# Patient Record
Sex: FEMALE | Race: BLACK OR AFRICAN AMERICAN | NOT HISPANIC OR LATINO | Employment: OTHER | URBAN - METROPOLITAN AREA
[De-identification: names, ages, dates, MRNs, and addresses within clinical notes are randomized per-mention and may not be internally consistent; named-entity substitution may affect disease eponyms.]

---

## 2017-01-20 ENCOUNTER — HISTORICAL (OUTPATIENT)
Dept: ADMINISTRATIVE | Facility: HOSPITAL | Age: 72
End: 2017-01-20

## 2017-03-17 ENCOUNTER — HISTORICAL (OUTPATIENT)
Dept: ADMINISTRATIVE | Facility: HOSPITAL | Age: 72
End: 2017-03-17

## 2017-04-20 ENCOUNTER — HISTORICAL (OUTPATIENT)
Dept: ADMINISTRATIVE | Facility: HOSPITAL | Age: 72
End: 2017-04-20

## 2017-06-05 ENCOUNTER — HISTORICAL (OUTPATIENT)
Dept: INFUSION THERAPY | Facility: HOSPITAL | Age: 72
End: 2017-06-05

## 2017-06-12 ENCOUNTER — HISTORICAL (OUTPATIENT)
Dept: INFUSION THERAPY | Facility: HOSPITAL | Age: 72
End: 2017-06-12

## 2017-06-19 ENCOUNTER — HISTORICAL (OUTPATIENT)
Dept: INFUSION THERAPY | Facility: HOSPITAL | Age: 72
End: 2017-06-19

## 2017-06-21 ENCOUNTER — HISTORICAL (OUTPATIENT)
Dept: ADMINISTRATIVE | Facility: HOSPITAL | Age: 72
End: 2017-06-21

## 2017-06-21 LAB
ABS NEUT (OLG): 5.37 X10(3)/MCL (ref 2.1–9.2)
ALBUMIN SERPL-MCNC: 2.9 GM/DL (ref 3.4–5)
ALBUMIN/GLOB SERPL: 1 RATIO (ref 1–2)
ALP SERPL-CCNC: 74 UNIT/L (ref 20–120)
ALT SERPL-CCNC: 17 UNIT/L
AST SERPL-CCNC: 20 UNIT/L
BASOPHILS # BLD AUTO: 0.04 X10(3)/MCL
BASOPHILS NFR BLD AUTO: 0 % (ref 0–1)
BILIRUB SERPL-MCNC: 0.4 MG/DL
BILIRUBIN DIRECT+TOT PNL SERPL-MCNC: <0.1 MG/DL
BILIRUBIN DIRECT+TOT PNL SERPL-MCNC: >0.3 MG/DL
BUN SERPL-MCNC: 32 MG/DL (ref 7–25)
CALCIUM SERPL-MCNC: 9 MG/DL (ref 8.4–10.3)
CHLORIDE SERPL-SCNC: 105 MMOL/L (ref 96–110)
CHOLEST SERPL-MCNC: 168 MG/DL
CHOLEST/HDLC SERPL: 3.6 {RATIO} (ref 0–4.4)
CO2 SERPL-SCNC: 30 MMOL/L (ref 24–32)
CREAT SERPL-MCNC: 3.47 MG/DL (ref 0.7–1.1)
EOSINOPHIL # BLD AUTO: 0.09 10*3/UL
EOSINOPHIL NFR BLD AUTO: 1 % (ref 0–5)
ERYTHROCYTE [DISTWIDTH] IN BLOOD BY AUTOMATED COUNT: 15.1 % (ref 11.5–14.5)
EST. AVERAGE GLUCOSE BLD GHB EST-MCNC: 183 MG/DL
GLOBULIN SER-MCNC: 4.3 GM/ML (ref 2.3–3.5)
GLUCOSE SERPL-MCNC: 158 MG/DL (ref 65–99)
HBA1C MFR BLD: 8 % (ref 4.7–5.6)
HCT VFR BLD AUTO: 31.9 % (ref 35–46)
HDLC SERPL-MCNC: 47 MG/DL
HGB BLD-MCNC: 9.7 GM/DL (ref 12–16)
IMM GRANULOCYTES # BLD AUTO: 0.02 10*3/UL
IMM GRANULOCYTES NFR BLD AUTO: 0 %
LDLC SERPL CALC-MCNC: 100 MG/DL (ref 0–130)
LYMPHOCYTES # BLD AUTO: 1.8 X10(3)/MCL
LYMPHOCYTES NFR BLD AUTO: 23 % (ref 15–40)
MCH RBC QN AUTO: 26.1 PG (ref 26–34)
MCHC RBC AUTO-ENTMCNC: 30.4 GM/DL (ref 31–37)
MCV RBC AUTO: 86 FL (ref 80–100)
MONOCYTES # BLD AUTO: 0.44 X10(3)/MCL
MONOCYTES NFR BLD AUTO: 6 % (ref 4–12)
NEUTROPHILS # BLD AUTO: 5.37 X10(3)/MCL
NEUTROPHILS NFR BLD AUTO: 69 X10(3)/MCL
PLATELET # BLD AUTO: 232 X10(3)/MCL (ref 130–400)
PMV BLD AUTO: 10.8 FL (ref 7.4–10.4)
POTASSIUM SERPL-SCNC: 3.7 MMOL/L (ref 3.6–5.2)
PROT SERPL-MCNC: 7.2 GM/DL (ref 6–8)
RBC # BLD AUTO: 3.71 X10(6)/MCL (ref 4–5.2)
SODIUM SERPL-SCNC: 142 MMOL/L (ref 135–146)
TRIGL SERPL-MCNC: 103 MG/DL
TSH SERPL-ACNC: 0.85 MIU/L (ref 0.5–5)
VLDLC SERPL CALC-MCNC: 21 MG/DL
WBC # SPEC AUTO: 7.8 X10(3)/MCL (ref 4.5–11)

## 2017-06-26 ENCOUNTER — HISTORICAL (OUTPATIENT)
Dept: INFUSION THERAPY | Facility: HOSPITAL | Age: 72
End: 2017-06-26

## 2017-07-28 ENCOUNTER — HISTORICAL (OUTPATIENT)
Dept: ADMINISTRATIVE | Facility: HOSPITAL | Age: 72
End: 2017-07-28

## 2017-07-28 LAB
ABS NEUT (OLG): 4.44 X10(3)/MCL (ref 2.1–9.2)
ALBUMIN SERPL-MCNC: 2.5 GM/DL (ref 3.4–5)
ALBUMIN/GLOB SERPL: 1 RATIO (ref 1–2)
ALP SERPL-CCNC: 92 UNIT/L (ref 45–117)
ALT SERPL-CCNC: 19 UNIT/L (ref 12–78)
APPEARANCE, UA: ABNORMAL
AST SERPL-CCNC: 15 UNIT/L (ref 15–37)
BACTERIA #/AREA URNS AUTO: ABNORMAL /[HPF]
BASOPHILS # BLD AUTO: 0.03 X10(3)/MCL
BASOPHILS NFR BLD AUTO: 0 % (ref 0–1)
BILIRUB SERPL-MCNC: 0.2 MG/DL (ref 0.2–1)
BILIRUB UR QL STRIP: 1
BILIRUBIN DIRECT+TOT PNL SERPL-MCNC: <0.1 MG/DL
BILIRUBIN DIRECT+TOT PNL SERPL-MCNC: >0.1 MG/DL
BUN SERPL-MCNC: 45 MG/DL (ref 7–18)
CALCIUM SERPL-MCNC: 8.5 MG/DL (ref 8.5–10.1)
CHLORIDE SERPL-SCNC: 105 MMOL/L (ref 98–107)
CO2 SERPL-SCNC: 27 MMOL/L (ref 21–32)
COLOR UR: YELLOW
CREAT SERPL-MCNC: 4.6 MG/DL (ref 0.6–1.3)
CREAT UR-MCNC: 293 MG/DL
DEPRECATED CALCIDIOL+CALCIFEROL SERPL-MC: 23.31 NG/ML (ref 30–80)
EOSINOPHIL # BLD AUTO: 0.09 10*3/UL
EOSINOPHIL NFR BLD AUTO: 1 % (ref 0–5)
ERYTHROCYTE [DISTWIDTH] IN BLOOD BY AUTOMATED COUNT: 15.3 % (ref 11.5–14.5)
FERRITIN SERPL-MCNC: 455.3 NG/ML (ref 8–388)
GLOBULIN SER-MCNC: 4.5 GM/ML (ref 2.3–3.5)
GLUCOSE (UA): 50 MG/DL
GLUCOSE SERPL-MCNC: 311 MG/DL (ref 74–106)
HCT VFR BLD AUTO: 31.6 % (ref 35–46)
HGB BLD-MCNC: 9.9 GM/DL (ref 12–16)
HGB UR QL STRIP: ABNORMAL /MCL
HYALINE CASTS #/AREA URNS LPF: 0 /[LPF]
IMM GRANULOCYTES # BLD AUTO: 0.01 10*3/UL
IMM GRANULOCYTES NFR BLD AUTO: 0 %
IRON SATN MFR SERPL: 20.9 % (ref 15–50)
IRON SERPL-MCNC: 45 MCG/DL (ref 50–170)
KETONES UR QL STRIP: ABNORMAL MG/DL
LEUKOCYTE ESTERASE UR QL STRIP: ABNORMAL /MCL
LYMPHOCYTES # BLD AUTO: 2 X10(3)/MCL
LYMPHOCYTES NFR BLD AUTO: 28 % (ref 15–40)
MAGNESIUM SERPL-MCNC: 2.3 MG/DL (ref 1.8–2.4)
MCH RBC QN AUTO: 26.6 PG (ref 26–34)
MCHC RBC AUTO-ENTMCNC: 31.3 GM/DL (ref 31–37)
MCV RBC AUTO: 84.9 FL (ref 80–100)
MONOCYTES # BLD AUTO: 0.51 X10(3)/MCL
MONOCYTES NFR BLD AUTO: 7 % (ref 4–12)
NEUTROPHILS # BLD AUTO: 4.44 X10(3)/MCL
NEUTROPHILS NFR BLD AUTO: 63 X10(3)/MCL
NITRITE UR QL STRIP: NEGATIVE
PH UR STRIP: 5 [PH] (ref 4.5–8)
PHOSPHATE SERPL-MCNC: 3.1 MG/DL (ref 2.5–4.9)
PLATELET # BLD AUTO: 198 X10(3)/MCL (ref 130–400)
PMV BLD AUTO: 11.1 FL (ref 7.4–10.4)
POTASSIUM SERPL-SCNC: 3.6 MMOL/L (ref 3.5–5.1)
PROT SERPL-MCNC: 7 GM/DL (ref 6.4–8.2)
PROT UR QL STRIP: 500
PROT UR STRIP-MCNC: 897.1 MG/DL
PROT/CREAT UR-RTO: 3061.8 MG/GM
PTH-INTACT SERPL-MCNC: 103.4 PG/ML (ref 13.8–85)
RBC # BLD AUTO: 3.72 X10(6)/MCL (ref 4–5.2)
RBC #/AREA URNS AUTO: ABNORMAL /[HPF]
SODIUM SERPL-SCNC: 141 MMOL/L (ref 136–145)
SP GR UR STRIP: 1.02 (ref 1–1.03)
SQUAMOUS #/AREA URNS LPF: ABNORMAL /[LPF]
TIBC SERPL-MCNC: 215 MCG/DL (ref 250–450)
TRANSFERRIN SERPL-MCNC: 184 MG/DL (ref 200–360)
UROBILINOGEN UR STRIP-ACNC: ABNORMAL MG/DL
WBC # SPEC AUTO: 7.1 X10(3)/MCL (ref 4.5–11)
WBC #/AREA URNS AUTO: ABNORMAL /HPF

## 2017-09-26 ENCOUNTER — HISTORICAL (OUTPATIENT)
Dept: ADMINISTRATIVE | Facility: HOSPITAL | Age: 72
End: 2017-09-26

## 2017-09-26 LAB
ABS NEUT (OLG): 4.39 X10(3)/MCL (ref 2.1–9.2)
ALBUMIN SERPL-MCNC: 2.9 GM/DL (ref 3.4–5)
ALBUMIN/GLOB SERPL: 1 RATIO (ref 1–2)
ALP SERPL-CCNC: 105 UNIT/L (ref 45–117)
ALT SERPL-CCNC: 19 UNIT/L (ref 12–78)
APPEARANCE, UA: ABNORMAL
AST SERPL-CCNC: 17 UNIT/L (ref 15–37)
BACTERIA #/AREA URNS AUTO: ABNORMAL /[HPF]
BASOPHILS # BLD AUTO: 0.03 X10(3)/MCL
BASOPHILS NFR BLD AUTO: 0 % (ref 0–1)
BILIRUB SERPL-MCNC: 0.3 MG/DL (ref 0.2–1)
BILIRUB UR QL STRIP: 0.5 MG/DL
BILIRUBIN DIRECT+TOT PNL SERPL-MCNC: <0.1 MG/DL
BILIRUBIN DIRECT+TOT PNL SERPL-MCNC: >0.2 MG/DL
BUN SERPL-MCNC: 41 MG/DL (ref 7–18)
CALCIUM SERPL-MCNC: 9.2 MG/DL (ref 8.5–10.1)
CHLORIDE SERPL-SCNC: 108 MMOL/L (ref 98–107)
CO2 SERPL-SCNC: 32 MMOL/L (ref 21–32)
COLOR UR: YELLOW
CREAT SERPL-MCNC: 4.5 MG/DL (ref 0.6–1.3)
CREAT UR-MCNC: 375 MG/DL
DEPRECATED CALCIDIOL+CALCIFEROL SERPL-MC: 39.51 NG/ML (ref 30–80)
EOSINOPHIL # BLD AUTO: 0.12 X10(3)/MCL
EOSINOPHIL NFR BLD AUTO: 2 % (ref 0–5)
ERYTHROCYTE [DISTWIDTH] IN BLOOD BY AUTOMATED COUNT: 15.4 % (ref 11.5–14.5)
FERRITIN SERPL-MCNC: 405.1 NG/ML (ref 8–388)
GLOBULIN SER-MCNC: 4.6 GM/ML (ref 2.3–3.5)
GLUCOSE (UA): NORMAL
GLUCOSE SERPL-MCNC: 110 MG/DL (ref 74–106)
HCT VFR BLD AUTO: 32.7 % (ref 35–46)
HGB BLD-MCNC: 10.4 GM/DL (ref 12–16)
HGB UR QL STRIP: 0.06 MG/DL
HYALINE CASTS #/AREA URNS LPF: ABNORMAL /[LPF]
IMM GRANULOCYTES # BLD AUTO: 0.02 10*3/UL
IMM GRANULOCYTES NFR BLD AUTO: 0 %
IRON SATN MFR SERPL: 21.7 % (ref 15–50)
IRON SERPL-MCNC: 52 MCG/DL (ref 50–170)
KETONES UR QL STRIP: ABNORMAL
LEUKOCYTE ESTERASE UR QL STRIP: 75 LEU/UL
LYMPHOCYTES # BLD AUTO: 2 X10(3)/MCL
LYMPHOCYTES NFR BLD AUTO: 29 % (ref 15–40)
MAGNESIUM SERPL-MCNC: 2.4 MG/DL (ref 1.8–2.4)
MCH RBC QN AUTO: 27.8 PG (ref 26–34)
MCHC RBC AUTO-ENTMCNC: 31.8 GM/DL (ref 31–37)
MCV RBC AUTO: 87.4 FL (ref 80–100)
MONOCYTES # BLD AUTO: 0.44 X10(3)/MCL
MONOCYTES NFR BLD AUTO: 6 % (ref 4–12)
NEUTROPHILS # BLD AUTO: 4.39 X10(3)/MCL
NEUTROPHILS NFR BLD AUTO: 63 X10(3)/MCL
NITRITE UR QL STRIP: NEGATIVE
PH UR STRIP: 6 [PH] (ref 4.5–8)
PHOSPHATE SERPL-MCNC: 3.7 MG/DL (ref 2.5–4.9)
PLATELET # BLD AUTO: 230 X10(3)/MCL (ref 130–400)
PMV BLD AUTO: 10.9 FL (ref 7.4–10.4)
POTASSIUM SERPL-SCNC: 4.5 MMOL/L (ref 3.5–5.1)
PROT SERPL-MCNC: 7.5 GM/DL (ref 6.4–8.2)
PROT UR QL STRIP: >600 MG/DL
PROT UR STRIP-MCNC: 1032.6 MG/DL
PROT/CREAT UR-RTO: 2753.6 MG/GM
PTH-INTACT SERPL-MCNC: 147.9 PG/ML (ref 13.8–85)
RBC # BLD AUTO: 3.74 X10(6)/MCL (ref 4–5.2)
RBC #/AREA URNS AUTO: ABNORMAL /[HPF]
SODIUM SERPL-SCNC: 142 MMOL/L (ref 136–145)
SP GR UR STRIP: 1.02 (ref 1–1.03)
SQUAMOUS #/AREA URNS LPF: ABNORMAL /[LPF]
TIBC SERPL-MCNC: 240 MCG/DL (ref 250–450)
TRANSFERRIN SERPL-MCNC: 208 MG/DL (ref 200–360)
UROBILINOGEN UR STRIP-ACNC: 2 MG/DL
WBC # SPEC AUTO: 7 X10(3)/MCL (ref 4.5–11)
WBC #/AREA URNS AUTO: ABNORMAL /HPF

## 2017-10-12 ENCOUNTER — HISTORICAL (OUTPATIENT)
Dept: ADMINISTRATIVE | Facility: HOSPITAL | Age: 72
End: 2017-10-12

## 2017-10-12 LAB
ABS NEUT (OLG): 5.1 X10(3)/MCL (ref 2.1–9.2)
ALBUMIN SERPL-MCNC: 2.9 GM/DL (ref 3.4–5)
ALBUMIN/GLOB SERPL: 1 RATIO (ref 1–2)
ALP SERPL-CCNC: 97 UNIT/L (ref 45–117)
ALT SERPL-CCNC: 18 UNIT/L (ref 12–78)
AST SERPL-CCNC: 16 UNIT/L (ref 15–37)
BASOPHILS # BLD AUTO: 0.04 X10(3)/MCL
BASOPHILS NFR BLD AUTO: 0 % (ref 0–1)
BILIRUB SERPL-MCNC: 0.3 MG/DL (ref 0.2–1)
BILIRUBIN DIRECT+TOT PNL SERPL-MCNC: 0.1 MG/DL
BILIRUBIN DIRECT+TOT PNL SERPL-MCNC: 0.2 MG/DL
BUN SERPL-MCNC: 45 MG/DL (ref 7–18)
CALCIUM SERPL-MCNC: 8.8 MG/DL (ref 8.5–10.1)
CHLORIDE SERPL-SCNC: 107 MMOL/L (ref 98–107)
CO2 SERPL-SCNC: 32 MMOL/L (ref 21–32)
CREAT SERPL-MCNC: 4.5 MG/DL (ref 0.6–1.3)
EOSINOPHIL # BLD AUTO: 0.16 X10(3)/MCL
EOSINOPHIL NFR BLD AUTO: 2 % (ref 0–5)
ERYTHROCYTE [DISTWIDTH] IN BLOOD BY AUTOMATED COUNT: 14.7 % (ref 11.5–14.5)
GLOBULIN SER-MCNC: 4.7 GM/ML (ref 2.3–3.5)
GLUCOSE SERPL-MCNC: 94 MG/DL (ref 74–106)
HCT VFR BLD AUTO: 31.2 % (ref 35–46)
HGB BLD-MCNC: 9.9 GM/DL (ref 12–16)
IMM GRANULOCYTES # BLD AUTO: 0.03 10*3/UL
IMM GRANULOCYTES NFR BLD AUTO: 0 %
LYMPHOCYTES # BLD AUTO: 2.23 X10(3)/MCL
LYMPHOCYTES NFR BLD AUTO: 27 % (ref 15–40)
MAGNESIUM SERPL-MCNC: 2.3 MG/DL (ref 1.8–2.4)
MCH RBC QN AUTO: 28 PG (ref 26–34)
MCHC RBC AUTO-ENTMCNC: 31.7 GM/DL (ref 31–37)
MCV RBC AUTO: 88.1 FL (ref 80–100)
MONOCYTES # BLD AUTO: 0.57 X10(3)/MCL
MONOCYTES NFR BLD AUTO: 7 % (ref 4–12)
NEUTROPHILS # BLD AUTO: 5.1 X10(3)/MCL
NEUTROPHILS NFR BLD AUTO: 63 X10(3)/MCL
PHOSPHATE SERPL-MCNC: 4.4 MG/DL (ref 2.5–4.9)
PLATELET # BLD AUTO: 201 X10(3)/MCL (ref 130–400)
PMV BLD AUTO: 10.5 FL (ref 7.4–10.4)
POTASSIUM SERPL-SCNC: 4.1 MMOL/L (ref 3.5–5.1)
PROT SERPL-MCNC: 7.6 GM/DL (ref 6.4–8.2)
PTH-INTACT SERPL-MCNC: 151.4 PG/ML (ref 13.8–85)
RBC # BLD AUTO: 3.54 X10(6)/MCL (ref 4–5.2)
SODIUM SERPL-SCNC: 143 MMOL/L (ref 136–145)
WBC # SPEC AUTO: 8.1 X10(3)/MCL (ref 4.5–11)

## 2017-10-19 ENCOUNTER — HISTORICAL (OUTPATIENT)
Dept: INTERNAL MEDICINE | Facility: CLINIC | Age: 72
End: 2017-10-19

## 2017-11-07 ENCOUNTER — HISTORICAL (OUTPATIENT)
Dept: INTERNAL MEDICINE | Facility: CLINIC | Age: 72
End: 2017-11-07

## 2017-11-07 LAB
ABS NEUT (OLG): 5.2 X10(3)/MCL (ref 2.1–9.2)
ALBUMIN SERPL-MCNC: 3 GM/DL (ref 3.4–5)
ALBUMIN/GLOB SERPL: 1 RATIO (ref 1–2)
ALP SERPL-CCNC: 108 UNIT/L (ref 45–117)
ALT SERPL-CCNC: 22 UNIT/L (ref 12–78)
AST SERPL-CCNC: 21 UNIT/L (ref 15–37)
BASOPHILS # BLD AUTO: 0.03 X10(3)/MCL
BASOPHILS NFR BLD AUTO: 0 % (ref 0–1)
BILIRUB SERPL-MCNC: 0.3 MG/DL (ref 0.2–1)
BILIRUBIN DIRECT+TOT PNL SERPL-MCNC: 0.1 MG/DL
BILIRUBIN DIRECT+TOT PNL SERPL-MCNC: 0.2 MG/DL
BUN SERPL-MCNC: 45 MG/DL (ref 7–18)
CALCIUM SERPL-MCNC: 9.1 MG/DL (ref 8.5–10.1)
CHLORIDE SERPL-SCNC: 107 MMOL/L (ref 98–107)
CHOLEST SERPL-MCNC: 156 MG/DL
CHOLEST/HDLC SERPL: 3.1 {RATIO} (ref 0–4.4)
CO2 SERPL-SCNC: 29 MMOL/L (ref 21–32)
CREAT SERPL-MCNC: 3.7 MG/DL (ref 0.6–1.3)
EOSINOPHIL # BLD AUTO: 0.11 X10(3)/MCL
EOSINOPHIL NFR BLD AUTO: 1 % (ref 0–5)
ERYTHROCYTE [DISTWIDTH] IN BLOOD BY AUTOMATED COUNT: 14.5 % (ref 11.5–14.5)
EST. AVERAGE GLUCOSE BLD GHB EST-MCNC: 189 MG/DL
GLOBULIN SER-MCNC: 4.3 GM/ML (ref 2.3–3.5)
GLUCOSE SERPL-MCNC: 112 MG/DL (ref 74–106)
HBA1C MFR BLD: 8.2 % (ref 4.2–6.3)
HCT VFR BLD AUTO: 26.2 % (ref 35–46)
HDLC SERPL-MCNC: 50 MG/DL
HGB BLD-MCNC: 8.1 GM/DL (ref 12–16)
IMM GRANULOCYTES # BLD AUTO: 0.02 10*3/UL
IMM GRANULOCYTES NFR BLD AUTO: 0 %
LDLC SERPL CALC-MCNC: 87 MG/DL (ref 0–130)
LYMPHOCYTES # BLD AUTO: 2.03 X10(3)/MCL
LYMPHOCYTES NFR BLD AUTO: 25 % (ref 15–40)
MCH RBC QN AUTO: 27.7 PG (ref 26–34)
MCHC RBC AUTO-ENTMCNC: 30.9 GM/DL (ref 31–37)
MCV RBC AUTO: 89.7 FL (ref 80–100)
MONOCYTES # BLD AUTO: 0.74 X10(3)/MCL
MONOCYTES NFR BLD AUTO: 9 % (ref 4–12)
NEUTROPHILS # BLD AUTO: 5.2 X10(3)/MCL
NEUTROPHILS NFR BLD AUTO: 64 X10(3)/MCL
PLATELET # BLD AUTO: 209 X10(3)/MCL (ref 130–400)
PMV BLD AUTO: 10.2 FL (ref 7.4–10.4)
POTASSIUM SERPL-SCNC: 4.2 MMOL/L (ref 3.5–5.1)
PROT SERPL-MCNC: 7.3 GM/DL (ref 6.4–8.2)
RBC # BLD AUTO: 2.92 X10(6)/MCL (ref 4–5.2)
SODIUM SERPL-SCNC: 144 MMOL/L (ref 136–145)
TRIGL SERPL-MCNC: 95 MG/DL
TSH SERPL-ACNC: 3.18 MIU/L (ref 0.36–3.74)
VLDLC SERPL CALC-MCNC: 19 MG/DL
WBC # SPEC AUTO: 8.1 X10(3)/MCL (ref 4.5–11)

## 2017-11-29 ENCOUNTER — HISTORICAL (OUTPATIENT)
Dept: RADIOLOGY | Facility: HOSPITAL | Age: 72
End: 2017-11-29

## 2017-12-05 ENCOUNTER — HISTORICAL (OUTPATIENT)
Dept: INTERNAL MEDICINE | Facility: CLINIC | Age: 72
End: 2017-12-05

## 2017-12-05 LAB
ABS NEUT (OLG): 4.69 X10(3)/MCL (ref 2.1–9.2)
ALBUMIN SERPL-MCNC: 2.9 GM/DL (ref 3.4–5)
ALBUMIN/GLOB SERPL: 1 RATIO (ref 1–2)
ALP SERPL-CCNC: 106 UNIT/L (ref 45–117)
ALT SERPL-CCNC: 21 UNIT/L (ref 12–78)
AST SERPL-CCNC: 19 UNIT/L (ref 15–37)
BASOPHILS # BLD AUTO: 0.05 X10(3)/MCL
BASOPHILS NFR BLD AUTO: 1 % (ref 0–1)
BILIRUB SERPL-MCNC: 0.2 MG/DL (ref 0.2–1)
BILIRUBIN DIRECT+TOT PNL SERPL-MCNC: 0.1 MG/DL
BILIRUBIN DIRECT+TOT PNL SERPL-MCNC: 0.1 MG/DL
BUN SERPL-MCNC: 49 MG/DL (ref 7–18)
CALCIUM SERPL-MCNC: 9 MG/DL (ref 8.5–10.1)
CHLORIDE SERPL-SCNC: 104 MMOL/L (ref 98–107)
CHOLEST SERPL-MCNC: 152 MG/DL
CHOLEST/HDLC SERPL: 2.3 {RATIO} (ref 0–4.4)
CO2 SERPL-SCNC: 33 MMOL/L (ref 21–32)
CREAT SERPL-MCNC: 5.3 MG/DL (ref 0.6–1.3)
EOSINOPHIL # BLD AUTO: 0.15 10*3/UL
EOSINOPHIL NFR BLD AUTO: 2 % (ref 0–5)
ERYTHROCYTE [DISTWIDTH] IN BLOOD BY AUTOMATED COUNT: 13.9 % (ref 11.5–14.5)
EST. AVERAGE GLUCOSE BLD GHB EST-MCNC: 174 MG/DL
GLOBULIN SER-MCNC: 4.4 GM/ML (ref 2.3–3.5)
GLUCOSE SERPL-MCNC: 53 MG/DL (ref 74–106)
HBA1C MFR BLD: 7.7 % (ref 4.2–6.3)
HCT VFR BLD AUTO: 29.2 % (ref 35–46)
HDLC SERPL-MCNC: 65 MG/DL
HGB BLD-MCNC: 9 GM/DL (ref 12–16)
IMM GRANULOCYTES # BLD AUTO: 0.03 10*3/UL
IMM GRANULOCYTES NFR BLD AUTO: 0 %
LDLC SERPL CALC-MCNC: 72 MG/DL (ref 0–130)
LYMPHOCYTES # BLD AUTO: 2.34 X10(3)/MCL
LYMPHOCYTES NFR BLD AUTO: 30 % (ref 15–40)
MCH RBC QN AUTO: 28.1 PG (ref 26–34)
MCHC RBC AUTO-ENTMCNC: 30.8 GM/DL (ref 31–37)
MCV RBC AUTO: 91.3 FL (ref 80–100)
MONOCYTES # BLD AUTO: 0.61 X10(3)/MCL
MONOCYTES NFR BLD AUTO: 8 % (ref 4–12)
NEUTROPHILS # BLD AUTO: 4.69 X10(3)/MCL
NEUTROPHILS NFR BLD AUTO: 60 X10(3)/MCL
PLATELET # BLD AUTO: 236 X10(3)/MCL (ref 130–400)
PMV BLD AUTO: 10.8 FL (ref 7.4–10.4)
POTASSIUM SERPL-SCNC: 4.4 MMOL/L (ref 3.5–5.1)
PROT SERPL-MCNC: 7.3 GM/DL (ref 6.4–8.2)
PTH-INTACT SERPL-MCNC: 131.8 PG/ML (ref 13.8–85)
RBC # BLD AUTO: 3.2 X10(6)/MCL (ref 4–5.2)
SODIUM SERPL-SCNC: 143 MMOL/L (ref 136–145)
TRIGL SERPL-MCNC: 76 MG/DL
TSH SERPL-ACNC: 0.82 MIU/L (ref 0.36–3.74)
VLDLC SERPL CALC-MCNC: 15 MG/DL
WBC # SPEC AUTO: 7.9 X10(3)/MCL (ref 4.5–11)

## 2018-01-08 ENCOUNTER — HISTORICAL (OUTPATIENT)
Dept: CARDIOLOGY | Facility: CLINIC | Age: 73
End: 2018-01-08

## 2018-01-10 ENCOUNTER — HISTORICAL (OUTPATIENT)
Dept: RADIOLOGY | Facility: HOSPITAL | Age: 73
End: 2018-01-10

## 2018-02-14 ENCOUNTER — HISTORICAL (OUTPATIENT)
Dept: ADMINISTRATIVE | Facility: HOSPITAL | Age: 73
End: 2018-02-14

## 2018-02-14 LAB
BILIRUB SERPL-MCNC: NEGATIVE MG/DL
BLOOD URINE, POC: NORMAL
CLARITY, POC UA: CLEAR
COLOR, POC UA: COLORLESS
GLUCOSE UR QL STRIP: NEGATIVE
KETONES UR QL STRIP: NEGATIVE
LEUKOCYTE EST, POC UA: NORMAL
NITRITE, POC UA: NEGATIVE
PH, POC UA: 6
PROTEIN, POC: NORMAL
SPECIFIC GRAVITY, POC UA: 1.02
UROBILINOGEN, POC UA: NORMAL

## 2018-02-16 LAB — FINAL CULTURE: NORMAL

## 2018-02-28 ENCOUNTER — HISTORICAL (OUTPATIENT)
Dept: RADIOLOGY | Facility: HOSPITAL | Age: 73
End: 2018-02-28

## 2018-03-14 ENCOUNTER — HISTORICAL (OUTPATIENT)
Dept: ADMINISTRATIVE | Facility: HOSPITAL | Age: 73
End: 2018-03-14

## 2018-03-21 ENCOUNTER — HISTORICAL (OUTPATIENT)
Dept: SURGERY | Facility: HOSPITAL | Age: 73
End: 2018-03-21

## 2018-03-21 LAB
ABS NEUT (OLG): 6.22 X10(3)/MCL (ref 2.1–9.2)
ALBUMIN SERPL-MCNC: 3.2 GM/DL (ref 3.4–5)
ALBUMIN/GLOB SERPL: 1 RATIO (ref 1–2)
ALP SERPL-CCNC: 119 UNIT/L (ref 45–117)
ALT SERPL-CCNC: 18 UNIT/L (ref 12–78)
APTT PPP: 37.4 SECOND(S) (ref 23.3–37)
AST SERPL-CCNC: 21 UNIT/L (ref 15–37)
BASOPHILS # BLD AUTO: 0.03 X10(3)/MCL
BASOPHILS NFR BLD AUTO: 0 %
BILIRUB SERPL-MCNC: 0.2 MG/DL (ref 0.2–1)
BILIRUBIN DIRECT+TOT PNL SERPL-MCNC: <0.1 MG/DL
BILIRUBIN DIRECT+TOT PNL SERPL-MCNC: ABNORMAL MG/DL
BUN SERPL-MCNC: 29 MG/DL (ref 7–18)
CALCIUM SERPL-MCNC: 8.9 MG/DL (ref 8.5–10.1)
CHLORIDE SERPL-SCNC: 100 MMOL/L (ref 98–107)
CO2 SERPL-SCNC: 36 MMOL/L (ref 21–32)
CREAT SERPL-MCNC: 3.6 MG/DL (ref 0.6–1.3)
EOSINOPHIL # BLD AUTO: 0.13 10*3/UL
EOSINOPHIL NFR BLD AUTO: 1 %
ERYTHROCYTE [DISTWIDTH] IN BLOOD BY AUTOMATED COUNT: 15.3 % (ref 11.5–14.5)
EST. AVERAGE GLUCOSE BLD GHB EST-MCNC: 194 MG/DL
GLOBULIN SER-MCNC: 5.4 GM/ML (ref 2.3–3.5)
GLUCOSE SERPL-MCNC: 44 MG/DL (ref 74–106)
HBA1C MFR BLD: 8.4 % (ref 4.2–6.3)
HCT VFR BLD AUTO: 37.9 % (ref 35–46)
HGB BLD-MCNC: 11.8 GM/DL (ref 12–16)
IMM GRANULOCYTES # BLD AUTO: 0.04 10*3/UL
IMM GRANULOCYTES NFR BLD AUTO: 0 %
INR PPP: 1.04 (ref 0.9–1.2)
LYMPHOCYTES # BLD AUTO: 2.11 X10(3)/MCL
LYMPHOCYTES NFR BLD AUTO: 23 % (ref 13–40)
MCH RBC QN AUTO: 29.1 PG (ref 26–34)
MCHC RBC AUTO-ENTMCNC: 31.1 GM/DL (ref 31–37)
MCV RBC AUTO: 93.6 FL (ref 80–100)
MONOCYTES # BLD AUTO: 0.7 X10(3)/MCL
MONOCYTES NFR BLD AUTO: 8 % (ref 4–12)
NEUTROPHILS # BLD AUTO: 6.22 X10(3)/MCL
NEUTROPHILS NFR BLD AUTO: 67 X10(3)/MCL
PLATELET # BLD AUTO: 248 X10(3)/MCL (ref 130–400)
PMV BLD AUTO: 10.2 FL (ref 7.4–10.4)
POTASSIUM SERPL-SCNC: 3.5 MMOL/L (ref 3.5–5.1)
PROT SERPL-MCNC: 8.6 GM/DL (ref 6.4–8.2)
PROTHROMBIN TIME: 12.9 SECOND(S) (ref 11.9–14.4)
RBC # BLD AUTO: 4.05 X10(6)/MCL (ref 4–5.2)
SODIUM SERPL-SCNC: 141 MMOL/L (ref 136–145)
T4 FREE SERPL-MCNC: 1.31 NG/DL (ref 0.76–1.46)
TSH SERPL-ACNC: 1.21 MIU/L (ref 0.36–3.74)
WBC # SPEC AUTO: 9.2 X10(3)/MCL (ref 4.5–11)

## 2018-04-21 ENCOUNTER — HOSPITAL ENCOUNTER (OUTPATIENT)
Dept: MEDSURG UNIT | Facility: HOSPITAL | Age: 73
End: 2018-04-23
Attending: INTERNAL MEDICINE | Admitting: INTERNAL MEDICINE

## 2018-04-21 LAB
ABS NEUT (OLG): 3.88 X10(3)/MCL (ref 2.1–9.2)
ABS NEUT (OLG): 3.97 X10(3)/MCL (ref 2.1–9.2)
ALBUMIN SERPL-MCNC: 2.6 GM/DL (ref 3.4–5)
ALBUMIN SERPL-MCNC: 3 GM/DL (ref 3.4–5)
ALBUMIN/GLOB SERPL: 1 RATIO (ref 1–2)
ALBUMIN/GLOB SERPL: 1 RATIO (ref 1–2)
ALP SERPL-CCNC: 106 UNIT/L (ref 45–117)
ALP SERPL-CCNC: 123 UNIT/L (ref 45–117)
ALT SERPL-CCNC: 13 UNIT/L (ref 12–78)
ALT SERPL-CCNC: 20 UNIT/L (ref 12–78)
AMPHET UR QL SCN: NEGATIVE
APPEARANCE, UA: CLEAR
AST SERPL-CCNC: 15 UNIT/L (ref 15–37)
AST SERPL-CCNC: 17 UNIT/L (ref 15–37)
BACTERIA #/AREA URNS AUTO: ABNORMAL /[HPF]
BARBITURATE SCN PRESENT UR: NEGATIVE
BASOPHILS # BLD AUTO: 0.04 X10(3)/MCL
BASOPHILS # BLD AUTO: 0.04 X10(3)/MCL
BASOPHILS NFR BLD AUTO: 1 %
BASOPHILS NFR BLD AUTO: 1 %
BENZODIAZ UR QL SCN: NEGATIVE
BILIRUB SERPL-MCNC: 0.2 MG/DL (ref 0.2–1)
BILIRUB SERPL-MCNC: 0.3 MG/DL (ref 0.2–1)
BILIRUB UR QL STRIP: NEGATIVE
BILIRUBIN DIRECT+TOT PNL SERPL-MCNC: 0.1 MG/DL
BILIRUBIN DIRECT+TOT PNL SERPL-MCNC: 0.2 MG/DL
BUN SERPL-MCNC: 16 MG/DL (ref 7–18)
BUN SERPL-MCNC: 23 MG/DL (ref 7–18)
CALCIUM SERPL-MCNC: 8.2 MG/DL (ref 8.5–10.1)
CALCIUM SERPL-MCNC: 8.4 MG/DL (ref 8.5–10.1)
CANNABINOIDS UR QL SCN: NEGATIVE
CHLORIDE SERPL-SCNC: 102 MMOL/L (ref 98–107)
CHLORIDE SERPL-SCNC: 98 MMOL/L (ref 98–107)
CK MB SERPL-MCNC: 2 NG/ML (ref 1–3.6)
CK SERPL-CCNC: 146 UNIT/L (ref 26–192)
CO2 SERPL-SCNC: 29 MMOL/L (ref 21–32)
CO2 SERPL-SCNC: 34 MMOL/L (ref 21–32)
COCAINE UR QL SCN: NEGATIVE
COLOR UR: ABNORMAL
CREAT SERPL-MCNC: 2.3 MG/DL (ref 0.6–1.3)
CREAT SERPL-MCNC: 3.1 MG/DL (ref 0.6–1.3)
EOSINOPHIL # BLD AUTO: 0.13 10*3/UL
EOSINOPHIL # BLD AUTO: 0.16 10*3/UL
EOSINOPHIL NFR BLD AUTO: 2 %
EOSINOPHIL NFR BLD AUTO: 2 %
ERYTHROCYTE [DISTWIDTH] IN BLOOD BY AUTOMATED COUNT: 15.9 % (ref 11.5–14.5)
ERYTHROCYTE [DISTWIDTH] IN BLOOD BY AUTOMATED COUNT: 15.9 % (ref 11.5–14.5)
GLOBULIN SER-MCNC: 4.4 GM/ML (ref 2.3–3.5)
GLOBULIN SER-MCNC: 4.9 GM/ML (ref 2.3–3.5)
GLUCOSE (UA): 30 MG/DL
GLUCOSE SERPL-MCNC: 117 MG/DL (ref 74–106)
GLUCOSE SERPL-MCNC: 201 MG/DL (ref 74–106)
HCT VFR BLD AUTO: 35.4 % (ref 35–46)
HCT VFR BLD AUTO: 35.5 % (ref 35–46)
HGB BLD-MCNC: 10.8 GM/DL (ref 12–16)
HGB BLD-MCNC: 11.2 GM/DL (ref 12–16)
HGB UR QL STRIP: NEGATIVE
HYALINE CASTS #/AREA URNS LPF: ABNORMAL /[LPF]
IMM GRANULOCYTES # BLD AUTO: 0.02 10*3/UL
IMM GRANULOCYTES # BLD AUTO: 0.02 10*3/UL
IMM GRANULOCYTES NFR BLD AUTO: 0 %
IMM GRANULOCYTES NFR BLD AUTO: 0 %
KETONES UR QL STRIP: NEGATIVE
LEUKOCYTE ESTERASE UR QL STRIP: NEGATIVE
LYMPHOCYTES # BLD AUTO: 1.88 X10(3)/MCL
LYMPHOCYTES # BLD AUTO: 1.97 X10(3)/MCL
LYMPHOCYTES NFR BLD AUTO: 28 % (ref 13–40)
LYMPHOCYTES NFR BLD AUTO: 29 % (ref 13–40)
MAGNESIUM SERPL-MCNC: 2 MG/DL (ref 1.8–2.4)
MCH RBC QN AUTO: 29.3 PG (ref 26–34)
MCH RBC QN AUTO: 29.9 PG (ref 26–34)
MCHC RBC AUTO-ENTMCNC: 30.5 GM/DL (ref 31–37)
MCHC RBC AUTO-ENTMCNC: 31.5 GM/DL (ref 31–37)
MCV RBC AUTO: 94.9 FL (ref 80–100)
MCV RBC AUTO: 96.2 FL (ref 80–100)
MONOCYTES # BLD AUTO: 0.53 X10(3)/MCL
MONOCYTES # BLD AUTO: 0.64 X10(3)/MCL
MONOCYTES NFR BLD AUTO: 10 % (ref 4–12)
MONOCYTES NFR BLD AUTO: 8 % (ref 4–12)
NEUTROPHILS # BLD AUTO: 3.88 X10(3)/MCL
NEUTROPHILS # BLD AUTO: 3.97 X10(3)/MCL
NEUTROPHILS NFR BLD AUTO: 59 X10(3)/MCL
NEUTROPHILS NFR BLD AUTO: 59 X10(3)/MCL
NITRITE UR QL STRIP: NEGATIVE
OPIATES UR QL SCN: NEGATIVE
PCP UR QL: NEGATIVE
PH UR STRIP.AUTO: 7.5 [PH] (ref 5–8)
PH UR STRIP: 7.5 [PH] (ref 4.5–8)
PLATELET # BLD AUTO: 187 X10(3)/MCL (ref 130–400)
PLATELET # BLD AUTO: 200 X10(3)/MCL (ref 130–400)
PMV BLD AUTO: 10.5 FL (ref 7.4–10.4)
PMV BLD AUTO: 11.7 FL (ref 7.4–10.4)
POC TROPONIN: 0.06 NG/ML (ref 0–0.08)
POTASSIUM SERPL-SCNC: 2.9 MMOL/L (ref 3.5–5.1)
POTASSIUM SERPL-SCNC: 3.7 MMOL/L (ref 3.5–5.1)
PROT SERPL-MCNC: 7 GM/DL (ref 6.4–8.2)
PROT SERPL-MCNC: 7.9 GM/DL (ref 6.4–8.2)
PROT UR QL STRIP: 200 MG/DL
RBC # BLD AUTO: 3.68 X10(6)/MCL (ref 4–5.2)
RBC # BLD AUTO: 3.74 X10(6)/MCL (ref 4–5.2)
RBC #/AREA URNS AUTO: ABNORMAL /[HPF]
SODIUM SERPL-SCNC: 139 MMOL/L (ref 136–145)
SODIUM SERPL-SCNC: 141 MMOL/L (ref 136–145)
SP GR UR STRIP: ABNORMAL (ref 1–1.03)
SQUAMOUS #/AREA URNS LPF: ABNORMAL /[LPF]
TEMPERATURE, URINE (OHS): 23 DEGC (ref 20–25)
TROPONIN I SERPL-MCNC: 0.06 NG/ML (ref 0–0.05)
UROBILINOGEN UR STRIP-ACNC: NORMAL
WBC # SPEC AUTO: 6.6 X10(3)/MCL (ref 4.5–11)
WBC # SPEC AUTO: 6.7 X10(3)/MCL (ref 4.5–11)
WBC #/AREA URNS AUTO: ABNORMAL /HPF

## 2018-04-22 LAB
TROPONIN I SERPL-MCNC: 0.05 NG/ML (ref 0–0.05)
TROPONIN I SERPL-MCNC: 0.06 NG/ML (ref 0–0.05)

## 2018-04-23 LAB
ABS NEUT (OLG): 2.47 X10(3)/MCL (ref 2.1–9.2)
ALBUMIN SERPL-MCNC: 2.8 GM/DL (ref 3.4–5)
ALBUMIN/GLOB SERPL: 1 RATIO (ref 1–2)
ALP SERPL-CCNC: 106 UNIT/L (ref 45–117)
ALT SERPL-CCNC: 11 UNIT/L (ref 12–78)
AST SERPL-CCNC: 14 UNIT/L (ref 15–37)
BASOPHILS # BLD AUTO: 0.03 X10(3)/MCL
BASOPHILS NFR BLD AUTO: 1 %
BILIRUB SERPL-MCNC: 0.3 MG/DL (ref 0.2–1)
BILIRUBIN DIRECT+TOT PNL SERPL-MCNC: 0.1 MG/DL
BILIRUBIN DIRECT+TOT PNL SERPL-MCNC: 0.2 MG/DL
BUN SERPL-MCNC: 34 MG/DL (ref 7–18)
CALCIUM SERPL-MCNC: 8.7 MG/DL (ref 8.5–10.1)
CHLORIDE SERPL-SCNC: 102 MMOL/L (ref 98–107)
CO2 SERPL-SCNC: 28 MMOL/L (ref 21–32)
CREAT SERPL-MCNC: 3.8 MG/DL (ref 0.6–1.3)
EOSINOPHIL # BLD AUTO: 0.11 X10(3)/MCL
EOSINOPHIL NFR BLD AUTO: 2 %
ERYTHROCYTE [DISTWIDTH] IN BLOOD BY AUTOMATED COUNT: 15.8 % (ref 11.5–14.5)
GLOBULIN SER-MCNC: 4.5 GM/ML (ref 2.3–3.5)
GLUCOSE SERPL-MCNC: 158 MG/DL (ref 74–106)
HCT VFR BLD AUTO: 35.2 % (ref 35–46)
HGB BLD-MCNC: 10.9 GM/DL (ref 12–16)
IMM GRANULOCYTES # BLD AUTO: 0.01 10*3/UL
IMM GRANULOCYTES NFR BLD AUTO: 0 %
LYMPHOCYTES # BLD AUTO: 1.45 X10(3)/MCL
LYMPHOCYTES NFR BLD AUTO: 32 % (ref 13–40)
MCH RBC QN AUTO: 29.3 PG (ref 26–34)
MCHC RBC AUTO-ENTMCNC: 31 GM/DL (ref 31–37)
MCV RBC AUTO: 94.6 FL (ref 80–100)
MONOCYTES # BLD AUTO: 0.48 X10(3)/MCL
MONOCYTES NFR BLD AUTO: 10 % (ref 4–12)
NEUTROPHILS # BLD AUTO: 2.47 X10(3)/MCL
NEUTROPHILS NFR BLD AUTO: 54 X10(3)/MCL
PLATELET # BLD AUTO: 169 X10(3)/MCL (ref 130–400)
PMV BLD AUTO: 10.8 FL (ref 7.4–10.4)
POTASSIUM SERPL-SCNC: 4.7 MMOL/L (ref 3.5–5.1)
PROT SERPL-MCNC: 7.3 GM/DL (ref 6.4–8.2)
RBC # BLD AUTO: 3.72 X10(6)/MCL (ref 4–5.2)
SODIUM SERPL-SCNC: 139 MMOL/L (ref 136–145)
TROPONIN I SERPL-MCNC: 0.04 NG/ML (ref 0–0.05)
WBC # SPEC AUTO: 4.6 X10(3)/MCL (ref 4.5–11)

## 2018-04-28 LAB — FINAL CULTURE: NORMAL

## 2018-06-07 ENCOUNTER — HOSPITAL ENCOUNTER (OUTPATIENT)
Dept: MEDSURG UNIT | Facility: HOSPITAL | Age: 73
End: 2018-06-08
Attending: INTERNAL MEDICINE | Admitting: INTERNAL MEDICINE

## 2018-06-07 LAB
ABS NEUT (OLG): 4.83 X10(3)/MCL (ref 2.1–9.2)
ALBUMIN SERPL-MCNC: 3.2 GM/DL (ref 3.4–5)
ALBUMIN/GLOB SERPL: 1 RATIO (ref 1–2)
ALP SERPL-CCNC: 165 UNIT/L (ref 45–117)
ALT SERPL-CCNC: 21 UNIT/L (ref 12–78)
APTT PPP: 35.8 SECOND(S) (ref 23.3–37)
AST SERPL-CCNC: 18 UNIT/L (ref 15–37)
BASOPHILS # BLD AUTO: 0.04 X10(3)/MCL
BASOPHILS NFR BLD AUTO: 1 %
BILIRUB SERPL-MCNC: 0.3 MG/DL (ref 0.2–1)
BILIRUBIN DIRECT+TOT PNL SERPL-MCNC: 0.1 MG/DL
BILIRUBIN DIRECT+TOT PNL SERPL-MCNC: 0.2 MG/DL
BUN SERPL-MCNC: 38 MG/DL (ref 7–18)
CALCIUM SERPL-MCNC: 8 MG/DL (ref 8.5–10.1)
CHLORIDE SERPL-SCNC: 101 MMOL/L (ref 98–107)
CO2 SERPL-SCNC: 28 MMOL/L (ref 21–32)
CREAT SERPL-MCNC: 4.7 MG/DL (ref 0.6–1.3)
EOSINOPHIL # BLD AUTO: 0.09 X10(3)/MCL
EOSINOPHIL NFR BLD AUTO: 1 %
ERYTHROCYTE [DISTWIDTH] IN BLOOD BY AUTOMATED COUNT: 14.4 % (ref 11.5–14.5)
FLUAV AG NPH QL IA: NEGATIVE
FLUBV AG NPH QL IA: NEGATIVE
GLOBULIN SER-MCNC: 4.1 GM/ML (ref 2.3–3.5)
GLUCOSE SERPL-MCNC: 203 MG/DL (ref 74–106)
HCT VFR BLD AUTO: 35.2 % (ref 35–46)
HGB BLD-MCNC: 11.3 GM/DL (ref 12–16)
IMM GRANULOCYTES # BLD AUTO: 0.02 10*3/UL
IMM GRANULOCYTES NFR BLD AUTO: 0 %
INR PPP: 1.05 (ref 0.9–1.2)
LACTATE SERPL-SCNC: 1.4 MMOL/L (ref 0.4–2)
LYMPHOCYTES # BLD AUTO: 0.81 X10(3)/MCL
LYMPHOCYTES NFR BLD AUTO: 13 % (ref 13–40)
MAGNESIUM SERPL-MCNC: 1.7 MG/DL (ref 1.8–2.4)
MCH RBC QN AUTO: 30.2 PG (ref 26–34)
MCHC RBC AUTO-ENTMCNC: 32.1 GM/DL (ref 31–37)
MCV RBC AUTO: 94.1 FL (ref 80–100)
MONOCYTES # BLD AUTO: 0.5 X10(3)/MCL
MONOCYTES NFR BLD AUTO: 8 % (ref 4–12)
NEUTROPHILS # BLD AUTO: 4.83 X10(3)/MCL
NEUTROPHILS NFR BLD AUTO: 77 X10(3)/MCL
PHOSPHATE SERPL-MCNC: 2.7 MG/DL (ref 2.5–4.9)
PLATELET # BLD AUTO: 152 X10(3)/MCL (ref 130–400)
PMV BLD AUTO: 10.4 FL (ref 7.4–10.4)
POTASSIUM SERPL-SCNC: 4.1 MMOL/L (ref 3.5–5.1)
PROT SERPL-MCNC: 7.3 GM/DL (ref 6.4–8.2)
PROTHROMBIN TIME: 13 SECOND(S) (ref 11.9–14.4)
RBC # BLD AUTO: 3.74 X10(6)/MCL (ref 4–5.2)
SODIUM SERPL-SCNC: 139 MMOL/L (ref 136–145)
WBC # SPEC AUTO: 6.3 X10(3)/MCL (ref 4.5–11)

## 2018-06-08 LAB
ABS NEUT (OLG): 3.4 X10(3)/MCL (ref 2.1–9.2)
APPEARANCE, UA: CLEAR
BACTERIA #/AREA URNS AUTO: ABNORMAL /[HPF]
BASOPHILS # BLD AUTO: 0.03 X10(3)/MCL
BASOPHILS NFR BLD AUTO: 0 %
BILIRUB UR QL STRIP: NEGATIVE
BUN SERPL-MCNC: 38 MG/DL (ref 7–18)
CALCIUM SERPL-MCNC: 8.3 MG/DL (ref 8.5–10.1)
CHLORIDE SERPL-SCNC: 101 MMOL/L (ref 98–107)
CO2 SERPL-SCNC: 29 MMOL/L (ref 21–32)
COLOR UR: ABNORMAL
CREAT SERPL-MCNC: 4.7 MG/DL (ref 0.6–1.3)
CREAT/UREA NIT SERPL: 8
EOSINOPHIL # BLD AUTO: 0.11 X10(3)/MCL
EOSINOPHIL NFR BLD AUTO: 2 %
ERYTHROCYTE [DISTWIDTH] IN BLOOD BY AUTOMATED COUNT: 14.6 % (ref 11.5–14.5)
GLUCOSE (UA): 50 MG/DL
GLUCOSE SERPL-MCNC: 152 MG/DL (ref 74–106)
HCT VFR BLD AUTO: 35.5 % (ref 35–46)
HGB BLD-MCNC: 11.2 GM/DL (ref 12–16)
HGB UR QL STRIP: 0.03 MG/DL
HYALINE CASTS #/AREA URNS LPF: ABNORMAL /[LPF]
IMM GRANULOCYTES # BLD AUTO: 0.02 10*3/UL
IMM GRANULOCYTES NFR BLD AUTO: 0 %
KETONES UR QL STRIP: NEGATIVE
LEUKOCYTE ESTERASE UR QL STRIP: NEGATIVE
LYMPHOCYTES # BLD AUTO: 1.45 X10(3)/MCL
LYMPHOCYTES NFR BLD AUTO: 26 % (ref 13–40)
MCH RBC QN AUTO: 29.6 PG (ref 26–34)
MCHC RBC AUTO-ENTMCNC: 31.5 GM/DL (ref 31–37)
MCV RBC AUTO: 93.7 FL (ref 80–100)
MONOCYTES # BLD AUTO: 0.53 X10(3)/MCL
MONOCYTES NFR BLD AUTO: 10 % (ref 4–12)
NEUTROPHILS # BLD AUTO: 3.4 X10(3)/MCL
NEUTROPHILS NFR BLD AUTO: 61 X10(3)/MCL
NITRITE UR QL STRIP: NEGATIVE
PH UR STRIP: 8 [PH] (ref 4.5–8)
PLATELET # BLD AUTO: 140 X10(3)/MCL (ref 130–400)
PMV BLD AUTO: 10.5 FL (ref 7.4–10.4)
POTASSIUM SERPL-SCNC: 3.7 MMOL/L (ref 3.5–5.1)
PROT UR QL STRIP: 200 MG/DL
RBC # BLD AUTO: 3.79 X10(6)/MCL (ref 4–5.2)
RBC #/AREA URNS AUTO: ABNORMAL /[HPF]
SODIUM SERPL-SCNC: 139 MMOL/L (ref 136–145)
SP GR UR STRIP: 1.01 (ref 1–1.03)
SQUAMOUS #/AREA URNS LPF: ABNORMAL /[LPF]
UROBILINOGEN UR STRIP-ACNC: NORMAL
WBC # SPEC AUTO: 5.5 X10(3)/MCL (ref 4.5–11)
WBC #/AREA URNS AUTO: ABNORMAL /HPF

## 2018-06-10 LAB — FINAL CULTURE: NORMAL

## 2018-06-13 LAB — FINAL CULTURE: NORMAL

## 2018-08-10 ENCOUNTER — HISTORICAL (OUTPATIENT)
Dept: WOUND CARE | Facility: HOSPITAL | Age: 73
End: 2018-08-10

## 2018-08-27 ENCOUNTER — HISTORICAL (OUTPATIENT)
Dept: RADIOLOGY | Facility: HOSPITAL | Age: 73
End: 2018-08-27

## 2018-10-24 ENCOUNTER — HISTORICAL (OUTPATIENT)
Dept: RADIOLOGY | Facility: HOSPITAL | Age: 73
End: 2018-10-24

## 2018-10-31 ENCOUNTER — HISTORICAL (OUTPATIENT)
Dept: RADIOLOGY | Facility: HOSPITAL | Age: 73
End: 2018-10-31

## 2018-11-26 ENCOUNTER — HISTORICAL (OUTPATIENT)
Dept: SURGERY | Facility: HOSPITAL | Age: 73
End: 2018-11-26

## 2018-11-26 LAB
ABS NEUT (OLG): 6.02 X10(3)/MCL (ref 2.1–9.2)
ALBUMIN SERPL-MCNC: 2.9 GM/DL (ref 3.4–5)
ALBUMIN/GLOB SERPL: 1 RATIO (ref 1–2)
ALP SERPL-CCNC: 114 UNIT/L (ref 45–117)
ALT SERPL-CCNC: 19 UNIT/L (ref 12–78)
APTT PPP: 37.1 SECOND(S) (ref 23.3–37)
AST SERPL-CCNC: 20 UNIT/L (ref 15–37)
BASOPHILS # BLD AUTO: 0.04 X10(3)/MCL
BASOPHILS NFR BLD AUTO: 0 %
BILIRUB SERPL-MCNC: 0.3 MG/DL (ref 0.2–1)
BILIRUBIN DIRECT+TOT PNL SERPL-MCNC: 0.1 MG/DL
BILIRUBIN DIRECT+TOT PNL SERPL-MCNC: 0.2 MG/DL
BUN SERPL-MCNC: 47 MG/DL (ref 7–18)
CALCIUM SERPL-MCNC: 9 MG/DL (ref 8.5–10.1)
CHLORIDE SERPL-SCNC: 101 MMOL/L (ref 98–107)
CO2 SERPL-SCNC: 33 MMOL/L (ref 21–32)
CREAT SERPL-MCNC: 4 MG/DL (ref 0.6–1.3)
EOSINOPHIL # BLD AUTO: 0.06 X10(3)/MCL
EOSINOPHIL NFR BLD AUTO: 1 %
ERYTHROCYTE [DISTWIDTH] IN BLOOD BY AUTOMATED COUNT: 14.9 % (ref 11.5–14.5)
GLOBULIN SER-MCNC: 4.7 GM/ML (ref 2.3–3.5)
GLUCOSE SERPL-MCNC: 176 MG/DL (ref 74–106)
HCT VFR BLD AUTO: 34 % (ref 35–46)
HGB BLD-MCNC: 10.4 GM/DL (ref 12–16)
IMM GRANULOCYTES # BLD AUTO: 0.02 10*3/UL
IMM GRANULOCYTES NFR BLD AUTO: 0 %
INR PPP: 1.02 (ref 0.9–1.2)
LYMPHOCYTES # BLD AUTO: 1.73 X10(3)/MCL
LYMPHOCYTES NFR BLD AUTO: 20 % (ref 13–40)
MCH RBC QN AUTO: 29.2 PG (ref 26–34)
MCHC RBC AUTO-ENTMCNC: 30.6 GM/DL (ref 31–37)
MCV RBC AUTO: 95.5 FL (ref 80–100)
MONOCYTES # BLD AUTO: 0.58 X10(3)/MCL
MONOCYTES NFR BLD AUTO: 7 % (ref 4–12)
NEUTROPHILS # BLD AUTO: 6.02 X10(3)/MCL
NEUTROPHILS NFR BLD AUTO: 71 X10(3)/MCL
PLATELET # BLD AUTO: 302 X10(3)/MCL (ref 130–400)
PMV BLD AUTO: 9.5 FL (ref 7.4–10.4)
POTASSIUM SERPL-SCNC: 4.4 MMOL/L (ref 3.5–5.1)
PROT SERPL-MCNC: 7.6 GM/DL (ref 6.4–8.2)
PROTHROMBIN TIME: 12.7 SECOND(S) (ref 11.9–14.4)
RBC # BLD AUTO: 3.56 X10(6)/MCL (ref 4–5.2)
SODIUM SERPL-SCNC: 138 MMOL/L (ref 136–145)
WBC # SPEC AUTO: 8.4 X10(3)/MCL (ref 4.5–11)

## 2019-01-25 ENCOUNTER — HISTORICAL (OUTPATIENT)
Dept: ADMINISTRATIVE | Facility: HOSPITAL | Age: 74
End: 2019-01-25

## 2019-01-25 LAB
ABS NEUT (OLG): 3.79 X10(3)/MCL (ref 2.1–9.2)
ALBUMIN SERPL-MCNC: 3.2 GM/DL (ref 3.4–5)
ALBUMIN/GLOB SERPL: 0.8 RATIO (ref 1.1–2)
ALP SERPL-CCNC: 199 UNIT/L (ref 45–117)
ALT SERPL-CCNC: 30 UNIT/L (ref 12–78)
AST SERPL-CCNC: 19 UNIT/L (ref 15–37)
BASOPHILS # BLD AUTO: 0.03 X10(3)/MCL
BASOPHILS NFR BLD AUTO: 0 %
BILIRUB SERPL-MCNC: 0.3 MG/DL (ref 0.2–1)
BILIRUBIN DIRECT+TOT PNL SERPL-MCNC: 0.1 MG/DL
BILIRUBIN DIRECT+TOT PNL SERPL-MCNC: 0.2 MG/DL
BUN SERPL-MCNC: 26 MG/DL (ref 7–18)
CALCIUM SERPL-MCNC: 8.5 MG/DL (ref 8.5–10.1)
CHLORIDE SERPL-SCNC: 99 MMOL/L (ref 98–107)
CHOLEST SERPL-MCNC: 154 MG/DL
CHOLEST/HDLC SERPL: 2.1 {RATIO} (ref 0–4.4)
CO2 SERPL-SCNC: 34 MMOL/L (ref 21–32)
CREAT SERPL-MCNC: 3.1 MG/DL (ref 0.6–1.3)
EOSINOPHIL # BLD AUTO: 0.07 X10(3)/MCL
EOSINOPHIL NFR BLD AUTO: 1 %
ERYTHROCYTE [DISTWIDTH] IN BLOOD BY AUTOMATED COUNT: 17.7 % (ref 11.5–14.5)
EST. AVERAGE GLUCOSE BLD GHB EST-MCNC: 169 MG/DL
GLOBULIN SER-MCNC: 4 GM/ML (ref 2.3–3.5)
GLUCOSE SERPL-MCNC: 272 MG/DL (ref 74–106)
HBA1C MFR BLD: 7.5 % (ref 4.2–6.3)
HCT VFR BLD AUTO: 39.9 % (ref 35–46)
HDLC SERPL-MCNC: 74 MG/DL
HGB BLD-MCNC: 12.4 GM/DL (ref 12–16)
IMM GRANULOCYTES # BLD AUTO: 0.03 10*3/UL
IMM GRANULOCYTES NFR BLD AUTO: 0 %
LDLC SERPL CALC-MCNC: 61 MG/DL (ref 0–130)
LYMPHOCYTES # BLD AUTO: 1.49 X10(3)/MCL
LYMPHOCYTES NFR BLD AUTO: 25 % (ref 13–40)
MCH RBC QN AUTO: 29.5 PG (ref 26–34)
MCHC RBC AUTO-ENTMCNC: 31.1 GM/DL (ref 31–37)
MCV RBC AUTO: 94.8 FL (ref 80–100)
MONOCYTES # BLD AUTO: 0.5 X10(3)/MCL
MONOCYTES NFR BLD AUTO: 8 % (ref 4–12)
NEUTROPHILS # BLD AUTO: 3.79 X10(3)/MCL
NEUTROPHILS NFR BLD AUTO: 64 X10(3)/MCL
PLATELET # BLD AUTO: 186 X10(3)/MCL (ref 130–400)
PMV BLD AUTO: 10.6 FL (ref 7.4–10.4)
POTASSIUM SERPL-SCNC: 4.3 MMOL/L (ref 3.5–5.1)
PROT SERPL-MCNC: 7.2 GM/DL (ref 6.4–8.2)
RBC # BLD AUTO: 4.21 X10(6)/MCL (ref 4–5.2)
SODIUM SERPL-SCNC: 137 MMOL/L (ref 136–145)
T4 FREE SERPL-MCNC: 1.19 NG/DL (ref 0.76–1.46)
TRIGL SERPL-MCNC: 96 MG/DL
TSH SERPL-ACNC: 3.89 MIU/L (ref 0.36–3.74)
VLDLC SERPL CALC-MCNC: 19 MG/DL
WBC # SPEC AUTO: 5.9 X10(3)/MCL (ref 4.5–11)

## 2019-07-26 ENCOUNTER — HISTORICAL (OUTPATIENT)
Dept: RADIOLOGY | Facility: HOSPITAL | Age: 74
End: 2019-07-26

## 2019-08-15 ENCOUNTER — HISTORICAL (OUTPATIENT)
Dept: RADIOLOGY | Facility: HOSPITAL | Age: 74
End: 2019-08-15

## 2019-08-19 ENCOUNTER — HISTORICAL (OUTPATIENT)
Dept: ADMINISTRATIVE | Facility: HOSPITAL | Age: 74
End: 2019-08-19

## 2019-08-19 LAB
ABS NEUT (OLG): 3.79 X10(3)/MCL (ref 2.1–9.2)
ALBUMIN SERPL-MCNC: 3.1 GM/DL (ref 3.4–5)
ALBUMIN/GLOB SERPL: 0.7 RATIO (ref 1.1–2)
ALP SERPL-CCNC: 145 UNIT/L (ref 45–117)
ALT SERPL-CCNC: 22 UNIT/L (ref 12–78)
AST SERPL-CCNC: 25 UNIT/L (ref 15–37)
BASOPHILS # BLD AUTO: 0.03 X10(3)/MCL
BASOPHILS NFR BLD AUTO: 0 %
BILIRUB SERPL-MCNC: 0.4 MG/DL (ref 0.2–1)
BILIRUBIN DIRECT+TOT PNL SERPL-MCNC: 0.1 MG/DL
BILIRUBIN DIRECT+TOT PNL SERPL-MCNC: 0.3 MG/DL
BUN SERPL-MCNC: 42 MG/DL (ref 7–18)
CALCIUM SERPL-MCNC: 8.8 MG/DL (ref 8.5–10.1)
CHLORIDE SERPL-SCNC: 99 MMOL/L (ref 98–107)
CHOLEST SERPL-MCNC: 124 MG/DL
CHOLEST/HDLC SERPL: 2 {RATIO} (ref 0–4.4)
CO2 SERPL-SCNC: 32 MMOL/L (ref 21–32)
CREAT SERPL-MCNC: 5 MG/DL (ref 0.6–1.3)
EOSINOPHIL # BLD AUTO: 0.13 10*3/UL
EOSINOPHIL NFR BLD AUTO: 2 %
ERYTHROCYTE [DISTWIDTH] IN BLOOD BY AUTOMATED COUNT: 14.4 % (ref 11.5–14.5)
EST. AVERAGE GLUCOSE BLD GHB EST-MCNC: 154 MG/DL
GLOBULIN SER-MCNC: 4.2 GM/ML (ref 2.3–3.5)
GLUCOSE SERPL-MCNC: 108 MG/DL (ref 74–106)
HBA1C MFR BLD: 7 % (ref 4.2–6.3)
HCT VFR BLD AUTO: 34 % (ref 35–46)
HDLC SERPL-MCNC: 63 MG/DL
HGB BLD-MCNC: 10.4 GM/DL (ref 12–16)
IMM GRANULOCYTES # BLD AUTO: 0.02 10*3/UL
IMM GRANULOCYTES NFR BLD AUTO: 0 %
LDLC SERPL CALC-MCNC: 49 MG/DL (ref 0–130)
LYMPHOCYTES # BLD AUTO: 1.47 X10(3)/MCL
LYMPHOCYTES NFR BLD AUTO: 25 % (ref 13–40)
MCH RBC QN AUTO: 29 PG (ref 26–34)
MCHC RBC AUTO-ENTMCNC: 30.6 GM/DL (ref 31–37)
MCV RBC AUTO: 94.7 FL (ref 80–100)
MONOCYTES # BLD AUTO: 0.44 X10(3)/MCL
MONOCYTES NFR BLD AUTO: 8 % (ref 0–24)
NEUTROPHILS # BLD AUTO: 3.79 X10(3)/MCL
NEUTROPHILS NFR BLD AUTO: 64 X10(3)/MCL
PLATELET # BLD AUTO: 216 X10(3)/MCL (ref 130–400)
PMV BLD AUTO: 10.2 FL (ref 7.4–10.4)
POTASSIUM SERPL-SCNC: 3.8 MMOL/L (ref 3.5–5.1)
PROT SERPL-MCNC: 7.3 GM/DL (ref 6.4–8.2)
RBC # BLD AUTO: 3.59 X10(6)/MCL (ref 4–5.2)
SODIUM SERPL-SCNC: 138 MMOL/L (ref 136–145)
TRIGL SERPL-MCNC: 62 MG/DL
TSH SERPL-ACNC: 0.66 MIU/L (ref 0.36–3.74)
VLDLC SERPL CALC-MCNC: 12 MG/DL
WBC # SPEC AUTO: 5.9 X10(3)/MCL (ref 4.5–11)

## 2019-08-28 ENCOUNTER — HISTORICAL (OUTPATIENT)
Dept: ADMINISTRATIVE | Facility: HOSPITAL | Age: 74
End: 2019-08-28

## 2019-10-02 ENCOUNTER — HISTORICAL (OUTPATIENT)
Dept: RADIOLOGY | Facility: HOSPITAL | Age: 74
End: 2019-10-02

## 2019-10-17 ENCOUNTER — HISTORICAL (OUTPATIENT)
Dept: RADIOLOGY | Facility: HOSPITAL | Age: 74
End: 2019-10-17

## 2020-01-07 ENCOUNTER — HISTORICAL (OUTPATIENT)
Dept: INTERNAL MEDICINE | Facility: CLINIC | Age: 75
End: 2020-01-07

## 2020-01-07 LAB
ABS NEUT (OLG): 3.64 X10(3)/MCL (ref 2.1–9.2)
ALBUMIN SERPL-MCNC: 3.5 GM/DL (ref 3.4–5)
ALBUMIN/GLOB SERPL: 0.7 RATIO (ref 1.1–2)
ALP SERPL-CCNC: 125 UNIT/L (ref 45–117)
ALT SERPL-CCNC: 25 UNIT/L (ref 12–78)
AST SERPL-CCNC: 23 UNIT/L (ref 15–37)
BASOPHILS # BLD AUTO: 0 X10(3)/MCL (ref 0–0.2)
BASOPHILS NFR BLD AUTO: 0 %
BILIRUB SERPL-MCNC: 0.3 MG/DL (ref 0.2–1)
BILIRUBIN DIRECT+TOT PNL SERPL-MCNC: <0.1 MG/DL (ref 0–0.2)
BILIRUBIN DIRECT+TOT PNL SERPL-MCNC: >0.2 MG/DL
BUN SERPL-MCNC: 17 MG/DL (ref 7–18)
CALCIUM SERPL-MCNC: 9.2 MG/DL (ref 8.5–10.1)
CHLORIDE SERPL-SCNC: 97 MMOL/L (ref 98–107)
CHOLEST SERPL-MCNC: 145 MG/DL
CHOLEST/HDLC SERPL: 2.7 {RATIO} (ref 0–4.4)
CO2 SERPL-SCNC: 35 MMOL/L (ref 21–32)
CREAT SERPL-MCNC: 2.9 MG/DL (ref 0.6–1.3)
EOSINOPHIL # BLD AUTO: 0.1 X10(3)/MCL (ref 0–0.9)
EOSINOPHIL NFR BLD AUTO: 2 %
ERYTHROCYTE [DISTWIDTH] IN BLOOD BY AUTOMATED COUNT: 14.7 % (ref 11.5–14.5)
EST. AVERAGE GLUCOSE BLD GHB EST-MCNC: 183 MG/DL
GLOBULIN SER-MCNC: 4.9 GM/ML (ref 2.3–3.5)
GLUCOSE SERPL-MCNC: 124 MG/DL (ref 74–106)
HBA1C MFR BLD: 8 % (ref 4.2–6.3)
HCT VFR BLD AUTO: 40.8 % (ref 35–46)
HDLC SERPL-MCNC: 53 MG/DL (ref 40–59)
HGB BLD-MCNC: 12.6 GM/DL (ref 12–16)
IMM GRANULOCYTES # BLD AUTO: 0.01 10*3/UL
IMM GRANULOCYTES NFR BLD AUTO: 0 %
LDLC SERPL CALC-MCNC: 65 MG/DL
LYMPHOCYTES # BLD AUTO: 1.4 X10(3)/MCL (ref 0.6–4.6)
LYMPHOCYTES NFR BLD AUTO: 25 %
MCH RBC QN AUTO: 28.4 PG (ref 26–34)
MCHC RBC AUTO-ENTMCNC: 30.9 GM/DL (ref 31–37)
MCV RBC AUTO: 91.9 FL (ref 80–100)
MONOCYTES # BLD AUTO: 0.4 X10(3)/MCL (ref 0.1–1.3)
MONOCYTES NFR BLD AUTO: 7 %
NEUTROPHILS # BLD AUTO: 3.64 X10(3)/MCL (ref 2.1–9.2)
NEUTROPHILS NFR BLD AUTO: 66 %
PLATELET # BLD AUTO: 160 X10(3)/MCL (ref 130–400)
PMV BLD AUTO: 10.1 FL (ref 7.4–10.4)
POTASSIUM SERPL-SCNC: 3.6 MMOL/L (ref 3.5–5.1)
PROT SERPL-MCNC: 8.4 GM/DL (ref 6.4–8.2)
RBC # BLD AUTO: 4.44 X10(6)/MCL (ref 4–5.2)
SODIUM SERPL-SCNC: 137 MMOL/L (ref 136–145)
T3FREE SERPL-MCNC: 2.04 PG/ML (ref 2.18–3.98)
T4 FREE SERPL-MCNC: 1.28 NG/DL (ref 0.76–1.46)
TRIGL SERPL-MCNC: 136 MG/DL
TSH SERPL-ACNC: 0.52 MIU/L (ref 0.36–3.74)
VLDLC SERPL CALC-MCNC: 27 MG/DL
WBC # SPEC AUTO: 5.5 X10(3)/MCL (ref 4.5–11)

## 2020-01-17 ENCOUNTER — HISTORICAL (OUTPATIENT)
Dept: RADIATION THERAPY | Facility: HOSPITAL | Age: 75
End: 2020-01-17

## 2020-01-29 ENCOUNTER — HISTORICAL (OUTPATIENT)
Dept: RADIOLOGY | Facility: HOSPITAL | Age: 75
End: 2020-01-29

## 2020-03-11 ENCOUNTER — HISTORICAL (OUTPATIENT)
Dept: RADIOLOGY | Facility: HOSPITAL | Age: 75
End: 2020-03-11

## 2020-05-13 ENCOUNTER — HISTORICAL (OUTPATIENT)
Dept: RADIOLOGY | Facility: HOSPITAL | Age: 75
End: 2020-05-13

## 2020-05-20 ENCOUNTER — HISTORICAL (OUTPATIENT)
Dept: RADIOLOGY | Facility: HOSPITAL | Age: 75
End: 2020-05-20

## 2020-06-29 ENCOUNTER — HISTORICAL (OUTPATIENT)
Dept: INTERNAL MEDICINE | Facility: CLINIC | Age: 75
End: 2020-06-29

## 2020-06-29 LAB
ABS NEUT (OLG): 3.33 X10(3)/MCL (ref 2.1–9.2)
ALBUMIN SERPL-MCNC: 2.4 GM/DL (ref 3.4–5)
ALBUMIN/GLOB SERPL: 0.5 RATIO (ref 1.1–2)
ALP SERPL-CCNC: 76 UNIT/L (ref 45–117)
ALT SERPL-CCNC: 27 UNIT/L (ref 12–78)
AST SERPL-CCNC: 38 UNIT/L (ref 15–37)
BASOPHILS # BLD AUTO: 0 X10(3)/MCL (ref 0–0.2)
BASOPHILS NFR BLD AUTO: 0 %
BILIRUB SERPL-MCNC: 0.5 MG/DL (ref 0.2–1)
BILIRUBIN DIRECT+TOT PNL SERPL-MCNC: 0.2 MG/DL (ref 0–0.2)
BILIRUBIN DIRECT+TOT PNL SERPL-MCNC: 0.3 MG/DL
BUN SERPL-MCNC: 29 MG/DL (ref 7–18)
CALCIUM SERPL-MCNC: 8.1 MG/DL (ref 8.5–10.1)
CHLORIDE SERPL-SCNC: 98 MMOL/L (ref 98–107)
CHOLEST SERPL-MCNC: 113 MG/DL
CHOLEST/HDLC SERPL: 2.4 {RATIO} (ref 0–4.4)
CO2 SERPL-SCNC: 30 MMOL/L (ref 21–32)
CREAT SERPL-MCNC: 5 MG/DL (ref 0.6–1.3)
EOSINOPHIL # BLD AUTO: 0 X10(3)/MCL (ref 0–0.9)
EOSINOPHIL NFR BLD AUTO: 0 %
ERYTHROCYTE [DISTWIDTH] IN BLOOD BY AUTOMATED COUNT: 15.1 % (ref 11.5–14.5)
EST. AVERAGE GLUCOSE BLD GHB EST-MCNC: 160 MG/DL
GLOBULIN SER-MCNC: 4.7 GM/ML (ref 2.3–3.5)
GLUCOSE SERPL-MCNC: 140 MG/DL (ref 74–106)
HBA1C MFR BLD: 7.2 % (ref 4.2–6.3)
HCT VFR BLD AUTO: 34.7 % (ref 35–46)
HDLC SERPL-MCNC: 48 MG/DL (ref 40–59)
HGB BLD-MCNC: 10.5 GM/DL (ref 12–16)
IMM GRANULOCYTES # BLD AUTO: 0.04 10*3/UL
IMM GRANULOCYTES NFR BLD AUTO: 1 %
LDLC SERPL CALC-MCNC: 39 MG/DL
LYMPHOCYTES # BLD AUTO: 1.2 X10(3)/MCL (ref 0.6–4.6)
LYMPHOCYTES NFR BLD AUTO: 23 %
MCH RBC QN AUTO: 27.5 PG (ref 26–34)
MCHC RBC AUTO-ENTMCNC: 30.3 GM/DL (ref 31–37)
MCV RBC AUTO: 90.8 FL (ref 80–100)
MONOCYTES # BLD AUTO: 0.4 X10(3)/MCL (ref 0.1–1.3)
MONOCYTES NFR BLD AUTO: 8 %
NEUTROPHILS # BLD AUTO: 3.33 X10(3)/MCL (ref 2.1–9.2)
NEUTROPHILS NFR BLD AUTO: 68 %
PLATELET # BLD AUTO: 174 X10(3)/MCL (ref 130–400)
PMV BLD AUTO: 10.8 FL (ref 7.4–10.4)
POTASSIUM SERPL-SCNC: 3.1 MMOL/L (ref 3.5–5.1)
PROT SERPL-MCNC: 7.1 GM/DL (ref 6.4–8.2)
RBC # BLD AUTO: 3.82 X10(6)/MCL (ref 4–5.2)
SODIUM SERPL-SCNC: 134 MMOL/L (ref 136–145)
TRIGL SERPL-MCNC: 128 MG/DL
TSH SERPL-ACNC: 1.57 MIU/L (ref 0.36–3.74)
VLDLC SERPL CALC-MCNC: 26 MG/DL
WBC # SPEC AUTO: 4.9 X10(3)/MCL (ref 4.5–11)

## 2020-07-10 ENCOUNTER — HISTORICAL (OUTPATIENT)
Dept: ADMINISTRATIVE | Facility: HOSPITAL | Age: 75
End: 2020-07-10

## 2021-01-18 ENCOUNTER — HISTORICAL (OUTPATIENT)
Dept: ADMINISTRATIVE | Facility: HOSPITAL | Age: 76
End: 2021-01-18

## 2021-01-18 LAB
ABS NEUT (OLG): 3.92 X10(3)/MCL (ref 2.1–9.2)
ALBUMIN SERPL-MCNC: 3.6 GM/DL (ref 3.4–4.8)
ALBUMIN/GLOB SERPL: 0.9 RATIO (ref 1.1–2)
ALP SERPL-CCNC: 109 UNIT/L (ref 40–150)
ALT SERPL-CCNC: 17 UNIT/L (ref 0–55)
APPEARANCE, UA: CLEAR
AST SERPL-CCNC: 15 UNIT/L (ref 5–34)
BACTERIA #/AREA URNS AUTO: ABNORMAL /HPF
BASOPHILS # BLD AUTO: 0 X10(3)/MCL (ref 0–0.2)
BASOPHILS NFR BLD AUTO: 1 %
BILIRUB SERPL-MCNC: 0.3 MG/DL
BILIRUB UR QL STRIP: NEGATIVE
BILIRUBIN DIRECT+TOT PNL SERPL-MCNC: 0.1 MG/DL (ref 0–0.5)
BILIRUBIN DIRECT+TOT PNL SERPL-MCNC: 0.2 MG/DL (ref 0–0.8)
BUN SERPL-MCNC: 43 MG/DL (ref 9.8–20.1)
CALCIUM SERPL-MCNC: 9.3 MG/DL (ref 8.4–10.2)
CHLORIDE SERPL-SCNC: 102 MMOL/L (ref 98–107)
CHOLEST SERPL-MCNC: 127 MG/DL
CHOLEST/HDLC SERPL: 3 {RATIO} (ref 0–5)
CO2 SERPL-SCNC: 31 MMOL/L (ref 23–31)
COLOR UR: YELLOW
CREAT SERPL-MCNC: 5.08 MG/DL (ref 0.55–1.02)
EOSINOPHIL # BLD AUTO: 0.1 X10(3)/MCL (ref 0–0.9)
EOSINOPHIL NFR BLD AUTO: 2 %
ERYTHROCYTE [DISTWIDTH] IN BLOOD BY AUTOMATED COUNT: 16.4 % (ref 11.5–14.5)
EST. AVERAGE GLUCOSE BLD GHB EST-MCNC: 162.8 MG/DL
GLOBULIN SER-MCNC: 3.9 GM/DL (ref 2.4–3.5)
GLUCOSE (UA): NEGATIVE
GLUCOSE SERPL-MCNC: 102 MG/DL (ref 82–115)
HBA1C MFR BLD: 7.3 %
HCT VFR BLD AUTO: 35.8 % (ref 35–46)
HDLC SERPL-MCNC: 50 MG/DL (ref 35–60)
HGB BLD-MCNC: 10.7 GM/DL (ref 12–16)
HGB UR QL STRIP: NEGATIVE
HYALINE CASTS #/AREA URNS LPF: ABNORMAL /LPF
IMM GRANULOCYTES # BLD AUTO: 0.02 10*3/UL
IMM GRANULOCYTES NFR BLD AUTO: 0 %
KETONES UR QL STRIP: NEGATIVE
LDLC SERPL CALC-MCNC: 63 MG/DL (ref 50–140)
LEUKOCYTE ESTERASE UR QL STRIP: NEGATIVE
LYMPHOCYTES # BLD AUTO: 1.6 X10(3)/MCL (ref 0.6–4.6)
LYMPHOCYTES NFR BLD AUTO: 25 %
MCH RBC QN AUTO: 30.1 PG (ref 26–34)
MCHC RBC AUTO-ENTMCNC: 29.9 GM/DL (ref 31–37)
MCV RBC AUTO: 100.6 FL (ref 80–100)
MONOCYTES # BLD AUTO: 0.5 X10(3)/MCL (ref 0.1–1.3)
MONOCYTES NFR BLD AUTO: 9 %
NEUTROPHILS # BLD AUTO: 3.92 X10(3)/MCL (ref 2.1–9.2)
NEUTROPHILS NFR BLD AUTO: 64 %
NITRITE UR QL STRIP: NEGATIVE
PH UR STRIP: 6 [PH] (ref 4.5–8)
PLATELET # BLD AUTO: 164 X10(3)/MCL (ref 130–400)
PMV BLD AUTO: 10.7 FL (ref 7.4–10.4)
POTASSIUM SERPL-SCNC: 4.9 MMOL/L (ref 3.5–5.1)
PROT SERPL-MCNC: 7.5 GM/DL (ref 5.8–7.6)
PROT UR QL STRIP: 30 MG/DL
RBC # BLD AUTO: 3.56 X10(6)/MCL (ref 4–5.2)
RBC #/AREA URNS AUTO: ABNORMAL /HPF
SODIUM SERPL-SCNC: 141 MMOL/L (ref 136–145)
SP GR UR STRIP: 1.01 (ref 1–1.03)
SQUAMOUS #/AREA URNS LPF: ABNORMAL /LPF
T4 FREE SERPL-MCNC: 1.01 NG/DL (ref 0.7–1.48)
TRIGL SERPL-MCNC: 71 MG/DL (ref 37–140)
TSH SERPL-ACNC: 1.16 UIU/ML (ref 0.35–4.94)
UROBILINOGEN UR STRIP-ACNC: NORMAL
VLDLC SERPL CALC-MCNC: 14 MG/DL
WBC # SPEC AUTO: 6.2 X10(3)/MCL (ref 4.5–11)
WBC #/AREA URNS AUTO: ABNORMAL /HPF

## 2021-06-09 ENCOUNTER — HISTORICAL (OUTPATIENT)
Dept: ADMINISTRATIVE | Facility: HOSPITAL | Age: 76
End: 2021-06-09

## 2021-07-07 ENCOUNTER — HISTORICAL (OUTPATIENT)
Dept: RADIOLOGY | Facility: HOSPITAL | Age: 76
End: 2021-07-07

## 2021-07-07 LAB — BCS RECOMMENDATION EXT: NORMAL

## 2021-07-21 ENCOUNTER — HISTORICAL (OUTPATIENT)
Dept: ENDOSCOPY | Facility: HOSPITAL | Age: 76
End: 2021-07-21

## 2021-07-21 LAB
CRC RECOMMENDATION EXT: NORMAL
POTASSIUM SERPL-SCNC: 3.6 MMOL/L (ref 3.5–5.1)

## 2021-12-15 ENCOUNTER — HISTORICAL (OUTPATIENT)
Dept: ADMINISTRATIVE | Facility: HOSPITAL | Age: 76
End: 2021-12-15

## 2021-12-15 LAB
ABS NEUT (OLG): 4.89 X10(3)/MCL (ref 2.1–9.2)
ALBUMIN SERPL-MCNC: 3.1 GM/DL (ref 3.4–4.8)
ALBUMIN/GLOB SERPL: 1 RATIO (ref 1.1–2)
ALP SERPL-CCNC: 98 UNIT/L (ref 40–150)
ALT SERPL-CCNC: 17 UNIT/L (ref 0–55)
AST SERPL-CCNC: 17 UNIT/L (ref 5–34)
BASOPHILS # BLD AUTO: 0 X10(3)/MCL (ref 0–0.2)
BASOPHILS NFR BLD AUTO: 1 %
BILIRUB SERPL-MCNC: 0.2 MG/DL
BILIRUBIN DIRECT+TOT PNL SERPL-MCNC: 0.1 MG/DL (ref 0–0.5)
BILIRUBIN DIRECT+TOT PNL SERPL-MCNC: 0.1 MG/DL (ref 0–0.8)
BUN SERPL-MCNC: 30.1 MG/DL (ref 9.8–20.1)
CALCIUM SERPL-MCNC: 9 MG/DL (ref 8.7–10.5)
CHLORIDE SERPL-SCNC: 100 MMOL/L (ref 98–107)
CHOLEST SERPL-MCNC: 117 MG/DL
CHOLEST/HDLC SERPL: 2 {RATIO} (ref 0–5)
CO2 SERPL-SCNC: 36 MMOL/L (ref 23–31)
CREAT SERPL-MCNC: 3.89 MG/DL (ref 0.55–1.02)
EOSINOPHIL # BLD AUTO: 0.1 X10(3)/MCL (ref 0–0.9)
EOSINOPHIL NFR BLD AUTO: 1 %
ERYTHROCYTE [DISTWIDTH] IN BLOOD BY AUTOMATED COUNT: 16.5 % (ref 11.5–17)
EST. AVERAGE GLUCOSE BLD GHB EST-MCNC: 177.2 MG/DL
GLOBULIN SER-MCNC: 3.1 GM/DL (ref 2.4–3.5)
GLUCOSE SERPL-MCNC: 156 MG/DL (ref 82–115)
HBA1C MFR BLD: 7.8 %
HCT VFR BLD AUTO: 31.6 % (ref 37–47)
HDLC SERPL-MCNC: 47 MG/DL (ref 35–60)
HGB BLD-MCNC: 9.2 GM/DL (ref 12–16)
LDLC SERPL CALC-MCNC: 57 MG/DL (ref 50–140)
LYMPHOCYTES # BLD AUTO: 1.5 X10(3)/MCL (ref 0.6–4.6)
LYMPHOCYTES NFR BLD AUTO: 21 %
MCH RBC QN AUTO: 28.8 PG (ref 27–31)
MCHC RBC AUTO-ENTMCNC: 29.1 GM/DL (ref 33–36)
MCV RBC AUTO: 98.8 FL (ref 80–94)
MONOCYTES # BLD AUTO: 0.5 X10(3)/MCL (ref 0.1–1.3)
MONOCYTES NFR BLD AUTO: 7 %
NEUTROPHILS # BLD AUTO: 4.89 X10(3)/MCL (ref 2.1–9.2)
NEUTROPHILS NFR BLD AUTO: 70 %
PLATELET # BLD AUTO: 164 X10(3)/MCL (ref 130–400)
PMV BLD AUTO: 11.5 FL (ref 9.4–12.4)
POTASSIUM SERPL-SCNC: 3.9 MMOL/L (ref 3.5–5.1)
PROT SERPL-MCNC: 6.2 GM/DL (ref 5.8–7.6)
RBC # BLD AUTO: 3.2 X10(6)/MCL (ref 4.2–5.4)
SODIUM SERPL-SCNC: 141 MMOL/L (ref 136–145)
T4 FREE SERPL-MCNC: 1.13 NG/DL (ref 0.7–1.48)
TRIGL SERPL-MCNC: 64 MG/DL (ref 37–140)
TSH SERPL-ACNC: 1.55 UIU/ML (ref 0.35–4.94)
VLDLC SERPL CALC-MCNC: 13 MG/DL
WBC # SPEC AUTO: 7 X10(3)/MCL (ref 4.5–11.5)

## 2022-03-19 ENCOUNTER — HISTORICAL (OUTPATIENT)
Dept: ADMINISTRATIVE | Facility: HOSPITAL | Age: 77
End: 2022-03-19

## 2022-04-10 ENCOUNTER — HISTORICAL (OUTPATIENT)
Dept: ADMINISTRATIVE | Facility: HOSPITAL | Age: 77
End: 2022-04-10
Payer: COMMERCIAL

## 2022-04-11 ENCOUNTER — HISTORICAL (OUTPATIENT)
Dept: ADMINISTRATIVE | Facility: HOSPITAL | Age: 77
End: 2022-04-11

## 2022-04-11 ENCOUNTER — HISTORICAL (OUTPATIENT)
Dept: RADIOLOGY | Facility: HOSPITAL | Age: 77
End: 2022-04-11

## 2022-04-11 ENCOUNTER — HISTORICAL (OUTPATIENT)
Dept: CARDIOLOGY | Facility: HOSPITAL | Age: 77
End: 2022-04-11

## 2022-04-18 ENCOUNTER — HISTORICAL (OUTPATIENT)
Dept: CARDIOLOGY | Facility: HOSPITAL | Age: 77
End: 2022-04-18
Payer: COMMERCIAL

## 2022-04-25 VITALS
HEIGHT: 65 IN | DIASTOLIC BLOOD PRESSURE: 82 MMHG | BODY MASS INDEX: 38.57 KG/M2 | OXYGEN SATURATION: 91 % | SYSTOLIC BLOOD PRESSURE: 162 MMHG | WEIGHT: 231.5 LBS

## 2022-05-03 NOTE — HISTORICAL [facility]ERNER
This is a historical note converted from [employer]. Formatting and pictures may have been removed.  Please reference [employer] for original formatting and attached multimedia. Chief Complaint  annual  History of Present Illness  73 yo postmenopausal female presenting for complaints of vaginal spotting x3 months. Denies prior episodes of postmenopausal bleeding. Age of menopause: early 50s. Endometrial cancer risk factors including HTN, DM II. Currently on ASA. Describes bleeding episode as a few specs on my pad approximately 3 months. No clots or persistent occurrences. Denies hx abnormal pap smears.  Review of Systems  negative; see HPI  Physical Exam  Vitals & Measurements  T:?36.8? ?C ?(Oral)? HR:?50?(Peripheral)? RR:?18? BP:?136/69?  HT:?165?cm? HT:?165?cm? WT:?96?kg? WT:?96?kg? BMI:?35.26?  General Appearance: Alert, cooperative, no distress  Neck: no thyromegaly, no LAD  Lungs: Clear to auscultation bilaterally, respirations unlabored  Heart: Regular rate and rhythm, S1 and S2 normal, no murmur, rub or gallop  Abdomen: Soft, non-tender, bowel sounds active all four quadrants, no masses, no organomegaly  Genitalia:  - Breast Exam: Symmetric breasts, without tenderness, masses, or nipple deformities.?-  ?Rectal: adequate sphincter tone; no masses or lesions; no gross blood on sterile glove; minimal stool  - External genitalia: Normal without lesions  - Urethral meatus: Normal  - Bladder: No suprapubic tenderness  - Vaginal/pelvic support: Normal, moist vaginal mucosa without lesions. thin physiologic vaginal discharge;?No blood  - Cervix: No CMT, no lesions; multiparous  - Uterus: ~8 week sized;mobile, non-nodular; firm in consistency; non-tender  - Adnexa/parametria: No fullness or masses  - Anus/perineum: Intact without lesions or hemorrhoids  Extremities: Extremities normal, atraumatic, no cyanosis or edema; evidence of left well healed lower extremity incision  Skin: Skin turgor normal, no rashes or lesions.?  ?    Endometrial Biopsy:  ?  Informed consent was obtained. Consent forms were signed and witnessed. ?  The patient was placed in dorsal lithotomy position. ?A speculum was placed in the vagina and good visualization of the cervix was achieved.  The cervix was swabbed with betadine x 3.??  The uterus sounded to 8 cm. ?The endometrial pipelle was advanced to the fundus without difficulty. ?Two passes were performed and adequate tissue was noted.?  All instruments were removed from the vagina. ?The patient tolerated the procedure well. ?  Endometrial biopsy was labeled and sent to pathology. ?Dr. Oneil was present for the entire procedure.  ?  ?  Assessment/Plan  Postmenopause bleeding  ? Discussed with patient need for endometrial tissue sampling?as result?of?endometrial risk?factors including HTN, DM II, age. ?Patient amenable and EMB performed today without difficulty. Complete pelvic ultrasound also ordered. Will follow up EMB results in 1-2 weeks and contact patient to determine need for closer follow up. UA/UCx with sensitives pending. FITT testing ordered to rule out additional etiologies for PMB. Rectal exam wnl today.  Ordered:  Occult Blood, Fecal, Immunoassay Lab [facility] [id number], Routine collect, 02/14/18 10:34:00 CST, Stool, Order for future visit, Stop date 02/14/18 10:34:00 CST, Nurse collect, Postmenopause bleeding  Colon cancer screening, 02/14/18 10:34:00 CST  Pathology Tissue [facility], 02/14/18 10:36:00 CST, AP Specimen, postmenopausal bleeding, postmenopausal bleeding, Endometrial biopsy, Routine, Collected, Hold Until Collected, RT - Routine, Postmenopause bleeding, 02/14/18 10:36:00 CST  Urinalysis Dip POC, 02/14/18 10:34:00 CST, [facility] C Central C  Urine Culture [id number], Routine collect, 02/14/18 10:34:00 CST, Order for future visit, Urine, Clean Catch, Nurse collect, Stop date 02/14/18 10:34:00 CST, Postmenopause bleeding  US Pelvic Non-Obstetrics, Routine, *Est. 03/14/18 3:00:00 CDT, Other (please specify),  None, Wheelchair, Patient Has IV?, Patient on Oxygen?, Rad Type, Order for future visit, Postmenopause bleeding, Schedule this test, [facility], 1, month(s), In Approxim...  US Transvaginal Non-OB, Routine, *Est. 03/14/18 3:00:00 CDT, Other (please specify), None, Wheelchair, Patient Has IV?, Patient on Oxygen?, Rad Type, Order for future visit, Postmenopause bleeding, Schedule this test, [facility], 1, month(s), In Approxim...  ?   Problem List/Past Medical History  Ongoing  CAD (coronary artery disease) of artery bypass graft  CHF, chronic  CKD (chronic kidney disease), stage IV  COPD  Depression  Diabetes  DM (diabetes mellitus)  HTN (hypertension)  Hyperlipemia  Historical  CABG (Coronary artery bypass grafting) planned  DM (diabetes mellitus), type 2  High cholesterol  HTN (hypertension)  Hypercholesterolemia  Hypertension  Hypothyroidism  Hypothyroidism  Renal insufficiency  Tobacco user  Procedure/Surgical History  Performance of Urinary Filtration, Intermittent, Less than 6 Hours Per Day (01/16/2018)  Fluoroscopy of Right Heart using Low Osmolar Contrast (11/13/2017)  Insertion of Infusion Device into Right Atrium, Percutaneous Approach (11/13/2017)  Insertion of Tunneled Vascular Access Device into Chest Subcutaneous Tissue and Fascia, Percutaneous Approach (11/13/2017)  Performance of Urinary Filtration, Intermittent, Less than 6 Hours Per Day (11/13/2017)  heart (11/01/2012)  CABG x 4 vessels (2012)  Thyroid  Thyroidectomy  Tubal ligation  Medications  aspirin 81 mg oral tablet, CHEWABLE, 81 mg= 1 tab(s), Oral, Daily  Breo Ellipta 100 mcg-25 mcg/inh inhalation powder, 1 puff(s), INH, Daily  calcitriol 0.5 mcg oral capsule, 0.5 mcg= 1 cap(s), Oral, Daily, 3 refills  cloniDINE 0.1 mg oral tablet, 0.1 mg= 1 tab(s), Oral, BID, 3 refills  Coreg 6.25 mg oral tablet, 6.25 mg= 1 tab(s), Oral, BID, 3 refills  DULoxetine 30 mg oral delayed release capsule, 30 mg= 1 cap(s),  Oral, Daily, 3 refills  ferrous gluconate 324 mg (38 mg elemental iron) oral tablet, See Instructions, 3 refills  glipiZIDE 10 mg oral tablet, See Instructions, 3 refills  Insulin Syringes 50 unit 1/2, See Instructions, 3 refills  Lasix 40 mg oral tablet, 40 mg= 1 tab(s), Oral, BID, 3 refills  levothyroxine 125 mcg (0.125 mg) oral tablet, See Instructions, 3 refills  NovoLIN 70/30 subcutaneous suspension, 45 units, Subcutaneous, BIDAC, 11 refills  ProAir HFA 90 mcg/inh inhalation aerosol with adapter, 1 puff(s), INH, q6hr, PRN, 2 refills  RISPERDAL CONSTA 50 MG SYR  rosuvastatin 10 mg oral tablet, 40 mg= 4 tab(s), Oral, Once a day (at bedtime)  ROSUVASTATIN CALCIUM 40 MG TAB  Spiriva 18 mcg inhalation capsule, 18 mcg= 1 cap(s), INH, Daily, 2 refills  Allergies  No Known Medication Allergies  Social History  Alcohol - Denies Alcohol Use, 02/09/2018  Current, 1-2 times per month  Employment/School - 02/09/2018  DISABLE, Highest education level: None. Operates hazardous equipment: No. 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  Exercise - 02/09/2018  Exercise frequency: 1-2 times/week. Self assessment: Fair condition. Exercise type: Walking.  Self assessment: Fair condition.  Exercise frequency: 1-2 times/week. Exercise type: Walking.  Home/Environment - 02/09/2018  Lives with Children. Living situation: Home/Independent. Home equipment: Glucose monitoring, Walker/Cane. Alcohol abuse in household: No. Substance abuse in household: No. Smoker in household: No. Injuries/Abuse/Neglect in household: No. Feels unsafe at home: No. Major illness in household: No. Financial concerns: No. TV/Computer concerns: No.  Nutrition/Health - 02/09/2018  Caffeine intake amount: drinks decaffeinated coffee 3 times a week.  Caffeine intake amount: 3 cups coffee per day.  Diabetic, Caffeine intake amount: 1 CUP OF COFFEE EVERY OTHER DAY. Wants to lose weight: Yes. Sleeping concerns: No. Feels highly stressed: No.  Sexual - No Sexual  Activity, 02/09/2018  Sexually active: No.  Substance Abuse - Denies Substance Abuse, 02/09/2018  Never  Tobacco - High Risk, 02/09/2018  Former smoker  Former smoker  Current every day smoker, Cigarettes, 3 per day.  Former smoker  Stopped age 69 Years.  Family History  Diabetes: Sister and Brother.  Hypertension.: Mother and Sister.  Primary malignant neoplasm of colon: Father.      I saw and evaluated the patient. Discussed with resident and agree with resident?s findings and plan as documented in the resident?s note  Reviewed the patients medical history, residents findings on physical exam, and the diagnosis with treatment plan. I saw and examined the patient. Care provided was reasonable and necessary.  I was present throughout the procedure.

## 2022-05-03 NOTE — HISTORICAL [facility] CERN[facility]
This is a historical note converted from [employer]. Formatting and pictures may have been removed.  Please reference [employer] for original formatting and attached multimedia. Chief Complaint  Renal follow up  History of Present Illness  This is a very pleasant 71-year-old -American lady with past medical history of hypertension, diabetes, chronic kidney disease stage IV, CHF, and hyperlipidemia. Previous kidney ultrasound in April 2015 revealed no evidence of obstructive uropathy, asymmetrical kidney size with left kidney smaller than the right. SRAVAN, ANCA, SPEP (-).  Patient denies fever, chills, weight loss, night sweats, visual/hearing changes, epistaxis, chest pain, palpitations,?syncope,?nausea, vomiting, abdominal pain, blood per rectum, constipation, dysuria, hematuria, kidney stones, joint pains, seizures, memory loss, paresthesia or tremors.??Reports feeling better after receiving ferrlecit. C/o chronic cough. ?  Review of Systems  All Systems: Negative except as noted in the HPI.  Physical Exam  Vitals & Measurements  T:?36.9? ?C ?(Oral)? HR:?57?(Peripheral)? BP:?135/74? HT:?166?cm? HT:?166?cm? WT:?101?kg? WT:?101?kg? BMI:?36.65?  General appearance: Appears in no acute distress.  Skin: No rashes, capillary refill <3 sec. Good turgor.  Lymph Nodes: No LAD.  HEENT: NC/AT. PERRLA, EOMI, no scleral icterus. Mucous membranes moist and pink, no oral lesions. No thyromegaly, JVD, neck supple.  ?Chest: Equal expansion, rhonchi cleared with cough, respirations unlabored.  ?Heart: S1-S2, regular rate and rhythm, no murmurs, pulses palpable throughout.  ?Abdomen: Benign. Obese  ?Genitourinary: Deferred  ?Extremities:?BLE edema left greater than right, no cyanosis or clubbing, pulses equal and palpable throughout.  Neurological: CN II-XII grossly intact.  Assessment/Plan  1.?CKD (chronic kidney disease), stage IV  ?Likely diabetic/hypertensive nephropathy. No recent labs, will obtain routine studies today and follow  up by phone with results.  ?  Encouraged to adhere to following instructions in order to slow the progression of renal disease:  ?   -Comply with renal diet and low sodium diet (2 grams a day)  -Control diabetes (goal A1C <7.0%)  -Control high blood pressure (?goal BP < 130/80, please record BP at home every day and bring log to next office visit)  -Exercise at least 30 minutes a day, 5 days a week.  -Maintain healthy weight.  -Stay well hydrated  -Receive Pneumovax, Flu, and HBV vaccines if indicated.  -Do not take NSAIDs (Ibuprofen, Naproxen, Aleve, Advil, Toradol, Mobic), may take only Tylenol as needed for pain/headaches.  -Take cholesterol-lowering medications as prescribed (LDL goal <100)  -NO SMOKING INDOORS (including all household members)  ?   Handout on treatment options for kidney disease provided.  F/U with PCP as scheduled  RTC to [facility] with routine labs in 2 months (CBC, CMP, Mg, Phos, iPTH, UA with micro, UP/CR, vitamin D level)  2.?HTN (hypertension)  ?Controlled. Continue current regimen.  3.?Anemia  ?Received 4 doses of IV Ferrlecit in June. Will recheck CBC and Fe studies today.  4.?Metabolic bone disease  Will check vitamin D, CMP and iPTH today.   Problem List/Past Medical History  CAD (coronary artery disease) of artery bypass graft  CHF, chronic  COPD  Depression  DM (diabetes mellitus)  HTN (hypertension)  Hyperlipemia  Historical  CABG (Coronary artery bypass grafting) planned  Diabetes  DM (diabetes mellitus), type 2  High cholesterol  HTN (hypertension)  Hypercholesterolemia  Hypertension  Hypothyroidism  Hypothyroidism  Renal insufficiency  Procedure/Surgical History  heart (11/01/2012), CABG x 4 vessels (2012), Thyroid, Thyroidectomy, Tubal ligation.  Medications  amLODIPine 5 mg oral tablet, 5 mg, 1 tab(s), Oral, Daily, 3 refills  aspirin 325 mg oral tablet, 325 mg, 1 tab(s), Oral, Daily, 2 refills  calcitriol 0.5 mcg oral capsule, 0.5 mcg, 1 cap(s), Oral, Daily, 1  refills  Coreg 6.25 mg oral tablet, 6.25 mg, 1 tab(s), Oral, BID, 1 refills  Crestor 40 mg oral tablet, See Instructions, 3 refills  DULoxetine 30 mg oral delayed release capsule, 30 mg, 1 cap(s), Oral, Daily, 3 refills  ferrous gluconate 324 mg (38 mg elemental iron) oral tablet, 1 tab(s), Oral, TID, 2 refills  glipiZIDE 10 mg oral tablet, See Instructions, 3 refills  Lasix 40 mg oral tablet, 40 mg, 1 tab(s), Oral, BID, 3 refills  levothyroxine 125 mcg (0.125 mg) oral tablet, See Instructions, 3 refills  lisinopril 10 mg oral tablet, 10 mg, 1 tab(s), Oral, Daily, 1 refills  NovoLIN 70/30 subcutaneous suspension, 45 units, Subcutaneous, BIDAC, 11 refills  omeprazole 20 mg oral DR capsule, 20 mg, 1 cap(s), Oral, Daily, 11 refills  ProAir HFA 90 mcg/inh inhalation aerosol with adapter, 1 puff(s), INH, q6hr, PRN, 2 refills  RISPERDAL CONSTA 50 MG SYR  Allergies  No Known Medication Allergies  Social History  Alcohol - Denies Alcohol Use  Current, 1-2 times per month  Employment/School  DISABLE, Highest education level: None. Operates hazardous equipment: No. 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  Exercise  Self assessment: Fair condition.  Exercise frequency: 1-2 times/week. Exercise type: Walking.  Home/Environment  Lives with Children. Living situation: Home/Independent. Home equipment: Glucose monitoring, Walker/Cane. Alcohol abuse in household: No. Substance abuse in household: No. Smoker in household: No. Injuries/Abuse/Neglect in household: No. Feels unsafe at home: No. Major illness in household: No. Financial concerns: No. TV/Computer concerns: No.  Nutrition/Health  Caffeine intake amount: 3 cups coffee per day.  Diabetic, Caffeine intake amount: 1 CUP OF COFFEE EVERY OTHER DAY. Wants to lose weight: Yes. Sleeping concerns: No. Feels highly stressed: No.  Sexual - No Sexual Activity  Substance Abuse - Denies Substance Abuse  Never  Tobacco - High Risk  Former smoker  Stopped age 69 Years.  Former  smoker  Family History  Diabetes: Sister and Brother.  Hypertension.: Mother and Sister.  Primary malignant neoplasm of colon: Father.  Immunizations  UTD  Lab Results  Sodium Lvl 142 mmol/L 06/21/2017 11:05 CDT  Potassium Lvl 3.7 mmol/L 06/21/2017 11:05 CDT  Chloride 105 mmol/L 06/21/2017 11:05 CDT  CO2 30 mmol/L 06/21/2017 11:05 CDT  Calcium Lvl 9.0 mg/dL 06/21/2017 11:05 CDT  Glucose Lvl 158 mg/dL 06/21/2017 11:05 CDT (High)   mg/dL 06/21/2017 11:05 CDT (High)  BUN 32 mg/dL 06/21/2017 11:05 CDT (High)  Creatinine 3.47 mg/dL 06/21/2017 11:05 CDT (High)  eGFR-AA 17 mL/min 06/21/2017 11:05 CDT (Low)  eGFR-KRISS 14 mL/min 06/21/2017 11:05 CDT (Low)  Bili Total 0.4 mg/dL 06/21/2017 11:05 CDT  Bili Direct <0.1 mg/dL 06/21/2017 11:05 CDT  Bili Indirect >0.3 mg/dL 06/21/2017 11:05 CDT  AST 20 unit/L 06/21/2017 11:05 CDT  ALT 17 unit/L 06/21/2017 11:05 CDT  Alk Phos 74 unit/L 06/21/2017 11:05 CDT  Total Protein 7.2 gm/dL 06/21/2017 11:05 CDT  Albumin Lvl 2.9 gm/dL 06/21/2017 11:05 CDT (Low)  Globulin 4.30 gm/mL 06/21/2017 11:05 CDT (High)  A/G Ratio 1 ratio 06/21/2017 11:05 CDT  Hgb A1c 8.0 % 06/21/2017 11:05 CDT (High)   mg/dL 06/21/2017 11:05 CDT  WBC 7.8 x10(3)/mcL 06/21/2017 11:05 CDT  RBC 3.71 x10(6)/mcL 06/21/2017 11:05 CDT (Low)  Hgb 9.7 gm/dL 06/21/2017 11:05 CDT (Low)  Hct 31.9 % 06/21/2017 11:05 CDT (Low)  Platelet 232 x10(3)/mcL 06/21/2017 11:05 CDT  Diagnostic Results  Reviewed

## 2022-05-03 NOTE — HISTORICAL [facility]ERN[facility]
This is a historical note converted from [employer]. Formatting and pictures may have been removed.  Please reference [employer] for original formatting and attached multimedia. DATE OF CONSULTATION: 6/8/2018  ?  CONSULT REQUESTED BY:? Dr Palacio  ?  REASON FOR CONSULTATION: End-stage renal disease  ?  HPI: This is a 72-year-old -American female with past medical history of end-stage renal disease on hemodialysis by way of right IJ tunneled catheter (Tuesdays, Thursdays, Saturdays), diabetes, hypertension, COPD, depression.? Patient presented to the hospital on 6/7/2018 with complaints of fever, chills, and cough for 1 day.? Patient reports that symptoms began after she received an injection of invega at her psychiatrists office.? Patient was last dialyzed on 6/5/2018, was unable to make scheduled appointment yesterday.? She was admitted to medicine team, further evaluation revealed no leukocytosis, chest pain without acute cardiopulmonary process, blood cultures have been collected, no growth to date.? We have been consulted for nephrology care.  ?  ALLERGIES: No known allergies  ?  REVIEW OF SYSTEMS: 12 point review systems conducted, negative except as stated in history of present illness  ?  PAST MEDICAL HISTORY: End-stage renal disease on hemodialysis, diabetes, hypertension, coronary artery disease, COPD, depression, hyperlipidemia.  ?  PROCEDURE HISTORY: AV fistula creation 3/21/2018, tunneled catheter insertion, CABG ?4 in 2012, thyroidectomy, tubal ligation.  ?  FAMILY HISTORY: Positive for diabetes, hypertension, colon cancer.  ?  SOCIAL HISTORY: Former smoker, denies alcohol/illicit drug use.  ?  MEDICATIONS:?  duloxetine HCL 30mg Cap ?30 mg 1 cap(s), Oral, Daily  furosemide 10mg/ml Inj--2ml ?60 mg 6 mL, IV Push, Once  heparin 5000 units/ml Inj-1ml ?5,000 units 1 mL, Subcutaneous, q12hr  heparin 5000 units/ml Inj-1ml ?5,000 units 1 mL, Subcutaneous, q12hr  levofloxacin 500 mg Tab UD ?500 mg 1 tab(s), Oral,  q48hr  levothyroxine 0.125 mg Tab UD ?125 mcg 1 tab(s), Oral, Daily  rosuvastatin 10 mg Tab UD ?40 mg 4 tab(s), Oral, Daily  Continuous: (0)  PRN: (13)  acetaminophen 500 mg Tab ?1,000 mg 2 tab(s), Oral, q6hr  albuterol-ipratropium 3mg-0.5 mg/3 mL Sol ?3 mL, NEB, As Directed  cloniDINE 0.1 mg Tab UD ?0.2 mg 2 tab(s), Oral, q8hr  dextrose 50% abboj ?12.5 gm 25 mL, IV Push, As Directed  dextrose 50% abboj ?12.5 gm 25 mL, IV Push, Once  dextrose 50% abboj ?12.5 gm 25 mL, IV Push, As Directed  dextrose 50% abboj ?25 gm 50 mL, IV Push, As Directed  glucagon recombinant 1 mg Inj ?1 mg 1 EA, IM, q10min  glucagon recombinant 1 mg Inj ?1 mg 1 EA, IM, q10min  glucose 40% oral gel ?15 gm 0.5 tube(s), Oral, As Directed  insulin lispro (clinic) 100 units/mL Soln ?2-14 units, Subcutaneous, As Directed  ondansetron 2 mg/mL inj - 2mL ?4 mg 2 mL, IV Push, q4hr  promethazine 25 mg/mL Inj ?12.5 mg 0.5 mL, IV Push, q4hr  ?  LABORATORY:?  BUN 38, creatinine 4.7, sodium 139, potassium 3.7, chloride 101, CO2 29, calcium 8.3, hemoglobin 11.2, hematocrit 35.5, platelet count 140, WBC 5.5.  ?  PHYSICAL EXAMINATION:  37.2, 57, 164/76, 18, 97%  General appearance: Appears in no acute distress.  Skin: No rashes, capillary refill <3 sec. Good turgor.? Right chest wall tunneled catheter in place,?site clean, dry,?no erythema/fluctuance/drainage.  Lymph Nodes: No LAD.  HEENT: NC/AT. PERRLA, EOMI, no scleral icterus. Mucous membranes moist and pink, no oral lesions. ?Neck is supple, no JVD.?  Chest: Equal expansion, rhonchi?to auscultation bilaterally, respirations unlabored.  Heart: S1-S2, regular.  Abdomen: Benign.??Obese.  Genitourinary: Deferred  Extremities: Trace bilateral lower extremity?edema, no cyanosis or clubbing, pulses equal and palpable throughout.? Left?wrist?AV fistula with audible bruit and palpable thrill  Neurological: CN II-XII grossly intact.  ?  ?  IMPRESSION:  End-stage renal disease, on  hemodialysis  Hypertension  Diabetes  Anemia of chronic disease  Coronary artery disease  COPD  Upper respiratory infection  History of depression heart failure with preserved ejection fraction  ?  RECOMMENDATIONS:  ?  No acute indications for hemodialysis today. ?Continue antibiotic therapy per primary team.? Recommend 60 mg of furosemide now, continue hemodialysis per Tuesday, Thursday, Saturday schedule.  ?  ?  Thank you very much for your consultation.   Seen and examined.Agree with NP assessment and plan as above.  ?

## 2022-05-03 NOTE — HISTORICAL [facility] CERNER
This is a historical note converted from Belinda. Formatting and pictures may have been removed.  Please reference Belinda for original formatting and attached multimedia. Chief Complaint  FEVER AND CHILLS. THINK I AM COMING DOWN WITH A FEVER. ON DIALYSIS. DIALYZE TU-THUR-SAT. DID NOT GO TODAY WILL GO ON TOMORROW BECAUSE I HAD DOCTORS APPOITMENT  History of Present Illness  This a 72-year-old -American female with past medical history CAD status post CABG, CHF with echo 1/18 EF 66%, ESRD on HD Tuesday, Thursday, Saturday last done Tuesday 6/5, hypertension, COPD,?IDDM,?and depression?who presents to [facility] ED with complaints of fever and chills that began?earlier today approximately 2pm. ?Patient states T-max recorded at home 100.4.??Patient also complains of?productive cough for 2 weeks.?Patient?normally undergoes dialysis Tuesday Thursday Saturday?and skip dialysis to today. ?Emergency department blood cultures drawn, labs indicated BUN/creatinine 37/4.7, WBC 6.3 and urinalysis pending. With chest x-ray no acute cardiopulmonary processes, No consolidations identified with right IJ tunneled central venous cath in place with postsurgical sternotomy wires,?as per my read. ?Official read pending. Patient received Tylenol as well as dose of Zosyn in the ER with blood cultures pending. ?Patient denies?chest pain, shortness of breath, dyspnea on exertion, nausea, vomiting, abdominal pain, headache, dizziness?or syncope.?Internal medicine consulted for fever without source and need for HD.  ?  Review of Systems  Constitutional:?+?fever, +chills.  ENMT: No hearing loss, sneezing, congestion, runny nose or sore throat.  Respiratory: No shortness of breath. No Dyspnea on exertion. + cough  Cardiovascular: no chest pain or palpations  Gastrointestinal: no anorexia, nausea, vomiting or diarrhea. No abdominal pain or blood.  Musculoskeletal: No muscle, back pain, joint pain or stiffness.  Neurologic: No headache, dizziness,  syncope, paralysis, ataxia, numbness or tingling in the extremities. No change in bowel or bladder control.  All Other ROS_  ?  Physical Exam  Vitals & Measurements  T:?37.2? ?C (Oral)? TMIN:?37.2? ?C (Oral)? TMAX:?38? ?C (Oral)? HR:?61(Peripheral)? HR:?61(Monitored)? RR:?20? BP:?154/68? SpO2:?96%? WT:?98.7?kg? WT:?98.7?kg?  General: AAOx3, NAD, alert and cooperative  HEENT: PERRLA, EOMI, normal conjunctiva  Neck: no LAD, no JVD, supple  CVS: S1/S2 nml, bradycardia, regular rhythm, no murmurs, tunneled cath to R upper?chest without warmth,?drainage, or erythema  Resp: slight decreased breath sounds throughout all lung fields,?with wheezes  GI: Not distended, not tender, BS+, no guarding  : No CVA tenderness  Skin: not jaundiced, warm  Musculoskeletal: normal ROM, no joint tenderness, normal muscular development  Extremities: trace peripheral edema, peripheral pulses intact, AV graft with palpable and audible?thrill?left forearm  Neuro: CN II-XII grossly intact, strength and sensation symmetric and intact throughout, no focal neurological deficits  ?  Assessment/Plan  1.? URI  2. ?ESRD?on dialysis  3.? Uncontrolled hypertension  4. ?COPD  5.? CAD status post CABG  6.? IDDM  7. depression  8. HFpEF 66% 1/2018  ?  ?  ?   Patient with 1 day history of fever T-max?100.4?with reported?chills??1 day.? Blood cultures pending, urinalysis pending.? No leukocytosis and lactic acid 1.4.? Patient has remained hemodynamically stable?afebrile with Tylenol.? Admitted to telemetry?with monitoring.??Patient did?complain of a?2 week productive cough?with clear sputum production likely URI.??Will?start patient on Levaquin given hx of?COPD with PRN duonebs. ?His maintain O2 sats 96 and above on room air.??Chest x-ray without any obvious sites of consolidation however official read is pending.??Patient with ESRD on dialysis following?Tuesday Thursday Saturday patient states that she skipped dialysis?on Thursday. ?Nephrology  consulted?for dialysis inpatient.? Patient hypertensive systolic 170s on admission. ?As needed clonidine.? BUN and creatinine?37/4.7. ?Upon examination of catheter?it is without?erythema, drainage, tenderness to palpation?or warmth.  ?  ?  ?  DVT ppx: Heparin 5000 units twice daily  Consults: nephrology  ?   Problem List/Past Medical History  Ongoing  CAD (coronary artery disease) of artery bypass graft  CHF, chronic  CKD (chronic kidney disease), stage IV  COPD  Depression  Diabetes  DM (diabetes mellitus)  HTN (hypertension)  Hyperlipemia  Procedure/Surgical History  Arteriovenous Fistula (Left) (03/21/2018), Bypass Left Radial Artery to Lower Arm Vein, Open Approach (03/21/2018), Performance of Urinary Filtration, Intermittent, Less than 6 Hours Per Day (01/16/2018), Insertion of Infusion Device into Right Atrium, Percutaneous Approach (11/13/2017), Insertion of Tunneled Vascular Access Device into Chest Subcutaneous Tissue and Fascia, Percutaneous Approach (11/13/2017), heart (11/01/2012), CABG x 4 vessels (2012), Thyroid, Thyroidectomy, Tubal ligation.  Medications  Inpatient  acetaminophen, 1000 mg= 2 tab(s), Oral, q6hr, PRN  cloNIDine, 0.2 mg= 1 tab(s), Oral, q8hr, PRN  heparin, 5000 units= 1 mL, Subcutaneous, q12hr  heparin, 5000 units= 1 mL, Subcutaneous, q12hr  Phenergan, 12.5 mg= 0.5 mL, IV Push, q4hr, PRN  Zofran, 4 mg= 2 mL, IV Push, q4hr, PRN  Home  aspirin 81 mg oral tablet, CHEWABLE, 81 mg= 1 tab(s), Chewed, Daily, 11 refills  Breo Ellipta 100 mcg-25 mcg/inh inhalation powder, 1 puff(s), INH, Daily, 3 refills  calcitriol 0.25 mcg oral capsule, 0.25 mcg= 1 cap(s), Oral, Daily, 3 refills  carvedilol 6.25 mg oral tablet, 6.25 mg= 1 tab(s), Oral, BID, 6 refills  CLONIDINE HCL 0.1 MG TABLET  DULOXETINE HCL DR 30 MG CAP  GLIPIZIDE 10 MG TABLET  hydrALAZINE 100 mg oral tablet, 100 mg= 1 tab(s), Oral, TID, 6 refills  HYDROCODONE-ACETAMIN 5-325 MG,? ?Not taking  Lantus Solostar Pen 100 units/mL subcutaneous  solution, 45 units, Subcutaneous, Once a day (at bedtime), 3 refills  levothyroxine 125 mcg (0.125 mg) oral tablet, 125 mcg= 1 tab(s), Oral, Daily, 3 refills  NovoLOG 100 units/mL injectable solution, 5 units, Subcutaneous, TIDAC, 3 refills,? ?Not Taking per Prescriber  RELION NOVOLIN 70-30 VIAL  RISPERDAL CONSTA 50 MG SYR  rosuvastatin 40 mg oral tablet, 40 mg= 1 tab(s), Oral, Daily, 6 refills  Allergies  No Known Medication Allergies  Social History  Alcohol - Denies Alcohol Use, 06/27/2014  Current, Wine, 1-2 times per month, 05/29/2018  Past, 04/21/2018  Employment/School  DISABLE, Highest education level: None. Operates hazardous equipment: No. 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 Self assessment: Fair condition. Exercise type: Walking., 02/09/2018  Self assessment: Fair condition., 03/11/2015  Exercise frequency: 1-2 times/week. Exercise type: Walking., 02/28/2015  Home/Environment  Lives with Children. Living situation: Home/Independent. Home equipment: Glucose monitoring, Walker/Cane. Alcohol abuse in household: No. Substance abuse in household: No. Smoker in household: No. Injuries/Abuse/Neglect in household: No. Feels unsafe at home: No. Major illness in household: No. Financial concerns: No. TV/Computer concerns: No., 02/28/2015  Nutrition/Health  Caffeine intake amount: drinks decaffeinated coffee 3 times a week., 02/09/2018  Caffeine intake amount: 3 cups coffee per day., 03/11/2015  Diabetic, Caffeine intake amount: 1 CUP OF COFFEE EVERY OTHER DAY. Wants to lose weight: Yes. Sleeping concerns: No. Feels highly stressed: No., 02/28/2015  Sexual - No Sexual Activity, 02/28/2015  Sexually active: No., 09/29/2017  Substance Abuse - Denies Substance Abuse, 06/27/2014  Never, 01/14/2017  Tobacco - High Risk, 06/28/2014  Former smoker, 02/09/2018  Former smoker, 11/21/2017  Former smoker, 09/16/2015  Stopped age 69 Years.,  02/28/2015  Family History  Diabetes: Sister and Brother.  Hypertension.: Mother and Sister.  Primary malignant neoplasm of colon: Father.  Immunizations  Vaccine Date Status Comments   influenza virus vaccine, inactivated 11/15/2017 Given    influenza virus vaccine, inactivated 10/31/2016 Given pt tolerated well.   pneumococcal 23-polyvalent vaccine 10/31/2016 Given pt tolerated well.   Lab Results  General Lab  BUN:?38 mg/dL?High (06/07/18 23:10:00 CDT)  Creatinine:?4.7 mg/dL?High (06/07/18 23:10:00 CDT)  Glucose Lvl:?203 mg/dL?High (06/07/18 23:10:00 CDT)  Hct: 35.2 % (06/07/18 23:10:00 CDT)  Hgb:?11.3 gm/dL?Low (06/07/18 23:10:00 CDT)  INR: 1.05 (06/07/18 23:10:00 CDT)  WBC: 6.3 x10(3)/mcL (06/07/18 23:10:00 CDT)  Platelet: 152 x10(3)/mcL (06/07/18 23:10:00 CDT)  Potassium Lvl: 4.1 mmol/L (06/07/18 23:10:00 CDT)  Sodium Lvl: 139 mmol/L (06/07/18 23:10:00 CDT)  Diagnostic Results  Checks x-ray official read pending      Agree with above, pt with multiple comorbidities w/ symptoms of febrile URI at risk for further decompensation admitted for observation. Pt received Zosyn in ED, proceed with Levaquin renally dosed. Chronicity of cough and lack of need for supplemental O2 not suggestive of COPD.   I have seen the patient with the residents, re assessed the historical, physical, laboratory, and radiologic findings.  I have discussed the case with the residents and made recommendations.

## 2022-05-03 NOTE — HISTORICAL [facility] CERNER
This is a historical note converted from Cerner. Formatting and pictures may have been removed.  Please reference Cerner for original formatting and attached multimedia. Chief Complaint  dialysis today; started having chest heaviness half way through procedure. Denies chest pain or heaviness currently. Denies shortness of breath, N or V.  History of Present Illness  Is a 72-year-old -American female with past medical history CAD status post CABG, CHF with echo 1/18 EF 66%, ESRD on ultrafiltration Tuesday, Thursday, Saturday last done today, hypertension, COPD,?diabetes,?continue and hyperlipidemia presents to [facility] ED with complaints of chest pressure which began during dialysis today.? Patient states that she had no other complaints such as shortness of breath, palpitations, orthopnea, PND, peripheral edema, syncope, nausea, vomiting, abdominal pain, or diaphoresis. Patient last saw cardiology February 2018 with echocardiogram with EF 66% and stress echo negative for significant ischemia at which time she received surgical clearance to receive forearm graft for future dialysis.? Patient currently has central line in which has been examined today clean dry intact without signs of infection. Blood pressure upon presentation to the emergency department 197/71.? POC troponin 0.06 and will continue to be trended every 6 hours ?3 along with EKGs.? Initial EKG with no new ischemic changes. Chest x-ray with no acute cardiopulmonary disease. ?Patient started on 0.2 clonidine tablet 3 times daily with first dose given then, heparin 5000 units twice daily.? If blood pressure continues to be elevated consider upgrading patient to ICU and starting Cardizem drip. ?Internal medicine consulted for ACS rule out.  Review of Systems  Constitutional: no weightloss,?fever, chills, weakness or fatigue  ENMT: No hearing loss, sneezing, congestion, runny nose or sore throat.  Respiratory: No shortness of breath, cough or sputum. No  Dyspnea on exertion.  Cardiovascular:+chest pressure. No palpitations or edema.  Gastrointestinal: no anorexia, nausea, vomiting or diarrhea. No abdominal pain or blood.  Musculoskeletal: No muscle, back pain, joint pain or stiffness.  Neurologic: No headache, dizziness, syncope, paralysis, ataxia, numbness or tingling in the extremities. No change in bowel or bladder control.  All Other ROS_  Physical Exam  Vitals & Measurements  T:?36.6? ?C (Oral)? TMIN:?36.6? ?C (Oral)? TMAX:?37? ?C (Oral)? HR:?56(Apical)? RR:?18? BP:?201/81? SpO2:?94%? WT:?97.4?kg?  General: AAOx3, NAD, alert and cooperative  HEENT: PERRLA, EOMI, normal conjunctiva  Neck: no LAD, no JVD, supple  CVS: S1/S2 nml, bradycardia, regular rhythm, no murmurs  Resp: slight decreased breath sounds throughout all lung fields,?with wheezes, no crackles  GI: Not distended, not tender, BS+, no guarding  : No CVA tenderness  Skin: not jaundiced, warm  Musculoskeletal: normal ROM, no joint tenderness, normal muscular development  Extremities: trace peripheral edema, peripheral pulses intact  Neuro: CN II-XII grossly intact, strength and sensation symmetric and intact throughout, no focal neurological deficits  ?  Assessment/Plan  1. ?Chest pain, ACS rule out  2.? ESRD on ultrafiltration Tuesday, Thursday, Saturday  3.? CAD status post CABG  4.??IDDM  5.? HFpEF?last EF 1/18 66%  6. ?Hypertension  7.? Hyperlipidemia  8.? Hypothyroid?status post thyroidectomy  9. COPD  ?  ?  72-year-old -American female with past medical history of?CAD status post CABG,?insulin-dependent diabetes, heart failure preserved ejection fraction 66?ESRD on ultrafiltration%,,?hypertension, hyperlipidemia, hypothyroid status post thyroidectomy, COPD who presents to emergency emergency department for chest discomfort that began during?filtration process today. ?Initial POC troponin 0.06,?EKG?with no new signs of ischemia,?and patient without?signs of fluid overload or?accompanying  symptoms like shortness of breath, nausea, vomiting, numbness, tingling,?abdominal pain,?syncope, or diaphoresis. ?Patient admitted to telemetry with monitoring. ?Placed on sliding scale insulin and?with continuation of?clonidine 0.2?TID with first dose given?now?to decreased blood pressure, and single?dose of labetalol 20mg?IV. ?Patients systolic blood pressure still remains high in 200s if she continues may need to be upgraded to ICU and placed on Cardizem drip.? Levothyroxine,?aspirin,?high intensity statin, Spiriva,?Breo continued.? Follow troponins every 6 hours ?3 with EKGs.  ?  ?  ?  ?  ?  ?  ?  ?  DVT prophylaxis: Heparin 5000 units twice daily  ?  ?  ?  ?  ?   Problem List/Past Medical History  Ongoing  CAD (coronary artery disease) of artery bypass graft  CHF, chronic  CKD (chronic kidney disease), stage IV  COPD  Depression  Diabetes  DM (diabetes mellitus)  HTN (hypertension)  Hyperlipemia  Procedure/Surgical History  Bypass Left Radial Artery to Lower Arm Vein, Open Approach (03/21/2018), Performance of Urinary Filtration, Intermittent, Less than 6 Hours Per Day (01/16/2018), Insertion of Infusion Device into Right Atrium, Percutaneous Approach (11/13/2017), CABG x 4 vessels (2012), Thyroid, Thyroidectomy, Tubal ligation.  Medications  Inpatient  acetaminophen, 650 mg= 2 tab(s), Oral, q6hr, PRN  acetaminophen, 1000 mg= 2 tab(s), Oral, q6hr, PRN  aspirin 81 mg oral tablet, CHEWABLE, 81 mg= 1 tab(s), Oral, Daily  Breo Ellipta 100 mcg-25 mcg/inh inhalation powder, 1 puff, INH, Daily  cloNIDine, 0.2 mg= 2 tab(s), Oral, q8hr, PRN  cloNIDine, 0.2 mg= 2 tab(s), Oral, TID  cloniDINE 0.1 mg oral tablet, 0.2 mg= 2 tab(s), Oral, Once-NOW  Dextrose 50% and Water (50 mL vial/syringe), 25 gm= 50 mL, IV Push, As Directed  Dextrose 50% and Water (50 mL vial/syringe), 12.5 gm= 25 mL, IV Push, Once, PRN  Dextrose 50% and Water (50 mL vial/syringe), 12.5 gm= 25 mL, IV Push, As Directed, PRN  Dextrose 50% in Water  intravenous solution, 12.5 gm= 25 mL, IV Push, As Directed, PRN  DULoxetine 30 mg oral delayed release capsule, 30 mg= 1 cap(s), Oral, Daily  glucagon recombinant 1 mg injection, 1 mg= 1 EA, IM, q10min, PRN  glucagon recombinant 1 mg injection, 1 mg= 1 EA, IM, q10min, PRN  glucose 40% oral gel, 15 gm= 0.5 tube(s), Oral, As Directed, PRN  heparin, 5000 units= 1 mL, Subcutaneous, q12hr  insulin lispro 100 units/mL subcutaneous injection, 3-21 units, Subcutaneous, As Directed, PRN  Lantus 100 U/mL (per pen), 30 units= 0.3 mL, Subcutaneous, At Bedtime  Lasix, 40 mg= 2 tab(s), Oral, BID  levothyroxine 125 mcg (0.125 mg) oral tablet, 125 mcg= 1 tab(s), Oral, Daily  rosuvastatin 40 mg oral tablet, 40 mg= 4 tab(s), Oral, Daily  Spiriva 18 mcg inhalation capsule, 18 mcg= 1 cap(s), INH, Daily  Zofran, 4 mg= 2 mL, IV Push, q4hr, PRN  Home  aspirin 81 mg oral tablet, CHEWABLE, 81 mg= 1 tab(s), Oral, Daily, 3 refills  benzonatate 100 mg oral capsule, See Instructions, 1 refills,? ?Not taking: Last Dose Date/Time Unknown  Breo Ellipta 100 mcg-25 mcg/inh inhalation powder, 1 puff(s), INH, Daily  calcitriol 0.5 mcg oral capsule, 0.5 mcg= 1 cap(s), Oral, Daily, 3 refills,? ?Not taking: pt. stated Last Dose Date/Time Unknown  cloniDINE 0.1 mg oral tablet, 0.1 mg= 1 tab(s), Oral, BID, 3 refills  Coreg 12.5 mg oral tablet, 12.5 mg= 1 tab(s), Oral, BID, 6 refills  DOXYCYCLINE HYCLATE 100 MG CAP, 100 mg= 1 cap(s), Oral, BID,? ?Not Taking, Completed Rx  DULoxetine 30 mg oral delayed release capsule, 30 mg= 1 cap(s), Oral, Daily, 3 refills  ferrous gluconate 324 mg (38 mg elemental iron) oral tablet, See Instructions, 3 refills  glipiZIDE 10 mg oral tablet, See Instructions, 3 refills  HYDROCODONE-ACETAMIN 5-325 MG,? ?Not taking  Insulin Syringes 50 unit 1/2, See Instructions, 3 refills  Lasix 40 mg oral tablet, 40 mg= 1 tab(s), Oral, BID, 3 refills  levothyroxine 125 mcg (0.125 mg) oral tablet, See Instructions, 3 refills  NovoLIN 70/30  subcutaneous suspension, 45 units, Subcutaneous, BIDAC, 11 refills  ProAir HFA 90 mcg/inh inhalation aerosol with adapter, 1 puff(s), INH, q6hr, PRN, 2 refills  RISPERDAL CONSTA 50 MG SYR  rosuvastatin 40 mg oral tablet, 40 mg= 1 tab(s), Oral, Daily, 3 refills  Spiriva 18 mcg inhalation capsule, 18 mcg= 1 cap(s), INH, Daily, 2 refills  Allergies  No Known Medication Allergies  Social History  Alcohol - Denies Alcohol Use, 06/27/2014  Past, 04/21/2018  Current, 1-2 times per month, 01/14/2017  Home/Environment  Lives with Children. Living situation: Home/Independent. Home equipment: Glucose monitoring, Walker/Cane. Alcohol abuse in household: No. Substance abuse in household: No. Smoker in household: No. Injuries/Abuse/Neglect in household: No. Feels unsafe at home: No. Major illness in household: No. Financial concerns: No. TV/Computer concerns: No., 02/28/2015  Substance Abuse - Denies Substance Abuse, 06/27/2014  Never, 01/14/2017  Tobacco - High Risk, 06/28/2014  Former smoker, 09/16/2015  Stopped age 69 Years., 02/28/2015  Family History  Diabetes: Sister and Brother.  Hypertension.: Mother and Sister.  Primary malignant neoplasm of colon: Father.  Immunizations  Vaccine Date Status Comments   influenza virus vaccine, inactivated 11/15/2017 Given    influenza virus vaccine, inactivated 10/31/2016 Given pt tolerated well.   pneumococcal 23-polyvalent vaccine 10/31/2016 Given pt tolerated well.   Lab Results  General Lab  BUN: 16 mg/dL (04/21/18 15:05:00)  Creatinine:?2.3 mg/dL?High (04/21/18 15:05:00)  Glucose Lvl:?117 mg/dL?High (04/21/18 15:05:00)  Hct: 35.5 % (04/21/18 15:05:00)  Hgb:?11.2 gm/dL?Low (04/21/18 15:05:00)  WBC: 6.7 x10(3)/mcL (04/21/18 15:05:00)  Platelet: 200 x10(3)/mcL (04/21/18 15:05:00)  Potassium Lvl:?2.9 mmol/L?Low (04/21/18 15:05:00)  Sodium Lvl: 139 mmol/L (04/21/18 15:05:00)  ?  Diagnostic Results  Accession:?[id number]  Order:?XR Chest 2 Views  Report Dt/Tm:?04/21/2018  15:39  Report:?  XR Chest 2 Views  ?  REASON FOR STUDY: Chest Pain  ?  TECHNIQUE: Frontal and lateral radiographs of the chest  ?  COMPARISON: Chest from March 14, 2018  ?  FINDINGS: Cardiac silhouette and mediastinal contours are within  normal limits. The lungs are well-inflated. No consolidation  identified. There is no pleural effusion or discernible pneumothorax.  Postsurgical changes from CABG. Right IJ tunneled central venous  catheter is unchanged in position with the distal tip at the atrial  caval junction.  ?  IMPRESSION:?  ?  No acute cardiopulmonary process. Stable chest. ? ?  ?  ?  ?      VASILIY score 4.   Labetalol not given considering patient bradycardiac in 50s. Norvasc and enalapril used instead  ?   I have performed a history and physical examination of the patient and discussed the management with the resident.  ???  [x ] I reviewed the residents note and agree with the documented findings and plan of care.  [ ] I reviewed the residents note and agree with the documented findings and plan of care except:  ?   See addendum on progress note from todays date.  ?   MD Shira  ID Staff

## 2022-05-03 NOTE — HISTORICAL [facility] CERNER
This is a historical note converted from Belinda. Formatting and pictures may have been removed.  Please reference Belinda for original formatting and attached multimedia. Interval H&P Update  I have reviewed the H&P and examined the pateint. Patient desires to proceed to surgery today  ?   Plan:  To OR for left AV fistula creation  ?   Ellie Puckett MD  [facility] Surgery PGY1  ?   Chief Complaint  needs av fistula  History of Present Illness  71 yo R hand dominant female with a PMHx of ESRD on TRS HD, HTN, HLD, CABG (2005), 20 PPD smoking hx (quit 10 yrs ago)and DMII who presents to surgery clinic for evaluation for AV fistula creation. Pt has had temporary access (R IJ)?since November for a GFR<15. Fistula creation was delayed until now because pt was hospitalized for PNA in January. She has been evaluated as a high risk surgical candidate by cardiology. Venous mapping shows distal cephalic vein <2 cm.  Review of Systems  ROS negative except as stated above.  Physical Exam  ??Vitals & Measurements  ??T:?36.6? ?C (Oral)? HR:?50(Peripheral)? RR:?24? BP:?154/90?  ??HT:?165?cm? HT:?165?cm? WT:?96.1?kg?  Gen: pt alert, well appearing, speaking in full sentences, A&Ox3  CV: RRR, 2+radial pulses  Chest: symmetric rise, CABG scar; R IJ cath dressing clean and dry  Resp: inspiratory wheezes bilaterally, chest rise symmetric, normal effort  Abd: +BS, soft, nondistended  Ext: warm, dry ,well perfused  Skin: skin color changes noted on dorsal aspect of left foot  Assessment/Plan  ESRD on dialysis  ??? To OR on March 21

## 2022-05-03 NOTE — HISTORICAL [facility] CERNER
This is a historical note converted from [employer]. Formatting and pictures may have been removed.  Please reference [employer] for original formatting and attached multimedia. Patient seen and examined this morning. Complains of worsening cervical disc disease, but otherwise no changes to below documented H&P.  ?   Josué Gallegos MD  PGY-1, [facility]  __________________________________________________________________________  ?  Chief Complaint  ultrasound results/Left AVF  History of Present Illness  Ms. Cuenca is a 72 yo F w PMHx of ESRD on HD, T2DM, L radiocephalic AV fistula (3/18)?w/ 50% stenosis.  She presents today for removal of IJ catheter. Currently, she states that both her fistula and IJ catheter are used for access.  States that sometimes fistula is not used because it clogs during HD and tunneled catheter is used. States that they are not able to get 2 needles into the fistula.  denies f/c, cp/sob, claudication, n/v/d  Review of Systems  otherwise negative, as stated above.  Physical Exam  ??Vitals & Measurements  ??T:?36.7? ?C (Oral)? HR:?71(Peripheral)? RR:?18? BP:?158/81? SpO2:?96%?  ??HT:?165.10?cm? HT:?165.10?cm? WT:?94.4?kg? WT:?94.4?kg? BMI:?34.63?  General:?well-developed well-nourished in no acute distress  Eye: PERRLA, EOMI, clear conjunctiva, eyelids normal  Respiratory:?clear to auscultation bilaterally  Cardiovascular:?systolic murmur III/VI, regular rate.  Gastrointestinal:?soft, non-tender, non-distended with normal bowel sounds, without masses to palpation  Genitourinary: no CVA tenderness to palpation  Musculoskeletal:?L arm AV radiocephalic fistula has palpable thrill. mild pulsatility. full range of motion of all extremities/spine without limitation or discomfort.  Integumentary: no rashes or skin lesions present  Neurologic: cranial nerves intact, no signs of peripheral neurological deficit, motor/sensory function intact  ?  Assessment/Plan  ?   72yo F w/ PMHx of ESRD on HD,  T2DM, L radiocephalic AV fistula (3/18) with poor flow during dialysis  -scheduled for fistulogram to assess for clot  -continue HD with IJ and fistula as allowed.  ?   ADDENDUM  Agree with above.? Patient seen and examined with medical student.? Plan for fistulagram 11/26/18.?  ?   Gerard Burroughs MD  [employer] Surgery, PGY-3 [1]  [1]?Office Visit Note; Behzad Bacon 11/20/2018 12:02 CST

## 2022-05-10 ENCOUNTER — TELEPHONE (OUTPATIENT)
Dept: INTERNAL MEDICINE | Facility: CLINIC | Age: 77
End: 2022-05-10

## 2022-05-10 NOTE — TELEPHONE ENCOUNTER
Pt called for a refill on her pen needles but I do not see it under her medication. She is also asking if it can be sent to [facility] pharmacy on [address]

## 2022-05-19 RX ORDER — PEN NEEDLE, DIABETIC 30 GX3/16"
2 NEEDLE, DISPOSABLE MISCELLANEOUS 2 TIMES DAILY
Qty: 100 EACH | Refills: 2 | Status: SHIPPED | OUTPATIENT
Start: 2022-05-19 | End: 2023-03-01 | Stop reason: SDUPTHER

## 2022-06-14 ENCOUNTER — TELEPHONE (OUTPATIENT)
Dept: INTERNAL MEDICINE | Facility: CLINIC | Age: 77
End: 2022-06-14

## 2022-06-14 NOTE — TELEPHONE ENCOUNTER
Pt, son stated that the pt needs a referral in order to ride the transportation bus to her appts, the companies name is [employer].

## 2022-06-21 NOTE — TELEPHONE ENCOUNTER
I spoke with patient's daughter and she notified me that a PA is needed because patient's insurance only covers 12 rides throughout the year and she needs long-term transportation since dialysis takes up most of the rides, she has none left for the year. She is also requesting diabetic shoes, a Saturday clinic appt has been scheduled for patient.

## 2022-07-06 ENCOUNTER — TELEPHONE (OUTPATIENT)
Dept: NEPHROLOGY | Facility: CLINIC | Age: 77
End: 2022-07-06
Payer: COMMERCIAL

## 2022-07-06 NOTE — TELEPHONE ENCOUNTER
Spoke with Charge nurseMacarena:   out of office; but aware of the situation.   Marlen, daughter of patient, advised to speak with  at [facility] clinic to collaborate with nephrologist in order to do prior authorization of increased transportation.

## 2022-07-06 NOTE — TELEPHONE ENCOUNTER
unable to leave message; voicemail fullLeft message, will call again  ----- Message from Roberta Bejarano sent at 7/5/2022  2:32 PM CDT -----  Regarding: call back  Pt's daughter Dalila called and stated that she would like a call back in regards to her mother. Contact info is [phone]          Thank You  Latisha MCKENZIE

## 2022-08-08 ENCOUNTER — OFFICE VISIT (OUTPATIENT)
Dept: CARDIOLOGY | Facility: CLINIC | Age: 77
End: 2022-08-08
Payer: COMMERCIAL

## 2022-08-08 VITALS
BODY MASS INDEX: 35.55 KG/M2 | DIASTOLIC BLOOD PRESSURE: 80 MMHG | HEART RATE: 56 BPM | WEIGHT: 213.38 LBS | SYSTOLIC BLOOD PRESSURE: 158 MMHG | RESPIRATION RATE: 18 BRPM | HEIGHT: 65 IN | OXYGEN SATURATION: 96 %

## 2022-08-08 DIAGNOSIS — N18.6 ESRD ON HEMODIALYSIS: ICD-10-CM

## 2022-08-08 DIAGNOSIS — E78.2 MIXED HYPERLIPIDEMIA: ICD-10-CM

## 2022-08-08 DIAGNOSIS — I25.700 CORONARY ARTERY DISEASE INVOLVING CORONARY BYPASS GRAFT OF NATIVE HEART WITH UNSTABLE ANGINA PECTORIS: ICD-10-CM

## 2022-08-08 DIAGNOSIS — E11.9 DIABETES MELLITUS WITHOUT COMPLICATION: ICD-10-CM

## 2022-08-08 DIAGNOSIS — Z99.2 ESRD ON HEMODIALYSIS: ICD-10-CM

## 2022-08-08 DIAGNOSIS — J44.9 CHRONIC OBSTRUCTIVE PULMONARY DISEASE, UNSPECIFIED COPD TYPE: ICD-10-CM

## 2022-08-08 DIAGNOSIS — I50.32 CHRONIC HEART FAILURE WITH PRESERVED EJECTION FRACTION: Primary | ICD-10-CM

## 2022-08-08 DIAGNOSIS — Z72.0 TOBACCO USE: ICD-10-CM

## 2022-08-08 DIAGNOSIS — I10 HTN (HYPERTENSION), BENIGN: ICD-10-CM

## 2022-08-08 PROCEDURE — 99214 PR OFFICE/OUTPT VISIT, EST, LEVL IV, 30-39 MIN: ICD-10-PCS | Mod: S$PBB,,, | Performed by: NURSE PRACTITIONER

## 2022-08-08 PROCEDURE — 1101F PR PT FALLS ASSESS DOC 0-1 FALLS W/OUT INJ PAST YR: ICD-10-PCS | Mod: CPTII,,, | Performed by: NURSE PRACTITIONER

## 2022-08-08 PROCEDURE — 1160F PR REVIEW ALL MEDS BY PRESCRIBER/CLIN PHARMACIST DOCUMENTED: ICD-10-PCS | Mod: CPTII,,, | Performed by: NURSE PRACTITIONER

## 2022-08-08 PROCEDURE — 1101F PT FALLS ASSESS-DOCD LE1/YR: CPT | Mod: CPTII,,, | Performed by: NURSE PRACTITIONER

## 2022-08-08 PROCEDURE — 3077F SYST BP >= 140 MM HG: CPT | Mod: CPTII,,, | Performed by: NURSE PRACTITIONER

## 2022-08-08 PROCEDURE — 1159F MED LIST DOCD IN RCRD: CPT | Mod: CPTII,,, | Performed by: NURSE PRACTITIONER

## 2022-08-08 PROCEDURE — 3077F PR MOST RECENT SYSTOLIC BLOOD PRESSURE >= 140 MM HG: ICD-10-PCS | Mod: CPTII,,, | Performed by: NURSE PRACTITIONER

## 2022-08-08 PROCEDURE — 3288F PR FALLS RISK ASSESSMENT DOCUMENTED: ICD-10-PCS | Mod: CPTII,,, | Performed by: NURSE PRACTITIONER

## 2022-08-08 PROCEDURE — 1159F PR MEDICATION LIST DOCUMENTED IN MEDICAL RECORD: ICD-10-PCS | Mod: CPTII,,, | Performed by: NURSE PRACTITIONER

## 2022-08-08 PROCEDURE — 3079F DIAST BP 80-89 MM HG: CPT | Mod: CPTII,,, | Performed by: NURSE PRACTITIONER

## 2022-08-08 PROCEDURE — 3288F FALL RISK ASSESSMENT DOCD: CPT | Mod: CPTII,,, | Performed by: NURSE PRACTITIONER

## 2022-08-08 PROCEDURE — 99214 OFFICE O/P EST MOD 30 MIN: CPT | Mod: S$PBB,,, | Performed by: NURSE PRACTITIONER

## 2022-08-08 PROCEDURE — 99215 OFFICE O/P EST HI 40 MIN: CPT | Mod: PBBFAC | Performed by: NURSE PRACTITIONER

## 2022-08-08 PROCEDURE — 1160F RVW MEDS BY RX/DR IN RCRD: CPT | Mod: CPTII,,, | Performed by: NURSE PRACTITIONER

## 2022-08-08 PROCEDURE — 3079F PR MOST RECENT DIASTOLIC BLOOD PRESSURE 80-89 MM HG: ICD-10-PCS | Mod: CPTII,,, | Performed by: NURSE PRACTITIONER

## 2022-08-08 RX ORDER — LEVOTHYROXINE SODIUM 125 UG/1
TABLET ORAL
COMMUNITY
Start: 2022-01-25 | End: 2022-09-30 | Stop reason: SDUPTHER

## 2022-08-08 RX ORDER — GLIPIZIDE 10 MG/1
10 TABLET ORAL
COMMUNITY
Start: 2022-01-19 | End: 2022-09-30 | Stop reason: SDUPTHER

## 2022-08-08 RX ORDER — AMLODIPINE BESYLATE 5 MG/1
5 TABLET ORAL DAILY
COMMUNITY
Start: 2022-05-21 | End: 2022-12-05 | Stop reason: SDUPTHER

## 2022-08-08 RX ORDER — DIPHENHYDRAMINE HCL 2 %
50 CREAM (GRAM) TOPICAL NIGHTLY
COMMUNITY
Start: 2022-02-23

## 2022-08-08 RX ORDER — FERROUS GLUCONATE 324(38)MG
1 TABLET ORAL 3 TIMES DAILY
COMMUNITY
Start: 2022-07-07 | End: 2022-09-30 | Stop reason: SDUPTHER

## 2022-08-08 RX ORDER — ASPIRIN 81 MG/1
81 TABLET ORAL DAILY
COMMUNITY
Start: 2022-05-21 | End: 2022-11-23 | Stop reason: SDUPTHER

## 2022-08-08 RX ORDER — BISACODYL 5 MG
5 TABLET, DELAYED RELEASE (ENTERIC COATED) ORAL DAILY PRN
Status: ON HOLD | COMMUNITY
End: 2023-03-25 | Stop reason: HOSPADM

## 2022-08-08 RX ORDER — FUROSEMIDE 40 MG/1
40 TABLET ORAL
COMMUNITY
Start: 2021-06-23 | End: 2022-12-05 | Stop reason: SDUPTHER

## 2022-08-08 RX ORDER — PALIPERIDONE PALMITATE 156 MG/ML
INJECTION INTRAMUSCULAR
COMMUNITY
Start: 2022-08-02

## 2022-08-08 RX ORDER — INSULIN GLARGINE 100 [IU]/ML
30 INJECTION, SOLUTION SUBCUTANEOUS DAILY
COMMUNITY
Start: 2022-05-30 | End: 2022-09-30 | Stop reason: SDUPTHER

## 2022-08-08 RX ORDER — ROSUVASTATIN CALCIUM 40 MG/1
40 TABLET, COATED ORAL DAILY
COMMUNITY
Start: 2022-05-21 | End: 2022-12-05 | Stop reason: SDUPTHER

## 2022-08-08 RX ORDER — TIZANIDINE 4 MG/1
4 TABLET ORAL EVERY 8 HOURS PRN
COMMUNITY
Start: 2022-05-07 | End: 2023-03-01

## 2022-08-08 RX ORDER — DULOXETIN HYDROCHLORIDE 30 MG/1
30 CAPSULE, DELAYED RELEASE ORAL DAILY
COMMUNITY
Start: 2022-07-27

## 2022-08-08 RX ORDER — FLUTICASONE FUROATE AND VILANTEROL 100; 25 UG/1; UG/1
1 POWDER RESPIRATORY (INHALATION) DAILY
COMMUNITY
Start: 2022-01-11 | End: 2022-09-30 | Stop reason: SDUPTHER

## 2022-08-08 RX ORDER — TIOTROPIUM BROMIDE 18 UG/1
18 CAPSULE ORAL; RESPIRATORY (INHALATION)
COMMUNITY
Start: 2022-02-07 | End: 2022-09-30 | Stop reason: SDUPTHER

## 2022-08-08 RX ORDER — CARVEDILOL 6.25 MG/1
6.25 TABLET ORAL 2 TIMES DAILY
COMMUNITY
Start: 2022-02-12 | End: 2022-12-05 | Stop reason: SDUPTHER

## 2022-08-08 RX ORDER — CLONAZEPAM 0.5 MG/1
0.5 TABLET ORAL NIGHTLY
COMMUNITY
Start: 2022-07-13

## 2022-08-08 NOTE — PATIENT INSTRUCTIONS
Take medications before every cardiology visit    Bring your medications with you to your appointment    Monitor Blood pressure, report high readings to nephrology

## 2022-08-08 NOTE — PROGRESS NOTES
CHIEF COMPLAINT:   Chief Complaint   Patient presents with    Here today for follow up     C/O low BP at dialysis                    Review of patient's allergies indicates:   Allergen Reactions    Lisinopril Swelling    Azithromycin      Other reaction(s): tongue/facial swelling    Baclofen      Other reaction(s): tongue/facial swelling    Doxycycline      Other reaction(s): Angioedema                                          HPI:  Rosette Madrid 77 y.o. AAF with a past medical history of CAD s/p CABG prior to 2005, ESRD on HD (TTHSat), HTN, DM, HFpEF, and HLD who presents for follow up. The patient was last seen in cardiology clinic in January 2022 after a hospitalization in December 2021 in which she was noted to have dark tarry stools. Patient underwent EGD procedure on December 31, 2021 which revealed a single small no bleeding angioectasia in the gastric antrum status post successful hemostasis with/APC, normal esophagus and duodenum, and small hiatal hernia. Cardiology was consulted due to elevated troponin levels. They stated no inpatient ischemic work-up was needed as it was NSTEMI Type II Demand Ischemia. Patient repeated an echocardiogram on 12.30.21 which revealed an ejection fraction of approximately 50 to 55%. See report below. She also had a hospital admission in March 2022 with COPD exacerbation and was treated withantibiotics, steroids and supplemental oxygen.     Today the patients endorses improved shortness of breath with exertion since last seen. She states she is able to perform her normal ADLs without chest pain or significant shortness of breath but does require baseline assistance.  She also endorses that she is able to do minor housework like washing dishes but is generally sedentary.  She utilizes a cane and a Rollator at home as an assistive devices.  She denies any orthopnea, chest pain, PND, lightheadedness, dizziness or syncope.  She does continue to endorse intermittent  bilateral lower extremity edema but states that she follows Dr. Singh for venous insufficiency.  She reports compliance with her current medications and with hemodialysis.  Of note, the patient does endorse low blood pressures with hemodialysis.    Echocardiogram December 30, 2021:  Left ventricular ejection fraction is measured at approximately 50 to 55%.  Grade 2 diastolic dysfunction.  Aortic valve is tricuspid.  Aortic valve trileaflet are mildly thickened.  Mitral annular calcification is present.  Mild mitral regurgitation.  There is mild tricuspid regurgitation with RVSP estimated at 56 mmHg.                                                                                                                                                                                                                                                                                                                                                                                                                                                                                              Patient Active Problem List   Diagnosis    Chronic heart failure with preserved ejection fraction    Tobacco use    Coronary artery disease involving coronary bypass graft of native heart with unstable angina pectoris    Chronic obstructive pulmonary disease    HTN (hypertension), benign    Mixed hyperlipidemia    Diabetes mellitus without complication    ESRD on hemodialysis     Past Surgical History:   Procedure Laterality Date    AV FISTULA PLACEMENT Left     CARDIAC CATHETERIZATION      COLONOSCOPY      CORONARY ARTERY BYPASS GRAFT      ESOPHAGOGASTRODUODENOSCOPY      POLYPECTOMY      THYROIDECTOMY      TUBAL LIGATION       Social History     Socioeconomic History    Marital status: Unknown   Tobacco Use    Smoking status: Current Every Day Smoker     Packs/day: 0.25    Smokeless tobacco: Never Used   Substance and Sexual Activity     Alcohol use: Yes    Drug use: Not Currently        History reviewed. No pertinent family history.      Current Outpatient Medications:     amLODIPine (NORVASC) 5 MG tablet, Take 5 mg by mouth once daily., Disp: , Rfl:     aspirin (ECOTRIN) 81 MG EC tablet, Take 81 mg by mouth once daily., Disp: , Rfl:     BASAGLAR KWIKPEN U-100 INSULIN glargine 100 units/mL SubQ pen, SMARTSI Unit(s) SUB-Q Every Night, Disp: , Rfl:     BENADRYL 25 mg capsule, Take 50 mg by mouth nightly., Disp: , Rfl:     bisacodyL (DULCOLAX) 5 mg EC tablet, Take 5 mg by mouth daily as needed for Constipation., Disp: , Rfl:     carvediloL (COREG) 6.25 MG tablet, Take 6.25 mg by mouth 2 (two) times daily., Disp: , Rfl:     cetirizine 10 mg Cap, Take 10 mg by mouth., Disp: , Rfl:     clonazePAM (KLONOPIN) 0.5 MG tablet, Take 0.5 mg by mouth daily as needed., Disp: , Rfl:     DULoxetine (CYMBALTA) 30 MG capsule, Take 30 mg by mouth once daily., Disp: , Rfl:     ferrous gluconate (FERGON) 324 MG tablet, Take 1 tablet by mouth 3 (three) times daily., Disp: , Rfl:     fluticasone furoate-vilanteroL (BREO) 100-25 mcg/dose diskus inhaler,  See Instructions, INHALE ONE PUFF ONCE A DAY, # 180 EA, 1 Refill(s), Pharmacy: [facility]/pharmacy #[id number], 165.1, cm, Height/Length Dosing, 22 8:32:00 CST, 105, kg, Weight Dosing, 22 8:32:00 CST, Disp: , Rfl:     furosemide (LASIX) 40 MG tablet, Take 40 mg by mouth., Disp: , Rfl:     glipiZIDE (GLUCOTROL) 10 MG tablet, Take 10 mg by mouth., Disp: , Rfl:     INVEGA SUSTENNA 156 mg/mL Syrg injection, Inject into the muscle., Disp: , Rfl:     levothyroxine (SYNTHROID) 125 MCG tablet,  See Instructions, TAKE ONE TABLET BY MOUTH DAILY, # 90 tab(s), 1 Refill(s), Pharmacy: [facility]/pharmacy #[id number], 165.1, cm, Height/Length Dosing, 22 8:32:00 CST, 105, kg, Weight Dosing, 22 8:32:00 CST, Disp: , Rfl:     rosuvastatin (CRESTOR) 40 MG Tab, Take 40 mg by mouth once daily., Disp: , Rfl:      "tiotropium (SPIRIVA WITH HANDIHALER) 18 mcg inhalation capsule, Inhale 18 mcg into the lungs., Disp: , Rfl:     tiZANidine (ZANAFLEX) 4 MG tablet, Take 4 mg by mouth every 8 (eight) hours., Disp: , Rfl:     ONETOUCH DELICA PLUS LANCET 30 gauge Misc, 1 lancet by Other route once daily., Disp: 100 each, Rfl: 2    ONETOUCH ULTRA TEST Strp, 1 strip by Other route once daily., Disp: 100 strip, Rfl: 2    pen needle, diabetic 31 gauge x 5/16" Ndle, 2 Units by Misc.(Non-Drug; Combo Route) route 2 (two) times a day. Patient to check blood sugars twice daily (morning and night), Disp: 100 each, Rfl: 2     ROS:                                                                                                                                                                             Review of Systems   Constitutional: Negative.    HENT: Negative.    Eyes: Negative.    Respiratory: Positive for shortness of breath.    Cardiovascular: Positive for leg swelling.   Gastrointestinal: Negative.    Genitourinary: Negative.    Musculoskeletal: Negative.    Skin: Negative.    Neurological: Negative.    Endo/Heme/Allergies: Negative.    Psychiatric/Behavioral: Negative.         Blood pressure (!) 158/80, pulse (!) 56, resp. rate 18, height 5' 5.35" (1.66 m), weight 96.8 kg (213 lb 6.5 oz), SpO2 96 %.   PE:  Physical Exam  HENT:      Head: Normocephalic.      Nose: Nose normal.   Eyes:      Pupils: Pupils are equal, round, and reactive to light.   Cardiovascular:      Rate and Rhythm: Normal rate and regular rhythm.   Pulmonary:      Effort: Pulmonary effort is normal.   Abdominal:      General: Abdomen is flat. Bowel sounds are normal.      Palpations: Abdomen is soft.   Musculoskeletal:         General: Normal range of motion.      Cervical back: Normal range of motion.   Skin:     General: Skin is warm.   Neurological:      General: No focal deficit present.      Mental Status: She is alert.   Psychiatric:         Mood and Affect: " Mood normal.                ASSESSMENT/PLAN:  HFpEF - EF 50-55% per Echo Dec. 2021 - NYHA Class III  Echo Jan. 2018 showed diastolic dysfunction, EF of 66%, E/A flow reversal, aortic regurg, and moderately thickened leaflets without aortic stenosis  Reports improved SOB with exertion. ADLs unchanged  Continue Lasix and hemodialysis as directed  Counseled on low salt diet    CAD s/p CABG prior to 2005  NSTEMI Type II (Supply/Demand) in the setting of Severe Anemia due to GI Bleed Dec. 2021 - recent hospital admission  EF 50% per Echo Dec. 2021  Stress Echo 2018 showed no significant ischemia but a moderately large fixed anterior defect of moderate intensity  Denies CP. Reports improved SOB with exertion  Continue ASA, Coreg, and Crestor  Counseled on heart healthy diet       COPD   Continue management per PCP       HTN  BP not at goal - 158/80- did not take medications today  Reports low BP with HD, however hypertensive today. Will not decrease medications today due to hypertension  Instructed patient to monitor/log BP andotify nephrologist of hypotension during HD for medication adjustment on those days  Continuing current therapy - will defer BP management to Nephrology  Counseled on low salt diet    HLD  LDL at goal - 57  Continue Crestor 40mg daily  Counseled on low cholesterol diet    Diabetes  Continue management per PCP    Tobacco use  Counseled on the importance of smoking cessation   She smokes 3 cigarettes per day - not ready to quit at this time. Declined smoking cessation program referral     ESRD on HD T/TH/Sat  Continue nephrology management    Venous Insufficiency   s/p percutaneous endovenous Microfoam ablation with Varithena of the left GSV with Dr. Singh on May 17, 2021  Recommend compression stockings  Instructed on low salt diet  Follow up with Dr. Singh as directed        Follow up in general cardiology clinic in 3 months with FLP or sooner if needed   Follow up with PCP and Nephrology as  directed

## 2022-09-30 ENCOUNTER — OFFICE VISIT (OUTPATIENT)
Dept: INTERNAL MEDICINE | Facility: CLINIC | Age: 77
End: 2022-09-30
Payer: COMMERCIAL

## 2022-09-30 ENCOUNTER — HOSPITAL ENCOUNTER (OUTPATIENT)
Dept: RADIOLOGY | Facility: HOSPITAL | Age: 77
Discharge: HOME | End: 2022-09-30
Attending: NURSE PRACTITIONER
Payer: COMMERCIAL

## 2022-09-30 VITALS
WEIGHT: 207.63 LBS | RESPIRATION RATE: 18 BRPM | SYSTOLIC BLOOD PRESSURE: 146 MMHG | TEMPERATURE: 98 F | DIASTOLIC BLOOD PRESSURE: 80 MMHG | HEART RATE: 51 BPM | HEIGHT: 65 IN | BODY MASS INDEX: 34.59 KG/M2

## 2022-09-30 DIAGNOSIS — Z79.4 TYPE 2 DIABETES MELLITUS WITH CHRONIC KIDNEY DISEASE ON CHRONIC DIALYSIS, WITH LONG-TERM CURRENT USE OF INSULIN: Chronic | ICD-10-CM

## 2022-09-30 DIAGNOSIS — E66.09 CLASS 1 OBESITY DUE TO EXCESS CALORIES WITH SERIOUS COMORBIDITY AND BODY MASS INDEX (BMI) OF 34.0 TO 34.9 IN ADULT: Chronic | ICD-10-CM

## 2022-09-30 DIAGNOSIS — E78.2 MIXED HYPERLIPIDEMIA: Chronic | ICD-10-CM

## 2022-09-30 DIAGNOSIS — E11.22 TYPE 2 DIABETES MELLITUS WITH CHRONIC KIDNEY DISEASE ON CHRONIC DIALYSIS, WITH LONG-TERM CURRENT USE OF INSULIN: Chronic | ICD-10-CM

## 2022-09-30 DIAGNOSIS — Z99.2 TYPE 2 DIABETES MELLITUS WITH CHRONIC KIDNEY DISEASE ON CHRONIC DIALYSIS, WITH LONG-TERM CURRENT USE OF INSULIN: Chronic | ICD-10-CM

## 2022-09-30 DIAGNOSIS — Z12.31 ENCOUNTER FOR SCREENING MAMMOGRAM FOR MALIGNANT NEOPLASM OF BREAST: ICD-10-CM

## 2022-09-30 DIAGNOSIS — J44.9 CHRONIC OBSTRUCTIVE PULMONARY DISEASE, UNSPECIFIED COPD TYPE: Primary | ICD-10-CM

## 2022-09-30 DIAGNOSIS — I50.32 CHRONIC HEART FAILURE WITH PRESERVED EJECTION FRACTION: Chronic | ICD-10-CM

## 2022-09-30 DIAGNOSIS — N18.6 ESRD ON HEMODIALYSIS: Chronic | ICD-10-CM

## 2022-09-30 DIAGNOSIS — N18.6 TYPE 2 DIABETES MELLITUS WITH CHRONIC KIDNEY DISEASE ON CHRONIC DIALYSIS, WITH LONG-TERM CURRENT USE OF INSULIN: Chronic | ICD-10-CM

## 2022-09-30 DIAGNOSIS — F32.A DEPRESSIVE DISORDER: Chronic | ICD-10-CM

## 2022-09-30 DIAGNOSIS — Z99.2 ESRD ON HEMODIALYSIS: Chronic | ICD-10-CM

## 2022-09-30 DIAGNOSIS — E55.9 VITAMIN D DEFICIENCY: Chronic | ICD-10-CM

## 2022-09-30 DIAGNOSIS — Z72.0 TOBACCO USE: Chronic | ICD-10-CM

## 2022-09-30 DIAGNOSIS — J44.9 CHRONIC OBSTRUCTIVE PULMONARY DISEASE, UNSPECIFIED COPD TYPE: ICD-10-CM

## 2022-09-30 DIAGNOSIS — I10 HTN (HYPERTENSION), BENIGN: Chronic | ICD-10-CM

## 2022-09-30 PROCEDURE — 1101F PR PT FALLS ASSESS DOC 0-1 FALLS W/OUT INJ PAST YR: ICD-10-PCS | Mod: CPTII,,, | Performed by: NURSE PRACTITIONER

## 2022-09-30 PROCEDURE — 1159F MED LIST DOCD IN RCRD: CPT | Mod: CPTII,,, | Performed by: NURSE PRACTITIONER

## 2022-09-30 PROCEDURE — 3077F PR MOST RECENT SYSTOLIC BLOOD PRESSURE >= 140 MM HG: ICD-10-PCS | Mod: CPTII,,, | Performed by: NURSE PRACTITIONER

## 2022-09-30 PROCEDURE — 3288F FALL RISK ASSESSMENT DOCD: CPT | Mod: CPTII,,, | Performed by: NURSE PRACTITIONER

## 2022-09-30 PROCEDURE — 1101F PT FALLS ASSESS-DOCD LE1/YR: CPT | Mod: CPTII,,, | Performed by: NURSE PRACTITIONER

## 2022-09-30 PROCEDURE — 3079F DIAST BP 80-89 MM HG: CPT | Mod: CPTII,,, | Performed by: NURSE PRACTITIONER

## 2022-09-30 PROCEDURE — 99215 OFFICE O/P EST HI 40 MIN: CPT | Mod: PBBFAC,25 | Performed by: NURSE PRACTITIONER

## 2022-09-30 PROCEDURE — 3079F PR MOST RECENT DIASTOLIC BLOOD PRESSURE 80-89 MM HG: ICD-10-PCS | Mod: CPTII,,, | Performed by: NURSE PRACTITIONER

## 2022-09-30 PROCEDURE — 1126F PR PAIN SEVERITY QUANTIFIED, NO PAIN PRESENT: ICD-10-PCS | Mod: CPTII,,, | Performed by: NURSE PRACTITIONER

## 2022-09-30 PROCEDURE — 99214 OFFICE O/P EST MOD 30 MIN: CPT | Mod: S$PBB,,, | Performed by: NURSE PRACTITIONER

## 2022-09-30 PROCEDURE — 3288F PR FALLS RISK ASSESSMENT DOCUMENTED: ICD-10-PCS | Mod: CPTII,,, | Performed by: NURSE PRACTITIONER

## 2022-09-30 PROCEDURE — 1160F PR REVIEW ALL MEDS BY PRESCRIBER/CLIN PHARMACIST DOCUMENTED: ICD-10-PCS | Mod: CPTII,,, | Performed by: NURSE PRACTITIONER

## 2022-09-30 PROCEDURE — 1159F PR MEDICATION LIST DOCUMENTED IN MEDICAL RECORD: ICD-10-PCS | Mod: CPTII,,, | Performed by: NURSE PRACTITIONER

## 2022-09-30 PROCEDURE — 99214 PR OFFICE/OUTPT VISIT, EST, LEVL IV, 30-39 MIN: ICD-10-PCS | Mod: S$PBB,,, | Performed by: NURSE PRACTITIONER

## 2022-09-30 PROCEDURE — 71046 X-RAY EXAM CHEST 2 VIEWS: CPT | Mod: TC

## 2022-09-30 PROCEDURE — 1126F AMNT PAIN NOTED NONE PRSNT: CPT | Mod: CPTII,,, | Performed by: NURSE PRACTITIONER

## 2022-09-30 PROCEDURE — 1160F RVW MEDS BY RX/DR IN RCRD: CPT | Mod: CPTII,,, | Performed by: NURSE PRACTITIONER

## 2022-09-30 PROCEDURE — 3077F SYST BP >= 140 MM HG: CPT | Mod: CPTII,,, | Performed by: NURSE PRACTITIONER

## 2022-09-30 RX ORDER — LEVOTHYROXINE SODIUM 125 UG/1
125 TABLET ORAL
Qty: 90 TABLET | Refills: 2 | Status: SHIPPED | OUTPATIENT
Start: 2022-09-30 | End: 2022-12-06 | Stop reason: SDUPTHER

## 2022-09-30 RX ORDER — LEVOMEFOLATE CALCIUM 15 MG
1 TABLET ORAL DAILY
Status: ON HOLD | COMMUNITY
Start: 2022-08-31 | End: 2023-03-25 | Stop reason: HOSPADM

## 2022-09-30 RX ORDER — CEFDINIR 300 MG/1
300 CAPSULE ORAL 2 TIMES DAILY
Qty: 20 CAPSULE | Refills: 0 | Status: SHIPPED | OUTPATIENT
Start: 2022-09-30 | End: 2022-10-10

## 2022-09-30 RX ORDER — FLUTICASONE FUROATE AND VILANTEROL 100; 25 UG/1; UG/1
1 POWDER RESPIRATORY (INHALATION) DAILY
Qty: 90 EACH | Refills: 2 | Status: SHIPPED | OUTPATIENT
Start: 2022-09-30 | End: 2023-03-01 | Stop reason: SDUPTHER

## 2022-09-30 RX ORDER — ASCORBIC ACID, THIAMINE, RIBOFLAVIN, NIACINAMIDE, PYRIDOXINE, FOLIC ACID, COBALAMIN, BIOTIN, PANTOTHENIC ACID 100; 1.5; 1.7; 20; 10; 1; 6; 300; 1 MG/1; MG/1; MG/1; MG/1; MG/1; MG/1; UG/1; UG/1; MG/1
1 TABLET, COATED ORAL DAILY
COMMUNITY
Start: 2022-08-13

## 2022-09-30 RX ORDER — TIOTROPIUM BROMIDE 18 UG/1
18 CAPSULE ORAL; RESPIRATORY (INHALATION) DAILY
Qty: 90 CAPSULE | Refills: 2 | Status: SHIPPED | OUTPATIENT
Start: 2022-09-30 | End: 2023-03-01 | Stop reason: SDUPTHER

## 2022-09-30 RX ORDER — PEN NEEDLE, DIABETIC 31 GX5/16"
NEEDLE, DISPOSABLE MISCELLANEOUS
COMMUNITY
Start: 2022-09-09 | End: 2023-02-16

## 2022-09-30 RX ORDER — ERGOCALCIFEROL 1.25 MG/1
50000 CAPSULE ORAL
Qty: 12 CAPSULE | Refills: 1 | Status: SHIPPED | OUTPATIENT
Start: 2022-09-30 | End: 2022-12-29

## 2022-09-30 RX ORDER — IPRATROPIUM BROMIDE AND ALBUTEROL SULFATE 2.5; .5 MG/3ML; MG/3ML
3 SOLUTION RESPIRATORY (INHALATION) EVERY 6 HOURS PRN
COMMUNITY
End: 2022-12-14 | Stop reason: SDUPTHER

## 2022-09-30 RX ORDER — METHYLPREDNISOLONE 4 MG/1
TABLET ORAL
Qty: 21 EACH | Refills: 0 | Status: SHIPPED | OUTPATIENT
Start: 2022-09-30 | End: 2022-10-21

## 2022-09-30 RX ORDER — ALBUTEROL SULFATE 90 UG/1
2 AEROSOL, METERED RESPIRATORY (INHALATION) EVERY 6 HOURS PRN
Qty: 18 G | Refills: 1 | Status: SHIPPED | OUTPATIENT
Start: 2022-09-30 | End: 2022-11-23 | Stop reason: SDUPTHER

## 2022-09-30 RX ORDER — FERROUS GLUCONATE 324(38)MG
1 TABLET ORAL 3 TIMES DAILY
Qty: 270 TABLET | Refills: 2 | Status: SHIPPED | OUTPATIENT
Start: 2022-09-30 | End: 2022-12-29

## 2022-09-30 RX ORDER — INSULIN GLARGINE 100 [IU]/ML
30 INJECTION, SOLUTION SUBCUTANEOUS DAILY
Qty: 27 ML | Refills: 2 | Status: SHIPPED | OUTPATIENT
Start: 2022-09-30 | End: 2023-03-01 | Stop reason: SDUPTHER

## 2022-09-30 RX ORDER — SEVELAMER CARBONATE 800 MG/1
1600 TABLET, FILM COATED ORAL 3 TIMES DAILY
Status: ON HOLD | COMMUNITY
Start: 2022-09-27 | End: 2023-03-23 | Stop reason: CLARIF

## 2022-09-30 RX ORDER — GLIPIZIDE 10 MG/1
10 TABLET ORAL
Qty: 90 TABLET | Refills: 2 | Status: SHIPPED | OUTPATIENT
Start: 2022-09-30 | End: 2022-12-06 | Stop reason: SDUPTHER

## 2022-09-30 NOTE — ASSESSMENT & PLAN NOTE
PFTs: none noted  Continue Albuterol prn, DuoNeb prn, Breo 1 puff daily, Spiriva 1 puff daily.   Start Cefdinir 300 mg bid x10 days, Medrol Dose pack take as directed

## 2022-09-30 NOTE — ASSESSMENT & PLAN NOTE
Continue Rosuvastatin  Weight loss encouraged  Low fat/high fiber diet  Increase physical activity  Tobacco cessation encouraged  Chol: 117  HDL: 42  Tri  LDL: 62

## 2022-09-30 NOTE — PROGRESS NOTES
Subjective:       Patient ID: Rosette Madrid is a 77 y.o. female.    Chief Complaint: Follow-up (C/O chest cold)    Patient has diagnosis of HTN, HLD, Diastolic Dysfunction, Class III HFpEF 55%, Hx of CABG x4, DM2, ESRD with HD, COPD, Hypothyroidism S/T Thyroidectomy. Patient seen in clinic today for cough and congestion. Patient states started about 3 weeks ago. States goes to Dialysis on Tues, Thurs, Sat. Patient states she went to dialysis yesterday and has appointment for tomorrow. Patient denies skipping any dialysis appointments. Patient does have diagnosis of COPD. Patient denies SOB, chest pain, fever. Patient denies any other acute complaints. Patient last seen in clinic on 03/30/2022.    Patient followed by Nephrology, Dr. Harris. Last appointment on 09/10/2022.     Patient followed by Cardiology. Last appointment on 008/08/2022. Rosette Madrid 77 y.o. AAF with a past medical history of CAD s/p CABG prior to 2005, ESRD on HD (TTHSat), HTN, DM, HFpEF, and HLD who presents for follow up. The patient was last seen in cardiology clinic in January 2022 after a hospitalization in December 2021 in which she was noted to have dark tarry stools. Patient underwent EGD procedure on December 31, 2021 which revealed a single small no bleeding angioectasia in the gastric antrum status post successful hemostasis with/APC, normal esophagus and duodenum, and small hiatal hernia. Cardiology was consulted due to elevated troponin levels. They stated no inpatient ischemic work-up was needed as it was NSTEMI Type II Demand Ischemia. Patient repeated an echocardiogram on 12.30.21 which revealed an ejection fraction of approximately 50 to 55%. See report below. She also had a hospital admission in March 2022 with COPD exacerbation and was treated withantibiotics, steroids and supplemental oxygen. Today the patients endorses improved shortness of breath with exertion since last seen. She states she is able to perform her normal  ADLs without chest pain or significant shortness of breath but does require baseline assistance.  She also endorses that she is able to do minor housework like washing dishes but is generally sedentary.  She utilizes a cane and a Rollator at home as an assistive devices.  She denies any orthopnea, chest pain, PND, lightheadedness, dizziness or syncope.  She does continue to endorse intermittent bilateral lower extremity edema but states that she follows Dr. Singh for venous insufficiency.  She reports compliance with her current medications and with hemodialysis.  Of note, the patient does endorse low blood pressures with hemodialysis. HFpEF - EF 50-55% per Echo Dec. 2021 - NYHA Class III: Echo Jan. 2018 showed diastolic dysfunction, EF of 66%, E/A flow reversal, aortic regurg, and moderately thickened leaflets without aortic stenosis; Reports improved SOB with exertion; ADLs unchanged; Continue Lasix and hemodialysis as directed; Counseled on low salt diet. CAD s/p CABG prior to 2005: NSTEMI Type II (Supply/Demand) in the setting of Severe Anemia due to GI Bleed Dec. 2021 - recent hospital admission; EF 50% per Echo Dec. 2021; Stress Echo 2018 showed no significant ischemia but a moderately large fixed anterior defect of moderate intensity; Denies CP. Reports improved SOB with exertion; Continue ASA, Coreg, and Crestor; Counseled on heart healthy diet. HTN: BP not at goal - 158/80- did not take medications today; Reports low BP with HD, however hypertensive today; Will not decrease medications today due to hypertension; Instructed patient to monitor/log BP andotify nephrologist of hypotension during HD for medication adjustment on those days; Continuing current therapy - will defer BP management to Nephrology. Counseled on low salt diet. HLD: LDL at goal - 57; Continue Crestor 40mg daily; Counseled on low cholesterol diet. Venous Insufficiency: s/p percutaneous endovenous Microfoam ablation with Varithena of the  left GSV with Dr. Singh on May 17, 2021; Recommend compression stockings; Instructed on low salt diet; Follow up with Dr. Singh as directed. Patient has follow up appointment scheduled on 11/08/2022.     Patient followed by [facility].      Mammogram: 07/07/2021, negative  PAP: deferred due to age  FIT: 08/10/2018, positive  Colonoscopy: 07/21/2021, repeat in 3 years  Diabetic Eye Exam: 06/15/2020, patient followed by Dr. Casey in [place]  Diabetic Foot Exam: 06/09/2021  Low Dose CT Scan Lung: refuses at this time  Bone Density: 04/11/2022, normal  Medicare Wellness: ordered    Review of Systems   Constitutional: Negative.    HENT: Negative.     Eyes: Negative.    Respiratory:  Positive for cough.    Cardiovascular: Negative.    Gastrointestinal: Negative.    Endocrine: Negative.    Genitourinary: Negative.    Musculoskeletal: Negative.    Integumentary:  Negative.   Allergic/Immunologic: Negative.    Neurological: Negative.    Hematological: Negative.    Psychiatric/Behavioral: Negative.         Objective:      Physical Exam  Vitals reviewed.   Constitutional:       Appearance: Normal appearance.   HENT:      Head: Normocephalic and atraumatic.      Mouth/Throat:      Mouth: Mucous membranes are moist.      Pharynx: Oropharynx is clear.   Eyes:      Extraocular Movements: Extraocular movements intact.      Conjunctiva/sclera: Conjunctivae normal.      Pupils: Pupils are equal, round, and reactive to light.   Cardiovascular:      Rate and Rhythm: Normal rate and regular rhythm.      Heart sounds: Normal heart sounds.   Pulmonary:      Effort: Pulmonary effort is normal.      Breath sounds: Examination of the left-upper field reveals rales. Examination of the left-middle field reveals rales. Rales present.   Abdominal:      General: Bowel sounds are normal.   Musculoskeletal:         General: Normal range of motion.      Cervical back: Normal range of motion.   Skin:     General: Skin is warm and dry.    Neurological:      Mental Status: She is alert and oriented to person, place, and time.   Psychiatric:         Mood and Affect: Mood normal.         Behavior: Behavior normal.       Assessment:       Problem List Items Addressed This Visit          Psychiatric    Depressive disorder (Chronic)     Patient followed by Mental Health Provider            Pulmonary    Chronic obstructive pulmonary disease - Primary (Chronic)     PFTs: none noted  Continue Albuterol prn, DuoNeb prn, Breo 1 puff daily, Spiriva 1 puff daily.   Start Cefdinir 300 mg bid x10 days, Medrol Dose pack take as directed         Relevant Orders    X-Ray Chest PA And Lateral (Completed)       Cardiac/Vascular    Chronic heart failure with preserved ejection fraction (Chronic)     Followed by Cardiology         HTN (hypertension), benign (Chronic)     B/P: 146/80  Patient did not take B/P medication this morning  Continue Amlodipine 5 mg daily, Lasix 40 mg daily  [id number]  DASH diet  Encouraged home blood pressure monitoring         Relevant Orders    CBC Auto Differential    Comprehensive Metabolic Panel    TSH    Mixed hyperlipidemia (Chronic)     Continue Rosuvastatin  Weight loss encouraged  Low fat/high fiber diet  Increase physical activity  Tobacco cessation encouraged  Chol: 117  HDL: 42  Tri  LDL: 62            Renal/    ESRD on hemodialysis (Chronic)     Patient followed by Nephrology  Dialysis on , Sat.            Endocrine    Type 2 diabetes mellitus with chronic kidney disease on chronic dialysis, with long-term current use of insulin (Chronic)     Continue Insulin Glargine 30 units daily, Glipizide 10 mg daily  Continue home CBG monitoring.  Hypoglycemic episodes: denies  BMI: 34.17  HgbA1c: 6.0  On Dialysis  On Rosuvastatin  Weight Loss Encouraged  ADA Diet         Relevant Medications    glipiZIDE (GLUCOTROL) 10 MG tablet    BASAGLAR KWIKPEN U-100 INSULIN glargine 100 units/mL SubQ pen    Class 1 obesity due  to excess calories with serious comorbidity and body mass index (BMI) of 34.0 to 34.9 in adult (Chronic)     BMI: 34.17  TSH: 1.55  Vitamin D level: 20.0  HgbA1c: 6.0  Weight Loss Encouraged  Increase Physical Activity         Vitamin D deficiency (Chronic)     Vitamin D level: 20  Start Vitamin D 50,000 units weekly         Relevant Orders    Vitamin D       Other    Tobacco use (Chronic)     Smoking cessation education provided, encouraged. Patient refused smoking cessation at this time.           Other Visit Diagnoses       Encounter for screening mammogram for malignant neoplasm of breast        Relevant Orders    Mammo Digital Screening Bilat w/ Josse            Plan:    Patient to follow up in 2 weeks to follow up on COPD.

## 2022-09-30 NOTE — ASSESSMENT & PLAN NOTE
BMI: 34.17  TSH: 1.55  Vitamin D level: 20.0  HgbA1c: 6.0  Weight Loss Encouraged  Increase Physical Activity

## 2022-09-30 NOTE — ASSESSMENT & PLAN NOTE
B/P: 146/80  Patient did not take B/P medication this morning  Continue Amlodipine 5 mg daily, Lasix 40 mg daily  [id number]  DASH diet  Encouraged home blood pressure monitoring

## 2022-09-30 NOTE — ASSESSMENT & PLAN NOTE
Continue Insulin Glargine 30 units daily, Glipizide 10 mg daily  Continue home CBG monitoring.  Hypoglycemic episodes: denies  BMI: 34.17  HgbA1c: 6.0  On Dialysis  On Rosuvastatin  Weight Loss Encouraged  ADA Diet

## 2022-10-13 ENCOUNTER — TELEPHONE (OUTPATIENT)
Dept: INTERNAL MEDICINE | Facility: CLINIC | Age: 77
End: 2022-10-13
Payer: COMMERCIAL

## 2022-10-28 ENCOUNTER — HOSPITAL ENCOUNTER (OUTPATIENT)
Dept: RADIOLOGY | Facility: HOSPITAL | Age: 77
Discharge: HOME | End: 2022-10-28
Attending: NURSE PRACTITIONER
Payer: COMMERCIAL

## 2022-10-28 DIAGNOSIS — Z12.31 ENCOUNTER FOR SCREENING MAMMOGRAM FOR MALIGNANT NEOPLASM OF BREAST: ICD-10-CM

## 2022-10-28 PROCEDURE — 77067 SCR MAMMO BI INCL CAD: CPT | Mod: TC

## 2022-10-28 PROCEDURE — 77063 MAMMO DIGITAL SCREENING BILAT WITH TOMO: ICD-10-PCS | Mod: 26,,, | Performed by: RADIOLOGY

## 2022-10-28 PROCEDURE — 77067 MAMMO DIGITAL SCREENING BILAT WITH TOMO: ICD-10-PCS | Mod: 26,,, | Performed by: RADIOLOGY

## 2022-10-28 PROCEDURE — 77067 SCR MAMMO BI INCL CAD: CPT | Mod: 26,,, | Performed by: RADIOLOGY

## 2022-10-28 PROCEDURE — 77063 BREAST TOMOSYNTHESIS BI: CPT | Mod: 26,,, | Performed by: RADIOLOGY

## 2022-11-23 ENCOUNTER — OFFICE VISIT (OUTPATIENT)
Dept: INTERNAL MEDICINE | Facility: CLINIC | Age: 77
End: 2022-11-23
Payer: COMMERCIAL

## 2022-11-23 DIAGNOSIS — Z79.4 TYPE 2 DIABETES MELLITUS WITH CHRONIC KIDNEY DISEASE ON CHRONIC DIALYSIS, WITH LONG-TERM CURRENT USE OF INSULIN: Chronic | ICD-10-CM

## 2022-11-23 DIAGNOSIS — F17.210 CIGARETTE NICOTINE DEPENDENCE WITHOUT COMPLICATION: Chronic | ICD-10-CM

## 2022-11-23 DIAGNOSIS — E55.9 VITAMIN D DEFICIENCY: Chronic | ICD-10-CM

## 2022-11-23 DIAGNOSIS — N18.6 ESRD ON HEMODIALYSIS: Chronic | ICD-10-CM

## 2022-11-23 DIAGNOSIS — J44.9 CHRONIC OBSTRUCTIVE PULMONARY DISEASE, UNSPECIFIED COPD TYPE: Primary | Chronic | ICD-10-CM

## 2022-11-23 DIAGNOSIS — F32.A DEPRESSIVE DISORDER: Chronic | ICD-10-CM

## 2022-11-23 DIAGNOSIS — E78.2 MIXED HYPERLIPIDEMIA: Chronic | ICD-10-CM

## 2022-11-23 DIAGNOSIS — E11.9 DIABETES MELLITUS WITHOUT COMPLICATION: ICD-10-CM

## 2022-11-23 DIAGNOSIS — I10 HTN (HYPERTENSION), BENIGN: Chronic | ICD-10-CM

## 2022-11-23 DIAGNOSIS — N18.6 TYPE 2 DIABETES MELLITUS WITH CHRONIC KIDNEY DISEASE ON CHRONIC DIALYSIS, WITH LONG-TERM CURRENT USE OF INSULIN: Chronic | ICD-10-CM

## 2022-11-23 DIAGNOSIS — Z99.2 TYPE 2 DIABETES MELLITUS WITH CHRONIC KIDNEY DISEASE ON CHRONIC DIALYSIS, WITH LONG-TERM CURRENT USE OF INSULIN: Chronic | ICD-10-CM

## 2022-11-23 DIAGNOSIS — E11.22 TYPE 2 DIABETES MELLITUS WITH CHRONIC KIDNEY DISEASE ON CHRONIC DIALYSIS, WITH LONG-TERM CURRENT USE OF INSULIN: Chronic | ICD-10-CM

## 2022-11-23 DIAGNOSIS — E66.09 CLASS 1 OBESITY DUE TO EXCESS CALORIES WITH SERIOUS COMORBIDITY AND BODY MASS INDEX (BMI) OF 34.0 TO 34.9 IN ADULT: Chronic | ICD-10-CM

## 2022-11-23 DIAGNOSIS — Z99.2 ESRD ON HEMODIALYSIS: Chronic | ICD-10-CM

## 2022-11-23 DIAGNOSIS — I50.32 CHRONIC HEART FAILURE WITH PRESERVED EJECTION FRACTION: Chronic | ICD-10-CM

## 2022-11-23 PROCEDURE — 99406 BEHAV CHNG SMOKING 3-10 MIN: CPT | Mod: 95,,, | Performed by: NURSE PRACTITIONER

## 2022-11-23 PROCEDURE — 99214 PR OFFICE/OUTPT VISIT, EST, LEVL IV, 30-39 MIN: ICD-10-PCS | Mod: 25,FQ,95, | Performed by: NURSE PRACTITIONER

## 2022-11-23 PROCEDURE — 3288F PR FALLS RISK ASSESSMENT DOCUMENTED: ICD-10-PCS | Mod: CPTII,95,, | Performed by: NURSE PRACTITIONER

## 2022-11-23 PROCEDURE — 1160F RVW MEDS BY RX/DR IN RCRD: CPT | Mod: CPTII,95,, | Performed by: NURSE PRACTITIONER

## 2022-11-23 PROCEDURE — 99214 OFFICE O/P EST MOD 30 MIN: CPT | Mod: 25,FQ,95, | Performed by: NURSE PRACTITIONER

## 2022-11-23 PROCEDURE — 3288F FALL RISK ASSESSMENT DOCD: CPT | Mod: CPTII,95,, | Performed by: NURSE PRACTITIONER

## 2022-11-23 PROCEDURE — 1160F PR REVIEW ALL MEDS BY PRESCRIBER/CLIN PHARMACIST DOCUMENTED: ICD-10-PCS | Mod: CPTII,95,, | Performed by: NURSE PRACTITIONER

## 2022-11-23 PROCEDURE — 1101F PT FALLS ASSESS-DOCD LE1/YR: CPT | Mod: CPTII,95,, | Performed by: NURSE PRACTITIONER

## 2022-11-23 PROCEDURE — 99406 PR TOBACCO USE CESSATION INTERMEDIATE 3-10 MINUTES: ICD-10-PCS | Mod: 95,,, | Performed by: NURSE PRACTITIONER

## 2022-11-23 PROCEDURE — 1101F PR PT FALLS ASSESS DOC 0-1 FALLS W/OUT INJ PAST YR: ICD-10-PCS | Mod: CPTII,95,, | Performed by: NURSE PRACTITIONER

## 2022-11-23 PROCEDURE — 1159F PR MEDICATION LIST DOCUMENTED IN MEDICAL RECORD: ICD-10-PCS | Mod: CPTII,95,, | Performed by: NURSE PRACTITIONER

## 2022-11-23 PROCEDURE — 1159F MED LIST DOCD IN RCRD: CPT | Mod: CPTII,95,, | Performed by: NURSE PRACTITIONER

## 2022-11-23 RX ORDER — ASPIRIN 81 MG/1
81 TABLET ORAL DAILY
Qty: 90 TABLET | Refills: 1 | Status: ON HOLD | OUTPATIENT
Start: 2022-11-23 | End: 2023-06-21 | Stop reason: HOSPADM

## 2022-11-23 RX ORDER — ALBUTEROL SULFATE 90 UG/1
2 AEROSOL, METERED RESPIRATORY (INHALATION) EVERY 6 HOURS PRN
Qty: 18 G | Refills: 1 | Status: SHIPPED | OUTPATIENT
Start: 2022-11-23 | End: 2023-03-01 | Stop reason: SDUPTHER

## 2022-11-23 RX ORDER — BLOOD SUGAR DIAGNOSTIC
1 STRIP MISCELLANEOUS DAILY
Qty: 200 STRIP | Refills: 2 | Status: SHIPPED | OUTPATIENT
Start: 2022-11-23 | End: 2024-01-29 | Stop reason: SDUPTHER

## 2022-11-23 NOTE — ASSESSMENT & PLAN NOTE
Continue Insulin Glargine 30 units daily, Glipizide 10 mg daily  Continue home CBG monitoring.  Hypoglycemic episodes: denies  BMI: 34.17  HgbA1c: 6.0  On Dialysis  On Rosuvastatin  Weight Loss Encouraged  ADA Diet   RRR, lungs CTA B/L

## 2022-11-23 NOTE — PROGRESS NOTES
Subjective:       Patient ID: Rosette Madrid is a 77 y.o. female.    Chief Complaint: Follow-up (Need refill on albuterol inhaler and aspirin.), Medication Refill, and Results    Established Patient - Audio Only Telehealth Visit     The patient location is: home  The chief complaint leading to consultation is: follow up on COPD  Visit type: Virtual visit with audio only (telephone)  Total time spent with patient: 12 minutes    The reason for the audio only service rather than synchronous audio and video virtual visit was related to technical difficulties or patient preference/necessity.    Each patient to whom I provide medical services by telemedicine is:  (1) informed of the relationship between the physician and patient and the respective role of any other health care provider with respect to management of the patient; and (2) notified that they may decline to receive medical services by telemedicine and may withdraw from such care at any time. Patient verbally consented to receive this service via voice-only telephone call.    This service was not originating from a related E/M service provided within the previous 7 days nor will  to an E/M service or procedure within the next 24 hours or my soonest available appointment.  Prevailing standard of care was able to be met in this audio-only visit.          Patient has diagnosis of HTN, HLD, Diastolic Dysfunction, Class III HFpEF 55%, Hx of CABG x4, DM2, ESRD with HD on T/Th/Sa, COPD, Hypothyroidism S/T Thyroidectomy. Patient seen per audio visit today for follow up on COPD. Patient last seen in clinic on 09/30/2022. Wheezing noted at that appointment. CXR negative. Patient states she is doing better. States some wheezing at times but uses her Albuterol Inhaler and it improved. Patient currently on Breo Ellipta and and Spiriva for COPD. Patient states she has cut back on her cigarettes to where she smokes a couple cigarettes a day, less than 1/2 a pack.  Patient refuses smoking cessation program. Patient denies any acute complaints.      Patient followed by Nephrology, Dr. Harris.      Patient followed by Cardiology. Last appointment on 08/08/2022. Rosette Madrid 77 y.o. AAF with a past medical history of CAD s/p CABG prior to 2005, ESRD on HD (TTHSat), HTN, DM, HFpEF, and HLD who presents for follow up. The patient was last seen in cardiology clinic in January 2022 after a hospitalization in December 2021 in which she was noted to have dark tarry stools. Patient underwent EGD procedure on December 31, 2021 which revealed a single small no bleeding angioectasia in the gastric antrum status post successful hemostasis with/APC, normal esophagus and duodenum, and small hiatal hernia. Cardiology was consulted due to elevated troponin levels. They stated no inpatient ischemic work-up was needed as it was NSTEMI Type II Demand Ischemia. Patient repeated an echocardiogram on 12.30.21 which revealed an ejection fraction of approximately 50 to 55%. See report below. She also had a hospital admission in March 2022 with COPD exacerbation and was treated withantibiotics, steroids and supplemental oxygen. Today the patients endorses improved shortness of breath with exertion since last seen. She states she is able to perform her normal ADLs without chest pain or significant shortness of breath but does require baseline assistance.  She also endorses that she is able to do minor housework like washing dishes but is generally sedentary.  She utilizes a cane and a Rollator at home as an assistive devices.  She denies any orthopnea, chest pain, PND, lightheadedness, dizziness or syncope.  She does continue to endorse intermittent bilateral lower extremity edema but states that she follows Dr. Singh for venous insufficiency.  She reports compliance with her current medications and with hemodialysis.  Of note, the patient does endorse low blood pressures with hemodialysis. HFpEF - EF  50-55% per Echo Dec. 2021 - NYHA Class III: Echo Jan. 2018 showed diastolic dysfunction, EF of 66%, E/A flow reversal, aortic regurg, and moderately thickened leaflets without aortic stenosis; Reports improved SOB with exertion; ADLs unchanged; Continue Lasix and hemodialysis as directed; Counseled on low salt diet. CAD s/p CABG prior to 2005: NSTEMI Type II (Supply/Demand) in the setting of Severe Anemia due to GI Bleed Dec. 2021 - recent hospital admission; EF 50% per Echo Dec. 2021; Stress Echo 2018 showed no significant ischemia but a moderately large fixed anterior defect of moderate intensity; Denies CP. Reports improved SOB with exertion; Continue ASA, Coreg, and Crestor; Counseled on heart healthy diet. HTN: BP not at goal - 158/80- did not take medications today; Reports low BP with HD, however hypertensive today; Will not decrease medications today due to hypertension; Instructed patient to monitor/log BP andotify nephrologist of hypotension during HD for medication adjustment on those days; Continuing current therapy - will defer BP management to Nephrology. Counseled on low salt diet. HLD: LDL at goal - 57; Continue Crestor 40mg daily; Counseled on low cholesterol diet. Venous Insufficiency: s/p percutaneous endovenous Microfoam ablation with Varithena of the left GSV with Dr. Singh on May 17, 2021; Recommend compression stockings; Instructed on low salt diet; Follow up with Dr. Singh as directed. Patient has follow up appointment scheduled on 12/05/2022.     Patient followed by [facility], Dr. Marshal Espinal.        Mammogram: 10/28/2022, negative  PAP: deferred due to age  FIT: 08/10/2018, positive  Colonoscopy: 07/21/2021, repeat in 3 years  Diabetic Eye Exam: 06/15/2020, patient followed by Dr. Casey in [place]  Diabetic Foot Exam: 06/09/2021  Low Dose CT Scan Lung: refuses at this time  Bone Density: 04/11/2022, normal  Medicare Wellness: ordered      Review of Systems   Constitutional:  Negative.    HENT: Negative.     Eyes: Negative.    Respiratory: Negative.     Cardiovascular: Negative.    Gastrointestinal: Negative.    Endocrine: Negative.    Genitourinary: Negative.    Musculoskeletal: Negative.    Integumentary:  Negative.   Allergic/Immunologic: Negative.    Neurological: Negative.    Hematological: Negative.    Psychiatric/Behavioral: Negative.         Objective:      Physical Exam  Neurological:      Mental Status: She is alert and oriented to person, place, and time.   Psychiatric:         Mood and Affect: Mood normal.       Assessment:       Problem List Items Addressed This Visit          Psychiatric    Depressive disorder (Chronic)     Followed by Mental Health Provider            Pulmonary    Chronic obstructive pulmonary disease - Primary (Chronic)     PFTs: none noted  Continue Albuterol prn, Breo 1 puff daily, Spiriva 1 puff daily            Cardiac/Vascular    Chronic heart failure with preserved ejection fraction (Chronic)     Patient followed by Cardiology Clinic         HTN (hypertension), benign (Chronic)     B/P: deferred due to audio visit  Continue Amlodipine 5 mg daily,Corvedilol 6.25 mg bid, Lasix 40 mg daily  EK2022  DASH diet  Encouraged home blood pressure monitoring         Mixed hyperlipidemia (Chronic)     Continue Rosuvastatin  Weight loss encouraged  Low fat/high fiber diet  Increase physical activity  Tobacco cessation encouraged   Latest Reference Range & Units 22 09:18   Cholesterol <=200 mg/dL 117   HDL 35 - 60 mg/dL 42   LDL Cholesterol External 50.00 - 140.00 mg/dL 62.00   Total Cholesterol/HDL Ratio 0 - 5  3   Triglycerides 37 - 140 mg/dL 66   Very Low Density Lipoprotein  13            Relevant Orders    Lipid Panel       Renal/    ESRD on hemodialysis (Chronic)     Followed by Nephrology  Dialysis on ,Sat            Endocrine    Class 1 obesity due to excess calories with serious comorbidity and body mass index (BMI) of 34.0 to 34.9  in adult (Chronic)     BMI: 34.17  TSH: 1.55  Vitamin D level: 20.0  HgbA1c: 6.0  Weight Loss Encouraged  Increase Physical Activity         Vitamin D deficiency (Chronic)     Vitamin D level: 20  Start Vitamin D 50,000 units weekly         Relevant Orders    Vitamin D    Type 2 diabetes mellitus with chronic kidney disease on chronic dialysis, with long-term current use of insulin     Continue Insulin Glargine 30 units daily, Glipizide 10 mg daily  Continue home CBG monitoring.  Hypoglycemic episodes: denies  BMI: 34.17  HgbA1c: 6.0  On Dialysis  On Rosuvastatin  Weight Loss Encouraged  ADA Diet         Relevant Medications    ONETOUCH ULTRA TEST Strp    Other Relevant Orders    CBC Auto Differential    Comprehensive Metabolic Panel    Hemoglobin A1C       Other    Cigarette nicotine dependence without complication (Chronic)     Smoking cessation education provided, encouraged. Offered smoking cessation program. Patient refused smoking cessation at this time. I spent approx 5 minutes discussing smoking cessation with patient.           Other Visit Diagnoses       Diabetes mellitus without complication        Relevant Medications    ONETOUCH ULTRA TEST Strp            Plan:    Patient to follow up in 3 months with labs to be done prior to appointment to reassess DM2.

## 2022-11-23 NOTE — ASSESSMENT & PLAN NOTE
B/P: deferred due to audio visit  Continue Amlodipine 5 mg daily,Corvedilol 6.25 mg bid, Lasix 40 mg daily  EK2022  DASH diet  Encouraged home blood pressure monitoring

## 2022-11-23 NOTE — ASSESSMENT & PLAN NOTE
Smoking cessation education provided, encouraged. Offered smoking cessation program. Patient refused smoking cessation at this time. I spent approx 5 minutes discussing smoking cessation with patient.

## 2022-11-23 NOTE — ASSESSMENT & PLAN NOTE
Continue Rosuvastatin  Weight loss encouraged  Low fat/high fiber diet  Increase physical activity  Tobacco cessation encouraged   Latest Reference Range & Units 09/30/22 09:18   Cholesterol <=200 mg/dL 117   HDL 35 - 60 mg/dL 42   LDL Cholesterol External 50.00 - 140.00 mg/dL 62.00   Total Cholesterol/HDL Ratio 0 - 5  3   Triglycerides 37 - 140 mg/dL 66   Very Low Density Lipoprotein  13

## 2022-12-05 ENCOUNTER — OFFICE VISIT (OUTPATIENT)
Dept: CARDIOLOGY | Facility: CLINIC | Age: 77
End: 2022-12-05
Payer: COMMERCIAL

## 2022-12-05 VITALS
WEIGHT: 206.81 LBS | HEART RATE: 60 BPM | HEIGHT: 63 IN | TEMPERATURE: 98 F | DIASTOLIC BLOOD PRESSURE: 80 MMHG | SYSTOLIC BLOOD PRESSURE: 170 MMHG | BODY MASS INDEX: 36.64 KG/M2 | RESPIRATION RATE: 18 BRPM

## 2022-12-05 DIAGNOSIS — Z72.0 TOBACCO USE: ICD-10-CM

## 2022-12-05 DIAGNOSIS — J44.9 CHRONIC OBSTRUCTIVE PULMONARY DISEASE, UNSPECIFIED COPD TYPE: Chronic | ICD-10-CM

## 2022-12-05 DIAGNOSIS — N18.6 ESRD ON HEMODIALYSIS: Chronic | ICD-10-CM

## 2022-12-05 DIAGNOSIS — I25.10 CORONARY ARTERY DISEASE INVOLVING NATIVE CORONARY ARTERY OF NATIVE HEART WITHOUT ANGINA PECTORIS: ICD-10-CM

## 2022-12-05 DIAGNOSIS — Z99.2 ESRD ON HEMODIALYSIS: Chronic | ICD-10-CM

## 2022-12-05 DIAGNOSIS — E78.2 MIXED HYPERLIPIDEMIA: Chronic | ICD-10-CM

## 2022-12-05 DIAGNOSIS — I10 HTN (HYPERTENSION), BENIGN: Chronic | ICD-10-CM

## 2022-12-05 DIAGNOSIS — I50.32 CHRONIC HEART FAILURE WITH PRESERVED EJECTION FRACTION: Primary | Chronic | ICD-10-CM

## 2022-12-05 PROCEDURE — 1126F AMNT PAIN NOTED NONE PRSNT: CPT | Mod: CPTII,,, | Performed by: NURSE PRACTITIONER

## 2022-12-05 PROCEDURE — 99213 PR OFFICE/OUTPT VISIT, EST, LEVL III, 20-29 MIN: ICD-10-PCS | Mod: S$PBB,,, | Performed by: NURSE PRACTITIONER

## 2022-12-05 PROCEDURE — 99215 OFFICE O/P EST HI 40 MIN: CPT | Mod: PBBFAC | Performed by: NURSE PRACTITIONER

## 2022-12-05 PROCEDURE — 3077F SYST BP >= 140 MM HG: CPT | Mod: CPTII,,, | Performed by: NURSE PRACTITIONER

## 2022-12-05 PROCEDURE — 1159F PR MEDICATION LIST DOCUMENTED IN MEDICAL RECORD: ICD-10-PCS | Mod: CPTII,,, | Performed by: NURSE PRACTITIONER

## 2022-12-05 PROCEDURE — 1160F PR REVIEW ALL MEDS BY PRESCRIBER/CLIN PHARMACIST DOCUMENTED: ICD-10-PCS | Mod: CPTII,,, | Performed by: NURSE PRACTITIONER

## 2022-12-05 PROCEDURE — 3288F FALL RISK ASSESSMENT DOCD: CPT | Mod: CPTII,,, | Performed by: NURSE PRACTITIONER

## 2022-12-05 PROCEDURE — 1159F MED LIST DOCD IN RCRD: CPT | Mod: CPTII,,, | Performed by: NURSE PRACTITIONER

## 2022-12-05 PROCEDURE — 1160F RVW MEDS BY RX/DR IN RCRD: CPT | Mod: CPTII,,, | Performed by: NURSE PRACTITIONER

## 2022-12-05 PROCEDURE — 3079F DIAST BP 80-89 MM HG: CPT | Mod: CPTII,,, | Performed by: NURSE PRACTITIONER

## 2022-12-05 PROCEDURE — 1126F PR PAIN SEVERITY QUANTIFIED, NO PAIN PRESENT: ICD-10-PCS | Mod: CPTII,,, | Performed by: NURSE PRACTITIONER

## 2022-12-05 PROCEDURE — 3079F PR MOST RECENT DIASTOLIC BLOOD PRESSURE 80-89 MM HG: ICD-10-PCS | Mod: CPTII,,, | Performed by: NURSE PRACTITIONER

## 2022-12-05 PROCEDURE — 3288F PR FALLS RISK ASSESSMENT DOCUMENTED: ICD-10-PCS | Mod: CPTII,,, | Performed by: NURSE PRACTITIONER

## 2022-12-05 PROCEDURE — 3077F PR MOST RECENT SYSTOLIC BLOOD PRESSURE >= 140 MM HG: ICD-10-PCS | Mod: CPTII,,, | Performed by: NURSE PRACTITIONER

## 2022-12-05 PROCEDURE — 1101F PT FALLS ASSESS-DOCD LE1/YR: CPT | Mod: CPTII,,, | Performed by: NURSE PRACTITIONER

## 2022-12-05 PROCEDURE — 99213 OFFICE O/P EST LOW 20 MIN: CPT | Mod: S$PBB,,, | Performed by: NURSE PRACTITIONER

## 2022-12-05 PROCEDURE — 1101F PR PT FALLS ASSESS DOC 0-1 FALLS W/OUT INJ PAST YR: ICD-10-PCS | Mod: CPTII,,, | Performed by: NURSE PRACTITIONER

## 2022-12-05 RX ORDER — ROSUVASTATIN CALCIUM 40 MG/1
40 TABLET, COATED ORAL DAILY
Qty: 90 TABLET | Refills: 3 | Status: SHIPPED | OUTPATIENT
Start: 2022-12-05 | End: 2023-11-29 | Stop reason: SDUPTHER

## 2022-12-05 RX ORDER — CARVEDILOL 6.25 MG/1
6.25 TABLET ORAL 2 TIMES DAILY
Qty: 180 TABLET | Refills: 3 | Status: SHIPPED | OUTPATIENT
Start: 2022-12-05 | End: 2023-11-29 | Stop reason: SDUPTHER

## 2022-12-05 RX ORDER — FUROSEMIDE 40 MG/1
40 TABLET ORAL DAILY
Qty: 90 TABLET | Refills: 3 | Status: SHIPPED | OUTPATIENT
Start: 2022-12-05 | End: 2023-11-29 | Stop reason: SDUPTHER

## 2022-12-05 RX ORDER — AMLODIPINE BESYLATE 5 MG/1
5 TABLET ORAL DAILY
Qty: 90 TABLET | Refills: 3 | Status: SHIPPED | OUTPATIENT
Start: 2022-12-05 | End: 2023-02-06

## 2022-12-05 NOTE — PROGRESS NOTES
CHIEF COMPLAINT:   Chief Complaint   Patient presents with    Follow-up     Last ekg 3/22/22 denies cardiac targets                   Review of patient's allergies indicates:   Allergen Reactions    Lisinopril Swelling    Azithromycin      Other reaction(s): tongue/facial swelling    Baclofen      Other reaction(s): tongue/facial swelling    Doxycycline      Other reaction(s): Angioedema                                          HPI:  Rosette Madrid 77 y.o. with a past medical history of CAD s/p CABG prior to 2005, ESRD on HD (TTHSat), HTN, DM, HFpEF, and HLD presents for follow up and ongoing care.  Patient completed an Echocardiogram on 12.30.21 which revealed an ejection fraction of approximately 50 to 55%. See report below.     Patient denies chest pain, shortness of breath, palpitations, orthopnea, or syncope.  Patient does endorse bilateral lower extremity edema, but continues to follow up with Dr. Singh for the venous insufficiency.  She reports compliance with her medications.  She also reports compliance with her hemodialysis.  Of note, patient continues to state that she does have episodes of hypotension during her dialysis sessions.    Echocardiogram December 30, 2021:  Left ventricular ejection fraction is measured at approximately 50 to 55%.  Grade 2 diastolic dysfunction.  Aortic valve is tricuspid.  Aortic valve trileaflet are mildly thickened.  Mitral annular calcification is present.  Mild mitral regurgitation.  There is mild tricuspid regurgitation with RVSP estimated at 56 mmHg.                                                                                                                                                                                                                                                                                                                                                                                                                                                                                               Patient Active Problem List   Diagnosis    Chronic heart failure with preserved ejection fraction    Cigarette nicotine dependence without complication    Coronary artery disease involving coronary bypass graft of native heart with unstable angina pectoris    Chronic obstructive pulmonary disease    HTN (hypertension), benign    Mixed hyperlipidemia    Type 2 diabetes mellitus with chronic kidney disease on chronic dialysis, with long-term current use of insulin    ESRD on hemodialysis    Depressive disorder    Class 1 obesity due to excess calories with serious comorbidity and body mass index (BMI) of 34.0 to 34.9 in adult    Vitamin D deficiency    Coronary artery disease involving native coronary artery of native heart without angina pectoris    Tobacco use     Past Surgical History:   Procedure Laterality Date    AV FISTULA PLACEMENT Left     CARDIAC CATHETERIZATION      COLONOSCOPY      CORONARY ARTERY BYPASS GRAFT      ESOPHAGOGASTRODUODENOSCOPY      POLYPECTOMY      THYROIDECTOMY      TUBAL LIGATION       Social History     Socioeconomic History    Marital status:     Number of children: 6   Tobacco Use    Smoking status: Every Day     Packs/day: 0.25     Types: Cigarettes    Smokeless tobacco: Never    Tobacco comments:     Smokes 2 cigarettes a day   Substance and Sexual Activity    Alcohol use: Yes     Comment: 1 glass every 2-3 month    Drug use: Not Currently    Sexual activity: Not Currently        Family History   Problem Relation Age of Onset    Hypertension Mother     Hypertension Father     Hypertension Sister     Hypertension Sister          Current Outpatient Medications:     albuterol (VENTOLIN HFA) 90 mcg/actuation inhaler, Inhale 2 puffs into the lungs every 6 (six) hours as needed for Wheezing. Rescue, Disp: 18 g, Rfl: 1    albuterol-ipratropium (DUO-NEB) 2.5 mg-0.5 mg/3 mL nebulizer solution, Take 3 mLs by nebulization every 6 (six)  "hours as needed. Rescue, Disp: , Rfl:     aspirin (ECOTRIN) 81 MG EC tablet, Take 1 tablet (81 mg total) by mouth once daily., Disp: 90 tablet, Rfl: 1    BASAGLAR KWIKPEN U-100 INSULIN glargine 100 units/mL SubQ pen, Inject 30 Units into the skin Daily., Disp: 27 mL, Rfl: 2    BD ULTRA-FINE MINI PEN NEEDLE 31 gauge x 3/16" Ndle, USE AS DIRECTED TWICE A DAY, Disp: , Rfl:     BENADRYL 25 mg capsule, Take 50 mg by mouth nightly., Disp: , Rfl:     bisacodyL (DULCOLAX) 5 mg EC tablet, Take 5 mg by mouth daily as needed for Constipation., Disp: , Rfl:     clonazePAM (KLONOPIN) 0.5 MG tablet, Take 0.5 mg by mouth daily as needed., Disp: , Rfl:     DIALYVITE 100-1 mg Tab, Take 1 tablet by mouth once daily., Disp: , Rfl:     DULoxetine (CYMBALTA) 30 MG capsule, Take 30 mg by mouth once daily., Disp: , Rfl:     ferrous gluconate (FERGON) 324 MG tablet, Take 1 tablet (324 mg total) by mouth 3 (three) times daily., Disp: 270 tablet, Rfl: 2    glipiZIDE (GLUCOTROL) 10 MG tablet, Take 1 tablet (10 mg total) by mouth daily with breakfast., Disp: 90 tablet, Rfl: 2    INVEGA SUSTENNA 156 mg/mL Syrg injection, Inject into the muscle., Disp: , Rfl:     L-METHYLFOLATE 15 mg Tab, Take 1 tablet by mouth once daily., Disp: , Rfl:     levothyroxine (SYNTHROID) 125 MCG tablet, Take 1 tablet (125 mcg total) by mouth before breakfast., Disp: 90 tablet, Rfl: 2    ONETOUCH DELICA PLUS LANCET 30 gauge Misc, 1 lancet by Other route once daily., Disp: 100 each, Rfl: 2    ONETOUCH ULTRA TEST Strp, 1 strip by Other route once daily., Disp: 200 strip, Rfl: 2    pen needle, diabetic 31 gauge x 5/16" Ndle, 2 Units by Misc.(Non-Drug; Combo Route) route 2 (two) times a day. Patient to check blood sugars twice daily (morning and night), Disp: 100 each, Rfl: 2    sevelamer carbonate (RENVELA) 800 mg Tab, Take 1,600 mg by mouth 3 (three) times daily., Disp: , Rfl:     tiotropium (SPIRIVA WITH HANDIHALER) 18 mcg inhalation capsule, Inhale 1 capsule (18 mcg " "total) into the lungs Daily., Disp: 90 capsule, Rfl: 2    tiZANidine (ZANAFLEX) 4 MG tablet, Take 4 mg by mouth every 8 (eight) hours as needed., Disp: , Rfl:     amLODIPine (NORVASC) 5 MG tablet, Take 1 tablet (5 mg total) by mouth once daily., Disp: 90 tablet, Rfl: 3    carvediloL (COREG) 6.25 MG tablet, Take 1 tablet (6.25 mg total) by mouth 2 (two) times daily., Disp: 180 tablet, Rfl: 3    ergocalciferol (ERGOCALCIFEROL) 50,000 unit Cap, Take 1 capsule (50,000 Units total) by mouth every 7 days., Disp: 12 capsule, Rfl: 1    fluticasone furoate-vilanteroL (BREO) 100-25 mcg/dose diskus inhaler, Inhale 1 puff into the lungs once daily., Disp: 90 each, Rfl: 2    furosemide (LASIX) 40 MG tablet, Take 1 tablet (40 mg total) by mouth once daily., Disp: 90 tablet, Rfl: 3    rosuvastatin (CRESTOR) 40 MG Tab, Take 1 tablet (40 mg total) by mouth once daily., Disp: 90 tablet, Rfl: 3     ROS:                                                                                                                                                                             Review of Systems   Constitutional: Negative.    HENT: Negative.     Eyes: Negative.    Respiratory: Negative.     Cardiovascular:  Positive for leg swelling.   Gastrointestinal: Negative.    Genitourinary: Negative.    Musculoskeletal: Negative.    Skin: Negative.    Neurological: Negative.    Endo/Heme/Allergies: Negative.    Psychiatric/Behavioral: Negative.        Blood pressure (!) 170/80, pulse 60, temperature 97.5 °F (36.4 °C), resp. rate 18, height 5' 3" (1.6 m), weight 93.8 kg (206 lb 12.7 oz).   PE:  Physical Exam  HENT:      Head: Normocephalic.      Nose: Nose normal.   Eyes:      Pupils: Pupils are equal, round, and reactive to light.   Cardiovascular:      Rate and Rhythm: Normal rate and regular rhythm.   Pulmonary:      Effort: Pulmonary effort is normal.   Abdominal:      General: Abdomen is flat. Bowel sounds are normal.      Palpations: Abdomen is " soft.   Musculoskeletal:         General: Normal range of motion.      Cervical back: Normal range of motion.      Right lower leg: Edema present.      Left lower leg: Edema present.   Skin:     General: Skin is warm.   Neurological:      General: No focal deficit present.      Mental Status: She is alert.   Psychiatric:         Mood and Affect: Mood normal.        ASSESSMENT/PLAN:  HFpEF - EF 50-55% per Echo Dec. 2021 - NYHA Class II  Echo Jan. 2018 showed diastolic dysfunction, EF of 66%, E/A flow reversal, aortic regurg, and moderately thickened leaflets without aortic stenosis  Patient denies SOB  She is euvolemic, warm, and dry on exam today  Continue Lasix and hemodialysis as directed  Counseled on low salt diet    CAD s/p CABG prior to 2005  NSTEMI Type II (Supply/Demand) in the setting of Severe Anemia due to GI Bleed Dec. 2021 - recent hospital admission  EF 50% per Echo Dec. 2021  Stress Echo 2018 showed no significant ischemia but a moderately large fixed anterior defect of moderate intensity  Denies chest pain  Continue Aspirin, Coreg, and Crestor  Counseled on heart healthy diet     COPD   Management per PCP     HTN  BP not at goal -  170/80 - did not take medications today  Reports low BP with HD, however hypertensive today  Continuing current therapy   Will defer BP management to Nephrology  Counseled on low salt diet    HLD  LDL at goal - 62  Continue Crestor 40mg daily  Counseled on low cholesterol diet    Diabetes  A1C - 6.0   Continue management per PCP    Tobacco use  Counseled on the importance of smoking cessation   She smokes 2 cigarettes per day - states she is trying to quit on her own      ESRD on HD T/TH/Sat  Continue nephrology management    Venous Insufficiency   s/p percutaneous endovenous Microfoam ablation with Varithena of the left GSV with Dr. Singh on May 17, 2021  Recommend compression stockings  Instructed on low salt diet  Follow up with Dr. Singh as directed    Follow up in  Cardiology Clinic in 4 months  Follow-up with PCP, Nephrology, and Dr. Singh as directed

## 2022-12-05 NOTE — PATIENT INSTRUCTIONS
Follow up in Cardiology Clinic in 4 months  Follow-up with PCP, Nephrology, and Dr. Singh as directed

## 2022-12-17 ENCOUNTER — DOCUMENTATION ONLY (OUTPATIENT)
Dept: INTERNAL MEDICINE | Facility: CLINIC | Age: 77
End: 2022-12-17
Payer: COMMERCIAL

## 2022-12-18 ENCOUNTER — PATIENT OUTREACH (OUTPATIENT)
Dept: ADMINISTRATIVE | Facility: HOSPITAL | Age: 77
End: 2022-12-18
Payer: COMMERCIAL

## 2023-01-19 ENCOUNTER — HOSPITAL ENCOUNTER (INPATIENT)
Facility: HOSPITAL | Age: 78
LOS: 3 days | Discharge: HOME | DRG: 377 | End: 2023-01-22
Attending: FAMILY MEDICINE | Admitting: STUDENT IN AN ORGANIZED HEALTH CARE EDUCATION/TRAINING PROGRAM
Payer: MEDICARE

## 2023-01-19 DIAGNOSIS — Z99.2 ESRD (END STAGE RENAL DISEASE) ON DIALYSIS: ICD-10-CM

## 2023-01-19 DIAGNOSIS — J44.9 CHRONIC OBSTRUCTIVE PULMONARY DISEASE, UNSPECIFIED COPD TYPE: Chronic | ICD-10-CM

## 2023-01-19 DIAGNOSIS — D64.9 ACUTE ANEMIA: Primary | ICD-10-CM

## 2023-01-19 DIAGNOSIS — I48.19 PERSISTENT ATRIAL FIBRILLATION: ICD-10-CM

## 2023-01-19 DIAGNOSIS — I48.91 NEW ONSET A-FIB: ICD-10-CM

## 2023-01-19 DIAGNOSIS — N18.6 ESRD (END STAGE RENAL DISEASE) ON DIALYSIS: ICD-10-CM

## 2023-01-19 DIAGNOSIS — Q27.30 AVM (ARTERIOVENOUS MALFORMATION): ICD-10-CM

## 2023-01-19 DIAGNOSIS — D64.9 ANEMIA, UNSPECIFIED TYPE: ICD-10-CM

## 2023-01-19 DIAGNOSIS — R19.5 OCCULT BLOOD IN STOOLS: ICD-10-CM

## 2023-01-19 PROBLEM — D53.9 MACROCYTIC ANEMIA: Status: ACTIVE | Noted: 2023-01-19

## 2023-01-19 PROBLEM — D64.89 OTHER SPECIFIED ANEMIAS: Status: ACTIVE | Noted: 2023-01-19

## 2023-01-19 LAB
ABORH RETYPE: NORMAL
ALBUMIN SERPL-MCNC: 3.2 G/DL (ref 3.4–4.8)
ALBUMIN/GLOB SERPL: 0.9 RATIO (ref 1.1–2)
ALP SERPL-CCNC: 80 UNIT/L (ref 40–150)
ALT SERPL-CCNC: 24 UNIT/L (ref 0–55)
APTT PPP: 38.4 SECONDS
AST SERPL-CCNC: 21 UNIT/L (ref 5–34)
BASOPHILS # BLD AUTO: 0.02 X10(3)/MCL (ref 0–0.2)
BASOPHILS NFR BLD AUTO: 0.2 %
BILIRUBIN DIRECT+TOT PNL SERPL-MCNC: 0.3 MG/DL
BUN SERPL-MCNC: 34.9 MG/DL (ref 9.8–20.1)
CALCIUM SERPL-MCNC: 9.7 MG/DL (ref 8.4–10.2)
CHLORIDE SERPL-SCNC: 102 MMOL/L (ref 98–107)
CO2 SERPL-SCNC: 29 MMOL/L (ref 23–31)
CREAT SERPL-MCNC: 5.34 MG/DL (ref 0.55–1.02)
EOSINOPHIL # BLD AUTO: 0.09 X10(3)/MCL (ref 0–0.9)
EOSINOPHIL NFR BLD AUTO: 1.1 %
ERYTHROCYTE [DISTWIDTH] IN BLOOD BY AUTOMATED COUNT: 17 % (ref 11.5–17)
EST. AVERAGE GLUCOSE BLD GHB EST-MCNC: 119.8 MG/DL
FERRITIN SERPL-MCNC: 1461.24 NG/ML (ref 4.63–204)
FLUAV AG UPPER RESP QL IA.RAPID: NOT DETECTED
FLUBV AG UPPER RESP QL IA.RAPID: NOT DETECTED
FOLATE SERPL-MCNC: 15 NG/ML (ref 7–31.4)
GFR SERPLBLD CREATININE-BSD FMLA CKD-EPI: 8 MLS/MIN/1.73/M2
GLOBULIN SER-MCNC: 3.4 GM/DL (ref 2.4–3.5)
GLUCOSE SERPL-MCNC: 180 MG/DL (ref 82–115)
GROUP & RH: NORMAL
HBA1C MFR BLD: 5.8 %
HCT VFR BLD AUTO: 22.3 % (ref 37–47)
HGB BLD-MCNC: 6.6 GM/DL (ref 12–16)
IMM GRANULOCYTES # BLD AUTO: 0.04 X10(3)/MCL (ref 0–0.04)
IMM GRANULOCYTES NFR BLD AUTO: 0.5 %
INDIRECT COOMBS GEL: NORMAL
IRON SATN MFR SERPL: 18 % (ref 20–50)
IRON SERPL-MCNC: 44 UG/DL (ref 50–170)
LYMPHOCYTES # BLD AUTO: 1.26 X10(3)/MCL (ref 0.6–4.6)
LYMPHOCYTES NFR BLD AUTO: 15.7 %
MAGNESIUM SERPL-MCNC: 2.3 MG/DL (ref 1.6–2.6)
MCH RBC QN AUTO: 30.3 PG
MCHC RBC AUTO-ENTMCNC: 29.6 MG/DL (ref 33–36)
MCV RBC AUTO: 102.3 FL (ref 80–94)
MONOCYTES # BLD AUTO: 0.48 X10(3)/MCL (ref 0.1–1.3)
MONOCYTES NFR BLD AUTO: 6 %
NEUTROPHILS # BLD AUTO: 6.15 X10(3)/MCL (ref 2.1–9.2)
NEUTROPHILS NFR BLD AUTO: 76.5 %
NRBC BLD AUTO-RTO: 0 %
PHOSPHATE SERPL-MCNC: 2.6 MG/DL (ref 2.3–4.7)
PLATELET # BLD AUTO: 188 X10(3)/MCL (ref 130–400)
PMV BLD AUTO: 10.6 FL (ref 7.4–10.4)
POCT GLUCOSE: 156 MG/DL (ref 70–110)
POTASSIUM SERPL-SCNC: 4.2 MMOL/L (ref 3.5–5.1)
PROT SERPL-MCNC: 6.6 GM/DL (ref 5.8–7.6)
RBC # BLD AUTO: 2.18 X10(6)/MCL (ref 4.2–5.4)
SARS-COV-2 RNA RESP QL NAA+PROBE: NOT DETECTED
SODIUM SERPL-SCNC: 139 MMOL/L (ref 136–145)
TIBC SERPL-MCNC: 197 UG/DL (ref 70–310)
TIBC SERPL-MCNC: 241 UG/DL (ref 250–450)
TRANSFERRIN SERPL-MCNC: 202 MG/DL (ref 173–360)
TRANSFERRIN SERPL-MCNC: 202 MG/DL (ref 173–360)
TROPONIN I SERPL-MCNC: 0.07 NG/ML (ref 0–0.04)
TROPONIN I SERPL-MCNC: 0.08 NG/ML (ref 0–0.04)
TSH SERPL-ACNC: 1.6 UIU/ML (ref 0.35–4.94)
VIT B12 SERPL-MCNC: 817 PG/ML (ref 213–816)
WBC # SPEC AUTO: 8 X10(3)/MCL (ref 4.5–11.5)

## 2023-01-19 PROCEDURE — 82607 VITAMIN B-12: CPT

## 2023-01-19 PROCEDURE — 0240U COVID/FLU A&B PCR: CPT | Performed by: PHYSICIAN ASSISTANT

## 2023-01-19 PROCEDURE — 84466 ASSAY OF TRANSFERRIN: CPT | Performed by: STUDENT IN AN ORGANIZED HEALTH CARE EDUCATION/TRAINING PROGRAM

## 2023-01-19 PROCEDURE — 63600175 PHARM REV CODE 636 W HCPCS

## 2023-01-19 PROCEDURE — 85025 COMPLETE CBC W/AUTO DIFF WBC: CPT | Performed by: PHYSICIAN ASSISTANT

## 2023-01-19 PROCEDURE — 25000003 PHARM REV CODE 250

## 2023-01-19 PROCEDURE — 85730 THROMBOPLASTIN TIME PARTIAL: CPT

## 2023-01-19 PROCEDURE — 83735 ASSAY OF MAGNESIUM: CPT | Performed by: PHYSICIAN ASSISTANT

## 2023-01-19 PROCEDURE — 11000001 HC ACUTE MED/SURG PRIVATE ROOM

## 2023-01-19 PROCEDURE — 85610 PROTHROMBIN TIME: CPT

## 2023-01-19 PROCEDURE — 83036 HEMOGLOBIN GLYCOSYLATED A1C: CPT

## 2023-01-19 PROCEDURE — 84443 ASSAY THYROID STIM HORMONE: CPT

## 2023-01-19 PROCEDURE — 93005 ELECTROCARDIOGRAM TRACING: CPT

## 2023-01-19 PROCEDURE — 84100 ASSAY OF PHOSPHORUS: CPT | Performed by: STUDENT IN AN ORGANIZED HEALTH CARE EDUCATION/TRAINING PROGRAM

## 2023-01-19 PROCEDURE — 36415 COLL VENOUS BLD VENIPUNCTURE: CPT

## 2023-01-19 PROCEDURE — 86920 COMPATIBILITY TEST SPIN: CPT

## 2023-01-19 PROCEDURE — 36430 TRANSFUSION BLD/BLD COMPNT: CPT

## 2023-01-19 PROCEDURE — 80053 COMPREHEN METABOLIC PANEL: CPT | Performed by: PHYSICIAN ASSISTANT

## 2023-01-19 PROCEDURE — 86920 COMPATIBILITY TEST SPIN: CPT | Performed by: STUDENT IN AN ORGANIZED HEALTH CARE EDUCATION/TRAINING PROGRAM

## 2023-01-19 PROCEDURE — 99285 EMERGENCY DEPT VISIT HI MDM: CPT | Mod: 25

## 2023-01-19 PROCEDURE — 82728 ASSAY OF FERRITIN: CPT

## 2023-01-19 PROCEDURE — 82746 ASSAY OF FOLIC ACID SERUM: CPT

## 2023-01-19 PROCEDURE — 86900 BLOOD TYPING SEROLOGIC ABO: CPT

## 2023-01-19 PROCEDURE — 83550 IRON BINDING TEST: CPT

## 2023-01-19 PROCEDURE — 84484 ASSAY OF TROPONIN QUANT: CPT | Performed by: PHYSICIAN ASSISTANT

## 2023-01-19 PROCEDURE — 86920 COMPATIBILITY TEST SPIN: CPT | Performed by: PHYSICIAN ASSISTANT

## 2023-01-19 PROCEDURE — P9016 RBC LEUKOCYTES REDUCED: HCPCS

## 2023-01-19 RX ORDER — HYDROCODONE BITARTRATE AND ACETAMINOPHEN 500; 5 MG/1; MG/1
TABLET ORAL
Status: DISCONTINUED | OUTPATIENT
Start: 2023-01-19 | End: 2023-01-19

## 2023-01-19 RX ORDER — IPRATROPIUM BROMIDE AND ALBUTEROL SULFATE 2.5; .5 MG/3ML; MG/3ML
3 SOLUTION RESPIRATORY (INHALATION) EVERY 6 HOURS PRN
Status: DISCONTINUED | OUTPATIENT
Start: 2023-01-19 | End: 2023-01-20

## 2023-01-19 RX ORDER — GLUCAGON 1 MG
1 KIT INJECTION
Status: DISCONTINUED | OUTPATIENT
Start: 2023-01-19 | End: 2023-01-22 | Stop reason: HOSPADM

## 2023-01-19 RX ORDER — AMLODIPINE BESYLATE 5 MG/1
5 TABLET ORAL DAILY
Status: DISCONTINUED | OUTPATIENT
Start: 2023-01-20 | End: 2023-01-22 | Stop reason: HOSPADM

## 2023-01-19 RX ORDER — INSULIN ASPART 100 [IU]/ML
0-5 INJECTION, SOLUTION INTRAVENOUS; SUBCUTANEOUS
Status: DISCONTINUED | OUTPATIENT
Start: 2023-01-19 | End: 2023-01-22 | Stop reason: HOSPADM

## 2023-01-19 RX ORDER — IBUPROFEN 200 MG
24 TABLET ORAL
Status: DISCONTINUED | OUTPATIENT
Start: 2023-01-19 | End: 2023-01-22 | Stop reason: HOSPADM

## 2023-01-19 RX ORDER — HYDROCODONE BITARTRATE AND ACETAMINOPHEN 500; 5 MG/1; MG/1
TABLET ORAL
Status: DISCONTINUED | OUTPATIENT
Start: 2023-01-19 | End: 2023-01-22 | Stop reason: HOSPADM

## 2023-01-19 RX ORDER — IBUPROFEN 200 MG
16 TABLET ORAL
Status: DISCONTINUED | OUTPATIENT
Start: 2023-01-19 | End: 2023-01-22 | Stop reason: HOSPADM

## 2023-01-19 RX ORDER — FLUTICASONE FUROATE AND VILANTEROL 100; 25 UG/1; UG/1
1 POWDER RESPIRATORY (INHALATION) DAILY
Status: DISCONTINUED | OUTPATIENT
Start: 2023-01-20 | End: 2023-01-22 | Stop reason: HOSPADM

## 2023-01-19 RX ORDER — ATORVASTATIN CALCIUM 40 MG/1
40 TABLET, FILM COATED ORAL DAILY
Status: DISCONTINUED | OUTPATIENT
Start: 2023-01-20 | End: 2023-01-22 | Stop reason: HOSPADM

## 2023-01-19 RX ORDER — CARVEDILOL 3.12 MG/1
6.25 TABLET ORAL 2 TIMES DAILY
Status: DISCONTINUED | OUTPATIENT
Start: 2023-01-19 | End: 2023-01-22 | Stop reason: HOSPADM

## 2023-01-19 RX ORDER — PANTOPRAZOLE SODIUM 40 MG/1
40 TABLET, DELAYED RELEASE ORAL DAILY
Status: DISCONTINUED | OUTPATIENT
Start: 2023-01-20 | End: 2023-01-22 | Stop reason: HOSPADM

## 2023-01-19 RX ORDER — LANOLIN ALCOHOL/MO/W.PET/CERES
1 CREAM (GRAM) TOPICAL EVERY OTHER DAY
Status: DISCONTINUED | OUTPATIENT
Start: 2023-01-20 | End: 2023-01-21

## 2023-01-19 RX ORDER — SEVELAMER CARBONATE 800 MG/1
1600 TABLET, FILM COATED ORAL 3 TIMES DAILY
Status: DISCONTINUED | OUTPATIENT
Start: 2023-01-19 | End: 2023-01-21

## 2023-01-19 RX ORDER — SODIUM CHLORIDE 0.9 % (FLUSH) 0.9 %
10 SYRINGE (ML) INJECTION
Status: DISCONTINUED | OUTPATIENT
Start: 2023-01-19 | End: 2023-01-22 | Stop reason: HOSPADM

## 2023-01-19 RX ORDER — TALC
6 POWDER (GRAM) TOPICAL NIGHTLY PRN
Status: DISCONTINUED | OUTPATIENT
Start: 2023-01-19 | End: 2023-01-22 | Stop reason: HOSPADM

## 2023-01-19 RX ADMIN — Medication 6 MG: at 09:01

## 2023-01-19 RX ADMIN — INSULIN DETEMIR 30 UNITS: 100 INJECTION, SOLUTION SUBCUTANEOUS at 09:01

## 2023-01-19 RX ADMIN — SEVELAMER CARBONATE 1600 MG: 800 TABLET, FILM COATED ORAL at 09:01

## 2023-01-19 RX ADMIN — CARVEDILOL 6.25 MG: 3.12 TABLET, FILM COATED ORAL at 08:01

## 2023-01-19 NOTE — ED PROVIDER NOTES
Encounter Date: 1/19/2023       History     Chief Complaint   Patient presents with    Hypotension     Low BP during dialysis, no weakness at this time.       76 yo F w/ PMHx significant for ESRD on dialysis, DM, CHF, HTN, HLD & COPD presents to ED for critical H&H. Patient originally reported that she was here for low BP. On T/TH/SA dialysis schedule, but reports they called her this AM & told her to come to ED rather than going for dialysis appointment. Mild hypotension on arrival to ED w/ BP of 98/60. I called dialysis clinic & they informed me that the patient was actually told to come to ED for H&H of 6.4 & 11. Patient does endorse hx of dark stool, but states she believes this is because of the iron supplement she takes. Denies abdominal pain, hematochezia, unexplained weight loss, night sweats, appetite change, F/C, lightheadedness, syncope, generalized weakness, fatigue, CP, SOB, palpitations, vaginal bleeding, hx of easy bruising/bleeding. VSS on arrival w/ NAD.    Review of patient's allergies indicates:   Allergen Reactions    Lisinopril Swelling    Azithromycin      Other reaction(s): tongue/facial swelling    Baclofen      Other reaction(s): tongue/facial swelling    Doxycycline      Other reaction(s): Angioedema     Past Medical History:   Diagnosis Date    CKD (chronic kidney disease) requiring chronic dialysis     COPD (chronic obstructive pulmonary disease)     Diabetes mellitus     Hyperlipidemia     Hypertension     Renal insufficiency     Thyroid disease      Past Surgical History:   Procedure Laterality Date    AV FISTULA PLACEMENT Left     CARDIAC CATHETERIZATION      COLONOSCOPY      CORONARY ARTERY BYPASS GRAFT      ESOPHAGOGASTRODUODENOSCOPY      POLYPECTOMY      THYROIDECTOMY      TUBAL LIGATION       Family History   Problem Relation Age of Onset    Hypertension Mother     Hypertension Father     Hypertension Sister     Hypertension Sister      Social History     Tobacco Use    Smoking  status: Every Day     Packs/day: 0.25     Types: Cigarettes    Smokeless tobacco: Never    Tobacco comments:     Smokes 2 cigarettes a day   Substance Use Topics    Alcohol use: Yes     Comment: 1 glass every 2-3 month    Drug use: Not Currently     Review of Systems   HENT:  Negative for congestion, rhinorrhea and sore throat.    Eyes:  Negative for visual disturbance.   Gastrointestinal:  Negative for constipation, diarrhea, nausea and vomiting.   Musculoskeletal:  Negative for arthralgias, joint swelling and myalgias.   Skin:  Negative for color change, pallor and rash.   All other systems reviewed and are negative.    Physical Exam     Initial Vitals [01/19/23 1140]   BP Pulse Resp Temp SpO2   98/60 62 18 97 °F (36.1 °C) 99 %      MAP       --         Physical Exam    Nursing note and vitals reviewed.  Constitutional: She appears well-developed and well-nourished. She is not diaphoretic. No distress.   HENT:   Head: Normocephalic and atraumatic.   Mouth/Throat: Oropharynx is clear and moist. No oropharyngeal exudate.   Eyes: Conjunctivae and EOM are normal. Pupils are equal, round, and reactive to light.   Neck: Neck supple.   Normal range of motion.  Cardiovascular:  Normal rate, regular rhythm, normal heart sounds and intact distal pulses.     Exam reveals no gallop and no friction rub.       No murmur heard.  Pulmonary/Chest: No accessory muscle usage. No tachypnea. No respiratory distress. She has wheezes. She has rhonchi. She has no rales.   Abdominal: Abdomen is soft. Bowel sounds are normal. She exhibits no distension and no mass. no abdominal tenderness There is no rebound and no guarding.   Genitourinary: Rectum:      Guaiac result positive.      External hemorrhoid present.      No rectal mass, tenderness or abnormal anal tone.   Guaiac positive stool. : Acceptable.   Genitourinary Comments: RICKY chaperoned by Ofelia Linares RN     Musculoskeletal:         General: No tenderness. Normal  range of motion.      Cervical back: Normal range of motion and neck supple.      Right lower le+ Pitting Edema present.      Left lower le+ Pitting Edema present.     Neurological: She is alert and oriented to person, place, and time. She has normal strength. No sensory deficit.   Skin: Skin is warm and dry. Capillary refill takes less than 2 seconds. No rash noted. No erythema. No pallor.   Psychiatric: She has a normal mood and affect. Thought content normal.       ED Course   Procedures  Labs Reviewed   COMPREHENSIVE METABOLIC PANEL - Abnormal; Notable for the following components:       Result Value    Glucose Level 180 (*)     Blood Urea Nitrogen 34.9 (*)     Creatinine 5.34 (*)     Albumin Level 3.2 (*)     Albumin/Globulin Ratio 0.9 (*)     All other components within normal limits   TROPONIN I - Abnormal; Notable for the following components:    Troponin-I 0.076 (*)     All other components within normal limits   CBC WITH DIFFERENTIAL - Abnormal; Notable for the following components:    RBC 2.18 (*)     Hgb 6.6 (*)     Hct 22.3 (*)     .3 (*)     MCHC 29.6 (*)     MPV 10.6 (*)     All other components within normal limits   TROPONIN I - Abnormal; Notable for the following components:    Troponin-I 0.071 (*)     All other components within normal limits   MAGNESIUM - Normal   CBC W/ AUTO DIFFERENTIAL    Narrative:     The following orders were created for panel order CBC Auto Differential.  Procedure                               Abnormality         Status                     ---------                               -----------         ------                     CBC with Differential[[id number]]        Abnormal            Final result                 Please view results for these tests on the individual orders.   EXTRA TUBES    Narrative:     The following orders were created for panel order EXTRA TUBES.  Procedure                               Abnormality         Status                      ---------                               -----------         ------                     Red Top Hold[[id number]]                                     In process                   Please view results for these tests on the individual orders.   RED TOP HOLD   COVID/FLU A&B PCR   PHOSPHORUS   IRON AND TIBC   FERRITIN   PROTIME-INR   APTT   HEMOGLOBIN A1C   TSH   TYPE & SCREEN   POCT GLUCOSE MONITORING CONTINUOUS   PREPARE RBC SOFT     EKG Readings: (Independently Interpreted)   Initial Reading: No STEMI. Rhythm: Atrial Fibrillation. Heart Rate: 68. Conduction: LBBB. T Waves Flipped: I, AVL, V5 and V6. Axis: Left Axis Deviation. Clinical Impression: Atrial Fibrillation with LBBB   ECG Results              EKG 12-lead (In process)  Result time 01/19/23 16:43:47      In process by Interface, Lab In [facility] (01/19/23 16:43:47)                   Narrative:    Test Reason :     Vent. Rate : 068 BPM     Atrial Rate : 065 BPM     P-R Int : 000 ms          QRS Dur : 132 ms      QT Int : 452 ms       P-R-T Axes : 000 020 148 degrees     QTc Int : 480 ms    Undetermined rhythm  Nonspecific intraventricular block  Cannot rule out Anteroseptal infarct ,age undetermined  T wave abnormality, consider lateral ischemia  Abnormal ECG  When compared with ECG of 19-JAN-2023 14:04,  Significant changes have occurred    Referred By: [email or web address]   SELF           Confirmed By:                                      EKG 12-lead (In process)  Result time 01/19/23 14:11:33      In process by Interface, Lab In [facility] (01/19/23 14:11:33)                   Narrative:    Test Reason : N18.6,Z99.2,    Vent. Rate : 064 BPM     Atrial Rate : 066 BPM     P-R Int : 000 ms          QRS Dur : 128 ms      QT Int : 592 ms       P-R-T Axes : 000 -62 259 degrees     QTc Int : 610 ms    Atrial fibrillation  Left axis deviation  Left bundle branch block  Abnormal ECG  When compared with ECG of 19-JAN-2023 14:03,  Previous ECG has undetermined rhythm, needs  review  ST no longer depressed in Inferior leads  QT has lengthened    Referred By: [email or web address]   SELF           Confirmed By:                                   Imaging Results              X-Ray Chest 1 View (Final result)  Result time 01/19/23 17:47:25      Final result by Reinier Owen MD (01/19/23 17:47:25)                   Impression:      No acute pulmonary process identified.      Electronically signed by: Reinier Owen  Date:    01/19/2023  Time:    17:47               Narrative:    EXAMINATION:  XR CHEST 1 VIEW    CLINICAL HISTORY:  coarse breath sounds;    TECHNIQUE:  Frontal view(s) of the chest.    COMPARISON:  Radiography 09/30/2022    FINDINGS:  Prior sternotomy.  Stable cardiac silhouette with similar tortuosity of the thoracic aorta.  The lungs are well-inflated.  Sites of mild scarring in the lower left lung similar since September.  No acute consolidation.  No significant pleural effusion or discernible pneumothorax.                                       Medications   amLODIPine tablet 5 mg (has no administration in time range)   carvediloL tablet 6.25 mg (has no administration in time range)   fluticasone furoate-vilanteroL 100-25 mcg/dose diskus inhaler 1 puff (has no administration in time range)   levothyroxine tablet 125 mcg (has no administration in time range)   atorvastatin tablet 40 mg (has no administration in time range)   sevelamer carbonate tablet 1,600 mg (has no administration in time range)   tiotropium bromide 2.5 mcg/actuation inhaler 2 puff (has no administration in time range)   sodium chloride 0.9% flush 10 mL (has no administration in time range)   melatonin tablet 6 mg (has no administration in time range)   pantoprazole EC tablet 40 mg (has no administration in time range)   0.9%  NaCl infusion (for blood administration) (has no administration in time range)   insulin detemir U-100 injection 30 Units (has no administration in time range)   insulin aspart U-100 injection  0-5 Units (has no administration in time range)   glucose chewable tablet 16 g (has no administration in time range)   glucose chewable tablet 24 g (has no administration in time range)   glucagon (human recombinant) injection 1 mg (has no administration in time range)   dextrose 10% bolus 125 mL 125 mL (has no administration in time range)   dextrose 10% bolus 250 mL 250 mL (has no administration in time range)   albuterol-ipratropium 2.5 mg-0.5 mg/3 mL nebulizer solution 3 mL (has no administration in time range)     Medical Decision Making:   Clinical Tests:   Lab Tests: Ordered and Reviewed  Radiological Study: Ordered and Reviewed  Medical Tests: Ordered and Reviewed  Other:   I have discussed this case with another health care provider.       <> Summary of the Discussion: Case discussed w/ Dr. Oneal & she reviewed all diagnostic information & performed face-to-face evaluation at bedside. All of her recommendations followed.           ED Course as of 01/19/23 1823   Thu Jan 19, 2023   0142 I went to speak w/ patient regarding need for blood transfusion based off of H&H. She informed me that she is a [employer] & cannot receive blood transfusions. I spoke w/ patient's son on the phone earlier & he told me he & his sister have power of  & to not let patient refuse medical care. Patient's son reported he would be to ED in next 30 mins to 1 hour when I spoke to him. Transfusion orders cancelled for now & will wait for patient's son to arrive to determine further plan of care. [NB]      ED Course User Index  [NB] HUANG Wallis                 Clinical Impression:   Final diagnoses:  [N18.6, Z99.2] ESRD (end stage renal disease) on dialysis  [D64.9] Acute anemia (Primary)  [R19.5] Occult blood in stools  [I48.91] New onset a-fib        ED Disposition Condition    Admit Stable                HUANG Wallis  01/19/23 1823

## 2023-01-19 NOTE — CARE UPDATE
"[facility] INTERNAL MEDICINE  ADMISSION HISTORY AND PHYSICAL  RESIDENT'S ATTESTATION    Resident Team: [facility] Medicine List 2  Attending Physician: Travon Nichole DO    Date of Admit: 1/19/2023    SUBJECTIVE:     Note made in conjunction with Dr. Shravan Gee, agree with assessment and plan as indicated in the history and physical. Of note, Rosette Madrid is a 77 y.o. female with PMH of ESRD with HD on T/Th/Sa, COPD, HTN, HLD, HFpEF 55% (December 2021), Hx of CABG x4, DMII, Hypothyroidism S/T Thyroidectomy, who was advised by her dialysis RN to present to the ED d/t low H/H. She is asymptomatic at this time and has no acute complaints. She states having intermittent dark stools for the past few months which she attributed to taking ferrous gluconate. Of note, her last colonoscopy on 7/21/2021 revealed multiple polyps in the transverse, descending, and sigmoid colon. EGD 12/31/2021 revealed a single small no bleeding angioectasia in the gastric antrum status post successful hemostasis with/APC, normal esophagus and duodenum, and small hiatal hernia.     In the ED: Initial BP was 98/60 but increased to 144/78 at the time of encounter. Labs are as followed: H/H 6.6/22.3, K+ 4.2, Mag 2.3, Cr 5.34, BUN 34.9, phos level pending. CXR revealed no acute cardiopulmonary abnormalities.    ROS:   (+) Occasional dark stools  (-) Chest pain, SOB, palpitations, fever, night sweat, chills, N/V, diarrhea, constipation, dizziness, hematuria       OBJECTIVE:     Vital signs:   /66 (BP Location: Right arm, Patient Position: Sitting)   Pulse 67   Temp 97 °F (36.1 °C) (Tympanic)   Resp 18   Ht 5' 3" (1.6 m)   Wt 95.1 kg (209 lb 12.3 oz)   SpO2 99%   BMI 37.16 kg/m²      Physical Examination:  General: Obese w/o distress  HEENT: NC/AT; PERRL; nasal and oral mucosa moist and clear  Neck: Full ROM; no lymphadenopathy  Pulm: Wheezing in upper lobes bilaterally, normal work of breathing on room air  CV: Irregular w/o murmurs or gallops; " +1 edema in RLE, +2 edema in LLE; (-) JVD; left AV fistula with thrill and bruit  GI: Soft with normal bowel sounds in all quadrants, no masses on palpation; RICKY revealed no active bleeding or masses; external hemorrhoid observed  MSK: Full ROM of all extremities and spine w/o limitation or discomfort  Derm: No rashes, abnormal bruising, or skin lesions  Neuro: AAOx4; motor/sensory function intact  Psych: Cooperative; appropriate mood and affect     ASSESSMENT & PLAN:     Anemia  ESRD with HD on T/Th/Sa   COPD   HTN  HLD   HFpEF 55% (December 2021)  Hx of CABG x4  DMII  Hypothyroidism S/T Thyroidectomy    -Admitted to inpatient unit for ongoing monitoring and medical therapy   -Colonoscopy 7/21/2021 revealed multiple polyps in the transverse, descending, and sigmoid colon. EGD 12/31/2021 revealed a single small no bleeding angioectasia in the gastric antrum status post successful hemostasis with/APC, normal esophagus and duodenum, and small hiatal hernia   -Electrolytes WNL, no acidosis, and does not appear fluid overloaded on admission; urgent dialysis is not needed at this time  -Patient is Je[employer] witness but agreed to receive blood transfusion; type and screen now and will give 2 units PRBCs; baseline H/H around 8/27; last blood transfusion was 1/1/2022  -Started low SSI and continue home dose levelmir 30 units QHS  -Continue home breathing treatments; started incentive spirometry  -Continue home meds: amlodipine, atorvastatin, carvedilol  -Will repeat EKG on 1/20/2023 morning to assess for presence of A-fib  -NPO after MN, will plan to consult GI services on 1/20/2023    DVT PPx: SCD and NADINE hose  GI PPx: Protonix   Diet: NPO after midnight  ABX: None  Fluids: None  Pain PRNs: None  O2: Room air  PCP: JOSEFINA Lord    Disposition (01/19/2023): Admitted inpatient for ongoing monitoring and medical therapy. Discharge plan: Will be discharged home when medically stable.     Margarito Pace, DO   [employer] [facility]  [facility]icine, PGY-II

## 2023-01-20 ENCOUNTER — ANESTHESIA (OUTPATIENT)
Dept: ENDOSCOPY | Facility: HOSPITAL | Age: 78
DRG: 377 | End: 2023-01-20
Payer: MEDICARE

## 2023-01-20 ENCOUNTER — ANESTHESIA EVENT (OUTPATIENT)
Dept: ENDOSCOPY | Facility: HOSPITAL | Age: 78
DRG: 377 | End: 2023-01-20
Payer: MEDICARE

## 2023-01-20 LAB
ABO + RH BLD: NORMAL
ABO + RH BLD: NORMAL
ALBUMIN SERPL-MCNC: 3 G/DL (ref 3.4–4.8)
ALBUMIN/GLOB SERPL: 0.9 RATIO (ref 1.1–2)
ALP SERPL-CCNC: 89 UNIT/L (ref 40–150)
ALT SERPL-CCNC: 25 UNIT/L (ref 0–55)
AST SERPL-CCNC: 23 UNIT/L (ref 5–34)
BASOPHILS # BLD AUTO: 0.02 X10(3)/MCL (ref 0–0.2)
BASOPHILS NFR BLD AUTO: 0.3 %
BILIRUBIN DIRECT+TOT PNL SERPL-MCNC: 0.3 MG/DL
BLD PROD TYP BPU: NORMAL
BLD PROD TYP BPU: NORMAL
BLOOD UNIT EXPIRATION DATE: NORMAL
BLOOD UNIT EXPIRATION DATE: NORMAL
BLOOD UNIT TYPE CODE: 5100
BLOOD UNIT TYPE CODE: 5100
BUN SERPL-MCNC: 42 MG/DL (ref 9.8–20.1)
CALCIUM SERPL-MCNC: 9.4 MG/DL (ref 8.4–10.2)
CHLORIDE SERPL-SCNC: 105 MMOL/L (ref 98–107)
CO2 SERPL-SCNC: 24 MMOL/L (ref 23–31)
CREAT SERPL-MCNC: 5.46 MG/DL (ref 0.55–1.02)
CROSSMATCH INTERPRETATION: NORMAL
CROSSMATCH INTERPRETATION: NORMAL
DISPENSE STATUS: NORMAL
DISPENSE STATUS: NORMAL
EOSINOPHIL # BLD AUTO: 0.14 X10(3)/MCL (ref 0–0.9)
EOSINOPHIL NFR BLD AUTO: 2 %
ERYTHROCYTE [DISTWIDTH] IN BLOOD BY AUTOMATED COUNT: 18.2 % (ref 11.5–17)
GFR SERPLBLD CREATININE-BSD FMLA CKD-EPI: 8 MLS/MIN/1.73/M2
GLOBULIN SER-MCNC: 3.2 GM/DL (ref 2.4–3.5)
GLUCOSE SERPL-MCNC: 161 MG/DL (ref 70–110)
GLUCOSE SERPL-MCNC: 64 MG/DL (ref 70–110)
GLUCOSE SERPL-MCNC: 91 MG/DL (ref 82–115)
HBV SURFACE AB SER-ACNC: 206.67 MIU/ML
HBV SURFACE AB SERPL IA-ACNC: REACTIVE M[IU]/ML
HBV SURFACE AG SERPL QL IA: NONREACTIVE
HCT VFR BLD AUTO: 28.6 % (ref 37–47)
HGB BLD-MCNC: 8.7 GM/DL (ref 12–16)
IMM GRANULOCYTES # BLD AUTO: 0.02 X10(3)/MCL (ref 0–0.04)
IMM GRANULOCYTES NFR BLD AUTO: 0.3 %
LYMPHOCYTES # BLD AUTO: 1.26 X10(3)/MCL (ref 0.6–4.6)
LYMPHOCYTES NFR BLD AUTO: 17.7 %
MAGNESIUM SERPL-MCNC: 2.4 MG/DL (ref 1.6–2.6)
MCH RBC QN AUTO: 29.5 PG
MCHC RBC AUTO-ENTMCNC: 30.4 MG/DL (ref 33–36)
MCV RBC AUTO: 96.9 FL (ref 80–94)
MONOCYTES # BLD AUTO: 0.6 X10(3)/MCL (ref 0.1–1.3)
MONOCYTES NFR BLD AUTO: 8.4 %
NEUTROPHILS # BLD AUTO: 5.09 X10(3)/MCL (ref 2.1–9.2)
NEUTROPHILS NFR BLD AUTO: 71.3 %
NRBC BLD AUTO-RTO: 0 %
PHOSPHATE SERPL-MCNC: 3.3 MG/DL (ref 2.3–4.7)
PLATELET # BLD AUTO: 173 X10(3)/MCL (ref 130–400)
PMV BLD AUTO: 10.7 FL (ref 7.4–10.4)
POCT GLUCOSE: 161 MG/DL (ref 70–110)
POCT GLUCOSE: 219 MG/DL (ref 70–110)
POCT GLUCOSE: 64 MG/DL (ref 70–110)
POCT GLUCOSE: 68 MG/DL (ref 70–110)
POCT GLUCOSE: 84 MG/DL (ref 70–110)
POCT GLUCOSE: 87 MG/DL (ref 70–110)
POTASSIUM SERPL-SCNC: 4.3 MMOL/L (ref 3.5–5.1)
PROT SERPL-MCNC: 6.2 GM/DL (ref 5.8–7.6)
RBC # BLD AUTO: 2.95 X10(6)/MCL (ref 4.2–5.4)
SODIUM SERPL-SCNC: 139 MMOL/L (ref 136–145)
UNIT NUMBER: NORMAL
UNIT NUMBER: NORMAL
WBC # SPEC AUTO: 7.1 X10(3)/MCL (ref 4.5–11.5)

## 2023-01-20 PROCEDURE — 37000009 HC ANESTHESIA EA ADD 15 MINS: Performed by: INTERNAL MEDICINE

## 2023-01-20 PROCEDURE — 37000008 HC ANESTHESIA 1ST 15 MINUTES: Performed by: INTERNAL MEDICINE

## 2023-01-20 PROCEDURE — 87340 HEPATITIS B SURFACE AG IA: CPT | Performed by: NURSE PRACTITIONER

## 2023-01-20 PROCEDURE — 83735 ASSAY OF MAGNESIUM: CPT

## 2023-01-20 PROCEDURE — 94640 AIRWAY INHALATION TREATMENT: CPT

## 2023-01-20 PROCEDURE — 85730 THROMBOPLASTIN TIME PARTIAL: CPT | Performed by: STUDENT IN AN ORGANIZED HEALTH CARE EDUCATION/TRAINING PROGRAM

## 2023-01-20 PROCEDURE — 25000003 PHARM REV CODE 250: Performed by: SPECIALIST

## 2023-01-20 PROCEDURE — 93005 ELECTROCARDIOGRAM TRACING: CPT

## 2023-01-20 PROCEDURE — 25000242 PHARM REV CODE 250 ALT 637 W/ HCPCS

## 2023-01-20 PROCEDURE — 86706 HEP B SURFACE ANTIBODY: CPT | Performed by: STUDENT IN AN ORGANIZED HEALTH CARE EDUCATION/TRAINING PROGRAM

## 2023-01-20 PROCEDURE — 63600175 PHARM REV CODE 636 W HCPCS: Performed by: NURSE ANESTHETIST, CERTIFIED REGISTERED

## 2023-01-20 PROCEDURE — 11000001 HC ACUTE MED/SURG PRIVATE ROOM

## 2023-01-20 PROCEDURE — 84100 ASSAY OF PHOSPHORUS: CPT

## 2023-01-20 PROCEDURE — 36415 COLL VENOUS BLD VENIPUNCTURE: CPT

## 2023-01-20 PROCEDURE — 25000003 PHARM REV CODE 250

## 2023-01-20 PROCEDURE — 94761 N-INVAS EAR/PLS OXIMETRY MLT: CPT

## 2023-01-20 PROCEDURE — 80100014 HC HEMODIALYSIS 1:1

## 2023-01-20 PROCEDURE — 63600175 PHARM REV CODE 636 W HCPCS

## 2023-01-20 PROCEDURE — 63600175 PHARM REV CODE 636 W HCPCS: Performed by: NURSE PRACTITIONER

## 2023-01-20 PROCEDURE — 43235 EGD DIAGNOSTIC BRUSH WASH: CPT | Performed by: INTERNAL MEDICINE

## 2023-01-20 PROCEDURE — 80053 COMPREHEN METABOLIC PANEL: CPT

## 2023-01-20 PROCEDURE — 25000003 PHARM REV CODE 250: Performed by: STUDENT IN AN ORGANIZED HEALTH CARE EDUCATION/TRAINING PROGRAM

## 2023-01-20 PROCEDURE — 85610 PROTHROMBIN TIME: CPT | Performed by: STUDENT IN AN ORGANIZED HEALTH CARE EDUCATION/TRAINING PROGRAM

## 2023-01-20 PROCEDURE — 85025 COMPLETE CBC W/AUTO DIFF WBC: CPT

## 2023-01-20 PROCEDURE — P9016 RBC LEUKOCYTES REDUCED: HCPCS

## 2023-01-20 RX ORDER — PROPOFOL 10 MG/ML
INJECTION, EMULSION INTRAVENOUS
Status: DISCONTINUED | OUTPATIENT
Start: 2023-01-20 | End: 2023-01-20

## 2023-01-20 RX ORDER — LIDOCAINE HCL/PF 100 MG/5ML
SYRINGE (ML) INTRAVENOUS
Status: DISCONTINUED | OUTPATIENT
Start: 2023-01-20 | End: 2023-01-20

## 2023-01-20 RX ORDER — SODIUM CHLORIDE 9 MG/ML
INJECTION, SOLUTION INTRAVENOUS CONTINUOUS
Status: CANCELLED | OUTPATIENT
Start: 2023-01-20

## 2023-01-20 RX ORDER — IPRATROPIUM BROMIDE AND ALBUTEROL SULFATE 2.5; .5 MG/3ML; MG/3ML
3 SOLUTION RESPIRATORY (INHALATION) EVERY 6 HOURS
Status: DISCONTINUED | OUTPATIENT
Start: 2023-01-20 | End: 2023-01-22 | Stop reason: HOSPADM

## 2023-01-20 RX ORDER — HEPARIN SODIUM,PORCINE/D5W 25000/250
0-40 INTRAVENOUS SOLUTION INTRAVENOUS CONTINUOUS
Status: DISCONTINUED | OUTPATIENT
Start: 2023-01-21 | End: 2023-01-21

## 2023-01-20 RX ORDER — LIDOCAINE HYDROCHLORIDE 10 MG/ML
1 INJECTION, SOLUTION EPIDURAL; INFILTRATION; INTRACAUDAL; PERINEURAL ONCE
Status: CANCELLED | OUTPATIENT
Start: 2023-01-20 | End: 2023-01-20

## 2023-01-20 RX ORDER — MUPIROCIN 20 MG/G
OINTMENT TOPICAL 2 TIMES DAILY
Status: DISCONTINUED | OUTPATIENT
Start: 2023-01-20 | End: 2023-01-22 | Stop reason: HOSPADM

## 2023-01-20 RX ORDER — HEPARIN SODIUM,PORCINE/D5W 25000/250
0-40 INTRAVENOUS SOLUTION INTRAVENOUS CONTINUOUS
Status: DISCONTINUED | OUTPATIENT
Start: 2023-01-20 | End: 2023-01-20

## 2023-01-20 RX ADMIN — IPRATROPIUM BROMIDE AND ALBUTEROL SULFATE 3 ML: 2.5; .5 SOLUTION RESPIRATORY (INHALATION) at 01:01

## 2023-01-20 RX ADMIN — FLUTICASONE FUROATE AND VILANTEROL TRIFENATATE 1 PUFF: 100; 25 POWDER RESPIRATORY (INHALATION) at 08:01

## 2023-01-20 RX ADMIN — INSULIN ASPART 1 UNITS: 100 INJECTION, SOLUTION INTRAVENOUS; SUBCUTANEOUS at 09:01

## 2023-01-20 RX ADMIN — FERROUS SULFATE TAB 325 MG (65 MG ELEMENTAL FE) 1 EACH: 325 (65 FE) TAB at 09:01

## 2023-01-20 RX ADMIN — ATORVASTATIN CALCIUM 40 MG: 40 TABLET, FILM COATED ORAL at 09:01

## 2023-01-20 RX ADMIN — IPRATROPIUM BROMIDE AND ALBUTEROL SULFATE 3 ML: 2.5; .5 SOLUTION RESPIRATORY (INHALATION) at 09:01

## 2023-01-20 RX ADMIN — DEXTROSE MONOHYDRATE 200 ML: 100 INJECTION, SOLUTION INTRAVENOUS at 05:01

## 2023-01-20 RX ADMIN — MUPIROCIN: 20 OINTMENT TOPICAL at 08:01

## 2023-01-20 RX ADMIN — CARVEDILOL 6.25 MG: 3.12 TABLET, FILM COATED ORAL at 08:01

## 2023-01-20 RX ADMIN — PANTOPRAZOLE SODIUM 40 MG: 40 TABLET, DELAYED RELEASE ORAL at 09:01

## 2023-01-20 RX ADMIN — PROPOFOL 50 MG: 10 INJECTION, EMULSION INTRAVENOUS at 05:01

## 2023-01-20 RX ADMIN — Medication 50 MG: at 05:01

## 2023-01-20 RX ADMIN — INSULIN DETEMIR 30 UNITS: 100 INJECTION, SOLUTION SUBCUTANEOUS at 09:01

## 2023-01-20 RX ADMIN — LEVOTHYROXINE SODIUM 125 MCG: 0.1 TABLET ORAL at 05:01

## 2023-01-20 RX ADMIN — AMLODIPINE BESYLATE 5 MG: 5 TABLET ORAL at 09:01

## 2023-01-20 RX ADMIN — IPRATROPIUM BROMIDE AND ALBUTEROL SULFATE 3 ML: 2.5; .5 SOLUTION RESPIRATORY (INHALATION) at 08:01

## 2023-01-20 RX ADMIN — Medication 6 MG: at 08:01

## 2023-01-20 RX ADMIN — SEVELAMER CARBONATE 1600 MG: 800 TABLET, FILM COATED ORAL at 08:01

## 2023-01-20 RX ADMIN — ERYTHROPOIETIN 10000 UNITS: 10000 INJECTION, SOLUTION INTRAVENOUS; SUBCUTANEOUS at 01:01

## 2023-01-20 RX ADMIN — CARVEDILOL 6.25 MG: 3.12 TABLET, FILM COATED ORAL at 09:01

## 2023-01-20 NOTE — MEDICAL/APP STUDENT
History and Physical     Date of Admit: 1/19/2023  Current Hospital Day: 1     Subjective:      Brief HPI:  Rosette Madrid is a 77 y.o. female who has PMH of ESRD on dialysis (per T/TH/S schedule with last session on Tuesday), DM, HFpEF (EF 50-55% Dec 2021), CAD (s/p CABG prior to 2005), HTN, HLD, COPD (Gold stage II, PFTs in 2015), and hypothyroid (s/p thyroidectomy). She presented to [facility] ED on 1/19/2023 after receiving a call from dialysis center ([facility]) this am advising her to report to ED for critical H/H. Pt's son states the dialysis center gets monthly labs, and they have seen steady decline in H/H. Pt denies weakness, fatigue or syncope. However, she does report dark stools for the past few months, which she thinks is caused by her iron supplement. Her last colonoscopy was on 7/21/2021, which revealed multiple polyps in the transverse, descending, and sigmoid colon. She also had EGD 12/31/2021 revealed a single small no bleeding angioectasia in the gastric antrum status post successful hemostasis with/APC, normal esophagus and duodenum, and small hiatal hernia.     Pt's VSS in ED. Labs significant for H/H of 6.6/22.3, K+ 4.2, Mag 2.3, BUN 34.9 and Cr 5.34. Phosphate pending. RICKY performed and guaiac stool positive. CXR revealed no acute cardiopulmonary abnormalities. EKG demonstrated a-fib with LBBB. Pt denies chest pain or palpitations.    Past Medical History:   Diagnosis Date    CKD (chronic kidney disease) requiring chronic dialysis     COPD (chronic obstructive pulmonary disease)     Diabetes mellitus     Hyperlipidemia     Hypertension     Renal insufficiency     Thyroid disease        Past Surgical History:   Procedure Laterality Date    AV FISTULA PLACEMENT Left     CARDIAC CATHETERIZATION      COLONOSCOPY      CORONARY ARTERY BYPASS GRAFT      ESOPHAGOGASTRODUODENOSCOPY      POLYPECTOMY      THYROIDECTOMY      TUBAL LIGATION         Review of patient's allergies indicates:   Allergen Reactions  "   Lisinopril Swelling    Azithromycin      Other reaction(s): tongue/facial swelling    Baclofen      Other reaction(s): tongue/facial swelling    Doxycycline      Other reaction(s): Angioedema       Current Outpatient Medications   Medication Instructions    albuterol (VENTOLIN HFA) 90 mcg/actuation inhaler 2 puffs, Inhalation, Every 6 hours PRN, Rescue    albuterol-ipratropium (DUO-NEB) 2.5 mg-0.5 mg/3 mL nebulizer solution 3 mLs, Nebulization, Every 6 hours PRN, Rescue    amLODIPine (NORVASC) 5 mg, Oral, Daily    aspirin (ECOTRIN) 81 mg, Oral, Daily    BASAGLAR KWIKPEN U-100 INSULIN 30 Units, Subcutaneous, Daily    BD ULTRA-FINE MINI PEN NEEDLE 31 gauge x 3/16" Ndle USE AS DIRECTED TWICE A DAY    BenadryL 50 mg, Oral, Nightly    bisacodyL (DULCOLAX) 5 mg, Oral, Daily PRN    carvediloL (COREG) 6.25 mg, Oral, 2 times daily    clonazePAM (KLONOPIN) 0.5 mg, Oral, Daily PRN    DIALYVITE 100-1 mg Tab 1 tablet, Oral, Daily    DULoxetine (CYMBALTA) 30 mg, Oral, Daily    fluticasone furoate-vilanteroL (BREO) 100-25 mcg/dose diskus inhaler 1 puff, Inhalation, Daily    furosemide (LASIX) 40 mg, Oral, Daily    glipiZIDE (GLUCOTROL) 10 mg, Oral, With breakfast    INVEGA SUSTENNA 156 mg/mL Syrg injection Intramuscular    L-METHYLFOLATE 15 mg Tab 1 tablet, Oral, Daily    levothyroxine (SYNTHROID) 125 mcg, Oral, Before breakfast    ONETOUCH DELICA PLUS LANCET 30 gauge Misc 1 lancet, Other, Daily    ONETOUCH ULTRA TEST Strp 1 strip, Other, Daily    pen needle, diabetic 2 Units, Misc.(Non-Drug; Combo Route), 2 times daily, Patient to check blood sugars twice daily (morning and night)    rosuvastatin (CRESTOR) 40 mg, Oral, Daily    sevelamer carbonate (RENVELA) 1,600 mg, Oral, 3 times daily    SPIRIVA WITH HANDIHALER 18 mcg, Inhalation, Daily    tiZANidine (ZANAFLEX) 4 mg, Oral, Every 8 hours PRN        Family History       Problem Relation (Age of Onset)    Hypertension Mother, Father, Sister, Sister            Tobacco Use    " Smoking status: Every Day     Packs/day: 0.25     Types: Cigarettes    Smokeless tobacco: Never    Tobacco comments:     Smokes 2 cigarettes a day   Substance and Sexual Activity    Alcohol use: Yes     Comment: 1 glass every 2-3 month    Drug use: Not Currently    Sexual activity: Not Currently        Review of Systems:  Review of Systems   Constitutional:  Negative for chills, fever and malaise/fatigue.   HENT:  Negative for congestion and sore throat.    Respiratory:  Positive for cough (cough after smoking) and wheezing. Negative for shortness of breath.    Cardiovascular:  Positive for leg swelling. Negative for chest pain, palpitations and orthopnea.   Gastrointestinal:  Positive for melena. Negative for abdominal pain, nausea and vomiting.   Genitourinary:  Negative for dysuria.   Musculoskeletal:  Negative for myalgias.   Neurological:  Negative for seizures, weakness and headaches.        Objective:     Vital Signs:  Vital Signs (Most Recent):  Temp: 97 °F (36.1 °C) (01/19/23 1140)  Pulse: 67 (01/19/23 1806)  Resp: 18 (01/19/23 1459)  BP: 124/66 (01/19/23 1459)  SpO2: 99 % (01/19/23 1309) Vital Signs (24h Range):  Temp:  [97 °F (36.1 °C)] 97 °F (36.1 °C)  Pulse:  [61-67] 67  Resp:  [18] 18  SpO2:  [99 %] 99 %  BP: ()/(53-66) 124/66   Body mass index is 37.16 kg/m².   No intake or output data in the 24 hours ending 01/19/23 1829    Physical Examination:  General:  Well developed, well nourished, no acute distress  Head: Normocephalic, atraumatic  ENT: PERRL, MMM.  Neck: supple, normal ROM  CVS:  regular rate, irregular rhythm. S1 and S2 normal. No murmurs, rubs, or gallops. Regular peripheral pulses. +1 edema to BLE  Resp:Normal respiratory effort.Wheezing noted throughout lung fields. No crackles.  GI:  Abdomen soft, non-tender, non-distended, normoactive bowel sounds  MSK:  Full range of motion, no obvious deformities. Fistula to left forearm with palpable thrill  Skin:  No rashes, ulcers,  erythema  Neuro:  Alert and oriented x3, No focal neuro deficits, CNII-XII grossly intact  Psych:  Appropriate mood and affect     Laboratory:    Recent Labs   Lab 01/19/23  1426   WBC 8.0   HGB 6.6*   HCT 22.3*      .3*   RDW 17.0     Recent Labs   Lab 01/19/23  1633   TROPONINI 0.071*     Recent Labs   Lab 01/19/23  1633   TROPONINI 0.071*     No results for input(s): CHOL, HDL, LDLCALC, TRIG, CHOLHDL in the last 168 hours. Recent Labs   Lab 01/19/23  1426      K 4.2   CO2 29   BUN 34.9*   CREATININE 5.34*   CALCIUM 9.7   MG 2.30     Recent Labs   Lab 01/19/23  1426   ALBUMIN 3.2*   BILITOT 0.3   AST 21   ALKPHOS 80   ALT 24     No results for input(s): IRON, TIBC, FERRITIN, SATURATEDIRO, [id number], FOLATE in the last 168 hours.  No results for input(s): TSH, S5TGVXW, HGBA1C, INR, PROTIME, PTT in the last 168 hours.       Microbiology Data:  Microbiology Results (last 7 days)       ** No results found for the last 168 hours. **             Other Results:    Radiology:  Imaging Results              X-Ray Chest 1 View (Final result)  Result time 01/19/23 17:47:25      Final result by Reinier Owen MD (01/19/23 17:47:25)                   Impression:      No acute pulmonary process identified.      Electronically signed by: Reinier Owen  Date:    01/19/2023  Time:    17:47               Narrative:    EXAMINATION:  XR CHEST 1 VIEW    CLINICAL HISTORY:  coarse breath sounds;    TECHNIQUE:  Frontal view(s) of the chest.    COMPARISON:  Radiography 09/30/2022    FINDINGS:  Prior sternotomy.  Stable cardiac silhouette with similar tortuosity of the thoracic aorta.  The lungs are well-inflated.  Sites of mild scarring in the lower left lung similar since September.  No acute consolidation.  No significant pleural effusion or discernible pneumothorax.                                    X-Ray Chest 1 View    Result Date: 1/19/2023  No acute pulmonary process identified. Electronically signed  by: Reinier Owen Date:    01/19/2023 Time:    17:47       Current Medications:     Infusions:        Scheduled:   [START ON 1/20/2023] amLODIPine  5 mg Oral Daily    [START ON 1/20/2023] atorvastatin  40 mg Oral Daily    carvediloL  6.25 mg Oral BID    [START ON 1/20/2023] fluticasone furoate-vilanteroL  1 puff Inhalation Daily    insulin detemir U-100  30 Units Subcutaneous QHS    [START ON 1/20/2023] levothyroxine  125 mcg Oral Before breakfast    [START ON 1/20/2023] pantoprazole  40 mg Oral Daily    sevelamer carbonate  1,600 mg Oral TID    [START ON 1/20/2023] tiotropium bromide  2 puff Inhalation Daily         PRN:   sodium chloride    albuterol-ipratropium    dextrose 10%    dextrose 10%    glucagon (human recombinant)    glucose    glucose    insulin aspart U-100    melatonin    sodium chloride 0.9%        Antibiotics and Day Number of Therapy:  Antibiotics (From admission, onward)      None                 Assessment & Plan:     Macrocytic anemia  - H/H of 6.6/22.3  - ordered type and screen  - will transfuse 2U pRBCs  - hold anticoagulation and antiplatelet  - Colonoscopy 7/21/2021 revealed multiple polyps in the transverse, descending, and sigmoid colon. EGD 12/31/2021 revealed a single small no bleeding angioectasia in the gastric antrum status post successful hemostasis with/APC, normal esophagus and duodenum, and small hiatal hernia   - NPO after midnight, plan to consult GI services tomorrow     ESRD on HD (T/TH/S schedule)  - electrolytes within normal limits  - missed today's dialysis session  - no indication for urgent dialysis tonight  - plan to consult nephrology tomorrow for HD    HTN  CAD (s/p CABG x4)  HFpEF (EF of 50-55% Dec 2021)  New onset a-fib  - [id number] - 7  - HAS-BLED - 4   - continue home amlodipine 5mg, coreg 6.25 BID, and atorvastatin 40mg    Hypothyroidism (s/p thyroidectomy)  - continue levothyroxine 125mcg    COPD (Gold stage II with last)  - continue home breathing  treatments  - duonebs q6h prn  - encourage incentive spirometry    DM  - last A1C 6.0  - continue home basal insulin 30u  - low dose SSI      CODE STATUS: full code  Access: peripheral, left forearm AV fistula  Antibiotics: none  Diet: Renal on dialysis + cardiac; NPO at midnight in anticipation of GI intervention  DVT Prophylaxis: SCD and NADINE hose  GI Prophylaxis: Protonix  Fluids: none      Disposition: Pt admitted to inpatient for anemia less than 7, ESRD (on HD), new onset a-fib. Pending transfusion of 2u pRBCs and HD tomorrow. Plan to consult GI tomorrow for intervention.      Trupti España, MS4

## 2023-01-20 NOTE — PROGRESS NOTES
"Inpatient Nutrition Evaluation    Admit Date: 2023   Total duration of encounter: 1 day    Nutrition Recommendation/Prescription     Diabetic, Renal diet  Monitor Weights with HD  Continue Fe supplementation    Nutrition Assessment     Chart Review    Reason Seen: continuous nutrition monitoring    Malnutrition Screening Tool Results   Have you recently lost weight without trying?: No  Have you been eating poorly because of a decreased appetite?: No   MST Score: 0     Diagnosis:  Macrocytic Anemia, Melena, ESRD on HD, New onset Afib, HF, COPD, HTN, DM    Relevant Medical History: ESRD on HD, DM, HF, CAD, HTN, COPD, Hypothyroidism, Anemia, Dyslipidemia    Nutrition-Related Medications: Amlodipine, Fe Sulfate, Insulin, Protonix, Sevelamer    Nutrition-Related Labs:  23 -- H/H 8.7/28.6 L, Glu 91, K 4.3, BUN 42 H, Cr 5.4 H, Hgb A1C 5.8    Diet Order: Diet NPO  Oral Supplement Order: none  Appetite/Oral Intake: good/NPO  Factors Affecting Nutritional Intake: none identified  Food/Temple/Cultural Preferences: none reported  Food Allergies: none reported       Wound(s):   skin intact    Comments    23 -- Pt reports good appetite; no n/v; LBM ; H/H (L) - improved s/p PRBC, on FE supplementation; wts stable; BUN/Cr (H) - ESRD on HD    Anthropometrics    Height: 5' 3" (160 cm) Height Method: Stated  Last Weight: 95.1 kg (209 lb 12.3 oz) (23 1140) Weight Method: Standard Scale  BMI (Calculated): 37.2  BMI Classification: obese grade II (BMI 35-39.9)     Ideal Body Weight (IBW), Female: 115 lb     % Ideal Body Weight, Female (lb): 182.41 %                    Usual Body Weight (UBW), k.5 kg  % Usual Body Weight: 100.9     Usual Weight Provided By: patient    Wt Readings from Last 3 Encounters:   23 1140 95.1 kg (209 lb 12.3 oz)   22 1153 93.8 kg (206 lb 12.7 oz)   22 0949 94.2 kg (207 lb 9.6 oz)      Weight Change(s) Since Admission:  Admit Weight: 95.1 kg (209 lb 12.3 oz) " (01/19/23 7276)  stable    Patient Education    Not applicable.    Monitoring & Evaluation     Dietitian will monitor food and beverage intake, weight change, electrolyte/renal panel, glucose/endocrine profile, and gastrointestinal profile.  Nutrition Risk/Follow-Up: low (follow-up in 5-7 days)  Patients assigned 'low nutrition risk' status do not qualify for a full nutritional assessment but will be monitored and re-evaluated in a 5-7 day time period. Please consult if re-evaluation needed sooner.

## 2023-01-20 NOTE — PLAN OF CARE
Problem: Adult Inpatient Plan of Care  Goal: Plan of Care Review  1/20/2023 0340 by Nedra Llaa RN  Outcome: Ongoing, Progressing  1/20/2023 0340 by Nedra Lala RN  Outcome: Ongoing, Progressing  Goal: Patient-Specific Goal (Individualized)  1/20/2023 0340 by Nedra Lala RN  Outcome: Ongoing, Progressing  1/20/2023 0340 by Nedra Lala RN  Outcome: Ongoing, Progressing  Goal: Absence of Hospital-Acquired Illness or Injury  1/20/2023 0340 by Nedra Lala RN  Outcome: Ongoing, Progressing  1/20/2023 0340 by Nedra Lala RN  Outcome: Ongoing, Progressing  Goal: Optimal Comfort and Wellbeing  1/20/2023 0340 by Nedra Lala RN  Outcome: Ongoing, Progressing  1/20/2023 0340 by Nedra Lala RN  Outcome: Ongoing, Progressing  Goal: Readiness for Transition of Care  1/20/2023 0340 by Nedra Lala RN  Outcome: Ongoing, Progressing  1/20/2023 0340 by Nedra Lala RN  Outcome: Ongoing, Progressing     Problem: Diabetes Comorbidity  Goal: Blood Glucose Level Within Targeted Range  1/20/2023 0340 by Nedra Lala RN  Outcome: Ongoing, Progressing  1/20/2023 0340 by Nedra Lala RN  Outcome: Ongoing, Progressing     Problem: Device-Related Complication Risk (Hemodialysis)  Goal: Safe, Effective Therapy Delivery  1/20/2023 0340 by Nedra Lala RN  Outcome: Ongoing, Progressing  1/20/2023 0340 by Nedra Lala RN  Outcome: Ongoing, Progressing     Problem: Hemodynamic Instability (Hemodialysis)  Goal: Effective Tissue Perfusion  1/20/2023 0340 by Nedra Lala RN  Outcome: Ongoing, Progressing  1/20/2023 0340 by Nedra Lala RN  Outcome: Ongoing, Progressing     Problem: Infection (Hemodialysis)  Goal: Absence of Infection Signs and Symptoms  1/20/2023 0340 by Nedra Lala RN  Outcome: Ongoing, Progressing  1/20/2023 0340 by Nedra Lala RN  Outcome: Ongoing, Progressing     Problem: Anemia  Goal: Anemia Symptom Improvement  1/20/2023 0340 by Nedra Lala RN  Outcome: Ongoing, Progressing  1/20/2023 0340 by  Nedra Lala RN  Outcome: Ongoing, Progressing

## 2023-01-20 NOTE — H&P
[facility] Medicine Wards   History & Physical Note     Resident Team: [facility] Medicine List 2  Attending Physician: Trvaon Nichole MD  Resident: Margarito Pace DO  Intern: Matias Gee MD     Date of Admit: 1/19/2023    Chief Complaint:     Anemia of less than 7 noted on labs by Christophe, was sent to the ED prior to dialysis being done    Subjective:      History of Present Illness:  Rosette Madrid is a 77 y.o. female who with a history of CAD status post CABG X 4 prior to 2005, ESRD on HD (T-TH-SAT), hypertension, diabetes mellitus, heart failure with a preserved ejection fraction (50-55% 12/2021), COPD (last PFTs in 2015 with gold stage II), hypothyroidism status post thyroidectomy, history of melena status post EGD 12/31/2021 who presented to [facility] ED on 1/19/2023  with complaint of asymptomatic anemia, melena.    Patient has been receiving dialysis Tuesday, Thursday, Saturday as scheduled.  Patient does not complain of any issues at this time, however however prior to today's hemodialysis session, her son was called and informed that her hemoglobin was low which was noted to be a significant drop from previous hemoglobin of 11.4.  Per the son, dialysis center apparently gets labs once a month and has noted a drop each time, and informed that patient needs to go to the ED.    At the ED patient was noted to be normotensive, and not in distress.  H&H was 6.6/22.3, BUN and creatinine was 34.9/5.34 however rest of CMP was unremarkable for any sort of electrolyte abnormalities.  Guaiac stool positive after RICKY done.  Chest x-ray was unremarkable on read, may have some slight increase in vasculature and possible cephalization but no pleural effusions.  EKG also was noted to show in atrial fibrillation, with left bundle branch block.  No palpitations, or shortness of breath endorsed.  Medicine was consulted for admission for hemodialysis, transfusion, new onset atrial fibrillation and workup of possible GI bleed.     Patient  "states that during dialysis day she is much more swollen throughout however resolves after dialysis session.    Past Medical History:   has a past medical history of CKD (chronic kidney disease) requiring chronic dialysis, COPD (chronic obstructive pulmonary disease), Diabetes mellitus, Hyperlipidemia, Hypertension, Renal insufficiency, and Thyroid disease.     Past Surgical History:   has a past surgical history that includes Coronary artery bypass graft; Cardiac catheterization; AV fistula placement (Left); Esophagogastroduodenoscopy; Colonoscopy; Thyroidectomy; Polypectomy; and Tubal ligation.     Family History:  family history includes Hypertension in her father, mother, sister, and sister.     Social History:   reports that she has been smoking cigarettes. She has been smoking an average of .25 packs per day. She has never used smokeless tobacco. She reports current alcohol use. She reports that she does not currently use drugs.     Allergies:  is allergic to lisinopril, azithromycin, baclofen, and doxycycline.     Home Medications:  Prior to Admission medications    Medication Sig Start Date End Date Taking? Authorizing Provider   albuterol (VENTOLIN HFA) 90 mcg/actuation inhaler Inhale 2 puffs into the lungs every 6 (six) hours as needed for Wheezing. Rescue 11/23/22   JOSEFINA Lord   albuterol-ipratropium (DUO-NEB) 2.5 mg-0.5 mg/3 mL nebulizer solution Take 3 mLs by nebulization every 6 (six) hours as needed for Wheezing or Shortness of Breath. Rescue 12/19/22   JOSEFINA Lord   amLODIPine (NORVASC) 5 MG tablet Take 1 tablet (5 mg total) by mouth once daily. 12/5/22 12/5/23  YUE Balderas   aspirin (ECOTRIN) 81 MG EC tablet Take 1 tablet (81 mg total) by mouth once daily. 11/23/22   JOSEFINA Lord   BASAGLAR KWIKPEN U-100 INSULIN glargine 100 units/mL SubQ pen Inject 30 Units into the skin Daily. 9/30/22 12/29/22  JOSEFINA Lord   BD ULTRA-FINE MINI PEN NEEDLE 31 gauge x 3/16" " "Ndle USE AS DIRECTED TWICE A DAY 9/9/22   Historical Provider   BENADRYL 25 mg capsule Take 50 mg by mouth nightly. 2/23/22   Historical Provider   bisacodyL (DULCOLAX) 5 mg EC tablet Take 5 mg by mouth daily as needed for Constipation.    Historical Provider   carvediloL (COREG) 6.25 MG tablet Take 1 tablet (6.25 mg total) by mouth 2 (two) times daily. 12/5/22 12/5/23  YUE Balderas   clonazePAM (KLONOPIN) 0.5 MG tablet Take 0.5 mg by mouth daily as needed. 7/13/22   Historical Provider   DIALYVITE 100-1 mg Tab Take 1 tablet by mouth once daily. 8/13/22   Historical Provider   DULoxetine (CYMBALTA) 30 MG capsule Take 30 mg by mouth once daily. 7/27/22   Historical Provider   fluticasone furoate-vilanteroL (BREO) 100-25 mcg/dose diskus inhaler Inhale 1 puff into the lungs once daily. 9/30/22 12/29/22  JOSEFINA Lord   furosemide (LASIX) 40 MG tablet Take 1 tablet (40 mg total) by mouth once daily. 12/5/22 12/5/23  YUE Balderas   glipiZIDE (GLUCOTROL) 10 MG tablet Take 1 tablet (10 mg total) by mouth daily with breakfast. 12/12/22   JOSEFINA Lord   INVEGA SUSTENNA 156 mg/mL Syrg injection Inject into the muscle. 8/2/22   Historical Provider   L-METHYLFOLATE 15 mg Tab Take 1 tablet by mouth once daily. 8/31/22   Historical Provider   levothyroxine (SYNTHROID) 125 MCG tablet Take 1 tablet (125 mcg total) by mouth before breakfast. 12/12/22   JOSEFINA Lord   ONETOUCH DELICA PLUS LANCET 30 gauge Misc 1 lancet by Other route once daily. 5/9/22   JOSEFINA LordUCH ULTRA TEST Strp 1 strip by Other route once daily. 11/23/22   JOSEFINA Lord   pen needle, diabetic 31 gauge x 5/16" Ndle 2 Units by Misc.(Non-Drug; Combo Route) route 2 (two) times a day. Patient to check blood sugars twice daily (morning and night) 5/19/22   JOSEFINA Lord   rosuvastatin (CRESTOR) 40 MG Tab Take 1 tablet (40 mg total) by mouth once daily. 12/5/22 12/5/23  YUE Balderas "   sevelamer carbonate (RENVELA) 800 mg Tab Take 1,600 mg by mouth 3 (three) times daily. 9/27/22   Historical Provider   tiotropium (SPIRIVA WITH HANDIHALER) 18 mcg inhalation capsule Inhale 1 capsule (18 mcg total) into the lungs Daily. 9/30/22 12/29/22  JOSEFINA Lord   tiZANidine (ZANAFLEX) 4 MG tablet Take 4 mg by mouth every 8 (eight) hours as needed. 5/7/22   Historical Provider         Review of Systems:  Review of Systems   Constitutional:  Negative for chills, fever, malaise/fatigue and weight loss.   HENT:  Negative for nosebleeds.    Respiratory:  Positive for wheezing. Negative for cough and shortness of breath.    Cardiovascular:  Negative for chest pain and orthopnea.   Gastrointestinal:  Positive for melena. Negative for abdominal pain, blood in stool and vomiting.   Genitourinary:  Negative for flank pain and hematuria.   Skin:  Negative for rash.   Neurological:  Negative for dizziness.          Objective:     Vital Signs (Most Recent):  Temp: 97 °F (36.1 °C) (01/19/23 1140)  Pulse: 67 (01/19/23 1806)  Resp: 18 (01/19/23 1459)  BP: 124/66 (01/19/23 1459)  SpO2: 99 % (01/19/23 1309) Vital Signs (24h Range):  Temp:  [97 °F (36.1 °C)] 97 °F (36.1 °C)  Pulse:  [61-67] 67  Resp:  [18] 18  SpO2:  [99 %] 99 %  BP: ()/(53-66) 124/66       Physical Examination:  General: Well nourished w/o distress  HEENT: NC/AT; PERRL; nasal and oral mucosa moist and clear; no sinus tenderness  Neck: Full ROM; no lymphadenopathy  Pulm:  Wheezing noted throughout lung fields, more prominent bibasilarly, no crackles appreciated, breath sounds not diminished  CV: S1, S2 w/o murmurs or gallops; bilateral +2 edema on lower extremities  GI: Soft with normal bowel sounds in all quadrants, no masses on palpation  MSK: Full ROM of all extremities and spine w/o limitation or discomfort, left forearm AV fistula noted with palpable thrill  Derm: No rashes, abnormal bruising, or skin lesions  Neuro: AAOx4; motor/sensory  function intact  Psych: Cooperative; appropriate mood and affect    Laboratory:  Most Recent Data:  CBC:   Lab Results   Component Value Date    WBC 8.0 01/19/2023    HGB 6.6 (L) 01/19/2023    HCT 22.3 (L) 01/19/2023     01/19/2023    .3 (H) 01/19/2023    RDW 17.0 01/19/2023     BMP:   Lab Results   Component Value Date     01/19/2023    K 4.2 01/19/2023    CHLORIDE 102 01/19/2023    CO2 29 01/19/2023    BUN 34.9 (H) 01/19/2023    CREATININE 5.34 (H) 01/19/2023    GLUCOSE 180 (H) 01/19/2023    CALCIUM 9.7 01/19/2023     LFTs:   Lab Results   Component Value Date    ALBUMIN 3.2 (L) 01/19/2023    BILITOT 0.3 01/19/2023    BILIDIR <0.1 03/22/2022    IBILI >0.10 03/22/2022    AST 21 01/19/2023    ALKPHOS 80 01/19/2023    ALT 24 01/19/2023     Coags:   Lab Results   Component Value Date    INR 1.2 12/30/2021    PROTIME 14.6 (H) 12/30/2021    PTT >200.0 (AA) 12/30/2021     FLP:   Lab Results   Component Value Date    CHOL 117 09/30/2022    HDL 42 09/30/2022    LDL 62.00 09/30/2022    TRIG 66 09/30/2022     DM:   Lab Results   Component Value Date    HGBA1C 6.0 09/30/2022    HGBA1C 7.8 (H) 12/15/2021    HGBA1C 7.3 (H) 01/18/2021    CREATININE 5.34 (H) 01/19/2023     Thyroid:   Lab Results   Component Value Date    TSH 1.5540 12/15/2021    [id number] 1.13 12/15/2021     Anemia:   Lab Results   Component Value Date    IRON 44 (L) 01/19/2023    FERRITIN 1,446.58 03/21/2022    TRANS 202 01/19/2023    TIBC 241 (L) 01/19/2023    THBCHOEA83 728 12/30/2021    FOLATE 17.9 12/30/2021     Cardiac:   Lab Results   Component Value Date    TROPONINI 0.071 (H) 01/19/2023     (H) 01/12/2019    CPKMB 3.1 01/12/2019    .4 03/19/2022     Urinalysis:   Lab Results   Component Value Date    COLORU YELLOW 09/30/2021    PHUA 7.0 09/30/2021    CLARITYU Clear 02/14/2018    SPECGRAV 1.020 02/14/2018    NITRITE Negative 09/30/2021    KETONESU Negative 09/30/2021    UROBILINOGEN Normal 09/30/2021    WBCUA 0-2  09/30/2021       Trended Cardiac Data:  Recent Labs   Lab 01/19/23  1426 01/19/23  1633   TROPONINI 0.076* 0.071*          Other Results:  EKG (my interpretation):  Atrial fibrillation in RVR, with left bundle branch block    Radiology:  Imaging Results              X-Ray Chest 1 View (Final result)  Result time 01/19/23 17:47:25      Final result by Reinier Owen MD (01/19/23 17:47:25)                   Impression:      No acute pulmonary process identified.      Electronically signed by: Reinier Owen  Date:    01/19/2023  Time:    17:47               Narrative:    EXAMINATION:  XR CHEST 1 VIEW    CLINICAL HISTORY:  coarse breath sounds;    TECHNIQUE:  Frontal view(s) of the chest.    COMPARISON:  Radiography 09/30/2022    FINDINGS:  Prior sternotomy.  Stable cardiac silhouette with similar tortuosity of the thoracic aorta.  The lungs are well-inflated.  Sites of mild scarring in the lower left lung similar since September.  No acute consolidation.  No significant pleural effusion or discernible pneumothorax.                                         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, on hemodialysis (Tuesday, Thursday, Saturday)  - currently no indications for urgent dialysis  - did not get dialysis today, we will consult Nephrology tomorrow morning   - continue home phosphate binder    New onset atrial fibrillation  History of CAD status post CABG x4  Heart  failure with preserved ejection fraction (50-55% on last echo)  - [id number] - 7  - HAS-BLED - 4   -continue statin, we will hold aspirin in the setting possible bleed  -we will resume amlodipine, Coreg home doses  - LDL at goal 62 mg/dL  - she has both a high risk for bleeding as well as for stroke, we will hold anticoagulation for now though we will plan to start eventually    COPD, PFTs last done 2015 gold stage  - p.r.n. duo nebs Q 6 hours  - resume home inhalers    Hypertension  -we will resume home antihypertensives  - currently controlled    Diabetes mellitus  - A1c currently at 6.0  - resuming home basal insulin at 30 units  - low-dose sliding scale started        CODE STATUS: Full Code  Access: Peripheral, left forearm AV fistula  Diet: Renal, NPO at midnight for possible GI intervention tomorrow  DVT Prophylaxis: Teds/SCDs  GI Prophylaxis: proton pump inhibitor per orders  Fluids: none     Disposition: day 0 of admission for anemia less than 7, ESRD, new onset atrial fibrillation, pending transfusion, and hemodialysis tomorrow, consult GI for possible intervention    Matias Gee MD  [facility] PGY-1

## 2023-01-20 NOTE — PROGRESS NOTES
[facility] Medicine Wards   Progress Note     Resident Team: [facility] Medicine List 2  Attending Physician: Travon Nichole MD  Resident: Margarito Pace DO   Intern: Matias Gee MD   Hospital Length of Stay: 1 days    Subjective:      Brief HPI:  Rosette Madrid is a 77 y.o. female who with a history of CAD status post CABG X 4 prior to 2005, ESRD on HD (T-TH-SAT), hypertension, diabetes mellitus, heart failure with a preserved ejection fraction (50-55% 12/2021), COPD (last PFTs in 2015 with gold stage II), hypothyroidism status post thyroidectomy, history of melena status post EGD 12/31/2021 who presented to [facility] ED on 1/19/2023  with complaint of asymptomatic anemia, melena.     Patient has been receiving dialysis Tuesday, Thursday, Saturday as scheduled.  Patient does not complain of any issues at this time, however however prior to today's hemodialysis session, her son was called and informed that her hemoglobin was low which was noted to be a significant drop from previous hemoglobin of 11.4.  Per the son, dialysis center apparently gets labs once a month and has noted a drop each time, and informed that patient needs to go to the ED.     At the ED patient was noted to be normotensive, and not in distress.  H&H was 6.6/22.3, BUN and creatinine was 34.9/5.34 however rest of CMP was unremarkable for any sort of electrolyte abnormalities.  Guaiac stool positive after RICKY done.  Chest x-ray was unremarkable on read, may have some slight increase in vasculature and possible cephalization but no pleural effusions.  EKG also was noted to show in atrial fibrillation, with left bundle branch block.  No palpitations, or shortness of breath endorsed.  Medicine was consulted for admission for hemodialysis, transfusion, new onset atrial fibrillation and workup of possible GI bleed.      Patient states that during dialysis day she is much more swollen throughout however resolves after dialysis session.       Interval History:   Patient seen in a.m. doing well.  She has no complaints.  Currently NPO.  H&H responded well to transfusion from 6.6-8.7.  Nephrology was consulted plan for dialysis today.  GI was also consulted regarding melena, plan to do push enteroscopy today.  Cardiology also consulted in regards to decision-making for new onset atrial fibrillation.      Review of Systems:  Review of Systems   Constitutional:  Negative for chills, fever and weight loss.   HENT:  Negative for hearing loss.    Respiratory:  Negative for cough and shortness of breath.    Cardiovascular:  Positive for leg swelling. Negative for chest pain.   Gastrointestinal:  Positive for melena.   Neurological:  Negative for dizziness.        Objective:     Vital Signs (Most Recent):  Temp: 98 °F (36.7 °C) (01/20/23 1136)  Pulse: (!) 56 (01/20/23 1136)  Resp: 18 (01/20/23 0810)  BP: 124/69 (01/20/23 1136)  SpO2: 99 % (01/20/23 1136)   Vital Signs (24h Range):  Temp:  [97.3 °F (36.3 °C)-98.3 °F (36.8 °C)] 98 °F (36.7 °C)  Pulse:  [55-72] 56  Resp:  [16-24] 18  SpO2:  [95 %-100 %] 99 %  BP: (102-145)/(48-75) 124/69       Physical Examination:  Physical Exam  Constitutional:       General: She is not in acute distress.     Appearance: Normal appearance. She is obese.   HENT:      Head: Normocephalic.   Eyes:      Pupils: Pupils are equal, round, and reactive to light.   Cardiovascular:      Rate and Rhythm: Normal rate. Rhythm irregular.      Pulses: Normal pulses.   Pulmonary:      Effort: Pulmonary effort is normal.      Breath sounds: Normal breath sounds.   Abdominal:      General: Abdomen is flat.      Palpations: Abdomen is soft.      Tenderness: There is no abdominal tenderness.   Musculoskeletal:         General: Swelling present. Normal range of motion.   Skin:     General: Skin is warm.      Capillary Refill: Capillary refill takes less than 2 seconds.   Neurological:      General: No focal deficit present.      Mental Status: She is alert and oriented to  person, place, and time.   Psychiatric:         Mood and Affect: Mood normal.         Behavior: Behavior normal.         Thought Content: Thought content normal.         Laboratory:  Most Recent Data:  CBC:   Lab Results   Component Value Date    WBC 7.1 01/20/2023    HGB 8.7 (L) 01/20/2023    HCT 28.6 (L) 01/20/2023     01/20/2023    MCV 96.9 (H) 01/20/2023    RDW 18.2 (H) 01/20/2023     BMP:   Lab Results   Component Value Date     01/20/2023    K 4.3 01/20/2023    CHLORIDE 105 01/20/2023    CO2 24 01/20/2023    BUN 42.0 (H) 01/20/2023    CREATININE 5.46 (H) 01/20/2023    GLUCOSE 91 01/20/2023    CALCIUM 9.4 01/20/2023     LFTs:   Lab Results   Component Value Date    ALBUMIN 3.0 (L) 01/20/2023    BILITOT 0.3 01/20/2023    BILIDIR <0.1 03/22/2022    IBILI >0.10 03/22/2022    AST 23 01/20/2023    ALKPHOS 89 01/20/2023    ALT 25 01/20/2023     Coags:   Lab Results   Component Value Date    INR 1.03 01/19/2023    PROTIME 13.3 01/19/2023    PTT 38.4 01/19/2023     FLP:   Lab Results   Component Value Date    CHOL 117 09/30/2022    HDL 42 09/30/2022    LDL 62.00 09/30/2022    TRIG 66 09/30/2022     DM:   Lab Results   Component Value Date    HGBA1C 5.8 01/19/2023    HGBA1C 6.0 09/30/2022    HGBA1C 7.8 (H) 12/15/2021    CREATININE 5.46 (H) 01/20/2023     Thyroid:   Lab Results   Component Value Date    TSH 1.602 01/19/2023    [id number] 1.13 12/15/2021     Anemia:   Lab Results   Component Value Date    IRON 44 (L) 01/19/2023    FERRITIN 1,461.24 (H) 01/19/2023    TRANS 202 01/19/2023    TRANS 202 01/19/2023    TIBC 241 (L) 01/19/2023    [id number] 817 (H) 01/19/2023    FOLATE 15.0 01/19/2023     Cardiac:   Lab Results   Component Value Date    TROPONINI 0.071 (H) 01/19/2023     (H) 01/12/2019    CPKMB 3.1 01/12/2019    .4 03/19/2022     Urinalysis:   Lab Results   Component Value Date    COLORU YELLOW 09/30/2021    PHUA 7.0 09/30/2021    CLARITYU Clear 02/14/2018    SPECGRAV 1.020 02/14/2018     NITRITE Negative 09/30/2021    KETONESU Negative 09/30/2021    UROBILINOGEN Normal 09/30/2021    WBCUA 0-2 09/30/2021       Trended Cardiac Data:  Recent Labs   Lab 01/19/23  1426 01/19/23  1633   TROPONINI 0.076* 0.071*       Radiology:  Imaging Results              X-Ray Chest 1 View (Final result)  Result time 01/19/23 17:47:25      Final result by Reinier Owen MD (01/19/23 17:47:25)                   Impression:      No acute pulmonary process identified.      Electronically signed by: Reinier Owen  Date:    01/19/2023  Time:    17:47               Narrative:    EXAMINATION:  XR CHEST 1 VIEW    CLINICAL HISTORY:  coarse breath sounds;    TECHNIQUE:  Frontal view(s) of the chest.    COMPARISON:  Radiography 09/30/2022    FINDINGS:  Prior sternotomy.  Stable cardiac silhouette with similar tortuosity of the thoracic aorta.  The lungs are well-inflated.  Sites of mild scarring in the lower left lung similar since September.  No acute consolidation.  No significant pleural effusion or discernible pneumothorax.                                      Current Medications:     Infusions:       Scheduled:   albuterol-ipratropium  3 mL Nebulization Q6H    amLODIPine  5 mg Oral Daily    atorvastatin  40 mg Oral Daily    carvediloL  6.25 mg Oral BID    ferrous sulfate  1 tablet Oral Every other day    fluticasone furoate-vilanteroL  1 puff Inhalation Daily    insulin detemir U-100  30 Units Subcutaneous QHS    levothyroxine  125 mcg Oral Before breakfast    pantoprazole  40 mg Oral Daily    sevelamer carbonate  1,600 mg Oral TID        PRN:  sodium chloride, dextrose 10%, dextrose 10%, glucagon (human recombinant), glucose, glucose, insulin aspart U-100, melatonin, sodium chloride 0.9%      Intake/Output Summary (Last 24 hours) at 1/20/2023 1306  Last data filed at 1/20/2023 0438  Gross per 24 hour   Intake 874.17 ml   Output 0 ml   Net 874.17 ml       Lines/Drains/Airways       Drain  Duration                   Hemodialysis AV Fistula Left forearm -- days              Peripheral Intravenous Line  Duration                  Peripheral IV - Single Lumen 01/19/23 1915 20 G Right Antecubital <1 day                      Assessment & Plan:   Macrocytic anemia  Melena - upper GI bleed versus iron supplementation discoloration versus possible malignancy  - holding all anticoagulation, antiplatelet for possible bleed  - noted EGD last done 12/2021 showing a single small nonbleeding angio ectasia in the gastric antrum which was successfully controlled with hemostasis/APC  - colonoscopy done 07/21/2021 noted to have numerous polyps removed and to be redone in 3 years  - patient was transfused 2 units last night and H&H matilda 6.6-8.7  - GI consulted, plan is for push enteroscopy today, appreciate assistance  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, on hemodialysis (Tuesday, Thursday, Saturday)  - currently no indications for urgent dialysis  - nephrology consulted, plan is for dialysis today, appreciate assistance  - continue home phosphate binder     New onset atrial fibrillation  History of CAD status post CABG x4  Heart failure with preserved ejection fraction (50-55% on last echo)  - [id number] - 7  - HAS-BLED - 4   -continue statin, we will hold aspirin in the setting possible bleed  -we will resume amlodipine, Coreg home doses  - LDL at goal 62 mg/dL  - she has both a high risk for bleeding as well as for stroke, we will hold anticoagulation for now though we will plan to start eventually  - cardiology was consulted and decision-making for anticoagulation, appreciate recommendations     COPD, PFTs last done 2015 gold stage  -DuoNebs q.6 H  - resume home inhalers     Hypertension  -we will resume home antihypertensives  -currently controlled     Diabetes mellitus  - A1c this admission is 5.8  - resuming home basal insulin at 30 units  - low-dose sliding scale started           CODE STATUS: Full  Code  Access: Peripheral, left forearm AV fistula  Diet:  NPO at this time, can resume renal diet on dialysis if no problems status post enteroscopy  DVT Prophylaxis: Teds/SCDs  GI Prophylaxis: proton pump inhibitor per orders  Fluids: none     Disposition: day 1 of admission for  melena workup, hemodialysis, atrial fibrillation decision-making.  Continuing workup, anticipate discharge tomorrow if with no significant findings    Matias Gee MD  [employer] [facility] PGY-1

## 2023-01-20 NOTE — PROGRESS NOTES
01/20/23 1609        Hemodialysis AV Fistula Left forearm   No placement date or time found.   Present Prior to Hospital Arrival?: Yes  Location: Left forearm   Site Assessment Clean;Dry;Intact;No redness;No swelling   Patency Present;Thrill;Bruit   Status Deaccessed   Dressing Status Clean;Dry;Intact   Site Condition No complications   Dressing Gauze   Post-Hemodialysis Assessment   Blood Volume Processed (Liters) 58.9 L   Dialyzer Clearance Lightly streaked   Duration of Treatment 180 minutes   Additional Fluid Intake (mL) 500 mL   Total UF (mL) 1500 mL   Net Fluid Removal 1000   Patient Response to Treatment Pt tolerated well   Post-Hemodialysis Comments Tx complete, pt reinfused, avf deaccessed per p&p, hemostasis acheived, gauze and tape applied

## 2023-01-20 NOTE — CONSULTS
[facility] Consult Note     Cardiology  Provider: Bing Bender MD    Reason for Consult:      New onset atrial fibrillation with GI bleed    Subjective:     History of Present Illness:  Rosette Madrid is a 77 y.o. female who  has a past medical history of CKD (chronic kidney disease) requiring chronic dialysis, COPD (chronic obstructive pulmonary disease), Diabetes mellitus, Hyperlipidemia, Hypertension, Renal insufficiency, and Thyroid disease.. Cardiology is being consulted for question about anticoagulation in the setting of GI bleed. Patient recently presented to the hospital after receiving dialysis when she was found to have a low hemoglobin. Dialysis center at the time had noted that the patient's hemoglobin had been slowly dropping each time. In the ED, patient was normotensive and in no acute distress. She did report having dark stools recently but had also said she had been on ferrous sulfate. H/H at the time was 6.2/22.3. Guaiac stool test came back positive after RICKY. During this time, patient's EKG showed new onset atrial fibrillation. She denies any palpitations, SOB, dizziness or syncope. Cardiology was consulted on the timing of anticoagulation in the setting of suspected GI bleed.      Review of Systems:  Review of Systems   Constitutional:  Negative for chills and fever.   HENT:  Negative for congestion, sinus pain and sore throat.    Eyes:  Negative for blurred vision and double vision.   Respiratory:  Negative for cough, sputum production, shortness of breath and wheezing.    Cardiovascular:  Positive for leg swelling. Negative for chest pain and palpitations.   Gastrointestinal:  Positive for melena. Negative for abdominal pain, nausea and vomiting.   Genitourinary:  Negative for dysuria.   Musculoskeletal:  Negative for myalgias.   Neurological:  Negative for dizziness, focal weakness, seizures and weakness.       Past Medical History:   Patient Active Problem List   Diagnosis    Chronic  heart failure with preserved ejection fraction    Cigarette nicotine dependence without complication    Coronary artery disease involving coronary bypass graft of native heart with unstable angina pectoris    Chronic obstructive pulmonary disease    HTN (hypertension), benign    Mixed hyperlipidemia    Type 2 diabetes mellitus with chronic kidney disease on chronic dialysis, with long-term current use of insulin    ESRD (end stage renal disease) on dialysis    Depressive disorder    Class 1 obesity due to excess calories with serious comorbidity and body mass index (BMI) of 34.0 to 34.9 in adult    Vitamin D deficiency    Coronary artery disease involving native coronary artery of native heart without angina pectoris    Tobacco use    Acute anemia        Past Surgical History:  Past Surgical History:   Procedure Laterality Date    AV FISTULA PLACEMENT Left     CARDIAC CATHETERIZATION      COLONOSCOPY      CORONARY ARTERY BYPASS GRAFT      ESOPHAGOGASTRODUODENOSCOPY      POLYPECTOMY      THYROIDECTOMY      TUBAL LIGATION         Family History:  Family History   Problem Relation Age of Onset    Hypertension Mother     Hypertension Father     Hypertension Sister     Hypertension Sister        Social History:  Social History     Tobacco Use    Smoking status: Every Day     Packs/day: 0.25     Types: Cigarettes    Smokeless tobacco: Never    Tobacco comments:     Smokes 2 cigarettes a day   Substance Use Topics    Alcohol use: Yes     Comment: 1 glass every 2-3 month    Drug use: Not Currently       Allergies:  Review of patient's allergies indicates:   Allergen Reactions    Lisinopril Swelling    Azithromycin      Other reaction(s): tongue/facial swelling    Baclofen      Other reaction(s): tongue/facial swelling    Doxycycline      Other reaction(s): Angioedema       Home Medications:  Prior to Admission medications    Medication Sig Start Date End Date Taking? Authorizing Provider   albuterol (VENTOLIN HFA) 90  "mcg/actuation inhaler Inhale 2 puffs into the lungs every 6 (six) hours as needed for Wheezing. Rescue 11/23/22   JOSEFINA Lord   albuterol-ipratropium (DUO-NEB) 2.5 mg-0.5 mg/3 mL nebulizer solution Take 3 mLs by nebulization every 6 (six) hours as needed for Wheezing or Shortness of Breath. Rescue 12/19/22   JOSEFINA Lord   amLODIPine (NORVASC) 5 MG tablet Take 1 tablet (5 mg total) by mouth once daily. 12/5/22 12/5/23  YUE Balderas   aspirin (ECOTRIN) 81 MG EC tablet Take 1 tablet (81 mg total) by mouth once daily. 11/23/22   JOSEFINA Lord   BASAGLAR KWIKPEN U-100 INSULIN glargine 100 units/mL SubQ pen Inject 30 Units into the skin Daily. 9/30/22 12/29/22  JOSEFINA Lord   BD ULTRA-FINE MINI PEN NEEDLE 31 gauge x 3/16" Ndle USE AS DIRECTED TWICE A DAY 9/9/22   Historical Provider   BENADRYL 25 mg capsule Take 50 mg by mouth nightly. 2/23/22   Historical Provider   bisacodyL (DULCOLAX) 5 mg EC tablet Take 5 mg by mouth daily as needed for Constipation.    Historical Provider   carvediloL (COREG) 6.25 MG tablet Take 1 tablet (6.25 mg total) by mouth 2 (two) times daily. 12/5/22 12/5/23  YUE Balderas   clonazePAM (KLONOPIN) 0.5 MG tablet Take 0.5 mg by mouth daily as needed. 7/13/22   Historical Provider   DIALYVITE 100-1 mg Tab Take 1 tablet by mouth once daily. 8/13/22   Historical Provider   DULoxetine (CYMBALTA) 30 MG capsule Take 30 mg by mouth once daily. 7/27/22   Historical Provider   fluticasone furoate-vilanteroL (BREO) 100-25 mcg/dose diskus inhaler Inhale 1 puff into the lungs once daily. 9/30/22 12/29/22  JOSEFINA Lord   furosemide (LASIX) 40 MG tablet Take 1 tablet (40 mg total) by mouth once daily. 12/5/22 12/5/23  Elizabeth Mustafa, ANP   glipiZIDE (GLUCOTROL) 10 MG tablet Take 1 tablet (10 mg total) by mouth daily with breakfast. 12/12/22   Margarita Dior, JAIMEP   INVEGA SUSTENNA 156 mg/mL Syrg injection Inject into the muscle. 8/2/22   Historical " "Provider   L-METHYLFOLATE 15 mg Tab Take 1 tablet by mouth once daily. 8/31/22   Historical Provider   levothyroxine (SYNTHROID) 125 MCG tablet Take 1 tablet (125 mcg total) by mouth before breakfast. 12/12/22   JOSEFINA Lord   ONETOUCH DELICA PLUS LANCET 30 gauge Misc 1 lancet by Other route once daily. 5/9/22   JOSEFINA Lord   ONETOUCH ULTRA TEST Strp 1 strip by Other route once daily. 11/23/22   JOSEFINA Lord   pen needle, diabetic 31 gauge x 5/16" Ndle 2 Units by Misc.(Non-Drug; Combo Route) route 2 (two) times a day. Patient to check blood sugars twice daily (morning and night) 5/19/22   JOSEFINA Lord   rosuvastatin (CRESTOR) 40 MG Tab Take 1 tablet (40 mg total) by mouth once daily. 12/5/22 12/5/23  Elizabeth Mustafa, YUE   sevelamer carbonate (RENVELA) 800 mg Tab Take 1,600 mg by mouth 3 (three) times daily. 9/27/22   Historical Provider   tiotropium (SPIRIVA WITH HANDIHALER) 18 mcg inhalation capsule Inhale 1 capsule (18 mcg total) into the lungs Daily. 9/30/22 12/29/22  JOSEFINA Lord   tiZANidine (ZANAFLEX) 4 MG tablet Take 4 mg by mouth every 8 (eight) hours as needed. 5/7/22   Historical Provider          Objective:   Vitals  Vitals:    01/19/23 2145 01/19/23 2211 01/19/23 2225 01/19/23 2255   BP: 131/68 116/65 (!) 110/48 (!) 102/54   Pulse: 64 67 64 66   Resp:  18 16 18   Temp: 98 °F (36.7 °C) 98.3 °F (36.8 °C) 97.8 °F (36.6 °C) 97.3 °F (36.3 °C)   TempSrc: Oral Oral Oral Oral   SpO2: 99% 97% 97% 99%   Weight:       Height:        01/20/23 0035 01/20/23 0055 01/20/23 0110 01/20/23 0210   BP: 128/60 121/65 134/69 127/70   Pulse: 61 60 (!) 59 (!) 59   Resp: 18 18 18 18   Temp: 97.8 °F (36.6 °C) 98 °F (36.7 °C) 97.9 °F (36.6 °C) 97.6 °F (36.4 °C)   TempSrc: Oral Oral Oral Oral   SpO2: 99% 98% 97% 96%   Weight:       Height:        01/20/23 0328 01/20/23 0330 01/20/23 0520 01/20/23 0739   BP: 129/70 (!) 129/57 (!) 145/73 131/75   Pulse: 61 (!) 57 (!) 57 (!) 55   Resp: 18 18   "   Temp: 97.5 °F (36.4 °C) 97.5 °F (36.4 °C) 97.9 °F (36.6 °C) 98.2 °F (36.8 °C)   TempSrc: Oral Oral Oral Oral   SpO2: 96% 95% 98% 99%   Weight:       Height:        01/20/23 0804 01/20/23 0810 01/20/23 1136   BP:   124/69   Pulse: (!) 56 (!) 57 (!) 56   Resp: 18 18    Temp:   98 °F (36.7 °C)   TempSrc:   Oral   SpO2: 99% 100% 99%   Weight:      Height:           Physical Examination:  Physical Exam  Constitutional:       Appearance: Normal appearance.   HENT:      Head: Normocephalic and atraumatic.      Mouth/Throat:      Mouth: Mucous membranes are moist.      Pharynx: Oropharynx is clear.   Eyes:      Conjunctiva/sclera: Conjunctivae normal.      Pupils: Pupils are equal, round, and reactive to light.   Cardiovascular:      Rate and Rhythm: Normal rate. Rhythm irregular.      Pulses: Normal pulses.      Heart sounds: No murmur heard.  Pulmonary:      Effort: Pulmonary effort is normal. No respiratory distress.      Breath sounds: No wheezing.   Chest:      Chest wall: No tenderness.   Abdominal:      General: Abdomen is flat. Bowel sounds are normal. There is no distension.      Palpations: Abdomen is soft.      Tenderness: There is no abdominal tenderness.   Musculoskeletal:         General: Swelling present. Normal range of motion.      Cervical back: Normal range of motion.      Left lower leg: Edema present.   Skin:     General: Skin is warm.   Neurological:      General: No focal deficit present.      Mental Status: She is alert and oriented to person, place, and time.         Laboratory:  Most Recent Data:  CBC:   Lab Results   Component Value Date    WBC 7.1 01/20/2023    HGB 8.7 (L) 01/20/2023    HCT 28.6 (L) 01/20/2023     01/20/2023    MCV 96.9 (H) 01/20/2023    RDW 18.2 (H) 01/20/2023     WBC Differential:   Recent Labs   Lab 01/19/23  1426 01/20/23  0438   WBC 8.0 7.1   HGB 6.6* 8.7*   HCT 22.3* 28.6*    173   .3* 96.9*     BMP:   Lab Results   Component Value Date      01/20/2023    K 4.3 01/20/2023    CO2 24 01/20/2023    BUN 42.0 (H) 01/20/2023    CREATININE 5.46 (H) 01/20/2023    CALCIUM 9.4 01/20/2023    MG 2.40 01/20/2023    PHOS 3.3 01/20/2023     LFTs:   Lab Results   Component Value Date    ALBUMIN 3.0 (L) 01/20/2023    BILITOT 0.3 01/20/2023    AST 23 01/20/2023    ALKPHOS 89 01/20/2023    ALT 25 01/20/2023     Coags:   Lab Results   Component Value Date    INR 1.03 01/19/2023    PROTIME 13.3 01/19/2023    PTT 38.4 01/19/2023     FLP:   Lab Results   Component Value Date    CHOL 117 09/30/2022    HDL 42 09/30/2022    TRIG 66 09/30/2022     DM:   Lab Results   Component Value Date    HGBA1C 5.8 01/19/2023    HGBA1C 6.0 09/30/2022    HGBA1C 7.8 (H) 12/15/2021    CREATININE 5.46 (H) 01/20/2023     Thyroid:   Lab Results   Component Value Date    TSH 1.602 01/19/2023      Anemia:   Lab Results   Component Value Date    IRON 44 (L) 01/19/2023    TIBC 241 (L) 01/19/2023    FERRITIN 1,461.24 (H) 01/19/2023       Lab Results   Component Value Date    PZHQDOAU39 817 (H) 01/19/2023       Lab Results   Component Value Date    FOLATE 15.0 01/19/2023        Cardiac:   Lab Results   Component Value Date    TROPONINI 0.071 (H) 01/19/2023    .4 03/19/2022     Urinalysis:   Lab Results   Component Value Date    COLORU YELLOW 09/30/2021    PHUA 7.0 09/30/2021    CLARITYU Clear 02/14/2018    SPECGRAV 1.020 02/14/2018    NITRITE Negative 09/30/2021    KETONESU Negative 09/30/2021    UROBILINOGEN Normal 09/30/2021    WBCUA 0-2 09/30/2021       Trended Lab Data:  Recent Labs   Lab 01/19/23  1426 01/20/23  0438 01/20/23  0451   WBC 8.0 7.1  --    HGB 6.6* 8.7*  --    HCT 22.3* 28.6*  --     173  --    .3* 96.9*  --    RDW 17.0 18.2*  --      --  139   K 4.2  --  4.3   CO2 29  --  24   BUN 34.9*  --  42.0*   CREATININE 5.34*  --  5.46*   ALBUMIN 3.2*  --  3.0*   BILITOT 0.3  --  0.3   AST 21  --  23   ALKPHOS 80  --  89   ALT 24  --  25       Trended Cardiac  Data:  Recent Labs   Lab 01/19/23  1426 01/19/23  1633   TROPONINI 0.076* 0.071*       Microbiology Data:  Microbiology Results (last 7 days)       ** No results found for the last 168 hours. **               Radiology:  X-Ray Chest 1 View    Result Date: 1/19/2023  No acute pulmonary process identified. Electronically signed by: Reinier Owen Date:    01/19/2023 Time:    17:47       Assessment & Plan:   76 yo female with PMH of CAD status post CABG X 4 prior to 2005, ESRD on HD (T-TH-SAT), hypertension, diabetes mellitus, heart failure with a preserved ejection fraction (50-55% 12/2021), history of melena status post EGD 12/31/2021. She presented after being told she had a low hemoglobin while receiving her scheduled dialysis. Found to have hemoglobin 6.6/22.3 in the ED. During this time, patient also found to be in atrial fibrillation. She is currently rate controlled. Patient denies any chest pain, SOB, BLAS, or syncope. Cardiology was consulted for the timing of anticoagulation in the setting of new onset atrial fibrillation with suspected GI bleed.    Atrial fibrillation  Suspected GI bleed    -Presents to [facility] due to low hemoglobin; found to be 6.2 in the ED  -Currently in atrial fibrillation though is rate controlled  -Transfused 2 units pRBC yesterday with appropriate response hemoglobin 8.7  -GI has been consulted for possible scope  -[id number] score 7  -HAS-BLED score 4  -Awaiting results of GI scope to determine timing of anticoagulation  -Even in setting of history of GI bleed, would continue to recommend anticoagulation once source of bleeding is controlled  -Recommend starting patient on Heparin after GI scope if source of bleeding is controlled   -Patient can be swapped to Eliquis 5 mg BID on discharge      Juan Winters MD  [facility]

## 2023-01-20 NOTE — PLAN OF CARE
01/20/23 1421   Discharge Assessment   Assessment Type Discharge Planning Assessment   Confirmed/corrected address, phone number and insurance Yes   Confirmed Demographics Correct on Facesheet   Source of Information patient   When was your last doctors appointment?   (PCP: Margarita Dior)   Does patient/caregiver understand observation status   (Inpatient)   Communicated YUN with patient/caregiver Date not available/Unable to determine   Reason For Admission ESRD, occult blood in stools   People in Home child(shawn), adult   Facility Arrived From: House   Do you expect to return to your current living situation? Yes   Do you have help at home or someone to help you manage your care at home? Yes   Who are your caregiver(s) and their phone number(s)? Marlen Bishop, daughter, P: [phone]; Jah Le, son, P: [phone]   Prior to hospitilization cognitive status: Alert/Oriented   Current cognitive status: Alert/Oriented;No Deficits   Walking or Climbing Stairs ambulation difficulty, requires equipment   Mobility Management Rollator, cane   Dressing/Bathing bathing difficulty, assistance 1 person   Dressing/Bathing Management Family   Home Accessibility wheelchair accessible   Home Layout Able to live on 1st floor   Equipment Currently Used at Home rollator;cane, straight;shower chair;glucometer   Readmission within 30 days? No   Patient currently being followed by outpatient case management? No   Do you currently have service(s) that help you manage your care at home? No   Do you take prescription medications? Yes  ([facility] on [address])   Do you have prescription coverage? Yes   Coverage [employer]   Do you have any problems affording any of your prescribed medications? No   Is the patient taking medications as prescribed? yes   Who is going to help you get home at discharge? Family   How do you get to doctors appointments? family or friend will provide   Are you on dialysis? Yes   Dialysis Name and Scheduled  days [facility]: TTS @ 11:40   Discharge Plan A Home with family   DME Needed Upon Discharge  none   Discharge Plan discussed with: Patient   Discharge Barriers Identified None   Physical Activity   On average, how many days per week do you engage in moderate to strenuous exercise (like a brisk walk)? 0 days   On average, how many minutes do you engage in exercise at this level? 0 min   Financial Resource Strain   How hard is it for you to pay for the very basics like food, housing, medical care, and heating? Not hard   Housing Stability   In the last 12 months, was there a time when you were not able to pay the mortgage or rent on time? N   In the last 12 months, how many places have you lived? 1   In the last 12 months, was there a time when you did not have a steady place to sleep or slept in a shelter (including now)? N   Transportation Needs   In the past 12 months, has lack of transportation kept you from medical appointments or from getting medications? no   In the past 12 months, has lack of transportation kept you from meetings, work, or from getting things needed for daily living? No   Food Insecurity   Within the past 12 months, you worried that your food would run out before you got the money to buy more. Never true   Within the past 12 months, the food you bought just didn't last and you didn't have money to get more. Never true   Stress   Do you feel stress - tense, restless, nervous, or anxious, or unable to sleep at night because your mind is troubled all the time - these days? Not at all   Social Connections   In a typical week, how many times do you talk on the phone with family, friends, or neighbors? More than 3   How often do you get together with friends or relatives? More than 3   How often do you attend Episcopal or Sabianist services? More than 4   Do you belong to any clubs or organizations such as Episcopal groups, unions, fraternal or athletic groups, or school groups? No   How often do you  attend meetings of the clubs or organizations you belong to? Never   Are you , , , , never , or living with a partner?    Alcohol Use   Q1: How often do you have a drink containing alcohol? Monthly or l   Q2: How many drinks containing alcohol do you have on a typical day when you are drinking? 1 or 2   Q3: How often do you have six or more drinks on one occasion? Never   OTHER   Name(s) of People in Home Marlen Bishop, daughter, P: [phone]

## 2023-01-20 NOTE — PROVATION PATIENT INSTRUCTIONS
Discharge Summary/Instructions after an Endoscopic Procedure  Patient Name: Rosette Madrid  Patient MRN: [id number]  Patient YOB: 1945 Friday, January 20, 2023  Lexi Scales MD  Dear patient,  As a result of recent federal legislation (The Federal Cures Act), you may   receive lab or pathology results from your procedure in your [facility]   account before your physician is able to contact you. Your physician or   their representative will relay the results to you with their   recommendations at their soonest availability.  Thank you,  RESTRICTIONS:  During your procedure today, you received medications for sedation.  These   medications may affect your judgment, balance and coordination.  Therefore,   for 24 hours, you have the following restrictions:   - DO NOT drive a car, operate machinery, make legal/financial decisions,   sign important papers or drink alcohol.    ACTIVITY:  Today: no heavy lifting, straining or running due to procedural   sedation/anesthesia.  The following day: return to full activity including work.  DIET:  Eat and drink normally unless instructed otherwise.     TREATMENT FOR COMMON SIDE EFFECTS:  - Mild abdominal pain, nausea, belching, bloating or excessive gas:  rest,   eat lightly and use a heating pad.  - Sore Throat: treat with throat lozenges and/or gargle with warm salt   water.  - Because air was used during the procedure, expelling large amounts of air   from your rectum or belching is normal.  - If a bowel prep was taken, you may not have a bowel movement for 1-3 days.    This is normal.  SYMPTOMS TO WATCH FOR AND REPORT TO YOUR PHYSICIAN:  1. Abdominal pain or bloating, other than gas cramps.  2. Chest pain.  3. Back pain.  4. Signs of infection such as: chills or fever occurring within 24 hours   after the procedure.  5. Rectal bleeding, which would show as bright red, maroon, or black stools.   (A tablespoon of blood from the rectum is not serious,  especially if   hemorrhoids are present.)  6. Vomiting.  7. Weakness or dizziness.  GO DIRECTLY TO THE NEAREST EMERGENCY ROOM IF YOU HAVE ANY OF THE FOLLOWING:      Difficulty breathing              Chills and/or fever over 101 F   Persistent vomiting and/or vomiting blood   Severe abdominal pain   Severe chest pain   Black, tarry stools   Bleeding- more than one tablespoon   Any other symptom or condition that you feel may need urgent attention  Your doctor recommends these additional instructions:  If any biopsies were taken, your doctors clinic will contact you in 1 to 2   weeks with any results.  - Return patient to [facility] for [facility] care.   - Advance diet as tolerated today.   - Continue present medications.   - To visualize the small bowel, perform video capsule endoscopy as   outpatient.   - Recommend an iron supplement.  For questions, problems or results please call your physician - Lexi Scales MD at Work:  [phone], Work:  [phone].  [facility] , EMERGENCY ROOM PHONE NUMBER: [phone]  IF A COMPLICATION OR EMERGENCY SITUATION ARISES AND YOU ARE UNABLE TO REACH   YOUR PHYSICIAN - GO DIRECTLY TO THE EMERGENCY ROOM.  MD Lexi Arellano MD  1/20/2023 5:37:07 PM  This report has been verified and signed electronically.  Dear patient,  As a result of recent federal legislation (The Federal Cures Act), you may   receive lab or pathology results from your procedure in your [facility]   account before your physician is able to contact you. Your physician or   their representative will relay the results to you with their   recommendations at their soonest availability.  Thank you,  PROVATION

## 2023-01-20 NOTE — CONSULTS
[facility]  Nephrology Consultation    Patient Name: Rosette Madrid  Age: 77 y.o.  : 1945  MRN: [id number]  Admission Date: 2023    Date of Consultation: 2023    Consultation Requested By: Dr Gee    Reason for Consultation: ESRD    Chief Complaint: Hypotension (Low BP during dialysis, no weakness at this time.  )    History of Present Illness:  Ms Rosette Madrid is a 77 y.o. Black or  female with past medical history of end-stage renal disease, (on hemodialysis by way of left forearm AV fistula, dialyzes per Tuesday,  [place] [facility] [place] [facility]), diabetes mellitus type 2, heart failure with preserved ejection fraction, coronary artery disease hypertension, dyslipidemia, COPD, hypothyroidism, and anemia.  Patient was referred to the emergency department by in her dialysis clinic for low hemoglobin.  Patient denied any complaints, denied signs of overt bleeding.  Upon presentation to the emergency department, laboratory results were remarkable for hemoglobin of 6.6.  Patient was admitted to medicine service, received 2 units packed red cells overnight.  Nephrology was consulted for assistance with management of ESRD.    Patient is allergic to lisinopril, azithromycin, baclofen, and doxycycline.     Review of Systems   Constitutional: Negative.    HENT: Negative.     Eyes: Negative.    Respiratory: Negative.     Cardiovascular: Negative.    Gastrointestinal: Negative.    Endocrine: Negative.    Genitourinary: Negative.    Musculoskeletal: Negative.    Skin: Negative.    Allergic/Immunologic: Negative.    Neurological: Negative.    Psychiatric/Behavioral: Negative.       Past Medical History:  has a past medical history of CKD (chronic kidney disease) requiring chronic dialysis, COPD (chronic obstructive pulmonary disease), Diabetes mellitus, Hyperlipidemia, Hypertension, Renal insufficiency, and Thyroid disease.    Procedure  "History:  has a past surgical history that includes Coronary artery bypass graft; Cardiac catheterization; AV fistula placement (Left); Esophagogastroduodenoscopy; Colonoscopy; Thyroidectomy; Polypectomy; and Tubal ligation.    Family History: family history includes Hypertension in her father, mother, sister, and sister.    Social History:  reports that she has been smoking cigarettes. She has been smoking an average of .25 packs per day. She has never used smokeless tobacco. She reports current alcohol use. She reports that she does not currently use drugs.    Physical Exam:   /75   Pulse (!) 55   Temp 98.2 °F (36.8 °C) (Oral)   Resp 18   Ht 5' 3" (1.6 m)   Wt 95.1 kg (209 lb 12.3 oz)   SpO2 99%   BMI 37.16 kg/m²  Body mass index is 37.16 kg/m².  General appearance: Patient is in no acute distress.  HEENT: PERRLA, EOMI, no scleral icterus, no JVD. Neck is supple.  Chest: Respirations are unlabored. Lungs sounds are diminished to auscultation.   Heart: S1, S2.   Abdomen: Benign. Obese  : Deferred.  Extremities:  Trace bilateral lower extremity pitting edema, peripheral pulses are palpable.  Left forearm AV fistula with audible bruit and palpable thrill  Neuro: No focal deficits.     Inpatient Medications:     Current Facility-Administered Medications:     0.9%  NaCl infusion (for blood administration), , Intravenous, Q24H PRN, Matias Gee MD    albuterol-ipratropium 2.5 mg-0.5 mg/3 mL nebulizer solution 3 mL, 3 mL, Nebulization, Q6H, Matias Gee MD    amLODIPine tablet 5 mg, 5 mg, Oral, Daily, Matias Gee MD    atorvastatin tablet 40 mg, 40 mg, Oral, Daily, Matias Gee MD    carvediloL tablet 6.25 mg, 6.25 mg, Oral, BID, Matias Gee MD, 6.25 mg at 01/19/23 2042    dextrose 10% bolus 125 mL 125 mL, 12.5 g, Intravenous, PRN, Matias Gee MD    dextrose 10% bolus 250 mL 250 mL, 25 g, Intravenous, PRN, Matias Gee MD    epoetin alexis injection 10,000 " Units, 10,000 Units, Subcutaneous, Once, Isabelle Salazar, JOSEFINA    ferrous sulfate tablet 1 each, 1 tablet, Oral, Every other day, Margarito Pace,     fluticasone furoate-vilanteroL 100-25 mcg/dose diskus inhaler 1 puff, 1 puff, Inhalation, Daily, Matias Gee MD    glucagon (human recombinant) injection 1 mg, 1 mg, Intramuscular, PRN, Matias Gee MD    glucose chewable tablet 16 g, 16 g, Oral, PRN, Matias Gee MD    glucose chewable tablet 24 g, 24 g, Oral, PRN, Matias Gee MD    insulin aspart U-100 injection 0-5 Units, 0-5 Units, Subcutaneous, QID (AC + HS) PRN, Matias Gee MD    insulin detemir U-100 injection 30 Units, 30 Units, Subcutaneous, QHS, Matias Gee MD, 30 Units at 01/19/23 2131    levothyroxine tablet 125 mcg, 125 mcg, Oral, Before breakfast, Matias Gee MD, 125 mcg at 01/20/23 0544    melatonin tablet 6 mg, 6 mg, Oral, Nightly PRN, Matias Gee MD, 6 mg at 01/19/23 2130    pantoprazole EC tablet 40 mg, 40 mg, Oral, Daily, Matias Gee MD    sevelamer carbonate tablet 1,600 mg, 1,600 mg, Oral, TID, Matias Gee MD, 1,600 mg at 01/19/23 2130    sodium chloride 0.9% flush 10 mL, 10 mL, Intravenous, PRN, Matias Gee MD     Imaging:  X-Ray Chest 1 View   Final Result      No acute pulmonary process identified.         Electronically signed by: Reinier Owen   Date:    01/19/2023   Time:    17:47          Laboratory Data:   Hematology  Lab Results   Component Value Date    WBC 7.1 01/20/2023    HGB 8.7 (L) 01/20/2023    HCT 28.6 (L) 01/20/2023     01/20/2023     Chemistry  Lab Results   Component Value Date     01/20/2023    K 4.3 01/20/2023    CHLORIDE 105 01/20/2023    CO2 24 01/20/2023    BUN 42.0 (H) 01/20/2023    CREATININE 5.46 (H) 01/20/2023    EGFRNORACEVR 8 01/20/2023    GLUCOSE 91 01/20/2023    CALCIUM 9.4 01/20/2023    ALKPHOS 89 01/20/2023    LABPROT 6.2 01/20/2023    ALBUMIN 3.0 (L) 01/20/2023     BILIDIR <0.1 03/22/2022    IBILI >0.10 03/22/2022    AST 23 01/20/2023    ALT 25 01/20/2023    MG 2.40 01/20/2023    PHOS 3.3 01/20/2023      Urine:  Lab Results   Component Value Date    APPEARANCEUA Hazy 09/30/2021    PHUA 7.0 09/30/2021    PROTEINUA 30 (A) 09/30/2021    LEUKOCYTESUR Negative 09/30/2021    RBCUA 0-2 09/30/2021    WBCUA 0-2 09/30/2021    BACTERIA Moderate (A) 09/30/2021    SQEPUA >100 (A) 09/30/2021    HYALINECASTS 0-2 (A) 09/30/2021    CREATRANDUR 375.0 09/26/2017    PROTEINURINE 1,032.6 09/26/2017         Lab Results   Component Value Date    HGBA1C 5.8 01/19/2023    GLUCOSE 91 01/20/2023     Lab Results   Component Value Date    .8 (H) 12/05/2017    CTOGMYOF63NO 20.0 (L) 09/30/2022       Lab Results   Component Value Date    IRON 44 (L) 01/19/2023    TIBC 241 (L) 01/19/2023    TRANS 202 01/19/2023    TRANS 202 01/19/2023    LABIRON 18 (L) 01/19/2023    FERRITIN 1,461.24 (H) 01/19/2023    FOLATE 15.0 01/19/2023    YOYWJTHF40 817 (H) 01/19/2023       Lab Results   Component Value Date    HEPCAB Nonreactive 11/13/2017    HEPBSURFAG Nonreactive 12/30/2021    HEPBSAB Reactive (A) 12/30/2021       Impression:   End-stage renal disease  Anemia   Hypertension   Diabetes mellitus type 2   Heart failure with preserved ejection fraction   Coronary artery disease   COPD   Hypothyroidism  Atrial fibrillation    Plan:   Will plan to dialyze patient today for uremic toxin clearance and ultrafiltration.  Will give 38384 units of Retacrit to stimulate erythropoiesis.  Continue antihypertensive regimen and phosphorus binders with meals.    Thank you very much for your consultation.     Isabelle Salazar NP  [facility]  1/20/2023 7:52 AM

## 2023-01-20 NOTE — ANESTHESIA POSTPROCEDURE EVALUATION
Anesthesia Post Evaluation    Patient: Rosette Madrid    Procedure(s) Performed: Procedure(s) (LRB):  ENTEROSCOPY (Left)    Final Anesthesia Type: general      Patient location during evaluation: GI PACU  Patient participation: Yes- Able to Participate  Level of consciousness: awake and alert  Post-procedure vital signs: reviewed and stable  Pain management: adequate    PONV status at discharge: No PONV  Anesthetic complications: no      Cardiovascular status: hemodynamically stable  Respiratory status: unassisted and spontaneous ventilation  Follow-up not needed.          Vitals Value Taken Time   /65 01/20/23 1500   Temp 36.7 °C (98.1 °F) 01/20/23 1500   Pulse 58 01/20/23 1500   Resp 18 01/20/23 1500   SpO2 99 % 01/20/23 1500         No case tracking events are documented in the log.      Pain/Marco Score: No data recorded

## 2023-01-20 NOTE — TRANSFER OF CARE
Anesthesia Transfer of Care Note    Patient: Rosette Madrid    Procedure(s) Performed: Procedure(s) (LRB):  ENTEROSCOPY (Left)    Patient location: GI    Anesthesia Type: general    Post pain: adequate analgesia    Post assessment: no apparent anesthetic complications    Post vital signs: stable    Level of consciousness: awake    Nausea/Vomiting: no nausea/vomiting    Complications: none    Transfer of care protocol was followedComments: Report to Luis orellana      Last vitals:   125/63 p66 r21 Sat 95 ra t36

## 2023-01-20 NOTE — CONSULTS
Gastroenterology/Hepatology Initial Consult Note    Patient Name: Rosette Madrid  Age: 77 y.o.  : 1945  MRN: [id number]  Admission Date: 2023    Reason for Consult:      Medical Management  Chief Complaint   Patient presents with    Hypotension     Low BP during dialysis, no weakness at this time.           Self, Aaareferral    HPI:     Rosette Madrid is a 77 y.o. female with history of ESRD (on hemodialysis on Tuesday/Thursday/Saturday schedule), type 2 diabetes, heart failure with preserved ejection fraction of 55% (2021), coronary artery disease, history of CABG x4, hypertension, dyslipidemia, COPD, hypothyroidism, anemia, AVM and colon polyps who presented to [facility] ED on 2023 for critical H&H after being advised by the dialysis RN to report to the ED for evaluation.  She initially reported to ED that she was there for low blood pressure.  While in the ED she did endorse having intermittent dark stools for the past few months which she attributed to taking ferrous gluconate.    In the ED: Initial BP was 98/60 but increased to 144/78 at the time of encounter. Labs are as followed: H/H 6.6/22.3, K+ 4.2, Mag 2.3, Cr 5.34, BUN 34.9, creatinine 5.34. CXR revealed no acute cardiopulmonary abnormalities.    GI service consulted for melena with history of angioectasia of gastric antrum.    Patient reports that she has had dark green stools for the past few months since starting iron supplement.  She endorses some constipation stating that it is sometimes hard for her to have a bowel movement and she has to strain a little. She typically has a bowel movement twice weekly.  She also takes a stool softener which she finds helpful.  Her last bowel movement was this morning which she describes as dark green and soft.  The nursing staff also endorses dark green stools.  She denies having black stools.  She denies abdominal pain, nausea, vomiting, hematemesis, reflux symptoms, difficulty swallowing,  diarrhea, hematochezia, decreased appetite, weight loss, dysuria and hematuria.      She reports having a colonoscopy in July of 2021 and an EGD in December of 2021. Denies NSAID use.  She does take aspirin 81 mg daily.  Admits to smoking 4 cigarettes per day for the past 2 years but previously smoked 1/2 ppd since she was 18 years old.  Admits to occasional alcohol use during the holidays but denies alcohol use during the most recent holidays.  Denies illicit drug use. Denies a family history of IBD, colon polyps or colon cancer.    Prior endoscopies:     Capsule endoscopy for anemia and history of gastric arteriovenous malformation  on 1/2/2022:  Technically incomplete study.  Visibility and stomach good until patient ingested food at 8:00 a.m..  Capsule did not leave the stomach.  Probable AVM ablation site identified within the stomach.  No other signs of bleeding were seen throughout the stomach.  Recommendations: Incomplete study due to delayed gastric emptying.  Consider repeating study with a capsule placed endoscopically if warranted.    EGD on 12/31/2021 for melena per Dr. Manolo Winter:  The esophagus was normal.  A small hiatal hernia was present.  A single small nonbleeding angioectasia was found in the gastric antrum.  Coagulation for hemostasis using argon beam was successful.  The examined duodenum was normal.  Recommendations:  We will plan on capsule endoscopy tomorrow.  Consider Sandostatin to help prevent any other possible AVM bleeding/oozing.  Overall, test situation with her not wanting to receive any blood.    Colonoscopy on 07/21/2021 for positive fecal immunochemical test per Dr. Trung Bourgeois:  three 5 to 9 mm semi-sessile polyps were found in the transverse colon and were removed with a cold snare. Resection and retrieval were complete.  Seven 6-9 mm semi-sessile polyps were found in the descending colon and were removed with a cold snare. Resection and retrieval were  complete.  A 12 mm semi-sessile polyp was found in the descending colon was removed with a hot snare. Resection and retrieval were complete.  Two 3 to 5 mm semi-sessile polyps were found in the sigmoid colon and were removed with a cold snare. Resection and retrieval were complete.  Non-bleeding internal hemorrhoids were found during retroflexion.  The hemorrhoids were medium-sized and grade II  (internal hemorrhoids that prolapse but reduced spontaneously).  Recommendations await pathology results.  Repeat colonoscopy in 3 years for surveillance.  Pathology:  Transverse colon, polyp x3, biopsy:  Tubular adenoma, multiple fragments.  Descending colon, polyp x8, biopsy:  Tubular adenoma, multiple fragments.  Sigmoid colon, polyp x2, biopsy:  Tubular adenoma, multiple fragments.      JOSEFINA Lord    Past Medical History:   Diagnosis Date    CKD (chronic kidney disease) requiring chronic dialysis     COPD (chronic obstructive pulmonary disease)     Diabetes mellitus     Hyperlipidemia     Hypertension     Renal insufficiency     Thyroid disease         Past Surgical History:   Procedure Laterality Date    AV FISTULA PLACEMENT Left     CARDIAC CATHETERIZATION      COLONOSCOPY      CORONARY ARTERY BYPASS GRAFT      ESOPHAGOGASTRODUODENOSCOPY      POLYPECTOMY      THYROIDECTOMY      TUBAL LIGATION          Family History   Problem Relation Age of Onset    Hypertension Mother     Hypertension Father     Hypertension Sister     Hypertension Sister        Social History     Tobacco Use    Smoking status: Every Day     Packs/day: 0.25     Types: Cigarettes    Smokeless tobacco: Never    Tobacco comments:     Smokes 2 cigarettes a day   Substance Use Topics    Alcohol use: Yes     Comment: 1 glass every 2-3 month         Review of patient's allergies indicates:   Allergen Reactions    Lisinopril Swelling    Azithromycin      Other reaction(s): tongue/facial swelling    Baclofen      Other reaction(s): tongue/facial  "swelling    Doxycycline      Other reaction(s): Angioedema        Medications Prior to Admission   Medication Sig Dispense Refill Last Dose    albuterol (VENTOLIN HFA) 90 mcg/actuation inhaler Inhale 2 puffs into the lungs every 6 (six) hours as needed for Wheezing. Rescue 18 g 1 Unknown    albuterol-ipratropium (DUO-NEB) 2.5 mg-0.5 mg/3 mL nebulizer solution Take 3 mLs by nebulization every 6 (six) hours as needed for Wheezing or Shortness of Breath. Rescue 75 mL 0 Unknown    amLODIPine (NORVASC) 5 MG tablet Take 1 tablet (5 mg total) by mouth once daily. 90 tablet 3 Unknown    aspirin (ECOTRIN) 81 MG EC tablet Take 1 tablet (81 mg total) by mouth once daily. 90 tablet 1 Unknown    BASAGLAR KWIKPEN U-100 INSULIN glargine 100 units/mL SubQ pen Inject 30 Units into the skin Daily. 27 mL 2     BD ULTRA-FINE MINI PEN NEEDLE 31 gauge x 3/16" Ndle USE AS DIRECTED TWICE A DAY   Unknown    BENADRYL 25 mg capsule Take 50 mg by mouth nightly.   Unknown    bisacodyL (DULCOLAX) 5 mg EC tablet Take 5 mg by mouth daily as needed for Constipation.   Unknown    carvediloL (COREG) 6.25 MG tablet Take 1 tablet (6.25 mg total) by mouth 2 (two) times daily. 180 tablet 3 Unknown    clonazePAM (KLONOPIN) 0.5 MG tablet Take 0.5 mg by mouth daily as needed.   Unknown    DIALYVITE 100-1 mg Tab Take 1 tablet by mouth once daily.   Unknown    DULoxetine (CYMBALTA) 30 MG capsule Take 30 mg by mouth once daily.   Unknown    fluticasone furoate-vilanteroL (BREO) 100-25 mcg/dose diskus inhaler Inhale 1 puff into the lungs once daily. 90 each 2     furosemide (LASIX) 40 MG tablet Take 1 tablet (40 mg total) by mouth once daily. 90 tablet 3 Unknown    glipiZIDE (GLUCOTROL) 10 MG tablet Take 1 tablet (10 mg total) by mouth daily with breakfast. 90 tablet 1 Unknown    INVEGA SUSTENNA 156 mg/mL Syrg injection Inject into the muscle.   Unknown    L-METHYLFOLATE 15 mg Tab Take 1 tablet by mouth once daily.   Unknown    levothyroxine (SYNTHROID) 125 MCG " "tablet Take 1 tablet (125 mcg total) by mouth before breakfast. 90 tablet 1 Unknown    ONETOUCH DELICA PLUS LANCET 30 gauge Misc 1 lancet by Other route once daily. 100 each 2 Unknown    ONETOUCH ULTRA TEST Strp 1 strip by Other route once daily. 200 strip 2 Unknown    pen needle, diabetic 31 gauge x 5/16" Ndle 2 Units by Misc.(Non-Drug; Combo Route) route 2 (two) times a day. Patient to check blood sugars twice daily (morning and night) 100 each 2 Unknown    rosuvastatin (CRESTOR) 40 MG Tab Take 1 tablet (40 mg total) by mouth once daily. 90 tablet 3 Unknown    sevelamer carbonate (RENVELA) 800 mg Tab Take 1,600 mg by mouth 3 (three) times daily.   Unknown    tiotropium (SPIRIVA WITH HANDIHALER) 18 mcg inhalation capsule Inhale 1 capsule (18 mcg total) into the lungs Daily. 90 capsule 2     tiZANidine (ZANAFLEX) 4 MG tablet Take 4 mg by mouth every 8 (eight) hours as needed.   Unknown         INPATIENT MEDICATIONS    Scheduled Meds:   albuterol-ipratropium  3 mL Nebulization Q6H    amLODIPine  5 mg Oral Daily    atorvastatin  40 mg Oral Daily    carvediloL  6.25 mg Oral BID    epoetin alexis (PROCRIT) injection  10,000 Units Subcutaneous Once    ferrous sulfate  1 tablet Oral Every other day    fluticasone furoate-vilanteroL  1 puff Inhalation Daily    insulin detemir U-100  30 Units Subcutaneous QHS    levothyroxine  125 mcg Oral Before breakfast    pantoprazole  40 mg Oral Daily    sevelamer carbonate  1,600 mg Oral TID     Continuous Infusions:  PRN Meds:  sodium chloride, dextrose 10%, dextrose 10%, glucagon (human recombinant), glucose, glucose, insulin aspart U-100, melatonin, sodium chloride 0.9%          Review of Systems:       Review of Systems   Constitutional:  Negative for appetite change, chills, fatigue, fever and unexpected weight change.   HENT:  Negative for trouble swallowing.    Respiratory:  Positive for wheezing. Negative for cough.    Cardiovascular:  Positive for leg swelling. "   Gastrointestinal:  Positive for constipation. Negative for abdominal distention, abdominal pain, anal bleeding, blood in stool, change in bowel habit, diarrhea, nausea, rectal pain, vomiting, reflux and change in bowel habit.   Genitourinary:  Positive for decreased urine volume (hx of CKD). Negative for dysuria and hematuria.   Integumentary:  Negative for color change and pallor.   Neurological:  Negative for dizziness and weakness.        Objective:       VITAL SIGNS: 24 HR MIN & MAX LAST    Temp  Min: 97 °F (36.1 °C)  Max: 98.3 °F (36.8 °C)  98.2 °F (36.8 °C)        BP  Min: 98/60  Max: 145/73  131/75     Pulse  Min: 55  Max: 72  (!) 57     Resp  Min: 16  Max: 24  18    SpO2  Min: 95 %  Max: 100 %  100 %        Physical Exam  Constitutional:       General: She is awake. She is not in acute distress.  Eyes:      Conjunctiva/sclera: Conjunctivae normal.   Cardiovascular:      Rate and Rhythm: Normal rate. Rhythm irregular.      Pulses: Normal pulses.   Pulmonary:      Effort: Pulmonary effort is normal. No respiratory distress.      Breath sounds: Wheezing (expiratory, bilaterally) present.   Abdominal:      General: Bowel sounds are normal. There is no distension.      Palpations: Abdomen is soft.      Tenderness: There is no abdominal tenderness.   Musculoskeletal:      Right lower leg: Edema (trace) present.      Left lower leg: Edema (trace) present.      Comments: Left forearm AV fistula with audible bruit and palpable thrill   Skin:     Coloration: Skin is not jaundiced or pale.   Neurological:      General: No focal deficit present.      Mental Status: She is alert and oriented to person, place, and time.   Psychiatric:         Behavior: Behavior is cooperative.            LABS:      Recent Labs   Lab 01/19/23  1426 01/20/23  0438   WBC 8.0 7.1   HGB 6.6* 8.7*   HCT 22.3* 28.6*    173   .3* 96.9*       Recent Labs   Lab 01/19/23  1426 01/20/23  0438   HGB 6.6* 8.7*   HCT 22.3* 28.6*         Recent Labs   Lab 01/19/23  1426 01/20/23  0451    139   K 4.2 4.3   CO2 29 24   BUN 34.9* 42.0*   CREATININE 5.34* 5.46*   BILITOT 0.3 0.3   ALKPHOS 80 89   AST 21 23   ALT 24 25   LABPROT 6.6 6.2   ALBUMIN 3.2* 3.0*        Recent Labs   Lab 01/19/23  1830   INR 1.03   PROTIME 13.3   PTT 38.4        Recent Labs   Lab 01/19/23  1830   IRON 44*   FERRITIN 1,461.24*          IMAGING:   X-Ray Chest 1 View    Result Date: 1/19/2023  EXAMINATION: XR CHEST 1 VIEW CLINICAL HISTORY: coarse breath sounds; TECHNIQUE: Frontal view(s) of the chest. COMPARISON: Radiography 09/30/2022 FINDINGS: Prior sternotomy.  Stable cardiac silhouette with similar tortuosity of the thoracic aorta.  The lungs are well-inflated.  Sites of mild scarring in the lower left lung similar since September.  No acute consolidation.  No significant pleural effusion or discernible pneumothorax.     No acute pulmonary process identified. Electronically signed by: Reinier Owen Date:    01/19/2023 Time:    17:47        Assessment / Plan:     Anemia, hx of AVM and colon polyps  -Hgb on admit 6.6--> 2 units PRBCs-->8.7.  -Responded appropriately to blood transfusions. Baseline appears to be a hgb of 8.   -Continue to monitor hgb. Transfuse for Hgb <7.   -Plan for push enteroscopy today.   -NPO for procedure.

## 2023-01-20 NOTE — MEDICAL/APP STUDENT
[facility] Medicine Progress Note      Subjective:   Brief HPI:  Rosette Madrid is a 77 y.o. female who has PMH of ESRD on dialysis (per T/TH/S schedule with last session on Tuesday), DM, HFpEF (EF 50-55% Dec 2021), CAD (s/p CABG prior to 2005), HTN, HLD, COPD (Gold stage II, PFTs in 2015), and hypothyroid (s/p thyroidectomy). She presented to [facility] ED on 1/19/2023 after receiving a call from dialysis center ([facility]) this am advising her to report to ED for critical H/H. Pt's son states the dialysis center gets monthly labs, and they have seen steady decline in H/H. Pt denies weakness, fatigue or syncope. However, she does report dark stools for the past few months, which she thinks is caused by her iron supplement. Her last colonoscopy was on 7/21/2021, which revealed multiple polyps in the transverse, descending, and sigmoid colon. She also had EGD 12/31/2021 revealed a single small no bleeding angioectasia in the gastric antrum status post successful hemostasis with/APC, normal esophagus and duodenum, and small hiatal hernia.      Pt's VSS in ED. Labs significant for H/H of 6.6/22.3, K+ 4.2, Mag 2.3, BUN 34.9 and Cr 5.34. Phosphate pending. RICKY performed and guaiac stool positive. CXR revealed no acute cardiopulmonary abnormalities. EKG demonstrated a-fib with LBBB. Pt denies chest pain or palpitations. IM consulted for admission for anemia less than 7 requiring transfusion, ESRD requiring HD, new onset atrial fibrillation.    Interval History: Pt hemodynamically stable this AM without acute events overnight. She was transfused 2u pRBCs last night and tolerated well. Reports increased wheezing but denies SOB, chest pain, or palpitations. She states she has had minimal urine output since admission and has not had a BM. Pt NPO since midnight in anticipation of possible GI intervention.      ROS:  Review of Systems   Constitutional:  Negative for chills, fever and malaise/fatigue.   HENT:  Negative for  congestion and sore throat.    Respiratory:  Positive for wheezing. Negative for cough, sputum production and shortness of breath.    Cardiovascular:  Positive for leg swelling. Negative for chest pain, palpitations and orthopnea.   Gastrointestinal:  Positive for melena. Negative for abdominal pain, nausea and vomiting.   Genitourinary:  Negative for dysuria.   Musculoskeletal:  Negative for myalgias.   Neurological:  Negative for seizures, weakness and headaches.       Objective:   Vitals:  Temp: 97.9 °F (36.6 °C) (01/20 0520)  Pulse: 57 (01/20 0520)  Resp: 18 (01/20 0330)  BP: 145/73 (01/20 0520)  SpO2: 98 % (01/20 0520)    Physical Examination:  General:  Well developed, well nourished, no acute distress, lying in bed  Head: Normocephalic, atraumatic  ENT: PERRL, MMM.  Neck: supple, normal ROM  CVS:  regular rate, irregular rhythm. S1 and S2 normal. No murmurs, rubs, or gallops. +1 edema to BLE similar to yesterday's exam  Resp:Normal respiratory effort. Wheezing noted throughout lung fields. No crackles.  GI:  Abdomen soft, non-tender, non-distended, normoactive bowel sounds  MSK:  Full range of motion, no obvious deformities. AV fistula to left forearm with palpable thrill  Skin:  No rashes, ulcers, erythema  Neuro:  Alert and oriented x3, No focal neuro deficits, CNII-XII grossly intact  Psych:  Appropriate mood and affect     Laboratory:  CMP:  Recent Labs   Lab 01/19/23  1426 01/20/23  0451    139   K 4.2 4.3   CHLORIDE 102 105   CO2 29 24   BUN 34.9* 42.0*   CREATININE 5.34* 5.46*   GLUCOSE 180* 91   CALCIUM 9.7 9.4   ALBUMIN 3.2* 3.0*   BILITOT 0.3 0.3   AST 21 23   ALT 24 25   ALKPHOS 80 89     CBC:  Recent Labs   Lab 01/19/23  1426 01/20/23  0438   WBC 8.0 7.1   ABSNEUTRO 6.15 5.09   RBC 2.18* 2.95*   HGB 6.6* 8.7*   HCT 22.3* 28.6*    173   .3* 96.9*   RDW 17.0 18.2*       Electrolytes:  Recent Labs   Lab 01/19/23  1426 01/19/23  1830 01/20/23  0451     --  139   K 4.2  --   4.3   CHLORIDE 102  --  105   CALCIUM 9.7  --  9.4   MG 2.30  --  2.40   PHOS  --  2.6 3.3       24hr CBG:  Recent Labs   Lab 01/19/23 2057   POCTGLUCOSE 156*       Radiology:  X-Ray Chest 1 View    Result Date: 1/19/2023  No acute pulmonary process identified. Electronically signed by: Reinier Owen Date:    01/19/2023 Time:    17:47       Assessment & Plan:     Macrocytic anemia  - H/H of 6.6/22.3 on admission  - transfused 2U pRBCs  - this AM, H/H 8.7/28.6  - hold anticoagulation and antiplatelet  - Colonoscopy 7/21/2021 revealed multiple polyps in the transverse, descending, and sigmoid colon. EGD 12/31/2021 revealed a single small no bleeding angioectasia in the gastric antrum status post successful hemostasis with/APC, normal esophagus and duodenum, and small hiatal hernia   - NPO since midnight, plan to consult GI services today for potential intervention      ESRD on HD (T/TH/S schedule)  - electrolytes within normal limits on admission; this AM electrolytes similar to yesterday  - BUN 42, Cr 5.46  - missed yesterday's dialysis session  - no indication for urgent dialysis   - plan to consult nephrology today for HD     HTN  CAD (s/p CABG x4)  HFpEF (EF of 50-55% Dec 2021)  New onset a-fib  - [id number] - 7  - HAS-BLED - 4   - continue home amlodipine 5mg, coreg 6.25 BID, and atorvastatin 40mg     Hypothyroidism (s/p thyroidectomy)  - continue levothyroxine 125mcg     COPD (Gold stage II with last PFTs 2015)  - pt wheezing on exam this AM without SOB  - change duonebs q6h prn to scheduled   - continue home breathing treatments  - encourage incentive spirometry     DM  - A1C 5.8  - continue home basal insulin 30u  - low dose SSI        CODE STATUS: full code  Access: peripheral, left forearm AV fistula  Antibiotics: none  Diet: Renal on dialysis + cardiac; NPO at midnight in anticipation of GI intervention  DVT Prophylaxis: SCD and NADINE hose  GI Prophylaxis: Protonix  Fluids: none      Disposition: Day 1 of  admission for anemia less than 7 requiring transfusion, ESRD requiring HD, new onset a-fib. Plan to consult nephrology and dialyze today. Plan to consult GI today for possible intervention.        Trupti España, MS4

## 2023-01-20 NOTE — ANESTHESIA PREPROCEDURE EVALUATION
01/20/2023  Rosette Madrid is a 77 y.o., female for EGD -- history of symptomatic anemia admitted 1.19.22    History of HTN, Hypothyroidism, ESRD Last HD PM 1.20.22, (HD @ Left forearm AV fistula, T, R, F) , DM2, CHF, CAD, COPD, Anemia, 70 pack year smoker      Last CBG noted 84 @ 1500    S/p 2 U PRBC with current H/H 8.7/28.6    Vitals:    01/20/23 0804 01/20/23 0810 01/20/23 1136 01/20/23 1341   BP:   124/69    Pulse: (!) 56 (!) 57 (!) 56 61   Resp: 18 18 19   Temp:   36.7 °C (98 °F)    TempSrc:   Oral    SpO2: 99% 100% 99% 99%   Weight:       Height:           Lab Results   Component Value Date    WBC 7.1 01/20/2023    HGB 8.7 (L) 01/20/2023    HCT 28.6 (L) 01/20/2023     01/20/2023    CHOL 117 09/30/2022    TRIG 66 09/30/2022    HDL 42 09/30/2022    ALT 25 01/20/2023    AST 23 01/20/2023     01/20/2023    K 4.3 01/20/2023    CREATININE 5.46 (H) 01/20/2023    BUN 42.0 (H) 01/20/2023    CO2 24 01/20/2023    TSH 1.602 01/19/2023    INR 1.03 01/19/2023    HGBA1C 5.8 01/19/2023     Pre-op Assessment    I have reviewed the Patient Summary Reports.     I have reviewed the Nursing Notes. I have reviewed the NPO Status.   I have reviewed the Medications.     Review of Systems  Anesthesia Hx:  No problems with previous Anesthesia  History of prior surgery of interest to airway management or planning: Denies Family Hx of Anesthesia complications.   Denies Personal Hx of Anesthesia complications.   Hematology/Oncology:  Hematology Normal   Oncology Normal     EENT/Dental:EENT/Dental Normal   Cardiovascular:  Cardiovascular Normal     Pulmonary:  Pulmonary Normal    Renal/:  Renal/ Normal     Hepatic/GI:  Hepatic/GI Normal    Musculoskeletal:  Musculoskeletal Normal    Neurological:  Neurology Normal    Endocrine:  Endocrine Normal    Dermatological:  Skin Normal    Psych:  Psychiatric Normal            Physical Exam  General: Well nourished, Cooperative, Alert and Oriented    Airway:  Mallampati: I / I  Mouth Opening: Normal  TM Distance: Normal  Tongue: Normal  Neck ROM: Normal ROM    Dental:  Intact        Anesthesia Plan  Type of Anesthesia, risks & benefits discussed:    Anesthesia Type: Gen Natural Airway  Intra-op Monitoring Plan: Standard ASA Monitors  Post Op Pain Control Plan: IV/PO Opioids PRN  (medical reason for not using multimodal pain management)  Induction:  IV  Informed Consent: Informed consent signed with the Patient and all parties understand the risks and agree with anesthesia plan.  All questions answered. Patient consented to blood products? No  ASA Score: 4  Day of Surgery Review of History & Physical: H&P Update referred to the surgeon/provider.    Ready For Surgery From Anesthesia Perspective.     .

## 2023-01-21 LAB
ALBUMIN SERPL-MCNC: 2.7 G/DL (ref 3.4–4.8)
ALBUMIN/GLOB SERPL: 0.9 RATIO (ref 1.1–2)
ALP SERPL-CCNC: 63 UNIT/L (ref 40–150)
ALT SERPL-CCNC: 18 UNIT/L (ref 0–55)
APTT PPP: 198.8 SECONDS
APTT PPP: 41.4 SECONDS
APTT PPP: 69.2 SECONDS
APTT PPP: 98.7 SECONDS
AST SERPL-CCNC: 16 UNIT/L (ref 5–34)
BASOPHILS # BLD AUTO: 0.03 X10(3)/MCL (ref 0–0.2)
BASOPHILS NFR BLD AUTO: 0.4 %
BILIRUBIN DIRECT+TOT PNL SERPL-MCNC: 0.4 MG/DL
BUN SERPL-MCNC: 25.3 MG/DL (ref 9.8–20.1)
CALCIUM SERPL-MCNC: 9.1 MG/DL (ref 8.4–10.2)
CHLORIDE SERPL-SCNC: 98 MMOL/L (ref 98–107)
CO2 SERPL-SCNC: 28 MMOL/L (ref 23–31)
CREAT SERPL-MCNC: 3.59 MG/DL (ref 0.55–1.02)
EOSINOPHIL # BLD AUTO: 0.1 X10(3)/MCL (ref 0–0.9)
EOSINOPHIL NFR BLD AUTO: 1.2 %
ERYTHROCYTE [DISTWIDTH] IN BLOOD BY AUTOMATED COUNT: 17.2 % (ref 11.5–17)
GFR SERPLBLD CREATININE-BSD FMLA CKD-EPI: 13 MLS/MIN/1.73/M2
GLOBULIN SER-MCNC: 3.1 GM/DL (ref 2.4–3.5)
GLUCOSE SERPL-MCNC: 75 MG/DL (ref 82–115)
HCT VFR BLD AUTO: 26.2 % (ref 37–47)
HGB BLD-MCNC: 8.1 GM/DL (ref 12–16)
IMM GRANULOCYTES # BLD AUTO: 0.03 X10(3)/MCL (ref 0–0.04)
IMM GRANULOCYTES NFR BLD AUTO: 0.4 %
LYMPHOCYTES # BLD AUTO: 0.92 X10(3)/MCL (ref 0.6–4.6)
LYMPHOCYTES NFR BLD AUTO: 11.3 %
MAGNESIUM SERPL-MCNC: 2.1 MG/DL (ref 1.6–2.6)
MCH RBC QN AUTO: 29.2 PG
MCHC RBC AUTO-ENTMCNC: 30.9 MG/DL (ref 33–36)
MCV RBC AUTO: 94.6 FL (ref 80–94)
MONOCYTES # BLD AUTO: 0.59 X10(3)/MCL (ref 0.1–1.3)
MONOCYTES NFR BLD AUTO: 7.2 %
NEUTROPHILS # BLD AUTO: 6.5 X10(3)/MCL (ref 2.1–9.2)
NEUTROPHILS NFR BLD AUTO: 79.5 %
NRBC BLD AUTO-RTO: 0 %
PHOSPHATE SERPL-MCNC: 2.7 MG/DL (ref 2.3–4.7)
PLATELET # BLD AUTO: 170 X10(3)/MCL (ref 130–400)
PMV BLD AUTO: 10.6 FL (ref 7.4–10.4)
POCT GLUCOSE: 141 MG/DL (ref 70–110)
POCT GLUCOSE: 155 MG/DL (ref 70–110)
POCT GLUCOSE: 276 MG/DL (ref 70–110)
POCT GLUCOSE: 69 MG/DL (ref 70–110)
POTASSIUM SERPL-SCNC: 4.1 MMOL/L (ref 3.5–5.1)
PROT SERPL-MCNC: 5.8 GM/DL (ref 5.8–7.6)
RBC # BLD AUTO: 2.77 X10(6)/MCL (ref 4.2–5.4)
SODIUM SERPL-SCNC: 131 MMOL/L (ref 136–145)
WBC # SPEC AUTO: 8.2 X10(3)/MCL (ref 4.5–11.5)

## 2023-01-21 PROCEDURE — 94640 AIRWAY INHALATION TREATMENT: CPT

## 2023-01-21 PROCEDURE — 80053 COMPREHEN METABOLIC PANEL: CPT

## 2023-01-21 PROCEDURE — 83735 ASSAY OF MAGNESIUM: CPT

## 2023-01-21 PROCEDURE — 63600175 PHARM REV CODE 636 W HCPCS: Mod: JG | Performed by: INTERNAL MEDICINE

## 2023-01-21 PROCEDURE — 84100 ASSAY OF PHOSPHORUS: CPT

## 2023-01-21 PROCEDURE — 85730 THROMBOPLASTIN TIME PARTIAL: CPT | Performed by: STUDENT IN AN ORGANIZED HEALTH CARE EDUCATION/TRAINING PROGRAM

## 2023-01-21 PROCEDURE — 63600175 PHARM REV CODE 636 W HCPCS

## 2023-01-21 PROCEDURE — 25000003 PHARM REV CODE 250

## 2023-01-21 PROCEDURE — 25000242 PHARM REV CODE 250 ALT 637 W/ HCPCS

## 2023-01-21 PROCEDURE — 85025 COMPLETE CBC W/AUTO DIFF WBC: CPT

## 2023-01-21 PROCEDURE — 36415 COLL VENOUS BLD VENIPUNCTURE: CPT | Performed by: STUDENT IN AN ORGANIZED HEALTH CARE EDUCATION/TRAINING PROGRAM

## 2023-01-21 PROCEDURE — 25000003 PHARM REV CODE 250: Performed by: INTERNAL MEDICINE

## 2023-01-21 PROCEDURE — 25000003 PHARM REV CODE 250: Performed by: STUDENT IN AN ORGANIZED HEALTH CARE EDUCATION/TRAINING PROGRAM

## 2023-01-21 PROCEDURE — 11000001 HC ACUTE MED/SURG PRIVATE ROOM

## 2023-01-21 PROCEDURE — 94761 N-INVAS EAR/PLS OXIMETRY MLT: CPT

## 2023-01-21 RX ORDER — CLONAZEPAM 0.5 MG/1
0.5 TABLET ORAL NIGHTLY
Status: DISCONTINUED | OUTPATIENT
Start: 2023-01-21 | End: 2023-01-22 | Stop reason: HOSPADM

## 2023-01-21 RX ORDER — BENZONATATE 100 MG/1
100 CAPSULE ORAL 3 TIMES DAILY PRN
Status: DISCONTINUED | OUTPATIENT
Start: 2023-01-21 | End: 2023-01-22 | Stop reason: HOSPADM

## 2023-01-21 RX ORDER — GUAIFENESIN 600 MG/1
600 TABLET, EXTENDED RELEASE ORAL 2 TIMES DAILY
Status: DISCONTINUED | OUTPATIENT
Start: 2023-01-21 | End: 2023-01-22 | Stop reason: HOSPADM

## 2023-01-21 RX ORDER — SEVELAMER CARBONATE 800 MG/1
800 TABLET, FILM COATED ORAL 3 TIMES DAILY
Status: DISCONTINUED | OUTPATIENT
Start: 2023-01-21 | End: 2023-01-22 | Stop reason: HOSPADM

## 2023-01-21 RX ORDER — ACETAMINOPHEN 325 MG/1
650 TABLET ORAL EVERY 4 HOURS PRN
Status: DISCONTINUED | OUTPATIENT
Start: 2023-01-21 | End: 2023-01-22 | Stop reason: HOSPADM

## 2023-01-21 RX ADMIN — IPRATROPIUM BROMIDE AND ALBUTEROL SULFATE 3 ML: 2.5; .5 SOLUTION RESPIRATORY (INHALATION) at 07:01

## 2023-01-21 RX ADMIN — GUAIFENESIN 600 MG: 600 TABLET ORAL at 12:01

## 2023-01-21 RX ADMIN — GUAIFENESIN 600 MG: 600 TABLET ORAL at 09:01

## 2023-01-21 RX ADMIN — Medication 6 MG: at 09:01

## 2023-01-21 RX ADMIN — GUAIFENESIN 600 MG: 600 TABLET ORAL at 08:01

## 2023-01-21 RX ADMIN — CARVEDILOL 6.25 MG: 3.12 TABLET, FILM COATED ORAL at 09:01

## 2023-01-21 RX ADMIN — MUPIROCIN: 20 OINTMENT TOPICAL at 08:01

## 2023-01-21 RX ADMIN — BENZONATATE 100 MG: 100 CAPSULE ORAL at 03:01

## 2023-01-21 RX ADMIN — BENZONATATE 100 MG: 100 CAPSULE ORAL at 12:01

## 2023-01-21 RX ADMIN — AMLODIPINE BESYLATE 5 MG: 5 TABLET ORAL at 08:01

## 2023-01-21 RX ADMIN — MUPIROCIN: 20 OINTMENT TOPICAL at 09:01

## 2023-01-21 RX ADMIN — PANTOPRAZOLE SODIUM 40 MG: 40 TABLET, DELAYED RELEASE ORAL at 08:01

## 2023-01-21 RX ADMIN — LEVOTHYROXINE SODIUM 125 MCG: 0.1 TABLET ORAL at 05:01

## 2023-01-21 RX ADMIN — CARVEDILOL 6.25 MG: 3.12 TABLET, FILM COATED ORAL at 08:01

## 2023-01-21 RX ADMIN — SEVELAMER CARBONATE 800 MG: 800 TABLET, FILM COATED ORAL at 09:01

## 2023-01-21 RX ADMIN — ATORVASTATIN CALCIUM 40 MG: 40 TABLET, FILM COATED ORAL at 08:01

## 2023-01-21 RX ADMIN — HEPARIN SODIUM 12 UNITS/KG/HR: 10000 INJECTION, SOLUTION INTRAVENOUS at 12:01

## 2023-01-21 RX ADMIN — INSULIN ASPART 3 UNITS: 100 INJECTION, SOLUTION INTRAVENOUS; SUBCUTANEOUS at 05:01

## 2023-01-21 RX ADMIN — SEVELAMER CARBONATE 800 MG: 800 TABLET, FILM COATED ORAL at 03:01

## 2023-01-21 RX ADMIN — APIXABAN 5 MG: 2.5 TABLET, FILM COATED ORAL at 09:01

## 2023-01-21 RX ADMIN — SEVELAMER CARBONATE 1600 MG: 800 TABLET, FILM COATED ORAL at 08:01

## 2023-01-21 RX ADMIN — INSULIN DETEMIR 30 UNITS: 100 INJECTION, SOLUTION SUBCUTANEOUS at 09:01

## 2023-01-21 RX ADMIN — FERUMOXYTOL 510 MG: 510 INJECTION INTRAVENOUS at 11:01

## 2023-01-21 RX ADMIN — CLONAZEPAM 0.5 MG: 0.5 TABLET ORAL at 03:01

## 2023-01-21 RX ADMIN — CLONAZEPAM 0.5 MG: 0.5 TABLET ORAL at 09:01

## 2023-01-21 RX ADMIN — IPRATROPIUM BROMIDE AND ALBUTEROL SULFATE 3 ML: 2.5; .5 SOLUTION RESPIRATORY (INHALATION) at 01:01

## 2023-01-21 RX ADMIN — FLUTICASONE FUROATE AND VILANTEROL TRIFENATATE 1 PUFF: 100; 25 POWDER RESPIRATORY (INHALATION) at 07:01

## 2023-01-21 RX ADMIN — ACETAMINOPHEN 650 MG: 325 TABLET, FILM COATED ORAL at 12:01

## 2023-01-21 NOTE — PLAN OF CARE
Problem: Adult Inpatient Plan of Care  Goal: Plan of Care Review  Outcome: Ongoing, Progressing  Goal: Patient-Specific Goal (Individualized)  Outcome: Ongoing, Progressing  Goal: Absence of Hospital-Acquired Illness or Injury  Outcome: Ongoing, Progressing  Goal: Optimal Comfort and Wellbeing  Outcome: Ongoing, Progressing  Goal: Readiness for Transition of Care  Outcome: Ongoing, Progressing     Problem: Diabetes Comorbidity  Goal: Blood Glucose Level Within Targeted Range  Outcome: Ongoing, Progressing     Problem: Device-Related Complication Risk (Hemodialysis)  Goal: Safe, Effective Therapy Delivery  Outcome: Ongoing, Progressing     Problem: Hemodynamic Instability (Hemodialysis)  Goal: Effective Tissue Perfusion  Outcome: Ongoing, Progressing     Problem: Infection (Hemodialysis)  Goal: Absence of Infection Signs and Symptoms  Outcome: Ongoing, Progressing     Problem: Anemia  Goal: Anemia Symptom Improvement  Outcome: Ongoing, Progressing

## 2023-01-21 NOTE — PROGRESS NOTES
"Nephrology Progress Note    Hospital course:  77-year-old female with end-stage renal disease dialyzes Tuesday Thursday Saturday at [facility] location missed dialysis and felt weak.  Patient did receive blood transfusion on Thursday night and was dialyzed yesterday without incident.    Subjective:   Patient relates that she feels better this morning.    Twelve point review of systems: Positive for some weakness but otherwise negative.    Objective:   /79   Pulse (!) 57   Temp 98.6 °F (37 °C) (Oral)   Resp 19   Ht 5' 3" (1.6 m)   Wt 95.1 kg (209 lb 12.3 oz)   SpO2 99%   BMI 37.16 kg/m²     Intake/Output Summary (Last 24 hours) at 1/21/2023 0925  Last data filed at 1/21/2023 0437  Gross per 24 hour   Intake 1502.36 ml   Output 1500 ml   Net 2.36 ml        Physical exam:   General:  Patient is alert and oriented person place time no acute distress  HEENT normocephalic atraumatic pupils equal round reactive to light and accommodation, pale conjunctiva bilaterally  Neck:  No JVD bruit thyromegaly adenopathy appreciated  Lungs: Clear to auscultation bilaterally all fields  Cardiovascular:  Regular rate and rhythm no murmur rub gallop appreciated  Abdomen: Positive bowel sounds all 4 quadrants soft nontender nondistended  Extremities:  No clubbing cyanosis or edema noted  Neurologic:  No clonus asterixis appreciated g 2 through 12 grossly intact    Laboratory data:   Recent Results (from the past 24 hour(s))   POCT glucose    Collection Time: 01/20/23 11:38 AM   Result Value Ref Range    POCT Glucose 68 (L) 70 - 110 mg/dL   POCT glucose    Collection Time: 01/20/23  3:00 PM   Result Value Ref Range    POCT Glucose 84 70 - 110 mg/dL   POCT glucose    Collection Time: 01/20/23  4:59 PM   Result Value Ref Range    POCT Glucose 64 (L) 70 - 110 mg/dL   POCT glucose    Collection Time: 01/20/23  5:01 PM   Result Value Ref Range    POC Glucose 64 (A) 70 - 110 MG/DL   POCT glucose    Collection Time: 01/20/23  " 5:37 PM   Result Value Ref Range    POCT Glucose 161 (H) 70 - 110 mg/dL   POCT glucose    Collection Time: 01/20/23  5:38 PM   Result Value Ref Range    POC Glucose 161 (A) 70 - 110 MG/DL   POCT glucose    Collection Time: 01/20/23  7:54 PM   Result Value Ref Range    POCT Glucose 219 (H) 70 - 110 mg/dL   APTT    Collection Time: 01/20/23 10:50 PM   Result Value Ref Range    PTT 41.4 <150.0 seconds   Protime-INR    Collection Time: 01/20/23 10:50 PM   Result Value Ref Range    PT 13.4 seconds    INR 1.04 0.00 - 1.30   Comprehensive metabolic panel    Collection Time: 01/21/23  6:07 AM   Result Value Ref Range    Sodium Level 131 (L) 136 - 145 mmol/L    Potassium Level 4.1 3.5 - 5.1 mmol/L    Chloride 98 98 - 107 mmol/L    Carbon Dioxide 28 23 - 31 mmol/L    Glucose Level 75 (L) 82 - 115 mg/dL    Blood Urea Nitrogen 25.3 (H) 9.8 - 20.1 mg/dL    Creatinine 3.59 (H) 0.55 - 1.02 mg/dL    Calcium Level Total 9.1 8.4 - 10.2 mg/dL    Protein Total 5.8 5.8 - 7.6 gm/dL    Albumin Level 2.7 (L) 3.4 - 4.8 g/dL    Globulin 3.1 2.4 - 3.5 gm/dL    Albumin/Globulin Ratio 0.9 (L) 1.1 - 2.0 ratio    Bilirubin Total 0.4 <=1.5 mg/dL    Alkaline Phosphatase 63 40 - 150 unit/L    Alanine Aminotransferase 18 0 - 55 unit/L    Aspartate Aminotransferase 16 5 - 34 unit/L    eGFR 13 mls/min/1.73/m2   Phosphorus    Collection Time: 01/21/23  6:07 AM   Result Value Ref Range    Phosphorus Level 2.7 2.3 - 4.7 mg/dL   Magnesium    Collection Time: 01/21/23  6:07 AM   Result Value Ref Range    Magnesium Level 2.10 1.60 - 2.60 mg/dL   APTT    Collection Time: 01/21/23  6:07 AM   Result Value Ref Range    PTT 98.7 <150.0 seconds   CBC with Differential    Collection Time: 01/21/23  6:07 AM   Result Value Ref Range    WBC 8.2 4.5 - 11.5 x10(3)/mcL    RBC 2.77 (L) 4.20 - 5.40 x10(6)/mcL    Hgb 8.1 (L) 12.0 - 16.0 gm/dL    Hct 26.2 (L) 37.0 - 47.0 %    MCV 94.6 (H) 80.0 - 94.0 fL    MCH 29.2 pg    MCHC 30.9 (L) 33.0 - 36.0 mg/dL    RDW 17.2 (H) 11.5 -  17.0 %    Platelet 170 130 - 400 x10(3)/mcL    MPV 10.6 (H) 7.4 - 10.4 fL    Neut % 79.5 %    Lymph % 11.3 %    Mono % 7.2 %    Eos % 1.2 %    Basophil % 0.4 %    Lymph # 0.92 0.6 - 4.6 x10(3)/mcL    Neut # 6.50 2.1 - 9.2 x10(3)/mcL    Mono # 0.59 0.1 - 1.3 x10(3)/mcL    Eos # 0.10 0 - 0.9 x10(3)/mcL    Baso # 0.03 0 - 0.2 x10(3)/mcL    IG# 0.03 0 - 0.04 x10(3)/mcL    IG% 0.4 %    NRBC% 0.0 %   POCT glucose    Collection Time: 01/21/23  7:53 AM   Result Value Ref Range    POCT Glucose 69 (L) 70 - 110 mg/dL       Imaging:   Imaging Results              X-Ray Chest 1 View (Final result)  Result time 01/19/23 17:47:25      Final result by Reinier Owen MD (01/19/23 17:47:25)                   Impression:      No acute pulmonary process identified.      Electronically signed by: Reinier Owen  Date:    01/19/2023  Time:    17:47               Narrative:    EXAMINATION:  XR CHEST 1 VIEW    CLINICAL HISTORY:  coarse breath sounds;    TECHNIQUE:  Frontal view(s) of the chest.    COMPARISON:  Radiography 09/30/2022    FINDINGS:  Prior sternotomy.  Stable cardiac silhouette with similar tortuosity of the thoracic aorta.  The lungs are well-inflated.  Sites of mild scarring in the lower left lung similar since September.  No acute consolidation.  No significant pleural effusion or discernible pneumothorax.                                       Medications:     Current Facility-Administered Medications:     0.9%  NaCl infusion (for blood administration), , Intravenous, Q24H PRN, Matias Gee MD    acetaminophen tablet 650 mg, 650 mg, Oral, Q4H PRN, Renaldo Acosta MD, 650 mg at 01/21/23 0054    albuterol-ipratropium 2.5 mg-0.5 mg/3 mL nebulizer solution 3 mL, 3 mL, Nebulization, Q6H, Matias Gee MD, 3 mL at 01/21/23 0755    amLODIPine tablet 5 mg, 5 mg, Oral, Daily, Matias Gee MD, 5 mg at 01/21/23 0855    atorvastatin tablet 40 mg, 40 mg, Oral, Daily, Matias Gee MD, 40 mg at 01/21/23 0835     benzonatate capsule 100 mg, 100 mg, Oral, TID PRN, Renaldo cAosta MD, 100 mg at 01/21/23 0053    carvediloL tablet 6.25 mg, 6.25 mg, Oral, BID, Matias Gee MD, 6.25 mg at 01/21/23 0855    clonazePAM tablet 0.5 mg, 0.5 mg, Oral, QHS, Hubert Aleman MD, 0.5 mg at 01/21/23 0304    dextrose 10% bolus 125 mL 125 mL, 12.5 g, Intravenous, PRN, Matias Gee MD    dextrose 10% bolus 200 mL 200 mL, 200 mL, Intravenous, bolus from bag, Yessica Hodge MD, Stopped at 01/20/23 1734    dextrose 10% bolus 250 mL 250 mL, 25 g, Intravenous, PRN, Matias Gee MD    ferrous sulfate tablet 1 each, 1 tablet, Oral, Every other day, Margarito Pace, DO, 1 each at 01/20/23 0916    fluticasone furoate-vilanteroL 100-25 mcg/dose diskus inhaler 1 puff, 1 puff, Inhalation, Daily, Matias Gee MD, 1 puff at 01/21/23 0756    glucagon (human recombinant) injection 1 mg, 1 mg, Intramuscular, PRN, Matias Gee MD    glucose chewable tablet 16 g, 16 g, Oral, PRN, Matias Gee MD    glucose chewable tablet 24 g, 24 g, Oral, PRN, Matias Gee MD    guaiFENesin 12 hr tablet 600 mg, 600 mg, Oral, BID, Renaldo Acosta MD, 600 mg at 01/21/23 0855    heparin 25,000 units in dextrose 5% (100 units/ml) IV bolus from bag - ADDITIONAL PRN BOLUS - 30 units/kg, 30 Units/kg (Adjusted), Intravenous, PRN, Zui Keat Ng, DO    heparin 25,000 units in dextrose 5% (100 units/ml) IV bolus from bag - ADDITIONAL PRN BOLUS - 60 units/kg, 60 Units/kg (Adjusted), Intravenous, PRN, Zui Keat Ng, DO    heparin 25,000 units in dextrose 5% 250 mL (100 units/mL) infusion LOW INTENSITY nomogram - LAF, 0-40 Units/kg/hr (Adjusted), Intravenous, Continuous, Renaldo Acosta MD, Last Rate: 8.3 mL/hr at 01/21/23 0740, 12 Units/kg/hr at 01/21/23 0740    insulin aspart U-100 injection 0-5 Units, 0-5 Units, Subcutaneous, QID (AC + HS) PRN, Matias Gee MD, 1 Units at 01/20/23 2111    insulin detemir U-100 injection 30 Units, 30 Units,  Subcutaneous, QHS, Matias Gee MD, 30 Units at 01/20/23 2110    levothyroxine tablet 125 mcg, 125 mcg, Oral, Before breakfast, Matias Gee MD, 125 mcg at 01/21/23 0538    melatonin tablet 6 mg, 6 mg, Oral, Nightly PRN, Matias Gee MD, 6 mg at 01/20/23 2054    mupirocin 2 % ointment, , Nasal, BID, Travon Nichole MD, Given at 01/21/23 0857    pantoprazole EC tablet 40 mg, 40 mg, Oral, Daily, Matias Gee MD, 40 mg at 01/21/23 0856    sevelamer carbonate tablet 1,600 mg, 1,600 mg, Oral, TID, Matias Gee MD, 1,600 mg at 01/21/23 0855    sodium chloride 0.9% flush 10 mL, 10 mL, Intravenous, PRN, Matias Gee MD     Impression/Plan:  1. End-stage renal disease:  Continue hemodialysis Monday Wednesday Friday while hospitalized labs reviewed stable at this time  2. Hyperphosphatemia:  Phosphorus is now corrected to 2.7 would decrease sevelamer from 1600 t.i.d. to 800 t.i.d. with meals and will hold sevelamer patient is not eating.  3. Acute anemia: Responded to blood transfusion.  Patient is iron deficient recommend 1 dose of Feraheme 510 mg IV x1 ferritin is elevated but is likely acute phase reactant.  Status post endoscopy yesterday.  Will monitor CBC and transfuse if needed  Yaz Cheng M.D.  Nephrology

## 2023-01-21 NOTE — PROGRESS NOTES
[facility] Medicine Wards   Progress Note     Resident Team: [facility] Medicine List 2  Attending Physician: Travon Nichole MD  Resident: Margarito Pace DO   Intern: Matias Gee MD   Hospital Length of Stay: 2 days    Subjective:      Brief HPI:  Rosette Madrid is a 77 y.o. female who with a history of CAD status post CABG X 4 prior to 2005, ESRD on HD (T-TH-SAT), hypertension, diabetes mellitus, heart failure with a preserved ejection fraction (50-55% 12/2021), COPD (last PFTs in 2015 with gold stage II), hypothyroidism status post thyroidectomy, history of melena status post EGD 12/31/2021 who presented to [facility] ED on 1/19/2023  with complaint of asymptomatic anemia, melena.     Patient has been receiving dialysis Tuesday, Thursday, Saturday as scheduled.  Patient does not complain of any issues at this time, however however prior to today's hemodialysis session, her son was called and informed that her hemoglobin was low which was noted to be a significant drop from previous hemoglobin of 11.4.  Per the son, dialysis center apparently gets labs once a month and has noted a drop each time, and informed that patient needs to go to the ED.     At the ED patient was noted to be normotensive, and not in distress.  H&H was 6.6/22.3, BUN and creatinine was 34.9/5.34 however rest of CMP was unremarkable for any sort of electrolyte abnormalities.  Guaiac stool positive after RICKY done.  Chest x-ray was unremarkable on read, may have some slight increase in vasculature and possible cephalization but no pleural effusions.  EKG also was noted to show in atrial fibrillation, with left bundle branch block.  No palpitations, or shortness of breath endorsed.  Medicine was consulted for admission for hemodialysis, transfusion, new onset atrial fibrillation and workup of possible GI bleed.      Patient states that during dialysis day she is much more swollen throughout however resolves after dialysis session.       Interval History:   Patient doing well this morning.  She complains of a little bit of irritation in her throat.  Patient had push enteroscopy done yesterday with no upper GI bleeding source identified.  GI recommends outpatient capsule study, repeat.  Patient also had dialysis yesterday, states swelling is improved.  We will follow through with Cardiology plan to place patient on heparin and swap to Eliquis 5 mg on discharge.    Review of Systems:  Review of Systems   Constitutional:  Negative for chills, fever and weight loss.   HENT:  Negative for hearing loss.    Respiratory:  Negative for cough and shortness of breath.    Cardiovascular:  Negative for chest pain and leg swelling.   Gastrointestinal:  Positive for melena.   Neurological:  Negative for dizziness.        Objective:     Vital Signs (Most Recent):  Temp: 98.6 °F (37 °C) (01/21/23 0735)  Pulse: (!) 57 (01/21/23 0756)  Resp: 19 (01/21/23 0756)  BP: 138/79 (01/21/23 0855)  SpO2: 99 % (01/21/23 0756)   Vital Signs (24h Range):  Temp:  [97.6 °F (36.4 °C)-99.2 °F (37.3 °C)] 98.6 °F (37 °C)  Pulse:  [55-71] 57  Resp:  [18-25] 19  SpO2:  [94 %-99 %] 99 %  BP: (124-159)/(60-79) 138/79       Physical Examination:  Physical Exam  Constitutional:       General: She is not in acute distress.     Appearance: Normal appearance. She is obese.   HENT:      Head: Normocephalic.   Eyes:      Pupils: Pupils are equal, round, and reactive to light.   Cardiovascular:      Rate and Rhythm: Normal rate. Rhythm irregular.      Pulses: Normal pulses.   Pulmonary:      Effort: Pulmonary effort is normal.      Breath sounds: Normal breath sounds.   Abdominal:      General: Abdomen is flat.      Palpations: Abdomen is soft.      Tenderness: There is no abdominal tenderness.   Musculoskeletal:         General: Swelling present. Normal range of motion.      Comments: Left AV fistula with palpable thrill   Skin:     General: Skin is warm.      Capillary Refill: Capillary refill takes less than 2  seconds.   Neurological:      General: No focal deficit present.      Mental Status: She is alert and oriented to person, place, and time.   Psychiatric:         Mood and Affect: Mood normal.         Behavior: Behavior normal.         Thought Content: Thought content normal.         Laboratory:  Most Recent Data:  CBC:   Lab Results   Component Value Date    WBC 8.2 01/21/2023    HGB 8.1 (L) 01/21/2023    HCT 26.2 (L) 01/21/2023     01/21/2023    MCV 94.6 (H) 01/21/2023    RDW 17.2 (H) 01/21/2023     BMP:   Lab Results   Component Value Date     (L) 01/21/2023    K 4.1 01/21/2023    CHLORIDE 98 01/21/2023    CO2 28 01/21/2023    BUN 25.3 (H) 01/21/2023    CREATININE 3.59 (H) 01/21/2023    GLUCOSE 75 (L) 01/21/2023    CALCIUM 9.1 01/21/2023     LFTs:   Lab Results   Component Value Date    ALBUMIN 2.7 (L) 01/21/2023    BILITOT 0.4 01/21/2023    BILIDIR <0.1 03/22/2022    IBILI >0.10 03/22/2022    AST 16 01/21/2023    ALKPHOS 63 01/21/2023    ALT 18 01/21/2023     Coags:   Lab Results   Component Value Date    INR 1.04 01/20/2023    PROTIME 13.4 01/20/2023    PTT 98.7 01/21/2023     FLP:   Lab Results   Component Value Date    CHOL 117 09/30/2022    HDL 42 09/30/2022    LDL 62.00 09/30/2022    TRIG 66 09/30/2022     DM:   Lab Results   Component Value Date    HGBA1C 5.8 01/19/2023    HGBA1C 6.0 09/30/2022    HGBA1C 7.8 (H) 12/15/2021    CREATININE 3.59 (H) 01/21/2023     Thyroid:   Lab Results   Component Value Date    TSH 1.602 01/19/2023    [id number] 1.13 12/15/2021     Anemia:   Lab Results   Component Value Date    IRON 44 (L) 01/19/2023    FERRITIN 1,461.24 (H) 01/19/2023    TRANS 202 01/19/2023    TRANS 202 01/19/2023    TIBC 241 (L) 01/19/2023    NXGMSDNX03 817 (H) 01/19/2023    FOLATE 15.0 01/19/2023     Cardiac:   Lab Results   Component Value Date    TROPONINI 0.071 (H) 01/19/2023     (H) 01/12/2019    CPKMB 3.1 01/12/2019    .4 03/19/2022     Urinalysis:   Lab Results    Component Value Date    COLORU YELLOW 09/30/2021    PHUA 7.0 09/30/2021    CLARITYU Clear 02/14/2018    SPECGRAV 1.020 02/14/2018    NITRITE Negative 09/30/2021    KETONESU Negative 09/30/2021    UROBILINOGEN Normal 09/30/2021    WBCUA 0-2 09/30/2021       Trended Cardiac Data:  Recent Labs   Lab 01/19/23  1426 01/19/23  1633   TROPONINI 0.076* 0.071*         Radiology:  Imaging Results              X-Ray Chest 1 View (Final result)  Result time 01/19/23 17:47:25      Final result by Reinier Owen MD (01/19/23 17:47:25)                   Impression:      No acute pulmonary process identified.      Electronically signed by: Reinier Owen  Date:    01/19/2023  Time:    17:47               Narrative:    EXAMINATION:  XR CHEST 1 VIEW    CLINICAL HISTORY:  coarse breath sounds;    TECHNIQUE:  Frontal view(s) of the chest.    COMPARISON:  Radiography 09/30/2022    FINDINGS:  Prior sternotomy.  Stable cardiac silhouette with similar tortuosity of the thoracic aorta.  The lungs are well-inflated.  Sites of mild scarring in the lower left lung similar since September.  No acute consolidation.  No significant pleural effusion or discernible pneumothorax.                                      Current Medications:     Infusions:   heparin (porcine) in D5W 12 Units/kg/hr (01/21/23 0740)        Scheduled:   albuterol-ipratropium  3 mL Nebulization Q6H    amLODIPine  5 mg Oral Daily    apixaban  5 mg Oral BID    atorvastatin  40 mg Oral Daily    carvediloL  6.25 mg Oral BID    clonazePAM  0.5 mg Oral QHS    ferrous sulfate  1 tablet Oral Every other day    fluticasone furoate-vilanteroL  1 puff Inhalation Daily    guaiFENesin  600 mg Oral BID    insulin detemir U-100  30 Units Subcutaneous QHS    levothyroxine  125 mcg Oral Before breakfast    mupirocin   Nasal BID    pantoprazole  40 mg Oral Daily    sevelamer carbonate  1,600 mg Oral TID        PRN:  sodium chloride, acetaminophen, benzonatate, dextrose 10%, dextrose 10%,  dextrose 10%, glucagon (human recombinant), glucose, glucose, heparin (PORCINE), heparin (PORCINE), insulin aspart U-100, melatonin, sodium chloride 0.9%      Intake/Output Summary (Last 24 hours) at 1/21/2023 0930  Last data filed at 1/21/2023 0437  Gross per 24 hour   Intake 1502.36 ml   Output 1500 ml   Net 2.36 ml         Lines/Drains/Airways       Drain  Duration                  Hemodialysis AV Fistula Left forearm -- days              Peripheral Intravenous Line  Duration                  Peripheral IV - Single Lumen 01/20/23 2250 20 G Posterior;Right Hand <1 day                      Assessment & Plan:   Macrocytic anemia  Melena - upper GI bleed versus iron supplementation discoloration versus possible malignancy  - holding all anticoagulation, antiplatelet for possible bleed  - noted EGD last done 12/2021 showing a single small nonbleeding angio ectasia in the gastric antrum which was successfully controlled with hemostasis/APC  - colonoscopy done 07/21/2021 noted to have numerous polyps removed and to be redone in 3 years  - patient was transfused 2 units last night and H&H matilda 6.6-8.7  - GI consulted, push enteroscopy done with no source of bleed identified, recommend capsule study repeat as outpatient  - patient has a history of AVM upon further chart review, capsule endoscopy performed 01/02/2022 was technically incomplete.  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, on hemodialysis (Tuesday, Thursday, Saturday)  - currently no indications for urgent dialysis  - nephrology consulted, dialysis performed yesterday patient looks improved, 1 L of fluid removed that  - continue home phosphate binder     New onset atrial fibrillation  History of CAD status post CABG x4  Heart failure with preserved ejection fraction (50-55% on last echo)  - [id number] - 7  - HAS-BLED - 4   -continue statin, we will hold aspirin in the setting possible bleed  -we will resume amlodipine,  Coreg home doses  - LDL at goal 62 mg/dL  - she has both a high risk for bleeding as well as for stroke, we will hold anticoagulation for now though we will plan to start eventually  - patient currently on a heparin drip post enteroscopy per Cardiology recommendation, appreciate recommendations  - we will plan to transition to Eliquis tonight at 9:00 a.m., 5 mg b.i.d. for AFib     COPD, PFTs last done 2015 gold stage  -DuoNebs q.6 H  - resume home inhalers     Hypertension  -we will resume home antihypertensives  -currently controlled     Diabetes mellitus  - A1c this admission is 5.8  - resuming home basal insulin at 30 units  - low-dose sliding scale continued           CODE STATUS: Full Code  Access: Peripheral, left forearm AV fistula  Diet:  NPO at this time, can resume renal diet on dialysis if no problems status post enteroscopy  DVT Prophylaxis: Teds/SCDs  GI Prophylaxis: proton pump inhibitor per orders  Fluids: none     Disposition: day 2 of admission for suspected upper GI bleed, enteroscopy done without bleeding, dialysis done yesterday, plan to continue heparin until 6:00 p.m. today and then transition Eliquis b.i.d. at 9:00 pm, anticipate discharge tomorrow    Matias Gee MD  [facility] PGY-1

## 2023-01-22 VITALS
RESPIRATION RATE: 18 BRPM | OXYGEN SATURATION: 100 % | WEIGHT: 209.75 LBS | TEMPERATURE: 99 F | DIASTOLIC BLOOD PRESSURE: 72 MMHG | HEART RATE: 58 BPM | HEIGHT: 63 IN | SYSTOLIC BLOOD PRESSURE: 145 MMHG | BODY MASS INDEX: 37.16 KG/M2

## 2023-01-22 PROBLEM — I48.91 NEW ONSET A-FIB: Status: ACTIVE | Noted: 2023-01-22

## 2023-01-22 LAB
ABO + RH BLD: NORMAL
ABO + RH BLD: NORMAL
ALBUMIN SERPL-MCNC: 2.8 G/DL (ref 3.4–4.8)
ALBUMIN/GLOB SERPL: 0.9 RATIO (ref 1.1–2)
ALP SERPL-CCNC: 78 UNIT/L (ref 40–150)
ALT SERPL-CCNC: 16 UNIT/L (ref 0–55)
AST SERPL-CCNC: 16 UNIT/L (ref 5–34)
BASOPHILS # BLD AUTO: 0.02 X10(3)/MCL (ref 0–0.2)
BASOPHILS NFR BLD AUTO: 0.2 %
BILIRUBIN DIRECT+TOT PNL SERPL-MCNC: 0.3 MG/DL
BLD PROD TYP BPU: NORMAL
BLD PROD TYP BPU: NORMAL
BLOOD UNIT EXPIRATION DATE: NORMAL
BLOOD UNIT EXPIRATION DATE: NORMAL
BLOOD UNIT TYPE CODE: 5100
BLOOD UNIT TYPE CODE: 5100
BUN SERPL-MCNC: 36.7 MG/DL (ref 9.8–20.1)
CALCIUM SERPL-MCNC: 9.4 MG/DL (ref 8.4–10.2)
CHLORIDE SERPL-SCNC: 97 MMOL/L (ref 98–107)
CO2 SERPL-SCNC: 26 MMOL/L (ref 23–31)
CREAT SERPL-MCNC: 4.03 MG/DL (ref 0.55–1.02)
CROSSMATCH INTERPRETATION: NORMAL
CROSSMATCH INTERPRETATION: NORMAL
DISPENSE STATUS: NORMAL
DISPENSE STATUS: NORMAL
EOSINOPHIL # BLD AUTO: 0.08 X10(3)/MCL (ref 0–0.9)
EOSINOPHIL NFR BLD AUTO: 1 %
ERYTHROCYTE [DISTWIDTH] IN BLOOD BY AUTOMATED COUNT: 16.3 % (ref 11.5–17)
GFR SERPLBLD CREATININE-BSD FMLA CKD-EPI: 11 MLS/MIN/1.73/M2
GLOBULIN SER-MCNC: 3.2 GM/DL (ref 2.4–3.5)
GLUCOSE SERPL-MCNC: 74 MG/DL (ref 82–115)
HCT VFR BLD AUTO: 26 % (ref 37–47)
HGB BLD-MCNC: 7.9 GM/DL (ref 12–16)
IMM GRANULOCYTES # BLD AUTO: 0.03 X10(3)/MCL (ref 0–0.04)
IMM GRANULOCYTES NFR BLD AUTO: 0.4 %
LYMPHOCYTES # BLD AUTO: 0.81 X10(3)/MCL (ref 0.6–4.6)
LYMPHOCYTES NFR BLD AUTO: 9.8 %
MAGNESIUM SERPL-MCNC: 2.1 MG/DL (ref 1.6–2.6)
MCH RBC QN AUTO: 28.9 PG
MCHC RBC AUTO-ENTMCNC: 30.4 MG/DL (ref 33–36)
MCV RBC AUTO: 95.2 FL (ref 80–94)
MONOCYTES # BLD AUTO: 0.66 X10(3)/MCL (ref 0.1–1.3)
MONOCYTES NFR BLD AUTO: 8 %
NEUTROPHILS # BLD AUTO: 6.7 X10(3)/MCL (ref 2.1–9.2)
NEUTROPHILS NFR BLD AUTO: 80.6 %
NRBC BLD AUTO-RTO: 0 %
PHOSPHATE SERPL-MCNC: 2.6 MG/DL (ref 2.3–4.7)
PLATELET # BLD AUTO: 170 X10(3)/MCL (ref 130–400)
PMV BLD AUTO: 10.6 FL (ref 7.4–10.4)
POCT GLUCOSE: 155 MG/DL (ref 70–110)
POCT GLUCOSE: 171 MG/DL (ref 70–110)
POCT GLUCOSE: 57 MG/DL (ref 70–110)
POTASSIUM SERPL-SCNC: 4.5 MMOL/L (ref 3.5–5.1)
PROT SERPL-MCNC: 6 GM/DL (ref 5.8–7.6)
RBC # BLD AUTO: 2.73 X10(6)/MCL (ref 4.2–5.4)
SODIUM SERPL-SCNC: 130 MMOL/L (ref 136–145)
UNIT NUMBER: NORMAL
UNIT NUMBER: NORMAL
WBC # SPEC AUTO: 8.3 X10(3)/MCL (ref 4.5–11.5)

## 2023-01-22 PROCEDURE — 94640 AIRWAY INHALATION TREATMENT: CPT

## 2023-01-22 PROCEDURE — 99900035 HC TECH TIME PER 15 MIN (STAT)

## 2023-01-22 PROCEDURE — 25000003 PHARM REV CODE 250

## 2023-01-22 PROCEDURE — 25000242 PHARM REV CODE 250 ALT 637 W/ HCPCS

## 2023-01-22 PROCEDURE — 80100014 HC HEMODIALYSIS 1:1

## 2023-01-22 PROCEDURE — 83735 ASSAY OF MAGNESIUM: CPT

## 2023-01-22 PROCEDURE — 36430 TRANSFUSION BLD/BLD COMPNT: CPT

## 2023-01-22 PROCEDURE — 84100 ASSAY OF PHOSPHORUS: CPT

## 2023-01-22 PROCEDURE — 80053 COMPREHEN METABOLIC PANEL: CPT

## 2023-01-22 PROCEDURE — 85025 COMPLETE CBC W/AUTO DIFF WBC: CPT

## 2023-01-22 PROCEDURE — P9016 RBC LEUKOCYTES REDUCED: HCPCS | Performed by: STUDENT IN AN ORGANIZED HEALTH CARE EDUCATION/TRAINING PROGRAM

## 2023-01-22 PROCEDURE — 94761 N-INVAS EAR/PLS OXIMETRY MLT: CPT

## 2023-01-22 PROCEDURE — P9016 RBC LEUKOCYTES REDUCED: HCPCS | Performed by: PHYSICIAN ASSISTANT

## 2023-01-22 PROCEDURE — 36415 COLL VENOUS BLD VENIPUNCTURE: CPT

## 2023-01-22 PROCEDURE — 25000003 PHARM REV CODE 250: Performed by: INTERNAL MEDICINE

## 2023-01-22 RX ORDER — FERROUS SULFATE 325(65) MG
325 TABLET, DELAYED RELEASE (ENTERIC COATED) ORAL DAILY
Qty: 120 TABLET | Refills: 0 | Status: SHIPPED | OUTPATIENT
Start: 2023-01-22 | End: 2023-03-01

## 2023-01-22 RX ORDER — HYDROCODONE BITARTRATE AND ACETAMINOPHEN 500; 5 MG/1; MG/1
TABLET ORAL
Status: DISCONTINUED | OUTPATIENT
Start: 2023-01-22 | End: 2023-01-22 | Stop reason: HOSPADM

## 2023-01-22 RX ADMIN — IPRATROPIUM BROMIDE AND ALBUTEROL SULFATE 3 ML: 2.5; .5 SOLUTION RESPIRATORY (INHALATION) at 07:01

## 2023-01-22 RX ADMIN — APIXABAN 2.5 MG: 2.5 TABLET, FILM COATED ORAL at 08:01

## 2023-01-22 RX ADMIN — SEVELAMER CARBONATE 800 MG: 800 TABLET, FILM COATED ORAL at 08:01

## 2023-01-22 RX ADMIN — CARVEDILOL 6.25 MG: 3.12 TABLET, FILM COATED ORAL at 08:01

## 2023-01-22 RX ADMIN — MUPIROCIN: 20 OINTMENT TOPICAL at 08:01

## 2023-01-22 RX ADMIN — PANTOPRAZOLE SODIUM 40 MG: 40 TABLET, DELAYED RELEASE ORAL at 08:01

## 2023-01-22 RX ADMIN — IPRATROPIUM BROMIDE AND ALBUTEROL SULFATE 3 ML: 2.5; .5 SOLUTION RESPIRATORY (INHALATION) at 12:01

## 2023-01-22 RX ADMIN — SEVELAMER CARBONATE 800 MG: 800 TABLET, FILM COATED ORAL at 03:01

## 2023-01-22 RX ADMIN — GUAIFENESIN 600 MG: 600 TABLET ORAL at 08:01

## 2023-01-22 RX ADMIN — ATORVASTATIN CALCIUM 40 MG: 40 TABLET, FILM COATED ORAL at 08:01

## 2023-01-22 RX ADMIN — FLUTICASONE FUROATE AND VILANTEROL TRIFENATATE 1 PUFF: 100; 25 POWDER RESPIRATORY (INHALATION) at 07:01

## 2023-01-22 RX ADMIN — BENZONATATE 100 MG: 100 CAPSULE ORAL at 08:01

## 2023-01-22 RX ADMIN — AMLODIPINE BESYLATE 5 MG: 5 TABLET ORAL at 08:01

## 2023-01-22 NOTE — NURSING
01/22/23 1331        Hemodialysis AV Fistula Left forearm   No placement date or time found.   Present Prior to Hospital Arrival?: Yes  Location: Left forearm   Needle Size 15ga   Site Assessment No redness;Intact;Dry;Clean;No swelling   Patency Present;Bruit;Thrill   Status Deaccessed   Flows Good   Dressing Intervention First dressing   Dressing Status Clean;Dry;Intact   Site Condition No complications   Dressing Gauze   Post-Hemodialysis Assessment   Blood Volume Processed (Liters) 71.8 L   Dialyzer Clearance Lightly streaked   Duration of Treatment 180 minutes   Total UF (mL) 3100 mL   Net Fluid Removal 2000   Patient Response to Treatment Dialyzied x 3 hours.  Net fluid removal of 2 liters.  Tolerated well.  Vitals stable.  2 units prbc given on hd and volume removed.   Post-Hemodialysis Comments Deaccessed per p and p.  Tolerated well.  NO bleeding

## 2023-01-22 NOTE — PROGRESS NOTES
"Nephrology Progress Note    Hospital course:   77 year old female with ESRD and symptomatic anemia. Patient status post GI evaluation. No source identified initially. Today hgb 7.9 and patient feel weak. Will dialyze and patient will receive 2 units packed RBC.    Subjective:   Feels "weak" today.    12 point ROS:  Describes dark stools overnight, some weakness. Otherwise, negative    Objective:   BP (!) 166/74   Pulse 67   Temp 98.2 °F (36.8 °C) (Oral)   Resp 20   Ht 5' 3" (1.6 m)   Wt 95.1 kg (209 lb 12.3 oz)   SpO2 100%   BMI 37.16 kg/m²     Intake/Output Summary (Last 24 hours) at 1/22/2023 0912  Last data filed at 1/21/2023 1800  Gross per 24 hour   Intake 433.85 ml   Output --   Net 433.85 ml        Physical exam:   General: alert and oriented x 3  HEENT: Normocephalic atraumatic, pale conjunctiva bilaterally  Neck: no JVD bruits  Lungs: CTA bilaterally all fields  CVS: RRR, no m/r/g  Abdomen: positve bowel sounds all 4 quadrants   Extremites: no clubbing, cyanosis or edema  Neurologic: CN 2-12 intact    Laboratory data:   Recent Results (from the past 24 hour(s))   POCT glucose    Collection Time: 01/21/23 11:37 AM   Result Value Ref Range    POCT Glucose 155 (H) 70 - 110 mg/dL   APTT    Collection Time: 01/21/23  1:37 PM   Result Value Ref Range    .8 (HH) <150.0 seconds   POCT glucose    Collection Time: 01/21/23  3:06 PM   Result Value Ref Range    POCT Glucose 276 (H) 70 - 110 mg/dL   APTT    Collection Time: 01/21/23  3:28 PM   Result Value Ref Range    PTT 69.2 <150.0 seconds   POCT glucose    Collection Time: 01/21/23  7:30 PM   Result Value Ref Range    POCT Glucose 141 (H) 70 - 110 mg/dL   Comprehensive metabolic panel    Collection Time: 01/22/23  5:16 AM   Result Value Ref Range    Sodium Level 130 (L) 136 - 145 mmol/L    Potassium Level 4.5 3.5 - 5.1 mmol/L    Chloride 97 (L) 98 - 107 mmol/L    Carbon Dioxide 26 23 - 31 mmol/L    Glucose Level 74 (L) 82 - 115 mg/dL    Blood Urea " Nitrogen 36.7 (H) 9.8 - 20.1 mg/dL    Creatinine 4.03 (H) 0.55 - 1.02 mg/dL    Calcium Level Total 9.4 8.4 - 10.2 mg/dL    Protein Total 6.0 5.8 - 7.6 gm/dL    Albumin Level 2.8 (L) 3.4 - 4.8 g/dL    Globulin 3.2 2.4 - 3.5 gm/dL    Albumin/Globulin Ratio 0.9 (L) 1.1 - 2.0 ratio    Bilirubin Total 0.3 <=1.5 mg/dL    Alkaline Phosphatase 78 40 - 150 unit/L    Alanine Aminotransferase 16 0 - 55 unit/L    Aspartate Aminotransferase 16 5 - 34 unit/L    eGFR 11 mls/min/1.73/m2   Phosphorus    Collection Time: 01/22/23  5:16 AM   Result Value Ref Range    Phosphorus Level 2.6 2.3 - 4.7 mg/dL   Magnesium    Collection Time: 01/22/23  5:16 AM   Result Value Ref Range    Magnesium Level 2.10 1.60 - 2.60 mg/dL   CBC with Differential    Collection Time: 01/22/23  5:16 AM   Result Value Ref Range    WBC 8.3 4.5 - 11.5 x10(3)/mcL    RBC 2.73 (L) 4.20 - 5.40 x10(6)/mcL    Hgb 7.9 (L) 12.0 - 16.0 gm/dL    Hct 26.0 (L) 37.0 - 47.0 %    MCV 95.2 (H) 80.0 - 94.0 fL    MCH 28.9 pg    MCHC 30.4 (L) 33.0 - 36.0 mg/dL    RDW 16.3 11.5 - 17.0 %    Platelet 170 130 - 400 x10(3)/mcL    MPV 10.6 (H) 7.4 - 10.4 fL    Neut % 80.6 %    Lymph % 9.8 %    Mono % 8.0 %    Eos % 1.0 %    Basophil % 0.2 %    Lymph # 0.81 0.6 - 4.6 x10(3)/mcL    Neut # 6.70 2.1 - 9.2 x10(3)/mcL    Mono # 0.66 0.1 - 1.3 x10(3)/mcL    Eos # 0.08 0 - 0.9 x10(3)/mcL    Baso # 0.02 0 - 0.2 x10(3)/mcL    IG# 0.03 0 - 0.04 x10(3)/mcL    IG% 0.4 %    NRBC% 0.0 %   POCT glucose    Collection Time: 01/22/23  8:19 AM   Result Value Ref Range    POCT Glucose 57 (L) 70 - 110 mg/dL       Imaging:   Imaging Results              X-Ray Chest 1 View (Final result)  Result time 01/19/23 17:47:25      Final result by Reinier Owen MD (01/19/23 17:47:25)                   Impression:      No acute pulmonary process identified.      Electronically signed by: Reinier Owen  Date:    01/19/2023  Time:    17:47               Narrative:    EXAMINATION:  XR CHEST 1 VIEW    CLINICAL  HISTORY:  coarse breath sounds;    TECHNIQUE:  Frontal view(s) of the chest.    COMPARISON:  Radiography 09/30/2022    FINDINGS:  Prior sternotomy.  Stable cardiac silhouette with similar tortuosity of the thoracic aorta.  The lungs are well-inflated.  Sites of mild scarring in the lower left lung similar since September.  No acute consolidation.  No significant pleural effusion or discernible pneumothorax.                                       Medications:     Current Facility-Administered Medications:     0.9%  NaCl infusion (for blood administration), , Intravenous, Q24H PRN, Matias Gee MD    0.9%  NaCl infusion (for blood administration), , Intravenous, Q24H PRN, Margarito Pace DO    acetaminophen tablet 650 mg, 650 mg, Oral, Q4H PRN, Renaldo Acosta MD, 650 mg at 01/21/23 0054    albuterol-ipratropium 2.5 mg-0.5 mg/3 mL nebulizer solution 3 mL, 3 mL, Nebulization, Q6H, Matias Gee MD, 3 mL at 01/22/23 0733    amLODIPine tablet 5 mg, 5 mg, Oral, Daily, Matias Gee MD, 5 mg at 01/22/23 0849    apixaban tablet 2.5 mg, 2.5 mg, Oral, BID, Yaz Cheng MD, 2.5 mg at 01/22/23 0850    atorvastatin tablet 40 mg, 40 mg, Oral, Daily, Matias Gee MD, 40 mg at 01/22/23 0850    benzonatate capsule 100 mg, 100 mg, Oral, TID PRN, Renaldo Acosta MD, 100 mg at 01/22/23 0849    carvediloL tablet 6.25 mg, 6.25 mg, Oral, BID, Matias Gee MD, 6.25 mg at 01/22/23 0849    clonazePAM tablet 0.5 mg, 0.5 mg, Oral, QHS, Hubert Aleman MD, 0.5 mg at 01/21/23 2103    dextrose 10% bolus 125 mL 125 mL, 12.5 g, Intravenous, PRN, Matias Gee MD    dextrose 10% bolus 200 mL 200 mL, 200 mL, Intravenous, bolus from bag, Yessica Hodge MD, Stopped at 01/20/23 1734    dextrose 10% bolus 250 mL 250 mL, 25 g, Intravenous, PRN, Matias Gee MD    fluticasone furoate-vilanteroL 100-25 mcg/dose diskus inhaler 1 puff, 1 puff, Inhalation, Daily, Matias Gee MD, 1 puff at 01/22/23  0742    glucagon (human recombinant) injection 1 mg, 1 mg, Intramuscular, PRN, Matias Gee MD    glucose chewable tablet 16 g, 16 g, Oral, PRN, Matias Gee MD    glucose chewable tablet 24 g, 24 g, Oral, PRN, Matias Gee MD    guaiFENesin 12 hr tablet 600 mg, 600 mg, Oral, BID, Renaldo Acosta MD, 600 mg at 01/22/23 0849    insulin aspart U-100 injection 0-5 Units, 0-5 Units, Subcutaneous, QID (AC + HS) PRN, Matias Gee MD, 3 Units at 01/21/23 1706    insulin detemir U-100 injection 30 Units, 30 Units, Subcutaneous, QHS, Matias Gee MD, 30 Units at 01/21/23 2106    levothyroxine tablet 125 mcg, 125 mcg, Oral, Before breakfast, Matias Gee MD, 125 mcg at 01/21/23 0538    melatonin tablet 6 mg, 6 mg, Oral, Nightly PRN, Matias Gee MD, 6 mg at 01/21/23 2103    mupirocin 2 % ointment, , Nasal, BID, Travon Nichole MD, Given at 01/22/23 0850    pantoprazole EC tablet 40 mg, 40 mg, Oral, Daily, Matias Gee MD, 40 mg at 01/22/23 0850    sevelamer carbonate tablet 800 mg, 800 mg, Oral, TID, Yaz Cheng MD, 800 mg at 01/22/23 0850    sodium chloride 0.9% flush 10 mL, 10 mL, Intravenous, PRN, Matias Gee MD     Impression/Plan:  1.ESRD: HD today for 3 hours, remove 1-2 liters as tolerated and transfuse 2 units PRBC.  2. Symptomatic anemia: transfuse 2 units PRBC with HD.  3. Hyperphoshatemia: decreased Renvela to 800 tid form 1600 since phos 2.6. Monitor if phos further decreases may hold renvela.  Yaz Cheng M.D.  Nephrology

## 2023-01-22 NOTE — PLAN OF CARE
Problem: Adult Inpatient Plan of Care  Goal: Plan of Care Review  Outcome: Ongoing, Progressing  Goal: Patient-Specific Goal (Individualized)  Outcome: Ongoing, Progressing  Goal: Absence of Hospital-Acquired Illness or Injury  Outcome: Ongoing, Progressing  Goal: Optimal Comfort and Wellbeing  Outcome: Ongoing, Progressing  Goal: Readiness for Transition of Care  Outcome: Ongoing, Progressing     Problem: Diabetes Comorbidity  Goal: Blood Glucose Level Within Targeted Range  Outcome: Ongoing, Progressing     Problem: Device-Related Complication Risk (Hemodialysis)  Goal: Safe, Effective Therapy Delivery  Outcome: Ongoing, Progressing     Problem: Hemodynamic Instability (Hemodialysis)  Goal: Effective Tissue Perfusion  Outcome: Ongoing, Progressing     Problem: Infection (Hemodialysis)  Goal: Absence of Infection Signs and Symptoms  Outcome: Ongoing, Progressing     Problem: Anemia  Goal: Anemia Symptom Improvement  Outcome: Ongoing, Progressing     Problem: Skin Injury Risk Increased  Goal: Skin Health and Integrity  Outcome: Ongoing, Progressing

## 2023-01-22 NOTE — PROGRESS NOTES
[facility] Medicine Wards   Progress Note     Resident Team: [facility] Medicine List 2  Attending Physician: Travon Nichole MD  Resident: Margarito Pace DO   Intern: Matias Gee MD   Hospital Length of Stay: 3 days    Subjective:      Brief HPI:  Rosette Madrid is a 77 y.o. female who with a history of CAD status post CABG X 4 prior to 2005, ESRD on HD (T-TH-SAT), hypertension, diabetes mellitus, heart failure with a preserved ejection fraction (50-55% 12/2021), COPD (last PFTs in 2015 with gold stage II), hypothyroidism status post thyroidectomy, history of melena status post EGD 12/31/2021 who presented to [facility] ED on 1/19/2023  with complaint of asymptomatic anemia, melena.     Patient has been receiving dialysis Tuesday, Thursday, Saturday as scheduled.  Patient does not complain of any issues at this time, however however prior to today's hemodialysis session, her son was called and informed that her hemoglobin was low which was noted to be a significant drop from previous hemoglobin of 11.4.  Per the son, dialysis center apparently gets labs once a month and has noted a drop each time, and informed that patient needs to go to the ED.     At the ED patient was noted to be normotensive, and not in distress.  H&H was 6.6/22.3, BUN and creatinine was 34.9/5.34 however rest of CMP was unremarkable for any sort of electrolyte abnormalities.  Guaiac stool positive after RICKY done.  Chest x-ray was unremarkable on read, may have some slight increase in vasculature and possible cephalization but no pleural effusions.  EKG also was noted to show in atrial fibrillation, with left bundle branch block.  No palpitations, or shortness of breath endorsed.  Medicine was consulted for admission for hemodialysis, transfusion, new onset atrial fibrillation and workup of possible GI bleed.      Patient states that during dialysis day she is much more swollen throughout however resolves after dialysis session.       Interval History:   Patient seen in in chair, sitting up.  She states she is doing well.  No acute events overnight.  Hemoglobin this morning was 7.9, holding stable from previous values.  Patient states that her bowel movements are brown, nonbloody.  She was transitioned to Eliquis 5 mg b.i.d. patient states she is ready to go home.    Review of Systems:  Review of Systems   Constitutional:  Negative for chills, fever and weight loss.   HENT:  Negative for hearing loss.    Respiratory:  Negative for cough and shortness of breath.    Cardiovascular:  Negative for chest pain and leg swelling.   Gastrointestinal:  Positive for melena.   Neurological:  Negative for dizziness.        Objective:     Vital Signs (Most Recent):  Temp: 98.2 °F (36.8 °C) (01/22/23 0021)  Pulse: 63 (01/22/23 0033)  Resp: 20 (01/22/23 0033)  BP: (!) 158/75 (01/22/23 0021)  SpO2: 97 % (01/22/23 0033)   Vital Signs (24h Range):  Temp:  [97.9 °F (36.6 °C)-98.8 °F (37.1 °C)] 98.2 °F (36.8 °C)  Pulse:  [55-67] 63  Resp:  [19-20] 20  SpO2:  [95 %-100 %] 97 %  BP: (138-158)/(67-79) 158/75       Physical Examination:  Physical Exam  Constitutional:       General: She is not in acute distress.     Appearance: Normal appearance. She is obese.   HENT:      Head: Normocephalic.   Eyes:      Pupils: Pupils are equal, round, and reactive to light.   Cardiovascular:      Rate and Rhythm: Normal rate. Rhythm irregular.      Pulses: Normal pulses.   Pulmonary:      Effort: Pulmonary effort is normal.      Breath sounds: Normal breath sounds.   Abdominal:      General: Abdomen is flat.      Palpations: Abdomen is soft.      Tenderness: There is no abdominal tenderness.   Musculoskeletal:         General: Swelling present. Normal range of motion.      Comments: Left AV fistula with palpable thrill   Skin:     General: Skin is warm.      Capillary Refill: Capillary refill takes less than 2 seconds.   Neurological:      General: No focal deficit present.      Mental Status: She is  alert and oriented to person, place, and time.   Psychiatric:         Mood and Affect: Mood normal.         Behavior: Behavior normal.         Thought Content: Thought content normal.         Laboratory:  Most Recent Data:  CBC:   Lab Results   Component Value Date    WBC 8.3 01/22/2023    HGB 7.9 (L) 01/22/2023    HCT 26.0 (L) 01/22/2023     01/22/2023    MCV 95.2 (H) 01/22/2023    RDW 16.3 01/22/2023     BMP:   Lab Results   Component Value Date     (L) 01/22/2023    K 4.5 01/22/2023    CHLORIDE 97 (L) 01/22/2023    CO2 26 01/22/2023    BUN 36.7 (H) 01/22/2023    CREATININE 4.03 (H) 01/22/2023    GLUCOSE 74 (L) 01/22/2023    CALCIUM 9.4 01/22/2023     LFTs:   Lab Results   Component Value Date    ALBUMIN 2.8 (L) 01/22/2023    BILITOT 0.3 01/22/2023    BILIDIR <0.1 03/22/2022    IBILI >0.10 03/22/2022    AST 16 01/22/2023    ALKPHOS 78 01/22/2023    ALT 16 01/22/2023     Coags:   Lab Results   Component Value Date    INR 1.04 01/20/2023    PROTIME 13.4 01/20/2023    PTT 69.2 01/21/2023     FLP:   Lab Results   Component Value Date    CHOL 117 09/30/2022    HDL 42 09/30/2022    LDL 62.00 09/30/2022    TRIG 66 09/30/2022     DM:   Lab Results   Component Value Date    HGBA1C 5.8 01/19/2023    HGBA1C 6.0 09/30/2022    HGBA1C 7.8 (H) 12/15/2021    CREATININE 4.03 (H) 01/22/2023     Thyroid:   Lab Results   Component Value Date    TSH 1.602 01/19/2023    YBCPHQ5DLMC 1.13 12/15/2021     Anemia:   Lab Results   Component Value Date    IRON 44 (L) 01/19/2023    FERRITIN 1,461.24 (H) 01/19/2023    TRANS 202 01/19/2023    TRANS 202 01/19/2023    TIBC 241 (L) 01/19/2023    GTTHHFPC69 817 (H) 01/19/2023    FOLATE 15.0 01/19/2023     Cardiac:   Lab Results   Component Value Date    TROPONINI 0.071 (H) 01/19/2023     (H) 01/12/2019    CPKMB 3.1 01/12/2019    .4 03/19/2022     Urinalysis:   Lab Results   Component Value Date    COLORU YELLOW 09/30/2021    PHUA 7.0 09/30/2021    CLARITYU Clear 02/14/2018     SPECGRAV 1.020 02/14/2018    NITRITE Negative 09/30/2021    KETONESU Negative 09/30/2021    UROBILINOGEN Normal 09/30/2021    WBCUA 0-2 09/30/2021       Trended Cardiac Data:  Recent Labs   Lab 01/19/23  1426 01/19/23  1633   TROPONINI 0.076* 0.071*         Radiology:  Imaging Results              X-Ray Chest 1 View (Final result)  Result time 01/19/23 17:47:25      Final result by Reinier Owen MD (01/19/23 17:47:25)                   Impression:      No acute pulmonary process identified.      Electronically signed by: Reinier Owen  Date:    01/19/2023  Time:    17:47               Narrative:    EXAMINATION:  XR CHEST 1 VIEW    CLINICAL HISTORY:  coarse breath sounds;    TECHNIQUE:  Frontal view(s) of the chest.    COMPARISON:  Radiography 09/30/2022    FINDINGS:  Prior sternotomy.  Stable cardiac silhouette with similar tortuosity of the thoracic aorta.  The lungs are well-inflated.  Sites of mild scarring in the lower left lung similar since September.  No acute consolidation.  No significant pleural effusion or discernible pneumothorax.                                      Current Medications:     Infusions:         Scheduled:   albuterol-ipratropium  3 mL Nebulization Q6H    amLODIPine  5 mg Oral Daily    apixaban  5 mg Oral BID    atorvastatin  40 mg Oral Daily    carvediloL  6.25 mg Oral BID    clonazePAM  0.5 mg Oral QHS    fluticasone furoate-vilanteroL  1 puff Inhalation Daily    guaiFENesin  600 mg Oral BID    insulin detemir U-100  30 Units Subcutaneous QHS    levothyroxine  125 mcg Oral Before breakfast    mupirocin   Nasal BID    pantoprazole  40 mg Oral Daily    sevelamer carbonate  800 mg Oral TID        PRN:  sodium chloride, acetaminophen, benzonatate, dextrose 10%, dextrose 10%, dextrose 10%, glucagon (human recombinant), glucose, glucose, insulin aspart U-100, melatonin, sodium chloride 0.9%      Intake/Output Summary (Last 24 hours) at 1/22/2023 0645  Last data filed at 1/21/2023  1800  Gross per 24 hour   Intake 433.85 ml   Output --   Net 433.85 ml         Lines/Drains/Airways       Drain  Duration                  Hemodialysis AV Fistula Left forearm -- days              Peripheral Intravenous Line  Duration                  Peripheral IV - Single Lumen 01/20/23 2250 20 G Posterior;Right Hand 1 day         Peripheral IV - Single Lumen 01/21/23 1100 22 G Distal;Right;Anterior Forearm <1 day                      Assessment & Plan:   Macrocytic anemia  Melena - upper GI bleed versus iron supplementation discoloration versus possible malignancy  - stable this a.m. after heparin transitioned to Eliquis  - noted EGD last done 12/2021 showing a single small nonbleeding angio ectasia in the gastric antrum which was successfully controlled with hemostasis/APC  - colonoscopy done 07/21/2021 noted to have numerous polyps removed and to be redone in 3 years  - patient was transfused 2 units last night and H&H matilda 6.6-8.7  - GI consulted, push enteroscopy done with no source of bleed identified, recommend capsule study repeat as outpatient  - patient has a history of AVM upon further chart review, capsule endoscopy performed 01/02/2022 was technically incomplete.  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, on hemodialysis (Tuesday, Thursday, Saturday)  - currently no indications for urgent dialysis  - nephrology consulted, dialysis performed yesterday patient looks improved, 1 L of fluid removed net  - to continue Monday Wednesday Friday schedule while inpatient  -plan to perform dialysis today, and we will transfuse 1 unit PRBC  - continue home phosphate binder     New onset atrial fibrillation  History of CAD status post CABG x4  Heart failure with preserved ejection fraction (50-55% on last echo)  - [id number] - 7  - HAS-BLED - 4   -continue statin, we will hold aspirin in the setting possible bleed  -we will resume amlodipine, Coreg home doses  - LDL at goal 62  mg/dL  - she has both a high risk for bleeding as well as for stroke, we will hold anticoagulation for now though we will plan to start eventually  - patient currently on a heparin drip post enteroscopy per Cardiology recommendation, appreciate recommendations  - on Eliquis 5 mg b.i.d. currently (H&H stable)     COPD, PFTs last done 2015 gold stage  -DuoNebs q.6 H  - resume home inhalers     Hypertension  -we will resume home antihypertensives  -currently controlled     Diabetes mellitus  - A1c this admission is 5.8  - resuming home basal insulin at 30 units  - low-dose sliding scale continued           CODE STATUS: Full Code  Access: Peripheral, left forearm AV fistula  Diet:  NPO at this time, can resume renal diet on dialysis if no problems status post enteroscopy  DVT Prophylaxis: Teds/SCDs  GI Prophylaxis: proton pump inhibitor per orders  Fluids: none     Disposition: day 3 of H&H stable this morning, Eliquis on board, no further evidence of GI bleed, plan is for discharge today after dialysis and 1 unit PRBC transfusion.  Patient to follow up with PCP, regular hemodialysis schedule, and follow up with GI for capsule study.    Matias Gee MD  [facility] PGY-1

## 2023-01-22 NOTE — DISCHARGE SUMMARY
[facility] Discharge Summary    Admitting Physician: Travon Nichole MD  Attending Physician: Travon Nichole MD  Date of Admit: 1/19/2023  Date of Discharge: 1/22/2023    Condition: Good  Outcome: Patient tolerated treatment/procedure well without complication and is now ready for discharge.  DISPOSITION: Home or Self Care        Discharge Diagnoses:     Patient Active Problem List   Diagnosis    Chronic heart failure with preserved ejection fraction    Cigarette nicotine dependence without complication    Coronary artery disease involving coronary bypass graft of native heart with unstable angina pectoris    Chronic obstructive pulmonary disease    HTN (hypertension), benign    Mixed hyperlipidemia    Type 2 diabetes mellitus with chronic kidney disease on chronic dialysis, with long-term current use of insulin    ESRD (end stage renal disease) on dialysis    Depressive disorder    Class 1 obesity due to excess calories with serious comorbidity and body mass index (BMI) of 34.0 to 34.9 in adult    Vitamin D deficiency    Coronary artery disease involving native coronary artery of native heart without angina pectoris    Tobacco use    Acute anemia    New onset a-fib       Principal Problem:  Acute anemia    Consultants and Procedures:     Consultants:  IP CONSULT TO INTERNAL MEDICINE  IP CONSULT TO NEPHROLOGY  IP CONSULT TO GI  IP CONSULT TO CARDIOLOGY    Procedures:   Procedure(s) (LRB):  ENTEROSCOPY (Left)      Brief Admission History:      Rosette Madrid is a 77 y.o. female who with a history of CAD status post CABG X 4 prior to 2005, ESRD on HD (T-TH-SAT), hypertension, diabetes mellitus, heart failure with a preserved ejection fraction (50-55% 12/2021), COPD (last PFTs in 2015 with gold stage II), hypothyroidism status post thyroidectomy, history of melena status post EGD 12/31/2021 who presented to [facility] ED on 1/19/2023  with complaint of asymptomatic anemia, melena.     Patient has been receiving  "dialysis Tuesday, Thursday, Saturday as scheduled.  Patient does not complain of any issues at this time, however however prior to today's hemodialysis session, her son was called and informed that her hemoglobin was low which was noted to be a significant drop from previous hemoglobin of 11.4.  Per the son, dialysis center apparently gets labs once a month and has noted a drop each time, and informed that patient needs to go to the ED.     At the ED patient was noted to be normotensive, and not in distress.  H&H was 6.6/22.3, BUN and creatinine was 34.9/5.34 however rest of CMP was unremarkable for any sort of electrolyte abnormalities.  Guaiac stool positive after RICKY done.  Chest x-ray was unremarkable on read, may have some slight increase in vasculature and possible cephalization but no pleural effusions.  EKG also was noted to show in atrial fibrillation, with left bundle branch block.  No palpitations, or shortness of breath endorsed.  Medicine was consulted for admission for hemodialysis, transfusion, new onset atrial fibrillation and workup of possible GI bleed.     Hospital Course with Pertinent Findings:      Patient was admitted and transfused with 2 units of packed RBC.  Nephrology was consulted and hemodialysis was initiated.  GI was consulted as well as Cardiology.  Push enteroscopy was done which did not identify any source of bleeding.  After the procedure heparin drip was initiated for 12 hours, and then patient was transitioned to Eliquis.  Patient tolerated all procedures well.    Discharge physical exam:  Vitals  BP: (!) 145/72  Temp: 98.9 °F (37.2 °C)  Temp src: Oral  Pulse: (!) 58  Resp: 18  SpO2: 100 %  Height: 5' 3" (160 cm)  Weight: 95.1 kg (209 lb 12.3 oz)    Constitutional:       General: She is not in acute distress.     Appearance: Normal appearance. She is obese.   HENT:      Head: Normocephalic.   Eyes:      Pupils: Pupils are equal, round, and reactive to light.   Cardiovascular:     "  Rate and Rhythm: Normal rate. Rhythm irregular.      Pulses: Normal pulses.   Pulmonary:      Effort: Pulmonary effort is normal.      Breath sounds: Normal breath sounds.   Abdominal:      General: Abdomen is flat.      Palpations: Abdomen is soft.      Tenderness: There is no abdominal tenderness.   Musculoskeletal:         General: Swelling present. Normal range of motion.      Comments: Left AV fistula with palpable thrill   Skin:     General: Skin is warm.      Capillary Refill: Capillary refill takes less than 2 seconds.   Neurological:      General: No focal deficit present.      Mental Status: She is alert and oriented to person, place, and time.   Psychiatric:         Mood and Affect: Mood normal.         Behavior: Behavior normal.         Thought Content: Thought content normal.        TIME SPENT ON DISCHARGE: 35 minutes    Discharge Medications:         Medication List        START taking these medications      apixaban 5 mg Tab  Commonly known as: ELIQUIS  Take 1 tablet (5 mg total) by mouth 2 (two) times daily.     ferrous sulfate 325 (65 FE) MG EC tablet  Take 1 tablet (325 mg total) by mouth once daily.            CONTINUE taking these medications      albuterol 90 mcg/actuation inhaler  Commonly known as: VENTOLIN HFA  Inhale 2 puffs into the lungs every 6 (six) hours as needed for Wheezing. Rescue     albuterol-ipratropium 2.5 mg-0.5 mg/3 mL nebulizer solution  Commonly known as: DUO-NEB  Take 3 mLs by nebulization every 6 (six) hours as needed for Wheezing or Shortness of Breath. Rescue     amLODIPine 5 MG tablet  Commonly known as: NORVASC  Take 1 tablet (5 mg total) by mouth once daily.     aspirin 81 MG EC tablet  Commonly known as: ECOTRIN  Take 1 tablet (81 mg total) by mouth once daily.     BASAGLAR KWIKPEN U-100 INSULIN glargine 100 units/mL SubQ pen  Generic drug: insulin  Inject 30 Units into the skin Daily.     BenadryL 25 mg capsule  Generic drug: diphenhydrAMINE     bisacodyL 5 mg EC  "tablet  Commonly known as: DULCOLAX     carvediloL 6.25 MG tablet  Commonly known as: COREG  Take 1 tablet (6.25 mg total) by mouth 2 (two) times daily.     clonazePAM 0.5 MG tablet  Commonly known as: KlonoPIN     DIALYVITE 100-1 mg Tab  Generic drug: B complex-vitamin C-folic acid     DULoxetine 30 MG capsule  Commonly known as: CYMBALTA     fluticasone furoate-vilanteroL 100-25 mcg/dose diskus inhaler  Commonly known as: BREO  Inhale 1 puff into the lungs once daily.     furosemide 40 MG tablet  Commonly known as: LASIX  Take 1 tablet (40 mg total) by mouth once daily.     glipiZIDE 10 MG tablet  Commonly known as: GLUCOTROL  Take 1 tablet (10 mg total) by mouth daily with breakfast.     INVEGA SUSTENNA 156 mg/mL Syrg injection  Generic drug: paliperidone palmitate     L-METHYLFOLATE 15 mg Tab  Generic drug: levomefolate calcium     levothyroxine 125 MCG tablet  Commonly known as: SYNTHROID  Take 1 tablet (125 mcg total) by mouth before breakfast.     ONETOUCH DELICA PLUS LANCET 30 gauge Misc  Generic drug: lancets  1 lancet by Other route once daily.     ONETOUCH ULTRA TEST Strp  Generic drug: blood sugar diagnostic  1 strip by Other route once daily.     * pen needle, diabetic 31 gauge x 5/16" Ndle  2 Units by Misc.(Non-Drug; Combo Route) route 2 (two) times a day. Patient to check blood sugars twice daily (morning and night)     * BD ULTRA-FINE MINI PEN NEEDLE 31 gauge x 3/16" Ndle  Generic drug: pen needle, diabetic     rosuvastatin 40 MG Tab  Commonly known as: CRESTOR  Take 1 tablet (40 mg total) by mouth once daily.     sevelamer carbonate 800 mg Tab  Commonly known as: RENVELA     SPIRIVA WITH HANDIHALER 18 mcg inhalation capsule  Generic drug: tiotropium  Inhale 1 capsule (18 mcg total) into the lungs Daily.     tiZANidine 4 MG tablet  Commonly known as: ZANAFLEX           * This list has 2 medication(s) that are the same as other medications prescribed for you. Read the directions carefully, and ask " your doctor or other care provider to review them with you.                   Where to Get Your Medications        These medications were sent to [id number] - [place] - [address]  [address], [place] [address]      Phone: [phone]   apixaban 5 mg Tab  ferrous sulfate 325 (65 FE) MG EC tablet         Discharge Instructions:         Rosette Madrid is being discharged Home or Self Care.    Discharge Procedure Orders   Ambulatory referral/consult to Internal Medicine   Standing Status: Future   Referral Priority: Routine Referral Type: Consultation   Referral Reason: Specialty Services Required   Requested Specialty: Internal Medicine   Number of Visits Requested: 1     Ambulatory referral/consult to Cardiology   Standing Status: Future   Referral Priority: Routine Referral Type: Consultation   Referral Reason: Specialty Services Required   Requested Specialty: Cardiology   Number of Visits Requested: 1        Follow-Up Appointments:   Follow-up Information       [facility] - Internal Medicine Follow up.    Specialty: Internal Medicine  Why: Nurse to call and make appointment  Contact information:  [address] [place] [id number] [phone]             [facility] - Gastroenterology. Schedule an appointment as soon as possible for a visit.    Specialty: Gastroenterology  Contact information:  [address] [place] [id number] [phone]  Additional information:  Entrance 1             [facility] - Cardiology. Schedule an appointment as soon as possible for a visit.    Specialty: Cardiology  Why: Call to schedule.  Contact information:  [address] [place] [phone] [phone]                         Post Wards follow up - patient should be assessed for Eliquis compliance, anemia workup    GI follow up to be scheduled for capsule study    Cardiology follow up for new onset  AFib    At this time, Rosette Madrid is determined to have maximized benefits of IP hospitalization. she is discharged in stable condition with OP f/u recommendations and instructions. All questions answered, and patient verbalized agreement with the POC. They were given return precautions prior to d/c including symptoms that should prompt return to ED or to call PCP. Total time spent of DC of 35 minutes.           Matias Gee MD  [facility] PGY-1

## 2023-01-23 ENCOUNTER — PATIENT OUTREACH (OUTPATIENT)
Dept: ADMINISTRATIVE | Facility: CLINIC | Age: 78
End: 2023-01-23
Payer: MEDICARE

## 2023-01-23 NOTE — PROGRESS NOTES
C3 nurse spoke with Rosette Madrid  for a TCC post hospital discharge follow up call. The patient has a scheduled HOSFU appointment with JOSEFINA Lord  on 02/23/2023 @ 1230 pm. The patient does not have a scheduled HOSFU appointment with JOSEFINA Lord  within 5-7 days post hospital discharge date 01/22/2023.    Message sent to PCP staff requesting they contact patient and schedule follow up appointment.     Appointment [facility] 04/05/2023 @ 1015 am.

## 2023-02-06 ENCOUNTER — OFFICE VISIT (OUTPATIENT)
Dept: INTERNAL MEDICINE | Facility: CLINIC | Age: 78
End: 2023-02-06
Payer: MEDICARE

## 2023-02-06 VITALS
HEIGHT: 63 IN | HEART RATE: 62 BPM | RESPIRATION RATE: 18 BRPM | OXYGEN SATURATION: 99 % | BODY MASS INDEX: 37.24 KG/M2 | SYSTOLIC BLOOD PRESSURE: 114 MMHG | WEIGHT: 210.19 LBS | TEMPERATURE: 98 F | DIASTOLIC BLOOD PRESSURE: 53 MMHG

## 2023-02-06 DIAGNOSIS — D64.9 ANEMIA, UNSPECIFIED TYPE: ICD-10-CM

## 2023-02-06 PROCEDURE — 99215 OFFICE O/P EST HI 40 MIN: CPT | Mod: PBBFAC

## 2023-02-06 NOTE — PROGRESS NOTES
[facility]    Office Visit Note      CC: Follow-Up    HPI:    77 y.o. female who with a history of CAD status post CABG X 4 prior to 2005, ESRD on HD (T-TH-SAT), hypertension, diabetes mellitus, HFpEF (50-55% 12/2021), COPD II (, hypothyroidism status post thyroidectomy, history of melena status post EGD  & colonoscopy 12/31/2021 w/ gastric AVMs and adenomatous polyps.    Admitted to [facility] 01/19 -01/22 for asymptomatic anemia workup from dialysis clinic. Received 2pRBC. GI consulted for push enteroscopy as stool was guaiac +. Cardiology consulted for New-onset atrial fibrillation w/ controlled HR, recommended Initiation of Eliquis 5mg BID and if she compliant a possible Watchman Procedure if subsequently admitted. Nephrology consulted for HD.      Push enteroscopy 1/20/23: no proximal small bowel bleeding, AVMs seen.  Repeat capsule study reasonable to pursue as outpatient.    TODAY: Reports no signs of bleeding or fatigue, compliant with dialysis scheduled. Reports increasing leg swelling despite taking lasix 40mg tabs. Does endorse increase in urine when she takes the lasix.     Denies chest pain, palpitations, SOB, fever, night sweats, chills, diarrhea, constipation.    ROS:  10 point review of systems assessed and are negative except as stated above    OBJECTIVE:    Vitals:   There were no vitals taken for this visit.     Physical Exam:  General: Well nourished w/o distress  HEENT: NC/AT; PERRLA; nasal and oral mucosa moist and clear; no sinus tenderness; no thyromegaly  Neck: Full ROM; no lymphadenopathy  Pulm: CTA bilaterally, normal work of breathing  CV: S1, S2 w/o murmurs or gallops; 2-3+ BLE pitting edema  GI: Soft with normal bowel sounds in all quadrants, no masses on palpation  MSK: Full ROM of all extremities and spine w/o limitation or discomfort  Neuro: AAOx4; CN II-XII intact; motor/sensory function intact  Psych: Cooperative; appropriate mood and affect        ASSESSMENT &  PLAN:    Asymptomatic anemia in the setting of CKD vs small bowel bleed  Continue iron supplementation,  OTC stool softener as needed for constipation  Continue HD per T/Th/S schedule  Recommended video capsule endoscopy, defer to GI  Atrial fibrillation- with controlled  Keep cardiology appt 04/5/2023  Continue Eliquis 5mg BID  Continue coreg 6.25mg BID, recommend discontinuing amlodipine at this time and uptitration of Coreg as tolerated, defer to PCP and cardiology  BLE edema  Advised to increase lasix to 40mg BID      F/u with PCP Alexx Dior, JAIMEP 02/23/2023      Sourav Rodgers MD  [facility], PGY-3

## 2023-02-06 NOTE — PROGRESS NOTES
I have reviewed and concur with the resident's history, physical, assessment, and plan.  I have discussed with him all issues related to the diagnosis, workup and treatment plan. Care provided as reasonable and necessary.stble a fib on eliquis.No Gi source of bleeding    Florentin Keith MD  [facility]

## 2023-02-09 ENCOUNTER — HOSPITAL ENCOUNTER (OUTPATIENT)
Facility: HOSPITAL | Age: 78
Discharge: HOME | End: 2023-02-10
Attending: FAMILY MEDICINE | Admitting: INTERNAL MEDICINE
Payer: MEDICARE

## 2023-02-09 DIAGNOSIS — Z99.2 ANEMIA DUE TO CHRONIC KIDNEY DISEASE, ON CHRONIC DIALYSIS: ICD-10-CM

## 2023-02-09 DIAGNOSIS — N18.6 ESRD (END STAGE RENAL DISEASE) ON DIALYSIS: ICD-10-CM

## 2023-02-09 DIAGNOSIS — I95.9 HYPOTENSION: ICD-10-CM

## 2023-02-09 DIAGNOSIS — D63.1 ANEMIA DUE TO CHRONIC KIDNEY DISEASE, ON CHRONIC DIALYSIS: ICD-10-CM

## 2023-02-09 DIAGNOSIS — D64.9 ANEMIA, UNSPECIFIED TYPE: Primary | ICD-10-CM

## 2023-02-09 DIAGNOSIS — R07.9 CHEST PAIN: ICD-10-CM

## 2023-02-09 DIAGNOSIS — N18.6 ANEMIA DUE TO CHRONIC KIDNEY DISEASE, ON CHRONIC DIALYSIS: ICD-10-CM

## 2023-02-09 DIAGNOSIS — Z99.2 ESRD (END STAGE RENAL DISEASE) ON DIALYSIS: ICD-10-CM

## 2023-02-09 LAB
ALBUMIN SERPL-MCNC: 3 G/DL (ref 3.4–4.8)
ALBUMIN/GLOB SERPL: 1 RATIO (ref 1.1–2)
ALP SERPL-CCNC: 86 UNIT/L (ref 40–150)
ALT SERPL-CCNC: 19 UNIT/L (ref 0–55)
AST SERPL-CCNC: 18 UNIT/L (ref 5–34)
BASOPHILS # BLD AUTO: 0.02 X10(3)/MCL (ref 0–0.2)
BASOPHILS NFR BLD AUTO: 0.3 %
BILIRUBIN DIRECT+TOT PNL SERPL-MCNC: 0.3 MG/DL
BNP BLD-MCNC: 518.9 PG/ML
BUN SERPL-MCNC: 21 MG/DL (ref 9.8–20.1)
CALCIUM SERPL-MCNC: 9 MG/DL (ref 8.4–10.2)
CHLORIDE SERPL-SCNC: 102 MMOL/L (ref 98–107)
CO2 SERPL-SCNC: 26 MMOL/L (ref 23–31)
CREAT SERPL-MCNC: 2.37 MG/DL (ref 0.55–1.02)
EOSINOPHIL # BLD AUTO: 0.08 X10(3)/MCL (ref 0–0.9)
EOSINOPHIL NFR BLD AUTO: 1.1 %
ERYTHROCYTE [DISTWIDTH] IN BLOOD BY AUTOMATED COUNT: 19.9 % (ref 11.5–17)
GFR SERPLBLD CREATININE-BSD FMLA CKD-EPI: 21 MLS/MIN/1.73/M2
GLOBULIN SER-MCNC: 2.9 GM/DL (ref 2.4–3.5)
GLUCOSE SERPL-MCNC: 182 MG/DL (ref 82–115)
GROUP & RH: NORMAL
HCT VFR BLD AUTO: 17.6 % (ref 37–47)
HEMOCCULT SP1 STL QL: POSITIVE
HGB BLD-MCNC: 5.2 GM/DL (ref 12–16)
IMM GRANULOCYTES # BLD AUTO: 0.04 X10(3)/MCL (ref 0–0.04)
IMM GRANULOCYTES NFR BLD AUTO: 0.5 %
INDIRECT COOMBS GEL: NORMAL
LYMPHOCYTES # BLD AUTO: 1.01 X10(3)/MCL (ref 0.6–4.6)
LYMPHOCYTES NFR BLD AUTO: 13.5 %
MAGNESIUM SERPL-MCNC: 1.9 MG/DL (ref 1.6–2.6)
MCH RBC QN AUTO: 29.7 PG
MCHC RBC AUTO-ENTMCNC: 29.5 MG/DL (ref 33–36)
MCV RBC AUTO: 100.6 FL (ref 80–94)
MONOCYTES # BLD AUTO: 0.45 X10(3)/MCL (ref 0.1–1.3)
MONOCYTES NFR BLD AUTO: 6 %
NEUTROPHILS # BLD AUTO: 5.89 X10(3)/MCL (ref 2.1–9.2)
NEUTROPHILS NFR BLD AUTO: 78.6 %
NRBC BLD AUTO-RTO: 0 %
PLATELET # BLD AUTO: 165 X10(3)/MCL (ref 130–400)
PMV BLD AUTO: 10.4 FL (ref 7.4–10.4)
POTASSIUM SERPL-SCNC: 3.6 MMOL/L (ref 3.5–5.1)
PROT SERPL-MCNC: 5.9 GM/DL (ref 5.8–7.6)
RBC # BLD AUTO: 1.75 X10(6)/MCL (ref 4.2–5.4)
SODIUM SERPL-SCNC: 138 MMOL/L (ref 136–145)
TROPONIN I SERPL-MCNC: 0.05 NG/ML (ref 0–0.04)
WBC # SPEC AUTO: 7.5 X10(3)/MCL (ref 4.5–11.5)

## 2023-02-09 PROCEDURE — 82270 OCCULT BLOOD FECES: CPT | Performed by: STUDENT IN AN ORGANIZED HEALTH CARE EDUCATION/TRAINING PROGRAM

## 2023-02-09 PROCEDURE — 25000003 PHARM REV CODE 250: Performed by: FAMILY MEDICINE

## 2023-02-09 PROCEDURE — 80053 COMPREHEN METABOLIC PANEL: CPT | Performed by: FAMILY MEDICINE

## 2023-02-09 PROCEDURE — 86920 COMPATIBILITY TEST SPIN: CPT | Performed by: FAMILY MEDICINE

## 2023-02-09 PROCEDURE — G0378 HOSPITAL OBSERVATION PER HR: HCPCS

## 2023-02-09 PROCEDURE — 83735 ASSAY OF MAGNESIUM: CPT | Performed by: FAMILY MEDICINE

## 2023-02-09 PROCEDURE — 86920 COMPATIBILITY TEST SPIN: CPT | Performed by: NURSE PRACTITIONER

## 2023-02-09 PROCEDURE — 83880 ASSAY OF NATRIURETIC PEPTIDE: CPT | Performed by: FAMILY MEDICINE

## 2023-02-09 PROCEDURE — 84484 ASSAY OF TROPONIN QUANT: CPT | Performed by: FAMILY MEDICINE

## 2023-02-09 PROCEDURE — 99291 CRITICAL CARE FIRST HOUR: CPT | Mod: 25

## 2023-02-09 PROCEDURE — 86900 BLOOD TYPING SEROLOGIC ABO: CPT | Performed by: FAMILY MEDICINE

## 2023-02-09 PROCEDURE — 93005 ELECTROCARDIOGRAM TRACING: CPT

## 2023-02-09 PROCEDURE — 85025 COMPLETE CBC W/AUTO DIFF WBC: CPT | Performed by: FAMILY MEDICINE

## 2023-02-09 PROCEDURE — 96360 HYDRATION IV INFUSION INIT: CPT

## 2023-02-09 RX ORDER — INSULIN ASPART 100 [IU]/ML
1-10 INJECTION, SOLUTION INTRAVENOUS; SUBCUTANEOUS
Status: DISCONTINUED | OUTPATIENT
Start: 2023-02-09 | End: 2023-02-10 | Stop reason: HOSPADM

## 2023-02-09 RX ORDER — IPRATROPIUM BROMIDE AND ALBUTEROL SULFATE 2.5; .5 MG/3ML; MG/3ML
3 SOLUTION RESPIRATORY (INHALATION) EVERY 6 HOURS PRN
Status: DISCONTINUED | OUTPATIENT
Start: 2023-02-09 | End: 2023-02-10 | Stop reason: HOSPADM

## 2023-02-09 RX ORDER — CLONAZEPAM 0.5 MG/1
0.5 TABLET ORAL NIGHTLY
Status: DISCONTINUED | OUTPATIENT
Start: 2023-02-09 | End: 2023-02-10 | Stop reason: HOSPADM

## 2023-02-09 RX ORDER — CARVEDILOL 3.12 MG/1
6.25 TABLET ORAL 2 TIMES DAILY
Status: DISCONTINUED | OUTPATIENT
Start: 2023-02-09 | End: 2023-02-10

## 2023-02-09 RX ORDER — SEVELAMER CARBONATE 800 MG/1
1600 TABLET, FILM COATED ORAL 3 TIMES DAILY
Status: DISCONTINUED | OUTPATIENT
Start: 2023-02-10 | End: 2023-02-10 | Stop reason: HOSPADM

## 2023-02-09 RX ORDER — FUROSEMIDE 20 MG/1
40 TABLET ORAL DAILY
Status: DISCONTINUED | OUTPATIENT
Start: 2023-02-10 | End: 2023-02-10 | Stop reason: HOSPADM

## 2023-02-09 RX ORDER — NALOXONE HCL 0.4 MG/ML
0.02 VIAL (ML) INJECTION
Status: DISCONTINUED | OUTPATIENT
Start: 2023-02-09 | End: 2023-02-10 | Stop reason: HOSPADM

## 2023-02-09 RX ORDER — SODIUM CHLORIDE 0.9 % (FLUSH) 0.9 %
10 SYRINGE (ML) INJECTION EVERY 12 HOURS PRN
Status: DISCONTINUED | OUTPATIENT
Start: 2023-02-09 | End: 2023-02-10 | Stop reason: HOSPADM

## 2023-02-09 RX ORDER — LEVOTHYROXINE SODIUM 125 UG/1
125 TABLET ORAL
Status: DISCONTINUED | OUTPATIENT
Start: 2023-02-10 | End: 2023-02-10 | Stop reason: HOSPADM

## 2023-02-09 RX ORDER — LANOLIN ALCOHOL/MO/W.PET/CERES
1 CREAM (GRAM) TOPICAL DAILY
Status: DISCONTINUED | OUTPATIENT
Start: 2023-02-10 | End: 2023-02-10 | Stop reason: HOSPADM

## 2023-02-09 RX ORDER — IBUPROFEN 200 MG
16 TABLET ORAL
Status: DISCONTINUED | OUTPATIENT
Start: 2023-02-09 | End: 2023-02-10 | Stop reason: HOSPADM

## 2023-02-09 RX ORDER — HYDROCODONE BITARTRATE AND ACETAMINOPHEN 500; 5 MG/1; MG/1
TABLET ORAL
Status: DISCONTINUED | OUTPATIENT
Start: 2023-02-09 | End: 2023-02-10 | Stop reason: HOSPADM

## 2023-02-09 RX ORDER — IBUPROFEN 200 MG
24 TABLET ORAL
Status: DISCONTINUED | OUTPATIENT
Start: 2023-02-09 | End: 2023-02-10 | Stop reason: HOSPADM

## 2023-02-09 RX ORDER — ATORVASTATIN CALCIUM 40 MG/1
80 TABLET, FILM COATED ORAL DAILY
Status: DISCONTINUED | OUTPATIENT
Start: 2023-02-10 | End: 2023-02-10 | Stop reason: HOSPADM

## 2023-02-09 RX ORDER — ASPIRIN 81 MG/1
81 TABLET ORAL DAILY
Status: DISCONTINUED | OUTPATIENT
Start: 2023-02-10 | End: 2023-02-10 | Stop reason: HOSPADM

## 2023-02-09 RX ORDER — GLUCAGON 1 MG
1 KIT INJECTION
Status: DISCONTINUED | OUTPATIENT
Start: 2023-02-09 | End: 2023-02-10 | Stop reason: HOSPADM

## 2023-02-09 RX ORDER — DIPHENHYDRAMINE HCL 25 MG
50 CAPSULE ORAL NIGHTLY
Status: DISCONTINUED | OUTPATIENT
Start: 2023-02-09 | End: 2023-02-10 | Stop reason: HOSPADM

## 2023-02-09 RX ADMIN — SODIUM CHLORIDE 1000 ML: 9 INJECTION, SOLUTION INTRAVENOUS at 07:02

## 2023-02-10 VITALS
HEART RATE: 65 BPM | BODY MASS INDEX: 38.67 KG/M2 | TEMPERATURE: 98 F | WEIGHT: 218.25 LBS | DIASTOLIC BLOOD PRESSURE: 56 MMHG | OXYGEN SATURATION: 98 % | RESPIRATION RATE: 18 BRPM | HEIGHT: 63 IN | SYSTOLIC BLOOD PRESSURE: 125 MMHG

## 2023-02-10 LAB
ABO + RH BLD: NORMAL
ALBUMIN SERPL-MCNC: 2.8 G/DL (ref 3.4–4.8)
ALBUMIN/GLOB SERPL: 1.2 RATIO (ref 1.1–2)
ALP SERPL-CCNC: 102 UNIT/L (ref 40–150)
ALT SERPL-CCNC: 16 UNIT/L (ref 0–55)
AST SERPL-CCNC: 20 UNIT/L (ref 5–34)
BASOPHILS # BLD AUTO: 0.03 X10(3)/MCL (ref 0–0.2)
BASOPHILS NFR BLD AUTO: 0.4 %
BILIRUBIN DIRECT+TOT PNL SERPL-MCNC: 0.4 MG/DL
BLD PROD TYP BPU: NORMAL
BLOOD UNIT EXPIRATION DATE: NORMAL
BLOOD UNIT TYPE CODE: 5100
BUN SERPL-MCNC: 38 MG/DL (ref 9.8–20.1)
CALCIUM SERPL-MCNC: 8.8 MG/DL (ref 8.4–10.2)
CHLORIDE SERPL-SCNC: 105 MMOL/L (ref 98–107)
CO2 SERPL-SCNC: 25 MMOL/L (ref 23–31)
CREAT SERPL-MCNC: 2.96 MG/DL (ref 0.55–1.02)
CROSSMATCH INTERPRETATION: NORMAL
DISPENSE STATUS: NORMAL
EOSINOPHIL # BLD AUTO: 0.08 X10(3)/MCL (ref 0–0.9)
EOSINOPHIL NFR BLD AUTO: 1.1 %
ERYTHROCYTE [DISTWIDTH] IN BLOOD BY AUTOMATED COUNT: 19.9 % (ref 11.5–17)
GFR SERPLBLD CREATININE-BSD FMLA CKD-EPI: 16 MLS/MIN/1.73/M2
GLOBULIN SER-MCNC: 2.4 GM/DL (ref 2.4–3.5)
GLUCOSE SERPL-MCNC: 149 MG/DL (ref 82–115)
HCT VFR BLD AUTO: 21.3 % (ref 37–47)
HGB BLD-MCNC: 6.6 GM/DL (ref 12–16)
IMM GRANULOCYTES # BLD AUTO: 0.04 X10(3)/MCL (ref 0–0.04)
IMM GRANULOCYTES NFR BLD AUTO: 0.6 %
LYMPHOCYTES # BLD AUTO: 1.19 X10(3)/MCL (ref 0.6–4.6)
LYMPHOCYTES NFR BLD AUTO: 16.6 %
MAGNESIUM SERPL-MCNC: 2.1 MG/DL (ref 1.6–2.6)
MCH RBC QN AUTO: 28.9 PG
MCHC RBC AUTO-ENTMCNC: 31 MG/DL (ref 33–36)
MCV RBC AUTO: 93.4 FL (ref 80–94)
MONOCYTES # BLD AUTO: 0.6 X10(3)/MCL (ref 0.1–1.3)
MONOCYTES NFR BLD AUTO: 8.4 %
NEUTROPHILS # BLD AUTO: 5.22 X10(3)/MCL (ref 2.1–9.2)
NEUTROPHILS NFR BLD AUTO: 72.9 %
NRBC BLD AUTO-RTO: 0.4 %
PHOSPHATE SERPL-MCNC: 3.1 MG/DL (ref 2.3–4.7)
PLATELET # BLD AUTO: 157 X10(3)/MCL (ref 130–400)
PMV BLD AUTO: 10.6 FL (ref 7.4–10.4)
POCT GLUCOSE: 122 MG/DL (ref 70–110)
POCT GLUCOSE: 293 MG/DL (ref 70–110)
POTASSIUM SERPL-SCNC: 4.1 MMOL/L (ref 3.5–5.1)
PROT SERPL-MCNC: 5.2 GM/DL (ref 5.8–7.6)
RBC # BLD AUTO: 2.28 X10(6)/MCL (ref 4.2–5.4)
RET# (OHS): 0.2 (ref 0.02–0.08)
RETICULOCYTE COUNT AUTOMATED (OLG): 8.7 % (ref 1.1–2.1)
SODIUM SERPL-SCNC: 137 MMOL/L (ref 136–145)
T4 FREE SERPL-MCNC: 0.92 NG/DL (ref 0.7–1.48)
TSH SERPL-ACNC: 7.24 UIU/ML (ref 0.35–4.94)
UNIT NUMBER: NORMAL
WBC # SPEC AUTO: 7.2 X10(3)/MCL (ref 4.5–11.5)

## 2023-02-10 PROCEDURE — 94640 AIRWAY INHALATION TREATMENT: CPT | Mod: XB

## 2023-02-10 PROCEDURE — P9016 RBC LEUKOCYTES REDUCED: HCPCS | Performed by: NURSE PRACTITIONER

## 2023-02-10 PROCEDURE — 82962 GLUCOSE BLOOD TEST: CPT | Mod: 91

## 2023-02-10 PROCEDURE — 25000242 PHARM REV CODE 250 ALT 637 W/ HCPCS: Performed by: STUDENT IN AN ORGANIZED HEALTH CARE EDUCATION/TRAINING PROGRAM

## 2023-02-10 PROCEDURE — 84443 ASSAY THYROID STIM HORMONE: CPT

## 2023-02-10 PROCEDURE — 85045 AUTOMATED RETICULOCYTE COUNT: CPT

## 2023-02-10 PROCEDURE — 84439 ASSAY OF FREE THYROXINE: CPT

## 2023-02-10 PROCEDURE — 94640 AIRWAY INHALATION TREATMENT: CPT

## 2023-02-10 PROCEDURE — 80100014 HC HEMODIALYSIS 1:1

## 2023-02-10 PROCEDURE — 96372 THER/PROPH/DIAG INJ SC/IM: CPT | Mod: 59 | Performed by: STUDENT IN AN ORGANIZED HEALTH CARE EDUCATION/TRAINING PROGRAM

## 2023-02-10 PROCEDURE — 84100 ASSAY OF PHOSPHORUS: CPT | Performed by: STUDENT IN AN ORGANIZED HEALTH CARE EDUCATION/TRAINING PROGRAM

## 2023-02-10 PROCEDURE — 25000003 PHARM REV CODE 250: Performed by: STUDENT IN AN ORGANIZED HEALTH CARE EDUCATION/TRAINING PROGRAM

## 2023-02-10 PROCEDURE — 80053 COMPREHEN METABOLIC PANEL: CPT | Performed by: STUDENT IN AN ORGANIZED HEALTH CARE EDUCATION/TRAINING PROGRAM

## 2023-02-10 PROCEDURE — P9016 RBC LEUKOCYTES REDUCED: HCPCS | Performed by: FAMILY MEDICINE

## 2023-02-10 PROCEDURE — 25000003 PHARM REV CODE 250

## 2023-02-10 PROCEDURE — 83735 ASSAY OF MAGNESIUM: CPT | Performed by: STUDENT IN AN ORGANIZED HEALTH CARE EDUCATION/TRAINING PROGRAM

## 2023-02-10 PROCEDURE — 85025 COMPLETE CBC W/AUTO DIFF WBC: CPT | Performed by: STUDENT IN AN ORGANIZED HEALTH CARE EDUCATION/TRAINING PROGRAM

## 2023-02-10 PROCEDURE — G0378 HOSPITAL OBSERVATION PER HR: HCPCS

## 2023-02-10 PROCEDURE — 97162 PT EVAL MOD COMPLEX 30 MIN: CPT

## 2023-02-10 PROCEDURE — 36430 TRANSFUSION BLD/BLD COMPNT: CPT

## 2023-02-10 PROCEDURE — G0257 UNSCHED DIALYSIS ESRD PT HOS: HCPCS

## 2023-02-10 PROCEDURE — 63600175 PHARM REV CODE 636 W HCPCS: Performed by: STUDENT IN AN ORGANIZED HEALTH CARE EDUCATION/TRAINING PROGRAM

## 2023-02-10 RX ORDER — FLUTICASONE FUROATE AND VILANTEROL 200; 25 UG/1; UG/1
1 POWDER RESPIRATORY (INHALATION) DAILY
Status: DISCONTINUED | OUTPATIENT
Start: 2023-02-10 | End: 2023-02-10 | Stop reason: HOSPADM

## 2023-02-10 RX ORDER — PANTOPRAZOLE SODIUM 40 MG/1
40 TABLET, DELAYED RELEASE ORAL DAILY
Status: DISCONTINUED | OUTPATIENT
Start: 2023-02-10 | End: 2023-02-10 | Stop reason: HOSPADM

## 2023-02-10 RX ADMIN — SEVELAMER CARBONATE 1600 MG: 800 TABLET, FILM COATED ORAL at 03:02

## 2023-02-10 RX ADMIN — DIPHENHYDRAMINE HYDROCHLORIDE 50 MG: 25 CAPSULE ORAL at 12:02

## 2023-02-10 RX ADMIN — FLUTICASONE FUROATE AND VILANTEROL TRIFENATATE 1 PUFF: 200; 25 POWDER RESPIRATORY (INHALATION) at 09:02

## 2023-02-10 RX ADMIN — ATORVASTATIN CALCIUM 80 MG: 40 TABLET, FILM COATED ORAL at 08:02

## 2023-02-10 RX ADMIN — APIXABAN 5 MG: 2.5 TABLET, FILM COATED ORAL at 12:02

## 2023-02-10 RX ADMIN — SEVELAMER CARBONATE 1600 MG: 800 TABLET, FILM COATED ORAL at 09:02

## 2023-02-10 RX ADMIN — APIXABAN 5 MG: 2.5 TABLET, FILM COATED ORAL at 08:02

## 2023-02-10 RX ADMIN — CARVEDILOL 6.25 MG: 3.12 TABLET, FILM COATED ORAL at 12:02

## 2023-02-10 RX ADMIN — CLONAZEPAM 0.5 MG: 0.5 TABLET ORAL at 03:02

## 2023-02-10 RX ADMIN — INSULIN ASPART 6 UNITS: 100 INJECTION, SOLUTION INTRAVENOUS; SUBCUTANEOUS at 09:02

## 2023-02-10 RX ADMIN — PANTOPRAZOLE SODIUM 40 MG: 40 TABLET, DELAYED RELEASE ORAL at 09:02

## 2023-02-10 RX ADMIN — TIOTROPIUM BROMIDE INHALATION SPRAY 2 PUFF: 3.12 SPRAY, METERED RESPIRATORY (INHALATION) at 09:02

## 2023-02-10 RX ADMIN — FUROSEMIDE 40 MG: 20 TABLET ORAL at 08:02

## 2023-02-10 RX ADMIN — LEVOTHYROXINE SODIUM 125 MCG: 125 TABLET ORAL at 06:02

## 2023-02-10 RX ADMIN — ASPIRIN 81 MG: 81 TABLET, COATED ORAL at 08:02

## 2023-02-10 RX ADMIN — FERROUS SULFATE TAB 325 MG (65 MG ELEMENTAL FE) 1 EACH: 325 (65 FE) TAB at 08:02

## 2023-02-10 RX ADMIN — IPRATROPIUM BROMIDE AND ALBUTEROL SULFATE 3 ML: .5; 3 SOLUTION RESPIRATORY (INHALATION) at 09:02

## 2023-02-10 NOTE — DISCHARGE SUMMARY
[facility] Discharge Summary    Admitting Physician: Joleen Cesar MD  Attending Physician: Joleen Cesar MD  Date of Admit: 2/9/2023  Date of Discharge: 2/10/2023    Condition: Good  Outcome: Patient tolerated treatment/procedure well without complication and is now ready for discharge.  DISPOSITION: Home or Self Care        Discharge Diagnoses:     Patient Active Problem List   Diagnosis    Chronic heart failure with preserved ejection fraction    Cigarette nicotine dependence without complication    Coronary artery disease involving coronary bypass graft of native heart with unstable angina pectoris    Chronic obstructive pulmonary disease    HTN (hypertension), benign    Mixed hyperlipidemia    Type 2 diabetes mellitus with chronic kidney disease on chronic dialysis, with long-term current use of insulin    ESRD (end stage renal disease) on dialysis    Depressive disorder    Class 1 obesity due to excess calories with serious comorbidity and body mass index (BMI) of 34.0 to 34.9 in adult    Vitamin D deficiency    Coronary artery disease involving native coronary artery of native heart without angina pectoris    Tobacco use    Acute anemia    New onset a-fib    Anemia due to chronic kidney disease, on chronic dialysis    Hypotension       Principal Problem:  Anemia due to chronic kidney disease, on chronic dialysis    Consultants and Procedures:     Consultants:  IP CONSULT TO NEPHROLOGY  IP CONSULT TO GI  IP CONSULT TO CARDIOLOGY    Procedures:   * No surgery found *      Brief Admission History:      Rosette Madrid is a 77 y.o. female with Hx of CAD s/p CABG x4, ESRD on HD (Tu, Th, Sa), HFpEF (LVEF 50-55%), COPD Gold Stage II (PFTs 2015), HTN, T2DM, and Hypothyroidism who presented to [facility] ED on 2/9/2023 from dialysis 2/2 anemia. Patient reports waking up this morning feeling a little weak and mildly SOB, she went to her dialysis session and reported feeling much better following dialysis,  "however she was advised that her Hgb was low and that she needed to come to the hospital for a transfusion. At this time patient denies any SOB, chest pain, palpitations, dizziness, or lightheadedness.     Of note, patient was recently admitted to [facility] (01/19/2023 - 01/22/2023) for a similar problem. Patient had presented at that time with a Hgb of 6.6 and have + guaiac stool on exam. Patient was transfused 2u PRBCs and was evaluated by GI with a push enteroscopy that did not show any source of bleeding at the time. Patient was also found to have new onset Atrial fibrillation during that admission wand was initiated on Eliquis.     In the ED patient was initially hypotensive on arrival, BP 87/48, all other vitals WNL. Labs were significant for H/H 5.2/17.6, BUN/Cr 21.0/2.37, .9, Troponin 0.052. CXR with no acute cardiopulmonary process. Patient was typed and screened and 2u PRBCs ordered from transfusion. Internal medicine consulted for admission secondary to symptomatic anemia.     Hospital Course with Pertinent Findings:      Patient seen in the ED, asymptomatic.  She received 2 units of packed RBC overnight, and received additional 2 units while undergoing dialysis today.  Vital signs were stable throughout admission. Consults with Gastroenterology and Cardiology done regarding recurrent bleeding and optimizing anticoagulation options in the setting of Afib.  Reticulocyte count showed 8.7%.  Plan for gastroenterology will be to pursue video capsule endoscopy on an outpatient basis.  From a cardiologic standpoint patient will be referred to [facility] for evaluation for Watchman.  Patient will need to tolerate anticoagulation before and after his procedure at that time.  Patient will follow-up with postvoid clinic next week for evaluation of anemia symptoms.    Discharge physical exam:  Vitals  BP: 135/76  Temp: 97.7 °F (36.5 °C)  Temp src: Oral  Pulse: 76  Resp: 15  SpO2: (!) 92 %  Height: 5' 3" (160 cm)  Weight: " 99 kg (218 lb 4.1 oz)    General: well-developed well-nourished in no acute distress  Eye: PERRLA, EOMI, clear conjunctiva, eyelids normal  HENT: oropharynx and nasal mucosal surfaces moist  Neck: full range of motion, no lymphadenopathy  Respiratory: clear to auscultation bilaterally, non labored respirations  Cardiovascular: regular rate and rhythm,2+ peripheral pulses  Gastrointestinal: soft, non-tender, non-distended with normal bowel sounds  Musculoskeletal: Strength 5/5 in bilateral LE/UE, moves all extremities spontaneously, LLE 1+ pitting edema to mid calf, RLE trace to 1+ pitting edema to mid calf  Neurologic: no signs of peripheral neurological deficit, motor/sensory function intact    TIME SPENT ON DISCHARGE: 35 minutes    Discharge Medications:         Medication List        CONTINUE taking these medications      albuterol 90 mcg/actuation inhaler  Commonly known as: VENTOLIN HFA  Inhale 2 puffs into the lungs every 6 (six) hours as needed for Wheezing. Rescue     albuterol-ipratropium 2.5 mg-0.5 mg/3 mL nebulizer solution  Commonly known as: DUO-NEB  Take 3 mLs by nebulization every 6 (six) hours as needed for Wheezing or Shortness of Breath. Rescue     apixaban 5 mg Tab  Commonly known as: ELIQUIS  Take 1 tablet (5 mg total) by mouth 2 (two) times daily.     aspirin 81 MG EC tablet  Commonly known as: ECOTRIN  Take 1 tablet (81 mg total) by mouth once daily.     BASAGLAR KWIKPEN U-100 INSULIN glargine 100 units/mL SubQ pen  Generic drug: insulin  Inject 30 Units into the skin Daily.     BenadryL 25 mg capsule  Generic drug: diphenhydrAMINE     bisacodyL 5 mg EC tablet  Commonly known as: DULCOLAX     carvediloL 6.25 MG tablet  Commonly known as: COREG  Take 1 tablet (6.25 mg total) by mouth 2 (two) times daily.     clonazePAM 0.5 MG tablet  Commonly known as: KlonoPIN     DIALYVITE 100-1 mg Tab  Generic drug: B complex-vitamin C-folic acid     DULoxetine 30 MG capsule  Commonly known as: CYMBALTA    "  ferrous sulfate 325 (65 FE) MG EC tablet  Take 1 tablet (325 mg total) by mouth once daily.     fluticasone furoate-vilanteroL 100-25 mcg/dose diskus inhaler  Commonly known as: BREO  Inhale 1 puff into the lungs once daily.     furosemide 40 MG tablet  Commonly known as: LASIX  Take 1 tablet (40 mg total) by mouth once daily.     glipiZIDE 10 MG tablet  Commonly known as: GLUCOTROL  Take 1 tablet (10 mg total) by mouth daily with breakfast.     INVEGA SUSTENNA 156 mg/mL Syrg injection  Generic drug: paliperidone palmitate     L-METHYLFOLATE 15 mg Tab  Generic drug: levomefolate calcium     levothyroxine 125 MCG tablet  Commonly known as: SYNTHROID  Take 1 tablet (125 mcg total) by mouth before breakfast.     ONETOUCH DELICA PLUS LANCET 30 gauge Misc  Generic drug: lancets  1 lancet by Other route once daily.     ONETOUCH ULTRA TEST Strp  Generic drug: blood sugar diagnostic  1 strip by Other route once daily.     * pen needle, diabetic 31 gauge x 5/16" Ndle  2 Units by Misc.(Non-Drug; Combo Route) route 2 (two) times a day. Patient to check blood sugars twice daily (morning and night)     * BD ULTRA-FINE MINI PEN NEEDLE 31 gauge x 3/16" Ndle  Generic drug: pen needle, diabetic     rosuvastatin 40 MG Tab  Commonly known as: CRESTOR  Take 1 tablet (40 mg total) by mouth once daily.     sevelamer carbonate 800 mg Tab  Commonly known as: RENVELA     SPIRIVA WITH HANDIHALER 18 mcg inhalation capsule  Generic drug: tiotropium  Inhale 1 capsule (18 mcg total) into the lungs Daily.     tiZANidine 4 MG tablet  Commonly known as: ZANAFLEX           * This list has 2 medication(s) that are the same as other medications prescribed for you. Read the directions carefully, and ask your doctor or other care provider to review them with you.                  Discharge Instructions:         Rosette Madrid is being discharged .        Follow-Up Appointments:   Follow-up Information       [facility]IN, [facility] INTERNAL MEDICINE Follow " up.    Why: Post-koehler F/U in 1 week.                             To address at follow-up:  - Symptoms of Anemia, Schedule for Video Capsule Endoscopy with GI  - For follow up with Cardiology outpatient to discuss options for anticoagulation for Eliquis (watchman evaluation)    At this time, Rosette Madrid is determined to have maximized benefits of IP hospitalization. she is discharged in stable condition with OP f/u recommendations and instructions. All questions answered, and family verbalized agreement with the POC. They were given return precautions prior to d/c including symptoms that should prompt return to ED or to call PCP. Total time spent of DC of 35 minutes.           Matias Gee MD  [facility] PGY-1

## 2023-02-10 NOTE — PROGRESS NOTES
02/10/23 1230   Vital Signs   Pulse 65   Resp 14   SpO2 100 %   BP (!) 145/68   MAP (mmHg) 93   Post-Hemodialysis Assessment   Blood Volume Processed (Liters) 53.4 L   Dialyzer Clearance Lightly streaked   Duration of Treatment 120 minutes   Additional Fluid Intake (mL) 500 mL   Total UF (mL) 3500 mL   Net Fluid Removal 3000   Patient Response to Treatment Tx completed, tolerated well, lines flushed and then needles pulled and pressure held till hemostasis achieved, no bleeding or hematoma noted   Post-Hemodialysis Comments no distress noted

## 2023-02-10 NOTE — ED PROVIDER NOTES
Name: Rosette Madrid   Age: 77 y.o.  Sex: female    Chief complaint:   Chief Complaint   Patient presents with    Hypotension     Sent from dialysis because she needs a blood transfusion.  Weak and hypotensive in triage      Patient arrived with: Private  History obtained from: Patient    Subjective:   77-year-old female with a past medical history of ESRD (hemodialysis on Tuesday, Thursday, Saturday), hypertension, hyperlipidemia, diabetes, COPD that presents to the emergency department for anemia.  Patient had dialysis today with a efraín her labs said that she was anemic with a hemoglobin 6.0 which is why they sent her into the emergency department.  Patient said she feels at baseline.  Denies feeling lightheaded or dizzy, no chest pain, no shortness of breath.  Denies fever, chills, sweats, cough, sore throat, runny nose, abdominal pain, nausea, vomiting, diarrhea.  Denies any bloody or black stools.    Patient had similar symptoms recently and was just recently discharged from the hospital on 01/22.  During that visit she was transfused 2 units of PRBCs.  GI did a enteroscopy and there is no obvious signs of bleeding.  Patient is currently on Eliquis.    Past Medical History:   Diagnosis Date    CKD (chronic kidney disease) requiring chronic dialysis     COPD (chronic obstructive pulmonary disease)     Diabetes mellitus     Hyperlipidemia     Hypertension     Renal insufficiency     Thyroid disease      Past Surgical History:   Procedure Laterality Date    AV FISTULA PLACEMENT Left     CARDIAC CATHETERIZATION      COLONOSCOPY      CORONARY ARTERY BYPASS GRAFT      ESOPHAGOGASTRODUODENOSCOPY      POLYPECTOMY      SMALL BOWEL ENTEROSCOPY Left 1/20/2023    Procedure: ENTEROSCOPY;  Surgeon: Lexi Scales MD;  Location: [facility] ENDOSCOPY;  Service: Gastroenterology;  Laterality: Left;    THYROIDECTOMY      TUBAL LIGATION       Social History     Socioeconomic History    Marital status:     Number of  children: 6   Tobacco Use    Smoking status: Every Day     Packs/day: 0.25     Types: Cigarettes    Smokeless tobacco: Never    Tobacco comments:     Smokes 4 cigarettes a day   Substance and Sexual Activity    Alcohol use: Yes     Comment: 1 glass every 2-3 month    Drug use: Not Currently    Sexual activity: Not Currently     Social Determinants of Health     Financial Resource Strain: Low Risk     Difficulty of Paying Living Expenses: Not hard at all   Food Insecurity: No Food Insecurity    Worried About Running Out of Food in the Last Year: Never true    Ran Out of Food in the Last Year: Never true   Transportation Needs: No Transportation Needs    Lack of Transportation (Medical): No    Lack of Transportation (Non-Medical): No   Physical Activity: Inactive    Days of Exercise per Week: 0 days    Minutes of Exercise per Session: 0 min   Stress: No Stress Concern Present    Feeling of Stress : Not at all   Social Connections: Moderately Isolated    Frequency of Communication with Friends and Family: More than three times a week    Frequency of Social Gatherings with Friends and Family: More than three times a week    Attends Tenri[employer] Services: More than 4 times per year    Active Member of Clubs or Organizations: No    Attends Club or Organization Meetings: Never    Marital Status:    Housing Stability: Low Risk     Unable to Pay for Housing in the Last Year: No    Number of Places Lived in the Last Year: 1    Unstable Housing in the Last Year: No     Review of patient's allergies indicates:   Allergen Reactions    Lisinopril Swelling    Azithromycin      Other reaction(s): tongue/facial swelling    Baclofen      Other reaction(s): tongue/facial swelling    Doxycycline      Other reaction(s): Angioedema        Review of Systems   Constitutional:  Negative for diaphoresis and fever.   HENT:  Negative for congestion and sore throat.    Eyes:  Negative for pain and discharge.   Respiratory:  Negative for  cough and shortness of breath.    Cardiovascular:  Negative for chest pain and palpitations.   Gastrointestinal:  Negative for diarrhea and vomiting.   Genitourinary:  Negative for dysuria and hematuria.   Musculoskeletal:  Negative for back pain and myalgias.   Skin:  Negative for itching and rash.   Neurological:  Negative for weakness and headaches.        Objective:     Vitals:    02/09/23 1856   BP: 111/67   Pulse: 82   Resp: 20   Temp:         Physical Exam  Constitutional:       Appearance: She is not toxic-appearing.   HENT:      Head: Normocephalic and atraumatic.   Cardiovascular:      Rate and Rhythm: Regular rhythm.      Pulses: Normal pulses.   Pulmonary:      Effort: Pulmonary effort is normal.      Breath sounds: Normal breath sounds.   Abdominal:      General: Abdomen is flat. Bowel sounds are normal.      Palpations: Abdomen is soft.   Musculoskeletal:         General: No deformity. Normal range of motion.      Cervical back: Normal range of motion and neck supple.   Skin:     General: Skin is warm and dry.   Neurological:      General: No focal deficit present.      Mental Status: She is alert and oriented to person, place, and time.   Psychiatric:         Mood and Affect: Mood normal.         Behavior: Behavior normal.        Records:  Nursing records and triage records reviewed  Prior records reviewed    Medical decision making:   Presents to the emergency department with a hemoglobin 6.0, patient is otherwise asymptomatic.  Patient is nontoxic appearing.  Afebrile, non tachycardic, originally had a blood pressure 87/48, improved to 1 11/67 with 1 L IVF, saturating well on room air.  Physical exam within normal limits.  Will get labs for further evaluation and type and screen.  Patient will be started on 1 L IV fluid.    ED Course as of 02/09/23 [address] [place] [address] Feb 09, 2023 1919 Hemoglobin 5.2, will give 2 units PRBC.  No leukocytosis or anemia. [RK]   1919 Chest x-ray within normal limits. [RK]    1932 No severe electrolyte abnormality.  ESRD with a creatinine 2.37.  No hyperglycemia.  Liver enzymes are within normal limits.  BNP elevated proximally 500.  Patient has no signs of fluid overload, will not diurese at this time secondary to hypotension. [RK]   2000 EKG is AFib with a prolonged QTC of 499 [RK]   2010 Troponin 0.052, similar to previous troponin and secondary to ESRD.      Patient will be admitted to the hospital for further management.  Discussed with internal medicine team. [RK]      ED Course User Index  [RK] Babatunde Meade MD          EKG:  ECG Results              EKG 12-lead (Preliminary result)  Result time 02/09/23 20:15:28      ED Interpretation by Babatunde Meade MD (02/09/23 20:15:28, [facility], Emergency Medicine)    AFib at a rate of 76, no signs of ST elevation or depression, left axis deviation, left bundle-branch block, prolonged QTC of 499                                     Critical Care    Date/Time: 2/9/2023 8:11 PM  Performed by: Babatunde Meade MD  Authorized by: Babatunde Meade MD   Total critical care time (exclusive of procedural time) : 0 minutes  Comments: Upon my evaluation, this patient had a high probability of imminent or life-threatening deterioration due to anemia with Hgb 5.2, which required my direct attention, intervention, and personal management.    I have personally provided 35 minutes of critical care time exclusive of time spent on separately billed procedures. Time includes review of chart, history and physical exam of the patient, review of laboratory data, review of radiology results, discussion with consultants, and monitoring for potential decompensation. Interventions were performed as documented above.              Diagnosis:  Final diagnoses:  [I95.9] Hypotension  [D64.9] Anemia, unspecified type (Primary)          Babatunde Meade M.D.  Emergency Medicine Physician     (Please note that this chart was  completed via voice to text dictation. There may be typographical errors or substitutions that are unintentional, or uncorrected. Every attempt was made to proofread the chart prior to completion. If there are any questions, please contact the physician for final clarification).         Babatunde Meade MD  02/09/23 2012       Babatunde Meade MD  02/09/23 2015

## 2023-02-10 NOTE — PLAN OF CARE
02/10/23 1242   Discharge Assessment   Assessment Type Discharge Planning Assessment   Confirmed/corrected address, phone number and insurance Yes   Confirmed Demographics Correct on Facesheet   Source of Information patient   When was your last doctors appointment?   ([facility])   Reason For Admission Anemia   People in Home child(shawn), adult;grandchild(shawn)   Facility Arrived From: Home   Do you expect to return to your current living situation? Yes   Do you have help at home or someone to help you manage your care at home? Yes   Who are your caregiver(s) and their phone number(s)? Marlen Masters (Daughter)   [phone]   Prior to hospitilization cognitive status: Alert/Oriented   Current cognitive status: Alert/Oriented   Walking or Climbing Stairs ambulation difficulty, requires equipment   Mobility Management Rollator, Cane   Dressing/Bathing bathing difficulty, requires equipment   Dressing/Bathing Management Shower chair   Home Accessibility wheelchair accessible   Equipment Currently Used at Home shower chair;rollator;cane, straight;grab bar   Patient currently being followed by outpatient case management? No   Do you currently have service(s) that help you manage your care at home? No   Do you take prescription medications? Yes  ([facility])   Do you have prescription coverage? Yes   Coverage [employer]   Do you have any problems affording any of your prescribed medications? No   Is the patient taking medications as prescribed? yes   Who is going to help you get home at discharge? Dghtr   How do you get to doctors appointments? health plan transportation   Are you on dialysis? Yes   Dialysis Name and Scheduled days [facility] 11:00   Discharge Plan A Home with family   DME Needed Upon Discharge  none   Discharge Plan discussed with: Patient   Discharge Barriers Identified None   OTHER   Name(s) of People in Home Marlen masters (Daughter)   [phone] & 37 yr old  Dante     Pt resides with [facility]; Utilizes [employer] service to get to appointments. Pt stated she plans to request order from physician for new diabetic shoes & stockings which she purchases at [employer]. Will follow for d/c planning needs.

## 2023-02-10 NOTE — PT/OT/SLP EVAL
Physical Therapy Evaluation    Patient Name:  Rosette Madrid   MRN:  [id number]    Recommendations:     Discharge Recommendations: home health PT   Discharge Equipment Recommendations: none   Barriers to discharge: None    Assessment:     Rosette Madrid is a 77 y.o. female admitted with a medical diagnosis of   1. Anemia, unspecified type    2. Hypotension    3. Chest pain    Per MD note  Pt presented to [facility] ED on 2/9/2023 from dialysis 2/2 anemia. Patient reports waking up this morning feeling a little weak and mildly SOB, she went to her dialysis session and reported feeling much better following dialysis, however she was advised that her Hgb was low and that she needed to come to the hospital for a transfusion    She presents with the following impairments/functional limitations: weakness, impaired endurance, impaired self care skills, impaired functional mobility, edema, impaired balance, gait instability .    Rehab Prognosis: Good; patient would benefit from acute skilled PT services to address these deficits and reach maximum level of function.    Recent Surgery: * No surgery found *      Plan:     During this hospitalization, patient to be seen BID (daily / BID 3-5 x week) to address the identified rehab impairments via gait training, therapeutic activities, therapeutic exercises and progress toward the following goals:    Plan of Care Expires:  03/12/23    Subjective     Chief Complaint: swelling  Patient/Family Comments/goals: to return home  Pain/Comfort:  Pain Rating 1: 0/10    Patients cultural, spiritual, Confucianist conflicts given the current situation: no    Living Environment:  Pt lives with daughter and granddaughter who both work from home. Her daughter is her PCA. Pt lives in a  house with 1 NAZ and a tub shower combo. She uses rollator or SC for ambulation. She requires assist for dressing and bathing as needed. She does dishes and laundry at home as she is able. Her family is able to assist.  She reported no falls    Equipment used at home: rollator, cane, straight, shower chair, other (see comments) (grab bars in shower).  DME owned (not currently used): none.  Upon discharge, patient will have assistance from family.    Objective:     Communicated with nurse maciej prior to session.  Patient found HOB elevated with peripheral IV, telemetry, pulse ox (continuous), blood pressure cuff  upon PT entry to room.    General Precautions: Standard, fall  Orthopedic Precautions:N/A   Braces: N/A  Respiratory Status: Room air   Pre session vitals Post session vitals   BP mmHG 124/65 111/58   HR bpm 67 67   Ox sats % 99 100       Exams:  Cognitive Exam:  Patient is oriented to Person, Place, Time, and Situation  Fine Motor Coordination:    -       Intact  Left hand thumb/finger opposition skills and Right hand thumb/finger opposition skills  Sensation:    -       Intact  light/touch BUE / LE  RUE ROM: WFL  RUE Strength: WFL  LUE ROM: WFL  LUE Strength: WFL  RLE ROM: WFL  RLE Strength: WFL  LLE ROM: WFL  LLE Strength: WFL    Functional Mobility:  Bed Mobility:     Supine to Sit: minimum assistance  Sit to Supine: moderate assistance  Transfers:     Sit to Stand:  contact guard assistance with no AD  Gait: pt side steps towards HOB with CGA. RT present for treatment        Patient left HOB elevated with all lines intact and call button in reach.    GOALS:   Multidisciplinary Problems       Physical Therapy Goals          Problem: Physical Therapy    Goal Priority Disciplines Outcome Goal Variances Interventions   Physical Therapy Goal     PT, PT/OT      Description: Goals to be met by: discharge     Patient will increase functional independence with mobility by performin. Supine to sit with Stand-by Assistance  2. Sit to supine with Set-up Arpin  3. Sit to stand transfer with Stand-by Assistance  4. Gait  x 130 feet with Stand-by Assistance using Rolling Walker.                          History:      Past Medical History:   Diagnosis Date    CKD (chronic kidney disease) requiring chronic dialysis     COPD (chronic obstructive pulmonary disease)     Diabetes mellitus     Hyperlipidemia     Hypertension     Renal insufficiency     Thyroid disease        Past Surgical History:   Procedure Laterality Date    AV FISTULA PLACEMENT Left     CARDIAC CATHETERIZATION      COLONOSCOPY      CORONARY ARTERY BYPASS GRAFT      ESOPHAGOGASTRODUODENOSCOPY      POLYPECTOMY      SMALL BOWEL ENTEROSCOPY Left 1/20/2023    Procedure: ENTEROSCOPY;  Surgeon: Lexi Scales MD;  Location: [facility] ENDOSCOPY;  Service: Gastroenterology;  Laterality: Left;    THYROIDECTOMY      TUBAL LIGATION         Time Tracking:     PT Received On: 02/10/23  PT Start Time: 0929     PT Stop Time: 0940  PT Total Time (min): 11 min     Billable Minutes: Evaluation 11      02/10/2023

## 2023-02-10 NOTE — CONSULTS
[facility] Cardiology Consult Note        Attending Physician: Dr. Bender  Fellow: Dr. Azeb Singh    Date of Admit: 2/9/2023    Reason for Consult     OAC recommendations    Subjective:      History of Present Illness:  77F with PMHx of CAD s/p CABG, HTN, HLD, ESRD on HD, DM2, COPD, thyroid disease, and newly diagnosed AF who presents on 2/9/23 for anemia. Pt states that she was instructed to come to ED after she was discovered to be severely anemic at her dialysis visit. She denies sxs of CP, SOB, lightheadedness, dizziness, palpitations, or syncope. However,she does report fatigue. She recently started eliquis a few weeks ago after new diagnosis of AF. Since then, she denies any obvious bleeding, bloody stools, or melena. She has no complaints at this time.    Past Medical History:  Past Medical History:   Diagnosis Date    CKD (chronic kidney disease) requiring chronic dialysis     COPD (chronic obstructive pulmonary disease)     Diabetes mellitus     Hyperlipidemia     Hypertension     Renal insufficiency     Thyroid disease        Past Surgical History:  Past Surgical History:   Procedure Laterality Date    AV FISTULA PLACEMENT Left     CARDIAC CATHETERIZATION      COLONOSCOPY      CORONARY ARTERY BYPASS GRAFT      ESOPHAGOGASTRODUODENOSCOPY      POLYPECTOMY      SMALL BOWEL ENTEROSCOPY Left 1/20/2023    Procedure: ENTEROSCOPY;  Surgeon: Lexi Scales MD;  Location: [facility] ENDOSCOPY;  Service: Gastroenterology;  Laterality: Left;    THYROIDECTOMY      TUBAL LIGATION         Allergies:  Review of patient's allergies indicates:   Allergen Reactions    Lisinopril Swelling    Azithromycin      Other reaction(s): tongue/facial swelling    Baclofen      Other reaction(s): tongue/facial swelling    Doxycycline      Other reaction(s): Angioedema       Home Medications:  Prior to Admission medications    Medication Sig Start Date End Date Taking? Authorizing Provider   albuterol (VENTOLIN HFA) 90 mcg/actuation  "inhaler Inhale 2 puffs into the lungs every 6 (six) hours as needed for Wheezing. Rescue 11/23/22   JOSEFINA Lord   albuterol-ipratropium (DUO-NEB) 2.5 mg-0.5 mg/3 mL nebulizer solution Take 3 mLs by nebulization every 6 (six) hours as needed for Wheezing or Shortness of Breath. Rescue 12/19/22   JOSEFINA Lord   apixaban (ELIQUIS) 5 mg Tab Take 1 tablet (5 mg total) by mouth 2 (two) times daily. 1/22/23 1/22/24  Matias Gee MD   aspirin (ECOTRIN) 81 MG EC tablet Take 1 tablet (81 mg total) by mouth once daily. 11/23/22   JOSEFINA Lord   BASAGLAR KWIKPEN U-100 INSULIN glargine 100 units/mL SubQ pen Inject 30 Units into the skin Daily. 9/30/22 2/6/23  JOSEFINA Lord   BD ULTRA-FINE MINI PEN NEEDLE 31 gauge x 3/16" Ndle USE AS DIRECTED TWICE A DAY 9/9/22   Historical Provider   BENADRYL 25 mg capsule Take 50 mg by mouth nightly. 2/23/22   Historical Provider   bisacodyL (DULCOLAX) 5 mg EC tablet Take 5 mg by mouth daily as needed for Constipation.    Historical Provider   carvediloL (COREG) 6.25 MG tablet Take 1 tablet (6.25 mg total) by mouth 2 (two) times daily. 12/5/22 12/5/23  YUE Balderas   clonazePAM (KLONOPIN) 0.5 MG tablet Take 0.5 mg by mouth daily as needed. 7/13/22   Historical Provider   DIALYVITE 100-1 mg Tab Take 1 tablet by mouth once daily. 8/13/22   Historical Provider   DULoxetine (CYMBALTA) 30 MG capsule Take 30 mg by mouth once daily. 7/27/22   Historical Provider   ferrous sulfate 325 (65 FE) MG EC tablet Take 1 tablet (325 mg total) by mouth once daily. 1/22/23 5/22/23  Matias Gee MD   fluticasone furoate-vilanteroL (BREO) 100-25 mcg/dose diskus inhaler Inhale 1 puff into the lungs once daily. 9/30/22 2/6/23  Margarita Dior, JAIMEP   furosemide (LASIX) 40 MG tablet Take 1 tablet (40 mg total) by mouth once daily. 12/5/22 12/5/23  Elizabeth Mustafa, ANP   glipiZIDE (GLUCOTROL) 10 MG tablet Take 1 tablet (10 mg total) by mouth daily with breakfast. " "22   JOSEFINA Lord   INVEGA SUSTENNA 156 mg/mL Syrg injection Inject into the muscle. 22   Historical Provider   L-METHYLFOLATE 15 mg Tab Take 1 tablet by mouth once daily. 22   Historical Provider   levothyroxine (SYNTHROID) 125 MCG tablet Take 1 tablet (125 mcg total) by mouth before breakfast. 22   JOSEFINA Lord   ONETOUCH DELICA PLUS LANCET 30 gauge Misc 1 lancet by Other route once daily. 22   JOSEFINA Lord   ONETOUCH ULTRA TEST Strp 1 strip by Other route once daily. 22   JOSEFINA Lord   pen needle, diabetic 31 gauge x 5/16" Ndle 2 Units by Misc.(Non-Drug; Combo Route) route 2 (two) times a day. Patient to check blood sugars twice daily (morning and night) 22   JOSEFINA Lord   rosuvastatin (CRESTOR) 40 MG Tab Take 1 tablet (40 mg total) by mouth once daily. 22  Elizabeth Mustafa, YUE   sevelamer carbonate (RENVELA) 800 mg Tab Take 1,600 mg by mouth 3 (three) times daily. 22   Historical Provider   tiotropium (SPIRIVA WITH HANDIHALER) 18 mcg inhalation capsule Inhale 1 capsule (18 mcg total) into the lungs Daily. 22  JOSEFINA Lord   tiZANidine (ZANAFLEX) 4 MG tablet Take 4 mg by mouth every 8 (eight) hours as needed. 22   Historical Provider       Family History:  Family History   Problem Relation Age of Onset    Hypertension Mother     Hypertension Father     Hypertension Sister     Hypertension Sister        Social History:  Social History     Tobacco Use    Smoking status: Every Day     Packs/day: 0.25     Types: Cigarettes    Smokeless tobacco: Never    Tobacco comments:     Smokes 4 cigarettes a day   Substance Use Topics    Alcohol use: Yes     Comment: 1 glass every 2-3 month    Drug use: Not Currently       Review of Systems:  All other systems are reviewed and are negative.       Objective:   Last 24 Hour Vital Signs:  BP  Min: 87/48  Max: 145/63  Temp  Av.1 °F (36.7 °C)  Min: 97.9 °F (36.6 °C)  " "Max: 98.6 °F (37 °C)  Pulse  Av.2  Min: 65  Max: 82  Resp  Av.7  Min: 15  Max: 23  SpO2  Av.4 %  Min: 96 %  Max: 100 %  Height  Av' 3" (160 cm)  Min: 5' 3" (160 cm)  Max: 5' 3" (160 cm)  Weight  Av.3 kg (214 lb 8.1 oz)  Min: 95.6 kg (210 lb 12.2 oz)  Max: 99 kg (218 lb 4.1 oz)  Body mass index is 38.66 kg/m².  I/O last 3 completed shifts:  In: 1299 [Blood:300; IV Piggyback:999]  Out: -     Physical Examination:  Gen: AxOx4, NAD  HEENT: PERRLA, EOMI, MMM  CV: RRR, no S3 or S4, no murmurs  Lungs: CTA bilaterally; no wheezes or rhonchi  Abd: soft, nt/nd, normoactive BS  Ext: no peripheral edema, no rashes/lesions  Neuro: no focal deficits, moving all extremities spontaneously, 5/5 strength of UE and LE bilaterally      Laboratory:  Most Recent Data:  CBC:   Lab Results   Component Value Date    WBC 7.2 02/10/2023    HGB 6.6 (L) 02/10/2023    HCT 21.3 (L) 02/10/2023     02/10/2023    MCV 93.4 02/10/2023    RDW 19.9 (H) 02/10/2023     BMP:   Lab Results   Component Value Date     02/10/2023    K 4.1 02/10/2023    CO2 25 02/10/2023    BUN 38.0 (H) 02/10/2023    CREATININE 2.96 (H) 02/10/2023    CALCIUM 8.8 02/10/2023    MG 2.10 02/10/2023    PHOS 3.1 02/10/2023     LFTs:   Lab Results   Component Value Date    ALBUMIN 2.8 (L) 02/10/2023    BILITOT 0.4 02/10/2023    AST 20 02/10/2023    ALKPHOS 102 02/10/2023    ALT 16 02/10/2023     Coags:   Lab Results   Component Value Date    INR 1.04 2023    PROTIME 13.4 2023    PTT 69.2 2023     FLP:   Lab Results   Component Value Date    CHOL 117 2022    HDL 42 2022    TRIG 66 2022     DM:   Lab Results   Component Value Date    HGBA1C 5.8 2023    HGBA1C 6.0 2022    HGBA1C 7.8 (H) 12/15/2021    CREATININE 2.96 (H) 02/10/2023     Thyroid:   Lab Results   Component Value Date    TSH 7.238 (H) 02/10/2023     Anemia:   Lab Results   Component Value Date    IRON 44 (L) 2023    TIBC 241 (L) " 01/19/2023    FERRITIN 1,461.24 (H) 01/19/2023    UXQDVXRL93 817 (H) 01/19/2023    FOLATE 15.0 01/19/2023     Cardiac:   Lab Results   Component Value Date    TROPONINI 0.052 (H) 02/09/2023    .9 (H) 02/09/2023     Urinalysis:   Lab Results   Component Value Date    COLORU YELLOW 09/30/2021    PHUA 7.0 09/30/2021    CLARITYU Clear 02/14/2018    SPECGRAV 1.020 02/14/2018    NITRITE Negative 09/30/2021    KETONESU Negative 09/30/2021    UROBILINOGEN Normal 09/30/2021    WBCUA 0-2 09/30/2021       Trended Lab Data:  Recent Labs   Lab 02/09/23  1900 02/10/23  0759 02/10/23  0800   WBC 7.5 7.2  --    HGB 5.2* 6.6*  --    HCT 17.6* 21.3*  --     157  --    .6* 93.4  --    RDW 19.9* 19.9*  --      --  137   K 3.6  --  4.1   CO2 26  --  25   BUN 21.0*  --  38.0*   CREATININE 2.37*  --  2.96*   ALBUMIN 3.0*  --  2.8*   BILITOT 0.3  --  0.4   AST 18  --  20   ALKPHOS 86  --  102   ALT 19  --  16       Trended Cardiac Data:  Recent Labs   Lab 02/09/23 1900   TROPONINI 0.052*   .9*           Radiology:  Imaging Results              X-Ray Chest 1 View (Final result)  Result time 02/09/23 19:13:01   Procedure changed from X-Ray Chest PA And Lateral     Final result by Niles Stevenson MD (02/09/23 19:13:01)                   Impression:      NO ACUTE CARDIOPULMONARY PROCESS IDENTIFIED.      Electronically signed by: Niles Stevenson  Date:    02/09/2023  Time:    19:13               Narrative:    EXAMINATION:  XR CHEST 1 VIEW    CLINICAL HISTORY:  Chest pain;    TECHNIQUE:  One view    COMPARISON:  January 19, 2023.    FINDINGS:  Cardiopericardial silhouette enlarged appearance is similar.  Sternotomy changes.  No acute dense focal or segmental consolidation, congestive process, pleural effusions or pneumothorax.                                       Assessment:   77F with PMHx of CAD s/p CABG, HTN, HLD, ESRD on HD, DM2, COPD, thyroid disease, and newly diagnosed AF who presents on 2/9/23 for anemia.  Cardiology consulted for recs regarding OAC.   Plan:     -H/H 5.2/17.6 on admission down from 8.7 three weeks earlier recently underwent push enteroscopy which was largely unremarkable underlying AOCD but occult stool positive today GI consulted hx of AVMs per chart review  -hold OAC until completion of GI workup to find source of bleeding  -CHADSVASC2 score of 7 HAS-BLED score of 5  -given need for transfusion and recent GI bleed, pt should be considered for watchman outpatient (explained that she will have to tolerate OAC periprocedurally) recommend referral to structural cardiology at [facility] in [place]       Azeb Singh MD

## 2023-02-10 NOTE — CONSULTS
Gastroenterology/Hepatology Initial Consult Note    Patient Name: Rosette Madrid  Age: 77 y.o.  : 1945  MRN: [id number]  Admission Date: 2023    Reason for Consult:      Medical Management  Chief Complaint   Patient presents with    Hypotension     Sent from dialysis because she needs a blood transfusion.  Weak and hypotensive in triage         Self, Aaareferral    HPI:     Rosette Madrid is a 77 y.o. female  with history of ESRD (on hemodialysis on Tuesday/Thursday/Saturday schedule), type 2 diabetes, heart failure with preserved ejection fraction of 55% (2021), coronary artery disease, history of CABG x4, hypertension, AF (on Eliquis), dyslipidemia, COPD, hypothyroidism, anemia, AVM and colon polyps who was sent from dialysis to [facility] ED on 2023 due to anemia. Patient reports waking yesterday morning feeling a little weak and mildly short of breath. She went to her dialysis treatment as scheduled and was advised that her Hgb was low (6.0) and that she needed to come to the hospital for a transfusion. She was admitted for symptomatic anemia.      ED course:VS:  BP 87/48, P 74, R 18, T 98.6 degrees Fahrenheit, SpO2 99%. Labs:  WBC 7.5, H and H 5.2/17.6, BUN 21.0, creatinine 2.37, glucose 182, albumin 3.0, LFTs WNL.  .9, troponin 0.052.  FOBT positive. CXR with no acute cardiopulmonary process.     GI service consulted for Hgb <5, rule out GI bleed.      Patient reports that she typically has a bowel movement every 1-2 days. She does take a laxative twice weekly in order to keep her bowel movements regular. Otherwise, she will have constipation. Her last bowel movement was 2 days ago which she describes as dark brown and soft. She denies having black or bloody stools.  She denies abdominal pain, nausea, vomiting, hematemesis, reflux symptoms, difficulty swallowing, diarrhea, hematochezia, decreased appetite, weight loss, dysuria and hematuria.     Denies NSAID use.  She does take  aspirin 81 mg daily and Eliquis daily- last dose this AM. Admits to smoking 4 cigarettes per day for the past 2 years but previously smoked 1/2 ppd since she was 18 years old.  Admits to occasional alcohol use during the holidays but denies alcohol use during the most recent holidays.  Denies illicit drug use. Denies a family history of IBD, colon polyps or colon cancer.       Prior endoscopies:     EGD/push enteroscopy on 1/20/2023 for unexplained iron deficiency anemia per Dr. Lexi Scales:  Normal esophagus.  Normal stomach.  Normal examined duodenum. No proximal small bowel bleeding or AVMs seen. No specimens collected. Recommendations:  To visualize the small bowel, perform video capsule endoscopy as outpatient.  Recommend an iron supplement.     Capsule endoscopy for anemia and history of gastric arteriovenous malformation  on 1/2/2022:  Technically incomplete study.  Visibility and stomach good until patient ingested food at 8:00 a.m..  Capsule did not leave the stomach.  Probable AVM ablation site identified within the stomach.  No other signs of bleeding were seen throughout the stomach.  Recommendations: Incomplete study due to delayed gastric emptying.  Consider repeating study with a capsule placed endoscopically if warranted.     EGD on 12/31/2021 for melena per Dr. Manolo Winter:  The esophagus was normal.  A small hiatal hernia was present.  A single small nonbleeding angioectasia was found in the gastric antrum.  Coagulation for hemostasis using argon beam was successful.  The examined duodenum was normal.  Recommendations:  We will plan on capsule endoscopy tomorrow.  Consider Sandostatin to help prevent any other possible AVM bleeding/oozing.  Overall, test situation with her not wanting to receive any blood.     Colonoscopy on 07/21/2021 for positive fecal immunochemical test per Dr. Trung Bourgeois:  three 5 to 9 mm semi-sessile polyps were found in the transverse colon and were  removed with a cold snare. Resection and retrieval were complete.  Seven 6-9 mm semi-sessile polyps were found in the descending colon and were removed with a cold snare. Resection and retrieval were complete.  A 12 mm semi-sessile polyp was found in the descending colon was removed with a hot snare. Resection and retrieval were complete.  Two 3 to 5 mm semi-sessile polyps were found in the sigmoid colon and were removed with a cold snare. Resection and retrieval were complete.  Non-bleeding internal hemorrhoids were found during retroflexion.  The hemorrhoids were medium-sized and grade II  (internal hemorrhoids that prolapse but reduced spontaneously).  Recommendations await pathology results. Repeat colonoscopy in 3 years for surveillance.  Pathology: Transverse colon, polyp x3, biopsy:  Tubular adenoma, multiple fragments. Descending colon, polyp x8, biopsy:  Tubular adenoma, multiple fragments.  Sigmoid colon, polyp x2, biopsy:  Tubular adenoma, multiple fragments.      Margarita Dior, JOSEFINA    Past Medical History:   Diagnosis Date    CKD (chronic kidney disease) requiring chronic dialysis     COPD (chronic obstructive pulmonary disease)     Diabetes mellitus     Hyperlipidemia     Hypertension     Renal insufficiency     Thyroid disease         Past Surgical History:   Procedure Laterality Date    AV FISTULA PLACEMENT Left     CARDIAC CATHETERIZATION      COLONOSCOPY      CORONARY ARTERY BYPASS GRAFT      ESOPHAGOGASTRODUODENOSCOPY      POLYPECTOMY      SMALL BOWEL ENTEROSCOPY Left 1/20/2023    Procedure: ENTEROSCOPY;  Surgeon: Lexi Scales MD;  Location: [facility] ENDOSCOPY;  Service: Gastroenterology;  Laterality: Left;    THYROIDECTOMY      TUBAL LIGATION          Family History   Problem Relation Age of Onset    Hypertension Mother     Hypertension Father     Hypertension Sister     Hypertension Sister        Social History     Tobacco Use    Smoking status: Every Day     Packs/day: 0.25     Types:  Cigarettes    Smokeless tobacco: Never    Tobacco comments:     Smokes 4 cigarettes a day   Substance Use Topics    Alcohol use: Yes     Comment: 1 glass every 2-3 month         Review of patient's allergies indicates:   Allergen Reactions    Lisinopril Swelling    Azithromycin      Other reaction(s): tongue/facial swelling    Baclofen      Other reaction(s): tongue/facial swelling    Doxycycline      Other reaction(s): Angioedema        (Not in a hospital admission)        INPATIENT MEDICATIONS    Scheduled Meds:   aspirin  81 mg Oral Daily    atorvastatin  80 mg Oral Daily    clonazePAM  0.5 mg Oral QHS    diphenhydrAMINE  50 mg Oral Nightly    ferrous sulfate  1 tablet Oral Daily    fluticasone furoate-vilanteroL  1 puff Inhalation Daily    furosemide  40 mg Oral Daily    levothyroxine  125 mcg Oral Before breakfast    pantoprazole  40 mg Oral Daily    sevelamer carbonate  1,600 mg Oral TID    tiotropium bromide  2 puff Inhalation Daily     Continuous Infusions:  PRN Meds:  sodium chloride, albuterol-ipratropium, dextrose 10%, dextrose 10%, glucagon (human recombinant), glucose, glucose, insulin aspart U-100, naloxone, sodium chloride 0.9%          Review of Systems:       Review of Systems   Constitutional:  Positive for fatigue. Negative for appetite change, chills, fever and unexpected weight change.   HENT:  Negative for trouble swallowing.    Respiratory:  Positive for shortness of breath.    Cardiovascular: Negative.    Gastrointestinal:  Positive for constipation (occasional). Negative for abdominal distention, abdominal pain, anal bleeding, blood in stool, change in bowel habit, diarrhea, nausea, vomiting, reflux and change in bowel habit.   Genitourinary:  Positive for decreased urine volume (hx of ESRD on dialysis). Negative for dysuria and hematuria.   Integumentary:  Negative for color change and mole/lesion.   Neurological:  Positive for weakness. Negative for dizziness and light-headedness.         Objective:       VITAL SIGNS: 24 HR MIN & MAX LAST    Temp  Min: 97.7 °F (36.5 °C)  Max: 98.6 °F (37 °C)  97.7 °F (36.5 °C)        BP  Min: 87/48  Max: 145/63  133/67     Pulse  Min: 59  Max: 82  70     Resp  Min: 14  Max: 23  15    SpO2  Min: 95 %  Max: 100 %  98 %        Physical Exam  Constitutional:       General: She is awake. She is not in acute distress.  Eyes:      Conjunctiva/sclera: Conjunctivae normal.   Cardiovascular:      Rate and Rhythm: Normal rate and regular rhythm.      Pulses: Normal pulses.   Pulmonary:      Effort: Pulmonary effort is normal. No respiratory distress.      Breath sounds: No wheezing.   Abdominal:      General: Bowel sounds are normal. There is no distension.      Palpations: Abdomen is soft.      Tenderness: There is no abdominal tenderness.   Musculoskeletal:      Right lower leg: Edema (1+ pitting) present.      Left lower leg: Edema (1+ pitting) present.      Comments: Dialysis treatment in progress using left forearm AV fistula.    Skin:     Coloration: Skin is not jaundiced or pale.   Neurological:      General: No focal deficit present.      Mental Status: She is alert and oriented to person, place, and time.   Psychiatric:         Behavior: Behavior is cooperative.            LABS:      Recent Labs   Lab 02/09/23  1900 02/10/23  0759   WBC 7.5 7.2   HGB 5.2* 6.6*   HCT 17.6* 21.3*    157   .6* 93.4       Recent Labs   Lab 02/09/23  1900 02/10/23  0759   HGB 5.2* 6.6*   HCT 17.6* 21.3*        Recent Labs   Lab 02/09/23  1900 02/10/23  0800    137   K 3.6 4.1   CO2 26 25   BUN 21.0* 38.0*   CREATININE 2.37* 2.96*   BILITOT 0.3 0.4   ALKPHOS 86 102   AST 18 20   ALT 19 16   LABPROT 5.9 5.2*   ALBUMIN 3.0* 2.8*        No results for input(s): INR, PROTIME, PTT in the last 168 hours.     No results for input(s): IRON, FERRITIN in the last 168 hours.       IMAGING:   X-Ray Chest 1 View    Result Date: 2/9/2023  EXAMINATION: XR CHEST 1 VIEW CLINICAL  HISTORY: Chest pain; TECHNIQUE: One view COMPARISON: January 19, 2023. FINDINGS: Cardiopericardial silhouette enlarged appearance is similar.  Sternotomy changes.  No acute dense focal or segmental consolidation, congestive process, pleural effusions or pneumothorax.     NO ACUTE CARDIOPULMONARY PROCESS IDENTIFIED. Electronically signed by: Nilse Stevenson Date:    02/09/2023 Time:    19:13    X-Ray Chest 1 View    Result Date: 1/19/2023  EXAMINATION: XR CHEST 1 VIEW CLINICAL HISTORY: coarse breath sounds; TECHNIQUE: Frontal view(s) of the chest. COMPARISON: Radiography 09/30/2022 FINDINGS: Prior sternotomy.  Stable cardiac silhouette with similar tortuosity of the thoracic aorta.  The lungs are well-inflated.  Sites of mild scarring in the lower left lung similar since September.  No acute consolidation.  No significant pleural effusion or discernible pneumothorax.     No acute pulmonary process identified. Electronically signed by: Reinier Owen Date:    01/19/2023 Time:    17:47        Assessment / Plan:     Anemia, hx of AVM and colon polyps  -Hgb on admit 5.2--> 2 units PRBCs-->6.6-->1 unit PRBC in progress.   -Monitor Hgb posttransfusion. Transfuse for Hgb <7.   -Continue iron replacement.   -Had unremarkable push enteroscopy on 1/20/2023.   -Last colonoscopy June 2021 with recommendations to repeat in 3 years for surveillance.  -Will plan for inpatient capsule endoscopy on Monday if patient is still hospitalized. Will need to be on a clear liquid diet Sunday, NPO after MN on Sunday and have Golytely 2,000 ml bowel prep Sunday at 1800. If discharged over the weekend, will plan to pursue outpatient.

## 2023-02-10 NOTE — PROGRESS NOTES
"Inpatient Nutrition Evaluation    Admit Date: 2023   Total duration of encounter: 1 day    Nutrition Recommendation/Prescription     When appropriate--resume diabetic /renal diet  Pt education on renal/diabetic diet complete  MVI/fe  Biweekly wt  Will monitor nutrition status     Nutrition Assessment     Chart Review    Reason Seen: continuous nutrition monitoring    Malnutrition Screening Tool Results                Diagnosis:  Anemia, ESRD/HD, CAD, CHF, HTN, Afib, DM, hypothyroid, COPD     Relevant Medical History: ESRD/HD, DM, CHF, CAD, HTN, dyslipidemia, COPD, hypothyroid, anemia     Nutrition-Related Medications: ASA, atorvastatin, FeSO4, Furosemide levothyroxine, pantoprazole, sevelamer carbonate    Nutrition-Related Labs:  (2-10) H/H 6.6/21.3(L) Gluc 149 Bun 38(H) Cr 2.9(H) GFR 16(L) K 4.1     Diet Order: Diet NPO Except for: Medication  Oral Supplement Order: none  Appetite/Oral Intake: NPO/NPO  Factors Affecting Nutritional Intake: NPO  Food/Zoroastrianism/Cultural Preferences: none reported  Food Allergies: none reported       Wound(s):   none reported     Comments    (2/10) Pt currently NPO; reported usually with good appetite; no N/V; UBW approx 210#--wt slightly elevated/ fluid; on diuretic. Pt tries to follow diabetic/ renal diet; provided pt with copy of diet. Abnormal labs noted: reflective DM/ESRD. Pt is obese--BMI 38.6.     Anthropometrics    Height: 5' 3" (160 cm)    Last Weight: 99 kg (218 lb 4.1 oz) (02/10/23 0649) Weight Method: Bed Scale  BMI (Calculated): 38.7  BMI Classification: obese grade II (BMI 35-39.9)     Ideal Body Weight (IBW), Female: 115 lb     % Ideal Body Weight, Female (lb): 183.27 %                    Usual Body Weight (UBW), k.4 kg  % Usual Body Weight: 103.99     Usual Weight Provided By: EMR weight history    Wt Readings from Last 3 Encounters:   02/10/23 0649 99 kg (218 lb 4.1 oz)   23 1836 95.6 kg (210 lb 12.2 oz)   23 1336 95.3 kg (210 lb 3.2 oz) "   01/19/23 1140 95.1 kg (209 lb 12.3 oz)      Weight Change(s) Since Admission:  Admit Weight: 95.6 kg (210 lb 12.2 oz) (02/09/23 1836)  Wt elevated/ obese/fluid     Patient Education    Education Provided: diabetic diet and renal diet  Teaching Method: explanation and printed materials  Comprehension: verbalizes understanding  Barriers to Learning: none evident  Expected Compliance: good  Comments: All questions were answered and dietitian's contact information was provided.     Monitoring & Evaluation     Dietitian will monitor food and beverage intake, weight, and electrolyte/renal panel.  Nutrition Risk/Follow-Up: low (follow-up in 5-7 days)  Patients assigned 'low nutrition risk' status do not qualify for a full nutritional assessment but will be monitored and re-evaluated in a 5-7 day time period. Please consult if re-evaluation needed sooner.

## 2023-02-10 NOTE — H&P
[facility] Internal Medicine History and Physical     Resident Team: [facility] Internal Medicine List 2  Attending Physician: Dr. Joleen Cesar MD  Resident: Nicky Winters MD     Date of Admit: 2/9/2023    Chief Complaint:      Chief Complaint   Patient presents with    Hypotension     Sent from dialysis because she needs a blood transfusion.  Weak and hypotensive in triage       Subjective:     History of Present Illness:  Rosette Madrid is a 77 y.o. female with Hx of CAD s/p CABG x4, ESRD on HD (Tu, Th, Sa), HFpEF (LVEF 50-55%), COPD Gold Stage II (PFTs 2015), HTN, T2DM, and Hypothyroidism who presented to [facility] ED on 2/9/2023 from dialysis 2/2 anemia. Patient reports waking up this morning feeling a little weak and mildly SOB, she went to her dialysis session and reported feeling much better following dialysis, however she was advised that her Hgb was low and that she needed to come to the hospital for a transfusion. At this time patient denies any SOB, chest pain, palpitations, dizziness, or lightheadedness.    Of note, patient was recently admitted to [facility] (01/19/2023 - 01/22/2023) for a similar problem. Patient had presented at that time with a Hgb of 6.6 and have + guaiac stool on exam. Patient was transfused 2u PRBCs and was evaluated by GI with a push enteroscopy that did not show any source of bleeding at the time. Patient was also found to have new onset Atrial fibrillation during that admission wand was initiated on Eliquis.    In the ED patient was initially hypotensive on arrival, BP 87/48, all other vitals WNL. Labs were significant for H/H 5.2/17.6, BUN/Cr 21.0/2.37, .9, Troponin 0.052. CXR with no acute cardiopulmonary process. Patient was typed and screened and 2u PRBCs ordered from transfusion. Internal medicine consulted for admission secondary to symptomatic anemia.       Review of Systems:  Constitutional: Denies fever or unintentional weight change   Eye: Denies vision changes   ENMT: Denies sinus  congestion or nasal drainage   Respiratory: Denies dyspnea, cough, or wheezing   Cardiovascular: Denies chest pain, palpitations, or syncope   Gastrointestinal: Denies nausea, vomiting, abdominal pain, constipation, or diarrhea   Genitourinary: Denies dysuria or hematuria   Heme/Lymph: Denies abnormal bruising or bleeding, or lymph node swelling   Musculoskeletal: Denies trauma, joint pain or swelling, or myalgias   Integumentary: Denies rash or lesion   Neurologic: Denies confusion, memory loss, numbness, tingling, or unilateral weakness   Endocrine: Denies polyuria, polydipsia, heat or cold intolerance   Psychiatric: Denies depression, anhedonia, or anxiety      Past Medical History:   ----------------------------  CKD (chronic kidney disease) requiring chronic dialysis  COPD (chronic obstructive pulmonary disease)  Diabetes mellitus  Hyperlipidemia  Hypertension  Renal insufficiency  Thyroid disease    Past Surgical History:  Past Surgical History:   Procedure Laterality Date    AV FISTULA PLACEMENT Left     CARDIAC CATHETERIZATION      COLONOSCOPY      CORONARY ARTERY BYPASS GRAFT      ESOPHAGOGASTRODUODENOSCOPY      POLYPECTOMY      SMALL BOWEL ENTEROSCOPY Left 1/20/2023    Procedure: ENTEROSCOPY;  Surgeon: Lexi Scales MD;  Location: [facility] ENDOSCOPY;  Service: Gastroenterology;  Laterality: Left;    THYROIDECTOMY      TUBAL LIGATION         Allergies:  Review of patient's allergies indicates:   Allergen Reactions    Lisinopril Swelling    Azithromycin      Other reaction(s): tongue/facial swelling    Baclofen      Other reaction(s): tongue/facial swelling    Doxycycline      Other reaction(s): Angioedema       Home Medications:  Prior to Admission medications    Medication Sig Start Date End Date Taking? Authorizing Provider   albuterol (VENTOLIN HFA) 90 mcg/actuation inhaler Inhale 2 puffs into the lungs every 6 (six) hours as needed for Wheezing. Rescue 11/23/22   JOSEFINA Lord  "  albuterol-ipratropium (DUO-NEB) 2.5 mg-0.5 mg/3 mL nebulizer solution Take 3 mLs by nebulization every 6 (six) hours as needed for Wheezing or Shortness of Breath. Rescue 12/19/22   JOSEFINA Lord   apixaban (ELIQUIS) 5 mg Tab Take 1 tablet (5 mg total) by mouth 2 (two) times daily. 1/22/23 1/22/24  Matias Gee MD   aspirin (ECOTRIN) 81 MG EC tablet Take 1 tablet (81 mg total) by mouth once daily. 11/23/22   JOSEFINA Lord   BASAGLAR KWIKPEN U-100 INSULIN glargine 100 units/mL SubQ pen Inject 30 Units into the skin Daily. 9/30/22 2/6/23  JOSEFINA Lord   BD ULTRA-FINE MINI PEN NEEDLE 31 gauge x 3/16" Ndle USE AS DIRECTED TWICE A DAY 9/9/22   Historical Provider   BENADRYL 25 mg capsule Take 50 mg by mouth nightly. 2/23/22   Historical Provider   bisacodyL (DULCOLAX) 5 mg EC tablet Take 5 mg by mouth daily as needed for Constipation.    Historical Provider   carvediloL (COREG) 6.25 MG tablet Take 1 tablet (6.25 mg total) by mouth 2 (two) times daily. 12/5/22 12/5/23  YUE Balderas   clonazePAM (KLONOPIN) 0.5 MG tablet Take 0.5 mg by mouth daily as needed. 7/13/22   Historical Provider   DIALYVITE 100-1 mg Tab Take 1 tablet by mouth once daily. 8/13/22   Historical Provider   DULoxetine (CYMBALTA) 30 MG capsule Take 30 mg by mouth once daily. 7/27/22   Historical Provider   ferrous sulfate 325 (65 FE) MG EC tablet Take 1 tablet (325 mg total) by mouth once daily. 1/22/23 5/22/23  Matias Gee MD   fluticasone furoate-vilanteroL (BREO) 100-25 mcg/dose diskus inhaler Inhale 1 puff into the lungs once daily. 9/30/22 2/6/23  JOSEFINA Lord   furosemide (LASIX) 40 MG tablet Take 1 tablet (40 mg total) by mouth once daily. 12/5/22 12/5/23  Elizabeth Mustafa, ANP   glipiZIDE (GLUCOTROL) 10 MG tablet Take 1 tablet (10 mg total) by mouth daily with breakfast. 12/12/22   JOSEFINA Lord SUSTENNA 156 mg/mL Syrg injection Inject into the muscle. 8/2/22   Historical " "Provider   L-METHYLFOLATE 15 mg Tab Take 1 tablet by mouth once daily. 8/31/22   Historical Provider   levothyroxine (SYNTHROID) 125 MCG tablet Take 1 tablet (125 mcg total) by mouth before breakfast. 12/12/22   JOSEFINA Lord   ONETOUCH DELICA PLUS LANCET 30 gauge Misc 1 lancet by Other route once daily. 5/9/22   JOSEFINA Lord   ONETOUCH ULTRA TEST Strp 1 strip by Other route once daily. 11/23/22   JOSEFINA Lord   pen needle, diabetic 31 gauge x 5/16" Ndle 2 Units by Misc.(Non-Drug; Combo Route) route 2 (two) times a day. Patient to check blood sugars twice daily (morning and night) 5/19/22   JOSEFINA Lord   rosuvastatin (CRESTOR) 40 MG Tab Take 1 tablet (40 mg total) by mouth once daily. 12/5/22 12/5/23  Elizabeth Mustafa, YUE   sevelamer carbonate (RENVELA) 800 mg Tab Take 1,600 mg by mouth 3 (three) times daily. 9/27/22   Historical Provider   tiotropium (SPIRIVA WITH HANDIHALER) 18 mcg inhalation capsule Inhale 1 capsule (18 mcg total) into the lungs Daily. 9/30/22 2/6/23  JOSEFINA Lord   tiZANidine (ZANAFLEX) 4 MG tablet Take 4 mg by mouth every 8 (eight) hours as needed. 5/7/22   Historical Provider       Family History:  Family History   Problem Relation Age of Onset    Hypertension Mother     Hypertension Father     Hypertension Sister     Hypertension Sister        Social History:  Social History     Tobacco Use    Smoking status: Every Day     Packs/day: 0.25     Types: Cigarettes    Smokeless tobacco: Never    Tobacco comments:     Smokes 4 cigarettes a day   Substance Use Topics    Alcohol use: Yes     Comment: 1 glass every 2-3 month    Drug use: Not Currently          Objective:   Vitals  Vitals  BP: 111/67  Temp: 98.6 °F (37 °C)  Pulse: 82  Resp: 20  SpO2: 100 %  Height: 5' 3" (160 cm)  Weight: 95.6 kg (210 lb 12.2 oz)    Physical Examination:  General: well-developed well-nourished in no acute distress  Eye: PERRLA, EOMI, clear conjunctiva, eyelids normal  HENT: oropharynx " and nasal mucosal surfaces moist  Neck: full range of motion, no lymphadenopathy  Respiratory: clear to auscultation bilaterally, non labored respirations  Cardiovascular: regular rate and rhythm,2+ peripheral pulses  Gastrointestinal: soft, non-tender, non-distended with normal bowel sounds  Musculoskeletal: Strength 5/5 in bilateral LE/UE, moves all extremities spontaneously, LLE 1+ pitting edema to mid calf, RLE trace to 1+ pitting edema to mid calf  Neurologic: no signs of peripheral neurological deficit, motor/sensory function intact    Laboratory:  CMP:  Recent Labs   Lab 02/09/23  1900      K 3.6   CHLORIDE 102   CO2 26   BUN 21.0*   CREATININE 2.37*   GLUCOSE 182*   CALCIUM 9.0   ALBUMIN 3.0*   BILITOT 0.3   AST 18   ALT 19   ALKPHOS 86     CBC:  Recent Labs   Lab 02/09/23  1900   WBC 7.5   ABSNEUTRO 5.89   RBC 1.75*   HGB 5.2*   HCT 17.6*      .6*   RDW 19.9*       Electrolytes:  Recent Labs   Lab 02/09/23  1900      K 3.6   CHLORIDE 102   CALCIUM 9.0   MG 1.90       24hr CBG:No results for input(s): POCTGLUCOSE in the last 168 hours.      Radiology:  X-Ray Chest 1 View    Result Date: 2/9/2023  NO ACUTE CARDIOPULMONARY PROCESS IDENTIFIED. Electronically signed by: Niles Stevenson Date:    02/09/2023 Time:    19:13       Assessment & Plan:     Symptomatic Anemia  Iron Deficiency Anemia  - Hgb 5.2 on admission  - Last iron panel with decreased iron (01/2023)  - Type and Screened, Transfuse 2u PRBCs, Post transfusion CBC pending  - FOBT ordered to r/o GI bleed    ESRD on HD (Tu,Th, Sa)  - Completed full dialysis session prior to admission  - Continue Sevelamer, will hold Dialyvite, not on formulary  - Nephrology consulted for HD    CAD s/p CABGx4  HFpEF (LVEF 50-55%)  HTN  - Patient hypotensive on admission, will hold BP medications for now; will resume if BP begins to elevate  - Continue Aspirin 81mg, Atorvastatin 80mg (takes Crestor 40mg at home), Coreg 6.25mg BID, and Lasix 40mg  qD  - Troponin mildly elevated on admission, however decreasing since last admission (01/19/2023), will not trend troponin at this time as patient is not actively bleeding and has chronically elevated troponin levels    Atrial Fibrillation  - New diagnosis in 01/2023  - Continue with Eliquis 5mg BID    T2DM  - Serum glucose 182 on admission  - Will hold home glipizide  - Moderate ISS    Hypothyroidism  - Continue synthroid 125mcg qD    COPD   - DuoNeb q6hr PRN for wheezing  - Continue daily Spiriva and Breo  - Supplemental Oxygen as needed for SOB to keep SpO2 >88%      CODE STATUS: Full Code  Access: PIV x1 RUE; AV Graft L Forearm  Antimicrobials: None  Diet: Renal/Diabetic  DVT Prophylaxis: Eliquis  GI Prophylaxis: None  Fluids: None    Disposition: Patient admitted for symptomatic anemia. Will transfuse 2u PRBCs and reassess patient's symptoms, will monitor H/H.      A total of 65 mins were spent on this encounter with this patient, which include interviewing patient and/or family, chart reviewing, coordinating care, examining patient, and documenting.    Nicky Winters MD  [employer] [facility]  PGY-3

## 2023-02-10 NOTE — CONSULTS
[facility]  Nephrology Consultation  Patient Name: Rosette Madrid  Age: 77 y.o.  : 1945  MRN: [id number]  Admission Date: 2023    Date of Consultation: 02/10/2023    Consultation Requested By: Dr. Winters    Reason for Consultation: ESRD    Chief Complaint: Hypotension (Sent from dialysis because she needs a blood transfusion.  Weak and hypotensive in triage)      History of Present Illness:  Ms Rosette Madrid is a 77 y.o. Black or  female with past medical history of end-stage renal disease, (on hemodialysis by way of left forearm AV fistula, dialyzes per Tuesday,  schedule at [facility]), diabetes mellitus type 2, heart failure with preserved ejection fraction, coronary artery disease hypertension, dyslipidemia, COPD, hypothyroidism, and anemia.    Patient presented to the emergency department on 2022 for evaluation of weakness and mild shortness of breath.  She denied fever, chills, nausea, vomiting, or overt bleeding.  She reported that symptoms began the morning of 2023, does not recall any precipitating or alleviating factors.  She was last dialyzed on 2023.  Laboratory results in the emergency department were remarkable for hemoglobin level of 5.2, patient was mildly hypotensive.  She was admitted to medicine service for further evaluation and treatment, received 2 units of packed red blood cells overnight. Nephrology was consulted for assistance with management of ESRD.    Patient is allergic to lisinopril, azithromycin, baclofen, and doxycycline.     Review of Systems   Constitutional:  Positive for fatigue.   HENT: Negative.     Eyes: Negative.    Respiratory:  Positive for shortness of breath.    Cardiovascular: Negative.    Gastrointestinal: Negative.    Endocrine: Negative.    Genitourinary: Negative.    Musculoskeletal: Negative.    Skin: Negative.    Allergic/Immunologic: Negative.    Neurological:  "Negative.    Hematological: Negative.    Psychiatric/Behavioral: Negative.       Past Medical History:  has a past medical history of CKD (chronic kidney disease) requiring chronic dialysis, COPD (chronic obstructive pulmonary disease), Diabetes mellitus, Hyperlipidemia, Hypertension, Renal insufficiency, and Thyroid disease.    Procedure History:  has a past surgical history that includes Coronary artery bypass graft; Cardiac catheterization; AV fistula placement (Left); Esophagogastroduodenoscopy; Colonoscopy; Thyroidectomy; Polypectomy; Tubal ligation; and Small bowel enteroscopy (Left, 1/20/2023).    Family History: family history includes Hypertension in her father, mother, sister, and sister.    Social History:  reports that she has been smoking cigarettes. She has been smoking an average of .25 packs per day. She has never used smokeless tobacco. She reports current alcohol use. She reports that she does not currently use drugs.    Physical Exam:   /61   Pulse 65   Temp 98.3 °F (36.8 °C)   Resp 18   Ht 5' 3" (1.6 m)   Wt 99 kg (218 lb 4.1 oz)   SpO2 99%   BMI 38.66 kg/m²  Body mass index is 38.66 kg/m².  General appearance: Patient is in no acute distress.  HEENT: PERRLA, EOMI, no scleral icterus, no JVD. Neck is supple.  Chest: Respirations are unlabored. Lungs sounds are diminished to auscultation.   Heart: S1, S2.   Abdomen: Benign. Obese  : Deferred.  Extremities:  2+ bilateral lower extremity pitting edema, peripheral pulses are palpable.  Left forearm AV fistula with audible bruit and palpable thrill  Neuro: No focal deficits.     Inpatient Medications:     Current Facility-Administered Medications:     0.9%  NaCl infusion (for blood administration), , Intravenous, Q24H PRN, Nicky Winters MD    albuterol-ipratropium 2.5 mg-0.5 mg/3 mL nebulizer solution 3 mL, 3 mL, Nebulization, Q6H PRN, Nicky Winters MD    apixaban tablet 5 mg, 5 mg, Oral, BID, Nicky Winters MD, 5 mg at 02/10/23 " 0815    aspirin EC tablet 81 mg, 81 mg, Oral, Daily, Nicky Winters MD, 81 mg at 02/10/23 0815    atorvastatin tablet 80 mg, 80 mg, Oral, Daily, Nicky Winters MD, 80 mg at 02/10/23 0815    clonazePAM tablet 0.5 mg, 0.5 mg, Oral, QHS, Nicky Winters MD, 0.5 mg at 02/10/23 0352    dextrose 10% bolus 125 mL 125 mL, 12.5 g, Intravenous, PRN, Nicky Winters MD    dextrose 10% bolus 250 mL 250 mL, 25 g, Intravenous, PRN, Nicky Winters MD    diphenhydrAMINE capsule 50 mg, 50 mg, Oral, Nightly, Nicky Winters MD, 50 mg at 02/10/23 0047    ferrous sulfate tablet 1 each, 1 tablet, Oral, Daily, Nicky Winters MD, 1 each at 02/10/23 0815    fluticasone furoate-vilanteroL 200-25 mcg/dose diskus inhaler 1 puff, 1 puff, Inhalation, Daily **AND** MDI Daily, , , Daily, Nicky Winters MD    furosemide tablet 40 mg, 40 mg, Oral, Daily, Nicky Winters MD, 40 mg at 02/10/23 0815    glucagon (human recombinant) injection 1 mg, 1 mg, Intramuscular, PRN, Nicky Winters MD    glucose chewable tablet 16 g, 16 g, Oral, PRN, Nicky Winters MD    glucose chewable tablet 24 g, 24 g, Oral, PRN, Nicky Winters MD    insulin aspart U-100 injection 1-10 Units, 1-10 Units, Subcutaneous, QID (AC + HS) PRN, Nicky Winters MD    levothyroxine tablet 125 mcg, 125 mcg, Oral, Before breakfast, Nicky Winters MD, 125 mcg at 02/10/23 0633    naloxone 0.4 mg/mL injection 0.02 mg, 0.02 mg, Intravenous, PRN, Nicky Winters MD    pantoprazole EC tablet 40 mg, 40 mg, Oral, Daily, Matias Gee MD, 40 mg at 02/10/23 0915    sevelamer carbonate tablet 1,600 mg, 1,600 mg, Oral, TID, Nicky Winters MD, 1,600 mg at 02/10/23 0911    sodium chloride 0.9% flush 10 mL, 10 mL, Intravenous, Q12H PRN, Nicky Winters MD    tiotropium bromide 2.5 mcg/actuation inhaler 2 puff, 2 puff, Inhalation, Daily, Nicky Winters MD    Current Outpatient Medications:     albuterol (VENTOLIN HFA) 90 mcg/actuation inhaler, Inhale 2 puffs into the lungs every 6 (six) hours as needed  "for Wheezing. Rescue, Disp: 18 g, Rfl: 1    albuterol-ipratropium (DUO-NEB) 2.5 mg-0.5 mg/3 mL nebulizer solution, Take 3 mLs by nebulization every 6 (six) hours as needed for Wheezing or Shortness of Breath. Rescue, Disp: 75 mL, Rfl: 0    apixaban (ELIQUIS) 5 mg Tab, Take 1 tablet (5 mg total) by mouth 2 (two) times daily., Disp: 60 tablet, Rfl: 11    aspirin (ECOTRIN) 81 MG EC tablet, Take 1 tablet (81 mg total) by mouth once daily., Disp: 90 tablet, Rfl: 1    BASAGLAR KWIKPEN U-100 INSULIN glargine 100 units/mL SubQ pen, Inject 30 Units into the skin Daily., Disp: 27 mL, Rfl: 2    BD ULTRA-FINE MINI PEN NEEDLE 31 gauge x 3/16" Ndle, USE AS DIRECTED TWICE A DAY, Disp: , Rfl:     BENADRYL 25 mg capsule, Take 50 mg by mouth nightly., Disp: , Rfl:     bisacodyL (DULCOLAX) 5 mg EC tablet, Take 5 mg by mouth daily as needed for Constipation., Disp: , Rfl:     carvediloL (COREG) 6.25 MG tablet, Take 1 tablet (6.25 mg total) by mouth 2 (two) times daily., Disp: 180 tablet, Rfl: 3    clonazePAM (KLONOPIN) 0.5 MG tablet, Take 0.5 mg by mouth daily as needed., Disp: , Rfl:     DIALYVITE 100-1 mg Tab, Take 1 tablet by mouth once daily., Disp: , Rfl:     DULoxetine (CYMBALTA) 30 MG capsule, Take 30 mg by mouth once daily., Disp: , Rfl:     ferrous sulfate 325 (65 FE) MG EC tablet, Take 1 tablet (325 mg total) by mouth once daily., Disp: 120 tablet, Rfl: 0    fluticasone furoate-vilanteroL (BREO) 100-25 mcg/dose diskus inhaler, Inhale 1 puff into the lungs once daily., Disp: 90 each, Rfl: 2    furosemide (LASIX) 40 MG tablet, Take 1 tablet (40 mg total) by mouth once daily., Disp: 90 tablet, Rfl: 3    glipiZIDE (GLUCOTROL) 10 MG tablet, Take 1 tablet (10 mg total) by mouth daily with breakfast., Disp: 90 tablet, Rfl: 1    INVEGA SUSTENNA 156 mg/mL Syrg injection, Inject into the muscle., Disp: , Rfl:     L-METHYLFOLATE 15 mg Tab, Take 1 tablet by mouth once daily., Disp: , Rfl:     levothyroxine (SYNTHROID) 125 MCG tablet, " "Take 1 tablet (125 mcg total) by mouth before breakfast., Disp: 90 tablet, Rfl: 1    ONETOUCH DELICA PLUS LANCET 30 gauge Misc, 1 lancet by Other route once daily., Disp: 100 each, Rfl: 2    ONETOUCH ULTRA TEST Strp, 1 strip by Other route once daily., Disp: 200 strip, Rfl: 2    pen needle, diabetic 31 gauge x 5/16" Ndle, 2 Units by Misc.(Non-Drug; Combo Route) route 2 (two) times a day. Patient to check blood sugars twice daily (morning and night), Disp: 100 each, Rfl: 2    rosuvastatin (CRESTOR) 40 MG Tab, Take 1 tablet (40 mg total) by mouth once daily., Disp: 90 tablet, Rfl: 3    sevelamer carbonate (RENVELA) 800 mg Tab, Take 1,600 mg by mouth 3 (three) times daily., Disp: , Rfl:     tiotropium (SPIRIVA WITH HANDIHALER) 18 mcg inhalation capsule, Inhale 1 capsule (18 mcg total) into the lungs Daily., Disp: 90 capsule, Rfl: 2    tiZANidine (ZANAFLEX) 4 MG tablet, Take 4 mg by mouth every 8 (eight) hours as needed., Disp: , Rfl:      Imaging:  X-Ray Chest 1 View   Final Result      NO ACUTE CARDIOPULMONARY PROCESS IDENTIFIED.         Electronically signed by: Niles Stevenson   Date:    02/09/2023   Time:    19:13        Laboratory Data:   Hematology  Lab Results   Component Value Date    WBC 7.2 02/10/2023    HGB 6.6 (L) 02/10/2023    HCT 21.3 (L) 02/10/2023     02/10/2023     Chemistry  Lab Results   Component Value Date     02/10/2023    K 4.1 02/10/2023    CHLORIDE 105 02/10/2023    CO2 25 02/10/2023    BUN 38.0 (H) 02/10/2023    CREATININE 2.96 (H) 02/10/2023    EGFRNORACEVR 16 02/10/2023    GLUCOSE 149 (H) 02/10/2023    CALCIUM 8.8 02/10/2023    ALKPHOS 102 02/10/2023    LABPROT 5.2 (L) 02/10/2023    ALBUMIN 2.8 (L) 02/10/2023    BILIDIR <0.1 03/22/2022    IBILI >0.10 03/22/2022    AST 20 02/10/2023    ALT 16 02/10/2023    MG 2.10 02/10/2023    PHOS 3.1 02/10/2023      Urine:  Lab Results   Component Value Date    APPEARANCEUA Hazy 09/30/2021    PHUA 7.0 09/30/2021    PROTEINUA 30 (A) 09/30/2021    " LEUKOCYTESUR Negative 09/30/2021    RBCUA 0-2 09/30/2021    WBCUA 0-2 09/30/2021    BACTERIA Moderate (A) 09/30/2021    SQEPUA >100 (A) 09/30/2021    HYALINECASTS 0-2 (A) 09/30/2021    CREATRANDUR 375.0 09/26/2017    PROTEINURINE 1,032.6 09/26/2017         Lab Results   Component Value Date    HGBA1C 5.8 01/19/2023    GLUCOSE 149 (H) 02/10/2023     Lab Results   Component Value Date    .8 (H) 12/05/2017    OAXSAICZ06SU 20.0 (L) 09/30/2022       Lab Results   Component Value Date    IRON 44 (L) 01/19/2023    TIBC 241 (L) 01/19/2023    TRANS 202 01/19/2023    TRANS 202 01/19/2023    LABIRON 18 (L) 01/19/2023    FERRITIN 1,461.24 (H) 01/19/2023    FOLATE 15.0 01/19/2023    MMADAQWA64 817 (H) 01/19/2023       Lab Results   Component Value Date    HEPCAB Nonreactive 11/13/2017    HEPBSURFAG Nonreactive 01/20/2023    HEPBSAB Reactive (A) 01/20/2023       Impression:   End-stage renal disease  Anemia   Diabetes mellitus type 2   Heart failure with preserved ejection fraction   Coronary artery disease (status post CABG x4)  Heart failure with preserved ejection fraction  COPD   Hypothyroidism  Atrial fibrillation    Plan:   Will plan to dialyze patient today, mostly for ultrafiltration purpose.  Repeat hemoglobin level after transfusion is still 6.6, will plan to give additional 2 units of packed red blood cells with hemodialysis treatment.  Patient is normotensive, will hold antihypertensives for now.  Will defer evaluation of etiology of anemia to medicine service.    Thank you very much for your consultation.     Isabelle Salazar NP  [facility] Nephrology  2/10/2023 7:28 AM

## 2023-02-13 NOTE — PT/OT/SLP DISCHARGE
Physical Therapy Discharge Summary    Name: Rosette Madrid  MRN: [id number]   Principal Problem: Anemia due to chronic kidney disease, on chronic dialysis   Post discharge documentation  Patient Discharged from acute Physical Therapy on 2023.  Please refer to prior PT noted date on 2/10/23 for functional status.     Assessment:     Patient was discharged unexpectedly.  Refer to therapy initial evaluation and last progress note for initial and most recent functional status and goal achievement.  Recommendations made may be found in medical record.    Objective:     GOALS:   Multidisciplinary Problems       Physical Therapy Goals          Problem: Physical Therapy    Goal Priority Disciplines Outcome Goal Variances Interventions   Physical Therapy Goal     PT, PT/OT Unable to Meet, eval only     Description: Goals to be met by: discharge     Patient will increase functional independence with mobility by performin. Supine to sit with Stand-by Assistance  2. Sit to supine with Set-up Bandy  3. Sit to stand transfer with Stand-by Assistance  4. Gait  x 130 feet with Stand-by Assistance using Rolling Walker.                          Reasons for Discontinuation of Therapy Services  Transfer to alternate level of care.      Plan:     Patient Discharged to: Home with Home Health Service.      2023

## 2023-02-16 ENCOUNTER — HOSPITAL ENCOUNTER (INPATIENT)
Facility: HOSPITAL | Age: 78
LOS: 4 days | Discharge: HOME | DRG: 377 | End: 2023-02-20
Attending: STUDENT IN AN ORGANIZED HEALTH CARE EDUCATION/TRAINING PROGRAM | Admitting: INTERNAL MEDICINE
Payer: MEDICARE

## 2023-02-16 DIAGNOSIS — N18.6 ESRD (END STAGE RENAL DISEASE): ICD-10-CM

## 2023-02-16 DIAGNOSIS — K92.1 MELENA: ICD-10-CM

## 2023-02-16 DIAGNOSIS — K92.2 GASTROINTESTINAL HEMORRHAGE, UNSPECIFIED GASTROINTESTINAL HEMORRHAGE TYPE: ICD-10-CM

## 2023-02-16 DIAGNOSIS — R07.9 CHEST PAIN: ICD-10-CM

## 2023-02-16 DIAGNOSIS — D64.9 ANEMIA, UNSPECIFIED TYPE: Primary | ICD-10-CM

## 2023-02-16 LAB
ABO + RH BLD: NORMAL
ALBUMIN SERPL-MCNC: 2.8 G/DL (ref 3.4–4.8)
ALBUMIN/GLOB SERPL: 1 RATIO (ref 1.1–2)
ALP SERPL-CCNC: 75 UNIT/L (ref 40–150)
ALT SERPL-CCNC: 32 UNIT/L (ref 0–55)
APTT PPP: 22.5 SECONDS (ref 23.2–33.7)
AST SERPL-CCNC: 24 UNIT/L (ref 5–34)
BASOPHILS # BLD AUTO: 0.04 X10(3)/MCL (ref 0–0.2)
BASOPHILS NFR BLD AUTO: 0.6 %
BILIRUBIN DIRECT+TOT PNL SERPL-MCNC: 0.3 MG/DL
BLD PROD TYP BPU: NORMAL
BLOOD UNIT EXPIRATION DATE: NORMAL
BLOOD UNIT TYPE CODE: 5100
BUN SERPL-MCNC: 55.7 MG/DL (ref 9.8–20.1)
CALCIUM SERPL-MCNC: 9 MG/DL (ref 8.4–10.2)
CHLORIDE SERPL-SCNC: 101 MMOL/L (ref 98–107)
CO2 SERPL-SCNC: 26 MMOL/L (ref 23–31)
CREAT SERPL-MCNC: 4.66 MG/DL (ref 0.55–1.02)
CROSSMATCH INTERPRETATION: NORMAL
DISPENSE STATUS: NORMAL
EOSINOPHIL # BLD AUTO: 0.06 X10(3)/MCL (ref 0–0.9)
EOSINOPHIL NFR BLD AUTO: 0.9 %
ERYTHROCYTE [DISTWIDTH] IN BLOOD BY AUTOMATED COUNT: 17.3 % (ref 11.5–17)
GFR SERPLBLD CREATININE-BSD FMLA CKD-EPI: 9 MLS/MIN/1.73/M2
GLOBULIN SER-MCNC: 2.7 GM/DL (ref 2.4–3.5)
GLUCOSE SERPL-MCNC: 163 MG/DL (ref 82–115)
GROUP & RH: NORMAL
HCT VFR BLD AUTO: 21.2 % (ref 37–47)
HGB BLD-MCNC: 6.6 GM/DL (ref 12–16)
IMM GRANULOCYTES # BLD AUTO: 0.03 X10(3)/MCL (ref 0–0.04)
IMM GRANULOCYTES NFR BLD AUTO: 0.4 %
INDIRECT COOMBS GEL: NORMAL
INR BLD: 1 (ref 0–1.3)
LYMPHOCYTES # BLD AUTO: 0.72 X10(3)/MCL (ref 0.6–4.6)
LYMPHOCYTES NFR BLD AUTO: 10.4 %
MCH RBC QN AUTO: 29.5 PG
MCHC RBC AUTO-ENTMCNC: 31.1 MG/DL (ref 33–36)
MCV RBC AUTO: 94.6 FL (ref 80–94)
MONOCYTES # BLD AUTO: 0.39 X10(3)/MCL (ref 0.1–1.3)
MONOCYTES NFR BLD AUTO: 5.6 %
NEUTROPHILS # BLD AUTO: 5.7 X10(3)/MCL (ref 2.1–9.2)
NEUTROPHILS NFR BLD AUTO: 82.1 %
NRBC BLD AUTO-RTO: 0 %
PLATELET # BLD AUTO: 155 X10(3)/MCL (ref 130–400)
PMV BLD AUTO: 10.8 FL (ref 7.4–10.4)
POTASSIUM SERPL-SCNC: 4.5 MMOL/L (ref 3.5–5.1)
PROT SERPL-MCNC: 5.5 GM/DL (ref 5.8–7.6)
PROTHROMBIN TIME: 13.1 SECONDS (ref 12.5–14.5)
RBC # BLD AUTO: 2.24 X10(6)/MCL (ref 4.2–5.4)
SODIUM SERPL-SCNC: 136 MMOL/L (ref 136–145)
UNIT NUMBER: NORMAL
WBC # SPEC AUTO: 6.9 X10(3)/MCL (ref 4.5–11.5)

## 2023-02-16 PROCEDURE — 25000003 PHARM REV CODE 250: Performed by: PHYSICIAN ASSISTANT

## 2023-02-16 PROCEDURE — 99284 EMERGENCY DEPT VISIT MOD MDM: CPT | Mod: 25

## 2023-02-16 PROCEDURE — C9113 INJ PANTOPRAZOLE SODIUM, VIA: HCPCS | Performed by: STUDENT IN AN ORGANIZED HEALTH CARE EDUCATION/TRAINING PROGRAM

## 2023-02-16 PROCEDURE — 11000001 HC ACUTE MED/SURG PRIVATE ROOM

## 2023-02-16 PROCEDURE — P9016 RBC LEUKOCYTES REDUCED: HCPCS | Performed by: STUDENT IN AN ORGANIZED HEALTH CARE EDUCATION/TRAINING PROGRAM

## 2023-02-16 PROCEDURE — 36430 TRANSFUSION BLD/BLD COMPNT: CPT

## 2023-02-16 PROCEDURE — 80100016 HC MAINTENANCE HEMODIALYSIS

## 2023-02-16 PROCEDURE — 80053 COMPREHEN METABOLIC PANEL: CPT | Performed by: PHYSICIAN ASSISTANT

## 2023-02-16 PROCEDURE — 86900 BLOOD TYPING SEROLOGIC ABO: CPT | Performed by: PHYSICIAN ASSISTANT

## 2023-02-16 PROCEDURE — 25000003 PHARM REV CODE 250: Performed by: INTERNAL MEDICINE

## 2023-02-16 PROCEDURE — 63600175 PHARM REV CODE 636 W HCPCS: Performed by: STUDENT IN AN ORGANIZED HEALTH CARE EDUCATION/TRAINING PROGRAM

## 2023-02-16 PROCEDURE — 85730 THROMBOPLASTIN TIME PARTIAL: CPT | Performed by: PHYSICIAN ASSISTANT

## 2023-02-16 PROCEDURE — 86923 COMPATIBILITY TEST ELECTRIC: CPT | Performed by: STUDENT IN AN ORGANIZED HEALTH CARE EDUCATION/TRAINING PROGRAM

## 2023-02-16 PROCEDURE — 85025 COMPLETE CBC W/AUTO DIFF WBC: CPT | Performed by: PHYSICIAN ASSISTANT

## 2023-02-16 PROCEDURE — 85610 PROTHROMBIN TIME: CPT | Performed by: PHYSICIAN ASSISTANT

## 2023-02-16 RX ORDER — FUROSEMIDE 40 MG/1
40 TABLET ORAL DAILY
Status: DISCONTINUED | OUTPATIENT
Start: 2023-02-17 | End: 2023-02-20 | Stop reason: HOSPADM

## 2023-02-16 RX ORDER — LABETALOL HYDROCHLORIDE 5 MG/ML
10 INJECTION, SOLUTION INTRAVENOUS
Status: DISCONTINUED | OUTPATIENT
Start: 2023-02-16 | End: 2023-02-20 | Stop reason: HOSPADM

## 2023-02-16 RX ORDER — ATORVASTATIN CALCIUM 40 MG/1
40 TABLET, FILM COATED ORAL NIGHTLY
Status: DISCONTINUED | OUTPATIENT
Start: 2023-02-16 | End: 2023-02-20 | Stop reason: HOSPADM

## 2023-02-16 RX ORDER — SEVELAMER CARBONATE 800 MG/1
1600 TABLET, FILM COATED ORAL 3 TIMES DAILY
Status: DISCONTINUED | OUTPATIENT
Start: 2023-02-16 | End: 2023-02-20 | Stop reason: HOSPADM

## 2023-02-16 RX ORDER — LEVOTHYROXINE SODIUM 125 UG/1
125 TABLET ORAL
Status: DISCONTINUED | OUTPATIENT
Start: 2023-02-17 | End: 2023-02-20 | Stop reason: HOSPADM

## 2023-02-16 RX ORDER — MAG HYDROX/ALUMINUM HYD/SIMETH 200-200-20
30 SUSPENSION, ORAL (FINAL DOSE FORM) ORAL
Status: DISCONTINUED | OUTPATIENT
Start: 2023-02-16 | End: 2023-02-20 | Stop reason: HOSPADM

## 2023-02-16 RX ORDER — CARVEDILOL 3.12 MG/1
6.25 TABLET ORAL 2 TIMES DAILY
Status: DISCONTINUED | OUTPATIENT
Start: 2023-02-16 | End: 2023-02-20 | Stop reason: HOSPADM

## 2023-02-16 RX ORDER — FLUTICASONE FUROATE AND VILANTEROL 100; 25 UG/1; UG/1
1 POWDER RESPIRATORY (INHALATION) DAILY
Status: DISCONTINUED | OUTPATIENT
Start: 2023-02-17 | End: 2023-02-20 | Stop reason: HOSPADM

## 2023-02-16 RX ORDER — PANTOPRAZOLE SODIUM 40 MG/10ML
40 INJECTION, POWDER, LYOPHILIZED, FOR SOLUTION INTRAVENOUS 2 TIMES DAILY
Status: DISCONTINUED | OUTPATIENT
Start: 2023-02-17 | End: 2023-02-20 | Stop reason: HOSPADM

## 2023-02-16 RX ORDER — SODIUM CHLORIDE 9 MG/ML
INJECTION, SOLUTION INTRAVENOUS CONTINUOUS
Status: DISCONTINUED | OUTPATIENT
Start: 2023-02-16 | End: 2023-02-20 | Stop reason: HOSPADM

## 2023-02-16 RX ORDER — CLONAZEPAM 0.5 MG/1
0.5 TABLET ORAL DAILY PRN
Status: DISCONTINUED | OUTPATIENT
Start: 2023-02-16 | End: 2023-02-20 | Stop reason: HOSPADM

## 2023-02-16 RX ORDER — ACETAMINOPHEN 325 MG/1
650 TABLET ORAL EVERY 4 HOURS PRN
Status: DISCONTINUED | OUTPATIENT
Start: 2023-02-16 | End: 2023-02-20 | Stop reason: HOSPADM

## 2023-02-16 RX ORDER — DULOXETIN HYDROCHLORIDE 30 MG/1
30 CAPSULE, DELAYED RELEASE ORAL DAILY
Status: DISCONTINUED | OUTPATIENT
Start: 2023-02-17 | End: 2023-02-20 | Stop reason: HOSPADM

## 2023-02-16 RX ORDER — BISACODYL 5 MG
5 TABLET, DELAYED RELEASE (ENTERIC COATED) ORAL DAILY PRN
Status: DISCONTINUED | OUTPATIENT
Start: 2023-02-16 | End: 2023-02-20 | Stop reason: HOSPADM

## 2023-02-16 RX ORDER — DIPHENHYDRAMINE HCL 25 MG
50 CAPSULE ORAL NIGHTLY PRN
Status: DISCONTINUED | OUTPATIENT
Start: 2023-02-16 | End: 2023-02-20 | Stop reason: HOSPADM

## 2023-02-16 RX ORDER — ALBUTEROL SULFATE 90 UG/1
2 AEROSOL, METERED RESPIRATORY (INHALATION) EVERY 6 HOURS PRN
Status: DISCONTINUED | OUTPATIENT
Start: 2023-02-16 | End: 2023-02-20 | Stop reason: HOSPADM

## 2023-02-16 RX ORDER — ONDANSETRON 2 MG/ML
4 INJECTION INTRAMUSCULAR; INTRAVENOUS EVERY 6 HOURS PRN
Status: DISCONTINUED | OUTPATIENT
Start: 2023-02-16 | End: 2023-02-20 | Stop reason: HOSPADM

## 2023-02-16 RX ORDER — GLUCAGON 1 MG
1 KIT INJECTION
Status: DISCONTINUED | OUTPATIENT
Start: 2023-02-16 | End: 2023-02-20 | Stop reason: HOSPADM

## 2023-02-16 RX ORDER — PANTOPRAZOLE SODIUM 40 MG/10ML
80 INJECTION, POWDER, LYOPHILIZED, FOR SOLUTION INTRAVENOUS
Status: COMPLETED | OUTPATIENT
Start: 2023-02-16 | End: 2023-02-16

## 2023-02-16 RX ORDER — HYDROCODONE BITARTRATE AND ACETAMINOPHEN 500; 5 MG/1; MG/1
TABLET ORAL
Status: DISCONTINUED | OUTPATIENT
Start: 2023-02-16 | End: 2023-02-20 | Stop reason: HOSPADM

## 2023-02-16 RX ORDER — INSULIN ASPART 100 [IU]/ML
0-5 INJECTION, SOLUTION INTRAVENOUS; SUBCUTANEOUS EVERY 6 HOURS PRN
Status: DISCONTINUED | OUTPATIENT
Start: 2023-02-16 | End: 2023-02-20 | Stop reason: HOSPADM

## 2023-02-16 RX ADMIN — DIPHENHYDRAMINE HYDROCHLORIDE 50 MG: 25 CAPSULE ORAL at 10:02

## 2023-02-16 RX ADMIN — SEVELAMER CARBONATE 1600 MG: 800 TABLET, FILM COATED ORAL at 08:02

## 2023-02-16 RX ADMIN — SODIUM CHLORIDE: 9 INJECTION, SOLUTION INTRAVENOUS at 08:02

## 2023-02-16 RX ADMIN — PANTOPRAZOLE SODIUM 80 MG: 40 INJECTION, POWDER, LYOPHILIZED, FOR SOLUTION INTRAVENOUS at 08:02

## 2023-02-16 RX ADMIN — CARVEDILOL 6.25 MG: 3.12 TABLET, FILM COATED ORAL at 08:02

## 2023-02-16 NOTE — H&P
[facility] Medicine History & Physical Examination       Patient Name: Rosette Madrid  MRN: [id number]  Patient Class: IP- Inpatient   Admission Date: 2/16/2023   Admitting Physician: Demetri Cote MD   Length of Stay: 0  Attending Physician: Demetri Cote MD   Primary Care Provider: JOSEFINA Lord  Face-to-Face encounter date: 02/16/2023  Code Status: Full Code  Chief Complaint: Abnormal Lab (Pt sent by dialysis for low h/h. Unknown amount. Last dialysis on Tuesday. Reports hx of anemia with recent transfusions. On Eliquis. Denies pain, weakness, or dizziness. Reports dark stools from iron.)        Patient information was obtained from patient, patient's family, past medical records and ER records.     HISTORY OF PRESENT ILLNESS:   Rosette Madrid is a 77 y.o. Black or  female with a past medical history of hypertension, hyperlipidemia, COPD, ESRD on HD, diabetes mellitus type 2, hypothyroidism. The patient presented to [facility] on 2/16/2023 with a primary complaint of low H&H at dialysis and therefore unable to undergo dialysis.  Patient reports she is been having dark brown stools due to taking iron supplements.  She is on Eliquis.  Her last full session of dialysis was on 02/14/2023.    Upon presentation to the ED, temperature 98.3° F, heart rate 63, blood pressure 138/73, respiratory rate 18 and SpO2 98% on room air.  Labs with H&H 6.6/21.2, MCV 95, BUN/creatinine 55.7/4.66, glucose 163.  Patient admitted to hospital medicine services and further medical management.    PAST MEDICAL HISTORY:   Hypertension  Hyperlipidemia  COPD  ESRD on HD TTS  Diabetes mellitus type 2   Hypothyroidism    PAST SURGICAL HISTORY:     Past Surgical History:   Procedure Laterality Date    AV FISTULA PLACEMENT Left     CARDIAC CATHETERIZATION      COLONOSCOPY      CORONARY ARTERY BYPASS GRAFT      ESOPHAGOGASTRODUODENOSCOPY      POLYPECTOMY      SMALL BOWEL ENTEROSCOPY Left  "1/20/2023    Procedure: ENTEROSCOPY;  Surgeon: Lexi Scales MD;  Location: [facility] ENDOSCOPY;  Service: Gastroenterology;  Laterality: Left;    THYROIDECTOMY      TUBAL LIGATION         ALLERGIES:   Lisinopril, Azithromycin, Baclofen, and Doxycycline    FAMILY HISTORY:   Reviewed and negative    SOCIAL HISTORY:     Social History     Tobacco Use    Smoking status: Every Day     Packs/day: 0.25     Types: Cigarettes    Smokeless tobacco: Never    Tobacco comments:     Smokes 4 cigarettes a day   Substance Use Topics    Alcohol use: Yes     Comment: 1 glass every 2-3 month        HOME MEDICATIONS:     Prior to Admission medications    Medication Sig Start Date End Date Taking? Authorizing Provider   albuterol (VENTOLIN HFA) 90 mcg/actuation inhaler Inhale 2 puffs into the lungs every 6 (six) hours as needed for Wheezing. Rescue 11/23/22   JOSEFINA Lord   albuterol-ipratropium (DUO-NEB) 2.5 mg-0.5 mg/3 mL nebulizer solution Take 3 mLs by nebulization every 6 (six) hours as needed for Wheezing or Shortness of Breath. Rescue 12/19/22   JOSEFINA Lord   apixaban (ELIQUIS) 5 mg Tab Take 1 tablet (5 mg total) by mouth 2 (two) times daily. 1/22/23 1/22/24  Matias Gee MD   aspirin (ECOTRIN) 81 MG EC tablet Take 1 tablet (81 mg total) by mouth once daily. 11/23/22   JOSEFINA Lord   BASAGLAR KWIKPEN U-100 INSULIN glargine 100 units/mL SubQ pen Inject 30 Units into the skin Daily. 9/30/22 2/6/23  JOSEFINA Lord   BD ULTRA-FINE MINI PEN NEEDLE 31 gauge x 3/16" Ndle USE AS DIRECTED TWICE A DAY 9/9/22   Historical Provider   BENADRYL 25 mg capsule Take 50 mg by mouth nightly. 2/23/22   Historical Provider   bisacodyL (DULCOLAX) 5 mg EC tablet Take 5 mg by mouth daily as needed for Constipation.    Historical Provider   carvediloL (COREG) 6.25 MG tablet Take 1 tablet (6.25 mg total) by mouth 2 (two) times daily. 12/5/22 12/5/23  Elizabeth L Ramsey, ANP   clonazePAM (KLONOPIN) 0.5 MG tablet Take 0.5 " "mg by mouth daily as needed. 7/13/22   Historical Provider   DIALYVITE 100-1 mg Tab Take 1 tablet by mouth once daily. 8/13/22   Historical Provider   DULoxetine (CYMBALTA) 30 MG capsule Take 30 mg by mouth once daily. 7/27/22   Historical Provider   ferrous sulfate 325 (65 FE) MG EC tablet Take 1 tablet (325 mg total) by mouth once daily. 1/22/23 5/22/23  Matias Gee MD   fluticasone furoate-vilanteroL (BREO) 100-25 mcg/dose diskus inhaler Inhale 1 puff into the lungs once daily. 9/30/22 2/6/23  JOSEFINA Lord   furosemide (LASIX) 40 MG tablet Take 1 tablet (40 mg total) by mouth once daily. 12/5/22 12/5/23  YUE Balderas   glipiZIDE (GLUCOTROL) 10 MG tablet Take 1 tablet (10 mg total) by mouth daily with breakfast. 12/12/22   JOSEFINA Lord   INVEGA SUSTENNA 156 mg/mL Syrg injection Inject into the muscle. 8/2/22   Historical Provider   L-METHYLFOLATE 15 mg Tab Take 1 tablet by mouth once daily. 8/31/22   Historical Provider   levothyroxine (SYNTHROID) 125 MCG tablet Take 1 tablet (125 mcg total) by mouth before breakfast. 12/12/22   JOSEFINA Lord   ONETOUCH DELICA PLUS LANCET 30 gauge Misc 1 lancet by Other route once daily. 5/9/22   JOSEFINA Lord   ONETOUCH ULTRA TEST Strp 1 strip by Other route once daily. 11/23/22   JOSEFINA Lord   pen needle, diabetic 31 gauge x 5/16" Ndle 2 Units by Misc.(Non-Drug; Combo Route) route 2 (two) times a day. Patient to check blood sugars twice daily (morning and night) 5/19/22   JOSEFINA Lord   rosuvastatin (CRESTOR) 40 MG Tab Take 1 tablet (40 mg total) by mouth once daily. 12/5/22 12/5/23  YUE Balderas   sevelamer carbonate (RENVELA) 800 mg Tab Take 1,600 mg by mouth 3 (three) times daily. 9/27/22   Historical Provider   tiotropium (SPIRIVA WITH HANDIHALER) 18 mcg inhalation capsule Inhale 1 capsule (18 mcg total) into the lungs Daily. 9/30/22 2/6/23  JOSEFINA Lord   tiZANidine (ZANAFLEX) 4 MG tablet Take 4 mg by " mouth every 8 (eight) hours as needed. 5/7/22   Historical Provider       REVIEW OF SYSTEMS:   Except as documented, all other systems reviewed and negative     PHYSICAL EXAM:     VITAL SIGNS: 24 HRS MIN & MAX LAST   Temp  Min: 97.6 °F (36.4 °C)  Max: 98.3 °F (36.8 °C) 97.6 °F (36.4 °C)   BP  Min: 138/73  Max: 162/84 (!) 162/84     Pulse  Min: 63  Max: 71  71   Resp  Min: 18  Max: 18 18   SpO2  Min: 98 %  Max: 100 % 100 %       General appearance: Well-developed, well-nourished female in no apparent distress.  No family at bedside.  Undergoing dialysis and transfusion of 1 unit packed red blood cells.  HEENT: Atraumatic head. Moist mucous membranes of oral cavity.  Lungs: Clear to auscultation bilaterally.   Heart: Regular rate and rhythm. 2+ pitting edema.  Abdomen: Soft, non-distended, non-tender. Bowel sounds are normal.   Extremities: No cyanosis, clubbing. No deformities.  Skin: No Rash. Warm and dry.  Neuro: Awake, alert and oriented. Motor and sensory exams grossly intact.  Psych/mental status: Appropriate mood and affect. Cooperative. Responds appropriately to questions.       LABS AND IMAGING:     Recent Labs   Lab 02/09/23  1900 02/10/23  0759 02/16/23  1123   WBC 7.5 7.2 6.9   RBC 1.75* 2.28* 2.24*   HGB 5.2* 6.6* 6.6*   HCT 17.6* 21.3* 21.2*   .6* 93.4 94.6*   MCH 29.7 28.9 29.5   MCHC 29.5* 31.0* 31.1*   RDW 19.9* 19.9* 17.3*    157 155   MPV 10.4 10.6* 10.8*       Recent Labs   Lab 02/09/23  1900 02/10/23  0800 02/16/23  1123    137 136   K 3.6 4.1 4.5   CO2 26 25 26   BUN 21.0* 38.0* 55.7*   CREATININE 2.37* 2.96* 4.66*   CALCIUM 9.0 8.8 9.0   MG 1.90 2.10  --    ALBUMIN 3.0* 2.8* 2.8*   ALKPHOS 86 102 75   ALT 19 16 32   AST 18 20 24   BILITOT 0.3 0.4 0.3       Microbiology Results (last 7 days)       ** No results found for the last 168 hours. **             X-Ray Chest 1 View  Narrative: EXAMINATION:  XR CHEST 1 VIEW    CLINICAL HISTORY:  Chest pain;    TECHNIQUE:  One  view    COMPARISON:  January 19, 2023.    FINDINGS:  Cardiopericardial silhouette enlarged appearance is similar.  Sternotomy changes.  No acute dense focal or segmental consolidation, congestive process, pleural effusions or pneumothorax.  Impression: NO ACUTE CARDIOPULMONARY PROCESS IDENTIFIED.    Electronically signed by: Niles Stevenson  Date:    02/09/2023  Time:    19:13        ASSESSMENT & PLAN:   Assessment:  Acute anemia of chronic disease  Essential hypertension   Hyperlipidemia  COPD  ESRD on HD TTS  Diabetes mellitus type 2   Hypothyroidism    Plan:  - GI consult and nephrology.  Appreciate recommendation  - NPO  - IV Protonix 40 mg BID  - Occult stool ordered   - Telemetry monitoring  - EKG ordered  - Undergoing transfusion   - Resume appropriate home medications   - Labs in AM      VTE Prophylaxis: will be placed on SCD for DVT prophylaxis and will be advised to be as mobile as possible and sit in a chair as tolerated      __________________________________________________________________________  INPATIENT LIST OF MEDICATIONS     Current Facility-Administered Medications:     0.9%  NaCl infusion (for blood administration), , Intravenous, Q24H PRN, Raghu Hernandez MD    0.9%  NaCl infusion, , Intravenous, Continuous, Judith Rubi PA-C    acetaminophen tablet 650 mg, 650 mg, Oral, Q4H PRN, Judith Rubi PA-C    aluminum-magnesium hydroxide-simethicone 200-200-20 mg/5 mL suspension 30 mL, 30 mL, Oral, QID (AC & HS), Judith Rubi PA-C    labetaloL injection 10 mg, 10 mg, Intravenous, Q2H PRN, Judith Rubi PA-C    ondansetron injection 4 mg, 4 mg, Intravenous, Q6H PRN, Judith Rubi PA-C    [START ON 2/17/2023] pantoprazole injection 40 mg, 40 mg, Intravenous, BID, Judith Rubi PA-C    pantoprazole injection 80 mg, 80 mg, Intravenous, ED 1 Time, Raghu Hernandez MD    sodium chloride 0.9% bolus 250 mL 250 mL, 250 mL, Intravenous, PRN, Lynn Tripathi, AGNP    Current Outpatient  "Medications:     albuterol (VENTOLIN HFA) 90 mcg/actuation inhaler, Inhale 2 puffs into the lungs every 6 (six) hours as needed for Wheezing. Rescue, Disp: 18 g, Rfl: 1    albuterol-ipratropium (DUO-NEB) 2.5 mg-0.5 mg/3 mL nebulizer solution, Take 3 mLs by nebulization every 6 (six) hours as needed for Wheezing or Shortness of Breath. Rescue, Disp: 75 mL, Rfl: 0    apixaban (ELIQUIS) 5 mg Tab, Take 1 tablet (5 mg total) by mouth 2 (two) times daily., Disp: 60 tablet, Rfl: 11    aspirin (ECOTRIN) 81 MG EC tablet, Take 1 tablet (81 mg total) by mouth once daily., Disp: 90 tablet, Rfl: 1    BASAGLAR KWIKPEN U-100 INSULIN glargine 100 units/mL SubQ pen, Inject 30 Units into the skin Daily., Disp: 27 mL, Rfl: 2    BD ULTRA-FINE MINI PEN NEEDLE 31 gauge x 3/16" Ndle, USE AS DIRECTED TWICE A DAY, Disp: , Rfl:     BENADRYL 25 mg capsule, Take 50 mg by mouth nightly., Disp: , Rfl:     bisacodyL (DULCOLAX) 5 mg EC tablet, Take 5 mg by mouth daily as needed for Constipation., Disp: , Rfl:     carvediloL (COREG) 6.25 MG tablet, Take 1 tablet (6.25 mg total) by mouth 2 (two) times daily., Disp: 180 tablet, Rfl: 3    clonazePAM (KLONOPIN) 0.5 MG tablet, Take 0.5 mg by mouth daily as needed., Disp: , Rfl:     DIALYVITE 100-1 mg Tab, Take 1 tablet by mouth once daily., Disp: , Rfl:     DULoxetine (CYMBALTA) 30 MG capsule, Take 30 mg by mouth once daily., Disp: , Rfl:     ferrous sulfate 325 (65 FE) MG EC tablet, Take 1 tablet (325 mg total) by mouth once daily., Disp: 120 tablet, Rfl: 0    fluticasone furoate-vilanteroL (BREO) 100-25 mcg/dose diskus inhaler, Inhale 1 puff into the lungs once daily., Disp: 90 each, Rfl: 2    furosemide (LASIX) 40 MG tablet, Take 1 tablet (40 mg total) by mouth once daily., Disp: 90 tablet, Rfl: 3    glipiZIDE (GLUCOTROL) 10 MG tablet, Take 1 tablet (10 mg total) by mouth daily with breakfast., Disp: 90 tablet, Rfl: 1    INVEGA SUSTENNA 156 mg/mL Syrg injection, Inject into the muscle., Disp: , Rfl: " "    L-METHYLFOLATE 15 mg Tab, Take 1 tablet by mouth once daily., Disp: , Rfl:     levothyroxine (SYNTHROID) 125 MCG tablet, Take 1 tablet (125 mcg total) by mouth before breakfast., Disp: 90 tablet, Rfl: 1    ONETOUCH DELICA PLUS LANCET 30 gauge Misc, 1 lancet by Other route once daily., Disp: 100 each, Rfl: 2    ONETOUCH ULTRA TEST Strp, 1 strip by Other route once daily., Disp: 200 strip, Rfl: 2    pen needle, diabetic 31 gauge x 5/16" Ndle, 2 Units by Misc.(Non-Drug; Combo Route) route 2 (two) times a day. Patient to check blood sugars twice daily (morning and night), Disp: 100 each, Rfl: 2    rosuvastatin (CRESTOR) 40 MG Tab, Take 1 tablet (40 mg total) by mouth once daily., Disp: 90 tablet, Rfl: 3    sevelamer carbonate (RENVELA) 800 mg Tab, Take 1,600 mg by mouth 3 (three) times daily., Disp: , Rfl:     tiotropium (SPIRIVA WITH HANDIHALER) 18 mcg inhalation capsule, Inhale 1 capsule (18 mcg total) into the lungs Daily., Disp: 90 capsule, Rfl: 2    tiZANidine (ZANAFLEX) 4 MG tablet, Take 4 mg by mouth every 8 (eight) hours as needed., Disp: , Rfl:       Scheduled Meds:   aluminum-magnesium hydroxide-simethicone  30 mL Oral QID (AC & HS)    [START ON 2/17/2023] pantoprazole  40 mg Intravenous BID    pantoprazole  80 mg Intravenous ED 1 Time     Continuous Infusions:   sodium chloride 0.9%       PRN Meds:.sodium chloride, acetaminophen, labetalol, ondansetron, sodium chloride 0.9%      Discharge Planning and Disposition: Anticipated discharge to be determined.    IJudith PA, have reviewed and discussed the case with Dr. Demetri Cote MD    Please see the following addendum for further assessment and plan from there attending MD.    Judith Rubi PA-C  02/16/2023    "

## 2023-02-16 NOTE — FIRST PROVIDER EVALUATION
Medical screening examination initiated.  I have conducted a focused provider triage encounter, findings are as follows:    Brief history of present illness:  77-year-old female with history of end-stage disease on presents to ED for low H&H.  Patient states she was called by her blood counts were low into ED.  Family that this is 3rd transfusion in the month.  Patient states that she has dark school stools but is currently on iron pills.  Currently Eliquis.    Vitals:    02/16/23 1057   BP: 138/73   Pulse: 63   Resp: 18   Temp: 98.3 °F (36.8 °C)   TempSrc: Oral   SpO2: 98%       Pertinent physical exam:  Patient is awake and alert and oriented.  Ambulatory to triage.  In no acute distress.      Brief workup plan:  labs, type and screen, IV    Preliminary workup initiated; this workup will be continued and followed by the physician or advanced practice provider that is assigned to the patient when roomed.

## 2023-02-16 NOTE — Clinical Note
Diagnosis: Anemia, unspecified type [[id number]]   Admitting Provider:: JONAH ETIENNE [[id number]]   Future Attending Provider: JONAH ETIENNE [[id number]]   Reason for IP Medical Treatment  (Clinical interventions that can only be accomplished in the IP setting? ) :: GI Bleed, hx of AVMs, anemia, Hemglobin of 6.6   Estimated Length of Stay:: 2 midnights   I certify that Inpatient services for greater than or equal to 2 midnights are medically necessary:: Yes   Plans for Post-Acute care--if anticipated (pick the single best option):: A. No post acute care anticipated at this time

## 2023-02-16 NOTE — ED PROVIDER NOTES
Encounter Date: 2/16/2023    SCRIBE #1 NOTE: I, Kary Miller, am scribing for, and in the presence of,  Raghu Hernandez MD. I have scribed the following portions of the note - Other sections scribed: HPI, ROS, PE.     History     Chief Complaint   Patient presents with    Abnormal Lab     Pt sent by dialysis for low h/h. Unknown amount. Last dialysis on Tuesday. Reports hx of anemia with recent transfusions. On Eliquis. Denies pain, weakness, or dizziness. Reports dark stools from iron.     77 year old female w/ hx of ESRD w/ HD, anemia, DM, HLD, and HTN presents to ED from her dialysis for low H&H. Pt states her dialysis says her stool is dark but she states its dark because she's taking iron x3 a day.  Patient does have  hx of GI bleed with previous AVMs. She states she had a blood transfusion ~2 weeks ago. She denies any complaints. She dialyzes on T,Th,Sat. Pt is on Eliquis.     The history is provided by the patient. No  was used.   Illness   The current episode started just prior to arrival. The problem has been unchanged. Nothing relieves the symptoms. Nothing aggravates the symptoms. Pertinent negatives include no fever, no abdominal pain, no sore throat, no shortness of breath and no rash. Recently, medical care has been given by a specialist.   Review of patient's allergies indicates:   Allergen Reactions    Lisinopril Swelling    Azithromycin      Other reaction(s): tongue/facial swelling    Baclofen      Other reaction(s): tongue/facial swelling    Doxycycline      Other reaction(s): Angioedema     Past Medical History:   Diagnosis Date    CKD (chronic kidney disease) requiring chronic dialysis     COPD (chronic obstructive pulmonary disease)     Diabetes mellitus     Hyperlipidemia     Hypertension     Renal insufficiency     Thyroid disease      Past Surgical History:   Procedure Laterality Date    AV FISTULA PLACEMENT Left     CARDIAC CATHETERIZATION      COLONOSCOPY       CORONARY ARTERY BYPASS GRAFT      ESOPHAGOGASTRODUODENOSCOPY      POLYPECTOMY      SMALL BOWEL ENTEROSCOPY Left 1/20/2023    Procedure: ENTEROSCOPY;  Surgeon: Lexi Scales MD;  Location: [facility] ENDOSCOPY;  Service: Gastroenterology;  Laterality: Left;    THYROIDECTOMY      TUBAL LIGATION       Family History   Problem Relation Age of Onset    Hypertension Mother     Hypertension Father     Hypertension Sister     Hypertension Sister      Social History     Tobacco Use    Smoking status: Every Day     Packs/day: 0.25     Types: Cigarettes    Smokeless tobacco: Never    Tobacco comments:     Smokes 4 cigarettes a day   Substance Use Topics    Alcohol use: Yes     Comment: 1 glass every 2-3 month    Drug use: Not Currently     Review of Systems   Constitutional:  Negative for fever.   HENT:  Negative for sore throat.    Eyes:  Negative for visual disturbance.   Respiratory:  Negative for shortness of breath.    Cardiovascular:  Negative for chest pain.   Gastrointestinal:  Positive for blood in stool. Negative for abdominal pain.   Genitourinary:  Negative for dysuria.   Musculoskeletal:  Negative for joint swelling.   Skin:  Negative for rash.   Neurological:  Negative for weakness.   Psychiatric/Behavioral:  Negative for confusion.      Physical Exam     Initial Vitals [02/16/23 1057]   BP Pulse Resp Temp SpO2   138/73 63 18 98.3 °F (36.8 °C) 98 %      MAP       --         Physical Exam    Nursing note and vitals reviewed.  Constitutional: She appears well-developed and well-nourished.   HENT:   Head: Normocephalic and atraumatic.   Eyes: EOM are normal. Pupils are equal, round, and reactive to light.   Neck:   Normal range of motion.  Cardiovascular:  Normal rate, regular rhythm, normal heart sounds and intact distal pulses.           No murmur heard.  Left forearm fistula, good thrill.   Pulmonary/Chest: Breath sounds normal. No respiratory distress. She has no wheezes. She has no rales.   Abdominal: Abdomen  is soft. She exhibits no distension. There is no abdominal tenderness. There is no rebound.   Genitourinary:    Genitourinary Comments: Rectal Exam: Melanotic stool.  Guaiac positive.  No external hemorrhoids.      Musculoskeletal:         General: No tenderness or edema. Normal range of motion.      Cervical back: Normal range of motion.     Neurological: She is alert. She has normal strength. No cranial nerve deficit. GCS score is 15. GCS eye subscore is 4. GCS verbal subscore is 5. GCS motor subscore is 6.   Skin: Skin is warm and dry. Capillary refill takes less than 2 seconds. No rash noted. No erythema.   Psychiatric: She has a normal mood and affect.       ED Course   Critical Care    Date/Time: 2/16/2023 2:00 PM  Performed by: Raghu Hernandez MD  Authorized by: Raghu Hernandez MD   Direct patient critical care time: 12 minutes  Additional history critical care time: 8 minutes  Ordering / reviewing critical care time: 5 minutes  Documentation critical care time: 6 minutes  Consulting other physicians critical care time: 5 minutes  Total critical care time (exclusive of procedural time) : 36 minutes  Critical care time was exclusive of separately billable procedures and treating other patients and teaching time.  Critical care was necessary to treat or prevent imminent or life-threatening deterioration of the following conditions: cardiac failure, circulatory failure, metabolic crisis and renal failure.  Critical care was time spent personally by me on the following activities: development of treatment plan with patient or surrogate, discussions with consultants, interpretation of cardiac output measurements, evaluation of patient's response to treatment, examination of patient, obtaining history from patient or surrogate, ordering and performing treatments and interventions, ordering and review of laboratory studies, ordering and review of radiographic studies, pulse oximetry, re-evaluation of patient's  condition and review of old charts.      Labs Reviewed   APTT - Abnormal; Notable for the following components:       Result Value    PTT 22.5 (*)     All other components within normal limits   COMPREHENSIVE METABOLIC PANEL - Abnormal; Notable for the following components:    Glucose Level 163 (*)     Blood Urea Nitrogen 55.7 (*)     Creatinine 4.66 (*)     Protein Total 5.5 (*)     Albumin Level 2.8 (*)     Albumin/Globulin Ratio 1.0 (*)     All other components within normal limits   CBC WITH DIFFERENTIAL - Abnormal; Notable for the following components:    RBC 2.24 (*)     Hgb 6.6 (*)     Hct 21.2 (*)     MCV 94.6 (*)     MCHC 31.1 (*)     RDW 17.3 (*)     MPV 10.8 (*)     All other components within normal limits   COMPREHENSIVE METABOLIC PANEL - Abnormal; Notable for the following components:    Glucose Level 126 (*)     Blood Urea Nitrogen 26.8 (*)     Creatinine 2.97 (*)     Protein Total 5.1 (*)     Albumin Level 2.8 (*)     Globulin 2.3 (*)     All other components within normal limits   CBC WITH DIFFERENTIAL - Abnormal; Notable for the following components:    RBC 2.84 (*)     Hgb 8.2 (*)     Hct 25.6 (*)     MCHC 32.0 (*)     RDW 17.7 (*)     MPV 10.5 (*)     All other components within normal limits   POCT GLUCOSE - Abnormal; Notable for the following components:    POCT Glucose 190 (*)     All other components within normal limits   POCT GLUCOSE - Abnormal; Notable for the following components:    POCT Glucose 139 (*)     All other components within normal limits   PROTIME-INR - Normal   CBC W/ AUTO DIFFERENTIAL    Narrative:     The following orders were created for panel order CBC auto differential.  Procedure                               Abnormality         Status                     ---------                               -----------         ------                     CBC with Differential[[id number]]        Abnormal            Final result                 Please view results for these tests on the  individual orders.   CBC W/ AUTO DIFFERENTIAL    Narrative:     The following orders were created for panel order CBC Auto Differential.  Procedure                               Abnormality         Status                     ---------                               -----------         ------                     CBC with Differential[[id number]]        Abnormal            Final result                 Please view results for these tests on the individual orders.   OCCULT BLOOD,STOOL,DIAGNOSTIC (1-3)    Narrative:     The following orders were created for panel order Occult Blood, Stool, Diagnostic (1-3).  Procedure                               Abnormality         Status                     ---------                               -----------         ------                     Occult blood x 3, stool[[id number]]                                                       Please view results for these tests on the individual orders.   OCCULT BLOOD X 3, STOOL   TYPE & SCREEN   POCT GLUCOSE MONITORING CONTINUOUS   PREPARE RBC SOFT          Imaging Results    None          Medications   0.9%  NaCl infusion (for blood administration) (has no administration in time range)   sodium chloride 0.9% bolus 250 mL 250 mL (has no administration in time range)   pantoprazole injection 40 mg (has no administration in time range)   labetaloL injection 10 mg (has no administration in time range)   ondansetron injection 4 mg (has no administration in time range)   acetaminophen tablet 650 mg (has no administration in time range)   aluminum-magnesium hydroxide-simethicone 200-200-20 mg/5 mL suspension 30 mL (30 mLs Oral Not Given 2/17/23 0645)   0.9%  NaCl infusion ( Intravenous New Bag 2/16/23 2041)   insulin aspart U-100 injection 0-5 Units (has no administration in time range)   dextrose 10% bolus 125 mL 125 mL (has no administration in time range)   glucagon (human recombinant) injection 1 mg (has no administration in time range)   albuterol  inhaler 2 puff (has no administration in time range)   bisacodyL EC tablet 5 mg (has no administration in time range)   carvediloL tablet 6.25 mg (6.25 mg Oral Given 2/16/23 2029)   clonazePAM tablet 0.5 mg (has no administration in time range)   DULoxetine DR capsule 30 mg (has no administration in time range)   fluticasone furoate-vilanteroL 100-25 mcg/dose diskus inhaler 1 puff (has no administration in time range)   furosemide tablet 40 mg (has no administration in time range)   levothyroxine tablet 125 mcg (125 mcg Oral Given 2/17/23 0613)   atorvastatin tablet 40 mg (40 mg Oral Not Given 2/16/23 2029)   tiotropium bromide 2.5 mcg/actuation inhaler 2 puff (has no administration in time range)   sevelamer carbonate tablet 1,600 mg (1,600 mg Oral Given 2/16/23 2029)   diphenhydrAMINE capsule 50 mg (50 mg Oral Given 2/16/23 2257)   pantoprazole injection 80 mg (80 mg Intravenous Given 2/16/23 2028)     Medical Decision Making  Problems Addressed:  Anemia, unspecified type: acute illness or injury  ESRD (end stage renal disease): chronic illness or injury  Gastrointestinal hemorrhage, unspecified gastrointestinal hemorrhage type: acute illness or injury that poses a threat to life or bodily functions  Melena: acute illness or injury    Amount and/or Complexity of Data Reviewed  Labs: ordered. Decision-making details documented in ED Course.    Risk  Parenteral controlled substances.  Decision regarding hospitalization.       Medical Decision Making:   History:   I obtained history from: someone other than patient.  Old Medical Records: I decided to obtain old medical records.  Old Records Summarized: records from clinic visits and records from previous admission(s).       <> Summary of Records: Reviewed old records including GI workup, capsule endoscopy, has had colonoscopy, recent admission 2 weeks ago.    Initial Assessment:   Anemia, GI bleed  Differential Diagnosis:   Anemia of renal disease.  GI  bleed  Clinical Tests:   Lab Tests: Ordered and Reviewed  ED Management:  Patient is a 78 y/o female presents for anemia from dialysis.  Was not dialyzed today.  Discussed with nephrology, Lynn who will dialyze.  Discussed with medicine for admission for GI bleed; possible dark stool due to iron supplementation but requiring multiple transfusions over last 2 weeks.  Has been given Protonix.   All results discussed.  Answered all questions at this time.  Patient verbalized understanding and agreed to plan.   Other:   I have discussed this case with another health care provider.        Scribe Attestation:   Scribe #1: I performed the above scribed service and the documentation accurately describes the services I performed. I attest to the accuracy of the note.    Attending Attestation:           Physician Attestation for Scribe:  Physician Attestation Statement for Scribe #1: I, Raghu Hernandez MD, reviewed documentation, as scribed by Kary Miller in my presence, and it is both accurate and complete.           ED Course as of 02/17/23 0820   Thu Feb 16, 2023   4758 Paged Dr. Bourgeois. [AA]   1103 Paged hospital medicine. [AA]   1635 Consulted hospitalist: patient is admitted. [AA]      ED Course User Index  [AA] Angela Preston                   Clinical Impression:   Final diagnoses:  [D64.9] Anemia, unspecified type (Primary)  [N18.6] ESRD (end stage renal disease)  [K92.2] Gastrointestinal hemorrhage, unspecified gastrointestinal hemorrhage type  [K92.1] Melena        ED Disposition Condition    Admit                 Raghu Hernandez MD  02/17/23 0823       Raghu Hernandez MD  03/07/23 1321       Raghu Hernandez MD  03/07/23 1322

## 2023-02-17 ENCOUNTER — ANESTHESIA (OUTPATIENT)
Dept: ENDOSCOPY | Facility: HOSPITAL | Age: 78
DRG: 377 | End: 2023-02-17
Payer: MEDICARE

## 2023-02-17 ENCOUNTER — ANESTHESIA EVENT (OUTPATIENT)
Dept: ENDOSCOPY | Facility: HOSPITAL | Age: 78
DRG: 377 | End: 2023-02-17
Payer: MEDICARE

## 2023-02-17 LAB
ALBUMIN SERPL-MCNC: 2.8 G/DL (ref 3.4–4.8)
ALBUMIN/GLOB SERPL: 1.2 RATIO (ref 1.1–2)
ALP SERPL-CCNC: 66 UNIT/L (ref 40–150)
ALT SERPL-CCNC: 28 UNIT/L (ref 0–55)
AST SERPL-CCNC: 20 UNIT/L (ref 5–34)
BASOPHILS # BLD AUTO: 0.05 X10(3)/MCL (ref 0–0.2)
BASOPHILS NFR BLD AUTO: 0.7 %
BILIRUBIN DIRECT+TOT PNL SERPL-MCNC: 0.6 MG/DL
BUN SERPL-MCNC: 26.8 MG/DL (ref 9.8–20.1)
CALCIUM SERPL-MCNC: 8.8 MG/DL (ref 8.4–10.2)
CHLORIDE SERPL-SCNC: 102 MMOL/L (ref 98–107)
CO2 SERPL-SCNC: 29 MMOL/L (ref 23–31)
CREAT SERPL-MCNC: 2.97 MG/DL (ref 0.55–1.02)
EOSINOPHIL # BLD AUTO: 0.09 X10(3)/MCL (ref 0–0.9)
EOSINOPHIL NFR BLD AUTO: 1.3 %
ERYTHROCYTE [DISTWIDTH] IN BLOOD BY AUTOMATED COUNT: 17.7 % (ref 11.5–17)
GFR SERPLBLD CREATININE-BSD FMLA CKD-EPI: 16 MLS/MIN/1.73/M2
GLOBULIN SER-MCNC: 2.3 GM/DL (ref 2.4–3.5)
GLUCOSE SERPL-MCNC: 126 MG/DL (ref 82–115)
HCT VFR BLD AUTO: 25.6 % (ref 37–47)
HGB BLD-MCNC: 8.2 GM/DL (ref 12–16)
IMM GRANULOCYTES # BLD AUTO: 0.04 X10(3)/MCL (ref 0–0.04)
IMM GRANULOCYTES NFR BLD AUTO: 0.6 %
LYMPHOCYTES # BLD AUTO: 0.92 X10(3)/MCL (ref 0.6–4.6)
LYMPHOCYTES NFR BLD AUTO: 13.7 %
MCH RBC QN AUTO: 28.9 PG
MCHC RBC AUTO-ENTMCNC: 32 MG/DL (ref 33–36)
MCV RBC AUTO: 90.1 FL (ref 80–94)
MONOCYTES # BLD AUTO: 0.54 X10(3)/MCL (ref 0.1–1.3)
MONOCYTES NFR BLD AUTO: 8 %
NEUTROPHILS # BLD AUTO: 5.08 X10(3)/MCL (ref 2.1–9.2)
NEUTROPHILS NFR BLD AUTO: 75.7 %
NRBC BLD AUTO-RTO: 0.4 %
PLATELET # BLD AUTO: 189 X10(3)/MCL (ref 130–400)
PMV BLD AUTO: 10.5 FL (ref 7.4–10.4)
POCT GLUCOSE: 123 MG/DL (ref 70–110)
POCT GLUCOSE: 123 MG/DL (ref 70–110)
POCT GLUCOSE: 139 MG/DL (ref 70–110)
POCT GLUCOSE: 190 MG/DL (ref 70–110)
POCT GLUCOSE: 231 MG/DL (ref 70–110)
POTASSIUM SERPL-SCNC: 3.8 MMOL/L (ref 3.5–5.1)
PROT SERPL-MCNC: 5.1 GM/DL (ref 5.8–7.6)
RBC # BLD AUTO: 2.84 X10(6)/MCL (ref 4.2–5.4)
SODIUM SERPL-SCNC: 137 MMOL/L (ref 136–145)
WBC # SPEC AUTO: 6.7 X10(3)/MCL (ref 4.5–11.5)

## 2023-02-17 PROCEDURE — 25000003 PHARM REV CODE 250: Performed by: NURSE ANESTHETIST, CERTIFIED REGISTERED

## 2023-02-17 PROCEDURE — 80053 COMPREHEN METABOLIC PANEL: CPT | Performed by: PHYSICIAN ASSISTANT

## 2023-02-17 PROCEDURE — 93010 ELECTROCARDIOGRAM REPORT: CPT | Mod: ,,, | Performed by: INTERNAL MEDICINE

## 2023-02-17 PROCEDURE — 63600175 PHARM REV CODE 636 W HCPCS: Performed by: PHYSICIAN ASSISTANT

## 2023-02-17 PROCEDURE — 37000008 HC ANESTHESIA 1ST 15 MINUTES: Performed by: STUDENT IN AN ORGANIZED HEALTH CARE EDUCATION/TRAINING PROGRAM

## 2023-02-17 PROCEDURE — 25000003 PHARM REV CODE 250: Performed by: INTERNAL MEDICINE

## 2023-02-17 PROCEDURE — 85025 COMPLETE CBC W/AUTO DIFF WBC: CPT | Performed by: PHYSICIAN ASSISTANT

## 2023-02-17 PROCEDURE — 43235 EGD DIAGNOSTIC BRUSH WASH: CPT | Performed by: STUDENT IN AN ORGANIZED HEALTH CARE EDUCATION/TRAINING PROGRAM

## 2023-02-17 PROCEDURE — 21400001 HC TELEMETRY ROOM

## 2023-02-17 PROCEDURE — C9113 INJ PANTOPRAZOLE SODIUM, VIA: HCPCS | Performed by: PHYSICIAN ASSISTANT

## 2023-02-17 PROCEDURE — 91110 GI TRC IMG INTRAL ESOPH-ILE: CPT | Mod: TC | Performed by: STUDENT IN AN ORGANIZED HEALTH CARE EDUCATION/TRAINING PROGRAM

## 2023-02-17 PROCEDURE — 37000009 HC ANESTHESIA EA ADD 15 MINS: Performed by: STUDENT IN AN ORGANIZED HEALTH CARE EDUCATION/TRAINING PROGRAM

## 2023-02-17 PROCEDURE — 93005 ELECTROCARDIOGRAM TRACING: CPT

## 2023-02-17 PROCEDURE — 25000003 PHARM REV CODE 250: Performed by: PHYSICIAN ASSISTANT

## 2023-02-17 PROCEDURE — 93010 EKG 12-LEAD: ICD-10-PCS | Mod: ,,, | Performed by: INTERNAL MEDICINE

## 2023-02-17 PROCEDURE — 25000242 PHARM REV CODE 250 ALT 637 W/ HCPCS: Performed by: INTERNAL MEDICINE

## 2023-02-17 RX ORDER — LIDOCAINE HYDROCHLORIDE 20 MG/ML
INJECTION, SOLUTION EPIDURAL; INFILTRATION; INTRACAUDAL; PERINEURAL
Status: DISPENSED
Start: 2023-02-17 | End: 2023-02-17

## 2023-02-17 RX ORDER — PROPOFOL 10 MG/ML
INJECTION, EMULSION INTRAVENOUS CONTINUOUS PRN
Status: DISCONTINUED | OUTPATIENT
Start: 2023-02-17 | End: 2023-02-17

## 2023-02-17 RX ORDER — PROPOFOL 10 MG/ML
VIAL (ML) INTRAVENOUS
Status: COMPLETED
Start: 2023-02-17 | End: 2023-02-17

## 2023-02-17 RX ORDER — LIDOCAINE HCL/PF 100 MG/5ML
SYRINGE (ML) INTRAVENOUS
Status: DISCONTINUED | OUTPATIENT
Start: 2023-02-17 | End: 2023-02-17

## 2023-02-17 RX ADMIN — FUROSEMIDE 40 MG: 40 TABLET ORAL at 08:02

## 2023-02-17 RX ADMIN — PANTOPRAZOLE SODIUM 40 MG: 40 INJECTION, POWDER, FOR SOLUTION INTRAVENOUS at 08:02

## 2023-02-17 RX ADMIN — CARVEDILOL 6.25 MG: 3.12 TABLET, FILM COATED ORAL at 08:02

## 2023-02-17 RX ADMIN — DIPHENHYDRAMINE HYDROCHLORIDE 50 MG: 25 CAPSULE ORAL at 08:02

## 2023-02-17 RX ADMIN — FLUTICASONE FUROATE AND VILANTEROL TRIFENATATE 1 PUFF: 100; 25 POWDER RESPIRATORY (INHALATION) at 08:02

## 2023-02-17 RX ADMIN — SEVELAMER CARBONATE 1600 MG: 800 TABLET, FILM COATED ORAL at 08:02

## 2023-02-17 RX ADMIN — ALUMINUM HYDROXIDE, MAGNESIUM HYDROXIDE, AND SIMETHICONE 30 ML: 200; 200; 20 SUSPENSION ORAL at 04:02

## 2023-02-17 RX ADMIN — PROPOFOL 200 MCG/KG/MIN: 10 INJECTION, EMULSION INTRAVENOUS at 11:02

## 2023-02-17 RX ADMIN — INSULIN ASPART 1 UNITS: 100 INJECTION, SOLUTION INTRAVENOUS; SUBCUTANEOUS at 09:02

## 2023-02-17 RX ADMIN — LEVOTHYROXINE SODIUM 125 MCG: 125 TABLET ORAL at 06:02

## 2023-02-17 RX ADMIN — SEVELAMER CARBONATE 1600 MG: 800 TABLET, FILM COATED ORAL at 02:02

## 2023-02-17 RX ADMIN — LIDOCAINE HYDROCHLORIDE 60 MG: 20 INJECTION, SOLUTION INTRAVENOUS at 11:02

## 2023-02-17 RX ADMIN — DULOXETINE 30 MG: 30 CAPSULE, DELAYED RELEASE ORAL at 08:02

## 2023-02-17 RX ADMIN — ATORVASTATIN CALCIUM 40 MG: 40 TABLET, FILM COATED ORAL at 08:02

## 2023-02-17 RX ADMIN — TIOTROPIUM BROMIDE INHALATION SPRAY 2 PUFF: 3.12 SPRAY, METERED RESPIRATORY (INHALATION) at 08:02

## 2023-02-17 NOTE — CLINICAL REVIEW
In Progress: Reviewed on 2/17/2023 by Norma MARKS MD       Created Using Review Status Review Entered   Epic In Primary 2/17/2023 1010       Created By   Norma MARKS MD       Criteria Set Name - Subset   PA review      Criteria Review   77-year-old female admitted on 02/16/2023 admitted with anemia noted at her hemodialysis unit.  Past medical history included hypertension, hyperlipidemia, COPD, end-stage renal disease on hemodialysis, diabetes mellitus type 2, hypothyroidism, also atrial fibrillation.  GI consulted.  She was hemodynamically stable on admission, afebrile.  Initial hemoglobin was noted to be at 6.6 with a baseline of around 2.  She received 2 units of packed red blood cells.  She has had multiple evaluations for acute blood loss anemia.  Recently admitted at an outlying facility.  She was also found to have a hemoglobin of 6.6, was transfused 2 units of packed red blood cells and was discharged.  She had an EGD with push enteroscopy which demonstrated: Normal stomach, small bowel, duodenum without any proximal small bowel bleeding.  A repeat capsule study was recommended.  EGD pending.  If that is negative, capsule will be placed endoscopy.  Continues on H&H monitoring q.12 hours, ppi IV q.12 hours, minor IV hydration.  In the setting of continued acute blood loss anemia multiple admissions for the same, H&H monitoring, electrolyte monitoring, kidney function monitoring, Nephrology consultation for dialysis, CBG monitoring, DVT prophylaxis, at the present time, the patient remains appropriate for IP LOC      Norma Martin MD  Utilization Management  Physician Advisor

## 2023-02-17 NOTE — CONSULTS
Consult Note    Reason for Consult:      We were consulted by Dr. Cote to evaluate this patient for GI bleed.    HPI:     77 y.o. AA female known to Dr. Bourgeois with history of ESRD on HD TTS, HFpEF 55% (December 2021), CAD/CABG x4 on asa 81 mg, HTN, HLD, COPD, venous insufficiency, hypothyroidism, anemia, gastric AVM, and adenomatous colon polyps, and recently dx afib (1/20/23) for which Eliquis was initiated.  Patient presented to the ED today with anemia on outpatient labs.  On presentation, VSS.  Laboratory notable for macrocytic anemia with hemoglobin 6.6.  ED rectal exam noted melenic stool, guaiac positive.  Patient was admitted, was transfused 1 unit PRBCs with dialysis last night, and GI was consulted.  Patient states she is been feeling good.  No chest pain, shortness of breath, palpitations, dizziness, lightheadedness, weakness, fatigue.  She states that she typically has 1 bowel movement every 3 days with the use of a gentle laxative over-the-counter.  Stools are always dark brown in color, but denies black tarry stools or hematochezia.  Takes PO iron TID.  Denies abdominal pain, nausea, vomiting.    Recently admitted to [facility] 1/19/23 with same and endorsed intermittent dark stool attributed to PO iron. Hgb was as low as 6.6. Was transfused 2u prbcs and was d/c'ed on 1/22 with hgb 7.9.  b12/folate were normal.  Iron low, TIBC low, %sat low, ferritin high.  EGD with Push enteroscopy 1/20/23 by Dr. Lexi Scales:  Normal stomach, small bowel, duodenum without any proximal small bowel bleeding.  Repeat capsule study as outpatient was recommended.    Other Prior endoscopies:   Colonoscopy 7/21/2021 for +FIT:  15 colon polyps, all tubular adenomas and were removed. Grade II internal hemorrhoids. Repeat colonoscopy in 3 years for surveillance.     EGD 12/31/2021 for melena:  esophagus normal.  small hiatal hernia.  single small nonbleeding angioectasia in antrum txed with APC. duodenum normal.    VCE for anemia  1/2/2022: Technically incomplete study due to delayed gastric emptying.      Baseline Hgb around 8 g/dL.      Previous records reviewed as above. Collateral information obtained from family member present at bedside.    PCP:  JOSEFINA Lord    Review of patient's allergies indicates:   Allergen Reactions    Lisinopril Swelling    Azithromycin      Other reaction(s): tongue/facial swelling    Baclofen      Other reaction(s): tongue/facial swelling    Doxycycline      Other reaction(s): Angioedema        Current Facility-Administered Medications   Medication Dose Route Frequency Provider Last Rate Last Admin    0.9%  NaCl infusion (for blood administration)   Intravenous Q24H PRN Raghu Hernandez MD        0.9%  NaCl infusion   Intravenous Continuous Judith Rubi PA-C 50 mL/hr at 02/16/23 2041 New Bag at 02/16/23 2041    acetaminophen tablet 650 mg  650 mg Oral Q4H PRN Judith Rubi PA-C        albuterol inhaler 2 puff  2 puff Inhalation Q6H PRN Demetri Cote MD        aluminum-magnesium hydroxide-simethicone 200-200-20 mg/5 mL suspension 30 mL  30 mL Oral QID (AC & HS) Judith Rubi PA-C        atorvastatin tablet 40 mg  40 mg Oral QHS Demetri Cote MD        bisacodyL EC tablet 5 mg  5 mg Oral Daily PRN Demetri Cote MD        carvediloL tablet 6.25 mg  6.25 mg Oral BID Demetri Cote MD   6.25 mg at 02/16/23 2029    clonazePAM tablet 0.5 mg  0.5 mg Oral Daily PRN Demetri Cote MD        dextrose 10% bolus 125 mL 125 mL  12.5 g Intravenous PRN Judith Rubi PA-C        diphenhydrAMINE capsule 50 mg  50 mg Oral Nightly PRN Manolo Rubi MD   50 mg at 02/16/23 2257    DULoxetine DR capsule 30 mg  30 mg Oral Daily Demetri Cote MD        fluticasone furoate-vilanteroL 100-25 mcg/dose diskus inhaler 1 puff  1 puff Inhalation Daily Demetri Cote MD        furosemide tablet 40 mg  40 mg Oral Daily Demetri Cote MD        glucagon (human recombinant) injection 1 mg  1 mg Intramuscular PRN Judith Rubi PA-C         insulin aspart U-100 injection 0-5 Units  0-5 Units Subcutaneous Q6H PRN Judith Rubi PA-C        labetaloL injection 10 mg  10 mg Intravenous Q2H PRN Judith Rubi PA-C        levothyroxine tablet 125 mcg  125 mcg Oral Before breakfast Demetri Cote MD   125 mcg at 02/17/23 0613    ondansetron injection 4 mg  4 mg Intravenous Q6H PRN Judith Rubi PA-C        pantoprazole injection 40 mg  40 mg Intravenous BID Judith Rubi PA-C        sevelamer carbonate tablet 1,600 mg  1,600 mg Oral TID Demetri Cote MD   1,600 mg at 02/16/23 2029    sodium chloride 0.9% bolus 250 mL 250 mL  250 mL Intravenous PRN LOGAN Gee        tiotropium bromide 2.5 mcg/actuation inhaler 2 puff  2 puff Inhalation Daily Demetri Cote MD         Current Outpatient Medications   Medication Sig Dispense Refill    apixaban (ELIQUIS) 5 mg Tab Take 1 tablet (5 mg total) by mouth 2 (two) times daily. 60 tablet 11    aspirin (ECOTRIN) 81 MG EC tablet Take 1 tablet (81 mg total) by mouth once daily. 90 tablet 1    BENADRYL 25 mg capsule Take 50 mg by mouth nightly.      carvediloL (COREG) 6.25 MG tablet Take 1 tablet (6.25 mg total) by mouth 2 (two) times daily. 180 tablet 3    clonazePAM (KLONOPIN) 0.5 MG tablet Take 0.5 mg by mouth daily as needed.      ferrous sulfate 325 (65 FE) MG EC tablet Take 1 tablet (325 mg total) by mouth once daily. (Patient taking differently: Take 325 mg by mouth 3 (three) times daily.) 120 tablet 0    furosemide (LASIX) 40 MG tablet Take 1 tablet (40 mg total) by mouth once daily. (Patient taking differently: Take 40 mg by mouth 2 (two) times a day.) 90 tablet 3    glipiZIDE (GLUCOTROL) 10 MG tablet Take 1 tablet (10 mg total) by mouth daily with breakfast. 90 tablet 1    levothyroxine (SYNTHROID) 125 MCG tablet Take 1 tablet (125 mcg total) by mouth before breakfast. 90 tablet 1    rosuvastatin (CRESTOR) 40 MG Tab Take 1 tablet (40 mg total) by mouth once daily. 90 tablet 3    sevelamer  "carbonate (RENVELA) 800 mg Tab Take 1,600 mg by mouth 3 (three) times daily.      tiZANidine (ZANAFLEX) 4 MG tablet Take 4 mg by mouth every 8 (eight) hours as needed.      albuterol (VENTOLIN HFA) 90 mcg/actuation inhaler Inhale 2 puffs into the lungs every 6 (six) hours as needed for Wheezing. Rescue 18 g 1    albuterol-ipratropium (DUO-NEB) 2.5 mg-0.5 mg/3 mL nebulizer solution Take 3 mLs by nebulization every 6 (six) hours as needed for Wheezing or Shortness of Breath. Rescue 75 mL 0    BASAGLAR KWIKPEN U-100 INSULIN glargine 100 units/mL SubQ pen Inject 30 Units into the skin Daily. 27 mL 2    bisacodyL (DULCOLAX) 5 mg EC tablet Take 5 mg by mouth daily as needed for Constipation.      DIALYVITE 100-1 mg Tab Take 1 tablet by mouth once daily.      DULoxetine (CYMBALTA) 30 MG capsule Take 30 mg by mouth once daily.      fluticasone furoate-vilanteroL (BREO) 100-25 mcg/dose diskus inhaler Inhale 1 puff into the lungs once daily. 90 each 2    INVEGA SUSTENNA 156 mg/mL Syrg injection Inject into the muscle.      L-METHYLFOLATE 15 mg Tab Take 1 tablet by mouth once daily.      ONETOUCH DELICA PLUS LANCET 30 gauge Misc 1 lancet by Other route once daily. 100 each 2    ONETOUCH ULTRA TEST Strp 1 strip by Other route once daily. 200 strip 2    pen needle, diabetic 31 gauge x 5/16" Ndle 2 Units by Misc.(Non-Drug; Combo Route) route 2 (two) times a day. Patient to check blood sugars twice daily (morning and night) 100 each 2    tiotropium (SPIRIVA WITH HANDIHALER) 18 mcg inhalation capsule Inhale 1 capsule (18 mcg total) into the lungs Daily. 90 capsule 2       Past Medical History:  Past Medical History:   Diagnosis Date    CKD (chronic kidney disease) requiring chronic dialysis     COPD (chronic obstructive pulmonary disease)     Diabetes mellitus     Hyperlipidemia     Hypertension     Renal insufficiency     Thyroid disease       Past Surgical History:  Past Surgical History:   Procedure Laterality Date    AV FISTULA " PLACEMENT Left     CARDIAC CATHETERIZATION      COLONOSCOPY      CORONARY ARTERY BYPASS GRAFT      ESOPHAGOGASTRODUODENOSCOPY      POLYPECTOMY      SMALL BOWEL ENTEROSCOPY Left 1/20/2023    Procedure: ENTEROSCOPY;  Surgeon: Lexi Scales MD;  Location: [facility] ENDOSCOPY;  Service: Gastroenterology;  Laterality: Left;    THYROIDECTOMY      TUBAL LIGATION        Family History:  Family History   Problem Relation Age of Onset    Hypertension Mother     Hypertension Father     Hypertension Sister     Hypertension Sister      Social History:  Social History     Tobacco Use    Smoking status: Every Day     Packs/day: 0.25     Types: Cigarettes    Smokeless tobacco: Never    Tobacco comments:     Smokes 4 cigarettes a day   Substance Use Topics    Alcohol use: Yes     Comment: 1 glass every 2-3 month       Review of Systems:     Review of Systems   Constitutional:  Negative for appetite change, chills, diaphoresis, fatigue, fever and unexpected weight change.   HENT:  Negative for trouble swallowing.    Respiratory:  Negative for cough, chest tightness and shortness of breath.    Cardiovascular:  Positive for leg swelling. Negative for chest pain and palpitations.   Gastrointestinal:  Positive for blood in stool (occult only). Negative for abdominal distention, abdominal pain, constipation, diarrhea, nausea, rectal pain and vomiting.   Skin:  Negative for color change and pallor.   Neurological:  Negative for dizziness, weakness and light-headedness.   Psychiatric/Behavioral:  Negative for confusion.      Objective:     VITAL SIGNS: 24 HR MIN & MAX LAST    Temp  Min: 97.5 °F (36.4 °C)  Max: 98.3 °F (36.8 °C)  98.1 °F (36.7 °C)        BP  Min: 138/73  Max: 170/62  (!) 153/78     Pulse  Min: 63  Max: 79  67     Resp  Min: 15  Max: 19  15    SpO2  Min: 96 %  Max: 100 %  98 %        Intake/Output Summary (Last 24 hours) at 2/17/2023 0836  Last data filed at 2/16/2023 1842  Gross per 24 hour   Intake 330 ml   Output 1700 ml    Net -1370 ml       Physical Exam  Constitutional:       General: She is not in acute distress.     Appearance: She is obese. She is not ill-appearing.   HENT:      Head: Normocephalic and atraumatic.   Eyes:      General: No scleral icterus.     Extraocular Movements: Extraocular movements intact.   Cardiovascular:      Rate and Rhythm: Normal rate and regular rhythm.   Pulmonary:      Effort: Pulmonary effort is normal. No respiratory distress.   Abdominal:      General: Bowel sounds are normal. There is no distension.      Palpations: Abdomen is soft. There is no mass.      Tenderness: There is no abdominal tenderness. There is no guarding or rebound.   Musculoskeletal:         General: Normal range of motion.      Right lower leg: Edema present.      Left lower leg: Edema present.   Skin:     General: Skin is warm and dry.   Neurological:      Mental Status: She is alert and oriented to person, place, and time.   Psychiatric:         Mood and Affect: Mood and affect normal.         Recent Results (from the past 48 hour(s))   APTT    Collection Time: 02/16/23 11:22 AM   Result Value Ref Range    PTT 22.5 (L) 23.2 - 33.7 seconds   Protime-INR    Collection Time: 02/16/23 11:22 AM   Result Value Ref Range    PT 13.1 12.5 - 14.5 seconds    INR 1.00 0.00 - 1.30   Type & Screen    Collection Time: 02/16/23 11:23 AM   Result Value Ref Range    Group & Rh O POS     Indirect Gideon GEL NEG    Comprehensive metabolic panel    Collection Time: 02/16/23 11:23 AM   Result Value Ref Range    Sodium Level 136 136 - 145 mmol/L    Potassium Level 4.5 3.5 - 5.1 mmol/L    Chloride 101 98 - 107 mmol/L    Carbon Dioxide 26 23 - 31 mmol/L    Glucose Level 163 (H) 82 - 115 mg/dL    Blood Urea Nitrogen 55.7 (H) 9.8 - 20.1 mg/dL    Creatinine 4.66 (H) 0.55 - 1.02 mg/dL    Calcium Level Total 9.0 8.4 - 10.2 mg/dL    Protein Total 5.5 (L) 5.8 - 7.6 gm/dL    Albumin Level 2.8 (L) 3.4 - 4.8 g/dL    Globulin 2.7 2.4 - 3.5 gm/dL     Albumin/Globulin Ratio 1.0 (L) 1.1 - 2.0 ratio    Bilirubin Total 0.3 <=1.5 mg/dL    Alkaline Phosphatase 75 40 - 150 unit/L    Alanine Aminotransferase 32 0 - 55 unit/L    Aspartate Aminotransferase 24 5 - 34 unit/L    eGFR 9 mls/min/1.73/m2   CBC with Differential    Collection Time: 02/16/23 11:23 AM   Result Value Ref Range    WBC 6.9 4.5 - 11.5 x10(3)/mcL    RBC 2.24 (L) 4.20 - 5.40 x10(6)/mcL    Hgb 6.6 (L) 12.0 - 16.0 gm/dL    Hct 21.2 (L) 37.0 - 47.0 %    MCV 94.6 (H) 80.0 - 94.0 fL    MCH 29.5 pg    MCHC 31.1 (L) 33.0 - 36.0 mg/dL    RDW 17.3 (H) 11.5 - 17.0 %    Platelet 155 130 - 400 x10(3)/mcL    MPV 10.8 (H) 7.4 - 10.4 fL    Neut % 82.1 %    Lymph % 10.4 %    Mono % 5.6 %    Eos % 0.9 %    Basophil % 0.6 %    Lymph # 0.72 0.6 - 4.6 x10(3)/mcL    Neut # 5.70 2.1 - 9.2 x10(3)/mcL    Mono # 0.39 0.1 - 1.3 x10(3)/mcL    Eos # 0.06 0 - 0.9 x10(3)/mcL    Baso # 0.04 0 - 0.2 x10(3)/mcL    IG# 0.03 0 - 0.04 x10(3)/mcL    IG% 0.4 %    NRBC% 0.0 %   Prepare RBC 1 Unit    Collection Time: 02/16/23 11:23 AM   Result Value Ref Range    UNIT NUMBER [id number]     UNIT ABO/RH O POS     DISPENSE STATUS Transfused     Unit Expiration [id number]     Product Code I9880J35     Unit Blood Type Code 5100     CROSSMATCH INTERPRETATION Compatible    POCT glucose    Collection Time: 02/17/23 12:01 AM   Result Value Ref Range    POCT Glucose 190 (H) 70 - 110 mg/dL   Comprehensive Metabolic Panel    Collection Time: 02/17/23  3:48 AM   Result Value Ref Range    Sodium Level 137 136 - 145 mmol/L    Potassium Level 3.8 3.5 - 5.1 mmol/L    Chloride 102 98 - 107 mmol/L    Carbon Dioxide 29 23 - 31 mmol/L    Glucose Level 126 (H) 82 - 115 mg/dL    Blood Urea Nitrogen 26.8 (H) 9.8 - 20.1 mg/dL    Creatinine 2.97 (H) 0.55 - 1.02 mg/dL    Calcium Level Total 8.8 8.4 - 10.2 mg/dL    Protein Total 5.1 (L) 5.8 - 7.6 gm/dL    Albumin Level 2.8 (L) 3.4 - 4.8 g/dL    Globulin 2.3 (L) 2.4 - 3.5 gm/dL    Albumin/Globulin Ratio 1.2 1.1 - 2.0  ratio    Bilirubin Total 0.6 <=1.5 mg/dL    Alkaline Phosphatase 66 40 - 150 unit/L    Alanine Aminotransferase 28 0 - 55 unit/L    Aspartate Aminotransferase 20 5 - 34 unit/L    eGFR 16 mls/min/1.73/m2   CBC with Differential    Collection Time: 02/17/23  3:48 AM   Result Value Ref Range    WBC 6.7 4.5 - 11.5 x10(3)/mcL    RBC 2.84 (L) 4.20 - 5.40 x10(6)/mcL    Hgb 8.2 (L) 12.0 - 16.0 gm/dL    Hct 25.6 (L) 37.0 - 47.0 %    MCV 90.1 80.0 - 94.0 fL    MCH 28.9 pg    MCHC 32.0 (L) 33.0 - 36.0 mg/dL    RDW 17.7 (H) 11.5 - 17.0 %    Platelet 189 130 - 400 x10(3)/mcL    MPV 10.5 (H) 7.4 - 10.4 fL    Neut % 75.7 %    Lymph % 13.7 %    Mono % 8.0 %    Eos % 1.3 %    Basophil % 0.7 %    Lymph # 0.92 0.6 - 4.6 x10(3)/mcL    Neut # 5.08 2.1 - 9.2 x10(3)/mcL    Mono # 0.54 0.1 - 1.3 x10(3)/mcL    Eos # 0.09 0 - 0.9 x10(3)/mcL    Baso # 0.05 0 - 0.2 x10(3)/mcL    IG# 0.04 0 - 0.04 x10(3)/mcL    IG% 0.6 %    NRBC% 0.4 %   POCT glucose    Collection Time: 02/17/23  6:12 AM   Result Value Ref Range    POCT Glucose 139 (H) 70 - 110 mg/dL       X-Ray Chest 1 View    Result Date: 2/9/2023  EXAMINATION: XR CHEST 1 VIEW CLINICAL HISTORY: Chest pain; TECHNIQUE: One view COMPARISON: January 19, 2023. FINDINGS: Cardiopericardial silhouette enlarged appearance is similar.  Sternotomy changes.  No acute dense focal or segmental consolidation, congestive process, pleural effusions or pneumothorax.     NO ACUTE CARDIOPULMONARY PROCESS IDENTIFIED. Electronically signed by: Niles Stevenson Date:    02/09/2023 Time:    19:13    X-Ray Chest 1 View    Result Date: 1/19/2023  EXAMINATION: XR CHEST 1 VIEW CLINICAL HISTORY: coarse breath sounds; TECHNIQUE: Frontal view(s) of the chest. COMPARISON: Radiography 09/30/2022 FINDINGS: Prior sternotomy.  Stable cardiac silhouette with similar tortuosity of the thoracic aorta.  The lungs are well-inflated.  Sites of mild scarring in the lower left lung similar since September.  No acute consolidation.  No  significant pleural effusion or discernible pneumothorax.     No acute pulmonary process identified. Electronically signed by: Reinier Owen Date:    01/19/2023 Time:    17:47    Assessment / Plan:     77 y.o. AA female known to Dr. Bourgeois with history of ESRD on HD TTS, HFpEF 55% (December 2021), CAD/CABG x4 on asa 81 mg, HTN, HLD, COPD, venous insufficiency, hypothyroidism, anemia, gastric AVM, and adenomatous colon polyps, and recently dx afib (1/20/23) for which Eliquis was initiated.  Here with anemia on outpatient labs.      Acute on chronic anemia.   Multifactorial secondary to chronic disease and occult losses.   Hgb 6.6--1 unit PRBCs--8.2  Baseline hgb 8  Dark stool on PO iron, FOBT+.    No overt bleeding.     - Keep NPO for EGD today and if negative, then will place capsule endoscopically.  - monitor stools for bleeding.   - monitor h/h and transfuse as needed.   - EPO consideration per renal if not on it already.       Thank you for allowing us to participate in this patient's care.

## 2023-02-17 NOTE — ANESTHESIA POSTPROCEDURE EVALUATION
Anesthesia Post Evaluation    Patient: Rosette Madrid    Procedure(s) Performed: Procedure(s) (LRB):  EGD +/- VCE PLACEMENT (N/A)    Final Anesthesia Type: general      Patient location during evaluation: PACU  Patient participation: Yes- Able to Participate  Level of consciousness: awake and alert  Post-procedure vital signs: reviewed and stable  Pain management: adequate  Airway patency: patent    PONV status at discharge: No PONV  Anesthetic complications: no      Respiratory status: unassisted  Hydration status: euvolemic  Follow-up not needed.          Vitals Value Taken Time   /49 02/17/23 1152   Temp 36.3 °C (97.3 °F) 02/17/23 1138   Pulse 61 02/17/23 1152   Resp 22 02/17/23 1152   SpO2 96 % 02/17/23 1152         No case tracking events are documented in the log.      Pain/Marco Score: Marco Score: 10 (2/17/2023 12:11 PM)

## 2023-02-17 NOTE — ANESTHESIA PREPROCEDURE EVALUATION
02/17/2023  Rosette Madrid is a 77 y.o., female.    Hx of esrd on HD tthsa, diastolic hf, cad/cabg x4, htn, hld, copd, hypothyroidism, chronic anemia, gastric avm, recently diagnosed atrial fibrillation on eliquis -- admitted for mgmt of severe anemia noted on outpatient labs.  Hb 6.6 on arrival.  Last HD yesterday, 3hr run with 1.7L off, 1u prbcs transfused at that time.  H/H 8.2 / 25.6 today.  Known LBBB on ecg.  On exam, appears frail, though no distress.  No home oxygen, home inhaler usage, likely mild-mod COPD.      Baseline Hgb around 8 g/dL.      Pre-op Assessment    I have reviewed the Patient Summary Reports.     I have reviewed the Nursing Notes. I have reviewed the NPO Status.   I have reviewed the Medications.     Review of Systems  Anesthesia Hx:  No problems with previous Anesthesia  History of prior surgery of interest to airway management or planning: Denies Family Hx of Anesthesia complications.   Denies Personal Hx of Anesthesia complications.   Hematology/Oncology:  Hematology Normal   Oncology Normal     EENT/Dental:EENT/Dental Normal   Cardiovascular:   Hypertension CAD  CABG/stent     Pulmonary:   COPD    Renal/:   Chronic Renal Disease, ESRD    Hepatic/GI:  Hepatic/GI Normal    Musculoskeletal:  Musculoskeletal Normal    Neurological:  Neurology Normal    Endocrine:   Diabetes    Dermatological:  Skin Normal    Psych:   Psychiatric History          Physical Exam  General: Cooperative and Alert    Airway:  Mallampati: II   Mouth Opening: Normal  TM Distance: Normal  Tongue: Large  Neck ROM: Normal ROM    Dental:  Dentures    Chest/Lungs:  Normal Respiratory Rate    Heart:  Rate: Normal        Anesthesia Plan  Type of Anesthesia, risks & benefits discussed:    Anesthesia Type: Gen Natural Airway  Intra-op Monitoring Plan: Standard ASA Monitors  Post Op Pain Control Plan: IV/PO Opioids  PRN  (medical reason for not using multimodal pain management)  Induction:  IV  Informed Consent: Informed consent signed with the Patient and all parties understand the risks and agree with anesthesia plan.  All questions answered.   ASA Score: 3  Day of Surgery Review of History & Physical: H&P Update referred to the surgeon/provider.    Ready For Surgery From Anesthesia Perspective.     .

## 2023-02-17 NOTE — NURSING
02/16/23 1842   Post-Hemodialysis Assessment   Blood Volume Processed (Liters) 60.5 L   Dialyzer Clearance Clear   Duration of Treatment 180 minutes   Total UF (mL) 1700 mL   Patient Response to Treatment Pt tolerated HD tx well; NAD noted/ VSS. Total tx length 3hrs with 1.7 liter UF goal. 1 unit of PRBC's also given while on HD.   Post-Hemodialysis Comments Pt deaccessed per P and P

## 2023-02-17 NOTE — NURSING
Nurses Note -- 4 Eyes      2/17/2023   4:53 PM      Skin assessed during: Admit      [x] No Pressure Injuries Present    []Prevention Measures Documented      [] Yes- Altered Skin Integrity Present or Discovered   [] LDA Added if Not in Epic (Describe Wound)   [] New Altered Skin Integrity was Present on Admit and Documented in LDA   [] Wound Image Taken    Wound Care Consulted? No    Attending Nurse:  Modesto Ann RN     Second RN/Staff Member:  Lexi Carrillo RN

## 2023-02-17 NOTE — PROGRESS NOTES
"Inpatient Nutrition Evaluation    Admit Date: 2/16/2023   Total duration of encounter: 1 day    Nutrition Recommendation/Prescription     Once medically feasible, goal renal/diabetic diet.     Nutrition Assessment     Chart Review    Reason Seen: continuous nutrition monitoring, Dx    Malnutrition Screening Tool Results   Have you recently lost weight without trying?: No  Have you been eating poorly because of a decreased appetite?: No   MST Score: 0     Diagnosis:  Anemia, unspecified type (Primary)  ESRD (end stage renal disease)  Gastrointestinal hemorrhage, unspecified gastrointestinal hemorrhage type  Melena  COPD    Relevant Medical History: ESRD w/ HD, anemia, DM, HLD, CAD, adenomatous colon polyps and HTN     Nutrition-Related Medications: Furosemide, Pantoprazole, Insulin, Sevelamer, NaCl IVF's    Nutrition-Related Labs:  2/17/23: Bun 26.8, Crea 2.97, GFR 16, Gluc 126    Diet Order: Diet NPO  Oral Supplement Order: none  Appetite/Oral Intake: NPO/NPO  Factors Affecting Nutritional Intake: NPO  Food/Buddhism/Cultural Preferences: none reported  Food Allergies: none reported       Wound(s):   none noted    Comments    2/17/23: Attempted to see patient in ED for rounds, however pt out for EGD. No unintentional wt loss or decrease in appetite prior to admit per NSG nutrition screen.     Anthropometrics    Height: 5' 3" (160 cm) Height Method: Stated  Last Weight: 95.3 kg (210 lb) (02/17/23 1014) Weight Method: Stated  BMI (Calculated): 37.2  BMI Classification: obese grade II (BMI 35-39.9)     Ideal Body Weight (IBW), Female: 115 lb     % Ideal Body Weight, Female (lb): 182.61 %                             Usual Weight Provided By: EMR weight history    Wt Readings from Last 3 Encounters:   02/17/23 1014 95.3 kg (210 lb)   02/16/23 1059 95.3 kg (210 lb)   02/10/23 0649 99 kg (218 lb 4.1 oz)   02/09/23 1836 95.6 kg (210 lb 12.2 oz)   02/06/23 1336 95.3 kg (210 lb 3.2 oz)      Weight Change(s) Since " Admission:  Admit Weight: 95.3 kg (210 lb) (02/16/23 1059)  .    Patient Education    Not applicable.    Monitoring & Evaluation     Dietitian will monitor weight change and electrolyte/renal panel.  Nutrition Risk/Follow-Up: low (follow-up in 5-7 days)  Patients assigned 'low nutrition risk' status do not qualify for a full nutritional assessment but will be monitored and re-evaluated in a 5-7 day time period. Please consult if re-evaluation needed sooner.

## 2023-02-17 NOTE — PROVATION PATIENT INSTRUCTIONS
Discharge Summary/Instructions after an Endoscopic Procedure  Patient Name: Rosette Madrid  Patient MRN: [id number]  Patient YOB: 1945 Friday, February 17, 2023  Morgan Gardner MD  Dear patient,  As a result of recent federal legislation (The Federal Cures Act), you may   receive lab or pathology results from your procedure in your [facility]   account before your physician is able to contact you. Your physician or   their representative will relay the results to you with their   recommendations at their soonest availability.  Thank you,  RESTRICTIONS:  During your procedure today, you received medications for sedation.  These   medications may affect your judgment, balance and coordination.  Therefore,   for 24 hours, you have the following restrictions:   - DO NOT drive a car, operate machinery, make legal/financial decisions,   sign important papers or drink alcohol.    ACTIVITY:  Today: no heavy lifting, straining or running due to procedural   sedation/anesthesia.  The following day: return to full activity including work.  DIET:  Eat and drink normally unless instructed otherwise.     TREATMENT FOR COMMON SIDE EFFECTS:  - Mild abdominal pain, nausea, belching, bloating or excessive gas:  rest,   eat lightly and use a heating pad.  - Sore Throat: treat with throat lozenges and/or gargle with warm salt   water.  - Because air was used during the procedure, expelling large amounts of air   from your rectum or belching is normal.  - If a bowel prep was taken, you may not have a bowel movement for 1-3 days.    This is normal.  SYMPTOMS TO WATCH FOR AND REPORT TO YOUR PHYSICIAN:  1. Abdominal pain or bloating, other than gas cramps.  2. Chest pain.  3. Back pain.  4. Signs of infection such as: chills or fever occurring within 24 hours   after the procedure.  5. Rectal bleeding, which would show as bright red, maroon, or black stools.   (A tablespoon of blood from the rectum is not serious, especially  if   hemorrhoids are present.)  6. Vomiting.  7. Weakness or dizziness.  GO DIRECTLY TO THE NEAREST EMERGENCY ROOM IF YOU HAVE ANY OF THE FOLLOWING:      Difficulty breathing              Chills and/or fever over 101 F   Persistent vomiting and/or vomiting blood   Severe abdominal pain   Severe chest pain   Black, tarry stools   Bleeding- more than one tablespoon   Any other symptom or condition that you feel may need urgent attention  Your doctor recommends these additional instructions:  If any biopsies were taken, your doctors clinic will contact you in 1 to 2   weeks with any results.  - Return patient to [facility] for [facility] care.   - Clear liquid diet for 3 hours.   - Continue present medications.   - To visualize the small bowel, perform video capsule endoscopy.  For questions, problems or results please call your physician - Morgan Gardner MD at Work:  [phone].  [facility], EMERGENCY ROOM PHONE NUMBER: [phone]  IF A COMPLICATION OR EMERGENCY SITUATION ARISES AND YOU ARE UNABLE TO REACH   YOUR PHYSICIAN - GO DIRECTLY TO THE EMERGENCY ROOM.  MD Morgan Ruiz MD  2/17/2023 11:34:14 AM  This report has been verified and signed electronically.  Dear patient,  As a result of recent federal legislation (The Federal Cures Act), you may   receive lab or pathology results from your procedure in your [facility]   account before your physician is able to contact you. Your physician or   their representative will relay the results to you with their   recommendations at their soonest availability.  Thank you,  PROVATION

## 2023-02-17 NOTE — PLAN OF CARE
Received from procedure room: S/P EGD with Video Capsule endoscopy placement    A-clear airway, able to speak in full sentences, able to expectorate secretions  B-Breathing spontaneously in regular intervals  C-Skin warm to touch, CRT less than 2 seconds    Pain-Denies pain, appears comfortable, no grimaced face noted    Vital signs taken and kept monitored  Seen by clinician before discharge. Report explained. RBS TAKEN-123 mg/dl    Capsule endoscopy instructions handed over to Francisco of ED

## 2023-02-17 NOTE — ED NOTES
Bed: 09  Expected date:   Expected time:   Means of arrival:   Comments:  Patient in GI lab  
Estephanie w/ Moshe DEAN from Dialysis, states pt had 3 hour run of dialysis with 1.7 L taken off. 1 Unit of RBC was given.   
In dialysis  
Alert and oriented to person, place and time

## 2023-02-17 NOTE — TRANSFER OF CARE
"Anesthesia Transfer of Care Note    Patient: Rosette Madrid    Procedure(s) Performed: Procedure(s) (LRB):  EGD +/- VCE PLACEMENT (N/A)    Patient location: GI    Anesthesia Type: MAC    Transport from OR: Transported from OR on room air with adequate spontaneous ventilation    Post pain: adequate analgesia    Post assessment: no apparent anesthetic complications    Post vital signs: stable    Level of consciousness: responds to stimulation    Nausea/Vomiting: no nausea/vomiting    Complications: none    Transfer of care protocol was followed      Last vitals:   Visit Vitals  BP (!) 154/71   Pulse 63   Temp 35.6 °C (96.1 °F)   Resp 18   Ht 5' 3" (1.6 m)   Wt 95.3 kg (210 lb)   SpO2 97%   Breastfeeding No   BMI 37.20 kg/m²     "

## 2023-02-17 NOTE — PROGRESS NOTES
[facility]  Hospital Medicine Progress Note        Chief Complaint: Inpatient Follow-up for     HPI: 77-year-old female with known past medical history of hypertension, hyperlipidemia, COPD, end-stage renal disease on hemodialysis, diabetes mellitus type 2, hypothyroidism status post thyroidectomy, reorts hx of valvular surgery, chart review shows afib, nicotine dependence with current smoking status of 4 cigarettes per day, admitted 02/16/2023 with diagnosis of low H&H at her dialysis unit.  She she was unable to undergo her dialysis and was asked to come to ED.  Patient reported this is her 3rd admission in the last 2 months for load H&H and blood transfusion.  Stating she was last admitted at [facility]. I inquired if she made the a appointment with the GI doctor for VCE which was recommended and patient reported she did not knew where to call.  Patient denies weakness, lightheadedness, shortness of breath.  Reports this time she did not realize that her hemoglobin was low as she had no symptoms.  Reports she has dark brown stool but she takes iron supplements.  She knows she is on blood thinner but did not new why  Last hemodialysis was on Tuesday  Currently denies any complaints.  Undergoing hemodialysis and receiving 1 unit of packed red blood cells    Interval Hx:   Laying in bed, she had her video capsule placed today.  Discussed she will be with the staff tomorrow and after that once GI her and hemoglobin remains stable, she probably can go after her hemodialysis  No complaints  No family at bedside  Case discussed with patient's nurse on the floor    Objective/physical exam:  General: In no acute distress, afebrile  Chest: Clear to auscultation bilaterally  Heart: S1, S2, no appreciable murmur  Abdomen: Soft, nontender, BS +  MSK: Warm, 2+ edema bilateral lower extremities, no clubbing or cyanosis  Neurologic: Alert and oriented x4, moving all extremities with good strength     VITAL  SIGNS: 24 HRS MIN & MAX LAST   Temp  Min: 97.5 °F (36.4 °C)  Max: 98.3 °F (36.8 °C) 98.1 °F (36.7 °C)   BP  Min: 138/73  Max: 170/62 (!) 153/78     Pulse  Min: 63  Max: 79  67   Resp  Min: 15  Max: 19 15   SpO2  Min: 96 %  Max: 100 % 98 %       Recent Labs   Lab 02/16/23  1123 02/17/23  0348   WBC 6.9 6.7   RBC 2.24* 2.84*   HGB 6.6* 8.2*   HCT 21.2* 25.6*   MCV 94.6* 90.1   MCH 29.5 28.9   MCHC 31.1* 32.0*   RDW 17.3* 17.7*    189   MPV 10.8* 10.5*       Recent Labs   Lab 02/16/23  1123 02/17/23  0348    137   K 4.5 3.8   CO2 26 29   BUN 55.7* 26.8*   CREATININE 4.66* 2.97*   CALCIUM 9.0 8.8   ALBUMIN 2.8* 2.8*   ALKPHOS 75 66   ALT 32 28   AST 24 20   BILITOT 0.3 0.6          Microbiology Results (last 7 days)       ** No results found for the last 168 hours. **             See below for Radiology    Scheduled Med:   aluminum-magnesium hydroxide-simethicone  30 mL Oral QID (AC & HS)    atorvastatin  40 mg Oral QHS    carvediloL  6.25 mg Oral BID    DULoxetine  30 mg Oral Daily    fluticasone furoate-vilanteroL  1 puff Inhalation Daily    furosemide  40 mg Oral Daily    levothyroxine  125 mcg Oral Before breakfast    pantoprazole  40 mg Intravenous BID    sevelamer carbonate  1,600 mg Oral TID    tiotropium bromide  2 puff Inhalation Daily        Continuous Infusions:   sodium chloride 0.9% 50 mL/hr at 02/16/23 2041        PRN Meds:  sodium chloride, acetaminophen, albuterol, bisacodyL, clonazePAM, dextrose 10%, diphenhydrAMINE, glucagon (human recombinant), insulin aspart U-100, labetalol, ondansetron, sodium chloride 0.9%       Assessment/Plan:  Acute on chronic anemia s/p blood transfusion  Anemia of chronic disease  Essential hypertension   Hyperlipidemia  COPD without exacerbation  ESRD on HD TTS  Diabetes mellitus type 2   Hypothyroidism  History of atrial fibrillation, HFpEF, history of valve replacement?  On Eliquis  Nicotine dependence with current smoking status     GI on board, appreciate  the recommendation, noted video capsule was placed today  Chart reviewed, last EGD done on 01/20/2023 which showed normal esophagus, normal stomach, normal examined duodenum  Received 1 unit packed red blood cell transfusion with hemodialysis on the day of admission  Hemoglobin improved from 6.6-8.2  Nephrology on board for hemodialysis need, continue Tuesday Thursday and Saturday schedule    Clear liquids (diabetic) now and NPO after midnight  Continue IV Protonix 40 mg BID  Occult stool ordered - pending collection  Continue Telemetry monitoring  Resume appropriate home medications for chronic medical condition, hold aspirin and Eliquis  Patient will need to follow with her primary cardiologist regarding probable Watchman device given the risks are outweighing the benefit of anticoagulation  Morning CBC, renal function ordered     VTE Prophylaxis:    SCDs     Patient condition:   Stable     Discharge Planning and Disposition/Anticipated discharge:  Probable discharge home tomorrow if cleared by GI after her HD, will need recommendation regarding resumption of Eliquis/aspirin upon discharge      All diagnosis and differential diagnosis have been reviewed; assessment and plan has been documented; I have personally reviewed the labs and test results that are presently available; I have reviewed the patients medication list; I have reviewed the consulting providers response and recommendations. I have reviewed or attempted to review medical records based upon their availability    All of the patient's questions have been  addressed and answered. Patient's is agreeable to the above stated plan. I will continue to monitor closely and make adjustments to medical management as needed.  _____________________________________________________________________    Nutrition Status:    Radiology:  X-Ray Chest 1 View  Narrative: EXAMINATION:  XR CHEST 1 VIEW    CLINICAL HISTORY:  Chest pain;    TECHNIQUE:  One  view    COMPARISON:  January 19, 2023.    FINDINGS:  Cardiopericardial silhouette enlarged appearance is similar.  Sternotomy changes.  No acute dense focal or segmental consolidation, congestive process, pleural effusions or pneumothorax.  Impression: NO ACUTE CARDIOPULMONARY PROCESS IDENTIFIED.    Electronically signed by: Niles Stevenson  Date:    02/09/2023  Time:    19:13      eDmetri Cote MD  Department of Hospital Medicine   [facility]   02/17/2023

## 2023-02-18 LAB
ALBUMIN SERPL-MCNC: 2.5 G/DL (ref 3.4–4.8)
BUN SERPL-MCNC: 28.4 MG/DL (ref 9.8–20.1)
CALCIUM SERPL-MCNC: 8.8 MG/DL (ref 8.4–10.2)
CHLORIDE SERPL-SCNC: 104 MMOL/L (ref 98–107)
CO2 SERPL-SCNC: 26 MMOL/L (ref 23–31)
CREAT SERPL-MCNC: 3.88 MG/DL (ref 0.55–1.02)
ERYTHROCYTE [DISTWIDTH] IN BLOOD BY AUTOMATED COUNT: 18.2 % (ref 11.5–17)
FERRITIN SERPL-MCNC: 1672.57 NG/ML (ref 4.63–204)
GFR SERPLBLD CREATININE-BSD FMLA CKD-EPI: 12 MLS/MIN/1.73/M2
GLUCOSE SERPL-MCNC: 96 MG/DL (ref 82–115)
HCT VFR BLD AUTO: 26.3 % (ref 37–47)
HGB BLD-MCNC: 8.1 G/DL (ref 12–16)
IRON SATN MFR SERPL: 16 % (ref 20–50)
IRON SERPL-MCNC: 37 UG/DL (ref 50–170)
MCH RBC QN AUTO: 28.6 PG
MCHC RBC AUTO-ENTMCNC: 30.8 G/DL (ref 33–36)
MCV RBC AUTO: 92.9 FL (ref 80–94)
NRBC BLD AUTO-RTO: 0.3 %
PHOSPHATE SERPL-MCNC: 3.1 MG/DL (ref 2.3–4.7)
PLATELET # BLD AUTO: 205 X10(3)/MCL (ref 130–400)
PMV BLD AUTO: 10.3 FL (ref 7.4–10.4)
POCT GLUCOSE: 115 MG/DL (ref 70–110)
POCT GLUCOSE: 163 MG/DL (ref 70–110)
POTASSIUM SERPL-SCNC: 3.9 MMOL/L (ref 3.5–5.1)
RBC # BLD AUTO: 2.83 X10(6)/MCL (ref 4.2–5.4)
SODIUM SERPL-SCNC: 138 MMOL/L (ref 136–145)
TIBC SERPL-MCNC: 193 UG/DL (ref 70–310)
TIBC SERPL-MCNC: 230 UG/DL (ref 250–450)
WBC # SPEC AUTO: 6.3 X10(3)/MCL (ref 4.5–11.5)

## 2023-02-18 PROCEDURE — 25000242 PHARM REV CODE 250 ALT 637 W/ HCPCS: Performed by: INTERNAL MEDICINE

## 2023-02-18 PROCEDURE — 80069 RENAL FUNCTION PANEL: CPT | Performed by: INTERNAL MEDICINE

## 2023-02-18 PROCEDURE — 63600175 PHARM REV CODE 636 W HCPCS: Performed by: PHYSICIAN ASSISTANT

## 2023-02-18 PROCEDURE — 83550 IRON BINDING TEST: CPT | Performed by: NURSE PRACTITIONER

## 2023-02-18 PROCEDURE — 85027 COMPLETE CBC AUTOMATED: CPT | Performed by: INTERNAL MEDICINE

## 2023-02-18 PROCEDURE — 25000003 PHARM REV CODE 250: Performed by: INTERNAL MEDICINE

## 2023-02-18 PROCEDURE — 21400001 HC TELEMETRY ROOM

## 2023-02-18 PROCEDURE — 80100016 HC MAINTENANCE HEMODIALYSIS

## 2023-02-18 PROCEDURE — 82728 ASSAY OF FERRITIN: CPT | Performed by: NURSE PRACTITIONER

## 2023-02-18 PROCEDURE — C9113 INJ PANTOPRAZOLE SODIUM, VIA: HCPCS | Performed by: PHYSICIAN ASSISTANT

## 2023-02-18 PROCEDURE — 25000003 PHARM REV CODE 250: Performed by: PHYSICIAN ASSISTANT

## 2023-02-18 RX ORDER — SODIUM CHLORIDE 9 MG/ML
200 INJECTION, SOLUTION INTRAVENOUS
Status: DISCONTINUED | OUTPATIENT
Start: 2023-02-18 | End: 2023-02-20 | Stop reason: HOSPADM

## 2023-02-18 RX ADMIN — PANTOPRAZOLE SODIUM 40 MG: 40 INJECTION, POWDER, FOR SOLUTION INTRAVENOUS at 08:02

## 2023-02-18 RX ADMIN — SEVELAMER CARBONATE 1600 MG: 800 TABLET, FILM COATED ORAL at 09:02

## 2023-02-18 RX ADMIN — TIOTROPIUM BROMIDE INHALATION SPRAY 2 PUFF: 3.12 SPRAY, METERED RESPIRATORY (INHALATION) at 09:02

## 2023-02-18 RX ADMIN — CARVEDILOL 6.25 MG: 3.12 TABLET, FILM COATED ORAL at 09:02

## 2023-02-18 RX ADMIN — ATORVASTATIN CALCIUM 40 MG: 40 TABLET, FILM COATED ORAL at 08:02

## 2023-02-18 RX ADMIN — ALUMINUM HYDROXIDE, MAGNESIUM HYDROXIDE, AND SIMETHICONE 30 ML: 200; 200; 20 SUSPENSION ORAL at 09:02

## 2023-02-18 RX ADMIN — CARVEDILOL 6.25 MG: 3.12 TABLET, FILM COATED ORAL at 08:02

## 2023-02-18 RX ADMIN — FLUTICASONE FUROATE AND VILANTEROL TRIFENATATE 1 PUFF: 100; 25 POWDER RESPIRATORY (INHALATION) at 09:02

## 2023-02-18 RX ADMIN — SEVELAMER CARBONATE 1600 MG: 800 TABLET, FILM COATED ORAL at 08:02

## 2023-02-18 RX ADMIN — DULOXETINE 30 MG: 30 CAPSULE, DELAYED RELEASE ORAL at 09:02

## 2023-02-18 RX ADMIN — DIPHENHYDRAMINE HYDROCHLORIDE 50 MG: 25 CAPSULE ORAL at 08:02

## 2023-02-18 RX ADMIN — PANTOPRAZOLE SODIUM 40 MG: 40 INJECTION, POWDER, FOR SOLUTION INTRAVENOUS at 09:02

## 2023-02-18 RX ADMIN — FUROSEMIDE 40 MG: 40 TABLET ORAL at 09:02

## 2023-02-18 RX ADMIN — SEVELAMER CARBONATE 1600 MG: 800 TABLET, FILM COATED ORAL at 02:02

## 2023-02-18 RX ADMIN — LEVOTHYROXINE SODIUM 125 MCG: 125 TABLET ORAL at 05:02

## 2023-02-18 NOTE — CONSULTS
NEPHROLOGY CONSULT     Consultant Attending:     Dr. Karel Linda    Reason for Consult:     ESRD on HD    Subjective:      History of Present Illness:  Rosette Madrid is a 77 y.o. well known to our service with a history of end-stage renal disease on hemodialysis.  Her normal dialysis days are Tuesdays, Thursdays and Saturdays at [facility] where she is followed by Dr. Juan Diego Carlton.  She presented to the emergency department on 02/16/2023 with black stools.  She does have a history of previous GI bleed with AVM.  She was subsequently admitted and GI was consulted.  We are consulted for continued management of ESRD.  She did undergo EGD yesterday with M2 a capsule placement.  She has received a transfusion since admission.  Hemoglobin and hematocrit on admission were 6.6 and 21.2 respectively.  Transfusion was done on admission.  Hemoglobin and hematocrit today 02/18/2023 are 8.1 and 26.3 respectively.  Currently vital signs are stable.  She denies any melena, hematemesis or hematochezia.  Op report from EGD yesterday is not available.  Plan will be from our standpoint dialysis today.  Procrit does appear to be scheduled for tomorrow.  No family is at the bedside on exam.  Other pertinent medical history includes type 2 diabetes, hyperlipidemia, hypertension, chronic anticoagulation with Eliquis, COPD, hypothyroidism and anemia secondary to chronic kidney disease.        Past Medical History:  Past Medical History:   Diagnosis Date    CKD (chronic kidney disease) requiring chronic dialysis     COPD (chronic obstructive pulmonary disease)     Diabetes mellitus     Hyperlipidemia     Hypertension     Renal insufficiency     Thyroid disease        Past Surgical History:  Past Surgical History:   Procedure Laterality Date    AV FISTULA PLACEMENT Left     CARDIAC CATHETERIZATION      COLONOSCOPY      CORONARY ARTERY BYPASS GRAFT      ESOPHAGOGASTRODUODENOSCOPY      ESOPHAGOGASTRODUODENOSCOPY N/A 2/17/2023     Procedure: EGD +/- VCE PLACEMENT;  Surgeon: Morgan Gardner MD;  Location: [facility] ENDOSCOPY;  Service: Gastroenterology;  Laterality: N/A;    POLYPECTOMY      SMALL BOWEL ENTEROSCOPY Left 1/20/2023    Procedure: ENTEROSCOPY;  Surgeon: Lexi Scaels MD;  Location: [facility] ENDOSCOPY;  Service: Gastroenterology;  Laterality: Left;    THYROIDECTOMY      TUBAL LIGATION         Allergies:  Review of patient's allergies indicates:   Allergen Reactions    Lisinopril Swelling    Azithromycin      Other reaction(s): tongue/facial swelling    Baclofen      Other reaction(s): tongue/facial swelling    Doxycycline      Other reaction(s): Angioedema       Medications:   In-Hospital Scheduled Medications:   aluminum-magnesium hydroxide-simethicone  30 mL Oral QID (AC & HS)    atorvastatin  40 mg Oral QHS    carvediloL  6.25 mg Oral BID    DULoxetine  30 mg Oral Daily    [START ON 2/19/2023] epoetin alexis  10,000 Units Subcutaneous Q7 Days    fluticasone furoate-vilanteroL  1 puff Inhalation Daily    furosemide  40 mg Oral Daily    levothyroxine  125 mcg Oral Before breakfast    pantoprazole  40 mg Intravenous BID    sevelamer carbonate  1,600 mg Oral TID    tiotropium bromide  2 puff Inhalation Daily      In-Hospital PRN Medications:  sodium chloride, sodium chloride 0.9%, acetaminophen, albuterol, bisacodyL, clonazePAM, dextrose 10%, diphenhydrAMINE, glucagon (human recombinant), insulin aspart U-100, labetalol, ondansetron, sodium chloride 0.9%   In-Hospital IV Infusion Medications:   sodium chloride 0.9% 50 mL/hr at 02/16/23 2041      Home Medications:  Prior to Admission medications    Medication Sig Start Date End Date Taking? Authorizing Provider   apixaban (ELIQUIS) 5 mg Tab Take 1 tablet (5 mg total) by mouth 2 (two) times daily. 1/22/23 1/22/24 Yes Matias Gee MD   aspirin (ECOTRIN) 81 MG EC tablet Take 1 tablet (81 mg total) by mouth once daily. 11/23/22  Yes JOSEFINA Lord   BENADRYL 25 mg capsule Take  50 mg by mouth nightly. 2/23/22  Yes Historical Provider   carvediloL (COREG) 6.25 MG tablet Take 1 tablet (6.25 mg total) by mouth 2 (two) times daily. 12/5/22 12/5/23 Yes YUE Balderas   clonazePAM (KLONOPIN) 0.5 MG tablet Take 0.5 mg by mouth daily as needed. 7/13/22  Yes Historical Provider   ferrous sulfate 325 (65 FE) MG EC tablet Take 1 tablet (325 mg total) by mouth once daily.  Patient taking differently: Take 325 mg by mouth 3 (three) times daily. 1/22/23 5/22/23 Yes Matias Gee MD   furosemide (LASIX) 40 MG tablet Take 1 tablet (40 mg total) by mouth once daily.  Patient taking differently: Take 40 mg by mouth 2 (two) times a day. 12/5/22 12/5/23 Yes YUE Balderas   glipiZIDE (GLUCOTROL) 10 MG tablet Take 1 tablet (10 mg total) by mouth daily with breakfast. 12/12/22  Yes JOSEFINA Lord   levothyroxine (SYNTHROID) 125 MCG tablet Take 1 tablet (125 mcg total) by mouth before breakfast. 12/12/22  Yes JOSEFINA Lord   rosuvastatin (CRESTOR) 40 MG Tab Take 1 tablet (40 mg total) by mouth once daily. 12/5/22 12/5/23 Yes YUE Balderas   sevelamer carbonate (RENVELA) 800 mg Tab Take 1,600 mg by mouth 3 (three) times daily. 9/27/22  Yes Historical Provider   tiZANidine (ZANAFLEX) 4 MG tablet Take 4 mg by mouth every 8 (eight) hours as needed. 5/7/22  Yes Historical Provider   albuterol (VENTOLIN HFA) 90 mcg/actuation inhaler Inhale 2 puffs into the lungs every 6 (six) hours as needed for Wheezing. Rescue 11/23/22   JOSEFINA Lord   albuterol-ipratropium (DUO-NEB) 2.5 mg-0.5 mg/3 mL nebulizer solution Take 3 mLs by nebulization every 6 (six) hours as needed for Wheezing or Shortness of Breath. Rescue 12/19/22   JOSEFINA Lord KWIKPEN U-100 INSULIN glargine 100 units/mL SubQ pen Inject 30 Units into the skin Daily. 9/30/22 2/6/23  JOSEFINA Lord   bisacodyL (DULCOLAX) 5 mg EC tablet Take 5 mg by mouth daily as needed for Constipation.     "Historical Provider   DIALYVITE 100-1 mg Tab Take 1 tablet by mouth once daily. 22   Historical Provider   DULoxetine (CYMBALTA) 30 MG capsule Take 30 mg by mouth once daily. 22   Historical Provider   fluticasone furoate-vilanteroL (BREO) 100-25 mcg/dose diskus inhaler Inhale 1 puff into the lungs once daily. 22  JOSEFINA Lord   INVEGA SUSTENNA 156 mg/mL Syrg injection Inject into the muscle. 22   Historical Provider   L-METHYLFOLATE 15 mg Tab Take 1 tablet by mouth once daily. 22   Historical Provider   ONETOUCH DELICA PLUS LANCET 30 gauge Misc 1 lancet by Other route once daily. 22   JOSEFINA Lord   ONETOUCH ULTRA TEST Strp 1 strip by Other route once daily. 22   JOSEFINA Lord   pen needle, diabetic 31 gauge x 5/16" Ndle 2 Units by Misc.(Non-Drug; Combo Route) route 2 (two) times a day. Patient to check blood sugars twice daily (morning and night) 22   JOSEFINA Lord   tiotropium (SPIRIVA WITH HANDIHALER) 18 mcg inhalation capsule Inhale 1 capsule (18 mcg total) into the lungs Daily. 22  JOSEFINA Lord       Family History:  Family History   Problem Relation Age of Onset    Hypertension Mother     Hypertension Father     Hypertension Sister     Hypertension Sister        Social History:  Social History     Tobacco Use    Smoking status: Every Day     Packs/day: 0.25     Types: Cigarettes    Smokeless tobacco: Never    Tobacco comments:     Smokes 4 cigarettes a day   Substance Use Topics    Alcohol use: Yes     Comment: 1 glass every 2-3 month    Drug use: Not Currently       Review of Systems:  No real complaints  Denies N&V or CP/ SOB         Objective:   Last 24 Hour Vital Signs:  BP  Min: 127/76  Max: 166/70  Temp  Av.1 °F (36.7 °C)  Min: 97.7 °F (36.5 °C)  Max: 98.8 °F (37.1 °C)  Pulse  Av.2  Min: 55  Max: 63  Resp  Av.6  Min: 17  Max: 20  SpO2  Av %  Min: 95 %  Max: 99 %  I/O last 3 completed shifts:  In: - " "  Out: 300 [Urine:300]    Physical Examination:    BP (!) 166/70   Pulse (!) 59   Temp 97.7 °F (36.5 °C) (Oral)   Resp 18   Ht 5' 3" (1.6 m)   Wt 95.3 kg (210 lb)   SpO2 99%   Breastfeeding No   BMI 37.20 kg/m²     General Appearance:    Alert, cooperative, no distress, appears stated age   Head:    Normocephalic, without obvious abnormality, atraumatic   Eyes:    PERRL, conjunctiva/corneas clear, EOM's intact, fundi     benign, both eyes   Ears:    Normal TM's and external ear canals, both ears   Nose:   Nares normal, septum midline, mucosa normal, no drainage    or sinus tenderness   Throat:   Lips, mucosa, and tongue normal; teeth and gums normal   Neck:   Supple, symmetrical, trachea midline, no adenopathy;     thyroid:  no enlargement/tenderness/nodules; no carotid    bruit or JVD       Lungs:     Clear to auscultation bilaterally, respirations unlabored   Chest Wall:    No tenderness or deformity    Heart:    Regular rate and rhythm, S1 and S2 normal, no murmur, rub   or gallop       Abdomen:     Soft, non-tender, bowel sounds active all four quadrants,     no masses, no organomegaly           Extremities:   Extremities normal, atraumatic, no cyanosis/ 1+ edema   Pulses:   2+ and symmetric all extremities   Skin:   Skin color, texture, turgor normal, no rashes or lesions       Neurologic:   CNII-XII intact, normal strength, sensation and reflexes     throughout         Laboratory Results:    Trended Lab Data:  Recent Labs   Lab 02/16/23  1123 02/17/23  0348 02/18/23  0412   WBC 6.9 6.7 6.3   HGB 6.6* 8.2* 8.1*   HCT 21.2* 25.6* 26.3*    189 205   MCV 94.6* 90.1 92.9   RDW 17.3* 17.7* 18.2*    137 138   K 4.5 3.8 3.9   CO2 26 29 26   BUN 55.7* 26.8* 28.4*   ALBUMIN 2.8* 2.8* 2.5*   BILITOT 0.3 0.6  --    AST 24 20  --    ALKPHOS 75 66  --    ALT 32 28  --            Microbiology Data:      Other Laboratory Data:      Other Results:      Radiology:  X-Ray Chest 1 View    Result Date: " 2/9/2023  EXAMINATION: XR CHEST 1 VIEW CLINICAL HISTORY: Chest pain; TECHNIQUE: One view COMPARISON: January 19, 2023. FINDINGS: Cardiopericardial silhouette enlarged appearance is similar.  Sternotomy changes.  No acute dense focal or segmental consolidation, congestive process, pleural effusions or pneumothorax.     NO ACUTE CARDIOPULMONARY PROCESS IDENTIFIED. Electronically signed by: Niles Stevenson Date:    02/09/2023 Time:    19:13    X-Ray Chest 1 View    Result Date: 1/19/2023  EXAMINATION: XR CHEST 1 VIEW CLINICAL HISTORY: coarse breath sounds; TECHNIQUE: Frontal view(s) of the chest. COMPARISON: Radiography 09/30/2022 FINDINGS: Prior sternotomy.  Stable cardiac silhouette with similar tortuosity of the thoracic aorta.  The lungs are well-inflated.  Sites of mild scarring in the lower left lung similar since September.  No acute consolidation.  No significant pleural effusion or discernible pneumothorax.     No acute pulmonary process identified. Electronically signed by: Reinier Owen Date:    01/19/2023 Time:    17:47           Assessment/ Plan               A/P--NE 02/18    1---ESRD on HD---Cont TTS schedule while inpatient  2---?GI Bleed---EGD done 2/17 with M2A capsule---Op Report not available  Cont PPI  3---Type II DM--Cont same management  4---Anemia secondary to CKD/ Blood Loss  Procrit is scheduled for 2/19---Follow H&H--Check Fe Studies    IV--SL    ORDERS:  HD today  CBC in am  Fe studies    [facility]/ Bianka    Thanks for this consult!    Seen by me

## 2023-02-18 NOTE — PROGRESS NOTES
"Gastroenterology Progress Note    Subjective/Interval History:  2-18-23  Pt with no issues overnight  Afebrile  Bm x 1  VCE - P    ROS:  Constitutional:  Negative for appetite change, chills, diaphoresis, fatigue, fever and unexpected weight change.   HENT:  Negative for trouble swallowing.    Respiratory:  Negative for cough, chest tightness and shortness of breath.    Cardiovascular:  Positive for leg swelling. Negative for chest pain and palpitations.   Gastrointestinal:  Positive for blood in stool (occult only). Negative for abdominal distention, abdominal pain, constipation, diarrhea, nausea, rectal pain and vomiting.   Skin:  Negative for color change and pallor.   Neurological:  Negative for dizziness, weakness and light-headedness.   Psychiatric/Behavioral:  Negative for confusion.     Vital Signs:  /76   Pulse (!) 58   Temp 97.8 °F (36.6 °C) (Oral)   Resp 17   Ht 5' 3" (1.6 m)   Wt 95.3 kg (210 lb)   SpO2 97%   Breastfeeding No   BMI 37.20 kg/m²   Body mass index is 37.2 kg/m².    Physical Exam:  Constitutional:       General: She is not in acute distress.     Appearance: She is obese. She is not ill-appearing.   HENT:      Head: Normocephalic and atraumatic.   Eyes:      General: No scleral icterus.     Extraocular Movements: Extraocular movements intact.   Cardiovascular:      Rate and Rhythm: Normal rate and regular rhythm.   Pulmonary:      Effort: Pulmonary effort is normal. No respiratory distress.   Abdominal:      General: Bowel sounds are normal. There is no distension.      Palpations: Abdomen is soft. There is no mass.      Tenderness: There is no abdominal tenderness. There is no guarding or rebound.   Musculoskeletal:         General: Normal range of motion.      Right lower leg: Edema present.      Left lower leg: Edema present.   Skin:     General: Skin is warm and dry.   Neurological:      Mental Status: She is alert and oriented to person, place, and time.   Psychiatric:    "      Mood and Affect: Mood and affect normal.     Labs:  Recent Results (from the past 48 hour(s))   APTT    Collection Time: 02/16/23 11:22 AM   Result Value Ref Range    PTT 22.5 (L) 23.2 - 33.7 seconds   Protime-INR    Collection Time: 02/16/23 11:22 AM   Result Value Ref Range    PT 13.1 12.5 - 14.5 seconds    INR 1.00 0.00 - 1.30   Type & Screen    Collection Time: 02/16/23 11:23 AM   Result Value Ref Range    Group & Rh O POS     Indirect Gideon GEL NEG    Comprehensive metabolic panel    Collection Time: 02/16/23 11:23 AM   Result Value Ref Range    Sodium Level 136 136 - 145 mmol/L    Potassium Level 4.5 3.5 - 5.1 mmol/L    Chloride 101 98 - 107 mmol/L    Carbon Dioxide 26 23 - 31 mmol/L    Glucose Level 163 (H) 82 - 115 mg/dL    Blood Urea Nitrogen 55.7 (H) 9.8 - 20.1 mg/dL    Creatinine 4.66 (H) 0.55 - 1.02 mg/dL    Calcium Level Total 9.0 8.4 - 10.2 mg/dL    Protein Total 5.5 (L) 5.8 - 7.6 gm/dL    Albumin Level 2.8 (L) 3.4 - 4.8 g/dL    Globulin 2.7 2.4 - 3.5 gm/dL    Albumin/Globulin Ratio 1.0 (L) 1.1 - 2.0 ratio    Bilirubin Total 0.3 <=1.5 mg/dL    Alkaline Phosphatase 75 40 - 150 unit/L    Alanine Aminotransferase 32 0 - 55 unit/L    Aspartate Aminotransferase 24 5 - 34 unit/L    eGFR 9 mls/min/1.73/m2   CBC with Differential    Collection Time: 02/16/23 11:23 AM   Result Value Ref Range    WBC 6.9 4.5 - 11.5 x10(3)/mcL    RBC 2.24 (L) 4.20 - 5.40 x10(6)/mcL    Hgb 6.6 (L) 12.0 - 16.0 gm/dL    Hct 21.2 (L) 37.0 - 47.0 %    MCV 94.6 (H) 80.0 - 94.0 fL    MCH 29.5 pg    MCHC 31.1 (L) 33.0 - 36.0 mg/dL    RDW 17.3 (H) 11.5 - 17.0 %    Platelet 155 130 - 400 x10(3)/mcL    MPV 10.8 (H) 7.4 - 10.4 fL    Neut % 82.1 %    Lymph % 10.4 %    Mono % 5.6 %    Eos % 0.9 %    Basophil % 0.6 %    Lymph # 0.72 0.6 - 4.6 x10(3)/mcL    Neut # 5.70 2.1 - 9.2 x10(3)/mcL    Mono # 0.39 0.1 - 1.3 x10(3)/mcL    Eos # 0.06 0 - 0.9 x10(3)/mcL    Baso # 0.04 0 - 0.2 x10(3)/mcL    IG# 0.03 0 - 0.04 x10(3)/mcL    IG% 0.4 %     NRBC% 0.0 %   Prepare RBC 1 Unit    Collection Time: 02/16/23 11:23 AM   Result Value Ref Range    UNIT NUMBER [id number]     UNIT ABO/RH O POS     DISPENSE STATUS Transfused     Unit Expiration [id number]     Product Code U9683E42     Unit Blood Type Code 5100     CROSSMATCH INTERPRETATION Compatible    POCT glucose    Collection Time: 02/17/23 12:01 AM   Result Value Ref Range    POCT Glucose 190 (H) 70 - 110 mg/dL   Comprehensive Metabolic Panel    Collection Time: 02/17/23  3:48 AM   Result Value Ref Range    Sodium Level 137 136 - 145 mmol/L    Potassium Level 3.8 3.5 - 5.1 mmol/L    Chloride 102 98 - 107 mmol/L    Carbon Dioxide 29 23 - 31 mmol/L    Glucose Level 126 (H) 82 - 115 mg/dL    Blood Urea Nitrogen 26.8 (H) 9.8 - 20.1 mg/dL    Creatinine 2.97 (H) 0.55 - 1.02 mg/dL    Calcium Level Total 8.8 8.4 - 10.2 mg/dL    Protein Total 5.1 (L) 5.8 - 7.6 gm/dL    Albumin Level 2.8 (L) 3.4 - 4.8 g/dL    Globulin 2.3 (L) 2.4 - 3.5 gm/dL    Albumin/Globulin Ratio 1.2 1.1 - 2.0 ratio    Bilirubin Total 0.6 <=1.5 mg/dL    Alkaline Phosphatase 66 40 - 150 unit/L    Alanine Aminotransferase 28 0 - 55 unit/L    Aspartate Aminotransferase 20 5 - 34 unit/L    eGFR 16 mls/min/1.73/m2   CBC with Differential    Collection Time: 02/17/23  3:48 AM   Result Value Ref Range    WBC 6.7 4.5 - 11.5 x10(3)/mcL    RBC 2.84 (L) 4.20 - 5.40 x10(6)/mcL    Hgb 8.2 (L) 12.0 - 16.0 gm/dL    Hct 25.6 (L) 37.0 - 47.0 %    MCV 90.1 80.0 - 94.0 fL    MCH 28.9 pg    MCHC 32.0 (L) 33.0 - 36.0 mg/dL    RDW 17.7 (H) 11.5 - 17.0 %    Platelet 189 130 - 400 x10(3)/mcL    MPV 10.5 (H) 7.4 - 10.4 fL    Neut % 75.7 %    Lymph % 13.7 %    Mono % 8.0 %    Eos % 1.3 %    Basophil % 0.7 %    Lymph # 0.92 0.6 - 4.6 x10(3)/mcL    Neut # 5.08 2.1 - 9.2 x10(3)/mcL    Mono # 0.54 0.1 - 1.3 x10(3)/mcL    Eos # 0.09 0 - 0.9 x10(3)/mcL    Baso # 0.05 0 - 0.2 x10(3)/mcL    IG# 0.04 0 - 0.04 x10(3)/mcL    IG% 0.6 %    NRBC% 0.4 %   POCT glucose    Collection  Time: 02/17/23  6:12 AM   Result Value Ref Range    POCT Glucose 139 (H) 70 - 110 mg/dL   POCT glucose    Collection Time: 02/17/23 12:07 PM   Result Value Ref Range    POCT Glucose 123 (H) 70 - 110 mg/dL   POCT glucose    Collection Time: 02/17/23  1:44 PM   Result Value Ref Range    POCT Glucose 123 (H) 70 - 110 mg/dL   POCT glucose    Collection Time: 02/17/23  8:47 PM   Result Value Ref Range    POCT Glucose 231 (H) 70 - 110 mg/dL   CBC Without Differential    Collection Time: 02/18/23  4:12 AM   Result Value Ref Range    WBC 6.3 4.5 - 11.5 x10(3)/mcL    RBC 2.83 (L) 4.20 - 5.40 x10(6)/mcL    Hgb 8.1 (L) 12.0 - 16.0 g/dL    Hct 26.3 (L) 37.0 - 47.0 %    Platelet 205 130 - 400 x10(3)/mcL    MCV 92.9 80.0 - 94.0 fL    MCH 28.6 pg    MCHC 30.8 (L) 33.0 - 36.0 g/dL    RDW 18.2 (H) 11.5 - 17.0 %    MPV 10.3 7.4 - 10.4 fL    NRBC% 0.3 %   Renal Function Panel    Collection Time: 02/18/23  4:12 AM   Result Value Ref Range    Sodium Level 138 136 - 145 mmol/L    Potassium Level 3.9 3.5 - 5.1 mmol/L    Chloride 104 98 - 107 mmol/L    Carbon Dioxide 26 23 - 31 mmol/L    Glucose Level 96 82 - 115 mg/dL    Blood Urea Nitrogen 28.4 (H) 9.8 - 20.1 mg/dL    Creatinine 3.88 (H) 0.55 - 1.02 mg/dL    Calcium Level Total 8.8 8.4 - 10.2 mg/dL    Albumin Level 2.5 (L) 3.4 - 4.8 g/dL    Phosphorus Level 3.1 2.3 - 4.7 mg/dL    eGFR 12 mls/min/1.73/m2         Assessment/Plan:  77 y.o. AA female known to Dr. Bourgeois with history of ESRD on HD TTS, HFpEF 55% (December 2021), CAD/CABG x4 on asa 81 mg, HTN, HLD, COPD, venous insufficiency, hypothyroidism, anemia, gastric AVM, and adenomatous colon polyps, and recently dx afib (1/20/23) for which Eliquis was initiated.  Here with anemia on outpatient labs.       Acute on chronic anemia.   Multifactorial secondary to chronic disease and occult losses.   Hgb 6.6--1 unit PRBCs--8.2  Baseline hgb 8  Dark stool on PO iron, FOBT+.    No overt bleeding.      -will read VCE once downloaded  - monitor  stools for bleeding.   - monitor h/h and transfuse as needed.        Gela Rubi NP as scribe for Dr. Morgan Gardner

## 2023-02-18 NOTE — PROGRESS NOTES
[facility]  Hospital Medicine Progress Note        Chief Complaint: Inpatient Follow-up for Anemia    HPI:   77-year-old female with PMH of HTN, HLD, COPD, ESRD on HD, DM type 2, Hypothyroidism s/p Thyroidectomy, reports hx of Valvular Surgery, Atrial Fibrillation on Eliquis, Tobacco Smoking who was admitted 02/16/2023 with diagnosis of low H&H at her dialysis unit.  She she was unable to undergo her dialysis and was asked to come to ED.  Patient reported this is her 3rd admission in the last 2 months for load H&H and blood transfusion.  Stating she was last admitted at [facility]. She was asked if she made the appointment with the GI doctor for VCE which was recommended and patient reported she did not knew where to call.  Patient denies weakness, lightheadedness, shortness of breath. Reports this time she did not realize that her hemoglobin was low as she had no symptoms.  Reports she has dark brown stool but she takes iron supplements. Last hemodialysis was on Tuesday. Underwent EGD with GI on 02/17 which was unremarkable. Underwent placement of VCE during EGD, to be read by GI & further recommendations to be made.    Interval Hx:   Today, Mrs. Madrid stated she was doing well and had no new complaints.  GI will read VCE and make further recommendations.  HGB/HCT noted to be stable at 8.1/26.3.  Due for HD today, Nephrology consulted as they were not consulted from ER.    Objective/physical exam:  General: alert lady lying comfortably in bed, in no acute distress  HENT: oral and oropharyngeal mucosa moist, pink, with no erythema or exudates, no ear pain or discharge  Neck: normal neck movement, no lymph nodes or swellings, no JVD or Carotid bruit  Respiratory: clear breathing sounds bilaterally, no crackles, rales, ronchi or wheezes  Cardiovascular: clear S1 and S2, no murmurs, rubs or gallops  Peripheral Vascular: no lesions, ulcers or erosions, normal peripheral pulses and no pedal  edema  Gastrointestinal: soft, non-tender, non-distended abdomen, no guarding, rigidity or rebound tenderness, normal bowel sounds  Integumentary: normal skin color, no rashes or lesions  Neuro: AAO x 3; motor strength 5/5 in B/L UEs & LEs; sensation intact to gross and fine touch B/L; CN II-XII grossly intact    VITAL SIGNS: 24 HRS MIN & MAX LAST   Temp  Min: 97.7 °F (36.5 °C)  Max: 98.8 °F (37.1 °C) 97.7 °F (36.5 °C)   BP  Min: 127/76  Max: 166/70 (!) 166/70     Pulse  Min: 58  Max: 63  (!) 59   Resp  Min: 17  Max: 20 18   SpO2  Min: 95 %  Max: 99 % 99 %       Recent Labs   Lab 02/16/23  1123 02/17/23  0348 02/18/23  0412   WBC 6.9 6.7 6.3   RBC 2.24* 2.84* 2.83*   HGB 6.6* 8.2* 8.1*   HCT 21.2* 25.6* 26.3*   MCV 94.6* 90.1 92.9   MCH 29.5 28.9 28.6   MCHC 31.1* 32.0* 30.8*   RDW 17.3* 17.7* 18.2*    189 205   MPV 10.8* 10.5* 10.3         Recent Labs   Lab 02/16/23  1123 02/17/23  0348 02/18/23  0412    137 138   K 4.5 3.8 3.9   CO2 26 29 26   BUN 55.7* 26.8* 28.4*   CREATININE 4.66* 2.97* 3.88*   CALCIUM 9.0 8.8 8.8   ALBUMIN 2.8* 2.8* 2.5*   ALKPHOS 75 66  --    ALT 32 28  --    AST 24 20  --    BILITOT 0.3 0.6  --        Scheduled Med:   aluminum-magnesium hydroxide-simethicone  30 mL Oral QID (AC & HS)    atorvastatin  40 mg Oral QHS    carvediloL  6.25 mg Oral BID    DULoxetine  30 mg Oral Daily    [START ON 2/19/2023] epoetin alexis  10,000 Units Subcutaneous Q7 Days    fluticasone furoate-vilanteroL  1 puff Inhalation Daily    furosemide  40 mg Oral Daily    levothyroxine  125 mcg Oral Before breakfast    pantoprazole  40 mg Intravenous BID    sevelamer carbonate  1,600 mg Oral TID    tiotropium bromide  2 puff Inhalation Daily      Continuous Infusions:   sodium chloride 0.9% 50 mL/hr at 02/16/23 2041      PRN Meds:  sodium chloride, sodium chloride 0.9%, acetaminophen, albuterol, bisacodyL, clonazePAM, dextrose 10%, diphenhydrAMINE, glucagon (human recombinant), insulin aspart U-100, labetalol,  ondansetron, sodium chloride 0.9%     Assessment/Plan:  Acute on Chronic Anemia s/p blood transfusion  Anemia of Chronic Disease  Essential Hypertension   Hyperlipidemia  COPD  ESRD on HD TTS  Diabetes Mellitus type 2   Hypothyroidism  Atrial Fibrillation on Eliquis  HFpEF  History of Valve Replacement    Tobacco Smoking    - Patient continues to be admitted for close monitoring  - GI on-board; appreciate the recommendation, noted video capsule was placed 02/17  - Last EGD done on 01/20/2023 which showed normal esophagus, normal stomach, normal examined duodenum  - Patient given 1 unit packed red blood cell transfusion with hemodialysis on the day of admission  - HB/HCT stable at 8.1/26.3  - Nephrology on board; continue TThSat schedule  - Continue IV Protonix 40 mg BID  - Patient continued on Coreg 6.25 mg BID, Atorvastatin 40 mg, Lasix 40 mg, Levothyroxine 125 mcg, Sevelamer 1600 mg, EPO 16110 units qweek, Fluticasone-Vilanterol 100-25 mcg, Tiotropium 2.5 mcg  - Holding ASA, Eliquis  - Patient will need to follow with her primary cardiologist regarding probable Watchman device given the risks are outweighing the benefit of anticoagulation     VTE Prophylaxis:    SCDs     Patient condition:   Stable     Discharge Planning and Disposition/Anticipated discharge:  Pending GI recs    All diagnosis and differential diagnosis have been reviewed; assessment and plan has been documented; I have personally reviewed the labs and test results that are presently available; I have reviewed the patients medication list; I have reviewed the consulting providers response and recommendations. I have reviewed or attempted to review medical records based upon their availability    All of the patient's questions have been  addressed and answered. Patient's is agreeable to the above stated plan. I will continue to monitor closely and make adjustments to medical management as  needed.  _____________________________________________________________________    Nutrition Status: Liquids    Radiology:  X-Ray Chest 1 View  Narrative: EXAMINATION:  XR CHEST 1 VIEW    CLINICAL HISTORY:  Chest pain;    TECHNIQUE:  One view    COMPARISON:  January 19, 2023.    FINDINGS:  Cardiopericardial silhouette enlarged appearance is similar.  Sternotomy changes.  No acute dense focal or segmental consolidation, congestive process, pleural effusions or pneumothorax.  Impression: NO ACUTE CARDIOPULMONARY PROCESS IDENTIFIED.    Electronically signed by: Niles Stevenson  Date:    02/09/2023  Time:    19:13      Timo Ang MD  02/18/2023

## 2023-02-19 LAB
ANION GAP SERPL CALC-SCNC: 7 MEQ/L
BASOPHILS # BLD AUTO: 0.04 X10(3)/MCL (ref 0–0.2)
BASOPHILS NFR BLD AUTO: 0.6 %
BUN SERPL-MCNC: 15.4 MG/DL (ref 9.8–20.1)
CALCIUM SERPL-MCNC: 8.6 MG/DL (ref 8.4–10.2)
CHLORIDE SERPL-SCNC: 100 MMOL/L (ref 98–107)
CO2 SERPL-SCNC: 27 MMOL/L (ref 23–31)
CREAT SERPL-MCNC: 3.1 MG/DL (ref 0.55–1.02)
CREAT/UREA NIT SERPL: 5
EOSINOPHIL # BLD AUTO: 0.11 X10(3)/MCL (ref 0–0.9)
EOSINOPHIL NFR BLD AUTO: 1.7 %
ERYTHROCYTE [DISTWIDTH] IN BLOOD BY AUTOMATED COUNT: 18.2 % (ref 11.5–17)
GFR SERPLBLD CREATININE-BSD FMLA CKD-EPI: 15 MLS/MIN/1.73/M2
GLUCOSE SERPL-MCNC: 197 MG/DL (ref 82–115)
HCT VFR BLD AUTO: 26.2 % (ref 37–47)
HGB BLD-MCNC: 8.1 G/DL (ref 12–16)
IMM GRANULOCYTES # BLD AUTO: 0.03 X10(3)/MCL (ref 0–0.04)
IMM GRANULOCYTES NFR BLD AUTO: 0.5 %
LYMPHOCYTES # BLD AUTO: 1.04 X10(3)/MCL (ref 0.6–4.6)
LYMPHOCYTES NFR BLD AUTO: 16.4 %
MAGNESIUM SERPL-MCNC: 1.9 MG/DL (ref 1.6–2.6)
MCH RBC QN AUTO: 28.8 PG
MCHC RBC AUTO-ENTMCNC: 30.9 G/DL (ref 33–36)
MCV RBC AUTO: 93.2 FL (ref 80–94)
MONOCYTES # BLD AUTO: 0.63 X10(3)/MCL (ref 0.1–1.3)
MONOCYTES NFR BLD AUTO: 9.9 %
NEUTROPHILS # BLD AUTO: 4.5 X10(3)/MCL (ref 2.1–9.2)
NEUTROPHILS NFR BLD AUTO: 70.9 %
NRBC BLD AUTO-RTO: 0.3 %
PLATELET # BLD AUTO: 211 X10(3)/MCL (ref 130–400)
PMV BLD AUTO: 10.3 FL (ref 7.4–10.4)
POCT GLUCOSE: 108 MG/DL (ref 70–110)
POCT GLUCOSE: 118 MG/DL (ref 70–110)
POCT GLUCOSE: 189 MG/DL (ref 70–110)
POCT GLUCOSE: 195 MG/DL (ref 70–110)
POCT GLUCOSE: 224 MG/DL (ref 70–110)
POTASSIUM SERPL-SCNC: 4.5 MMOL/L (ref 3.5–5.1)
RBC # BLD AUTO: 2.81 X10(6)/MCL (ref 4.2–5.4)
SODIUM SERPL-SCNC: 134 MMOL/L (ref 136–145)
WBC # SPEC AUTO: 6.4 X10(3)/MCL (ref 4.5–11.5)

## 2023-02-19 PROCEDURE — C9113 INJ PANTOPRAZOLE SODIUM, VIA: HCPCS | Performed by: PHYSICIAN ASSISTANT

## 2023-02-19 PROCEDURE — 80048 BASIC METABOLIC PNL TOTAL CA: CPT | Performed by: STUDENT IN AN ORGANIZED HEALTH CARE EDUCATION/TRAINING PROGRAM

## 2023-02-19 PROCEDURE — 25000003 PHARM REV CODE 250: Performed by: NURSE PRACTITIONER

## 2023-02-19 PROCEDURE — 25000003 PHARM REV CODE 250: Performed by: INTERNAL MEDICINE

## 2023-02-19 PROCEDURE — 21400001 HC TELEMETRY ROOM

## 2023-02-19 PROCEDURE — 83735 ASSAY OF MAGNESIUM: CPT | Performed by: STUDENT IN AN ORGANIZED HEALTH CARE EDUCATION/TRAINING PROGRAM

## 2023-02-19 PROCEDURE — 63600175 PHARM REV CODE 636 W HCPCS: Mod: JG | Performed by: NURSE PRACTITIONER

## 2023-02-19 PROCEDURE — 63600175 PHARM REV CODE 636 W HCPCS: Performed by: PHYSICIAN ASSISTANT

## 2023-02-19 PROCEDURE — 85025 COMPLETE CBC W/AUTO DIFF WBC: CPT | Performed by: NURSE PRACTITIONER

## 2023-02-19 PROCEDURE — 25000242 PHARM REV CODE 250 ALT 637 W/ HCPCS: Performed by: INTERNAL MEDICINE

## 2023-02-19 RX ORDER — POLYETHYLENE GLYCOL 3350 17 G/17G
17 POWDER, FOR SOLUTION ORAL DAILY
Status: DISCONTINUED | OUTPATIENT
Start: 2023-02-19 | End: 2023-02-20 | Stop reason: HOSPADM

## 2023-02-19 RX ORDER — BISACODYL 10 MG
10 SUPPOSITORY, RECTAL RECTAL ONCE
Status: COMPLETED | OUTPATIENT
Start: 2023-02-19 | End: 2023-02-19

## 2023-02-19 RX ADMIN — INSULIN ASPART 2 UNITS: 100 INJECTION, SOLUTION INTRAVENOUS; SUBCUTANEOUS at 04:02

## 2023-02-19 RX ADMIN — FLUTICASONE FUROATE AND VILANTEROL TRIFENATATE 1 PUFF: 100; 25 POWDER RESPIRATORY (INHALATION) at 08:02

## 2023-02-19 RX ADMIN — SEVELAMER CARBONATE 1600 MG: 800 TABLET, FILM COATED ORAL at 04:02

## 2023-02-19 RX ADMIN — LEVOTHYROXINE SODIUM 125 MCG: 125 TABLET ORAL at 06:02

## 2023-02-19 RX ADMIN — DULOXETINE 30 MG: 30 CAPSULE, DELAYED RELEASE ORAL at 08:02

## 2023-02-19 RX ADMIN — ERYTHROPOIETIN 10000 UNITS: 10000 INJECTION, SOLUTION INTRAVENOUS; SUBCUTANEOUS at 12:02

## 2023-02-19 RX ADMIN — CARVEDILOL 6.25 MG: 3.12 TABLET, FILM COATED ORAL at 08:02

## 2023-02-19 RX ADMIN — PANTOPRAZOLE SODIUM 40 MG: 40 INJECTION, POWDER, FOR SOLUTION INTRAVENOUS at 08:02

## 2023-02-19 RX ADMIN — FUROSEMIDE 40 MG: 40 TABLET ORAL at 08:02

## 2023-02-19 RX ADMIN — POLYETHYLENE GLYCOL 3350 17 G: 17 POWDER, FOR SOLUTION ORAL at 12:02

## 2023-02-19 RX ADMIN — SEVELAMER CARBONATE 1600 MG: 800 TABLET, FILM COATED ORAL at 08:02

## 2023-02-19 RX ADMIN — ATORVASTATIN CALCIUM 40 MG: 40 TABLET, FILM COATED ORAL at 08:02

## 2023-02-19 RX ADMIN — DIPHENHYDRAMINE HYDROCHLORIDE 50 MG: 25 CAPSULE ORAL at 08:02

## 2023-02-19 RX ADMIN — TIOTROPIUM BROMIDE INHALATION SPRAY 2 PUFF: 3.12 SPRAY, METERED RESPIRATORY (INHALATION) at 08:02

## 2023-02-19 RX ADMIN — BISACODYL 10 MG: 10 SUPPOSITORY RECTAL at 12:02

## 2023-02-19 NOTE — PLAN OF CARE
Problem: Device-Related Complication Risk (Hemodialysis)  Goal: Safe, Effective Therapy Delivery  Outcome: Ongoing, Progressing     Problem: Hemodynamic Instability (Hemodialysis)  Goal: Effective Tissue Perfusion  Outcome: Ongoing, Progressing     Problem: Infection (Hemodialysis)  Goal: Absence of Infection Signs and Symptoms  Outcome: Ongoing, Progressing     Problem: Adult Inpatient Plan of Care  Goal: Plan of Care Review  Outcome: Ongoing, Progressing  Goal: Patient-Specific Goal (Individualized)  Outcome: Ongoing, Progressing  Goal: Absence of Hospital-Acquired Illness or Injury  Outcome: Ongoing, Progressing  Goal: Optimal Comfort and Wellbeing  Outcome: Ongoing, Progressing  Goal: Readiness for Transition of Care  Outcome: Ongoing, Progressing     Problem: Diabetes Comorbidity  Goal: Blood Glucose Level Within Targeted Range  Outcome: Ongoing, Progressing

## 2023-02-19 NOTE — PROGRESS NOTES
NEPHROLOGY PROGRESS NOTE      Patient Demographics:  Name:  Rosette Madrid  Age: 77 y.o.  MRN:  [id number]  Admission Date: 2/16/2023      Subjective:      Doing ok  No BM since admit    Tolerated HD yesterday without issue    Current Facility-Administered Medications   Medication Dose Route Frequency Provider Last Rate Last Admin    0.9%  NaCl infusion (for blood administration)   Intravenous Q24H PRN Raghu Hernandez MD        0.9%  NaCl infusion   Intravenous Continuous Judith Rubi PA-C 50 mL/hr at 02/16/23 2041 New Bag at 02/16/23 2041    0.9%  NaCl infusion  200 mL Intravenous PRN YUE Pelletier        acetaminophen tablet 650 mg  650 mg Oral Q4H PRN Judith Rubi PA-C        albuterol inhaler 2 puff  2 puff Inhalation Q6H PRN Demetri Cote MD        aluminum-magnesium hydroxide-simethicone 200-200-20 mg/5 mL suspension 30 mL  30 mL Oral QID (AC & HS) Judith Rubi PA-C   30 mL at 02/18/23 0936    atorvastatin tablet 40 mg  40 mg Oral QHS Demetri Cote MD   40 mg at 02/18/23 2055    bisacodyL EC tablet 5 mg  5 mg Oral Daily PRN Demetri Cote MD        carvediloL tablet 6.25 mg  6.25 mg Oral BID Demetri Cote MD   6.25 mg at 02/19/23 0858    clonazePAM tablet 0.5 mg  0.5 mg Oral Daily PRN Demetri Cote MD        dextrose 10% bolus 125 mL 125 mL  12.5 g Intravenous PRN Judith Rubi PA-C        diphenhydrAMINE capsule 50 mg  50 mg Oral Nightly PRN Manolo Rubi MD   50 mg at 02/18/23 2059    DULoxetine DR capsule 30 mg  30 mg Oral Daily Demetri Cote MD   30 mg at 02/19/23 0858    epoetin alexis injection 10,000 Units  10,000 Units Subcutaneous Q7 Days YUE Pelletier        fluticasone furoate-vilanteroL 100-25 mcg/dose diskus inhaler 1 puff  1 puff Inhalation Daily Demetri Cote MD   1 puff at 02/19/23 0858    furosemide tablet 40 mg  40 mg Oral Daily Demetri Cote MD   40 mg at 02/19/23 0858    glucagon (human recombinant) injection 1 mg  1 mg Intramuscular PRN Judith Rubi PA-C         "insulin aspart U-100 injection 0-5 Units  0-5 Units Subcutaneous Q6H PRN Judith Rubi PA-C   1 Units at 02/17/23 2148    labetaloL injection 10 mg  10 mg Intravenous Q2H PRN Judith Rubi PA-C        levothyroxine tablet 125 mcg  125 mcg Oral Before breakfast Demetri Cote MD   125 mcg at 02/19/23 0603    ondansetron injection 4 mg  4 mg Intravenous Q6H PRN Judith Rubi PA-C        pantoprazole injection 40 mg  40 mg Intravenous BID Judith Rubi PA-C   40 mg at 02/19/23 0857    sevelamer carbonate tablet 1,600 mg  1,600 mg Oral TID Demetri Cote MD   1,600 mg at 02/19/23 0858    sodium chloride 0.9% bolus 250 mL 250 mL  250 mL Intravenous PRN LOGAN Gee        tiotropium bromide 2.5 mcg/actuation inhaler 2 puff  2 puff Inhalation Daily Demetri Cote MD   2 puff at 02/19/23 0858           Review of Systems   Constitutional:  Positive for malaise/fatigue.   HENT: Negative.     Eyes: Negative.    Respiratory: Negative.     Cardiovascular: Negative.    Gastrointestinal:  Positive for constipation.   Genitourinary: Negative.    Musculoskeletal: Negative.    Skin: Negative.    Neurological:  Positive for weakness.       Objective:    BP (!) 151/85   Pulse 60   Temp 97.7 °F (36.5 °C) (Oral)   Resp 18   Ht 5' 3" (1.6 m)   Wt 95.3 kg (210 lb)   SpO2 97%   Breastfeeding No   BMI 37.20 kg/m²       Intake/Output Summary (Last 24 hours) at 2/19/2023 0925  Last data filed at 2/18/2023 1817  Gross per 24 hour   Intake 740 ml   Output 3160 ml   Net -2420 ml             Physical Exam  Vitals reviewed.   Constitutional:       Appearance: Normal appearance.   HENT:      Head: Normocephalic and atraumatic.      Nose: Nose normal.   Eyes:      Extraocular Movements: Extraocular movements intact.      Pupils: Pupils are equal, round, and reactive to light.   Cardiovascular:      Rate and Rhythm: Normal rate and regular rhythm.      Pulses: Normal pulses.      Heart sounds: Normal heart sounds.   Pulmonary: "      Effort: Pulmonary effort is normal.      Breath sounds: Normal breath sounds.   Abdominal:      General: Bowel sounds are normal.      Palpations: Abdomen is soft.   Musculoskeletal:         General: Normal range of motion.      Cervical back: Normal range of motion.      Comments: Some deconditioning   Skin:     General: Skin is warm and dry.   Neurological:      General: No focal deficit present.      Mental Status: She is alert and oriented to person, place, and time. Mental status is at baseline.   Psychiatric:         Mood and Affect: Mood normal.          Inpatient Diagnostics:  Recent Results (from the past 24 hour(s))   POCT glucose    Collection Time: 02/18/23 11:12 AM   Result Value Ref Range    POCT Glucose 163 (H) 70 - 110 mg/dL   POCT glucose    Collection Time: 02/18/23  9:06 PM   Result Value Ref Range    POCT Glucose 189 (H) 70 - 110 mg/dL   CBC with Differential    Collection Time: 02/19/23  3:19 AM   Result Value Ref Range    WBC 6.4 4.5 - 11.5 x10(3)/mcL    RBC 2.81 (L) 4.20 - 5.40 x10(6)/mcL    Hgb 8.1 (L) 12.0 - 16.0 g/dL    Hct 26.2 (L) 37.0 - 47.0 %    MCV 93.2 80.0 - 94.0 fL    MCH 28.8 pg    MCHC 30.9 (L) 33.0 - 36.0 g/dL    RDW 18.2 (H) 11.5 - 17.0 %    Platelet 211 130 - 400 x10(3)/mcL    MPV 10.3 7.4 - 10.4 fL    Neut % 70.9 %    Lymph % 16.4 %    Mono % 9.9 %    Eos % 1.7 %    Basophil % 0.6 %    Lymph # 1.04 0.6 - 4.6 x10(3)/mcL    Neut # 4.50 2.1 - 9.2 x10(3)/mcL    Mono # 0.63 0.1 - 1.3 x10(3)/mcL    Eos # 0.11 0 - 0.9 x10(3)/mcL    Baso # 0.04 0 - 0.2 x10(3)/mcL    IG# 0.03 0 - 0.04 x10(3)/mcL    IG% 0.5 %    NRBC% 0.3 %   POCT glucose    Collection Time: 02/19/23  6:01 AM   Result Value Ref Range    POCT Glucose 108 70 - 110 mg/dL     A/P--NE 02/19    1---ESRD on HD---Cont TTS schedule while inpatient  2---?GI Bleed---EGD done 2/17 with M2A capsule---Op Report not available  Cont PPI  3---Type II DM--Cont same management  4---Anemia secondary to CKD/ Blood Loss  Procrit is  scheduled for 2/19---Follow H&H---Fe Studies show Def---Ferritin is high     IV--SL     ORDERS:  Miralax daily    [facility]/ Bianka Petersen DNP, ANP-C    2/19/2023

## 2023-02-19 NOTE — PROGRESS NOTES
02/18/23 1817   Post-Hemodialysis Assessment   Rinseback Volume (mL) 250 mL   Blood Volume Processed (Liters) 74 L   Dialyzer Clearance Clear   Duration of Treatment 180 minutes   Total UF (mL) 2500 mL   Net Fluid Removal 2000   Patient Response to Treatment tolerated well   Post-Hemodialysis Comments Completed 3hr HD tx. No meds given with HD. Removed 2L

## 2023-02-19 NOTE — PROGRESS NOTES
"Gastroenterology Progress Note    Subjective/Interval History:  2-18-23  Pt with no issues overnight  Afebrile  Bm x 1  VCE - P    2-19-23  VCE negative for signs of bleed  Hgb remains stable at 8.1  Afebrile  No BM    ROS:  Constitutional:  Negative for appetite change, chills, diaphoresis, fatigue, fever and unexpected weight change.   HENT:  Negative for trouble swallowing.    Respiratory:  Negative for cough, chest tightness and shortness of breath.    Cardiovascular:  Positive for leg swelling. Negative for chest pain and palpitations.   Gastrointestinal:  Positive for blood in stool (occult only). Negative for abdominal distention, abdominal pain, constipation, diarrhea, nausea, rectal pain and vomiting.   Skin:  Negative for color change and pallor.   Neurological:  Negative for dizziness, weakness and light-headedness.   Psychiatric/Behavioral:  Negative for confusion.     Vital Signs:  BP (!) 156/76   Pulse 62   Temp 97.6 °F (36.4 °C) (Oral)   Resp 18   Ht 5' 3" (1.6 m)   Wt 95.3 kg (210 lb)   SpO2 98%   Breastfeeding No   BMI 37.20 kg/m²   Body mass index is 37.2 kg/m².    Physical Exam:  Constitutional:       General: She is not in acute distress.     Appearance: She is obese. She is not ill-appearing.   HENT:      Head: Normocephalic and atraumatic.   Eyes:      General: No scleral icterus.     Extraocular Movements: Extraocular movements intact.   Cardiovascular:      Rate and Rhythm: Normal rate and regular rhythm.   Pulmonary:      Effort: Pulmonary effort is normal. No respiratory distress.   Abdominal:      General: Bowel sounds are normal. There is no distension.      Palpations: Abdomen is soft. There is no mass.      Tenderness: There is no abdominal tenderness. There is no guarding or rebound.   Musculoskeletal:         General: Normal range of motion.      Right lower leg: Edema present.      Left lower leg: Edema present.   Skin:     General: Skin is warm and dry.   Neurological:      " Mental Status: She is alert and oriented to person, place, and time.   Psychiatric:         Mood and Affect: Mood and affect normal.     Labs:  Recent Results (from the past 48 hour(s))   POCT glucose    Collection Time: 02/17/23  1:44 PM   Result Value Ref Range    POCT Glucose 123 (H) 70 - 110 mg/dL   POCT glucose    Collection Time: 02/17/23  8:47 PM   Result Value Ref Range    POCT Glucose 231 (H) 70 - 110 mg/dL   CBC Without Differential    Collection Time: 02/18/23  4:12 AM   Result Value Ref Range    WBC 6.3 4.5 - 11.5 x10(3)/mcL    RBC 2.83 (L) 4.20 - 5.40 x10(6)/mcL    Hgb 8.1 (L) 12.0 - 16.0 g/dL    Hct 26.3 (L) 37.0 - 47.0 %    Platelet 205 130 - 400 x10(3)/mcL    MCV 92.9 80.0 - 94.0 fL    MCH 28.6 pg    MCHC 30.8 (L) 33.0 - 36.0 g/dL    RDW 18.2 (H) 11.5 - 17.0 %    MPV 10.3 7.4 - 10.4 fL    NRBC% 0.3 %   Renal Function Panel    Collection Time: 02/18/23  4:12 AM   Result Value Ref Range    Sodium Level 138 136 - 145 mmol/L    Potassium Level 3.9 3.5 - 5.1 mmol/L    Chloride 104 98 - 107 mmol/L    Carbon Dioxide 26 23 - 31 mmol/L    Glucose Level 96 82 - 115 mg/dL    Blood Urea Nitrogen 28.4 (H) 9.8 - 20.1 mg/dL    Creatinine 3.88 (H) 0.55 - 1.02 mg/dL    Calcium Level Total 8.8 8.4 - 10.2 mg/dL    Albumin Level 2.5 (L) 3.4 - 4.8 g/dL    Phosphorus Level 3.1 2.3 - 4.7 mg/dL    eGFR 12 mls/min/1.73/m2   Iron and TIBC    Collection Time: 02/18/23  4:12 AM   Result Value Ref Range    Iron Binding Capacity Unsaturated 193 70 - 310 ug/dL    Iron Level 37 (L) 50 - 170 ug/dL    Iron Binding Capacity Total 230 (L) 250 - 450 ug/dL    Iron Saturation 16 (L) 20 - 50 %   Ferritin    Collection Time: 02/18/23  4:12 AM   Result Value Ref Range    Ferritin Level 1,672.57 (H) 4.63 - 204.00 ng/mL   POCT glucose    Collection Time: 02/18/23  5:36 AM   Result Value Ref Range    POCT Glucose 115 (H) 70 - 110 mg/dL   POCT glucose    Collection Time: 02/18/23 11:12 AM   Result Value Ref Range    POCT Glucose 163 (H) 70 - 110  mg/dL   POCT glucose    Collection Time: 02/18/23  9:06 PM   Result Value Ref Range    POCT Glucose 189 (H) 70 - 110 mg/dL   CBC with Differential    Collection Time: 02/19/23  3:19 AM   Result Value Ref Range    WBC 6.4 4.5 - 11.5 x10(3)/mcL    RBC 2.81 (L) 4.20 - 5.40 x10(6)/mcL    Hgb 8.1 (L) 12.0 - 16.0 g/dL    Hct 26.2 (L) 37.0 - 47.0 %    MCV 93.2 80.0 - 94.0 fL    MCH 28.8 pg    MCHC 30.9 (L) 33.0 - 36.0 g/dL    RDW 18.2 (H) 11.5 - 17.0 %    Platelet 211 130 - 400 x10(3)/mcL    MPV 10.3 7.4 - 10.4 fL    Neut % 70.9 %    Lymph % 16.4 %    Mono % 9.9 %    Eos % 1.7 %    Basophil % 0.6 %    Lymph # 1.04 0.6 - 4.6 x10(3)/mcL    Neut # 4.50 2.1 - 9.2 x10(3)/mcL    Mono # 0.63 0.1 - 1.3 x10(3)/mcL    Eos # 0.11 0 - 0.9 x10(3)/mcL    Baso # 0.04 0 - 0.2 x10(3)/mcL    IG# 0.03 0 - 0.04 x10(3)/mcL    IG% 0.5 %    NRBC% 0.3 %   POCT glucose    Collection Time: 02/19/23  6:01 AM   Result Value Ref Range    POCT Glucose 108 70 - 110 mg/dL   Basic Metabolic Panel    Collection Time: 02/19/23  9:08 AM   Result Value Ref Range    Sodium Level 134 (L) 136 - 145 mmol/L    Potassium Level 4.5 3.5 - 5.1 mmol/L    Chloride 100 98 - 107 mmol/L    Carbon Dioxide 27 23 - 31 mmol/L    Glucose Level 197 (H) 82 - 115 mg/dL    Blood Urea Nitrogen 15.4 9.8 - 20.1 mg/dL    Creatinine 3.10 (H) 0.55 - 1.02 mg/dL    BUN/Creatinine Ratio 5     Calcium Level Total 8.6 8.4 - 10.2 mg/dL    Anion Gap 7.0 mEq/L    eGFR 15 mls/min/1.73/m2   Magnesium    Collection Time: 02/19/23  9:08 AM   Result Value Ref Range    Magnesium Level 1.90 1.60 - 2.60 mg/dL         Assessment/Plan:  77 y.o. AA female known to Dr. Bourgeois with history of ESRD on HD TTS, HFpEF 55% (December 2021), CAD/CABG x4 on asa 81 mg, HTN, HLD, COPD, venous insufficiency, hypothyroidism, anemia, gastric AVM, and adenomatous colon polyps, and recently dx afib (1/20/23) for which Eliquis was initiated.  Here with anemia on outpatient labs.       Acute on chronic anemia.    Multifactorial secondary to chronic disease and occult losses.   Hgb 6.6--1 unit PRBCs--8.2  Baseline hgb 8  Dark stool on PO iron, FOBT+.    No overt bleeding.      VCE negative for signs of bleeding  Will give suppository for constipation  - agree with Miralax  Hgb remains stable  On Procrit per renal    No further Gi recs - please call if needed    Gela Rubi NP as scribe for Dr. Morgan Gardner

## 2023-02-20 VITALS
BODY MASS INDEX: 39.31 KG/M2 | DIASTOLIC BLOOD PRESSURE: 82 MMHG | RESPIRATION RATE: 18 BRPM | HEIGHT: 63 IN | HEART RATE: 63 BPM | OXYGEN SATURATION: 97 % | TEMPERATURE: 98 F | WEIGHT: 221.88 LBS | SYSTOLIC BLOOD PRESSURE: 169 MMHG

## 2023-02-20 PROBLEM — K92.1 MELENA: Status: ACTIVE | Noted: 2023-02-20

## 2023-02-20 LAB
ALBUMIN SERPL-MCNC: 2.6 G/DL (ref 3.4–4.8)
ALBUMIN/GLOB SERPL: 1.2 RATIO (ref 1.1–2)
ALP SERPL-CCNC: 80 UNIT/L (ref 40–150)
ALT SERPL-CCNC: 20 UNIT/L (ref 0–55)
AST SERPL-CCNC: 17 UNIT/L (ref 5–34)
BASOPHILS # BLD AUTO: 0.03 X10(3)/MCL (ref 0–0.2)
BASOPHILS NFR BLD AUTO: 0.4 %
BILIRUBIN DIRECT+TOT PNL SERPL-MCNC: 0.3 MG/DL
BUN SERPL-MCNC: 19 MG/DL (ref 9.8–20.1)
CALCIUM SERPL-MCNC: 8.6 MG/DL (ref 8.4–10.2)
CHLORIDE SERPL-SCNC: 99 MMOL/L (ref 98–107)
CO2 SERPL-SCNC: 28 MMOL/L (ref 23–31)
CREAT SERPL-MCNC: 3.53 MG/DL (ref 0.55–1.02)
EOSINOPHIL # BLD AUTO: 0.14 X10(3)/MCL (ref 0–0.9)
EOSINOPHIL NFR BLD AUTO: 2 %
ERYTHROCYTE [DISTWIDTH] IN BLOOD BY AUTOMATED COUNT: 17.9 % (ref 11.5–17)
GFR SERPLBLD CREATININE-BSD FMLA CKD-EPI: 13 MLS/MIN/1.73/M2
GLOBULIN SER-MCNC: 2.2 GM/DL (ref 2.4–3.5)
GLUCOSE SERPL-MCNC: 107 MG/DL (ref 82–115)
HCT VFR BLD AUTO: 27.1 % (ref 37–47)
HGB BLD-MCNC: 8.5 G/DL (ref 12–16)
IMM GRANULOCYTES # BLD AUTO: 0.03 X10(3)/MCL (ref 0–0.04)
IMM GRANULOCYTES NFR BLD AUTO: 0.4 %
LYMPHOCYTES # BLD AUTO: 1.09 X10(3)/MCL (ref 0.6–4.6)
LYMPHOCYTES NFR BLD AUTO: 15.9 %
MCH RBC QN AUTO: 29.2 PG
MCHC RBC AUTO-ENTMCNC: 31.4 G/DL (ref 33–36)
MCV RBC AUTO: 93.1 FL (ref 80–94)
MONOCYTES # BLD AUTO: 0.59 X10(3)/MCL (ref 0.1–1.3)
MONOCYTES NFR BLD AUTO: 8.6 %
NEUTROPHILS # BLD AUTO: 4.99 X10(3)/MCL (ref 2.1–9.2)
NEUTROPHILS NFR BLD AUTO: 72.7 %
NRBC BLD AUTO-RTO: 0.3 %
PLATELET # BLD AUTO: 238 X10(3)/MCL (ref 130–400)
PMV BLD AUTO: 9.9 FL (ref 7.4–10.4)
POTASSIUM SERPL-SCNC: 4.6 MMOL/L (ref 3.5–5.1)
PROT SERPL-MCNC: 4.8 GM/DL (ref 5.8–7.6)
RBC # BLD AUTO: 2.91 X10(6)/MCL (ref 4.2–5.4)
SODIUM SERPL-SCNC: 132 MMOL/L (ref 136–145)
WBC # SPEC AUTO: 6.9 X10(3)/MCL (ref 4.5–11.5)

## 2023-02-20 PROCEDURE — 63600175 PHARM REV CODE 636 W HCPCS: Performed by: PHYSICIAN ASSISTANT

## 2023-02-20 PROCEDURE — 25000003 PHARM REV CODE 250: Performed by: STUDENT IN AN ORGANIZED HEALTH CARE EDUCATION/TRAINING PROGRAM

## 2023-02-20 PROCEDURE — 85025 COMPLETE CBC W/AUTO DIFF WBC: CPT | Performed by: STUDENT IN AN ORGANIZED HEALTH CARE EDUCATION/TRAINING PROGRAM

## 2023-02-20 PROCEDURE — 25000003 PHARM REV CODE 250: Performed by: INTERNAL MEDICINE

## 2023-02-20 PROCEDURE — C9113 INJ PANTOPRAZOLE SODIUM, VIA: HCPCS | Performed by: PHYSICIAN ASSISTANT

## 2023-02-20 PROCEDURE — 25000242 PHARM REV CODE 250 ALT 637 W/ HCPCS: Performed by: INTERNAL MEDICINE

## 2023-02-20 PROCEDURE — 80053 COMPREHEN METABOLIC PANEL: CPT | Performed by: STUDENT IN AN ORGANIZED HEALTH CARE EDUCATION/TRAINING PROGRAM

## 2023-02-20 PROCEDURE — 25000003 PHARM REV CODE 250: Performed by: NURSE PRACTITIONER

## 2023-02-20 RX ORDER — POLYETHYLENE GLYCOL 3350 17 G/17G
17 POWDER, FOR SOLUTION ORAL DAILY
Qty: 10 EACH | Refills: 1 | Status: ON HOLD | OUTPATIENT
Start: 2023-02-21 | End: 2023-03-25 | Stop reason: HOSPADM

## 2023-02-20 RX ORDER — PANTOPRAZOLE SODIUM 40 MG/1
40 TABLET, DELAYED RELEASE ORAL 2 TIMES DAILY
Qty: 60 TABLET | Refills: 11 | Status: SHIPPED | OUTPATIENT
Start: 2023-02-20 | End: 2023-09-01

## 2023-02-20 RX ORDER — LACTULOSE 10 G/15ML
15 SOLUTION ORAL 3 TIMES DAILY
Status: DISCONTINUED | OUTPATIENT
Start: 2023-02-20 | End: 2023-02-20 | Stop reason: HOSPADM

## 2023-02-20 RX ORDER — LACTULOSE 10 G/15ML
15 SOLUTION ORAL 3 TIMES DAILY
Status: DISCONTINUED | OUTPATIENT
Start: 2023-02-20 | End: 2023-02-20

## 2023-02-20 RX ORDER — LACTULOSE 10 G/15ML
15 SOLUTION ORAL 3 TIMES DAILY
Qty: 15 ML | Refills: 3 | Status: ON HOLD | OUTPATIENT
Start: 2023-02-20 | End: 2023-03-23 | Stop reason: CLARIF

## 2023-02-20 RX ADMIN — POLYETHYLENE GLYCOL 3350 17 G: 17 POWDER, FOR SOLUTION ORAL at 08:02

## 2023-02-20 RX ADMIN — DULOXETINE 30 MG: 30 CAPSULE, DELAYED RELEASE ORAL at 08:02

## 2023-02-20 RX ADMIN — SEVELAMER CARBONATE 1600 MG: 800 TABLET, FILM COATED ORAL at 08:02

## 2023-02-20 RX ADMIN — FLUTICASONE FUROATE AND VILANTEROL TRIFENATATE 1 PUFF: 100; 25 POWDER RESPIRATORY (INHALATION) at 08:02

## 2023-02-20 RX ADMIN — LEVOTHYROXINE SODIUM 125 MCG: 125 TABLET ORAL at 05:02

## 2023-02-20 RX ADMIN — CARVEDILOL 6.25 MG: 3.12 TABLET, FILM COATED ORAL at 08:02

## 2023-02-20 RX ADMIN — LACTULOSE 15 G: 10 SOLUTION ORAL at 12:02

## 2023-02-20 RX ADMIN — PANTOPRAZOLE SODIUM 40 MG: 40 INJECTION, POWDER, FOR SOLUTION INTRAVENOUS at 08:02

## 2023-02-20 RX ADMIN — TIOTROPIUM BROMIDE INHALATION SPRAY 2 PUFF: 3.12 SPRAY, METERED RESPIRATORY (INHALATION) at 08:02

## 2023-02-20 RX ADMIN — FUROSEMIDE 40 MG: 40 TABLET ORAL at 08:02

## 2023-02-20 NOTE — PLAN OF CARE
Problem: Device-Related Complication Risk (Hemodialysis)  Goal: Safe, Effective Therapy Delivery  2/20/2023 1330 by Lexi Carrillo RN  Outcome: Met  2/20/2023 0751 by Lexi Carrillo RN  Outcome: Ongoing, Progressing     Problem: Hemodynamic Instability (Hemodialysis)  Goal: Effective Tissue Perfusion  2/20/2023 1330 by Lexi Carrillo RN  Outcome: Met  2/20/2023 0751 by Lexi Carrillo RN  Outcome: Ongoing, Progressing     Problem: Infection (Hemodialysis)  Goal: Absence of Infection Signs and Symptoms  2/20/2023 1330 by Lexi Carrillo RN  Outcome: Met  2/20/2023 0751 by Lexi Carrillo RN  Outcome: Ongoing, Progressing     Problem: Adult Inpatient Plan of Care  Goal: Plan of Care Review  2/20/2023 1330 by Lexi Carrillo RN  Outcome: Met  2/20/2023 0751 by Lexi Carrillo RN  Outcome: Ongoing, Progressing  Goal: Patient-Specific Goal (Individualized)  2/20/2023 1330 by Lexi Carrillo RN  Outcome: Met  2/20/2023 0751 by Lexi Carrillo RN  Outcome: Ongoing, Progressing  Goal: Absence of Hospital-Acquired Illness or Injury  2/20/2023 1330 by Lexi Carrillo RN  Outcome: Met  2/20/2023 0751 by Lexi Carrillo RN  Outcome: Ongoing, Progressing  Goal: Optimal Comfort and Wellbeing  2/20/2023 1330 by Lexi Carrillo RN  Outcome: Met  2/20/2023 0751 by Lexi Carrillo RN  Outcome: Ongoing, Progressing  Goal: Readiness for Transition of Care  2/20/2023 1330 by Lexi Carrillo RN  Outcome: Met  2/20/2023 0751 by Lexi Carrillo RN  Outcome: Ongoing, Progressing     Problem: Diabetes Comorbidity  Goal: Blood Glucose Level Within Targeted Range  2/20/2023 1330 by Lexi Carrillo RN  Outcome: Met  2/20/2023 0751 by Lexi Carrillo RN  Outcome: Ongoing, Progressing

## 2023-02-20 NOTE — PROGRESS NOTES
[facility]  Hospital Medicine Progress Note        Chief Complaint: Inpatient Follow-up for Anemia    HPI:   77-year-old female with PMH of HTN, HLD, COPD, ESRD on HD, DM type 2, Hypothyroidism s/p Thyroidectomy, reports hx of Valvular Surgery, Atrial Fibrillation on Eliquis, Tobacco Smoking who was admitted 02/16/2023 with diagnosis of low H&H at her dialysis unit.  She she was unable to undergo her dialysis and was asked to come to ED.  Patient reported this is her 3rd admission in the last 2 months for load H&H and blood transfusion.  Stating she was last admitted at [facility]. She was asked if she made the appointment with the GI doctor for VCE which was recommended and patient reported she did not knew where to call.  Patient denies weakness, lightheadedness, shortness of breath. Reports this time she did not realize that her hemoglobin was low as she had no symptoms.  Reports she has dark brown stool but she takes iron supplements. Last hemodialysis was on Tuesday. Underwent EGD with GI on 02/17 which was unremarkable. Underwent placement of VCE during EGD, to be read by GI & further recommendations to be made. Nephrology consulted for HD.    Interval Hx:   Today, Mrs. Madrid stated she was doing well and had no new complaints.  GI to interpret VCE and make further recommendations.  HGB/HCT noted to be stable at 8.1/26.2.  Plan for DC tomorrow in case no further intervention planned by GI.    Objective/physical exam:  General: alert lady lying comfortably in bed, in no acute distress  HENT: oral and oropharyngeal mucosa moist, pink, with no erythema or exudates, no ear pain or discharge  Neck: normal neck movement, no lymph nodes or swellings, no JVD or Carotid bruit  Respiratory: clear breathing sounds bilaterally, no crackles, rales, ronchi or wheezes  Cardiovascular: clear S1 and S2, no murmurs, rubs or gallops  Peripheral Vascular: no lesions, ulcers or erosions, normal  peripheral pulses and no pedal edema  Gastrointestinal: soft, non-tender, non-distended abdomen, no guarding, rigidity or rebound tenderness, normal bowel sounds  Integumentary: normal skin color, no rashes or lesions  Neuro: AAO x 3; motor strength 5/5 in B/L UEs & LEs; sensation intact to gross and fine touch B/L; CN II-XII grossly intact    VITAL SIGNS: 24 HRS MIN & MAX LAST   Temp  Min: 97.5 °F (36.4 °C)  Max: 97.9 °F (36.6 °C) 97.9 °F (36.6 °C)   BP  Min: 125/73  Max: 156/76 125/73     Pulse  Min: 55  Max: 63  (!) 56   Resp  Min: 18  Max: 22 18   SpO2  Min: 96 %  Max: 100 % 98 %       Recent Labs   Lab 02/17/23  0348 02/18/23  0412 02/19/23  0319   WBC 6.7 6.3 6.4   RBC 2.84* 2.83* 2.81*   HGB 8.2* 8.1* 8.1*   HCT 25.6* 26.3* 26.2*   MCV 90.1 92.9 93.2   MCH 28.9 28.6 28.8   MCHC 32.0* 30.8* 30.9*   RDW 17.7* 18.2* 18.2*    205 211   MPV 10.5* 10.3 10.3         Recent Labs   Lab 02/16/23  1123 02/17/23  0348 02/18/23  0412 02/19/23  0908    137 138 134*   K 4.5 3.8 3.9 4.5   CO2 26 29 26 27   BUN 55.7* 26.8* 28.4* 15.4   CREATININE 4.66* 2.97* 3.88* 3.10*   CALCIUM 9.0 8.8 8.8 8.6   MG  --   --   --  1.90   ALBUMIN 2.8* 2.8* 2.5*  --    ALKPHOS 75 66  --   --    ALT 32 28  --   --    AST 24 20  --   --    BILITOT 0.3 0.6  --   --        Scheduled Med:   aluminum-magnesium hydroxide-simethicone  30 mL Oral QID (AC & HS)    atorvastatin  40 mg Oral QHS    carvediloL  6.25 mg Oral BID    DULoxetine  30 mg Oral Daily    epoetin alexis  10,000 Units Subcutaneous Q7 Days    fluticasone furoate-vilanteroL  1 puff Inhalation Daily    furosemide  40 mg Oral Daily    levothyroxine  125 mcg Oral Before breakfast    pantoprazole  40 mg Intravenous BID    polyethylene glycol  17 g Oral Daily    sevelamer carbonate  1,600 mg Oral TID    tiotropium bromide  2 puff Inhalation Daily      Continuous Infusions:   sodium chloride 0.9% 50 mL/hr at 02/16/23 2041      PRN Meds:  sodium chloride, sodium chloride 0.9%,  acetaminophen, albuterol, bisacodyL, clonazePAM, dextrose 10%, diphenhydrAMINE, glucagon (human recombinant), insulin aspart U-100, labetalol, ondansetron, sodium chloride 0.9%     Assessment/Plan:  Acute on Chronic Anemia s/p blood transfusion  Anemia of Chronic Disease  Essential Hypertension   Hyperlipidemia  COPD  ESRD on HD TTS  Diabetes Mellitus type 2   Hypothyroidism  Atrial Fibrillation on Eliquis  HFpEF  History of Valve Replacement    Tobacco Smoking    - Patient continues to be admitted for close monitoring  - GI on-board; appreciate the recommendation, noted video capsule was placed 02/17; to make further recommendations after interpreting VCE images  - Last EGD done on 01/20/2023 which showed normal esophagus, normal stomach, normal examined duodenum  - Patient given 1 unit packed red blood cell transfusion with hemodialysis on the day of admission  - HB/HCT stable at 8.1/26.2  - Nephrology on board; continue TThSat schedule  - Continue IV Protonix 40 mg BID  - Patient continued on Coreg 6.25 mg BID, Atorvastatin 40 mg, Lasix 40 mg, Levothyroxine 125 mcg, Sevelamer 1600 mg, EPO 38533 units qweek, Fluticasone-Vilanterol 100-25 mcg, Tiotropium 2.5 mcg  - Holding ASA, Eliquis  - Patient will need to follow with her primary cardiologist regarding probable Watchman device given the risks are outweighing the benefit of anticoagulation  - Plan for DC once cleared by GI.  Nephrology consulted for HD     VTE Prophylaxis:    SCDs     Patient condition:   Stable     Discharge Planning and Disposition/Anticipated discharge:  Pending GI recs DC tomorrow    All diagnosis and differential diagnosis have been reviewed; assessment and plan has been documented; I have personally reviewed the labs and test results that are presently available; I have reviewed the patients medication list; I have reviewed the consulting providers response and recommendations. I have reviewed or attempted to review medical records based  upon their availability    All of the patient's questions have been  addressed and answered. Patient's is agreeable to the above stated plan. I will continue to monitor closely and make adjustments to medical management as needed.  _____________________________________________________________________    Nutrition Status: Liquids    Radiology:  X-Ray Chest 1 View  Narrative: EXAMINATION:  XR CHEST 1 VIEW    CLINICAL HISTORY:  Chest pain;    TECHNIQUE:  One view    COMPARISON:  January 19, 2023.    FINDINGS:  Cardiopericardial silhouette enlarged appearance is similar.  Sternotomy changes.  No acute dense focal or segmental consolidation, congestive process, pleural effusions or pneumothorax.  Impression: NO ACUTE CARDIOPULMONARY PROCESS IDENTIFIED.    Electronically signed by: Niles Stevenson  Date:    02/09/2023  Time:    19:13      Timo Ang MD  02/19/2023

## 2023-02-20 NOTE — PROGRESS NOTES
"[facility]  Nephrology Progress Note  Patient Name: Rosette Madrid  Age: 77 y.o.  : 1945  MRN: [id number]  Admission Date: 2023        Chief Complaint: Abnormal Lab (Pt sent by dialysis for low h/h. Unknown amount. Last dialysis on Tuesday. Reports hx of anemia with recent transfusions. On Eliquis. Denies pain, weakness, or dizziness. Reports dark stools from iron.)      History of Present Illness:  Ms Rosette Madrid is a 77 y.o. Black or  female with past medical history of ESRD on HD. She dialyzes on TTS schedule at [facility] via [facility]. She presented to the ED on 23 with melanotic stool. She was seen by GI services and underwent VCE revealing single lesion not felt to be cause of bleeding. No further black stools or bright red blood since admission. We are following to provide hemodialysis.       Physical Exam:   BP (!) 160/79   Pulse 60   Temp 97.8 °F (36.6 °C) (Oral)   Resp 18   Ht 5' 3" (1.6 m)   Wt 100.7 kg (221 lb 14.4 oz)   SpO2 98%   Breastfeeding No   BMI 39.31 kg/m²  Body mass index is 39.31 kg/m².  General Appearance:  Alert, cooperative, no distress, appropriate for age  Head:  Normocephalic, no obvious abnormality  EENT:  PERRL, EOM's intact, conjunctiva and corneas clear, mucosa clear and moist, teeth intact                     Neck:  Supple, symmetrical, no tenderness  Lungs:  Clear to auscultation bilaterally, respirations unlabored, Room Air   Heart:  regular rate & rhythm, no MGR  Abdomen:  Soft, non-tender, bowel sounds active all four quadrants  Musculoskeletal:  no peripheral edema, KER[facility] AVF   Skin:  Skin warm, dry, and intact, no rashes or abnormal dyspigmentation  Neurologic:  Alert and oriented x3, no cranial nerve deficits, normal strength and tone      Inpatient Medications:     Current Facility-Administered Medications:     0.9%  NaCl infusion (for blood administration), , Intravenous, Q24H PRN, Raghu Hernandez MD    " 0.9%  NaCl infusion, , Intravenous, Continuous, Judith Rubi PA-C, Last Rate: 50 mL/hr at 02/16/23 2041, New Bag at 02/16/23 2041    0.9%  NaCl infusion, 200 mL, Intravenous, PRN, YUE Pelletier    acetaminophen tablet 650 mg, 650 mg, Oral, Q4H PRN, Judith Rubi PA-C    albuterol inhaler 2 puff, 2 puff, Inhalation, Q6H PRN, Demetri Cote MD    aluminum-magnesium hydroxide-simethicone 200-200-20 mg/5 mL suspension 30 mL, 30 mL, Oral, QID (AC & HS), Judith Rubi PA-C, 30 mL at 02/18/23 0936    atorvastatin tablet 40 mg, 40 mg, Oral, QHS, Demetri Cote MD, 40 mg at 02/19/23 2041    bisacodyL EC tablet 5 mg, 5 mg, Oral, Daily PRN, Demetri Cote MD    carvediloL tablet 6.25 mg, 6.25 mg, Oral, BID, Demetri Cote MD, 6.25 mg at 02/20/23 0852    clonazePAM tablet 0.5 mg, 0.5 mg, Oral, Daily PRN, Demetri Cote MD    dextrose 10% bolus 125 mL 125 mL, 12.5 g, Intravenous, PRN, Judith Rubi PA-C    diphenhydrAMINE capsule 50 mg, 50 mg, Oral, Nightly PRN, Manolo Rubi MD, 50 mg at 02/19/23 2048    DULoxetine DR capsule 30 mg, 30 mg, Oral, Daily, Demetri Cote MD, 30 mg at 02/20/23 0852    epoetin alexis injection 10,000 Units, 10,000 Units, Subcutaneous, Q7 Days, YUE Pelletier, 10,000 Units at 02/19/23 1213    fluticasone furoate-vilanteroL 100-25 mcg/dose diskus inhaler 1 puff, 1 puff, Inhalation, Daily, Demetri Cote MD, 1 puff at 02/20/23 0852    furosemide tablet 40 mg, 40 mg, Oral, Daily, Demetri Cote MD, 40 mg at 02/20/23 0852    glucagon (human recombinant) injection 1 mg, 1 mg, Intramuscular, PRN, Judith Rubi PA-C    insulin aspart U-100 injection 0-5 Units, 0-5 Units, Subcutaneous, Q6H PRN, Judith Rubi PA-C, 2 Units at 02/19/23 1651    labetaloL injection 10 mg, 10 mg, Intravenous, Q2H PRN, Judith Rubi PA-C    levothyroxine tablet 125 mcg, 125 mcg, Oral, Before breakfast, Demetri Cote MD, 125 mcg at 02/20/23 0523    ondansetron injection 4 mg, 4 mg, Intravenous, Q6H PRN, Judith  NITO Rubi PA-C    pantoprazole injection 40 mg, 40 mg, Intravenous, BID, Judith Rubi PA-C, 40 mg at 02/20/23 0852    polyethylene glycol packet 17 g, 17 g, Oral, Daily, Maira Petersen, YUE, 17 g at 02/20/23 0851    sevelamer carbonate tablet 1,600 mg, 1,600 mg, Oral, TID, Demetri Cote MD, 1,600 mg at 02/20/23 0852    sodium chloride 0.9% bolus 250 mL 250 mL, 250 mL, Intravenous, PRN, LOGAN Gee    tiotropium bromide 2.5 mcg/actuation inhaler 2 puff, 2 puff, Inhalation, Daily, Demetri Cote MD, 2 puff at 02/20/23 0852     Imaging:  No orders to display       Laboratory Data:  Recent Labs   Lab 02/18/23  0412 02/19/23  0908 02/20/23  0346      < > 132*   K 3.9   < > 4.6   CO2 26   < > 28   BUN 28.4*   < > 19.0   CREATININE 3.88*   < > 3.53*   GLUCOSE 96   < > 107   CALCIUM 8.8   < > 8.6   PHOS 3.1  --   --     < > = values in this interval not displayed.     Recent Labs   Lab 02/20/23  0345   WBC 6.9   HGB 8.5*   HCT 27.1*            Impression:   ESRD on HD -- - TTS at [facility]   Melanotic stool s/p EGD and VCE on 2/17 with single lesion identified   Dm II  Anemia of acute blood loss and CKD    Plan:   -Likely to discharge today per primary documentation from yesterday. Ok to dc from renal standpoint. If she remains inpatient we will continue dialysis on TTS schedule     Thank you very much for your consultation.     Lynn Tripathi NP  Nephrology  2/20/2023 8:56 AM

## 2023-02-21 NOTE — DISCHARGE SUMMARY
[facility]  Hospital Medicine Discharge Summary    Admit Date: 2/16/2023  Discharge Date and Time: 2/20/20236:55 PM  Admitting Physician: ANGELO Team  Discharging Physician: Timo Ang MD.  Primary Care Physician: JOSEFINA Lord  Consults: Gastroenterology and Nephrology    Discharge Diagnoses:  Acute on Chronic Anemia s/p blood transfusion  Anemia of Chronic Disease  Essential Hypertension   Hyperlipidemia  COPD  ESRD on HD TTS  Diabetes Mellitus type 2   Hypothyroidism  Atrial Fibrillation on Eliquis  HFpEF  History of Valve Replacement    Tobacco Smoking    Hospital Course:   77-year-old female with PMH of HTN, HLD, COPD, ESRD on HD, DM type 2, Hypothyroidism s/p Thyroidectomy, reports hx of Valvular Surgery, Atrial Fibrillation on Eliquis, Tobacco Smoking who was admitted 02/16/2023 with diagnosis of low H&H at her dialysis unit.  She she was unable to undergo her dialysis and was asked to come to ED.  Patient reported this is her 3rd admission in the last 2 months for load H&H and blood transfusion.  Stating she was last admitted at [facility]. She was asked if she made the appointment with the GI doctor for VCE which was recommended and patient reported she did not knew where to call.  Patient denies weakness, lightheadedness, shortness of breath. Reports this time she did not realize that her hemoglobin was low as she had no symptoms.  Reports she has dark brown stool but she takes iron supplements. Last hemodialysis was on Tuesday. Underwent EGD with GI on 02/17 which was unremarkable. Underwent placement of VCE during EGD, for GI to read & further recommendations to be made. Nephrology consulted for HD. VCE showed a single erosion which was thought unlikely to be the source of bleeding. GI signed off and cleared patient for discharge & to resume ASA & Eliquis.    Pt was seen and examined on the day of discharge.  She stated that she was doing well and had no new complaints.  Will  plan for discharge today.  Patient placed on bowel regimen with MiraLax, lactulose due to constipation.  HGB/HCT noted to be stable at 8.5/27.1.  Patient instructed to return to the ER in case of further melena, hematemesis, weakness, chest pain, shortness of breath, cough, sputum production, abdominal pain, nausea, vomiting, headache, numbness, weakness, dizziness/lightheadedness or loss of consciousness.  Patient to resume regular HD TTS schedule starting tomorrow.    Vitals:  VITAL SIGNS: 24 HRS MIN & MAX LAST   Temp  Min: 97.5 °F (36.4 °C)  Max: 98.5 °F (36.9 °C) 98.1 °F (36.7 °C)   BP  Min: 137/58  Max: 169/82 (!) 169/82     Pulse  Min: 55  Max: 63  63   Resp  Min: 18  Max: 19 18   SpO2  Min: 93 %  Max: 98 % 97 %       Physical Exam:  General: alert lady lying comfortably in bed, in no acute distress  HENT: oral and oropharyngeal mucosa moist, pink, with no erythema or exudates, no ear pain or discharge  Neck: normal neck movement, no lymph nodes or swellings, no JVD or Carotid bruit  Respiratory: clear breathing sounds bilaterally, no crackles, rales, ronchi or wheezes  Cardiovascular: clear S1 and S2, no murmurs, rubs or gallops  Peripheral Vascular: no lesions, ulcers or erosions, normal peripheral pulses and no pedal edema  Gastrointestinal: soft, non-tender, non-distended abdomen, no guarding, rigidity or rebound tenderness, normal bowel sounds  Integumentary: normal skin color, no rashes or lesions  Neuro: AAO x 3; motor strength 5/5 in B/L UEs & LEs; sensation intact to gross and fine touch B/L; CN II-XII grossly intact    Procedures Performed: No admission procedures for hospital encounter.     Significant Diagnostic Studies: See Full reports for all details    Recent Labs   Lab 02/18/23  0412 02/19/23  0319 02/20/23  0345   WBC 6.3 6.4 6.9   RBC 2.83* 2.81* 2.91*   HGB 8.1* 8.1* 8.5*   HCT 26.3* 26.2* 27.1*   MCV 92.9 93.2 93.1   MCH 28.6 28.8 29.2   MCHC 30.8* 30.9* 31.4*   RDW 18.2* 18.2* 17.9*   PLT  205 211 238   MPV 10.3 10.3 9.9       Recent Labs   Lab 02/16/23  1123 02/17/23  0348 02/18/23  0412 02/19/23  0908 02/20/23  0346    137 138 134* 132*   K 4.5 3.8 3.9 4.5 4.6   CO2 26 29 26 27 28   BUN 55.7* 26.8* 28.4* 15.4 19.0   CREATININE 4.66* 2.97* 3.88* 3.10* 3.53*   CALCIUM 9.0 8.8 8.8 8.6 8.6   MG  --   --   --  1.90  --    ALBUMIN 2.8* 2.8* 2.5*  --  2.6*   ALKPHOS 75 66  --   --  80   ALT 32 28  --   --  20   AST 24 20  --   --  17   BILITOT 0.3 0.6  --   --  0.3        Microbiology Results (last 7 days)       ** No results found for the last 168 hours. **             X-Ray Chest 1 View  Narrative: EXAMINATION:  XR CHEST 1 VIEW    CLINICAL HISTORY:  Chest pain;    TECHNIQUE:  One view    COMPARISON:  January 19, 2023.    FINDINGS:  Cardiopericardial silhouette enlarged appearance is similar.  Sternotomy changes.  No acute dense focal or segmental consolidation, congestive process, pleural effusions or pneumothorax.  Impression: NO ACUTE CARDIOPULMONARY PROCESS IDENTIFIED.    Electronically signed by: Niles Stevenson  Date:    02/09/2023  Time:    19:13         Medication List        START taking these medications      lactulose 10 gram/15 ml 10 gram/15 mL (15 mL) solution  Commonly known as: CHRONULAC  Take 23 mLs (15.3333 g total) by mouth 3 (three) times daily.     pantoprazole 40 MG tablet  Commonly known as: PROTONIX  Take 1 tablet (40 mg total) by mouth 2 (two) times daily.     polyethylene glycol 17 gram Pwpk  Commonly known as: GLYCOLAX  Take 17 g by mouth once daily.  Start taking on: February 21, 2023            CHANGE how you take these medications      ferrous sulfate 325 (65 FE) MG EC tablet  Take 1 tablet (325 mg total) by mouth once daily.  What changed: when to take this     furosemide 40 MG tablet  Commonly known as: LASIX  Take 1 tablet (40 mg total) by mouth once daily.  What changed: when to take this            CONTINUE taking these medications      albuterol 90 mcg/actuation  "inhaler  Commonly known as: VENTOLIN HFA  Inhale 2 puffs into the lungs every 6 (six) hours as needed for Wheezing. Rescue     albuterol-ipratropium 2.5 mg-0.5 mg/3 mL nebulizer solution  Commonly known as: DUO-NEB  Take 3 mLs by nebulization every 6 (six) hours as needed for Wheezing or Shortness of Breath. Rescue     apixaban 5 mg Tab  Commonly known as: ELIQUIS  Take 1 tablet (5 mg total) by mouth 2 (two) times daily.     aspirin 81 MG EC tablet  Commonly known as: ECOTRIN  Take 1 tablet (81 mg total) by mouth once daily.     BASAGLAR KWIKPEN U-100 INSULIN glargine 100 units/mL SubQ pen  Generic drug: insulin  Inject 30 Units into the skin Daily.     BenadryL 25 mg capsule  Generic drug: diphenhydrAMINE     bisacodyL 5 mg EC tablet  Commonly known as: DULCOLAX     carvediloL 6.25 MG tablet  Commonly known as: COREG  Take 1 tablet (6.25 mg total) by mouth 2 (two) times daily.     clonazePAM 0.5 MG tablet  Commonly known as: KlonoPIN     DIALYVITE 100-1 mg Tab  Generic drug: B complex-vitamin C-folic acid     DULoxetine 30 MG capsule  Commonly known as: CYMBALTA     fluticasone furoate-vilanteroL 100-25 mcg/dose diskus inhaler  Commonly known as: BREO  Inhale 1 puff into the lungs once daily.     glipiZIDE 10 MG tablet  Commonly known as: GLUCOTROL  Take 1 tablet (10 mg total) by mouth daily with breakfast.     INVEGA SUSTENNA 156 mg/mL Syrg injection  Generic drug: paliperidone palmitate     L-METHYLFOLATE 15 mg Tab  Generic drug: levomefolate calcium     levothyroxine 125 MCG tablet  Commonly known as: SYNTHROID  Take 1 tablet (125 mcg total) by mouth before breakfast.     ONETOUCH DELICA PLUS LANCET 30 gauge Misc  Generic drug: lancets  1 lancet by Other route once daily.     ONETOUCH ULTRA TEST Strp  Generic drug: blood sugar diagnostic  1 strip by Other route once daily.     pen needle, diabetic 31 gauge x 5/16" Ndle  2 Units by Misc.(Non-Drug; Combo Route) route 2 (two) times a day. Patient to check blood " sugars twice daily (morning and night)     rosuvastatin 40 MG Tab  Commonly known as: CRESTOR  Take 1 tablet (40 mg total) by mouth once daily.     sevelamer carbonate 800 mg Tab  Commonly known as: RENVELA     SPIRIVA WITH HANDIHALER 18 mcg inhalation capsule  Generic drug: tiotropium  Inhale 1 capsule (18 mcg total) into the lungs Daily.     tiZANidine 4 MG tablet  Commonly known as: ZANAFLEX               Where to Get Your Medications        You can get these medications from any pharmacy    Bring a paper prescription for each of these medications  lactulose 10 gram/15 ml 10 gram/15 mL (15 mL) solution  pantoprazole 40 MG tablet  polyethylene glycol 17 gram Pwpk          Explained in detail to the patient about the discharge plan, medications, and follow-up visits. Pt understands and agrees with the treatment plan  Discharge Disposition: Home or Self Care   Discharged Condition: stable  Diet-    Medications Per DC med rec  Activities as tolerated    For further questions contact hospitalist office    Discharge time 33 minutes    For worsening symptoms, chest pain, shortness of breath, increased abdominal pain, high grade fever, stroke or stroke like symptoms, immediately go to the nearest Emergency Room or call 911 as soon as possible.      Timo Pierson M.D on 2/20/2023. at 6:55 PM

## 2023-02-22 LAB — POCT GLUCOSE: 175 MG/DL (ref 70–110)

## 2023-02-27 ENCOUNTER — LAB VISIT (OUTPATIENT)
Dept: LAB | Facility: HOSPITAL | Age: 78
End: 2023-02-27
Attending: NURSE PRACTITIONER
Payer: MEDICARE

## 2023-02-27 DIAGNOSIS — E55.9 VITAMIN D DEFICIENCY: Chronic | ICD-10-CM

## 2023-02-27 DIAGNOSIS — N18.6 TYPE 2 DIABETES MELLITUS WITH CHRONIC KIDNEY DISEASE ON CHRONIC DIALYSIS, WITH LONG-TERM CURRENT USE OF INSULIN: ICD-10-CM

## 2023-02-27 DIAGNOSIS — Z79.4 TYPE 2 DIABETES MELLITUS WITH CHRONIC KIDNEY DISEASE ON CHRONIC DIALYSIS, WITH LONG-TERM CURRENT USE OF INSULIN: ICD-10-CM

## 2023-02-27 DIAGNOSIS — E11.22 TYPE 2 DIABETES MELLITUS WITH CHRONIC KIDNEY DISEASE ON CHRONIC DIALYSIS, WITH LONG-TERM CURRENT USE OF INSULIN: ICD-10-CM

## 2023-02-27 DIAGNOSIS — E78.2 MIXED HYPERLIPIDEMIA: Chronic | ICD-10-CM

## 2023-02-27 DIAGNOSIS — Z99.2 TYPE 2 DIABETES MELLITUS WITH CHRONIC KIDNEY DISEASE ON CHRONIC DIALYSIS, WITH LONG-TERM CURRENT USE OF INSULIN: ICD-10-CM

## 2023-02-27 LAB
ALBUMIN SERPL-MCNC: 3.1 G/DL (ref 3.4–4.8)
ALBUMIN/GLOB SERPL: 1 RATIO (ref 1.1–2)
ALP SERPL-CCNC: 91 UNIT/L (ref 40–150)
ALT SERPL-CCNC: 19 UNIT/L (ref 0–55)
AST SERPL-CCNC: 18 UNIT/L (ref 5–34)
BASOPHILS # BLD AUTO: 0.04 X10(3)/MCL (ref 0–0.2)
BASOPHILS NFR BLD AUTO: 0.5 %
BILIRUBIN DIRECT+TOT PNL SERPL-MCNC: 0.3 MG/DL
BUN SERPL-MCNC: 25 MG/DL (ref 9.8–20.1)
CALCIUM SERPL-MCNC: 9.3 MG/DL (ref 8.4–10.2)
CHLORIDE SERPL-SCNC: 101 MMOL/L (ref 98–107)
CHOLEST SERPL-MCNC: 119 MG/DL
CHOLEST/HDLC SERPL: 3 {RATIO} (ref 0–5)
CO2 SERPL-SCNC: 28 MMOL/L (ref 23–31)
CREAT SERPL-MCNC: 4.15 MG/DL (ref 0.55–1.02)
DEPRECATED CALCIDIOL+CALCIFEROL SERPL-MC: 24.8 NG/ML (ref 30–80)
EOSINOPHIL # BLD AUTO: 0.12 X10(3)/MCL (ref 0–0.9)
EOSINOPHIL NFR BLD AUTO: 1.5 %
ERYTHROCYTE [DISTWIDTH] IN BLOOD BY AUTOMATED COUNT: 16.6 % (ref 11.5–17)
EST. AVERAGE GLUCOSE BLD GHB EST-MCNC: 96.8 MG/DL
GFR SERPLBLD CREATININE-BSD FMLA CKD-EPI: 11 MLS/MIN/1.73/M2
GLOBULIN SER-MCNC: 3 GM/DL (ref 2.4–3.5)
GLUCOSE SERPL-MCNC: 124 MG/DL (ref 82–115)
HBA1C MFR BLD: 5 %
HCT VFR BLD AUTO: 30.9 % (ref 37–47)
HDLC SERPL-MCNC: 44 MG/DL (ref 35–60)
HGB BLD-MCNC: 9.1 G/DL (ref 12–16)
IMM GRANULOCYTES # BLD AUTO: 0.03 X10(3)/MCL (ref 0–0.04)
IMM GRANULOCYTES NFR BLD AUTO: 0.4 %
LDLC SERPL CALC-MCNC: 56 MG/DL (ref 50–140)
LYMPHOCYTES # BLD AUTO: 0.77 X10(3)/MCL (ref 0.6–4.6)
LYMPHOCYTES NFR BLD AUTO: 9.5 %
MCH RBC QN AUTO: 28.3 PG
MCHC RBC AUTO-ENTMCNC: 29.4 G/DL (ref 33–36)
MCV RBC AUTO: 96 FL (ref 80–94)
MONOCYTES # BLD AUTO: 0.5 X10(3)/MCL (ref 0.1–1.3)
MONOCYTES NFR BLD AUTO: 6.2 %
NEUTROPHILS # BLD AUTO: 6.62 X10(3)/MCL (ref 2.1–9.2)
NEUTROPHILS NFR BLD AUTO: 81.9 %
NRBC BLD AUTO-RTO: 0 %
PLATELET # BLD AUTO: 211 X10(3)/MCL (ref 130–400)
PMV BLD AUTO: 10.2 FL (ref 7.4–10.4)
POTASSIUM SERPL-SCNC: 3.8 MMOL/L (ref 3.5–5.1)
PROT SERPL-MCNC: 6.1 GM/DL (ref 5.8–7.6)
RBC # BLD AUTO: 3.22 X10(6)/MCL (ref 4.2–5.4)
SODIUM SERPL-SCNC: 138 MMOL/L (ref 136–145)
TRIGL SERPL-MCNC: 95 MG/DL (ref 37–140)
VLDLC SERPL CALC-MCNC: 19 MG/DL
WBC # SPEC AUTO: 8.1 X10(3)/MCL (ref 4.5–11.5)

## 2023-02-27 PROCEDURE — 80061 LIPID PANEL: CPT

## 2023-02-27 PROCEDURE — 82306 VITAMIN D 25 HYDROXY: CPT

## 2023-02-27 PROCEDURE — 36415 COLL VENOUS BLD VENIPUNCTURE: CPT

## 2023-02-27 PROCEDURE — 85025 COMPLETE CBC W/AUTO DIFF WBC: CPT

## 2023-02-27 PROCEDURE — 83036 HEMOGLOBIN GLYCOSYLATED A1C: CPT

## 2023-02-27 PROCEDURE — 80053 COMPREHEN METABOLIC PANEL: CPT

## 2023-03-01 ENCOUNTER — OFFICE VISIT (OUTPATIENT)
Dept: INTERNAL MEDICINE | Facility: CLINIC | Age: 78
End: 2023-03-01
Payer: MEDICARE

## 2023-03-01 VITALS
RESPIRATION RATE: 16 BRPM | HEART RATE: 51 BPM | SYSTOLIC BLOOD PRESSURE: 124 MMHG | BODY MASS INDEX: 36.89 KG/M2 | TEMPERATURE: 99 F | DIASTOLIC BLOOD PRESSURE: 57 MMHG | WEIGHT: 208.19 LBS | HEIGHT: 63 IN

## 2023-03-01 DIAGNOSIS — I48.91 NEW ONSET A-FIB: ICD-10-CM

## 2023-03-01 DIAGNOSIS — N18.6 TYPE 2 DIABETES MELLITUS WITH CHRONIC KIDNEY DISEASE ON CHRONIC DIALYSIS, WITH LONG-TERM CURRENT USE OF INSULIN: Primary | Chronic | ICD-10-CM

## 2023-03-01 DIAGNOSIS — D64.9 ANEMIA, UNSPECIFIED TYPE: ICD-10-CM

## 2023-03-01 DIAGNOSIS — J44.9 CHRONIC OBSTRUCTIVE PULMONARY DISEASE, UNSPECIFIED COPD TYPE: Chronic | ICD-10-CM

## 2023-03-01 DIAGNOSIS — E66.01 CLASS 2 SEVERE OBESITY DUE TO EXCESS CALORIES WITH SERIOUS COMORBIDITY AND BODY MASS INDEX (BMI) OF 36.0 TO 36.9 IN ADULT: Chronic | ICD-10-CM

## 2023-03-01 DIAGNOSIS — F32.A DEPRESSIVE DISORDER: Chronic | ICD-10-CM

## 2023-03-01 DIAGNOSIS — Z99.2 ESRD (END STAGE RENAL DISEASE) ON DIALYSIS: Chronic | ICD-10-CM

## 2023-03-01 DIAGNOSIS — E78.2 MIXED HYPERLIPIDEMIA: Chronic | ICD-10-CM

## 2023-03-01 DIAGNOSIS — E11.22 TYPE 2 DIABETES MELLITUS WITH CHRONIC KIDNEY DISEASE ON CHRONIC DIALYSIS, WITH LONG-TERM CURRENT USE OF INSULIN: Primary | Chronic | ICD-10-CM

## 2023-03-01 DIAGNOSIS — Z99.2 TYPE 2 DIABETES MELLITUS WITH CHRONIC KIDNEY DISEASE ON CHRONIC DIALYSIS, WITH LONG-TERM CURRENT USE OF INSULIN: Primary | Chronic | ICD-10-CM

## 2023-03-01 DIAGNOSIS — Z79.4 TYPE 2 DIABETES MELLITUS WITH CHRONIC KIDNEY DISEASE ON CHRONIC DIALYSIS, WITH LONG-TERM CURRENT USE OF INSULIN: Primary | Chronic | ICD-10-CM

## 2023-03-01 DIAGNOSIS — I50.32 CHRONIC HEART FAILURE WITH PRESERVED EJECTION FRACTION: Chronic | ICD-10-CM

## 2023-03-01 DIAGNOSIS — Z13.5 DIABETIC RETINOPATHY SCREENING: ICD-10-CM

## 2023-03-01 DIAGNOSIS — F17.210 CIGARETTE NICOTINE DEPENDENCE WITHOUT COMPLICATION: Chronic | ICD-10-CM

## 2023-03-01 DIAGNOSIS — E55.9 VITAMIN D DEFICIENCY: Chronic | ICD-10-CM

## 2023-03-01 DIAGNOSIS — I10 HTN (HYPERTENSION), BENIGN: Chronic | ICD-10-CM

## 2023-03-01 DIAGNOSIS — N18.6 ESRD (END STAGE RENAL DISEASE) ON DIALYSIS: Chronic | ICD-10-CM

## 2023-03-01 DIAGNOSIS — E89.0 POSTOPERATIVE HYPOTHYROIDISM: Chronic | ICD-10-CM

## 2023-03-01 PROCEDURE — 3288F PR FALLS RISK ASSESSMENT DOCUMENTED: ICD-10-PCS | Mod: CPTII,,, | Performed by: NURSE PRACTITIONER

## 2023-03-01 PROCEDURE — 1159F MED LIST DOCD IN RCRD: CPT | Mod: CPTII,,, | Performed by: NURSE PRACTITIONER

## 2023-03-01 PROCEDURE — 99406 PR TOBACCO USE CESSATION INTERMEDIATE 3-10 MINUTES: ICD-10-PCS | Mod: S$PBB,,, | Performed by: NURSE PRACTITIONER

## 2023-03-01 PROCEDURE — 3074F SYST BP LT 130 MM HG: CPT | Mod: CPTII,,, | Performed by: NURSE PRACTITIONER

## 2023-03-01 PROCEDURE — 3078F DIAST BP <80 MM HG: CPT | Mod: CPTII,,, | Performed by: NURSE PRACTITIONER

## 2023-03-01 PROCEDURE — 99215 PR OFFICE/OUTPT VISIT, EST, LEVL V, 40-54 MIN: ICD-10-PCS | Mod: 25,S$PBB,, | Performed by: NURSE PRACTITIONER

## 2023-03-01 PROCEDURE — 99417 PR PROLONGED SVC, OUTPT, W/WO DIRECT PT CONTACT,  EA ADDTL 15 MIN: ICD-10-PCS | Mod: S$PBB,,, | Performed by: NURSE PRACTITIONER

## 2023-03-01 PROCEDURE — 99417 PROLNG OP E/M EACH 15 MIN: CPT | Mod: S$PBB,,, | Performed by: NURSE PRACTITIONER

## 2023-03-01 PROCEDURE — 99215 OFFICE O/P EST HI 40 MIN: CPT | Mod: PBBFAC | Performed by: NURSE PRACTITIONER

## 2023-03-01 PROCEDURE — 99406 BEHAV CHNG SMOKING 3-10 MIN: CPT | Mod: S$PBB,,, | Performed by: NURSE PRACTITIONER

## 2023-03-01 PROCEDURE — 1126F PR PAIN SEVERITY QUANTIFIED, NO PAIN PRESENT: ICD-10-PCS | Mod: CPTII,,, | Performed by: NURSE PRACTITIONER

## 2023-03-01 PROCEDURE — 3074F PR MOST RECENT SYSTOLIC BLOOD PRESSURE < 130 MM HG: ICD-10-PCS | Mod: CPTII,,, | Performed by: NURSE PRACTITIONER

## 2023-03-01 PROCEDURE — 99215 OFFICE O/P EST HI 40 MIN: CPT | Mod: 25,S$PBB,, | Performed by: NURSE PRACTITIONER

## 2023-03-01 PROCEDURE — 1101F PR PT FALLS ASSESS DOC 0-1 FALLS W/OUT INJ PAST YR: ICD-10-PCS | Mod: CPTII,,, | Performed by: NURSE PRACTITIONER

## 2023-03-01 PROCEDURE — 3288F FALL RISK ASSESSMENT DOCD: CPT | Mod: CPTII,,, | Performed by: NURSE PRACTITIONER

## 2023-03-01 PROCEDURE — 1111F PR DISCHARGE MEDS RECONCILED W/ CURRENT OUTPATIENT MED LIST: ICD-10-PCS | Mod: CPTII,,, | Performed by: NURSE PRACTITIONER

## 2023-03-01 PROCEDURE — 1160F PR REVIEW ALL MEDS BY PRESCRIBER/CLIN PHARMACIST DOCUMENTED: ICD-10-PCS | Mod: CPTII,,, | Performed by: NURSE PRACTITIONER

## 2023-03-01 PROCEDURE — 1101F PT FALLS ASSESS-DOCD LE1/YR: CPT | Mod: CPTII,,, | Performed by: NURSE PRACTITIONER

## 2023-03-01 PROCEDURE — 1111F DSCHRG MED/CURRENT MED MERGE: CPT | Mod: CPTII,,, | Performed by: NURSE PRACTITIONER

## 2023-03-01 PROCEDURE — 1159F PR MEDICATION LIST DOCUMENTED IN MEDICAL RECORD: ICD-10-PCS | Mod: CPTII,,, | Performed by: NURSE PRACTITIONER

## 2023-03-01 PROCEDURE — 3078F PR MOST RECENT DIASTOLIC BLOOD PRESSURE < 80 MM HG: ICD-10-PCS | Mod: CPTII,,, | Performed by: NURSE PRACTITIONER

## 2023-03-01 PROCEDURE — 1160F RVW MEDS BY RX/DR IN RCRD: CPT | Mod: CPTII,,, | Performed by: NURSE PRACTITIONER

## 2023-03-01 PROCEDURE — 1126F AMNT PAIN NOTED NONE PRSNT: CPT | Mod: CPTII,,, | Performed by: NURSE PRACTITIONER

## 2023-03-01 RX ORDER — TIOTROPIUM BROMIDE 18 UG/1
18 CAPSULE ORAL; RESPIRATORY (INHALATION) DAILY
Qty: 90 CAPSULE | Refills: 2 | Status: SHIPPED | OUTPATIENT
Start: 2023-03-01 | End: 2023-09-01 | Stop reason: SDUPTHER

## 2023-03-01 RX ORDER — PEN NEEDLE, DIABETIC 30 GX3/16"
2 NEEDLE, DISPOSABLE MISCELLANEOUS 2 TIMES DAILY
Qty: 200 EACH | Refills: 2 | Status: ON HOLD | OUTPATIENT
Start: 2023-03-01 | End: 2024-01-09 | Stop reason: HOSPADM

## 2023-03-01 RX ORDER — ERGOCALCIFEROL 1.25 MG/1
50000 CAPSULE ORAL
Qty: 12 CAPSULE | Refills: 2 | Status: SHIPPED | OUTPATIENT
Start: 2023-03-01 | End: 2023-09-01 | Stop reason: SDUPTHER

## 2023-03-01 RX ORDER — ALBUTEROL SULFATE 90 UG/1
2 AEROSOL, METERED RESPIRATORY (INHALATION) EVERY 6 HOURS PRN
Qty: 18 G | Refills: 1 | Status: SHIPPED | OUTPATIENT
Start: 2023-03-01 | End: 2023-09-13 | Stop reason: SDUPTHER

## 2023-03-01 RX ORDER — FLUTICASONE FUROATE AND VILANTEROL 100; 25 UG/1; UG/1
1 POWDER RESPIRATORY (INHALATION) DAILY
Qty: 90 EACH | Refills: 2 | Status: SHIPPED | OUTPATIENT
Start: 2023-03-01 | End: 2023-09-01 | Stop reason: SDUPTHER

## 2023-03-01 RX ORDER — INSULIN GLARGINE 100 [IU]/ML
30 INJECTION, SOLUTION SUBCUTANEOUS DAILY
Qty: 27 ML | Refills: 2 | Status: SHIPPED | OUTPATIENT
Start: 2023-03-01 | End: 2023-09-01 | Stop reason: SDUPTHER

## 2023-03-01 RX ORDER — FERROUS SULFATE 325(65) MG
325 TABLET ORAL DAILY
Qty: 90 TABLET | Refills: 2 | Status: SHIPPED | OUTPATIENT
Start: 2023-03-01 | End: 2023-09-01

## 2023-03-01 RX ORDER — LEVOTHYROXINE SODIUM 125 UG/1
125 TABLET ORAL
Qty: 90 TABLET | Refills: 2 | Status: SHIPPED | OUTPATIENT
Start: 2023-03-01 | End: 2023-09-01 | Stop reason: SDUPTHER

## 2023-03-01 RX ORDER — FERROUS SULFATE 325(65) MG
1 TABLET ORAL DAILY
COMMUNITY
Start: 2023-01-22 | End: 2023-03-01

## 2023-03-01 RX ORDER — GLIPIZIDE 10 MG/1
10 TABLET ORAL
Qty: 90 TABLET | Refills: 2 | Status: SHIPPED | OUTPATIENT
Start: 2023-03-01 | End: 2023-09-01 | Stop reason: SDUPTHER

## 2023-03-01 NOTE — PROGRESS NOTES
Patient ID: [id number]     Chief Complaint: Follow-up    HPI:     Rosette Madrid is a 77 y.o. female with diagnosis of HTN, HLD, Diastolic Dysfunction, Class III HFpEF 55%, Hx of CABG x4, A-FIB, DM2, ESRD with HD on Tu/Thur/Sat, COPD, Hypothyroidism S/T Thyroidectomy, Tobacco Use. Patient seen in clinic today for hospital follow up. Patient last seen in clinic on 11/23/2022.  Patient admitted to [facility] (see below, hospital records reviewed) on 02/16/2023 for anemia. Patient discharged on 02/20/2023. Patient states she didn't feel sick when she had to go to the hospital. States she feels well today. Patient denies SOB, chest pain, fatigue, weakness. Patient denies any acute complaints.   Patient requesting Diabetic Shoes, will order Medicare Visit.   Patient started on Vitamin D 50,000 units weekly for low Vitamin D level. Previous vitamin D level 20; current vitamin D level 24. Patient states she is out of medication but did take medication weekly as prescribed.   Patient continued on Insulin Glargine 30 units daily, Glipizide 10 mg daily for DM2. Previous A1c 6.0; current A1c 5.0. Patient states taking medication as prescribed, tolerating well. Patient states she checks her CBGs at home and denies hypoglycemia. States CBGs average in the 100's.   Patient continued On Breo Ellipta 1 puff daily, Spiriva 2 puff daily, Albuterol Inh prn for COPD. Patient states she takes her medication as prescribed, tolerating well. Denies SOB except for excessive exertion. Patient current tobacco user, refuses smoking cessation. Smoking cessation encouraged.     Patient hospitalized on 02/16/2023 at [facility]. 77-year-old female with PMH of HTN, HLD, COPD, ESRD on HD, DM type 2, Hypothyroidism s/p Thyroidectomy, reports hx of Valvular Surgery, Atrial Fibrillation on Eliquis, Tobacco Smoking who was admitted 02/16/2023 with diagnosis of low H&H at her dialysis unit. She was unable to undergo her dialysis and was asked to come to ED. Patient  reported this is her 3rd admission in the last 2 months for low H&H and blood transfusion. Stating she was last admitted at [facility]. She was asked if she made the appointment with the GI doctor for VCE which was recommended and patient reported she did not knew where to call. Patient denies weakness, lightheadedness, shortness of breath. Reports this time she did not realize that her hemoglobin was low as she had no symptoms. Reports she has dark brown stool but she takes iron supplements. Last hemodialysis was on Tuesday. Underwent EGD with GI on 02/17 which was unremarkable. Underwent placement of VCE during EGD, for GI to read & further recommendations to be made. Nephrology consulted for HD. VCE showed a single erosion which was thought unlikely to be the source of bleeding. GI signed off and cleared patient for discharge & to resume ASA & Eliquis. Patient discharged home on 02/20/2023.      Pt was seen and examined on the day of discharge.  She stated that she was doing well and had no new complaints.  Will plan for discharge today.  Patient placed on bowel regimen with MiraLax, lactulose due to constipation.  HGB/HCT noted to be stable at 8.5/27.1.  Patient instructed to return to the ER in case of further melena, hematemesis, weakness, chest pain, shortness of breath, cough, sputum production, abdominal pain, nausea, vomiting, headache, numbness, weakness, dizziness/lightheadedness or loss of consciousness.  Patient to resume regular HD TTS schedule starting tomorrow.     Patient followed by Nephrology, Dr. Harris.      Patient followed by Cardiology. Last appointment on 12/05/2022. [address] Menendez 77 y.o. with a past medical history of CAD s/p CABG prior to 2005, ESRD on HD (TTHSat), HTN, DM, HFpEF, and HLD presents for follow up and ongoing care. Patient completed an Echocardiogram on 12.30.21 which revealed an ejection fraction of approximately 50 to 55%. Patient denies chest pain, shortness of breath,  palpitations, orthopnea, or syncope. Patient does endorse bilateral lower extremity edema, but continues to follow up with Dr. Singh for the venous insufficiency. She reports compliance with her medications. She also reports compliance with her hemodialysis. Of note, patient continues to state that she does have episodes of hypotension during her dialysis sessions. HFpEF - EF 50-55% per Echo Dec. 2021 - NYHA Class II: Echo Jan. 2018 showed diastolic dysfunction, EF of 66%, E/A flow reversal, aortic regurg, and moderately thickened leaflets without aortic stenosis; Patient denies SOB; She is euvolemic, warm, and dry on exam today; Continue Lasix and hemodialysis as directed; Counseled on low salt diet. CAD s/p CABG prior to 2005: NSTEMI Type II (Supply/Demand) in the setting of Severe Anemia due to GI Bleed Dec. 2021 - recent hospital admission; EF 50% per Echo Dec. 2021; Stress Echo 2018 showed no significant ischemia but a moderately large fixed anterior defect of moderate intensity; Denies chest pain; Continue Aspirin, Coreg, and Crestor; Counseled on heart healthy diet. HTN: BP not at goal -  170/80 - did not take medications today; Reports low BP with HD, however hypertensive today; Continuing current therapy; Will defer BP management to Nephrology; Counseled on low salt diet. HLD: LDL at goal - 62; Continue Crestor 40mg daily; Counseled on low cholesterol diet. Diabetes: A1C - 6.0; Continue management per PCP. Tobacco use: Counseled on the importance of smoking cessation; She smokes 2 cigarettes per day - states she is trying to quit on her own. ESRD on HD T/TH/Sat: Continue nephrology management. Venous Insufficiency: s/p percutaneous endovenous Microfoam ablation with Varithena of the left GSV with Dr. Singh on May 17, 2021; Recommend compression stockings; Instructed on low salt diet; Follow up with Dr. Singh as directed. Follow up in Cardiology Clinic in 4 months. Follow-up with PCP, Nephrology, and   Francisco as directed. Patient has follow up appointment scheduled for 2023.     Patient followed by [facility], Dr. Marshal Espinal.     Review of patient's allergies indicates:   Allergen Reactions    Lisinopril Swelling    Azithromycin      Other reaction(s): tongue/facial swelling    Baclofen      Other reaction(s): tongue/facial swelling    Doxycycline      Other reaction(s): Angioedema     Breast Cancer Screening: MMG negative on 10/28/2022  Cervical Cancer Screening: deferred due to age  Colorectal Cancer Screening: Colonoscopy on 2021 with recommended repeat in 3 years  Diabetic Eye Exam: ordered 2023  Diabetic Foot Exam: 2023  Lung Cancer Screening: refuses  Prostate Cancer Screening: N/A  AAA Screening: N/A  Osteoporosis Screenin2022, normal  Medicare Wellness: ordered  Immunizations:   Immunization History   Administered Date(s) Administered    COVID-19, MRNA, LN-S, PF (MODERNA FULL 0.5 ML DOSE) 2021    COVID-19, MRNA, LN-S, PF (Pfizer) (Purple Cap) 03/10/2021    Influenza - High Dose - PF (65 years and older) 10/31/2016    Influenza - Quadrivalent - High Dose - PF (65 years and older) 11/15/2017, 2019, 2021, 10/18/2022    PPD Test 11/15/2017    Pneumococcal Polysaccharide - 23 Valent 10/31/2016     Past Surgical History:   Procedure Laterality Date    AV FISTULA PLACEMENT Left     CARDIAC CATHETERIZATION      COLONOSCOPY      CORONARY ARTERY BYPASS GRAFT      ESOPHAGOGASTRODUODENOSCOPY      ESOPHAGOGASTRODUODENOSCOPY N/A 2023    Procedure: EGD +/- VCE PLACEMENT;  Surgeon: Morgan Gardner MD;  Location: [facility] ENDOSCOPY;  Service: Gastroenterology;  Laterality: N/A;    POLYPECTOMY      SMALL BOWEL ENTEROSCOPY Left 2023    Procedure: ENTEROSCOPY;  Surgeon: Lexi Scales MD;  Location: [facility] ENDOSCOPY;  Service: Gastroenterology;  Laterality: Left;    THYROIDECTOMY      TUBAL LIGATION       family history includes Hypertension in her  father, mother, sister, and sister.    Social History     Socioeconomic History    Marital status:     Number of children: 6   Tobacco Use    Smoking status: Every Day     Packs/day: 0.25     Types: Cigarettes    Smokeless tobacco: Never    Tobacco comments:     Smokes 4 cigarettes a day   Substance and Sexual Activity    Alcohol use: Yes     Comment: 1 glass every 2-3 month    Drug use: Not Currently    Sexual activity: Not Currently     Social Determinants of Health     Financial Resource Strain: Low Risk     Difficulty of Paying Living Expenses: Not hard at all   Food Insecurity: No Food Insecurity    Worried About Running Out of Food in the Last Year: Never true    Ran Out of Food in the Last Year: Never true   Transportation Needs: No Transportation Needs    Lack of Transportation (Medical): No    Lack of Transportation (Non-Medical): No   Physical Activity: Inactive    Days of Exercise per Week: 0 days    Minutes of Exercise per Session: 0 min   Stress: No Stress Concern Present    Feeling of Stress : Not at all   Social Connections: Moderately Isolated    Frequency of Communication with Friends and Family: More than three times a week    Frequency of Social Gatherings with Friends and Family: More than three times a week    Attends [employer] Services: More than 4 times per year    Active Member of Clubs or Organizations: No    Attends Club or Organization Meetings: Never    Marital Status:    Housing Stability: Low Risk     Unable to Pay for Housing in the Last Year: No    Number of Places Lived in the Last Year: 1    Unstable Housing in the Last Year: No     Current Outpatient Medications   Medication Instructions    albuterol (VENTOLIN HFA) 90 mcg/actuation inhaler 2 puffs, Inhalation, Every 6 hours PRN, Rescue    albuterol-ipratropium (DUO-NEB) 2.5 mg-0.5 mg/3 mL nebulizer solution 3 mLs, Nebulization, Every 6 hours PRN, Rescue    apixaban (ELIQUIS) 5 mg, Oral, 2 times daily    aspirin  (ECOTRIN) 81 mg, Oral, Daily    BASAGLAR KWIKPEN U-100 INSULIN 30 Units, Subcutaneous, Daily    BenadryL 50 mg, Oral, Nightly    bisacodyL (DULCOLAX) 5 mg, Oral, Daily PRN    carvediloL (COREG) 6.25 mg, Oral, 2 times daily    clonazePAM (KLONOPIN) 0.5 mg, Oral, Daily PRN    DIALYVITE 100-1 mg Tab 1 tablet, Oral, Daily    DULoxetine (CYMBALTA) 30 mg, Oral, Daily    ergocalciferol (ERGOCALCIFEROL) 50,000 Units, Oral, Every 7 days    ferrous sulfate (FEOSOL) 325 mg, Oral, Daily    fluticasone furoate-vilanteroL (BREO) 100-25 mcg/dose diskus inhaler 1 puff, Inhalation, Daily    furosemide (LASIX) 40 mg, Oral, Daily    glipiZIDE (GLUCOTROL) 10 mg, Oral, With breakfast    INVEGA SUSTENNA 156 mg/mL Syrg injection Intramuscular    L-METHYLFOLATE 15 mg Tab 1 tablet, Oral, Daily    lactulose 10 gram/15 ml (CHRONULAC) 15.3333 g, Oral, 3 times daily    levothyroxine (SYNTHROID) 125 mcg, Oral, Before breakfast    ONETOUCH DELICA PLUS LANCET 30 gauge Misc 1 lancet, Other, Daily    ONETOUCH ULTRA TEST Strp 1 strip, Other, Daily    pantoprazole (PROTONIX) 40 mg, Oral, 2 times daily    pen needle, diabetic 2 Units, Misc.(Non-Drug; Combo Route), 2 times daily, Patient to check blood sugars twice daily (morning and night)    polyethylene glycol (GLYCOLAX) 17 g, Oral, Daily    rosuvastatin (CRESTOR) 40 mg, Oral, Daily    sevelamer carbonate (RENVELA) 1,600 mg, Oral, 3 times daily    SPIRIVA WITH HANDIHALER 18 mcg, Inhalation, Daily       Subjective:     Review of Systems   Constitutional: Negative.    HENT: Negative.     Eyes: Negative.    Respiratory: Negative.     Cardiovascular: Negative.    Gastrointestinal: Negative.    Endocrine: Negative.    Genitourinary: Negative.    Musculoskeletal: Negative.    Skin: Negative.    Allergic/Immunologic: Negative.    Neurological: Negative.    Hematological: Negative.    Psychiatric/Behavioral: Negative.       Objective:     Visit Vitals  BP (!) 124/57 (BP Location: Right arm, Patient Position:  "Sitting, BP Method: Large (Automatic))   Pulse (!) 51   Temp 98.6 °F (37 °C) (Oral)   Resp 16   Ht 5' 2.99" (1.6 m)   Wt 94.4 kg (208 lb 3.2 oz)   BMI 36.89 kg/m²       Physical Exam  Vitals reviewed.   Constitutional:       Appearance: Normal appearance.   HENT:      Head: Normocephalic and atraumatic.      Mouth/Throat:      Mouth: Mucous membranes are moist.      Pharynx: Oropharynx is clear.   Cardiovascular:      Rate and Rhythm: Normal rate. Rhythm irregular.      Heart sounds: Normal heart sounds.   Pulmonary:      Effort: Pulmonary effort is normal.      Breath sounds: Normal breath sounds.   Abdominal:      General: Bowel sounds are normal.   Musculoskeletal:         General: Normal range of motion.      Cervical back: Normal range of motion.      Right lower leg: No edema.      Left lower leg: No edema.   Skin:     General: Skin is warm and dry.   Neurological:      Mental Status: She is alert and oriented to person, place, and time.   Psychiatric:         Mood and Affect: Mood normal.         Behavior: Behavior normal.     Protective Sensation (w/ 10 gram monofilament):  Right: Decreased  Left: Intact    Visual Inspection:  Callus -  Right     Pedal Pulses:   Right: Present  Left: Present    Posterior Tibialis Pulses:   Right:Present  Left: Present      Labs Reviewed:     Hematology:  Lab Results   Component Value Date    WBC 8.1 02/27/2023    HGB 9.1 (L) 02/27/2023    HCT 30.9 (L) 02/27/2023     02/27/2023     Chemistry:  Lab Results   Component Value Date     02/27/2023    K 3.8 02/27/2023    CHLORIDE 101 02/27/2023    BUN 25.0 (H) 02/27/2023    CREATININE 4.15 (H) 02/27/2023    EGFRNORACEVR 11 02/27/2023    GLUCOSE 124 (H) 02/27/2023    CALCIUM 9.3 02/27/2023    ALKPHOS 91 02/27/2023    LABPROT 6.1 02/27/2023    ALBUMIN 3.1 (L) 02/27/2023    BILIDIR <0.1 03/22/2022    IBILI >0.10 03/22/2022    AST 18 02/27/2023    ALT 19 02/27/2023    MG 1.90 02/19/2023    PHOS 3.1 02/18/2023    " [id number] 24.8 (L) 02/27/2023     Lab Results   Component Value Date    HGBA1C 5.0 02/27/2023     Lipid Panel:  Lab Results   Component Value Date    CHOL 119 02/27/2023    HDL 44 02/27/2023    LDL 56.00 02/27/2023    TRIG 95 02/27/2023    TOTALCHOLEST 3 02/27/2023     Thyroid:  Lab Results   Component Value Date    TSH 7.238 (H) 02/10/2023    T3FREE 2.04 (L) 01/07/2020     Urine:  Lab Results   Component Value Date    APPEARANCEUA Hazy 09/30/2021    PHUA 7.0 09/30/2021    PROTEINUA 30 (A) 09/30/2021    LEUKOCYTESUR Negative 09/30/2021    RBCUA 0-2 09/30/2021    WBCUA 0-2 09/30/2021    BACTERIA Moderate (A) 09/30/2021    SQEPUA >100 (A) 09/30/2021    HYALINECASTS 0-2 (A) 09/30/2021    CREATRANDUR 375.0 09/26/2017    PROTEINURINE 1,032.6 09/26/2017        Assessment:       ICD-10-CM ICD-9-CM   1. Type 2 diabetes mellitus with chronic kidney disease on chronic dialysis, with long-term current use of insulin  E11.22 250.40    N18.6 585.9    Z99.2 V45.11    Z79.4 V58.67   2. Mixed hyperlipidemia  E78.2 272.2   3. Chronic obstructive pulmonary disease, unspecified COPD type  J44.9 496   4. Postoperative hypothyroidism  E89.0 244.0   5. Chronic heart failure with preserved ejection fraction  I50.32 428.9   6. HTN (hypertension), benign  I10 401.1   7. New onset a-fib  I48.91 427.31   8. ESRD (end stage renal disease) on dialysis  N18.6 585.6    Z99.2 V45.11   9. Anemia, unspecified type  D64.9 285.9   10. Vitamin D deficiency  E55.9 268.9   11. Class 2 severe obesity due to excess calories with serious comorbidity and body mass index (BMI) of 36.0 to 36.9 in adult  E66.01 278.01    Z68.36 V85.36   12. Depressive disorder  F32.A 311   13. Cigarette nicotine dependence without complication  F17.210 305.1   14. Diabetic retinopathy screening  Z13.5 V80.2        Plan:     1. Type 2 diabetes mellitus with chronic kidney disease on chronic dialysis, with long-term current use of insulin  Continue Medications.  Encouraged  home CBG monitoring.  Hypoglycemic episodes:  Body mass index is 36.89 kg/m².  Hemoglobin A1c   Date Value Ref Range Status   02/27/2023 5.0 <=7.0 % Final   Patient followed by Nephrology for ESRD with HD  On Rosuvastatin  Weight Loss Encouraged  ADA Diet  - CBC Auto Differential; Future  - Comprehensive Metabolic Panel; Future  - Hemoglobin A1C; Future    2. Mixed hyperlipidemia  Continue Rosuvastatin 40 mg daily  Weight loss encouraged  Low fat/high fiber diet  Increase physical activity  Tobacco cessation encouraged  Cholesterol Total   Date Value Ref Range Status   02/27/2023 119 <=200 mg/dL Final     HDL Cholesterol   Date Value Ref Range Status   02/27/2023 44 35 - 60 mg/dL Final     Triglyceride   Date Value Ref Range Status   02/27/2023 95 37 - 140 mg/dL Final     LDL Cholesterol   Date Value Ref Range Status   02/27/2023 56.00 50.00 - 140.00 mg/dL Final   - Lipid Panel; Future    3. Chronic obstructive pulmonary disease, unspecified COPD type  PFTs: none noted  Continue Albuterol prn, Breo 1 puff daily, Spiriva 2 puffs bid    4. Postoperative hypothyroidism  Thyroid Stimulating Hormone   Date Value Ref Range Status   02/10/2023 7.238 (H) 0.350 - 4.940 uIU/mL Final   Continue Synthroid 125 mcg daily    5. Chronic heart failure with preserved ejection fraction  Patient followed by Cardiology  Continue Coreg 6.25 mg bid, Lasix 40 mg daily    6. HTN (hypertension), benign  Vitals:    03/01/23 0758   BP: (!) 124/57   Pulse: (!) 51   Resp: 16   Temp: 98.6 °F (37 °C)   Patient followed by Nephrology for ESRD with HD  Results for orders placed or performed during the hospital encounter of 02/16/23   EKG 12-lead    Collection Time: 02/17/23  6:28 PM    Narrative    Test Reason : R07.9,    Vent. Rate : 062 BPM     Atrial Rate : 062 BPM     P-R Int : 200 ms          QRS Dur : 138 ms      QT Int : 462 ms       P-R-T Axes : 000 -48 126 degrees     QTc Int : 468 ms    Normal sinus rhythm  Left axis deviation  Left bundle  branch block  Abnormal ECG  When compared with ECG of 09-FEB-2023 19:23,  Sinus rhythm has replaced Wide QRS rhythm  Confirmed by Jeff Harris MD ([id number]) on 2/17/2023 6:37:13 PM    Referred By: [email or web address]   SELF           Confirmed By:Jeff Harris MD   Continue Coreg 6.25 mg bid, Lasix 40 mg daily  DASH diet  Encouraged home blood pressure monitoring    7. New onset a-fib  Followed by Cardiology  Continue Eliquis 5 mg bid    8. ESRD (end stage renal disease) on dialysis  Followed by Nephrology   Latest Reference Range & Units 02/27/23 08:41   BUN 9.8 - 20.1 mg/dL 25.0 (H)   Creatinine 0.55 - 1.02 mg/dL 4.15 (H)   eGFR mls/min/1.73/m2 11     9. Anemia, unspecified type  RBC   Date Value Ref Range Status   02/27/2023 3.22 (L) 4.20 - 5.40 x10(6)/mcL Final     Hgb   Date Value Ref Range Status   02/27/2023 9.1 (L) 12.0 - 16.0 g/dL Final     Hct   Date Value Ref Range Status   02/27/2023 30.9 (L) 37.0 - 47.0 % Final     MCV   Date Value Ref Range Status   02/27/2023 96.0 (H) 80.0 - 94.0 fL Final     Iron Level   Date Value Ref Range Status   02/18/2023 37 (L) 50 - 170 ug/dL Final     Iron Binding Capacity Total   Date Value Ref Range Status   02/18/2023 230 (L) 250 - 450 ug/dL Final     Ferritin Level   Date Value Ref Range Status   02/18/2023 1,672.57 (H) 4.63 - 204.00 ng/mL Final   Continue Ferrous Sulfate 325 mg daily    10. Vitamin D deficiency  Vitamin D level: 24  Continue Vitamin D 50,000 units weekly  - Vitamin D; Future    11. Class 2 obesity due to excess calories with serious comorbidity and body mass index (BMI) of 36.0 to 36.9 in adult  Body mass index is 36.89 kg/m².  Thyroid Stimulating Hormone   Date Value Ref Range Status   02/10/2023 7.238 (H) 0.350 - 4.940 uIU/mL Final     Vit D 25 OH   Date Value Ref Range Status   02/27/2023 24.8 (L) 30.0 - 80.0 ng/mL Final     Hemoglobin A1c   Date Value Ref Range Status   02/27/2023 5.0 <=7.0 % Final   Sleep Study: none noted  Weight Loss Encouraged  Increase Physical  Activity    12. Depressive disorder  Followed by Mental Health Provider    13. Cigarette nicotine dependence without complication  Smoking cessation education provided, encouraged. Informed of smoking cessation program. Patient refused smoking cessation at this time. I spent 3 minutes discussing smoking cessation with patient.     14. Diabetic retinopathy screening  - Diabetic Eye Screening Photo      Follow up in about 6 months (around 9/1/2023) for Labs. In addition to their scheduled follow up, the patient has also been instructed to follow up on as needed basis.     Margarita Dior, JAIMEP

## 2023-03-09 ENCOUNTER — TELEPHONE (OUTPATIENT)
Dept: GASTROENTEROLOGY | Facility: CLINIC | Age: 78
End: 2023-03-09
Payer: MEDICARE

## 2023-03-09 NOTE — TELEPHONE ENCOUNTER
----- Message from JOSEFINA Jane sent at 2/10/2023  3:17 PM CST -----  Regarding: Outpatient capsule endoscopy  If the patient is discharged from the hospital over the weekend, we will need to schedule her for an outpatient capsule endoscopy. She will need the capsule put down through endoscopy.   Thank you,  Antonieta

## 2023-03-22 ENCOUNTER — HOSPITAL ENCOUNTER (INPATIENT)
Facility: HOSPITAL | Age: 78
LOS: 3 days | Discharge: HOME | DRG: 377 | End: 2023-03-25
Attending: STUDENT IN AN ORGANIZED HEALTH CARE EDUCATION/TRAINING PROGRAM | Admitting: INTERNAL MEDICINE
Payer: MEDICARE

## 2023-03-22 DIAGNOSIS — K92.2 GI BLEED: ICD-10-CM

## 2023-03-22 DIAGNOSIS — N18.6 ESRD (END STAGE RENAL DISEASE): ICD-10-CM

## 2023-03-22 DIAGNOSIS — K92.1 MELENA: Primary | ICD-10-CM

## 2023-03-22 DIAGNOSIS — R06.02 SOB (SHORTNESS OF BREATH): ICD-10-CM

## 2023-03-22 DIAGNOSIS — I44.0 1ST DEGREE AV BLOCK: ICD-10-CM

## 2023-03-22 DIAGNOSIS — R53.83 FATIGUE: ICD-10-CM

## 2023-03-22 DIAGNOSIS — D64.9 ANEMIA, UNSPECIFIED TYPE: ICD-10-CM

## 2023-03-22 LAB
ALBUMIN SERPL-MCNC: 2.7 G/DL (ref 3.4–4.8)
ALBUMIN/GLOB SERPL: 1.1 RATIO (ref 1.1–2)
ALP SERPL-CCNC: 73 UNIT/L (ref 40–150)
ALT SERPL-CCNC: 20 UNIT/L (ref 0–55)
APTT PPP: 37.5 SECONDS (ref 23.2–33.7)
AST SERPL-CCNC: 27 UNIT/L (ref 5–34)
BASOPHILS # BLD AUTO: 0.01 X10(3)/MCL (ref 0–0.2)
BASOPHILS NFR BLD AUTO: 0.1 %
BILIRUBIN DIRECT+TOT PNL SERPL-MCNC: 0.4 MG/DL
BUN SERPL-MCNC: 50.3 MG/DL (ref 9.8–20.1)
CALCIUM SERPL-MCNC: 8.6 MG/DL (ref 8.4–10.2)
CHLORIDE SERPL-SCNC: 104 MMOL/L (ref 98–107)
CO2 SERPL-SCNC: 24 MMOL/L (ref 23–31)
CREAT SERPL-MCNC: 3.85 MG/DL (ref 0.55–1.02)
EOSINOPHIL # BLD AUTO: 0.04 X10(3)/MCL (ref 0–0.9)
EOSINOPHIL NFR BLD AUTO: 0.5 %
ERYTHROCYTE [DISTWIDTH] IN BLOOD BY AUTOMATED COUNT: 17.7 % (ref 11.5–17)
GFR SERPLBLD CREATININE-BSD FMLA CKD-EPI: 12 MLS/MIN/1.73/M2
GLOBULIN SER-MCNC: 2.4 GM/DL (ref 2.4–3.5)
GLUCOSE SERPL-MCNC: 168 MG/DL (ref 82–115)
GROUP & RH: NORMAL
HCT VFR BLD AUTO: 15.9 % (ref 37–47)
HGB BLD-MCNC: 4.7 G/DL (ref 12–16)
IMM GRANULOCYTES # BLD AUTO: 0.06 X10(3)/MCL (ref 0–0.04)
IMM GRANULOCYTES NFR BLD AUTO: 0.7 %
INDIRECT COOMBS GEL: NORMAL
INR BLD: 1.54 (ref 0–1.3)
LYMPHOCYTES # BLD AUTO: 0.8 X10(3)/MCL (ref 0.6–4.6)
LYMPHOCYTES NFR BLD AUTO: 10 %
MCH RBC QN AUTO: 27.8 PG
MCHC RBC AUTO-ENTMCNC: 29.6 G/DL (ref 33–36)
MCV RBC AUTO: 94.1 FL (ref 80–94)
MONOCYTES # BLD AUTO: 0.46 X10(3)/MCL (ref 0.1–1.3)
MONOCYTES NFR BLD AUTO: 5.7 %
NEUTROPHILS # BLD AUTO: 6.66 X10(3)/MCL (ref 2.1–9.2)
NEUTROPHILS NFR BLD AUTO: 83 %
NRBC BLD AUTO-RTO: 0 %
PLATELET # BLD AUTO: 160 X10(3)/MCL (ref 130–400)
PMV BLD AUTO: 10.5 FL (ref 7.4–10.4)
POTASSIUM SERPL-SCNC: 4.3 MMOL/L (ref 3.5–5.1)
PROT SERPL-MCNC: 5.1 GM/DL (ref 5.8–7.6)
PROTHROMBIN TIME: 18.2 SECONDS (ref 12.5–14.5)
RBC # BLD AUTO: 1.69 X10(6)/MCL (ref 4.2–5.4)
SODIUM SERPL-SCNC: 135 MMOL/L (ref 136–145)
SPECIMEN OUTDATE: NORMAL
WBC # SPEC AUTO: 8 X10(3)/MCL (ref 4.5–11.5)

## 2023-03-22 PROCEDURE — C9113 INJ PANTOPRAZOLE SODIUM, VIA: HCPCS | Performed by: STUDENT IN AN ORGANIZED HEALTH CARE EDUCATION/TRAINING PROGRAM

## 2023-03-22 PROCEDURE — C1751 CATH, INF, PER/CENT/MIDLINE: HCPCS

## 2023-03-22 PROCEDURE — 36430 TRANSFUSION BLD/BLD COMPNT: CPT

## 2023-03-22 PROCEDURE — 85025 COMPLETE CBC W/AUTO DIFF WBC: CPT | Performed by: NURSE PRACTITIONER

## 2023-03-22 PROCEDURE — 11000001 HC ACUTE MED/SURG PRIVATE ROOM

## 2023-03-22 PROCEDURE — 86923 COMPATIBILITY TEST ELECTRIC: CPT | Mod: 91 | Performed by: STUDENT IN AN ORGANIZED HEALTH CARE EDUCATION/TRAINING PROGRAM

## 2023-03-22 PROCEDURE — P9016 RBC LEUKOCYTES REDUCED: HCPCS | Performed by: STUDENT IN AN ORGANIZED HEALTH CARE EDUCATION/TRAINING PROGRAM

## 2023-03-22 PROCEDURE — 86900 BLOOD TYPING SEROLOGIC ABO: CPT | Performed by: NURSE PRACTITIONER

## 2023-03-22 PROCEDURE — C9113 INJ PANTOPRAZOLE SODIUM, VIA: HCPCS | Performed by: INTERNAL MEDICINE

## 2023-03-22 PROCEDURE — 85610 PROTHROMBIN TIME: CPT | Performed by: NURSE PRACTITIONER

## 2023-03-22 PROCEDURE — 96374 THER/PROPH/DIAG INJ IV PUSH: CPT

## 2023-03-22 PROCEDURE — 86923 COMPATIBILITY TEST ELECTRIC: CPT | Mod: 91 | Performed by: INTERNAL MEDICINE

## 2023-03-22 PROCEDURE — 63600175 PHARM REV CODE 636 W HCPCS: Performed by: INTERNAL MEDICINE

## 2023-03-22 PROCEDURE — 36410 VNPNXR 3YR/> PHY/QHP DX/THER: CPT

## 2023-03-22 PROCEDURE — 96376 TX/PRO/DX INJ SAME DRUG ADON: CPT

## 2023-03-22 PROCEDURE — 99285 EMERGENCY DEPT VISIT HI MDM: CPT | Mod: 25

## 2023-03-22 PROCEDURE — 85730 THROMBOPLASTIN TIME PARTIAL: CPT | Performed by: NURSE PRACTITIONER

## 2023-03-22 PROCEDURE — 80053 COMPREHEN METABOLIC PANEL: CPT | Performed by: NURSE PRACTITIONER

## 2023-03-22 PROCEDURE — 63600175 PHARM REV CODE 636 W HCPCS: Performed by: STUDENT IN AN ORGANIZED HEALTH CARE EDUCATION/TRAINING PROGRAM

## 2023-03-22 RX ORDER — HYDROCODONE BITARTRATE AND ACETAMINOPHEN 500; 5 MG/1; MG/1
TABLET ORAL
Status: DISCONTINUED | OUTPATIENT
Start: 2023-03-22 | End: 2023-03-25 | Stop reason: HOSPADM

## 2023-03-22 RX ORDER — PANTOPRAZOLE SODIUM 40 MG/10ML
40 INJECTION, POWDER, LYOPHILIZED, FOR SOLUTION INTRAVENOUS EVERY 12 HOURS
Status: DISCONTINUED | OUTPATIENT
Start: 2023-03-22 | End: 2023-03-25 | Stop reason: HOSPADM

## 2023-03-22 RX ORDER — SODIUM CHLORIDE 0.9 % (FLUSH) 0.9 %
10 SYRINGE (ML) INJECTION
Status: DISCONTINUED | OUTPATIENT
Start: 2023-03-22 | End: 2023-03-25 | Stop reason: HOSPADM

## 2023-03-22 RX ORDER — PANTOPRAZOLE SODIUM 40 MG/10ML
80 INJECTION, POWDER, LYOPHILIZED, FOR SOLUTION INTRAVENOUS
Status: COMPLETED | OUTPATIENT
Start: 2023-03-22 | End: 2023-03-22

## 2023-03-22 RX ORDER — TALC
6 POWDER (GRAM) TOPICAL NIGHTLY PRN
Status: DISCONTINUED | OUTPATIENT
Start: 2023-03-22 | End: 2023-03-25 | Stop reason: HOSPADM

## 2023-03-22 RX ORDER — LABETALOL HYDROCHLORIDE 5 MG/ML
10 INJECTION, SOLUTION INTRAVENOUS
Status: DISCONTINUED | OUTPATIENT
Start: 2023-03-22 | End: 2023-03-23

## 2023-03-22 RX ORDER — SODIUM CHLORIDE 0.9 % (FLUSH) 0.9 %
10 SYRINGE (ML) INJECTION EVERY 6 HOURS
Status: DISCONTINUED | OUTPATIENT
Start: 2023-03-23 | End: 2023-03-25 | Stop reason: HOSPADM

## 2023-03-22 RX ADMIN — PANTOPRAZOLE SODIUM 40 MG: 40 INJECTION, POWDER, FOR SOLUTION INTRAVENOUS at 08:03

## 2023-03-22 RX ADMIN — PANTOPRAZOLE SODIUM 80 MG: 40 INJECTION, POWDER, FOR SOLUTION INTRAVENOUS at 04:03

## 2023-03-22 NOTE — Clinical Note
Diagnosis: GI bleed [666161]   Admitting Provider:: COLETTE HARRIS [[id number]]   Future Attending Provider: COLETTE HARRIS [[id number]]   Reason for IP Medical Treatment  (Clinical interventions that can only be accomplished in the IP setting? ) :: GI bleed   I certify that Inpatient services for greater than or equal to 2 midnights are medically necessary:: Yes   Plans for Post-Acute care--if anticipated (pick the single best option):: A. No post acute care anticipated at this time

## 2023-03-22 NOTE — ED PROVIDER NOTES
Encounter Date: 3/22/2023    SCRIBE #1 NOTE: I, Kary Miller, am scribing for, and in the presence of,  Raghu Hernandez MD. I have scribed the following portions of the note - Other sections scribed: HPI, ROS, PE.     History     Chief Complaint   Patient presents with    Fatigue    abnormal blood levels     Called by doctor for low hgb of 5.5, pt reports weakness, received BT in the past months. Dialysis T-Th-S, last full run yesterday, Denies SOB     77 year old female w/ hx of ESRD on HD (T,Th,Sat), COPD, DM, HLD, HTN, and anemia presents to ED for low hemoglobin. Pt was called by her nephrologist, told to come to the ED for a blood transfusion due to low hemoglobin levels. Pt states she feels weak, has been having issues with her Hgb recently, last transfusion was about 2 months ago. Pt's son at bedside states this would be her 4th blood transfusion in the past few months. Pt denies any vomiting or pain. Pt ran at dialysis yesterday.     Per chart review, lab work on 03/21 show Hbg 5.5.    Her nephrology is Dr. Annalee Bourgeois.    The history is provided by the patient and medical records. No  was used.   Fatigue  This is a new problem. The current episode started more than 2 days ago. The problem occurs constantly. The problem has been gradually worsening. Pertinent negatives include no chest pain, no abdominal pain and no shortness of breath. Nothing aggravates the symptoms. Nothing relieves the symptoms. She has tried nothing for the symptoms. The treatment provided no relief.   Review of patient's allergies indicates:   Allergen Reactions    Lisinopril Swelling    Azithromycin      Other reaction(s): tongue/facial swelling    Baclofen      Other reaction(s): tongue/facial swelling    Doxycycline      Other reaction(s): Angioedema     Past Medical History:   Diagnosis Date    CKD (chronic kidney disease) requiring chronic dialysis     COPD (chronic obstructive pulmonary disease)      Diabetes mellitus     Hyperlipidemia     Hypertension     Renal insufficiency     Thyroid disease      Past Surgical History:   Procedure Laterality Date    AV FISTULA PLACEMENT Left     CARDIAC CATHETERIZATION      COLONOSCOPY      CORONARY ARTERY BYPASS GRAFT      EGD, WITH HEMORRHAGE CONTROL  3/24/2023    Procedure: EGD,WITH HEMORRHAGE CONTROL;  Surgeon: Go Le MD;  Location: [facility];  Service: Gastroenterology;;    ESOPHAGOGASTRODUODENOSCOPY      ESOPHAGOGASTRODUODENOSCOPY N/A 2/17/2023    Procedure: EGD +/- VCE PLACEMENT;  Surgeon: Morgan Gardner MD;  Location: [facility] ENDOS[facility];  Service: Gastroenterology;  Laterality: N/A;    ESOPHAGOGASTRODUODENOSCOPY N/A 3/24/2023    Procedure: EGD;  Surgeon: Go Le MD;  Location: [facility];  Service: Gastroenterology;  Laterality: N/A;  with push    POLYPECTOMY      SMALL BOWEL ENTEROSCOPY Left 1/20/2023    Procedure: ENTEROSCOPY;  Surgeon: Lexi Scales MD;  Location: [facility] END[facility]COPY;  Service: Gastroenterology;  Laterality: Left;    THYROIDECTOMY      TUBAL LIGATION       Family History   Problem Relation Age of Onset    Hypertension Mother     Hypertension Father     Hypertension Sister     Hypertension Sister      Social History     Tobacco Use    Smoking status: Every Day     Packs/day: 0.25     Types: Cigarettes    Smokeless tobacco: Never    Tobacco comments:     Smokes 4 cigarettes a day   Substance Use Topics    Alcohol use: Yes     Comment: 1 glass every 2-3 month    Drug use: Not Currently     Review of Systems   Constitutional:  Positive for fatigue. Negative for fever.   HENT:  Negative for sore throat.    Eyes:  Negative for visual disturbance.   Respiratory:  Negative for shortness of breath.    Cardiovascular:  Negative for chest pain.   Gastrointestinal:  Negative for abdominal pain and nausea.   Genitourinary:  Negative for dysuria.   Musculoskeletal:  Negative for joint swelling.   Skin:  Negative for rash.    Neurological:  Positive for weakness (generalized).   Psychiatric/Behavioral:  Negative for confusion.      Physical Exam     Initial Vitals [03/22/23 1246]   BP Pulse Resp Temp SpO2   (!) 111/52 61 18 99.2 °F (37.3 °C) 98 %      MAP       --         Physical Exam    Nursing note and vitals reviewed.  Constitutional: She appears well-developed and well-nourished.   HENT:   Head: Normocephalic and atraumatic.   Eyes: EOM are normal. Pupils are equal, round, and reactive to light.   Neck:   Normal range of motion.  Cardiovascular:  Normal rate, regular rhythm, normal heart sounds and intact distal pulses.           No murmur heard.  Pulmonary/Chest: Breath sounds normal. No respiratory distress. She has no wheezes. She has no rales.   Abdominal: Abdomen is soft. She exhibits no distension. There is no abdominal tenderness. There is no rebound.   Genitourinary: Rectum:      Guaiac result positive.   Guaiac positive stool. : Acceptable.   Genitourinary Comments: Melanotic stool, hemoccult positive.  Female chaperone present.     Musculoskeletal:         General: No tenderness or edema. Normal range of motion.      Cervical back: Normal range of motion.     Neurological: She is alert. She has normal strength. No cranial nerve deficit. GCS score is 15. GCS eye subscore is 4. GCS verbal subscore is 5. GCS motor subscore is 6.   Skin: Skin is warm and dry. Capillary refill takes less than 2 seconds. No rash noted. No erythema.   Psychiatric: She has a normal mood and affect.       ED Course   Critical Care    Date/Time: 3/22/2023 3:12 PM  Performed by: Raghu Hernandez MD  Authorized by: Raghu Hernandez MD   Direct patient critical care time: 10 minutes  Additional history critical care time: 8 minutes  Ordering / reviewing critical care time: 6 minutes  Documentation critical care time: 5 minutes  Consulting other physicians critical care time: 5 minutes  Total critical care time (exclusive of procedural  time) : 34 minutes  Critical care time was exclusive of separately billable procedures and treating other patients and teaching time.  Critical care was necessary to treat or prevent imminent or life-threatening deterioration of the following conditions: metabolic crisis and renal failure.  Critical care was time spent personally by me on the following activities: development of treatment plan with patient or surrogate, discussions with consultants, interpretation of cardiac output measurements, evaluation of patient's response to treatment, examination of patient, obtaining history from patient or surrogate, ordering and performing treatments and interventions, ordering and review of laboratory studies, ordering and review of radiographic studies, pulse oximetry, re-evaluation of patient's condition and review of old charts.      Labs Reviewed   CBC WITH DIFFERENTIAL - Abnormal; Notable for the following components:       Result Value    RBC 1.69 (*)     Hgb 4.7 (*)     Hct 15.9 (*)     MCV 94.1 (*)     MCHC 29.6 (*)     RDW 17.7 (*)     MPV 10.5 (*)     IG# 0.06 (*)     All other components within normal limits   COMPREHENSIVE METABOLIC PANEL - Abnormal; Notable for the following components:    Sodium Level 135 (*)     Glucose Level 168 (*)     Blood Urea Nitrogen 50.3 (*)     Creatinine 3.85 (*)     Protein Total 5.1 (*)     Albumin Level 2.7 (*)     All other components within normal limits   PROTIME-INR - Abnormal; Notable for the following components:    PT 18.2 (*)     INR 1.54 (*)     All other components within normal limits   APTT - Abnormal; Notable for the following components:    PTT 37.5 (*)     All other components within normal limits   COMPREHENSIVE METABOLIC PANEL - Abnormal; Notable for the following components:    Glucose Level 138 (*)     Blood Urea Nitrogen 73.2 (*)     Creatinine 4.10 (*)     Protein Total 4.6 (*)     Albumin Level 2.7 (*)     Globulin 1.9 (*)     All other components within  normal limits   CBC WITH DIFFERENTIAL - Abnormal; Notable for the following components:    RBC 2.73 (*)     Hgb 7.7 (*)     Hct 24.0 (*)     MCHC 32.1 (*)     IG# 0.07 (*)     All other components within normal limits   POCT GLUCOSE - Abnormal; Notable for the following components:    POCT Glucose 171 (*)     All other components within normal limits   CBC W/ AUTO DIFFERENTIAL    Narrative:     The following orders were created for panel order CBC auto differential.  Procedure                               Abnormality         Status                     ---------                               -----------         ------                     CBC with Differential[[id number]]        Abnormal            Final result                 Please view results for these tests on the individual orders.   TYPE & SCREEN   PREPARE RBC SOFT        ECG Results    None       Imaging Results    None          Medications   ondansetron 4 mg/2 mL injection (has no administration in time range)   LIDOcaine (PF) 20 mg/mL (2%) 20 mg/mL (2 %) injection (has no administration in time range)   glycopyrrolate (ROBINUL) 0.2 mg/mL injection (has no administration in time range)   pantoprazole injection 80 mg (80 mg Intravenous Given 3/22/23 1613)   furosemide injection 10 mg (10 mg Intravenous Given 3/24/23 0332)   propofol (DIPRIVAN) 10 mg/mL IVP injection (  Override pull for Anesthesia 3/24/23 0950)   labetaloL injection 10 mg (10 mg Intravenous Given 3/24/23 1030)     Medical Decision Making:   History:   I obtained history from: someone other than patient.       <> Summary of History: Family reports patient has required multiple transfusions previously.  Old Medical Records: I decided to obtain old medical records.  Old Records Summarized: records from clinic visits and records from previous admission(s).       <> Summary of Records: lab work on 03/21 show Hbg 5.5.  Initial Assessment:   GI bleed  Differential Diagnosis:   Judging by the  patient's chief complaint and pertinent history, the patient has the following possible differential diagnoses, including but not limited to the following.  Some of these are deemed to be lower likelihood and some more likely based on my physical exam and history combined with possible lab work and/or imaging studies.   Please see the pertinent studies, and refer to the HPI.  Some of these diagnoses will take further evaluation to fully rule out, perhaps as an outpatient and the patient was encouraged to follow up when discharged for more comprehensive evaluation.    Upper GI bleed: PUD, gastritis, varices, AVM, tumors, erosions, AE fistula  Lower GI bleed: diverticular bleed, colon cancer, AVM, hemorrhoid, AE fistula     Clinical Tests:   Lab Tests: Ordered and Reviewed  ED Management:  Patient is a 77-year-old history of ESRD on hemodialysis presents to the emergency department for anemia.  See HPI.  See physical exam.  Hemoglobin of 4.7.  Blood products ordered.  Discussed with medicine for admission.  All results discussed with the patient and family.  Patient received Protonix.  Verbalized understanding agreed to plan.  Other:   I have discussed this case with another health care provider.  Medical Decision Making  Problems Addressed:  Anemia, unspecified type: acute illness or injury that poses a threat to life or bodily functions  ESRD (end stage renal disease): acute illness or injury that poses a threat to life or bodily functions  GI bleed: acute illness or injury that poses a threat to life or bodily functions    Amount and/or Complexity of Data Reviewed  External Data Reviewed: labs.     Details: lab work on 03/21 show Hbg 5.5.  Labs: ordered.    Risk  Prescription drug management.  Parenteral controlled substances.  Decision regarding hospitalization.              Scribe Attestation:   Scribe #1: I performed the above scribed service and the documentation accurately describes the services I performed. I  attest to the accuracy of the note.    Attending Attestation:           Physician Attestation for Scribe:  Physician Attestation Statement for Scribe #1: I, Raghu Hernandez MD, reviewed documentation, as scribed by Kary Miller in my presence, and it is both accurate and complete.           ED Course as of 04/10/23 1017   Wed Mar 22, 2023   1642 Paged hospitalist.  [AA]      ED Course User Index  [AA] Angela Preston                 Clinical Impression:   Final diagnoses:  [K92.2] GI bleed  [D64.9] Anemia, unspecified type  [N18.6] ESRD (end stage renal disease)        ED Disposition Condition    Admit Stable                Raghu Hernandez MD  04/10/23 1018

## 2023-03-22 NOTE — ED NOTES
Notified house sup at [facility] and faxed over PICC line screen for PICC/MID line placement

## 2023-03-22 NOTE — ED NOTES
Pt sitting comfortably in wheelchair. Pt appears in no apparent distress. Blood drawn and given to Phlebotomy.

## 2023-03-23 LAB
ABO + RH BLD: NORMAL
ALBUMIN SERPL-MCNC: 2.7 G/DL (ref 3.4–4.8)
ALBUMIN/GLOB SERPL: 1.4 RATIO (ref 1.1–2)
ALP SERPL-CCNC: 65 UNIT/L (ref 40–150)
ALT SERPL-CCNC: 17 UNIT/L (ref 0–55)
AST SERPL-CCNC: 18 UNIT/L (ref 5–34)
BASOPHILS # BLD AUTO: 0.04 X10(3)/MCL (ref 0–0.2)
BASOPHILS NFR BLD AUTO: 0.5 %
BILIRUBIN DIRECT+TOT PNL SERPL-MCNC: 0.8 MG/DL
BLD PROD TYP BPU: NORMAL
BLOOD UNIT EXPIRATION DATE: NORMAL
BLOOD UNIT TYPE CODE: 5100
BUN SERPL-MCNC: 73.2 MG/DL (ref 9.8–20.1)
CALCIUM SERPL-MCNC: 8.4 MG/DL (ref 8.4–10.2)
CHLORIDE SERPL-SCNC: 106 MMOL/L (ref 98–107)
CO2 SERPL-SCNC: 25 MMOL/L (ref 23–31)
CREAT SERPL-MCNC: 4.1 MG/DL (ref 0.55–1.02)
CROSSMATCH INTERPRETATION: NORMAL
DISPENSE STATUS: NORMAL
EOSINOPHIL # BLD AUTO: 0.08 X10(3)/MCL (ref 0–0.9)
EOSINOPHIL NFR BLD AUTO: 1 %
ERYTHROCYTE [DISTWIDTH] IN BLOOD BY AUTOMATED COUNT: 16.8 % (ref 11.5–17)
GFR SERPLBLD CREATININE-BSD FMLA CKD-EPI: 11 MLS/MIN/1.73/M2
GLOBULIN SER-MCNC: 1.9 GM/DL (ref 2.4–3.5)
GLUCOSE SERPL-MCNC: 138 MG/DL (ref 82–115)
HCT VFR BLD AUTO: 24 % (ref 37–47)
HGB BLD-MCNC: 7.7 G/DL (ref 12–16)
IMM GRANULOCYTES # BLD AUTO: 0.07 X10(3)/MCL (ref 0–0.04)
IMM GRANULOCYTES NFR BLD AUTO: 0.9 %
LYMPHOCYTES # BLD AUTO: 0.96 X10(3)/MCL (ref 0.6–4.6)
LYMPHOCYTES NFR BLD AUTO: 12.4 %
MCH RBC QN AUTO: 28.2 PG
MCHC RBC AUTO-ENTMCNC: 32.1 G/DL (ref 33–36)
MCV RBC AUTO: 87.9 FL (ref 80–94)
MONOCYTES # BLD AUTO: 0.55 X10(3)/MCL (ref 0.1–1.3)
MONOCYTES NFR BLD AUTO: 7.1 %
NEUTROPHILS # BLD AUTO: 6.02 X10(3)/MCL (ref 2.1–9.2)
NEUTROPHILS NFR BLD AUTO: 78.1 %
NRBC BLD AUTO-RTO: 0.5 %
PLATELET # BLD AUTO: 149 X10(3)/MCL (ref 130–400)
PMV BLD AUTO: 10.3 FL (ref 7.4–10.4)
POCT GLUCOSE: 171 MG/DL (ref 70–110)
POCT GLUCOSE: 197 MG/DL (ref 70–110)
POTASSIUM SERPL-SCNC: 4.1 MMOL/L (ref 3.5–5.1)
PROT SERPL-MCNC: 4.6 GM/DL (ref 5.8–7.6)
RBC # BLD AUTO: 2.73 X10(6)/MCL (ref 4.2–5.4)
SODIUM SERPL-SCNC: 139 MMOL/L (ref 136–145)
TROPONIN I SERPL-MCNC: 0.08 NG/ML (ref 0–0.04)
UNIT NUMBER: NORMAL
WBC # SPEC AUTO: 7.7 X10(3)/MCL (ref 4.5–11.5)

## 2023-03-23 PROCEDURE — 25000003 PHARM REV CODE 250: Performed by: STUDENT IN AN ORGANIZED HEALTH CARE EDUCATION/TRAINING PROGRAM

## 2023-03-23 PROCEDURE — C9113 INJ PANTOPRAZOLE SODIUM, VIA: HCPCS | Performed by: INTERNAL MEDICINE

## 2023-03-23 PROCEDURE — 85025 COMPLETE CBC W/AUTO DIFF WBC: CPT | Performed by: INTERNAL MEDICINE

## 2023-03-23 PROCEDURE — 93005 ELECTROCARDIOGRAM TRACING: CPT

## 2023-03-23 PROCEDURE — 63600175 PHARM REV CODE 636 W HCPCS: Performed by: INTERNAL MEDICINE

## 2023-03-23 PROCEDURE — 80053 COMPREHEN METABOLIC PANEL: CPT | Performed by: INTERNAL MEDICINE

## 2023-03-23 PROCEDURE — 25000003 PHARM REV CODE 250: Performed by: INTERNAL MEDICINE

## 2023-03-23 PROCEDURE — 93010 ELECTROCARDIOGRAM REPORT: CPT | Mod: ,,, | Performed by: INTERNAL MEDICINE

## 2023-03-23 PROCEDURE — 84484 ASSAY OF TROPONIN QUANT: CPT | Performed by: INTERNAL MEDICINE

## 2023-03-23 PROCEDURE — P9016 RBC LEUKOCYTES REDUCED: HCPCS | Performed by: INTERNAL MEDICINE

## 2023-03-23 PROCEDURE — 80100016 HC MAINTENANCE HEMODIALYSIS

## 2023-03-23 PROCEDURE — A4216 STERILE WATER/SALINE, 10 ML: HCPCS | Performed by: STUDENT IN AN ORGANIZED HEALTH CARE EDUCATION/TRAINING PROGRAM

## 2023-03-23 PROCEDURE — 21400001 HC TELEMETRY ROOM

## 2023-03-23 PROCEDURE — 63600175 PHARM REV CODE 636 W HCPCS: Mod: JZ | Performed by: STUDENT IN AN ORGANIZED HEALTH CARE EDUCATION/TRAINING PROGRAM

## 2023-03-23 PROCEDURE — 93010 EKG 12-LEAD: ICD-10-PCS | Mod: ,,, | Performed by: INTERNAL MEDICINE

## 2023-03-23 PROCEDURE — P9016 RBC LEUKOCYTES REDUCED: HCPCS | Performed by: STUDENT IN AN ORGANIZED HEALTH CARE EDUCATION/TRAINING PROGRAM

## 2023-03-23 RX ORDER — FERROUS GLUCONATE 324(38)MG
324 TABLET ORAL 3 TIMES DAILY
Status: ON HOLD | COMMUNITY
End: 2023-03-25 | Stop reason: HOSPADM

## 2023-03-23 RX ORDER — LEVOTHYROXINE SODIUM 125 UG/1
125 TABLET ORAL
Status: DISCONTINUED | OUTPATIENT
Start: 2023-03-23 | End: 2023-03-25 | Stop reason: HOSPADM

## 2023-03-23 RX ORDER — LANOLIN ALCOHOL/MO/W.PET/CERES
1 CREAM (GRAM) TOPICAL DAILY
Status: DISCONTINUED | OUTPATIENT
Start: 2023-03-23 | End: 2023-03-25 | Stop reason: HOSPADM

## 2023-03-23 RX ORDER — ATORVASTATIN CALCIUM 10 MG/1
20 TABLET, FILM COATED ORAL DAILY
Status: DISCONTINUED | OUTPATIENT
Start: 2023-03-23 | End: 2023-03-25 | Stop reason: HOSPADM

## 2023-03-23 RX ORDER — FLUTICASONE FUROATE AND VILANTEROL 100; 25 UG/1; UG/1
1 POWDER RESPIRATORY (INHALATION) DAILY
Status: DISCONTINUED | OUTPATIENT
Start: 2023-03-24 | End: 2023-03-25 | Stop reason: HOSPADM

## 2023-03-23 RX ORDER — INSULIN ASPART 100 [IU]/ML
1-10 INJECTION, SOLUTION INTRAVENOUS; SUBCUTANEOUS EVERY 6 HOURS PRN
Status: DISCONTINUED | OUTPATIENT
Start: 2023-03-23 | End: 2023-03-25 | Stop reason: HOSPADM

## 2023-03-23 RX ORDER — HYDROCODONE BITARTRATE AND ACETAMINOPHEN 500; 5 MG/1; MG/1
TABLET ORAL
Status: DISCONTINUED | OUTPATIENT
Start: 2023-03-23 | End: 2023-03-25 | Stop reason: HOSPADM

## 2023-03-23 RX ORDER — GLUCAGON 1 MG
1 KIT INJECTION
Status: DISCONTINUED | OUTPATIENT
Start: 2023-03-23 | End: 2023-03-25 | Stop reason: HOSPADM

## 2023-03-23 RX ORDER — DULOXETIN HYDROCHLORIDE 30 MG/1
30 CAPSULE, DELAYED RELEASE ORAL DAILY
Status: DISCONTINUED | OUTPATIENT
Start: 2023-03-24 | End: 2023-03-25 | Stop reason: HOSPADM

## 2023-03-23 RX ORDER — CARVEDILOL 3.12 MG/1
6.25 TABLET ORAL 2 TIMES DAILY
Status: DISCONTINUED | OUTPATIENT
Start: 2023-03-23 | End: 2023-03-25 | Stop reason: HOSPADM

## 2023-03-23 RX ORDER — MUPIROCIN 20 MG/G
OINTMENT TOPICAL 2 TIMES DAILY
Status: DISCONTINUED | OUTPATIENT
Start: 2023-03-23 | End: 2023-03-25 | Stop reason: HOSPADM

## 2023-03-23 RX ORDER — NITROGLYCERIN 0.4 MG/1
0.4 TABLET SUBLINGUAL EVERY 5 MIN PRN
Status: DISCONTINUED | OUTPATIENT
Start: 2023-03-23 | End: 2023-03-25 | Stop reason: HOSPADM

## 2023-03-23 RX ORDER — LACTULOSE 10 G/15ML
15 SOLUTION ORAL 3 TIMES DAILY
Status: DISCONTINUED | OUTPATIENT
Start: 2023-03-23 | End: 2023-03-25 | Stop reason: HOSPADM

## 2023-03-23 RX ORDER — FUROSEMIDE 10 MG/ML
10 INJECTION INTRAMUSCULAR; INTRAVENOUS ONCE
Status: DISCONTINUED | OUTPATIENT
Start: 2023-03-23 | End: 2023-03-24

## 2023-03-23 RX ORDER — FUROSEMIDE 40 MG/1
40 TABLET ORAL DAILY
Status: DISCONTINUED | OUTPATIENT
Start: 2023-03-23 | End: 2023-03-23

## 2023-03-23 RX ORDER — SEVELAMER CARBONATE 800 MG/1
1600 TABLET, FILM COATED ORAL 3 TIMES DAILY
Status: DISCONTINUED | OUTPATIENT
Start: 2023-03-23 | End: 2023-03-25 | Stop reason: HOSPADM

## 2023-03-23 RX ADMIN — INSULIN ASPART 2 UNITS: 100 INJECTION, SOLUTION INTRAVENOUS; SUBCUTANEOUS at 06:03

## 2023-03-23 RX ADMIN — INSULIN DETEMIR 5 UNITS: 100 INJECTION, SOLUTION SUBCUTANEOUS at 09:03

## 2023-03-23 RX ADMIN — LEVOTHYROXINE SODIUM 125 MCG: 125 TABLET ORAL at 06:03

## 2023-03-23 RX ADMIN — LACTULOSE 15 G: 10 SOLUTION ORAL at 09:03

## 2023-03-23 RX ADMIN — INSULIN ASPART 1 UNITS: 100 INJECTION, SOLUTION INTRAVENOUS; SUBCUTANEOUS at 11:03

## 2023-03-23 RX ADMIN — SODIUM CHLORIDE, PRESERVATIVE FREE 10 ML: 5 INJECTION INTRAVENOUS at 07:03

## 2023-03-23 RX ADMIN — CARVEDILOL 6.25 MG: 3.12 TABLET, FILM COATED ORAL at 09:03

## 2023-03-23 RX ADMIN — SEVELAMER CARBONATE 1600 MG: 800 TABLET, FILM COATED ORAL at 09:03

## 2023-03-23 RX ADMIN — MUPIROCIN: 20 OINTMENT TOPICAL at 09:03

## 2023-03-23 RX ADMIN — SODIUM CHLORIDE, PRESERVATIVE FREE 10 ML: 5 INJECTION INTRAVENOUS at 11:03

## 2023-03-23 RX ADMIN — SODIUM CHLORIDE, PRESERVATIVE FREE 10 ML: 5 INJECTION INTRAVENOUS at 12:03

## 2023-03-23 RX ADMIN — PANTOPRAZOLE SODIUM 40 MG: 40 INJECTION, POWDER, FOR SOLUTION INTRAVENOUS at 09:03

## 2023-03-23 RX ADMIN — PANTOPRAZOLE SODIUM 40 MG: 40 INJECTION, POWDER, FOR SOLUTION INTRAVENOUS at 10:03

## 2023-03-23 RX ADMIN — INSULIN ASPART 2 UNITS: 100 INJECTION, SOLUTION INTRAVENOUS; SUBCUTANEOUS at 04:03

## 2023-03-23 RX ADMIN — EPOETIN ALFA-EPBX 20000 UNITS: 20000 INJECTION, SOLUTION INTRAVENOUS; SUBCUTANEOUS at 01:03

## 2023-03-23 NOTE — PROCEDURES
Rosette Madrid is a 77 y.o. female patient.    Temp: 98.3 °F (36.8 °C) (03/22/23 2012)  Pulse: 79 (03/22/23 2100)  Resp: 19 (03/22/23 2100)  BP: (!) 136/58 (03/22/23 2100)  SpO2: 95 % (03/22/23 2100)    PICC  Date/Time: 3/22/2023 9:25 PM  Performed by: Loli Prieto RN  Consent Done: Yes  Time out: Immediately prior to procedure a time out was called to verify the correct patient, procedure, equipment, support staff and site/side marked as required  Indications: med administration and vascular access  Anesthesia: local infiltration  Local anesthetic: lidocaine 1% without epinephrine  Anesthetic Total (mL): 4  Preparation: skin prepped with ChloraPrep  Skin prep agent dried: skin prep agent completely dried prior to procedure  Sterile barriers: all five maximum sterile barriers used - cap, mask, sterile gown, sterile gloves, and large sterile sheet  Hand hygiene: hand hygiene performed prior to central venous catheter insertion  Location details: right brachial  Catheter type: single lumen  Catheter size: 4 Fr  Catheter Length: 17cm    Ultrasound guidance: yes  Vessel Caliber: medium and patent, compressibility normal  Vascular Doppler: not done  Needle advanced into vessel with real time Ultrasound guidance.  Guidewire confirmed in vessel.  Sterile sheath used.  Number of attempts: 1  Post-procedure: blood return through all ports, chlorhexidine patch and sterile dressing applied    Complications: none  Comments: Bruising  and swelling noted to R arm from previous IV attempts prior to procedure. Arm circ- 37cm      Loli Prieto RN  3/22/2023

## 2023-03-23 NOTE — NURSING
03/23/23 1337   Post-Hemodialysis Assessment   Blood Volume Processed (Liters) 72.8 L   Dialyzer Clearance Lightly streaked   Duration of Treatment 180 minutes   Total UF (mL) 2000 mL   Patient Response to Treatment PT responded to tx ok. She did start complaining of ab cramping and asked for UF to be turned off. Then about 25mins later, she started feeling better and wanted to continue to pull.   Post-Hemodialysis Comments 3hr tx, 2L net removed. Pressure applied to AVF until hemostasis achieved. While waiting on transport, PT stated she felt heaviness on her left chest. PT O2 sat 100% on RA, put on 2L NC for comfort, HR 73, BP stable. Primary RN Flor made aware. Post tx VS at 1355: BP-146/61, HR-73, temp-98.0, resp-20.

## 2023-03-23 NOTE — PROGRESS NOTES
[facility] Hospital Medicine Progress Note        Chief Complaint: Inpatient Follow-up for Anemia, GI Bleed    HPI: Patient is a 77-year-old female with a history of ESRD on HD TTS, heart failure with preserved EF of 55%, CAD/CABG, COPD, HTN, HLD, venous insufficiency, hypothyroidism, chronic anemia and known GI bleeding for which she was admitted in February and underwent EGD 02/17/2023 which showed no obvious source of bleeding, followed by video capsule endoscopy which showed a single erosion but unlikely the source of bleeding.  Patient has been having generalized weakness and did have outpatient laboratory work that showed significant worsening of her anemia so was referred to the ER for further evaluation.      On arrival to the ER she was afebrile, hemodynamically stable maintaining normal sats on room air.  Laboratory work showed a hemoglobin of 4.7, and remainder of laboratory work was consistent with end-stage renal disease.  Stool was reported to be melanotic and heme-positive.  Hospitalist service was consulted for admission for further management.    Interval Hx:   Patient was seen and examined at bedside, denies any complaints of lightheadedness/SOB/headache/Chest pain/fever/abdominal cramps/nausea/vomiting.    Chart was reviewed, rpt Hb after transfusions-7.7    Objective/physical exam:  General: In no acute distress, afebrile  Chest: Clear to auscultation bilaterally  Heart: RRR, +S1, S2, no appreciable murmur  Abdomen: Soft, nontender, BS +  MSK: Warm, no lower extremity edema, no clubbing or cyanosis  Neurologic: Alert and oriented x4, Cranial nerve II-XII intact, Strength 5/5 in all 4 extremities    VITAL SIGNS: 24 HRS MIN & MAX LAST   Temp  Min: 97.7 °F (36.5 °C)  Max: 99.7 °F (37.6 °C) 98.4 °F (36.9 °C)   BP  Min: 113/49  Max: 153/74 127/78   Pulse  Min: 67  Max: 96  96   Resp  Min: 15  Max: 28 20   SpO2  Min: 95 %  Max: 100 % 96 %       Recent Labs   Lab  03/22/23  1332 03/23/23  0753   WBC 8.0 7.7   RBC 1.69* 2.73*   HGB 4.7* 7.7*   HCT 15.9* 24.0*   MCV 94.1* 87.9   MCH 27.8 28.2   MCHC 29.6* 32.1*   RDW 17.7* 16.8    149   MPV 10.5* 10.3       Recent Labs   Lab 03/22/23  1332 03/23/23  0753   * 139   K 4.3 4.1   CO2 24 25   BUN 50.3* 73.2*   CREATININE 3.85* 4.10*   CALCIUM 8.6 8.4   ALBUMIN 2.7* 2.7*   ALKPHOS 73 65   ALT 20 17   AST 27 18   BILITOT 0.4 0.8          Microbiology Results (last 7 days)       ** No results found for the last 168 hours. **             See below for Radiology    Scheduled Med:   atorvastatin  20 mg Oral Daily    carvediloL  6.25 mg Oral BID    epoetin alexis-ebpx (RETACRIT) injection  20,000 Units Subcutaneous Every Tues, Thurs, Sat    ferrous sulfate  1 tablet Oral Daily    lactulose 10 gram/15 ml  15 g Oral TID    levothyroxine  125 mcg Oral Before breakfast    pantoprazole  40 mg Intravenous Q12H    sevelamer carbonate  1,600 mg Oral TID    sodium chloride 0.9%  10 mL Intravenous Q6H    tiotropium bromide  2 puff Inhalation Daily        Continuous Infusions:       PRN Meds:  sodium chloride, dextrose 10%, glucagon (human recombinant), insulin aspart U-100, labetalol, melatonin, sodium chloride 0.9%, Flushing PICC Protocol **AND** sodium chloride 0.9% **AND** sodium chloride 0.9%       Assessment/Plan:  Melena and acute blood loss anemia from GI bleeding  VCE/EGD 2/17/23 no obvious source of bleed   Swollen Upper Extremity,Rt per report  NSTEMI, Likely type 2  ESRD on HD TTS  Heart failure with preserved EF 55%   CAD/CABG  PAF on Eliquis  COPD, without evidence of acute exacerbation  Hypertension  Hyperlipidemia  Venous insufficiency       Plan:  Transfuse to keep Hb>8, Hold Blood thinners  PPI BID in house  GI was consulted, Endo with Push AM  Dialysis to continue in house, TTS  F/u Ultrasound Rt UE  Patient had an episode of heaviness during dialysis per report,F/u ECHO, Trend Trop, Continue Statin,Betablocker, PRN  Nitro, has Hx of CAD, Asp and Eliquis on hold given GI bleed  Home Medications  resumed and continued as needed  PT services once stable    Critical care Time Spent>35 min   Anemia requiring blood transfusions      VTE prophylaxis: SCDs    Patient condition:  Critical    Anticipated discharge and Disposition:         All diagnosis and differential diagnosis have been reviewed; assessment and plan has been documented; I have personally reviewed the labs and test results that are presently available; I have reviewed the patients medication list; I have reviewed the consulting providers response and recommendations. I have reviewed or attempted to review medical records based upon their availability    All of the patient's questions have been  addressed and answered. Patient's is agreeable to the above stated plan. I will continue to monitor closely and make adjustments to medical management as needed.  _____________________________________________________________________    Nutrition Status:    Radiology:  X-Ray Chest 1 View  Narrative: EXAMINATION:  XR CHEST 1 VIEW    CLINICAL HISTORY:  sob;    TECHNIQUE:  Frontal view(s) of the chest.    COMPARISON:  Radiography 02/09/2023    FINDINGS:  Prior sternotomy.  Stable cardiac silhouette.  The lungs are well-inflated.  No consolidation identified.  No significant pleural effusion or discernible pneumothorax.  Impression: No acute pulmonary process identified.    Electronically signed by: Reinier Owen  Date:    03/23/2023  Time:    17:22      Lissette Oliveira MD   03/23/2023

## 2023-03-23 NOTE — PLAN OF CARE
Patient needs dialysis before endoscopy. Due to timing/schedule, we will postpone EGD until tomorrow. Patient can have clear liquids tonight, then will be NPO at midnight.    Alley Bella PA-C  Gastroenterology  [facility]\

## 2023-03-23 NOTE — PLAN OF CARE
03/23/23 1539   Discharge Assessment   Assessment Type Discharge Planning Assessment   Confirmed/corrected address, phone number and insurance Yes   Confirmed Demographics Correct on Facesheet   Source of Information patient   When was your last doctors appointment?   (Patient reports last month.)   Communicated YUN with patient/caregiver Yes   Reason For Admission Melena   People in Home child(shawn), adult   Do you expect to return to your current living situation? Yes   Do you have help at home or someone to help you manage your care at home? Yes   Who are your caregiver(s) and their phone number(s)? Daughter: Marlen Masters:[phone]   Prior to hospitilization cognitive status: Unable to Assess   Current cognitive status: Alert/Oriented   Walking or Climbing Stairs ambulation difficulty, requires equipment   Dressing/Bathing bathing difficulty, assistance 1 person   Home Accessibility wheelchair accessible   Home Layout Able to live on 1st floor   Equipment Currently Used at Home glucometer;cane, straight;walker, rolling   Readmission within 30 days? Yes   Patient currently being followed by outpatient case management? No   Do you currently have service(s) that help you manage your care at home? No   Do you take prescription medications? Yes   Do you have prescription coverage? Yes   Coverage [employer]   Do you have any problems affording any of your prescribed medications? No   Is the patient taking medications as prescribed? yes   Who is going to help you get home at discharge? Pt reports her son, Jah   How do you get to doctors appointments? family or friend will provide   Are you on dialysis? Yes   Dialysis Name and Scheduled days Geovanna [place] at [place] [facility] [place] [address]   Do you take coumadin? No   Discharge Plan A Home with family   Discharge Plan B Home   DME Needed Upon Discharge  none   Discharge Plan discussed with: Patient   Discharge Barriers Identified None   OTHER   Name(s) of  People in Home Patient resides with her adult daughter, Marlen Masters.       Patient's PCP is JOSEFINA Adam.  No barriers to discharge at this time.

## 2023-03-23 NOTE — H&P
[facility] Hospital Medicine History & Physical Examination       Patient Name: Rosette Madrid  MRN: [id number]  Patient Class: IP- Inpatient   Admission Date: 3/22/2023 12:48 PM  Length of Stay: 0  Admitting Service: Hospital Medicine   Attending Physician: Manolo Rubi MD   Primary Care Provider: JOSEFINA Lord  History source: EMR, patient and/or patient's family    CHIEF COMPLAINT   Fatigue     HISTORY OF PRESENT ILLNESS:   Patient is a 77-year-old female with a history of ESRD on HD TTS, heart failure with preserved EF of 55%, CAD/CABG, COPD, HTN, HLD, venous insufficiency, hypothyroidism, chronic anemia and known GI bleeding for which she was admitted in February and underwent EGD 02/17/2023 which showed no obvious source of bleeding, followed by video capsule endoscopy which showed a single erosion but unlikely the source of bleeding.  Patient has been having generalized weakness and did have outpatient laboratory work that showed significant worsening of her anemia so was referred to the ER for further evaluation.      On arrival to the ER she was afebrile, hemodynamically stable maintaining normal sats on room air.  Laboratory work showed a hemoglobin of 4.7, and remainder of laboratory work was consistent with end-stage renal disease.  Stool was reported to be melanotic and heme-positive.  Hospitalist service was consulted for admission for further management.    PAST MEDICAL HISTORY:   ESRD on HD TTS  Heart failure with preserved EF 55%   CAD/CABG  PAF on Eliquis  COPD   Hypertension  Hyperlipidemia  Venous insufficiency   Hypothyroidism  Recurrent GI bleeding- source unclear    PAST SURGICAL HISTORY:     Past Surgical History:   Procedure Laterality Date    AV FISTULA PLACEMENT Left     CARDIAC CATHETERIZATION      COLONOSCOPY      CORONARY ARTERY BYPASS GRAFT      ESOPHAGOGASTRODUODENOSCOPY      ESOPHAGOGASTRODUODENOSCOPY N/A 2/17/2023    Procedure: EGD +/-  VCE PLACEMENT;  Surgeon: Morgan Gardner MD;  Location: [facility] ENDOSCOPY;  Service: Gastroenterology;  Laterality: N/A;    POLYPECTOMY      SMALL BOWEL ENTEROSCOPY Left 1/20/2023    Procedure: ENTEROSCOPY;  Surgeon: Lexi Scales MD;  Location: [facility] ENDOSCOPY;  Service: Gastroenterology;  Laterality: Left;    THYROIDECTOMY      TUBAL LIGATION         ALLERGIES:   Lisinopril, Azithromycin, Baclofen, and Doxycycline    FAMILY HISTORY:   Reviewed and non-contributory     SOCIAL HISTORY:      Smoking status: Every Day     Packs/day: 0.25     Types: Cigarettes    Smokeless tobacco: Never    Tobacco comments:     Smokes 4 cigarettes a day    Alcohol use: Yes     Comment: 1 glass every 2-3 month      HOME MEDICATIONS:     albuterol (VENTOLIN HFA) 90 mcg/actuation inhaler Inhale 2 puffs into the lungs every 6 (six) hours as needed for Wheezing. Rescue   albuterol-ipratropium (DUO-NEB) 2.5 mg-0.5 mg/3 mL nebulizer solution Take 3 mLs by nebulization every 6 (six) hours as needed for Wheezing or Shortness of Breath. Rescue   apixaban (ELIQUIS) 5 mg Tab Take 5 mg by mouth 2 (two) times daily.   aspirin (ECOTRIN) 81 MG EC tablet Take 1 tablet (81 mg total) by mouth once daily.   BASAGLAR KWIKPEN U-100 INSULIN glargine 100 units/mL SubQ pen Inject 30 Units into the skin Daily.   BENADRYL 25 mg capsule Take 50 mg by mouth nightly.   bisacodyL (DULCOLAX) 5 mg EC tablet Take 5 mg by mouth daily as needed for Constipation.   carvediloL (COREG) 6.25 MG tablet Take 1 tablet (6.25 mg total) by mouth 2 (two) times daily.   clonazePAM (KLONOPIN) 0.5 MG tablet Take 0.5 mg by mouth daily as needed.   DIALYVITE 100-1 mg Tab Take 1 tablet by mouth once daily.   DULoxetine (CYMBALTA) 30 MG capsule Take 30 mg by mouth once daily.   ergocalciferol (ERGOCALCIFEROL) 50,000 unit Cap Take 1 capsule (50,000 Units total) by mouth every 7 days.   ferrous sulfate (FEOSOL) 325 mg (65 mg iron) Tab tablet Take 1 tablet (325 mg total) by mouth  "once daily.   fluticasone furoate-vilanteroL (BREO) 100-25 mcg/dose diskus inhaler Inhale 1 puff into the lungs once daily.   furosemide (LASIX) 40 MG tablet Take 1 tablet (40 mg total) by mouth once daily.   glipiZIDE (GLUCOTROL) 10 MG tablet Take 1 tablet (10 mg total) by mouth daily with breakfast.   INVEGA SUSTENNA 156 mg/mL Syrg injection Inject into the muscle.   L-METHYLFOLATE 15 mg Tab Take 1 tablet by mouth once daily.   lactulose 10 gram/15 ml (CHRONULAC) 10 gram/15 mL (15 mL) solution Take 23 mLs (15.3333 g total) by mouth 3 (three) times daily.   levothyroxine (SYNTHROID) 125 MCG tablet Take 1 tablet (125 mcg total) by mouth before breakfast.   ONETOUCH DELICA PLUS LANCET 30 gauge Misc 1 lancet by Other route once daily.   ONETOUCH ULTRA TEST Strp 1 strip by Other route once daily.   pantoprazole (PROTONIX) 40 MG tablet Take 1 tablet (40 mg total) by mouth 2 (two) times daily.   pen needle, diabetic 31 gauge x 5/16" Ndle 2 Units by Misc.(Non-Drug; Combo Route) route 2 (two) times a day. Patient to check blood sugars twice daily (morning and night)   polyethylene glycol (GLYCOLAX) 17 gram PwPk Take 17 g by mouth once daily.   rosuvastatin (CRESTOR) 40 MG Tab Take 1 tablet (40 mg total) by mouth once daily.   sevelamer carbonate (RENVELA) 800 mg Tab Take 1,600 mg by mouth 3 (three) times daily.   tiotropium (SPIRIVA WITH HANDIHALER) 18 mcg inhalation capsule Inhale 1 capsule (18 mcg total) into the lungs Daily.       REVIEW OF SYSTEMS:   Except as documented, all other systems reviewed and negative     PHYSICAL EXAM:   T 98.3 °F (36.8 °C)   BP (!) 141/59   P 89   RR 17   O2 97 %  GENERAL: awake, alert, oriented and in no acute distress, non-toxic appearing   HEENT: normocephalic atraumatic   NECK: supple   LUNGS: Clear bilaterally, no wheezing or rales, no accessory muscle use   CVS: Regular rate and rhythm, normal peripheral perfusion  ABD: Soft, non-tender, non-distended, bowel sounds " present  EXTREMITIES: no clubbing or cyanosis  SKIN: Warm, dry.   NEURO: alert and oriented, grossly without focal deficits   PSYCHIATRIC: Cooperative    LABS AND IMAGING:     Recent Labs     03/22/23  1332   WBC 8.0   RBC 1.69*   HGB 4.7*   HCT 15.9*   MCV 94.1*   MCH 27.8   MCHC 29.6*   RDW 17.7*        No results for input(s): LACTIC in the last 72 hours.  Recent Labs     03/22/23  1331   INR 1.54*     No results for input(s): HGBA1C, CHOL, TRIG, LDL, VLDL, HDL in the last 72 hours.   Recent Labs     03/22/23  1332   *   K 4.3   CHLORIDE 104   CO2 24   BUN 50.3*   CREATININE 3.85*   GLUCOSE 168*   CALCIUM 8.6   ALBUMIN 2.7*   GLOBULIN 2.4   ALKPHOS 73   ALT 20   AST 27   BILITOT 0.4     No results for input(s): BNP, CPK, TROPONINI in the last 72 hours.       X-Ray Chest 1 View  Narrative: EXAMINATION:  XR CHEST 1 VIEW    CLINICAL HISTORY:  Chest pain;    TECHNIQUE:  One view    COMPARISON:  January 19, 2023.    FINDINGS:  Cardiopericardial silhouette enlarged appearance is similar.  Sternotomy changes.  No acute dense focal or segmental consolidation, congestive process, pleural effusions or pneumothorax.  Impression: NO ACUTE CARDIOPULMONARY PROCESS IDENTIFIED.    Electronically signed by: Niles Stevenson  Date:    02/09/2023  Time:    19:13      ASSESSMENT & PLAN:   Melena and acute blood loss anemia from GI bleeding  VCE/EGD 2/17/23 no obvious source of bleed   ESRD on HD TTS  Heart failure with preserved EF 55%   CAD/CABG  PAF on Eliquis  COPD, without evidence of acute exacerbation  Hypertension  Hyperlipidemia  Venous insufficiency     - hold Eliquis  - iv protonix  - transfuse to hb >7  - GI consult   - nephrology consult for HD   - resume home medications as appropriate pending med rec    DVT prophylaxis: scds/amb  Code status: full code     If patient was admitted under observational status it is with my approval/permission.     At least 55 min was spent on this history and physical.  Time  seen: 10PM 3/22/23  Critical care time = 35 min; Critical care diagnosis = anemia/transfusion   Manolo Rubi MD

## 2023-03-23 NOTE — CONSULTS
I have reviewed the notes, assessments, and/or procedures performed by HUANG Mendiola, I concur with her documentation of Rosette Madrid.      Gastroenterology Consultation Note    Reason for Consult:  anemia, GI bleed    PCP:   JOSEFINA Lord    Referring MD:  Ozzie Oviedo MD    Hospital Day: 1     Initial History of Present Illness (HPI):  This is a 77 y.o. female known to Dr. Bourgeois with PMH of ESRD on HD Tu/Th/Sa, HFpEF 55%, CAD/CABG x4 on ASA 81mg, HTN, HLD, COPD, venous insufficiency, hypothyroidism, anemia, gastric AVM, adenomatous colon polyps, Afib on Eliquis. She presented to the ED 03/22/23 at the recommendation of her nephrologist for low Hgb on outpatient labs. Patient also endorses weakness. Upon arrival, H&H 4.7/15.9, and rectal exam performed by ED provider revealed melanotic stool that was hemoccult positive. Patient transfused 3u PRBC, repeat H&H 7.7/24.0. GI consulted for anemia and GI bleed.    VCE 02/17/23: small nonbleeding erosion in distal duodenum. Recommend CTE if melena or anemia requiring transfusion off iron supplementation occurs  EGD 02/17/23 Dr. Gardner for JAYSON: normal anatomy, VCE placed  EGD 01/20/23 Dr. BJORN Scales for JAYSON: normal anatomy  Colonoscopy 07/21/21 Dr. Bourgeois for FIT+: multiple TAs throughout colon, nonbleeding internal hemorrhoids. Recommend repeat colonoscopy in 3 years.    Patient on ferrous sulfate. She endorses always having dark stools. Last BM this a.m. Denies n/v, abd pain. She says she just feels weak. Last dose of Eliquis possibly yesterday, patient is unsure. Abd soft and nontender.    ROS:  Review of Systems   Constitutional:  Positive for malaise/fatigue.   Respiratory:  Negative for cough and shortness of breath.    Cardiovascular:  Negative for chest pain.   Gastrointestinal:  Positive for melena. Negative for abdominal pain, constipation, diarrhea, nausea and vomiting.   Neurological:  Positive for weakness.     Medical History:   Past Medical  History:   Diagnosis Date    CKD (chronic kidney disease) requiring chronic dialysis     COPD (chronic obstructive pulmonary disease)     Diabetes mellitus     Hyperlipidemia     Hypertension     Renal insufficiency     Thyroid disease        Surgical History:   Past Surgical History:   Procedure Laterality Date    AV FISTULA PLACEMENT Left     CARDIAC CATHETERIZATION      COLONOSCOPY      CORONARY ARTERY BYPASS GRAFT      ESOPHAGOGASTRODUODENOSCOPY      ESOPHAGOGASTRODUODENOSCOPY N/A 2/17/2023    Procedure: EGD +/- VCE PLACEMENT;  Surgeon: Morgan Gardner MD;  Location: [facility] ENDOSCOPY;  Service: Gastroenterology;  Laterality: N/A;    POLYPECTOMY      SMALL BOWEL ENTEROSCOPY Left 1/20/2023    Procedure: ENTEROSCOPY;  Surgeon: Lexi Scales MD;  Location: [facility] ENDOSCOPY;  Service: Gastroenterology;  Laterality: Left;    THYROIDECTOMY      TUBAL LIGATION         Family History:   Family History   Problem Relation Age of Onset    Hypertension Mother     Hypertension Father     Hypertension Sister     Hypertension Sister    .     Social History:   Social History     Tobacco Use    Smoking status: Every Day     Packs/day: 0.25     Types: Cigarettes    Smokeless tobacco: Never    Tobacco comments:     Smokes 4 cigarettes a day   Substance Use Topics    Alcohol use: Yes     Comment: 1 glass every 2-3 month       Allergies:  Review of patient's allergies indicates:   Allergen Reactions    Lisinopril Swelling    Azithromycin      Other reaction(s): tongue/facial swelling    Baclofen      Other reaction(s): tongue/facial swelling    Doxycycline      Other reaction(s): Angioedema       (Not in a hospital admission)        Objective Findings:    Vital Signs:  BP (!) 146/64   Pulse 70   Temp 98.5 °F (36.9 °C)   Resp 17   SpO2 100%   There is no height or weight on file to calculate BMI.    Physical Exam:  Physical Exam  Constitutional:       General: She is not in acute distress.     Appearance: She is obese. She  is ill-appearing.   HENT:      Head: Normocephalic and atraumatic.      Right Ear: External ear normal.      Left Ear: External ear normal.      Nose: Nose normal.      Mouth/Throat:      Pharynx: Oropharynx is clear.   Eyes:      Conjunctiva/sclera: Conjunctivae normal.   Pulmonary:      Effort: Pulmonary effort is normal. No respiratory distress.   Abdominal:      General: Bowel sounds are normal. There is no distension.      Palpations: Abdomen is soft.      Tenderness: There is no abdominal tenderness. There is no guarding.   Skin:     General: Skin is warm and dry.   Neurological:      Mental Status: She is alert. Mental status is at baseline.   Psychiatric:         Mood and Affect: Mood normal.         Behavior: Behavior normal.       Labs:  Recent Results (from the past 24 hour(s))   Type & Screen    Collection Time: 03/22/23  1:31 PM   Result Value Ref Range    Group & Rh O POS     Indirect Gideon GEL NEG     Specimen Outdate 03/25/2023 23:59    Protime-INR    Collection Time: 03/22/23  1:31 PM   Result Value Ref Range    PT 18.2 (H) 12.5 - 14.5 seconds    INR 1.54 (H) 0.00 - 1.30   APTT    Collection Time: 03/22/23  1:31 PM   Result Value Ref Range    PTT 37.5 (H) 23.2 - 33.7 seconds   Prepare RBC 3 Units; bleed    Collection Time: 03/22/23  1:31 PM   Result Value Ref Range    UNIT NUMBER [id number]     UNIT ABO/RH O POS     DISPENSE STATUS Transfused     Unit Expiration [id number]     Product Code J3100V14     Unit Blood Type Code 5100     CROSSMATCH INTERPRETATION Compatible     UNIT NUMBER [id number]     UNIT ABO/RH O POS     DISPENSE STATUS Issued     Unit Expiration [id number]     Product Code O1743M00     Unit Blood Type Code 5100     CROSSMATCH INTERPRETATION Compatible     UNIT NUMBER [id number]     UNIT ABO/RH O POS     DISPENSE STATUS Transfused     Unit Expiration [id number]     Product Code J5687R25     Unit Blood Type Code 5100     CROSSMATCH INTERPRETATION Compatible    CBC with  Differential    Collection Time: 03/22/23  1:32 PM   Result Value Ref Range    WBC 8.0 4.5 - 11.5 x10(3)/mcL    RBC 1.69 (L) 4.20 - 5.40 x10(6)/mcL    Hgb 4.7 (LL) 12.0 - 16.0 g/dL    Hct 15.9 (LL) 37.0 - 47.0 %    MCV 94.1 (H) 80.0 - 94.0 fL    MCH 27.8 pg    MCHC 29.6 (L) 33.0 - 36.0 g/dL    RDW 17.7 (H) 11.5 - 17.0 %    Platelet 160 130 - 400 x10(3)/mcL    MPV 10.5 (H) 7.4 - 10.4 fL    Neut % 83.0 %    Lymph % 10.0 %    Mono % 5.7 %    Eos % 0.5 %    Basophil % 0.1 %    Lymph # 0.80 0.6 - 4.6 x10(3)/mcL    Neut # 6.66 2.1 - 9.2 x10(3)/mcL    Mono # 0.46 0.1 - 1.3 x10(3)/mcL    Eos # 0.04 0 - 0.9 x10(3)/mcL    Baso # 0.01 0 - 0.2 x10(3)/mcL    IG# 0.06 (H) 0 - 0.04 x10(3)/mcL    IG% 0.7 %    NRBC% 0.0 %   Comprehensive Metabolic Panel    Collection Time: 03/22/23  1:32 PM   Result Value Ref Range    Sodium Level 135 (L) 136 - 145 mmol/L    Potassium Level 4.3 3.5 - 5.1 mmol/L    Chloride 104 98 - 107 mmol/L    Carbon Dioxide 24 23 - 31 mmol/L    Glucose Level 168 (H) 82 - 115 mg/dL    Blood Urea Nitrogen 50.3 (H) 9.8 - 20.1 mg/dL    Creatinine 3.85 (H) 0.55 - 1.02 mg/dL    Calcium Level Total 8.6 8.4 - 10.2 mg/dL    Protein Total 5.1 (L) 5.8 - 7.6 gm/dL    Albumin Level 2.7 (L) 3.4 - 4.8 g/dL    Globulin 2.4 2.4 - 3.5 gm/dL    Albumin/Globulin Ratio 1.1 1.1 - 2.0 ratio    Bilirubin Total 0.4 <=1.5 mg/dL    Alkaline Phosphatase 73 40 - 150 unit/L    Alanine Aminotransferase 20 0 - 55 unit/L    Aspartate Aminotransferase 27 5 - 34 unit/L    eGFR 12 mls/min/1.73/m2   POCT glucose    Collection Time: 03/23/23  5:42 AM   Result Value Ref Range    POCT Glucose 171 (H) 70 - 110 mg/dL       No orders to display         Assessment/Plan:  This is a 77 y.o. female known to Dr. Bourgeois with PMH of ESRD on HD Tu/Th/Sa, HFpEF 55%, CAD/CABG x4 on ASA 81mg, HTN, HLD, COPD, venous insufficiency, hypothyroidism, anemia, gastric AVM, adenomatous colon polyps, Afib on Eliquis. She presented to the ED 03/22/23 at the recommendation of  her nephrologist for low Hgb on outpatient labs. Patient also endorses weakness. Upon arrival, H&H 4.7/15.9, and rectal exam performed by ED provider revealed melanotic stool that was hemoccult positive. Patient transfused 3u PRBC, repeat H&H 7.7/24.0. GI consulted for anemia and GI bleed.    JAYSON, likely multifactorial, with a component 2/2 GI blood loss  - monitor and transfuse as needed to keep Hgb >7-8  - monitor stool for blood - patient is on iron supplementation so her stools will appear dark  - PPI BID  - iron supplementation as needed  - avoid NSAIDs  - VCE Feb 2023 revealed distal duodenal erosion that was nonbleeding, this was unlikely to be the source of her anemia at that time  - EGD in Jan 2023 and Feb 2023 with normal anatomy and no source of active or recent bleeding  - NPO  - EGD with push today after HD  ESRD  - on HD Tue/Thu/Sat  - patient will need HD prior to anesthesia for endoscopy today  Afib  - on Eliquis - held at this time  - last dose possibly 03/22/23    Thank you for allowing us to participate in the care of Rosette Madrid.    Alley Bella PA-C  Gastroenterology  [facility]

## 2023-03-23 NOTE — CONSULTS
Nephrology Initial Consult Note    Patient Name: Rosette Madrid  Age: 77 y.o.  : 1945  MRN: [id number]  Admission Date: 3/22/2023    Reason for Consult:      ESRD  Self, Aaareferral    HPI:     Rosette Madrid is a 77 y.o. female who presents with anemia. PMH significant for ESRD on [place] [facility] [place], HFpEF, CAD s/p CABG, HTN, HLD, COPD, hypothyroidism, anemia, gastric AVM's, and A. Fib. She presented after dialysis labs done showing significant anemia. She does endorse melena. H&H at admission were 4.7/15.9. She was given 3u PRBC with improvement of H&H to 7.7/24. She has been compliant with her HD. She does endorse SOB and LE edema. No CP, abd pain, N/V. Nephrology consulted for ESRD management.         Current Facility-Administered Medications   Medication Dose Route Frequency Provider Last Rate Last Admin    0.9%  NaCl infusion (for blood administration)   Intravenous Q24H PRN Raghu Hernandez MD        atorvastatin tablet 20 mg  20 mg Oral Daily Manolo Rubi MD        carvediloL tablet 6.25 mg  6.25 mg Oral BID Manolo Rubi MD        dextrose 10% bolus 125 mL 125 mL  12.5 g Intravenous PRN Manolo Rubi MD        ferrous sulfate tablet 1 each  1 tablet Oral Daily Manolo Rubi MD        glucagon (human recombinant) injection 1 mg  1 mg Intramuscular PRN Manolo Rubi MD        insulin aspart U-100 injection 1-10 Units  1-10 Units Subcutaneous Q6H PRN Manolo Rubi MD   2 Units at 23 0633    labetaloL injection 10 mg  10 mg Intravenous Q2H PRN Manolo Rubi MD        lactulose 10 gram/15 ml solution 15 g  15 g Oral TID Manolo Rubi MD        levothyroxine tablet 125 mcg  125 mcg Oral Before breakfast Manolo Rubi MD   125 mcg at 23 0600    melatonin tablet 6 mg  6 mg Oral Nightly PRN Manolo Rubi MD        pantoprazole injection 40 mg  40 mg Intravenous Q12H Manolo Rubi MD   40 mg at 23     sevelamer carbonate tablet 1,600 mg  1,600 mg Oral TID Manolo Rubi MD        sodium chloride 0.9% flush 10 mL  10 mL Intravenous PRN Manolo Rubi MD        sodium chloride 0.9% flush 10 mL  10 mL Intravenous Q6H Raghu Hernandez MD   10 mL at 03/23/23 0715    And    sodium chloride 0.9% flush 10 mL  10 mL Intravenous PRN Raghu Hernandez MD        tiotropium bromide 2.5 mcg/actuation inhaler 2 puff  2 puff Inhalation Daily Manolo Rubi MD         Current Outpatient Medications   Medication Sig Dispense Refill    albuterol (VENTOLIN HFA) 90 mcg/actuation inhaler Inhale 2 puffs into the lungs every 6 (six) hours as needed for Wheezing. Rescue 18 g 1    albuterol-ipratropium (DUO-NEB) 2.5 mg-0.5 mg/3 mL nebulizer solution Take 3 mLs by nebulization every 6 (six) hours as needed for Wheezing or Shortness of Breath. Rescue 75 mL 0    apixaban (ELIQUIS) 5 mg Tab Take 5 mg by mouth 2 (two) times daily.      aspirin (ECOTRIN) 81 MG EC tablet Take 1 tablet (81 mg total) by mouth once daily. 90 tablet 1    BASAGLAR KWIKPEN U-100 INSULIN glargine 100 units/mL SubQ pen Inject 30 Units into the skin Daily. 27 mL 2    BENADRYL 25 mg capsule Take 50 mg by mouth nightly.      bisacodyL (DULCOLAX) 5 mg EC tablet Take 5 mg by mouth daily as needed for Constipation.      carvediloL (COREG) 6.25 MG tablet Take 1 tablet (6.25 mg total) by mouth 2 (two) times daily. 180 tablet 3    clonazePAM (KLONOPIN) 0.5 MG tablet Take 0.5 mg by mouth daily as needed.      DIALYVITE 100-1 mg Tab Take 1 tablet by mouth once daily.      DULoxetine (CYMBALTA) 30 MG capsule Take 30 mg by mouth once daily.      ergocalciferol (ERGOCALCIFEROL) 50,000 unit Cap Take 1 capsule (50,000 Units total) by mouth every 7 days. 12 capsule 2    ferrous sulfate (FEOSOL) 325 mg (65 mg iron) Tab tablet Take 1 tablet (325 mg total) by mouth once daily. 90 tablet 2    fluticasone furoate-vilanteroL (BREO) 100-25 mcg/dose diskus inhaler Inhale 1  "puff into the lungs once daily. 90 each 2    furosemide (LASIX) 40 MG tablet Take 1 tablet (40 mg total) by mouth once daily. 90 tablet 3    glipiZIDE (GLUCOTROL) 10 MG tablet Take 1 tablet (10 mg total) by mouth daily with breakfast. 90 tablet 2    INVEGA SUSTENNA 156 mg/mL Syrg injection Inject into the muscle.      L-METHYLFOLATE 15 mg Tab Take 1 tablet by mouth once daily.      lactulose 10 gram/15 ml (CHRONULAC) 10 gram/15 mL (15 mL) solution Take 23 mLs (15.3333 g total) by mouth 3 (three) times daily. 15 mL 3    levothyroxine (SYNTHROID) 125 MCG tablet Take 1 tablet (125 mcg total) by mouth before breakfast. 90 tablet 2    ONETOUCH DELICA PLUS LANCET 30 gauge Misc 1 lancet by Other route once daily. 100 each 2    ONETOUCH ULTRA TEST Strp 1 strip by Other route once daily. 200 strip 2    pantoprazole (PROTONIX) 40 MG tablet Take 1 tablet (40 mg total) by mouth 2 (two) times daily. 60 tablet 11    pen needle, diabetic 31 gauge x 5/16" Ndle 2 Units by Misc.(Non-Drug; Combo Route) route 2 (two) times a day. Patient to check blood sugars twice daily (morning and night) 200 each 2    polyethylene glycol (GLYCOLAX) 17 gram PwPk Take 17 g by mouth once daily. 10 each 1    rosuvastatin (CRESTOR) 40 MG Tab Take 1 tablet (40 mg total) by mouth once daily. 90 tablet 3    sevelamer carbonate (RENVELA) 800 mg Tab Take 1,600 mg by mouth 3 (three) times daily.      tiotropium (SPIRIVA WITH HANDIHALER) 18 mcg inhalation capsule Inhale 1 capsule (18 mcg total) into the lungs Daily. 90 capsule 2       JOSEFINA Lord    Past Medical History:   Diagnosis Date    CKD (chronic kidney disease) requiring chronic dialysis     COPD (chronic obstructive pulmonary disease)     Diabetes mellitus     Hyperlipidemia     Hypertension     Renal insufficiency     Thyroid disease       Past Surgical History:   Procedure Laterality Date    AV FISTULA PLACEMENT Left     CARDIAC CATHETERIZATION      COLONOSCOPY      CORONARY ARTERY BYPASS " GRAFT      ESOPHAGOGASTRODUODENOSCOPY      ESOPHAGOGASTRODUODENOSCOPY N/A 2/17/2023    Procedure: EGD +/- VCE PLACEMENT;  Surgeon: Morgan Gardner MD;  Location: [facility] ENDOSCOPY;  Service: Gastroenterology;  Laterality: N/A;    POLYPECTOMY      SMALL BOWEL ENTEROSCOPY Left 1/20/2023    Procedure: ENTEROSCOPY;  Surgeon: Lexi Scales MD;  Location: [facility] ENDOSCOPY;  Service: Gastroenterology;  Laterality: Left;    THYROIDECTOMY      TUBAL LIGATION        Family History   Problem Relation Age of Onset    Hypertension Mother     Hypertension Father     Hypertension Sister     Hypertension Sister      Social History     Tobacco Use    Smoking status: Every Day     Packs/day: 0.25     Types: Cigarettes    Smokeless tobacco: Never    Tobacco comments:     Smokes 4 cigarettes a day   Substance Use Topics    Alcohol use: Yes     Comment: 1 glass every 2-3 month     (Not in a hospital admission)    Review of patient's allergies indicates:   Allergen Reactions    Lisinopril Swelling    Azithromycin      Other reaction(s): tongue/facial swelling    Baclofen      Other reaction(s): tongue/facial swelling    Doxycycline      Other reaction(s): Angioedema            Review of Systems:     Comprehensive 10pt ROS negative except as noted per HPI.      Objective:       VITAL SIGNS: 24 HR MIN & MAX LAST    Temp  Min: 97.7 °F (36.5 °C)  Max: 99.7 °F (37.6 °C)  98.1 °F (36.7 °C)        BP  Min: 111/52  Max: 153/74  (!) 146/64     Pulse  Min: 61  Max: 89  70     Resp  Min: 15  Max: 28  19    SpO2  Min: 95 %  Max: 100 %  100 %      GEN: Chronically ill appearing AAF in NAD  HEENT: Conjunctiva anicteric, pupils equal   CV: RRR +S1,S2 without murmur  PULM: Expiratory wheeze, unlabored  ABD: Soft, NT/ND abdomen with NABS  EXT: 1+ BLE edema  SKIN: Warm and dry  PSYCH: Awake, alert and appropriately conversant.   Vascular access: LUE AVF with good pulsation and thrill            Component Value Date/Time     03/23/2023 0753      (L) 03/22/2023 1332    K 4.1 03/23/2023 0753    K 4.3 03/22/2023 1332    CHLORIDE 106 03/23/2023 0753    CHLORIDE 104 03/22/2023 1332    CO2 25 03/23/2023 0753    CO2 24 03/22/2023 1332    BUN 73.2 (H) 03/23/2023 0753    BUN 50.3 (H) 03/22/2023 1332    CREATININE 4.10 (H) 03/23/2023 0753    CREATININE 3.85 (H) 03/22/2023 1332    CALCIUM 8.4 03/23/2023 0753    CALCIUM 8.6 03/22/2023 1332    PHOS 3.1 02/18/2023 0412            Component Value Date/Time    WBC 7.7 03/23/2023 0753    WBC 8.0 03/22/2023 1332    HGB 7.7 (L) 03/23/2023 0753    HGB 4.7 (LL) 03/22/2023 1332    HCT 24.0 (L) 03/23/2023 0753    HCT 15.9 (LL) 03/22/2023 1332     03/23/2023 0753     03/22/2023 1332             No orders to display       Assessment / Plan:       Active Hospital Problems    Diagnosis  POA    *GI bleed [K92.2]  Yes      Resolved Hospital Problems   No resolved problems to display.       [place]RD - [place] [facility] [place]  Hypervolemia  Anemia - 2/2 GIB with AoCD. Received PRBC  GI bleed - GI following, planning for EGD    Plan:  Will do HD today on regular TTS schedule. Aim for 3L UF with mild hypervolemia. GI planning for EGD. She has received 3u PRBC. Start EPO 20,000u TTS.       Fco Donis DO  Nephrology  [facility]  Clinic number: [phone]

## 2023-03-24 ENCOUNTER — ANESTHESIA (OUTPATIENT)
Dept: ENDOSCOPY | Facility: HOSPITAL | Age: 78
DRG: 377 | End: 2023-03-24
Payer: MEDICARE

## 2023-03-24 ENCOUNTER — ANESTHESIA EVENT (OUTPATIENT)
Dept: ENDOSCOPY | Facility: HOSPITAL | Age: 78
DRG: 377 | End: 2023-03-24
Payer: MEDICARE

## 2023-03-24 LAB
ALBUMIN SERPL-MCNC: 2.6 G/DL (ref 3.4–4.8)
AORTIC ROOT ANNULUS: 4 CM
AORTIC VALVE CUSP SEPERATION: 1.9 CM
AV INDEX (PROSTH): 0.82
AV MEAN GRADIENT: 8 MMHG
AV PEAK GRADIENT: 16 MMHG
AV VALVE AREA: 2.57 CM2
AV VELOCITY RATIO: 0.7
BASOPHILS # BLD AUTO: 0.05 X10(3)/MCL (ref 0–0.2)
BASOPHILS NFR BLD AUTO: 0.6 %
BSA FOR ECHO PROCEDURE: 2.06 M2
BUN SERPL-MCNC: 42.2 MG/DL (ref 9.8–20.1)
CALCIUM SERPL-MCNC: 8.7 MG/DL (ref 8.4–10.2)
CHLORIDE SERPL-SCNC: 106 MMOL/L (ref 98–107)
CO2 SERPL-SCNC: 26 MMOL/L (ref 23–31)
CREAT SERPL-MCNC: 3.53 MG/DL (ref 0.55–1.02)
CV ECHO LV RWT: 0.46 CM
DOP CALC AO PEAK VEL: 2 M/S
DOP CALC AO VTI: 38.8 CM
DOP CALC LVOT AREA: 3.1 CM2
DOP CALC LVOT DIAMETER: 2 CM
DOP CALC LVOT PEAK VEL: 1.39 M/S
DOP CALC LVOT STROKE VOLUME: 99.85 CM3
DOP CALC MV VTI: 33 CM
DOP CALCLVOT PEAK VEL VTI: 31.8 CM
E WAVE DECELERATION TIME: 251 MSEC
E/A RATIO: 1.11
E/E' RATIO: 18 M/S
ECHO LV POSTERIOR WALL: 1.27 CM (ref 0.6–1.1)
EJECTION FRACTION: 60 %
EOSINOPHIL # BLD AUTO: 0.11 X10(3)/MCL (ref 0–0.9)
EOSINOPHIL NFR BLD AUTO: 1.2 %
ERYTHROCYTE [DISTWIDTH] IN BLOOD BY AUTOMATED COUNT: 17.2 % (ref 11.5–17)
FRACTIONAL SHORTENING: 34 % (ref 28–44)
GFR SERPLBLD CREATININE-BSD FMLA CKD-EPI: 13 MLS/MIN/1.73/M2
GLUCOSE SERPL-MCNC: 133 MG/DL (ref 82–115)
HCT VFR BLD AUTO: 25 % (ref 37–47)
HGB BLD-MCNC: 8.1 G/DL (ref 12–16)
IMM GRANULOCYTES # BLD AUTO: 0.06 X10(3)/MCL (ref 0–0.04)
IMM GRANULOCYTES NFR BLD AUTO: 0.7 %
INTERVENTRICULAR SEPTUM: 1.41 CM (ref 0.6–1.1)
LEFT INTERNAL DIMENSION IN SYSTOLE: 3.67 CM (ref 2.1–4)
LEFT VENTRICLE DIASTOLIC VOLUME INDEX: 77.16 ML/M2
LEFT VENTRICLE DIASTOLIC VOLUME: 152 ML
LEFT VENTRICLE MASS INDEX: 164 G/M2
LEFT VENTRICLE SYSTOLIC VOLUME INDEX: 28.9 ML/M2
LEFT VENTRICLE SYSTOLIC VOLUME: 57 ML
LEFT VENTRICULAR INTERNAL DIMENSION IN DIASTOLE: 5.57 CM (ref 3.5–6)
LEFT VENTRICULAR MASS: 324 G
LV LATERAL E/E' RATIO: 18 M/S
LV SEPTAL E/E' RATIO: 18 M/S
LVOT MG: 4 MMHG
LVOT MV: 0.91 CM/S
LYMPHOCYTES # BLD AUTO: 0.95 X10(3)/MCL (ref 0.6–4.6)
LYMPHOCYTES NFR BLD AUTO: 10.8 %
MCH RBC QN AUTO: 28.6 PG (ref 27–31)
MCHC RBC AUTO-ENTMCNC: 32.4 G/DL (ref 33–36)
MCV RBC AUTO: 88.3 FL (ref 80–94)
MONOCYTES # BLD AUTO: 0.52 X10(3)/MCL (ref 0.1–1.3)
MONOCYTES NFR BLD AUTO: 5.9 %
MV MEAN GRADIENT: 3 MMHG
MV PEAK A VEL: 0.65 M/S
MV PEAK E VEL: 0.72 M/S
MV PEAK GRADIENT: 5 MMHG
MV STENOSIS PRESSURE HALF TIME: 101 MS
MV VALVE AREA BY CONTINUITY EQUATION: 3.03 CM2
MV VALVE AREA P 1/2 METHOD: 2.18 CM2
NEUTROPHILS # BLD AUTO: 7.12 X10(3)/MCL (ref 2.1–9.2)
NEUTROPHILS NFR BLD AUTO: 80.8 %
NRBC BLD AUTO-RTO: 0.3 %
PHOSPHATE SERPL-MCNC: 3.3 MG/DL (ref 2.3–4.7)
PISA MRMAX VEL: 4.01 M/S
PLATELET # BLD AUTO: 150 X10(3)/MCL (ref 130–400)
PMV BLD AUTO: 10.6 FL (ref 7.4–10.4)
POCT GLUCOSE: 108 MG/DL (ref 70–110)
POCT GLUCOSE: 121 MG/DL (ref 70–110)
POCT GLUCOSE: 129 MG/DL (ref 70–110)
POCT GLUCOSE: 188 MG/DL (ref 70–110)
POCT GLUCOSE: 191 MG/DL (ref 70–110)
POCT GLUCOSE: 195 MG/DL (ref 70–110)
POCT GLUCOSE: 223 MG/DL (ref 70–110)
POTASSIUM SERPL-SCNC: 4.2 MMOL/L (ref 3.5–5.1)
PV PEAK VELOCITY: 1.53 CM/S
RBC # BLD AUTO: 2.83 X10(6)/MCL (ref 4.2–5.4)
SODIUM SERPL-SCNC: 137 MMOL/L (ref 136–145)
TDI LATERAL: 0.04 M/S
TDI SEPTAL: 0.04 M/S
TDI: 0.04 M/S
TRICUSPID ANNULAR PLANE SYSTOLIC EXCURSION: 2.33 CM
WBC # SPEC AUTO: 8.8 X10(3)/MCL (ref 4.5–11.5)

## 2023-03-24 PROCEDURE — 80069 RENAL FUNCTION PANEL: CPT | Performed by: STUDENT IN AN ORGANIZED HEALTH CARE EDUCATION/TRAINING PROGRAM

## 2023-03-24 PROCEDURE — 25000003 PHARM REV CODE 250

## 2023-03-24 PROCEDURE — 21400001 HC TELEMETRY ROOM

## 2023-03-24 PROCEDURE — 63600175 PHARM REV CODE 636 W HCPCS: Performed by: INTERNAL MEDICINE

## 2023-03-24 PROCEDURE — 25000242 PHARM REV CODE 250 ALT 637 W/ HCPCS: Performed by: INTERNAL MEDICINE

## 2023-03-24 PROCEDURE — 63600175 PHARM REV CODE 636 W HCPCS: Performed by: NURSE ANESTHETIST, CERTIFIED REGISTERED

## 2023-03-24 PROCEDURE — 25000003 PHARM REV CODE 250: Performed by: STUDENT IN AN ORGANIZED HEALTH CARE EDUCATION/TRAINING PROGRAM

## 2023-03-24 PROCEDURE — 37000009 HC ANESTHESIA EA ADD 15 MINS: Performed by: INTERNAL MEDICINE

## 2023-03-24 PROCEDURE — A4216 STERILE WATER/SALINE, 10 ML: HCPCS | Performed by: STUDENT IN AN ORGANIZED HEALTH CARE EDUCATION/TRAINING PROGRAM

## 2023-03-24 PROCEDURE — 25000003 PHARM REV CODE 250: Performed by: INTERNAL MEDICINE

## 2023-03-24 PROCEDURE — 25000003 PHARM REV CODE 250: Performed by: NURSE PRACTITIONER

## 2023-03-24 PROCEDURE — 43255 EGD CONTROL BLEEDING ANY: CPT | Performed by: INTERNAL MEDICINE

## 2023-03-24 PROCEDURE — 25000003 PHARM REV CODE 250: Performed by: NURSE ANESTHETIST, CERTIFIED REGISTERED

## 2023-03-24 PROCEDURE — C9113 INJ PANTOPRAZOLE SODIUM, VIA: HCPCS | Performed by: INTERNAL MEDICINE

## 2023-03-24 PROCEDURE — 27201423 OPTIME MED/SURG SUP & DEVICES STERILE SUPPLY: Performed by: INTERNAL MEDICINE

## 2023-03-24 PROCEDURE — 37000008 HC ANESTHESIA 1ST 15 MINUTES: Performed by: INTERNAL MEDICINE

## 2023-03-24 PROCEDURE — 85025 COMPLETE CBC W/AUTO DIFF WBC: CPT | Performed by: STUDENT IN AN ORGANIZED HEALTH CARE EDUCATION/TRAINING PROGRAM

## 2023-03-24 RX ORDER — IPRATROPIUM BROMIDE AND ALBUTEROL SULFATE 2.5; .5 MG/3ML; MG/3ML
3 SOLUTION RESPIRATORY (INHALATION)
Status: CANCELLED | OUTPATIENT
Start: 2023-03-24

## 2023-03-24 RX ORDER — ONDANSETRON 4 MG/1
8 TABLET, ORALLY DISINTEGRATING ORAL EVERY 6 HOURS PRN
Status: CANCELLED | OUTPATIENT
Start: 2023-03-24

## 2023-03-24 RX ORDER — FUROSEMIDE 10 MG/ML
10 INJECTION INTRAMUSCULAR; INTRAVENOUS ONCE
Status: COMPLETED | OUTPATIENT
Start: 2023-03-24 | End: 2023-03-24

## 2023-03-24 RX ORDER — ONDANSETRON 2 MG/ML
INJECTION INTRAMUSCULAR; INTRAVENOUS
Status: DISCONTINUED | OUTPATIENT
Start: 2023-03-24 | End: 2023-03-24

## 2023-03-24 RX ORDER — MEPERIDINE HYDROCHLORIDE 25 MG/ML
12.5 INJECTION INTRAMUSCULAR; INTRAVENOUS; SUBCUTANEOUS EVERY 10 MIN PRN
Status: CANCELLED | OUTPATIENT
Start: 2023-03-24 | End: 2023-03-25

## 2023-03-24 RX ORDER — GLYCOPYRROLATE 0.2 MG/ML
INJECTION INTRAMUSCULAR; INTRAVENOUS
Status: DISCONTINUED | OUTPATIENT
Start: 2023-03-24 | End: 2023-03-24

## 2023-03-24 RX ORDER — LIDOCAINE HYDROCHLORIDE 20 MG/ML
INJECTION INTRAVENOUS
Status: DISCONTINUED | OUTPATIENT
Start: 2023-03-24 | End: 2023-03-24

## 2023-03-24 RX ORDER — GLYCOPYRROLATE 0.2 MG/ML
INJECTION INTRAMUSCULAR; INTRAVENOUS
Status: DISPENSED
Start: 2023-03-24 | End: 2023-03-24

## 2023-03-24 RX ORDER — LORAZEPAM 2 MG/ML
0.25 INJECTION INTRAMUSCULAR ONCE AS NEEDED
Status: CANCELLED | OUTPATIENT
Start: 2023-03-24 | End: 2034-08-19

## 2023-03-24 RX ORDER — ONDANSETRON 2 MG/ML
4 INJECTION INTRAMUSCULAR; INTRAVENOUS DAILY PRN
Status: CANCELLED | OUTPATIENT
Start: 2023-03-24

## 2023-03-24 RX ORDER — LABETALOL HYDROCHLORIDE 5 MG/ML
10 INJECTION, SOLUTION INTRAVENOUS ONCE
Status: COMPLETED | OUTPATIENT
Start: 2023-03-24 | End: 2023-03-24

## 2023-03-24 RX ORDER — SODIUM CHLORIDE, SODIUM GLUCONATE, SODIUM ACETATE, POTASSIUM CHLORIDE AND MAGNESIUM CHLORIDE 30; 37; 368; 526; 502 MG/100ML; MG/100ML; MG/100ML; MG/100ML; MG/100ML
INJECTION, SOLUTION INTRAVENOUS CONTINUOUS
Status: CANCELLED | OUTPATIENT
Start: 2023-03-24 | End: 2023-04-23

## 2023-03-24 RX ORDER — LIDOCAINE HYDROCHLORIDE 20 MG/ML
INJECTION, SOLUTION EPIDURAL; INFILTRATION; INTRACAUDAL; PERINEURAL
Status: DISPENSED
Start: 2023-03-24 | End: 2023-03-24

## 2023-03-24 RX ORDER — PROPOFOL 10 MG/ML
VIAL (ML) INTRAVENOUS
Status: COMPLETED
Start: 2023-03-24 | End: 2023-03-24

## 2023-03-24 RX ORDER — PROPOFOL 10 MG/ML
INJECTION, EMULSION INTRAVENOUS
Status: DISCONTINUED | OUTPATIENT
Start: 2023-03-24 | End: 2023-03-24

## 2023-03-24 RX ORDER — ONDANSETRON 2 MG/ML
INJECTION INTRAMUSCULAR; INTRAVENOUS
Status: DISPENSED
Start: 2023-03-24 | End: 2023-03-24

## 2023-03-24 RX ORDER — LIDOCAINE HYDROCHLORIDE 10 MG/ML
1 INJECTION, SOLUTION EPIDURAL; INFILTRATION; INTRACAUDAL; PERINEURAL ONCE
Status: CANCELLED | OUTPATIENT
Start: 2023-03-24 | End: 2023-03-24

## 2023-03-24 RX ORDER — LABETALOL HYDROCHLORIDE 5 MG/ML
INJECTION, SOLUTION INTRAVENOUS
Status: COMPLETED
Start: 2023-03-24 | End: 2023-03-24

## 2023-03-24 RX ORDER — DIPHENHYDRAMINE HCL 25 MG
25 CAPSULE ORAL NIGHTLY PRN
Status: DISCONTINUED | OUTPATIENT
Start: 2023-03-24 | End: 2023-03-25 | Stop reason: HOSPADM

## 2023-03-24 RX ORDER — PROCHLORPERAZINE EDISYLATE 5 MG/ML
5 INJECTION INTRAMUSCULAR; INTRAVENOUS EVERY 30 MIN PRN
Status: CANCELLED | OUTPATIENT
Start: 2023-03-24

## 2023-03-24 RX ADMIN — MUPIROCIN: 20 OINTMENT TOPICAL at 12:03

## 2023-03-24 RX ADMIN — TIOTROPIUM BROMIDE INHALATION SPRAY 2 PUFF: 3.12 SPRAY, METERED RESPIRATORY (INHALATION) at 12:03

## 2023-03-24 RX ADMIN — CARVEDILOL 6.25 MG: 3.12 TABLET, FILM COATED ORAL at 10:03

## 2023-03-24 RX ADMIN — CARVEDILOL 6.25 MG: 3.12 TABLET, FILM COATED ORAL at 12:03

## 2023-03-24 RX ADMIN — LABETALOL HYDROCHLORIDE 10 MG: 5 INJECTION, SOLUTION INTRAVENOUS at 10:03

## 2023-03-24 RX ADMIN — GLYCOPYRROLATE 0.2 MG: 0.2 INJECTION INTRAMUSCULAR; INTRAVENOUS at 09:03

## 2023-03-24 RX ADMIN — LIDOCAINE HYDROCHLORIDE 100 MG: 20 INJECTION, SOLUTION INTRAVENOUS at 09:03

## 2023-03-24 RX ADMIN — SEVELAMER CARBONATE 1600 MG: 800 TABLET, FILM COATED ORAL at 10:03

## 2023-03-24 RX ADMIN — PROPOFOL 20 MG: 10 INJECTION, EMULSION INTRAVENOUS at 09:03

## 2023-03-24 RX ADMIN — FLUTICASONE FUROATE AND VILANTEROL TRIFENATATE 1 PUFF: 100; 25 POWDER RESPIRATORY (INHALATION) at 12:03

## 2023-03-24 RX ADMIN — SODIUM CHLORIDE, PRESERVATIVE FREE 10 ML: 5 INJECTION INTRAVENOUS at 06:03

## 2023-03-24 RX ADMIN — ATORVASTATIN CALCIUM 20 MG: 10 TABLET, FILM COATED ORAL at 12:03

## 2023-03-24 RX ADMIN — DULOXETINE 30 MG: 30 CAPSULE, DELAYED RELEASE ORAL at 12:03

## 2023-03-24 RX ADMIN — LACTULOSE 15 G: 10 SOLUTION ORAL at 12:03

## 2023-03-24 RX ADMIN — LACTULOSE 15 G: 10 SOLUTION ORAL at 10:03

## 2023-03-24 RX ADMIN — FUROSEMIDE 10 MG: 10 INJECTION, SOLUTION INTRAMUSCULAR; INTRAVENOUS at 03:03

## 2023-03-24 RX ADMIN — ONDANSETRON 4 MG: 2 INJECTION INTRAMUSCULAR; INTRAVENOUS at 09:03

## 2023-03-24 RX ADMIN — PROPOFOL 30 MG: 10 INJECTION, EMULSION INTRAVENOUS at 09:03

## 2023-03-24 RX ADMIN — SODIUM CHLORIDE, PRESERVATIVE FREE 10 ML: 5 INJECTION INTRAVENOUS at 12:03

## 2023-03-24 RX ADMIN — PANTOPRAZOLE SODIUM 40 MG: 40 INJECTION, POWDER, FOR SOLUTION INTRAVENOUS at 10:03

## 2023-03-24 RX ADMIN — SODIUM CHLORIDE: 9 INJECTION, SOLUTION INTRAVENOUS at 09:03

## 2023-03-24 RX ADMIN — FERROUS SULFATE TAB 325 MG (65 MG ELEMENTAL FE) 1 EACH: 325 (65 FE) TAB at 12:03

## 2023-03-24 RX ADMIN — INSULIN DETEMIR 5 UNITS: 100 INJECTION, SOLUTION SUBCUTANEOUS at 10:03

## 2023-03-24 RX ADMIN — DIPHENHYDRAMINE HYDROCHLORIDE 25 MG: 25 CAPSULE ORAL at 10:03

## 2023-03-24 RX ADMIN — INSULIN ASPART 4 UNITS: 100 INJECTION, SOLUTION INTRAVENOUS; SUBCUTANEOUS at 06:03

## 2023-03-24 RX ADMIN — PANTOPRAZOLE SODIUM 40 MG: 40 INJECTION, POWDER, FOR SOLUTION INTRAVENOUS at 12:03

## 2023-03-24 RX ADMIN — MUPIROCIN: 20 OINTMENT TOPICAL at 10:03

## 2023-03-24 RX ADMIN — DIPHENHYDRAMINE HYDROCHLORIDE 25 MG: 25 CAPSULE ORAL at 12:03

## 2023-03-24 NOTE — PLAN OF CARE
Problem: Adult Inpatient Plan of Care  Goal: Plan of Care Review  Outcome: Ongoing, Progressing  Goal: Patient-Specific Goal (Individualized)  Outcome: Ongoing, Progressing  Goal: Absence of Hospital-Acquired Illness or Injury  Outcome: Ongoing, Progressing  Goal: Optimal Comfort and Wellbeing  Outcome: Ongoing, Progressing  Goal: Readiness for Transition of Care  Outcome: Ongoing, Progressing     Problem: Device-Related Complication Risk (Hemodialysis)  Goal: Safe, Effective Therapy Delivery  Outcome: Ongoing, Progressing     Problem: Hemodynamic Instability (Hemodialysis)  Goal: Effective Tissue Perfusion  Outcome: Ongoing, Progressing     Problem: Infection (Hemodialysis)  Goal: Absence of Infection Signs and Symptoms  Outcome: Ongoing, Progressing     Problem: Diabetes Comorbidity  Goal: Blood Glucose Level Within Targeted Range  Outcome: Ongoing, Progressing     Problem: Infection  Goal: Absence of Infection Signs and Symptoms  Outcome: Ongoing, Progressing     Problem: Fall Injury Risk  Goal: Absence of Fall and Fall-Related Injury  Outcome: Ongoing, Progressing

## 2023-03-24 NOTE — TRANSFER OF CARE
"Anesthesia Transfer of Care Note    Patient: Rosette Madrid    Procedure(s) Performed: Procedure(s) (LRB):  EGD (N/A)  EGD,WITH HEMORRHAGE CONTROL    Patient location: GI    Anesthesia Type: MAC    Transport from OR: Transported from OR on room air with adequate spontaneous ventilation    Post pain: adequate analgesia    Post assessment: no apparent anesthetic complications and tolerated procedure well    Post vital signs: stable    Level of consciousness: awake, alert and responds to stimulation    Nausea/Vomiting: no nausea/vomiting    Complications: none    Transfer of care protocol was followed      Last vitals:   Visit Vitals  BP (!) 184/73   Pulse 67   Temp 36.1 °C (97 °F) (Temporal)   Resp 19   Ht 5' 2.99" (1.6 m)   Wt 95.3 kg (210 lb)   SpO2 98%   Breastfeeding No   BMI 37.21 kg/m²     "

## 2023-03-24 NOTE — PROGRESS NOTES
[facility] Hospital Medicine Progress Note        Chief Complaint: Inpatient Follow-up for anemia    HPI:   Patient is a 77-year-old female with a history of ESRD on HD TTS, heart failure with preserved EF of 55%, CAD/CABG, COPD, HTN, HLD, venous insufficiency, hypothyroidism, chronic anemia and known GI bleeding for which she was admitted in February and underwent EGD 02/17/2023 which showed no obvious source of bleeding, followed by video capsule endoscopy which showed a single erosion but unlikely the source of bleeding.  Patient has been having generalized weakness and did have outpatient laboratory work that showed significant worsening of her anemia so was referred to the ER for further evaluation.      On arrival to the ER she was afebrile, hemodynamically stable maintaining normal sats on room air.  Laboratory work showed a hemoglobin of 4.7, and remainder of laboratory work was consistent with end-stage renal disease.  Stool was reported to be melanotic and heme-positive.  Hospitalist service was consulted for admission for further management.  Interval Hx:   No acute events overnight.  Patient was seen and examined at bedside after the procedure.  She reported no active output since this morning.  She underwent EGD with APC of AVM noted in the 4th portion of duodenum.  Patient's hemoglobin this morning pending    Objective/physical exam:  General: In no acute distress, afebrile  Chest: Clear to auscultation bilaterally  Heart: RRR  Abdomen: Soft, nontender, BS +  MSK:  Left greater than right pedal edema likely chronic linear surgical scar noted in the left shin   Neurologic: Alert and oriented   VITAL SIGNS: 24 HRS MIN & MAX LAST   Temp  Min: 97 °F (36.1 °C)  Max: 98.4 °F (36.9 °C) 98 °F (36.7 °C)   BP  Min: 109/61  Max: 195/73 112/63     Pulse  Min: 59  Max: 85  63   Resp  Min: 16  Max: 24 16   SpO2  Min: 95 %  Max: 99 % 95 %       Recent Labs   Lab 03/22/23  1332  03/23/23  0753 03/24/23  1237   WBC 8.0 7.7 8.8   RBC 1.69* 2.73* 2.83*   HGB 4.7* 7.7* 8.1*   HCT 15.9* 24.0* 25.0*   MCV 94.1* 87.9 88.3   MCH 27.8 28.2 28.6   MCHC 29.6* 32.1* 32.4*   RDW 17.7* 16.8 17.2*    149 150   MPV 10.5* 10.3 10.6*       Recent Labs   Lab 03/22/23  1332 03/23/23  0753 03/24/23  1237   * 139 137   K 4.3 4.1 4.2   CO2 24 25 26   BUN 50.3* 73.2* 42.2*   CREATININE 3.85* 4.10* 3.53*   CALCIUM 8.6 8.4 8.7   ALBUMIN 2.7* 2.7* 2.6*   ALKPHOS 73 65  --    ALT 20 17  --    AST 27 18  --    BILITOT 0.4 0.8  --           Microbiology Results (last 7 days)       ** No results found for the last 168 hours. **             See below for Radiology    Scheduled Med:   atorvastatin  20 mg Oral Daily    carvediloL  6.25 mg Oral BID    DULoxetine  30 mg Oral Daily    epoetin alexis-ebpx (RETACRIT) injection  20,000 Units Subcutaneous Every Tues, Thurs, Sat    ferrous sulfate  1 tablet Oral Daily    fluticasone furoate-vilanteroL  1 puff Inhalation Daily    glycopyrrolate        insulin detemir U-100  5 Units Subcutaneous QHS    lactulose 10 gram/15 ml  15 g Oral TID    levothyroxine  125 mcg Oral Before breakfast    LIDOcaine (PF) 20 mg/mL (2%)        mupirocin   Nasal BID    ondansetron        pantoprazole  40 mg Intravenous Q12H    sevelamer carbonate  1,600 mg Oral TID    sodium chloride 0.9%  10 mL Intravenous Q6H    tiotropium bromide  2 puff Inhalation Daily        Continuous Infusions:       PRN Meds:  sodium chloride, sodium chloride, dextrose 10%, diphenhydrAMINE, glucagon (human recombinant), insulin aspart U-100, melatonin, nitroGLYCERIN, sodium chloride 0.9%, Flushing PICC Protocol **AND** sodium chloride 0.9% **AND** sodium chloride 0.9%       Assessment/Plan:  Acute blood loss anemia likely from duodenum AVM   Swollen Upper Extremity, negative for DVT   NSTEMI, Likely type 2  ESRD on HD TTS  Heart failure with preserved EF 55%   CAD/CABG  PAF on Eliquis  COPD, without evidence of  acute exacerbation  Hypertension  Hyperlipidemia  Venous insufficiency     Patient underwent EGD this a.m. which showed AVM in the 4th portion of the duodenum, APC applied   GI recommended to monitor H&H  Labs from this morning showed hemoglobin 8.1 improved from 4.7 after 4 units PRBC.  Continue with PPI   Home Eliquis on hold  Monitor stools transfuse to keep hemoglobin greater than 8 given CAD   Reviewed echo  Reviewed ultrasound ruled out DVT in the right upper extremity   Nephrology on board.  Hemodialysis per schedule  Cardiology was consulted for troponin elevation, given ongoing bleeding and need for anticoagulation.  Appreciate their input   Reviewed medications continue current care  Reviewed labs WBC 8.8, hemoglobin 8.1, platelets 150, sodium 137, potassium 4.2 bicarb 26  Case discussed with case management today.    Monitor on tele    VTE prophylaxis:  SCDs    Patient condition:  Fair  Anticipated discharge and Disposition:   Likely in next 24-48 hours      _____________________________________________________________________    Nutrition Status:    Radiology:  Echo  · Concentric hypertrophy and normal systolic function.  · The estimated ejection fraction is 60%.  · Indeterminate left ventricular diastolic function.  · Mild-to-moderate mitral regurgitation.  · Normal right ventricular size.  · Mild tricuspid regurgitation.     CV Ultrasound doppler venous arm right  Right upper extremity negative for deep vein thrombosis.      Noah Cowan MD   03/24/2023

## 2023-03-24 NOTE — ANESTHESIA POSTPROCEDURE EVALUATION
Anesthesia Post Evaluation    Patient: Rosette Madrid    Procedure(s) Performed: Procedure(s) (LRB):  EGD (N/A)  EGD,WITH HEMORRHAGE CONTROL    Final Anesthesia Type: general      Patient location during evaluation: GI PACU  Patient participation: Yes- Able to Participate  Level of consciousness: awake and alert  Post-procedure vital signs: reviewed and stable  Pain management: adequate  Airway patency: patent    PONV status at discharge: No PONV  Anesthetic complications: no      Cardiovascular status: blood pressure returned to baseline  Respiratory status: spontaneous ventilation and room air  Hydration status: euvolemic  Follow-up not needed.          Vitals Value Taken Time   /66 03/24/23 1114   Temp 36.7 °C (98 °F) 03/24/23 1114   Pulse 59 03/24/23 1114   Resp 19 03/24/23 1114   SpO2 96 % 03/24/23 1114         No case tracking events are documented in the log.      Pain/Marco Score: Marco Score: 10 (3/24/2023 10:41 AM)

## 2023-03-24 NOTE — PROCEDURES
EGD push enteroscopy Report    Date of procedure: 03/24/2023     Surgeon: Go Le    ASA: per anesthesia    Medications: Per anesthesia    Indication: anemia    Procedure: EGD with APC of AVM    Description of the Procedure: The patient was brought back to the endoscopy suite where the risks, benefits, and alternatives of the procedure were described in detail. The patient was given the opportunity to ask questions and then signed informed consent. Patient was positioned in the left lateral decubitus position, continuous monitoring was initiated, and supplemental oxygen was provided via nasal cannula. Bite block was placed. Adequate sedation was achieved with the above mentioned medications as documented in chart and then titrated during the entire procedure. Under direct visualization the gastroscope was introduced through the oropharynx into the esophagus. The scope was advanced into the stomach and to the proximal jejunum. Scope was withdrawn and the mucosa was carefully examined. The entire gastric mucosa was examined, including the fundus with retroflexion. Air was evacuated from the stomach and the scope was withdrawn into the esophagus. The entire esophageal mucosa was examined. The procedure was completed. The patient tolerated the procedure well and was transferred to the recovery area in stable condition.     Estimated Blood Loss: minimal    Complications: none    Findings:  AVM in the 4th portion of the duodenum, APC applied    Impression and Recommendations:   Monitor H/H    Go Le

## 2023-03-24 NOTE — CONSULTS
[facility] - Oncology Acute  Cardiology  Consult Note    Patient Name: Rosette Madrid  MRN: [id number]  Admission Date: 3/22/2023  Hospital Length of Stay: 2 days  Code Status: Full Code   Attending Provider:  Dr. Kelley  Consulting Provider: Savanna Marks MD  Primary Care Physician: JOSEFINA Lord  Principal Problem:GI bleed    Patient information was obtained from patient, past medical records, ER records, and primary team.     Subjective:     Chief Complaint:  GIB  Consult: Elevated Troponin    HPI: Pt is a 77 y/ female with PMH of ESRD on HD, HFpEF, CAD/CABG, HTN, COPD, Hypothyroidism, and known GIB w/ anemia. Pt admitted for worsening anemia and generalized weakness. Pt had heavy feeling in chest during dialysis which prompted EKG and troponin. Troponin mildly elevated and cardiology consulted. Home Eliquis held 2/2 to bleed.       Previous Cardiac Diagnostics:   CABG >10 yrs  TTE 3/23/22  Concentric hypertrophy and normal systolic function.  The estimated ejection fraction is 60%.  Indeterminate left ventricular diastolic function.  Mild-to-moderate mitral regurgitation.  Normal right ventricular size.  Mild tricuspid regurgitation.    Past Medical History:   Diagnosis Date    CKD (chronic kidney disease) requiring chronic dialysis     COPD (chronic obstructive pulmonary disease)     Diabetes mellitus     Hyperlipidemia     Hypertension     Renal insufficiency     Thyroid disease        Past Surgical History:   Procedure Laterality Date    AV FISTULA PLACEMENT Left     CARDIAC CATHETERIZATION      COLONOSCOPY      CORONARY ARTERY BYPASS GRAFT      ESOPHAGOGASTRODUODENOSCOPY      ESOPHAGOGASTRODUODENOSCOPY N/A 2/17/2023    Procedure: EGD +/- VCE PLACEMENT;  Surgeon: Morgan Gardner MD;  Location: [facility] ENDOSCOPY;  Service: Gastroenterology;  Laterality: N/A;    POLYPECTOMY      SMALL BOWEL ENTEROSCOPY Left 1/20/2023    Procedure: ENTEROSCOPY;  Surgeon: Lexi Scales MD;   Location: [facility] ENDOSCOPY;  Service: Gastroenterology;  Laterality: Left;    THYROIDECTOMY      TUBAL LIGATION         Review of patient's allergies indicates:   Allergen Reactions    Lisinopril Swelling    Azithromycin      Other reaction(s): tongue/facial swelling    Baclofen      Other reaction(s): tongue/facial swelling    Doxycycline      Other reaction(s): Angioedema       No current facility-administered medications on file prior to encounter.     Current Outpatient Medications on File Prior to Encounter   Medication Sig    albuterol (VENTOLIN HFA) 90 mcg/actuation inhaler Inhale 2 puffs into the lungs every 6 (six) hours as needed for Wheezing. Rescue    albuterol-ipratropium (DUO-NEB) 2.5 mg-0.5 mg/3 mL nebulizer solution Take 3 mLs by nebulization every 6 (six) hours as needed for Wheezing or Shortness of Breath. Rescue    apixaban (ELIQUIS) 5 mg Tab Take 5 mg by mouth 2 (two) times daily.    aspirin (ECOTRIN) 81 MG EC tablet Take 1 tablet (81 mg total) by mouth once daily.    BASAGLAR KWIKPEN U-100 INSULIN glargine 100 units/mL SubQ pen Inject 30 Units into the skin Daily.    BENADRYL 25 mg capsule Take 50 mg by mouth nightly.    carvediloL (COREG) 6.25 MG tablet Take 1 tablet (6.25 mg total) by mouth 2 (two) times daily.    clonazePAM (KLONOPIN) 0.5 MG tablet Take 0.5 mg by mouth every evening.    DIALYVITE 100-1 mg Tab Take 1 tablet by mouth once daily.    DULoxetine (CYMBALTA) 30 MG capsule Take 30 mg by mouth once daily.    ergocalciferol (ERGOCALCIFEROL) 50,000 unit Cap Take 1 capsule (50,000 Units total) by mouth every 7 days.    ferrous gluconate (FERGON) 324 MG tablet Take 324 mg by mouth 3 (three) times daily.    ferrous sulfate (FEOSOL) 325 mg (65 mg iron) Tab tablet Take 1 tablet (325 mg total) by mouth once daily.    fluticasone furoate-vilanteroL (BREO) 100-25 mcg/dose diskus inhaler Inhale 1 puff into the lungs once daily.    furosemide (LASIX) 40 MG tablet Take 1 tablet (40 mg total) by  "mouth once daily.    glipiZIDE (GLUCOTROL) 10 MG tablet Take 1 tablet (10 mg total) by mouth daily with breakfast.    levothyroxine (SYNTHROID) 125 MCG tablet Take 1 tablet (125 mcg total) by mouth before breakfast.    ONETOUCH DELICA PLUS LANCET 30 gauge Misc 1 lancet by Other route once daily.    ONETOUCH ULTRA TEST Strp 1 strip by Other route once daily.    pantoprazole (PROTONIX) 40 MG tablet Take 1 tablet (40 mg total) by mouth 2 (two) times daily.    pen needle, diabetic 31 gauge x 5/16" Ndle 2 Units by Misc.(Non-Drug; Combo Route) route 2 (two) times a day. Patient to check blood sugars twice daily (morning and night)    rosuvastatin (CRESTOR) 40 MG Tab Take 1 tablet (40 mg total) by mouth once daily.    tiotropium (SPIRIVA WITH HANDIHALER) 18 mcg inhalation capsule Inhale 1 capsule (18 mcg total) into the lungs Daily.    bisacodyL (DULCOLAX) 5 mg EC tablet Take 5 mg by mouth daily as needed for Constipation.    INVEGA SUSTENNA 156 mg/mL Syrg injection Inject into the muscle.    L-METHYLFOLATE 15 mg Tab Take 1 tablet by mouth once daily.    polyethylene glycol (GLYCOLAX) 17 gram PwPk Take 17 g by mouth once daily.     Family History       Problem Relation (Age of Onset)    Hypertension Mother, Father, Sister, Sister          Tobacco Use    Smoking status: Every Day     Packs/day: 0.25     Types: Cigarettes    Smokeless tobacco: Never    Tobacco comments:     Smokes 4 cigarettes a day   Substance and Sexual Activity    Alcohol use: Yes     Comment: 1 glass every 2-3 month    Drug use: Not Currently    Sexual activity: Not Currently       Review of Systems   Constitutional:  Negative for activity change, fatigue and fever.   Respiratory:  Negative for chest tightness and shortness of breath.    Cardiovascular:  Negative for chest pain and palpitations.   Gastrointestinal:  Negative for abdominal pain.   Neurological:  Negative for dizziness, syncope, light-headedness and headaches.     Objective:     Vital " Signs (Most Recent):  Temp: 97.7 °F (36.5 °C) (03/24/23 0952)  Pulse: 85 (03/24/23 0952)  Resp: 19 (03/24/23 0952)  BP: (!) 146/64 (03/24/23 0952)  SpO2: 96 % (03/24/23 0952)   Vital Signs (24h Range):  Temp:  [97 °F (36.1 °C)-98.4 °F (36.9 °C)] 97.7 °F (36.5 °C)  Pulse:  [65-96] 85  Resp:  [17-24] 19  SpO2:  [96 %-99 %] 96 %  BP: (109-184)/(49-80) 146/64     Weight: 95.3 kg (210 lb)  Body mass index is 37.21 kg/m².    SpO2: 96 %         Intake/Output Summary (Last 24 hours) at 3/24/2023 1003  Last data filed at 3/24/2023 0950  Gross per 24 hour   Intake 480 ml   Output 2300 ml   Net -1820 ml       Lines/Drains/Airways       Drain  Duration                  Hemodialysis AV Fistula Left forearm -- days              Peripheral Intravenous Line  Duration                  Midline Catheter Insertion/Assessment  - Single Lumen 03/22/23 2128 Right brachial vein 1 day                    Significant Labs:  Recent Results (from the past 72 hour(s))   Type & Screen    Collection Time: 03/22/23  1:31 PM   Result Value Ref Range    Group & Rh O POS     Indirect Gideon GEL NEG     Specimen Outdate 03/25/2023 23:59    Protime-INR    Collection Time: 03/22/23  1:31 PM   Result Value Ref Range    PT 18.2 (H) 12.5 - 14.5 seconds    INR 1.54 (H) 0.00 - 1.30   APTT    Collection Time: 03/22/23  1:31 PM   Result Value Ref Range    PTT 37.5 (H) 23.2 - 33.7 seconds   Prepare RBC 3 Units; bleed    Collection Time: 03/22/23  1:31 PM   Result Value Ref Range    UNIT NUMBER [id number]     UNIT ABO/RH O POS     DISPENSE STATUS Transfused     Unit Expiration [id number]     Product Code M6278R35     Unit Blood Type Code 5100     CROSSMATCH INTERPRETATION Compatible     UNIT NUMBER [id number]     UNIT ABO/RH O POS     DISPENSE STATUS Transfused     Unit Expiration [id number]     Product Code U3754B00     Unit Blood Type Code 5100     CROSSMATCH INTERPRETATION Compatible     UNIT NUMBER [id number]     UNIT ABO/RH O POS     DISPENSE  STATUS Transfused     Unit Expiration [id number]     Product Code Y6687Z84     Unit Blood Type Code 5100     CROSSMATCH INTERPRETATION Compatible    Prepare RBC 1 Unit    Collection Time: 03/22/23  1:31 PM   Result Value Ref Range    UNIT NUMBER [id number]     UNIT ABO/RH O POS     DISPENSE STATUS Released     Unit Expiration [id number]     Product Code L1506L26     Unit Blood Type Code 5100     CROSSMATCH INTERPRETATION Compatible     UNIT NUMBER [id number]     UNIT ABO/RH O POS     DISPENSE STATUS Transfused     Unit Expiration [id number]     Product Code E7161A95     Unit Blood Type Code 5100     CROSSMATCH INTERPRETATION Compatible    CBC with Differential    Collection Time: 03/22/23  1:32 PM   Result Value Ref Range    WBC 8.0 4.5 - 11.5 x10(3)/mcL    RBC 1.69 (L) 4.20 - 5.40 x10(6)/mcL    Hgb 4.7 (LL) 12.0 - 16.0 g/dL    Hct 15.9 (LL) 37.0 - 47.0 %    MCV 94.1 (H) 80.0 - 94.0 fL    MCH 27.8 pg    MCHC 29.6 (L) 33.0 - 36.0 g/dL    RDW 17.7 (H) 11.5 - 17.0 %    Platelet 160 130 - 400 x10(3)/mcL    MPV 10.5 (H) 7.4 - 10.4 fL    Neut % 83.0 %    Lymph % 10.0 %    Mono % 5.7 %    Eos % 0.5 %    Basophil % 0.1 %    Lymph # 0.80 0.6 - 4.6 x10(3)/mcL    Neut # 6.66 2.1 - 9.2 x10(3)/mcL    Mono # 0.46 0.1 - 1.3 x10(3)/mcL    Eos # 0.04 0 - 0.9 x10(3)/mcL    Baso # 0.01 0 - 0.2 x10(3)/mcL    IG# 0.06 (H) 0 - 0.04 x10(3)/mcL    IG% 0.7 %    NRBC% 0.0 %   Comprehensive Metabolic Panel    Collection Time: 03/22/23  1:32 PM   Result Value Ref Range    Sodium Level 135 (L) 136 - 145 mmol/L    Potassium Level 4.3 3.5 - 5.1 mmol/L    Chloride 104 98 - 107 mmol/L    Carbon Dioxide 24 23 - 31 mmol/L    Glucose Level 168 (H) 82 - 115 mg/dL    Blood Urea Nitrogen 50.3 (H) 9.8 - 20.1 mg/dL    Creatinine 3.85 (H) 0.55 - 1.02 mg/dL    Calcium Level Total 8.6 8.4 - 10.2 mg/dL    Protein Total 5.1 (L) 5.8 - 7.6 gm/dL    Albumin Level 2.7 (L) 3.4 - 4.8 g/dL    Globulin 2.4 2.4 - 3.5 gm/dL    Albumin/Globulin Ratio 1.1 1.1 -  2.0 ratio    Bilirubin Total 0.4 <=1.5 mg/dL    Alkaline Phosphatase 73 40 - 150 unit/L    Alanine Aminotransferase 20 0 - 55 unit/L    Aspartate Aminotransferase 27 5 - 34 unit/L    eGFR 12 mls/min/1.73/m2   POCT glucose    Collection Time: 03/23/23  5:42 AM   Result Value Ref Range    POCT Glucose 171 (H) 70 - 110 mg/dL   Comprehensive metabolic panel    Collection Time: 03/23/23  7:53 AM   Result Value Ref Range    Sodium Level 139 136 - 145 mmol/L    Potassium Level 4.1 3.5 - 5.1 mmol/L    Chloride 106 98 - 107 mmol/L    Carbon Dioxide 25 23 - 31 mmol/L    Glucose Level 138 (H) 82 - 115 mg/dL    Blood Urea Nitrogen 73.2 (H) 9.8 - 20.1 mg/dL    Creatinine 4.10 (H) 0.55 - 1.02 mg/dL    Calcium Level Total 8.4 8.4 - 10.2 mg/dL    Protein Total 4.6 (L) 5.8 - 7.6 gm/dL    Albumin Level 2.7 (L) 3.4 - 4.8 g/dL    Globulin 1.9 (L) 2.4 - 3.5 gm/dL    Albumin/Globulin Ratio 1.4 1.1 - 2.0 ratio    Bilirubin Total 0.8 <=1.5 mg/dL    Alkaline Phosphatase 65 40 - 150 unit/L    Alanine Aminotransferase 17 0 - 55 unit/L    Aspartate Aminotransferase 18 5 - 34 unit/L    eGFR 11 mls/min/1.73/m2   CBC with Differential    Collection Time: 03/23/23  7:53 AM   Result Value Ref Range    WBC 7.7 4.5 - 11.5 x10(3)/mcL    RBC 2.73 (L) 4.20 - 5.40 x10(6)/mcL    Hgb 7.7 (L) 12.0 - 16.0 g/dL    Hct 24.0 (L) 37.0 - 47.0 %    MCV 87.9 80.0 - 94.0 fL    MCH 28.2 pg    MCHC 32.1 (L) 33.0 - 36.0 g/dL    RDW 16.8 11.5 - 17.0 %    Platelet 149 130 - 400 x10(3)/mcL    MPV 10.3 7.4 - 10.4 fL    Neut % 78.1 %    Lymph % 12.4 %    Mono % 7.1 %    Eos % 1.0 %    Basophil % 0.5 %    Lymph # 0.96 0.6 - 4.6 x10(3)/mcL    Neut # 6.02 2.1 - 9.2 x10(3)/mcL    Mono # 0.55 0.1 - 1.3 x10(3)/mcL    Eos # 0.08 0 - 0.9 x10(3)/mcL    Baso # 0.04 0 - 0.2 x10(3)/mcL    IG# 0.07 (H) 0 - 0.04 x10(3)/mcL    IG% 0.9 %    NRBC% 0.5 %   Troponin I    Collection Time: 03/23/23  4:04 PM   Result Value Ref Range    Troponin-I 0.077 (H) 0.000 - 0.045 ng/mL   POCT glucose     Collection Time: 03/23/23  4:17 PM   Result Value Ref Range    POCT Glucose 197 (H) 70 - 110 mg/dL   Echo    Collection Time: 03/23/23 10:16 PM   Result Value Ref Range    BSA 2.06 m2    TDI SEPTAL 0.04 m/s    LV LATERAL E/E' RATIO 18.00 m/s    LV SEPTAL E/E' RATIO 18.00 m/s    EF 60 %    Left Ventricular Outflow Tract Mean Velocity 0.91 cm/s    Left Ventricular Outflow Tract Mean Gradient 4.00 mmHg    AORTIC VALVE CUSP SEPERATION 1.90 cm    TDI LATERAL 0.04 m/s    PV PEAK VELOCITY 1.53 cm/s    LVIDd 5.57 3.5 - 6.0 cm    IVS 1.41 (A) 0.6 - 1.1 cm    Posterior Wall 1.27 (A) 0.6 - 1.1 cm    Ao root annulus 4.00 cm    LVIDs 3.67 2.1 - 4.0 cm    FS 34 28 - 44 %    LV mass 324.00 g    TAPSE 2.33 cm    Left Ventricle Relative Wall Thickness 0.46 cm    AV mean gradient 8 mmHg    AV valve area 2.57 cm2    AV Velocity Ratio 0.70     AV index (prosthetic) 0.82     MV mean gradient 3 mmHg    MV valve area p 1/2 method 2.18 cm2    MV valve area by continuity eq 3.03 cm2    E/A ratio 1.11     Mean e' 0.04 m/s    E wave deceleration time 251.00 msec    LVOT diameter 2.00 cm    LVOT area 3.1 cm2    LVOT peak vik 1.39 m/s    LVOT peak VTI 31.80 cm    Ao peak vik 2.00 m/s    Ao VTI 38.8 cm    Mr max vik 4.01 m/s    LVOT stroke volume 99.85 cm3    AV peak gradient 16 mmHg    MV peak gradient 5 mmHg    E/E' ratio 18.00 m/s    MV Peak E Vik 0.72 m/s    MV VTI 33.0 cm    MV stenosis pressure 1/2 time 101.00 ms    MV Peak A Vik 0.65 m/s    LV Systolic Volume 57.00 mL    LV Systolic Volume Index 28.9 mL/m2    LV Diastolic Volume 152.00 mL    LV Diastolic Volume Index 77.16 mL/m2    LV Mass Index 164 g/m2   POCT glucose    Collection Time: 03/24/23  6:02 AM   Result Value Ref Range    POCT Glucose 108 70 - 110 mg/dL       Significant Imaging:  Imaging Results    None         EKG:    Results for orders placed or performed during the hospital encounter of 03/22/23   EKG 12-lead    Narrative    Test Reason : R06.02,    Vent. Rate : 072 BPM      Atrial Rate : 072 BPM     P-R Int : 262 ms          QRS Dur : 150 ms      QT Int : 436 ms       P-R-T Axes : 090 -33 136 degrees     QTc Int : 477 ms    Sinus rhythm with 1st degree A-V block with Premature atrial complexes with   Aberrant conduction  Left axis deviation  Left bundle branch block  Abnormal ECG  Confirmed by Valerio Villegas MD ([id number]) on 3/23/2023 5:20:22 PM    Referred By: LELIA   SELF           Confirmed By:Valerio Villegas MD         Physical Exam  Constitutional:       General: She is not in acute distress.  Cardiovascular:      Rate and Rhythm: Normal rate and regular rhythm.      Pulses: Normal pulses.      Heart sounds: No murmur heard.     Comments: Central healed sternotomy scar  Pulmonary:      Effort: Pulmonary effort is normal.      Breath sounds: Normal breath sounds. No wheezing or rales.   Abdominal:      General: Bowel sounds are normal. There is no distension.      Palpations: Abdomen is soft.      Tenderness: There is no abdominal tenderness.   Musculoskeletal:      Right lower leg: No edema.      Left lower leg: No edema.   Skin:     General: Skin is warm and dry.   Neurological:      General: No focal deficit present.      Mental Status: She is oriented to person, place, and time.       Home Medications:   No current facility-administered medications on file prior to encounter.     Current Outpatient Medications on File Prior to Encounter   Medication Sig Dispense Refill    albuterol (VENTOLIN HFA) 90 mcg/actuation inhaler Inhale 2 puffs into the lungs every 6 (six) hours as needed for Wheezing. Rescue 18 g 1    albuterol-ipratropium (DUO-NEB) 2.5 mg-0.5 mg/3 mL nebulizer solution Take 3 mLs by nebulization every 6 (six) hours as needed for Wheezing or Shortness of Breath. Rescue 75 mL 0    apixaban (ELIQUIS) 5 mg Tab Take 5 mg by mouth 2 (two) times daily.      aspirin (ECOTRIN) 81 MG EC tablet Take 1 tablet (81 mg total) by mouth once daily. 90 tablet 1    BASAGLAR KWIKPEN U-100  "INSULIN glargine 100 units/mL SubQ pen Inject 30 Units into the skin Daily. 27 mL 2    BENADRYL 25 mg capsule Take 50 mg by mouth nightly.      carvediloL (COREG) 6.25 MG tablet Take 1 tablet (6.25 mg total) by mouth 2 (two) times daily. 180 tablet 3    clonazePAM (KLONOPIN) 0.5 MG tablet Take 0.5 mg by mouth every evening.      DIALYVITE 100-1 mg Tab Take 1 tablet by mouth once daily.      DULoxetine (CYMBALTA) 30 MG capsule Take 30 mg by mouth once daily.      ergocalciferol (ERGOCALCIFEROL) 50,000 unit Cap Take 1 capsule (50,000 Units total) by mouth every 7 days. 12 capsule 2    ferrous gluconate (FERGON) 324 MG tablet Take 324 mg by mouth 3 (three) times daily.      ferrous sulfate (FEOSOL) 325 mg (65 mg iron) Tab tablet Take 1 tablet (325 mg total) by mouth once daily. 90 tablet 2    fluticasone furoate-vilanteroL (BREO) 100-25 mcg/dose diskus inhaler Inhale 1 puff into the lungs once daily. 90 each 2    furosemide (LASIX) 40 MG tablet Take 1 tablet (40 mg total) by mouth once daily. 90 tablet 3    glipiZIDE (GLUCOTROL) 10 MG tablet Take 1 tablet (10 mg total) by mouth daily with breakfast. 90 tablet 2    levothyroxine (SYNTHROID) 125 MCG tablet Take 1 tablet (125 mcg total) by mouth before breakfast. 90 tablet 2    ONETOUCH DELICA PLUS LANCET 30 gauge Misc 1 lancet by Other route once daily. 100 each 2    ONETOUCH ULTRA TEST Strp 1 strip by Other route once daily. 200 strip 2    pantoprazole (PROTONIX) 40 MG tablet Take 1 tablet (40 mg total) by mouth 2 (two) times daily. 60 tablet 11    pen needle, diabetic 31 gauge x 5/16" Ndle 2 Units by Misc.(Non-Drug; Combo Route) route 2 (two) times a day. Patient to check blood sugars twice daily (morning and night) 200 each 2    rosuvastatin (CRESTOR) 40 MG Tab Take 1 tablet (40 mg total) by mouth once daily. 90 tablet 3    tiotropium (SPIRIVA WITH HANDIHALER) 18 mcg inhalation capsule Inhale 1 capsule (18 mcg total) into the lungs Daily. 90 capsule 2    bisacodyL " (DULCOLAX) 5 mg EC tablet Take 5 mg by mouth daily as needed for Constipation.      INVEGA SUSTENNA 156 mg/mL Syrg injection Inject into the muscle.      L-METHYLFOLATE 15 mg Tab Take 1 tablet by mouth once daily.      polyethylene glycol (GLYCOLAX) 17 gram PwPk Take 17 g by mouth once daily. 10 each 1       Current Inpatient Medications:    Current Facility-Administered Medications:     0.9%  NaCl infusion (for blood administration), , Intravenous, Q24H PRN, Raghu Hernandez MD    0.9%  NaCl infusion (for blood administration), , Intravenous, Q24H PRN, Lissette Oliveira MD    atorvastatin tablet 20 mg, 20 mg, Oral, Daily, Manolo Rubi MD    carvediloL tablet 6.25 mg, 6.25 mg, Oral, BID, Manolo Rubi MD, 6.25 mg at 03/23/23 2157    dextrose 10% bolus 125 mL 125 mL, 12.5 g, Intravenous, PRN, Manolo Rubi MD    diphenhydrAMINE capsule 25 mg, 25 mg, Oral, Nightly PRN, Lisandra Alonso, FNP, 25 mg at 03/24/23 0031    DULoxetine DR capsule 30 mg, 30 mg, Oral, Daily, Lissette Oliveira MD    epoetin alexis-epbx injection 20,000 Units, 20,000 Units, Subcutaneous, Every Tues, Thurs, Sat, Fco T Donis, DO, 20,000 Units at 03/23/23 1323    ferrous sulfate tablet 1 each, 1 tablet, Oral, Daily, Manolo Rubi MD    fluticasone furoate-vilanteroL 100-25 mcg/dose diskus inhaler 1 puff, 1 puff, Inhalation, Daily, Lissette Oliveira MD    glucagon (human recombinant) injection 1 mg, 1 mg, Intramuscular, PRN, Manolo Rubi MD    glycopyrrolate (ROBINUL) 0.2 mg/mL injection, , , ,     insulin aspart U-100 injection 1-10 Units, 1-10 Units, Subcutaneous, Q6H PRN, Manolo Rubi MD, 1 Units at 03/23/23 8234    insulin detemir U-100 injection 5 Units, 5 Units, Subcutaneous, QHS, Lissette Oliveira MD, 5 Units at 03/23/23 2157    lactulose 10 gram/15 ml solution 15 g, 15 g, Oral, TID, Manolo Rubi MD, 15 g at 03/23/23 2157    levothyroxine tablet 125 mcg, 125 mcg, Oral, Before breakfast, Manolo  KALE Rubi MD, 125 mcg at 03/23/23 0600    LIDOcaine (PF) 20 mg/mL (2%) 20 mg/mL (2 %) injection, , , ,     melatonin tablet 6 mg, 6 mg, Oral, Nightly PRN, Manolo Rubi MD    mupirocin 2 % ointment, , Nasal, BID, Ozzie Oviedo MD, Given at 03/23/23 2157    nitroGLYCERIN SL tablet 0.4 mg, 0.4 mg, Sublingual, Q5 Min PRN, Lissette Oliveira MD    ondansetron 4 mg/2 mL injection, , , ,     pantoprazole injection 40 mg, 40 mg, Intravenous, Q12H, Manolo Rubi MD, 40 mg at 03/23/23 2157    sevelamer carbonate tablet 1,600 mg, 1,600 mg, Oral, TID, Manolo Rubi MD, 1,600 mg at 03/23/23 2157    sodium chloride 0.9% flush 10 mL, 10 mL, Intravenous, PRN, Manolo Rubi MD    Flushing PICC Protocol, , , Until Discontinued **AND** sodium chloride 0.9% flush 10 mL, 10 mL, Intravenous, Q6H, 10 mL at 03/24/23 0600 **AND** sodium chloride 0.9% flush 10 mL, 10 mL, Intravenous, PRN, Raghu Hernandez MD    tiotropium bromide 2.5 mcg/actuation inhaler 2 puff, 2 puff, Inhalation, Daily, Manolo Rubi MD         VTE Risk Mitigation (From admission, onward)           Ordered     IP VTE HIGH RISK PATIENT  Once         03/22/23 2000     Place sequential compression device  Until discontinued         03/22/23 2000                    Assessment:   Acute GIB/melena  Acute on Chronic Anemia  ESRD on HD  HFpEF- 60 %  PAF - on Eliquis  CAD/CABG  HTN  HLD  COPD    Plan:     EGD w/ GI this AM  Minimal elevation in troponin secondary to supply/demand mismatch   Agree w/ Continue home statin and b-blocker  Will sign off.  Thank you for your consult.     Savanna Marks M.D.  [facility] -2  Cardiology  [facility] - Oncology Acute  03/24/2023 10:03 AM      I have seen the patient, reviewed the resident's note, assessment and plan. I have personally interviewed and examined the patient at bedside and agree with the findings. Medical decision making listed above were done under my guidance.

## 2023-03-24 NOTE — ANESTHESIA RELEASE NOTE
"Anesthesia Release from PACU Note    Patient: Rosette Madrid    Procedure(s) Performed: Procedure(s) (LRB):  EGD (N/A)  EGD,WITH HEMORRHAGE CONTROL    Anesthesia type: MAC    Post pain: Adequate analgesia    Post assessment: no apparent anesthetic complications, tolerated procedure well and no evidence of recall    Last Vitals:   Visit Vitals  BP (!) 184/73   Pulse 67   Temp 36.1 °C (97 °F) (Temporal)   Resp 19   Ht 5' 2.99" (1.6 m)   Wt 95.3 kg (210 lb)   SpO2 98%   Breastfeeding No   BMI 37.21 kg/m²       Post vital signs: stable    Level of consciousness: awake, alert  and responds to stimulation    Nausea/Vomiting: no nausea/no vomiting    Complications: none    Airway Patency: patent    Respiratory: unassisted, spontaneous ventilation, room air    Cardiovascular: stable and blood pressure at baseline    Hydration: euvolemic  "

## 2023-03-24 NOTE — ANESTHESIA PREPROCEDURE EVALUATION
03/24/2023  Rosette Madrid is a 77 y.o., female with ESRD (TTS--last dialysis Tuesday), CAD status post CABG (EF 55%) , AFib previously on Eliquis admitted March 22nd with fatigue and further workup revealed critical anemia requiring transfusion.  Patient presents today for EGD.  Patient tolerated IV sedation for EGD with VCE in February (see below).            Pre-op Assessment    I have reviewed the Patient Summary Reports.    I have reviewed the NPO Status.   I have reviewed the Medications.     Review of Systems  Anesthesia Hx:   Denies Personal Hx of Anesthesia complications.   Social:  Non-Smoker    Cardiovascular:   Hypertension  Functional Capacity 2 METS  Coronary Artery Disease: S/P Aorto-Coronary Bypass Graft Surgery (CABG):    Pulmonary:   COPD    Renal/:   Chronic Renal Disease, ESRD, Dialysis    Endocrine:   Diabetes, type 2, using insulin Hypothyroidism  Obesity / BMI > 30      Physical Exam  General: Well nourished, Cooperative, Alert and Oriented    Airway:  Mallampati: II   Mouth Opening: Normal  TM Distance: Normal  Tongue: Normal  Neck ROM: Normal ROM  Neck: Girth Increased    Dental:  Edentulous    Chest/Lungs:  Clear to auscultation, Normal Respiratory Rate    Heart:  Rate: Normal  Rhythm: Regular Rhythm        Anesthesia Plan  Type of Anesthesia, risks & benefits discussed:    Anesthesia Type: Gen Natural Airway  Intra-op Monitoring Plan: Standard ASA Monitors  Induction:  IV  Informed Consent: Informed consent signed with the Patient and all parties understand the risks and agree with anesthesia plan.  All questions answered.   ASA Score: 4  Day of Surgery Review of History & Physical: H&P Update referred to the surgeon/provider.    Ready For Surgery From Anesthesia Perspective.     .

## 2023-03-25 VITALS
OXYGEN SATURATION: 98 % | WEIGHT: 209.88 LBS | TEMPERATURE: 98 F | RESPIRATION RATE: 18 BRPM | HEART RATE: 84 BPM | DIASTOLIC BLOOD PRESSURE: 83 MMHG | BODY MASS INDEX: 37.19 KG/M2 | HEIGHT: 63 IN | SYSTOLIC BLOOD PRESSURE: 145 MMHG

## 2023-03-25 LAB
ANION GAP SERPL CALC-SCNC: 8 MEQ/L
BASOPHILS # BLD AUTO: 0.03 X10(3)/MCL (ref 0–0.2)
BASOPHILS NFR BLD AUTO: 0.4 %
BUN SERPL-MCNC: 46.8 MG/DL (ref 9.8–20.1)
CALCIUM SERPL-MCNC: 8.9 MG/DL (ref 8.4–10.2)
CHLORIDE SERPL-SCNC: 103 MMOL/L (ref 98–107)
CO2 SERPL-SCNC: 24 MMOL/L (ref 23–31)
CREAT SERPL-MCNC: 3.73 MG/DL (ref 0.55–1.02)
CREAT/UREA NIT SERPL: 13
EOSINOPHIL # BLD AUTO: 0.13 X10(3)/MCL (ref 0–0.9)
EOSINOPHIL NFR BLD AUTO: 1.6 %
ERYTHROCYTE [DISTWIDTH] IN BLOOD BY AUTOMATED COUNT: 18 % (ref 11.5–17)
GFR SERPLBLD CREATININE-BSD FMLA CKD-EPI: 12 MLS/MIN/1.73/M2
GLUCOSE SERPL-MCNC: 113 MG/DL (ref 82–115)
HCT VFR BLD AUTO: 25.9 % (ref 37–47)
HGB BLD-MCNC: 8.1 G/DL (ref 12–16)
IMM GRANULOCYTES # BLD AUTO: 0.06 X10(3)/MCL (ref 0–0.04)
IMM GRANULOCYTES NFR BLD AUTO: 0.8 %
LYMPHOCYTES # BLD AUTO: 1.05 X10(3)/MCL (ref 0.6–4.6)
LYMPHOCYTES NFR BLD AUTO: 13.3 %
MCH RBC QN AUTO: 28.4 PG (ref 27–31)
MCHC RBC AUTO-ENTMCNC: 31.3 G/DL (ref 33–36)
MCV RBC AUTO: 90.9 FL (ref 80–94)
MONOCYTES # BLD AUTO: 0.54 X10(3)/MCL (ref 0.1–1.3)
MONOCYTES NFR BLD AUTO: 6.9 %
NEUTROPHILS # BLD AUTO: 6.07 X10(3)/MCL (ref 2.1–9.2)
NEUTROPHILS NFR BLD AUTO: 77 %
NRBC BLD AUTO-RTO: 0.3 %
PLATELET # BLD AUTO: 167 X10(3)/MCL (ref 130–400)
PMV BLD AUTO: 10.3 FL (ref 7.4–10.4)
POCT GLUCOSE: 208 MG/DL (ref 70–110)
POTASSIUM SERPL-SCNC: 4.7 MMOL/L (ref 3.5–5.1)
RBC # BLD AUTO: 2.85 X10(6)/MCL (ref 4.2–5.4)
SODIUM SERPL-SCNC: 135 MMOL/L (ref 136–145)
WBC # SPEC AUTO: 7.9 X10(3)/MCL (ref 4.5–11.5)

## 2023-03-25 PROCEDURE — A4216 STERILE WATER/SALINE, 10 ML: HCPCS | Performed by: STUDENT IN AN ORGANIZED HEALTH CARE EDUCATION/TRAINING PROGRAM

## 2023-03-25 PROCEDURE — 80048 BASIC METABOLIC PNL TOTAL CA: CPT | Performed by: INTERNAL MEDICINE

## 2023-03-25 PROCEDURE — 25000003 PHARM REV CODE 250: Performed by: STUDENT IN AN ORGANIZED HEALTH CARE EDUCATION/TRAINING PROGRAM

## 2023-03-25 PROCEDURE — 25000003 PHARM REV CODE 250: Performed by: INTERNAL MEDICINE

## 2023-03-25 PROCEDURE — 85025 COMPLETE CBC W/AUTO DIFF WBC: CPT | Performed by: INTERNAL MEDICINE

## 2023-03-25 PROCEDURE — 63600175 PHARM REV CODE 636 W HCPCS: Performed by: INTERNAL MEDICINE

## 2023-03-25 PROCEDURE — 25000242 PHARM REV CODE 250 ALT 637 W/ HCPCS: Performed by: INTERNAL MEDICINE

## 2023-03-25 PROCEDURE — C9113 INJ PANTOPRAZOLE SODIUM, VIA: HCPCS | Performed by: INTERNAL MEDICINE

## 2023-03-25 PROCEDURE — 80100016 HC MAINTENANCE HEMODIALYSIS

## 2023-03-25 PROCEDURE — 63600175 PHARM REV CODE 636 W HCPCS: Mod: JZ | Performed by: STUDENT IN AN ORGANIZED HEALTH CARE EDUCATION/TRAINING PROGRAM

## 2023-03-25 RX ORDER — SEVELAMER CARBONATE 800 MG/1
1600 TABLET, FILM COATED ORAL 3 TIMES DAILY
Qty: 90 TABLET | Refills: 0 | Status: SHIPPED | OUTPATIENT
Start: 2023-03-25

## 2023-03-25 RX ADMIN — SODIUM CHLORIDE, PRESERVATIVE FREE 10 ML: 5 INJECTION INTRAVENOUS at 12:03

## 2023-03-25 RX ADMIN — LEVOTHYROXINE SODIUM 125 MCG: 125 TABLET ORAL at 07:03

## 2023-03-25 RX ADMIN — FLUTICASONE FUROATE AND VILANTEROL TRIFENATATE 1 PUFF: 100; 25 POWDER RESPIRATORY (INHALATION) at 10:03

## 2023-03-25 RX ADMIN — CARVEDILOL 6.25 MG: 3.12 TABLET, FILM COATED ORAL at 10:03

## 2023-03-25 RX ADMIN — INSULIN ASPART 4 UNITS: 100 INJECTION, SOLUTION INTRAVENOUS; SUBCUTANEOUS at 12:03

## 2023-03-25 RX ADMIN — LACTULOSE 15 G: 10 SOLUTION ORAL at 10:03

## 2023-03-25 RX ADMIN — MUPIROCIN: 20 OINTMENT TOPICAL at 10:03

## 2023-03-25 RX ADMIN — SEVELAMER CARBONATE 1600 MG: 800 TABLET, FILM COATED ORAL at 10:03

## 2023-03-25 RX ADMIN — PANTOPRAZOLE SODIUM 40 MG: 40 INJECTION, POWDER, FOR SOLUTION INTRAVENOUS at 10:03

## 2023-03-25 RX ADMIN — FERROUS SULFATE TAB 325 MG (65 MG ELEMENTAL FE) 1 EACH: 325 (65 FE) TAB at 10:03

## 2023-03-25 RX ADMIN — EPOETIN ALFA-EPBX 20000 UNITS: 20000 INJECTION, SOLUTION INTRAVENOUS; SUBCUTANEOUS at 11:03

## 2023-03-25 RX ADMIN — DULOXETINE 30 MG: 30 CAPSULE, DELAYED RELEASE ORAL at 10:03

## 2023-03-25 RX ADMIN — ATORVASTATIN CALCIUM 20 MG: 10 TABLET, FILM COATED ORAL at 10:03

## 2023-03-25 RX ADMIN — SODIUM CHLORIDE, PRESERVATIVE FREE 10 ML: 5 INJECTION INTRAVENOUS at 11:03

## 2023-03-25 RX ADMIN — TIOTROPIUM BROMIDE INHALATION SPRAY 2 PUFF: 3.12 SPRAY, METERED RESPIRATORY (INHALATION) at 10:03

## 2023-03-25 RX ADMIN — SODIUM CHLORIDE, PRESERVATIVE FREE 10 ML: 5 INJECTION INTRAVENOUS at 07:03

## 2023-03-25 NOTE — NURSING
03/25/23 1600   Post-Hemodialysis Assessment   Blood Volume Processed (Liters) 71.1 L   Dialyzer Clearance Lightly streaked   Duration of Treatment 180 minutes   Total UF (mL) 2500 mL   Patient Response to Treatment pt tolerated hd well

## 2023-03-25 NOTE — PROGRESS NOTES
NEPHROLOGY PROGRESS NOTE      Patient Demographics:  Name:  Rosette Madrid  Age: 77 y.o.  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 requiring cautery    H&H stable    Current Facility-Administered Medications   Medication Dose Route Frequency Provider Last Rate Last Admin    0.9%  NaCl infusion (for blood administration)   Intravenous Q24H PRN Raghu Hernandez MD        0.9%  NaCl infusion (for blood administration)   Intravenous Q24H PRN Lissette Oliveira MD        atorvastatin tablet 20 mg  20 mg Oral Daily Manolo Rubi MD   20 mg at 03/25/23 1001    carvediloL tablet 6.25 mg  6.25 mg Oral BID Manolo Rubi MD   6.25 mg at 03/25/23 1001    dextrose 10% bolus 125 mL 125 mL  12.5 g Intravenous PRN Manolo Rubi MD        diphenhydrAMINE capsule 25 mg  25 mg Oral Nightly PRN JOSEFINA Anderson   25 mg at 03/24/23 2253    DULoxetine DR capsule 30 mg  30 mg Oral Daily Lissette Oliveira MD   30 mg at 03/25/23 1001    epoetin alexis-epbx injection 20,000 Units  20,000 Units Subcutaneous Every Tues, Thurs, Sat Fco Donis DO   20,000 Units at 03/25/23 1116    ferrous sulfate tablet 1 each  1 tablet Oral Daily Manolo Rubi MD   1 each at 03/25/23 1001    fluticasone furoate-vilanteroL 100-25 mcg/dose diskus inhaler 1 puff  1 puff Inhalation Daily Lissette Oliveira MD   1 puff at 03/25/23 1007    glucagon (human recombinant) injection 1 mg  1 mg Intramuscular PRN Manolo Rubi MD        insulin aspart U-100 injection 1-10 Units  1-10 Units Subcutaneous Q6H PRN Manolo Rubi MD   4 Units at 03/25/23 1210    insulin detemir U-100 injection 5 Units  5 Units Subcutaneous QHS Lissette Oliveira MD   5 Units at 03/24/23 2233    lactulose 10 gram/15 ml solution 15 g  15 g Oral TID Manolo Rubi MD   15 g at 03/25/23 1001    levothyroxine tablet 125 mcg  125 mcg Oral Before breakfast Manolo HENLEY  "MD Greyson   125 mcg at 03/25/23 0744    melatonin tablet 6 mg  6 mg Oral Nightly PRN Manolo Rubi MD        mupirocin 2 % ointment   Nasal BID Ozzie Oviedo MD   Given at 03/25/23 1001    nitroGLYCERIN SL tablet 0.4 mg  0.4 mg Sublingual Q5 Min PRN Lissette Oliveira MD        pantoprazole injection 40 mg  40 mg Intravenous Q12H Manolo Rubi MD   40 mg at 03/25/23 1001    sevelamer carbonate tablet 1,600 mg  1,600 mg Oral TID Manolo Rubi MD   1,600 mg at 03/25/23 1001    sodium chloride 0.9% flush 10 mL  10 mL Intravenous PRN Manolo Rubi MD        sodium chloride 0.9% flush 10 mL  10 mL Intravenous Q6H Raghu Hernandez MD   10 mL at 03/25/23 1117    And    sodium chloride 0.9% flush 10 mL  10 mL Intravenous PRN Raghu Hernandez MD        tiotropium bromide 2.5 mcg/actuation inhaler 2 puff  2 puff Inhalation Daily Manolo Rubi MD   2 puff at 03/25/23 1002           Review of Systems   Constitutional:  Positive for malaise/fatigue.   HENT: Negative.     Eyes: Negative.    Respiratory: Negative.     Cardiovascular: Negative.    Gastrointestinal: Negative.    Genitourinary: Negative.    Musculoskeletal: Negative.    Skin: Negative.    Neurological:  Positive for weakness.       Objective:    BP (!) 145/83   Pulse 84   Temp 98.1 °F (36.7 °C) (Oral)   Resp 18   Ht 5' 2.99" (1.6 m)   Wt 95.2 kg (209 lb 14.1 oz)   SpO2 98%   Breastfeeding No   BMI 37.19 kg/m²       Intake/Output Summary (Last 24 hours) at 3/25/2023 1236  Last data filed at 3/24/2023 2100  Gross per 24 hour   Intake 1020 ml   Output 100 ml   Net 920 ml             Physical Exam  Vitals reviewed.   Constitutional:       Appearance: Normal appearance.   HENT:      Head: Normocephalic and atraumatic.      Nose: Nose normal.   Eyes:      Extraocular Movements: Extraocular movements intact.      Pupils: Pupils are equal, round, and reactive to light.   Cardiovascular:      Rate and Rhythm: Normal rate and regular " rhythm.      Pulses: Normal pulses.      Heart sounds: Normal heart sounds.   Pulmonary:      Effort: Pulmonary effort is normal.      Breath sounds: Normal breath sounds.   Abdominal:      General: Bowel sounds are normal.      Palpations: Abdomen is soft.   Musculoskeletal:         General: Normal range of motion.      Cervical back: Normal range of motion.      Comments: Some deconditioning   Skin:     General: Skin is warm and dry.   Neurological:      General: No focal deficit present.      Mental Status: She is alert and oriented to person, place, and time. Mental status is at baseline.   Psychiatric:         Mood and Affect: Mood normal.          Inpatient Diagnostics:  Recent Results (from the past 24 hour(s))   Renal Function Panel    Collection Time: 03/24/23 12:37 PM   Result Value Ref Range    Sodium Level 137 136 - 145 mmol/L    Potassium Level 4.2 3.5 - 5.1 mmol/L    Chloride 106 98 - 107 mmol/L    Carbon Dioxide 26 23 - 31 mmol/L    Glucose Level 133 (H) 82 - 115 mg/dL    Blood Urea Nitrogen 42.2 (H) 9.8 - 20.1 mg/dL    Creatinine 3.53 (H) 0.55 - 1.02 mg/dL    Calcium Level Total 8.7 8.4 - 10.2 mg/dL    Albumin Level 2.6 (L) 3.4 - 4.8 g/dL    Phosphorus Level 3.3 2.3 - 4.7 mg/dL    eGFR 13 mls/min/1.73/m2   CBC with Differential    Collection Time: 03/24/23 12:37 PM   Result Value Ref Range    WBC 8.8 4.5 - 11.5 x10(3)/mcL    RBC 2.83 (L) 4.20 - 5.40 x10(6)/mcL    Hgb 8.1 (L) 12.0 - 16.0 g/dL    Hct 25.0 (L) 37.0 - 47.0 %    MCV 88.3 80.0 - 94.0 fL    MCH 28.6 27.0 - 31.0 pg    MCHC 32.4 (L) 33.0 - 36.0 g/dL    RDW 17.2 (H) 11.5 - 17.0 %    Platelet 150 130 - 400 x10(3)/mcL    MPV 10.6 (H) 7.4 - 10.4 fL    Neut % 80.8 %    Lymph % 10.8 %    Mono % 5.9 %    Eos % 1.2 %    Basophil % 0.6 %    Lymph # 0.95 0.6 - 4.6 x10(3)/mcL    Neut # 7.12 2.1 - 9.2 x10(3)/mcL    Mono # 0.52 0.1 - 1.3 x10(3)/mcL    Eos # 0.11 0 - 0.9 x10(3)/mcL    Baso # 0.05 0 - 0.2 x10(3)/mcL    IG# 0.06 (H) 0 - 0.04 x10(3)/mcL     IG% 0.7 %    NRBC% 0.3 %   POCT glucose    Collection Time: 03/24/23  5:08 PM   Result Value Ref Range    POCT Glucose 223 (H) 70 - 110 mg/dL   POCT glucose    Collection Time: 03/24/23  8:23 PM   Result Value Ref Range    POCT Glucose 188 (H) 70 - 110 mg/dL   Basic Metabolic Panel    Collection Time: 03/25/23  4:03 AM   Result Value Ref Range    Sodium Level 135 (L) 136 - 145 mmol/L    Potassium Level 4.7 3.5 - 5.1 mmol/L    Chloride 103 98 - 107 mmol/L    Carbon Dioxide 24 23 - 31 mmol/L    Glucose Level 113 82 - 115 mg/dL    Blood Urea Nitrogen 46.8 (H) 9.8 - 20.1 mg/dL    Creatinine 3.73 (H) 0.55 - 1.02 mg/dL    BUN/Creatinine Ratio 13     Calcium Level Total 8.9 8.4 - 10.2 mg/dL    Anion Gap 8.0 mEq/L    eGFR 12 mls/min/1.73/m2   CBC with Differential    Collection Time: 03/25/23  4:03 AM   Result Value Ref Range    WBC 7.9 4.5 - 11.5 x10(3)/mcL    RBC 2.85 (L) 4.20 - 5.40 x10(6)/mcL    Hgb 8.1 (L) 12.0 - 16.0 g/dL    Hct 25.9 (L) 37.0 - 47.0 %    MCV 90.9 80.0 - 94.0 fL    MCH 28.4 27.0 - 31.0 pg    MCHC 31.3 (L) 33.0 - 36.0 g/dL    RDW 18.0 (H) 11.5 - 17.0 %    Platelet 167 130 - 400 x10(3)/mcL    MPV 10.3 7.4 - 10.4 fL    Neut % 77.0 %    Lymph % 13.3 %    Mono % 6.9 %    Eos % 1.6 %    Basophil % 0.4 %    Lymph # 1.05 0.6 - 4.6 x10(3)/mcL    Neut # 6.07 2.1 - 9.2 x10(3)/mcL    Mono # 0.54 0.1 - 1.3 x10(3)/mcL    Eos # 0.13 0 - 0.9 x10(3)/mcL    Baso # 0.03 0 - 0.2 x10(3)/mcL    IG# 0.06 (H) 0 - 0.04 x10(3)/mcL    IG% 0.8 %    NRBC% 0.3 %   POCT glucose    Collection Time: 03/25/23 11:55 AM   Result Value Ref Range    POCT Glucose 208 (H) 70 - 110 mg/dL     A/P--NE 03/25    1---ESRD on HD--Cont TTS schedule while inpatient  2---UGI Bleed--- 3/24 s/p EGD--Bleeding AVM cauterized--per GI  3---A Fib--Cont same management  4---Anemia secondary to Blood Loss---Transfused on admit--H&H now stable  Procrit scheduled    IV--SL    ORDERS:  HD today  CBC in cheryl Petersen DNP, ANP-C    3/25/2023

## 2023-03-25 NOTE — DISCHARGE SUMMARY
[facility] Hospital Medicine Discharge Summary    Admit Date: 3/22/2023  Discharge Date and Time: 3/25/23 12:52 PM  Admitting Physician: ANGELO Team  Discharging Physician: Noah Cowan MD.  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   Swollen Upper Extremity, negative for DVT   NSTEMI, Likely type 2  ESRD on HD TTS  Heart failure with preserved EF 55%   CAD/CABG  PAF on Eliquis  COPD, without evidence of acute exacerbation  Hypertension  Hyperlipidemia  Venous insufficiency     Hospital Course:   77-year-old female with a history of ESRD on HD TTS, heart failure with preserved EF of 55%, CAD/CABG, COPD, HTN, HLD, venous insufficiency, hypothyroidism, chronic anemia and known GI bleeding for which she was admitted in February and underwent EGD 02/17/2023 which showed no obvious source of bleeding, followed by video capsule endoscopy which showed a single erosion but unlikely the source of bleeding.  Patient has been having generalized weakness and did have outpatient laboratory work that showed significant worsening of her anemia so was referred to the ER for further evaluation.      On arrival to the ER she was afebrile, hemodynamically stable maintaining normal sats on room air.  Laboratory work showed a hemoglobin of 4.7, and remainder of laboratory work was consistent with end-stage renal disease.  Stool was reported to be melanotic and heme-positive.  Hospitalist service was consulted for admission for further management.    She underwent EGD with APC of AVM noted in the 4th portion of duodenum.Labs f showed hemoglobin 8.1 improved from 4.7 after  PRBC transfusion.No further active out put. Nephrology consulted for HD while in hospital. Continued TTS schedule.     Pt was seen and examined on the day of discharge, no complaints, remained hemodynamically stable,  discharged to home, patient reports had [facility] in the past but does not want to be discharged with home health at this time. Discussed with cards , recommended to cont anticoagulation with Eliquis, discussed with GI cleared to be discharged on Eliquis. JERMAINE Cohn confirmed with GI,Cards at the time of dc re continuation of eliquis at the time of dc.     Vitals:  VITAL SIGNS: 24 HRS MIN & MAX LAST   No data recorded 98.1 °F (36.7 °C)   No data recorded (!) 145/83     No data recorded  84   No data recorded 18   No data recorded 98 %       Physical Exam:  General: In no acute distress, afebrile  Chest: Clear to auscultation bilaterally  Heart: RRR  Abdomen: Soft, nontender, BS +  MSK:  Left greater than right pedal edema likely chronic linear surgical scar noted in the left shin   Neurologic: Alert and oriented   Procedures Performed:  EGD with APC of AVM     Significant Diagnostic Studies: See Full reports for all details    No results for input(s): WBC, RBC, HGB, HCT, MCV, MCH, MCHC, RDW, PLT, MPV, GRAN, LYMPH, MONO, BASO, NRBC in the last 168 hours.      No results for input(s): NA, K, CL, CO2, ANIONGAP, BUN, CREATININE, GLU, CALCIUM, PH, MG, ALBUMIN, PROT, ALKPHOS, ALT, AST, BILITOT in the last 168 hours.       Microbiology Results (last 7 days)       ** No results found for the last 168 hours. **             Echo  · Concentric hypertrophy and normal systolic function.  · The estimated ejection fraction is 60%.  · Indeterminate left ventricular diastolic function.  · Mild-to-moderate mitral regurgitation.  · Normal right ventricular size.  · Mild tricuspid regurgitation.     CV Ultrasound doppler venous arm right  Right upper extremity negative for deep vein thrombosis.         Medication List        CONTINUE taking these medications      albuterol 90 mcg/actuation inhaler  Commonly known as: VENTOLIN HFA  Inhale 2 puffs into the lungs every 6 (six) hours as needed for Wheezing. Rescue     albuterol-ipratropium  2.5 mg-0.5 mg/3 mL nebulizer solution  Commonly known as: DUO-NEB  Take 3 mLs by nebulization every 6 (six) hours as needed for Wheezing or Shortness of Breath. Rescue     apixaban 5 mg Tab  Commonly known as: ELIQUIS     aspirin 81 MG EC tablet  Commonly known as: ECOTRIN  Take 1 tablet (81 mg total) by mouth once daily.     BASAGLAR KWIKPEN U-100 INSULIN glargine 100 units/mL SubQ pen  Generic drug: insulin  Inject 30 Units into the skin Daily.     BenadryL 25 mg capsule  Generic drug: diphenhydrAMINE     carvediloL 6.25 MG tablet  Commonly known as: COREG  Take 1 tablet (6.25 mg total) by mouth 2 (two) times daily.     clonazePAM 0.5 MG tablet  Commonly known as: KlonoPIN     DIALYVITE 100-1 mg Tab  Generic drug: B complex-vitamin C-folic acid     DULoxetine 30 MG capsule  Commonly known as: CYMBALTA     ergocalciferol 50,000 unit Cap  Commonly known as: ERGOCALCIFEROL  Take 1 capsule (50,000 Units total) by mouth every 7 days.     ferrous sulfate 325 mg (65 mg iron) Tab tablet  Commonly known as: FEOSOL  Take 1 tablet (325 mg total) by mouth once daily.     fluticasone furoate-vilanteroL 100-25 mcg/dose diskus inhaler  Commonly known as: BREO  Inhale 1 puff into the lungs once daily.     furosemide 40 MG tablet  Commonly known as: LASIX  Take 1 tablet (40 mg total) by mouth once daily.     glipiZIDE 10 MG tablet  Commonly known as: GLUCOTROL  Take 1 tablet (10 mg total) by mouth daily with breakfast.     INVEGA SUSTENNA 156 mg/mL Syrg injection  Generic drug: paliperidone palmitate     levothyroxine 125 MCG tablet  Commonly known as: SYNTHROID  Take 1 tablet (125 mcg total) by mouth before breakfast.     ONETOUCH DELICA PLUS LANCET 30 gauge Misc  Generic drug: lancets  1 lancet by Other route once daily.     ONETOUCH ULTRA TEST Strp  Generic drug: blood sugar diagnostic  1 strip by Other route once daily.     pantoprazole 40 MG tablet  Commonly known as: PROTONIX  Take 1 tablet (40 mg total) by mouth 2 (two)  "times daily.     pen needle, diabetic 31 gauge x 5/16" Ndle  2 Units by Misc.(Non-Drug; Combo Route) route 2 (two) times a day. Patient to check blood sugars twice daily (morning and night)     rosuvastatin 40 MG Tab  Commonly known as: CRESTOR  Take 1 tablet (40 mg total) by mouth once daily.     sevelamer carbonate 800 mg Tab  Commonly known as: RENVELA  Take 2 tablets (1,600 mg total) by mouth 3 (three) times daily.     SPIRIVA WITH HANDIHALER 18 mcg inhalation capsule  Generic drug: tiotropium  Inhale 1 capsule (18 mcg total) into the lungs Daily.            STOP taking these medications      bisacodyL 5 mg EC tablet  Commonly known as: DULCOLAX     ferrous gluconate 324 MG tablet  Commonly known as: FERGON     L-METHYLFOLATE 15 mg Tab  Generic drug: levomefolate calcium     polyethylene glycol 17 gram Pwpk  Commonly known as: GLYCOLAX               Where to Get Your Medications        These medications were sent to [facility]/pharmacy #[id number] [address] AT [place] [address]      Phone: [phone]   sevelamer carbonate 800 mg Tab          Explained in detail to the patient about the discharge plan, medications, and follow-up visits. Pt understands and agrees with the treatment plan  Discharge Disposition: Home   Discharged Condition: stable  Diet-        Medications Per HI med rec  Activities as tolerated   Follow-up Information       JOSEFINA Lord. Schedule an appointment as soon as possible for a visit in 1 week(s).    Specialty: Family Medicine  Contact information:  [address] [id number] [phone]                           For further questions contact hospitalist office    Discharge time 33 minutes    For worsening symptoms, chest pain, shortness of breath, increased abdominal pain, high grade fever, stroke or stroke like symptoms, immediately go to the nearest Emergency Room or call 911 as soon as possible.      Noah" Reza BLAKE on 3/25/23 . at 12:52 PM.

## 2023-03-25 NOTE — PLAN OF CARE
Problem: Adult Inpatient Plan of Care  Goal: Plan of Care Review  3/25/2023 1433 by Lalit Mahoney RN  Outcome: Met  3/25/2023 0745 by Lalit Mahoney RN  Outcome: Ongoing, Progressing  Flowsheets (Taken 3/25/2023 0745)  Plan of Care Reviewed With: patient  Goal: Patient-Specific Goal (Individualized)  3/25/2023 1433 by Lalit Mahoney RN  Outcome: Met  3/25/2023 0745 by Lalit Mahoney RN  Outcome: Ongoing, Progressing  Goal: Absence of Hospital-Acquired Illness or Injury  3/25/2023 1433 by Lalit Mahoney RN  Outcome: Met  3/25/2023 0745 by Lalit Mahoney RN  Outcome: Ongoing, Progressing  Intervention: Identify and Manage Fall Risk  Flowsheets (Taken 3/25/2023 0745)  Safety Promotion/Fall Prevention:   assistive device/personal item within reach   medications reviewed   nonskid shoes/socks when out of bed   side rails raised x 2  Intervention: Prevent Skin Injury  Flowsheets (Taken 3/25/2023 0745)  Body Position: position changed independently  Skin Protection:   adhesive use limited   incontinence pads utilized   tubing/devices free from skin contact  Intervention: Prevent and Manage VTE (Venous Thromboembolism) Risk  Flowsheets (Taken 3/25/2023 0745)  Activity Management: Ambulated in room - L4  VTE Prevention/Management:   ambulation promoted   bleeding risk assessed   ROM (active) performed  Range of Motion:   active ROM (range of motion) encouraged   ROM (range of motion) performed  Intervention: Prevent Infection  Flowsheets (Taken 3/25/2023 0745)  Infection Prevention:   rest/sleep promoted   single patient room provided  Goal: Optimal Comfort and Wellbeing  3/25/2023 1433 by Lalit Maohney RN  Outcome: Met  3/25/2023 0745 by Lalit Mahoney RN  Outcome: Ongoing, Progressing  Intervention: Monitor Pain and Promote Comfort  Flowsheets (Taken 3/25/2023 0745)  Pain Management Interventions:   care clustered   medication offered   pain management plan reviewed with patient/caregiver   pillow support provided   position  adjusted  Intervention: Provide Person-Centered Care  Flowsheets (Taken 3/25/2023 0745)  Trust Relationship/Rapport: care explained  Goal: Readiness for Transition of Care  3/25/2023 1433 by Lalit Mahoney RN  Outcome: Met  3/25/2023 0745 by Lalit Mahoney RN  Outcome: Ongoing, Progressing     Problem: Device-Related Complication Risk (Hemodialysis)  Goal: Safe, Effective Therapy Delivery  3/25/2023 1433 by Lalit Mahoney RN  Outcome: Met  3/25/2023 0745 by Lalit Mahoney RN  Outcome: Ongoing, Progressing  Intervention: Optimize Device Care and Function  Flowsheets (Taken 3/25/2023 0745)  Medication Review/Management: medications reviewed     Problem: Hemodynamic Instability (Hemodialysis)  Goal: Effective Tissue Perfusion  3/25/2023 1433 by Lalit Mahoney RN  Outcome: Met  3/25/2023 0745 by Lalit Mahoney RN  Outcome: Ongoing, Progressing     Problem: Infection (Hemodialysis)  Goal: Absence of Infection Signs and Symptoms  3/25/2023 1433 by Lalit Mahoney RN  Outcome: Met  3/25/2023 0745 by Lalit Mahoney RN  Outcome: Ongoing, Progressing     Problem: Diabetes Comorbidity  Goal: Blood Glucose Level Within Targeted Range  3/25/2023 1433 by Lalit Mahoney RN  Outcome: Met  3/25/2023 0745 by Lalit Mahoney RN  Outcome: Ongoing, Progressing  Intervention: Monitor and Manage Glycemia  Flowsheets (Taken 3/25/2023 0745)  Glycemic Management: blood glucose monitored     Problem: Infection  Goal: Absence of Infection Signs and Symptoms  3/25/2023 1433 by Lalit Mahoney RN  Outcome: Met  3/25/2023 0745 by Lalit Mahoney RN  Outcome: Ongoing, Progressing  Intervention: Prevent or Manage Infection  Flowsheets (Taken 3/25/2023 0745)  Infection Management: aseptic technique maintained     Problem: Fall Injury Risk  Goal: Absence of Fall and Fall-Related Injury  3/25/2023 1433 by Lalit Mahoney RN  Outcome: Met  3/25/2023 0745 by Lalit Mahoney RN  Outcome: Ongoing, Progressing  Intervention: Identify and Manage Contributors  Flowsheets (Taken  3/25/2023 0745)  Self-Care Promotion: independence encouraged  Medication Review/Management: medications reviewed  Intervention: Promote Injury-Free Environment  Flowsheets (Taken 3/25/2023 0745)  Safety Promotion/Fall Prevention:   assistive device/personal item within reach   medications reviewed   nonskid shoes/socks when out of bed   side rails raised x 2

## 2023-03-25 NOTE — NURSING
Nurses Note -- 4 Eyes      3/24/2023   9:12 PM      Skin assessed during: Admit      [x] No Pressure Injuries Present    [x]Prevention Measures Documented      [] Yes- Altered Skin Integrity Present or Discovered   [] LDA Added if Not in Epic (Describe Wound)   [] New Altered Skin Integrity was Present on Admit and Documented in LDA   [] Wound Image Taken    Wound Care Consulted? No    Attending Nurse:  Flor Ordaz RN     Second RN/Staff Member:  Corinne Robideaux, RN

## 2023-03-25 NOTE — PLAN OF CARE
Received DC orders for home with HH, patient reports had [facility] in the past but does not want to be discharged with home health at this time. Notified patient's nurse Lalit.

## 2023-03-25 NOTE — PLAN OF CARE
Problem: Adult Inpatient Plan of Care  Goal: Plan of Care Review  Outcome: Ongoing, Progressing  Flowsheets (Taken 3/25/2023 0745)  Plan of Care Reviewed With: patient  Goal: Patient-Specific Goal (Individualized)  Outcome: Ongoing, Progressing  Goal: Absence of Hospital-Acquired Illness or Injury  Outcome: Ongoing, Progressing  Intervention: Identify and Manage Fall Risk  Flowsheets (Taken 3/25/2023 0745)  Safety Promotion/Fall Prevention:   assistive device/personal item within reach   medications reviewed   nonskid shoes/socks when out of bed   side rails raised x 2  Intervention: Prevent Skin Injury  Flowsheets (Taken 3/25/2023 0745)  Body Position: position changed independently  Skin Protection:   adhesive use limited   incontinence pads utilized   tubing/devices free from skin contact  Intervention: Prevent and Manage VTE (Venous Thromboembolism) Risk  Flowsheets (Taken 3/25/2023 0745)  Activity Management: Ambulated in room - L4  VTE Prevention/Management:   ambulation promoted   bleeding risk assessed   ROM (active) performed  Range of Motion:   active ROM (range of motion) encouraged   ROM (range of motion) performed  Intervention: Prevent Infection  Flowsheets (Taken 3/25/2023 0745)  Infection Prevention:   rest/sleep promoted   single patient room provided  Goal: Optimal Comfort and Wellbeing  Outcome: Ongoing, Progressing  Intervention: Monitor Pain and Promote Comfort  Flowsheets (Taken 3/25/2023 0745)  Pain Management Interventions:   care clustered   medication offered   pain management plan reviewed with patient/caregiver   pillow support provided   position adjusted  Intervention: Provide Person-Centered Care  Flowsheets (Taken 3/25/2023 0745)  Trust Relationship/Rapport: care explained  Goal: Readiness for Transition of Care  Outcome: Ongoing, Progressing     Problem: Device-Related Complication Risk (Hemodialysis)  Goal: Safe, Effective Therapy Delivery  Outcome: Ongoing,  Patient back from XR. Monitors x3 applied. Progressing  Intervention: Optimize Device Care and Function  Flowsheets (Taken 3/25/2023 0745)  Medication Review/Management: medications reviewed     Problem: Hemodynamic Instability (Hemodialysis)  Goal: Effective Tissue Perfusion  Outcome: Ongoing, Progressing     Problem: Infection (Hemodialysis)  Goal: Absence of Infection Signs and Symptoms  Outcome: Ongoing, Progressing     Problem: Diabetes Comorbidity  Goal: Blood Glucose Level Within Targeted Range  Outcome: Ongoing, Progressing  Intervention: Monitor and Manage Glycemia  Flowsheets (Taken 3/25/2023 0745)  Glycemic Management: blood glucose monitored     Problem: Infection  Goal: Absence of Infection Signs and Symptoms  Outcome: Ongoing, Progressing  Intervention: Prevent or Manage Infection  Flowsheets (Taken 3/25/2023 0745)  Infection Management: aseptic technique maintained     Problem: Fall Injury Risk  Goal: Absence of Fall and Fall-Related Injury  Outcome: Ongoing, Progressing  Intervention: Identify and Manage Contributors  Flowsheets (Taken 3/25/2023 0745)  Self-Care Promotion: independence encouraged  Medication Review/Management: medications reviewed  Intervention: Promote Injury-Free Environment  Flowsheets (Taken 3/25/2023 0745)  Safety Promotion/Fall Prevention:   assistive device/personal item within reach   medications reviewed   nonskid shoes/socks when out of bed   side rails raised x 2

## 2023-03-25 NOTE — DISCHARGE INSTRUCTIONS
Please return to the ER with any worsening of your symptoms. Call your PCP with any further questions and concerns. Be sure to call to schedule a follow up. If you have a return of your bloody stools and are not taking iron, return to the ER. Iron can cause your stools to be darker.

## 2023-03-27 ENCOUNTER — PATIENT OUTREACH (OUTPATIENT)
Dept: ADMINISTRATIVE | Facility: CLINIC | Age: 78
End: 2023-03-27
Payer: MEDICARE

## 2023-03-27 NOTE — PROGRESS NOTES
C3 nurse attempted to contact [facility]  for a TCC post hospital discharge follow up call. No answer. No voicemail available. Called emergency contact number for daughter Marlen. No answer. Left voicemail with call back number. The patient has a scheduled [facility] appointment with JOSEFINA Lord  on 03/31/2023 @ 845 am..

## 2023-03-28 NOTE — PROGRESS NOTES
C3 nurse spoke with Rosette Madrid  for a TCC post hospital discharge follow up call. The patient has a scheduled HOS appointment with JOSEFINA Lord  on 03/31/2023 @ 845 am..       Patient taking;  Gentle Ease laxative as needed

## 2023-04-05 ENCOUNTER — HOSPITAL ENCOUNTER (INPATIENT)
Facility: HOSPITAL | Age: 78
LOS: 1 days | Discharge: HOME | DRG: 377 | End: 2023-04-07
Attending: EMERGENCY MEDICINE | Admitting: INTERNAL MEDICINE
Payer: MEDICARE

## 2023-04-05 DIAGNOSIS — E87.70 HYPERVOLEMIA, UNSPECIFIED HYPERVOLEMIA TYPE: ICD-10-CM

## 2023-04-05 DIAGNOSIS — N18.6 ESRD (END STAGE RENAL DISEASE) ON DIALYSIS: Primary | ICD-10-CM

## 2023-04-05 DIAGNOSIS — Z99.2 ESRD (END STAGE RENAL DISEASE) ON DIALYSIS: Primary | ICD-10-CM

## 2023-04-05 DIAGNOSIS — K92.2 GASTROINTESTINAL HEMORRHAGE, UNSPECIFIED GASTROINTESTINAL HEMORRHAGE TYPE: ICD-10-CM

## 2023-04-05 DIAGNOSIS — D64.9 ACUTE ANEMIA: ICD-10-CM

## 2023-04-05 LAB
ALBUMIN SERPL-MCNC: 2.8 G/DL (ref 3.4–4.8)
ALBUMIN/GLOB SERPL: 1.1 RATIO (ref 1.1–2)
ALP SERPL-CCNC: 106 UNIT/L (ref 40–150)
ALT SERPL-CCNC: 45 UNIT/L (ref 0–55)
APTT PPP: 30.4 SECONDS (ref 23.2–33.7)
AST SERPL-CCNC: 42 UNIT/L (ref 5–34)
BASOPHILS # BLD AUTO: 0.03 X10(3)/MCL (ref 0–0.2)
BASOPHILS NFR BLD AUTO: 0.4 %
BILIRUBIN DIRECT+TOT PNL SERPL-MCNC: 0.4 MG/DL
BUN SERPL-MCNC: 31.9 MG/DL (ref 9.8–20.1)
CALCIUM SERPL-MCNC: 8.7 MG/DL (ref 8.4–10.2)
CHLORIDE SERPL-SCNC: 100 MMOL/L (ref 98–107)
CO2 SERPL-SCNC: 25 MMOL/L (ref 23–31)
CREAT SERPL-MCNC: 4.47 MG/DL (ref 0.55–1.02)
EOSINOPHIL # BLD AUTO: 0.08 X10(3)/MCL (ref 0–0.9)
EOSINOPHIL NFR BLD AUTO: 1.1 %
ERYTHROCYTE [DISTWIDTH] IN BLOOD BY AUTOMATED COUNT: 21 % (ref 11.5–17)
GFR SERPLBLD CREATININE-BSD FMLA CKD-EPI: 10 MLS/MIN/1.73/M2
GLOBULIN SER-MCNC: 2.5 GM/DL (ref 2.4–3.5)
GLUCOSE SERPL-MCNC: 138 MG/DL (ref 82–115)
HCT VFR BLD AUTO: 18.7 % (ref 37–47)
HGB BLD-MCNC: 5.7 G/DL (ref 12–16)
IMM GRANULOCYTES # BLD AUTO: 0.03 X10(3)/MCL (ref 0–0.04)
IMM GRANULOCYTES NFR BLD AUTO: 0.4 %
INR BLD: 1.03 (ref 0–1.3)
LYMPHOCYTES # BLD AUTO: 0.87 X10(3)/MCL (ref 0.6–4.6)
LYMPHOCYTES NFR BLD AUTO: 12.3 %
MCH RBC QN AUTO: 31.1 PG (ref 27–31)
MCHC RBC AUTO-ENTMCNC: 30.5 G/DL (ref 33–36)
MCV RBC AUTO: 102.2 FL (ref 80–94)
MONOCYTES # BLD AUTO: 0.62 X10(3)/MCL (ref 0.1–1.3)
MONOCYTES NFR BLD AUTO: 8.7 %
NEUTROPHILS # BLD AUTO: 5.46 X10(3)/MCL (ref 2.1–9.2)
NEUTROPHILS NFR BLD AUTO: 77.1 %
NRBC BLD AUTO-RTO: 0.6 %
PLATELET # BLD AUTO: 209 X10(3)/MCL (ref 130–400)
PMV BLD AUTO: 10.1 FL (ref 7.4–10.4)
POTASSIUM SERPL-SCNC: 4 MMOL/L (ref 3.5–5.1)
PROT SERPL-MCNC: 5.3 GM/DL (ref 5.8–7.6)
PROTHROMBIN TIME: 13.4 SECONDS (ref 12.5–14.5)
RBC # BLD AUTO: 1.83 X10(6)/MCL (ref 4.2–5.4)
SODIUM SERPL-SCNC: 134 MMOL/L (ref 136–145)
WBC # SPEC AUTO: 7.1 X10(3)/MCL (ref 4.5–11.5)

## 2023-04-05 PROCEDURE — 85730 THROMBOPLASTIN TIME PARTIAL: CPT | Performed by: NURSE PRACTITIONER

## 2023-04-05 PROCEDURE — C9113 INJ PANTOPRAZOLE SODIUM, VIA: HCPCS | Performed by: EMERGENCY MEDICINE

## 2023-04-05 PROCEDURE — G0378 HOSPITAL OBSERVATION PER HR: HCPCS

## 2023-04-05 PROCEDURE — 96365 THER/PROPH/DIAG IV INF INIT: CPT

## 2023-04-05 PROCEDURE — 85610 PROTHROMBIN TIME: CPT | Performed by: NURSE PRACTITIONER

## 2023-04-05 PROCEDURE — 96376 TX/PRO/DX INJ SAME DRUG ADON: CPT

## 2023-04-05 PROCEDURE — 80053 COMPREHEN METABOLIC PANEL: CPT | Performed by: NURSE PRACTITIONER

## 2023-04-05 PROCEDURE — 96375 TX/PRO/DX INJ NEW DRUG ADDON: CPT

## 2023-04-05 PROCEDURE — 25000003 PHARM REV CODE 250: Performed by: EMERGENCY MEDICINE

## 2023-04-05 PROCEDURE — 63600175 PHARM REV CODE 636 W HCPCS: Performed by: EMERGENCY MEDICINE

## 2023-04-05 PROCEDURE — 85025 COMPLETE CBC W/AUTO DIFF WBC: CPT | Performed by: NURSE PRACTITIONER

## 2023-04-05 PROCEDURE — 99285 EMERGENCY DEPT VISIT HI MDM: CPT | Mod: 25

## 2023-04-05 RX ORDER — HYDROCODONE BITARTRATE AND ACETAMINOPHEN 500; 5 MG/1; MG/1
TABLET ORAL
Status: DISCONTINUED | OUTPATIENT
Start: 2023-04-05 | End: 2023-04-07 | Stop reason: HOSPADM

## 2023-04-05 RX ORDER — FUROSEMIDE 10 MG/ML
80 INJECTION INTRAMUSCULAR; INTRAVENOUS
Status: COMPLETED | OUTPATIENT
Start: 2023-04-05 | End: 2023-04-05

## 2023-04-05 RX ORDER — PANTOPRAZOLE SODIUM 40 MG/10ML
80 INJECTION, POWDER, LYOPHILIZED, FOR SOLUTION INTRAVENOUS
Status: COMPLETED | OUTPATIENT
Start: 2023-04-05 | End: 2023-04-05

## 2023-04-05 RX ADMIN — PANTOPRAZOLE SODIUM 80 MG: 40 INJECTION, POWDER, FOR SOLUTION INTRAVENOUS at 10:04

## 2023-04-05 RX ADMIN — FUROSEMIDE 80 MG: 10 INJECTION, SOLUTION INTRAMUSCULAR; INTRAVENOUS at 10:04

## 2023-04-05 RX ADMIN — PANTOPRAZOLE SODIUM 8 MG/HR: 40 INJECTION, POWDER, FOR SOLUTION INTRAVENOUS at 11:04

## 2023-04-05 NOTE — Clinical Note
Diagnosis: Acute anemia [[id number]]   Future Attending Provider: BC VARGHESE [[id number]]   Admitting Provider:: BC VARGHESE [[id number]]

## 2023-04-06 ENCOUNTER — ANESTHESIA EVENT (OUTPATIENT)
Dept: ENDOSCOPY | Facility: HOSPITAL | Age: 78
DRG: 377 | End: 2023-04-06
Payer: MEDICARE

## 2023-04-06 ENCOUNTER — ANESTHESIA (OUTPATIENT)
Dept: ENDOSCOPY | Facility: HOSPITAL | Age: 78
DRG: 377 | End: 2023-04-06
Payer: MEDICARE

## 2023-04-06 LAB
ABO + RH BLD: NORMAL
BASOPHILS # BLD AUTO: 0.02 X10(3)/MCL (ref 0–0.2)
BASOPHILS NFR BLD AUTO: 0.4 %
BLD PROD TYP BPU: NORMAL
BLOOD UNIT EXPIRATION DATE: NORMAL
BLOOD UNIT TYPE CODE: 5100
CROSSMATCH INTERPRETATION: NORMAL
DISPENSE STATUS: NORMAL
EOSINOPHIL # BLD AUTO: 0.07 X10(3)/MCL (ref 0–0.9)
EOSINOPHIL NFR BLD AUTO: 1.3 %
ERYTHROCYTE [DISTWIDTH] IN BLOOD BY AUTOMATED COUNT: 19 % (ref 11.5–17)
GROUP & RH: NORMAL
HCT VFR BLD AUTO: 23.4 % (ref 37–47)
HGB BLD-MCNC: 7.3 G/DL (ref 12–16)
IMM GRANULOCYTES # BLD AUTO: 0.03 X10(3)/MCL (ref 0–0.04)
IMM GRANULOCYTES NFR BLD AUTO: 0.6 %
INDIRECT COOMBS GEL: NORMAL
LYMPHOCYTES # BLD AUTO: 0.54 X10(3)/MCL (ref 0.6–4.6)
LYMPHOCYTES NFR BLD AUTO: 10.4 %
MCH RBC QN AUTO: 29.8 PG (ref 27–31)
MCHC RBC AUTO-ENTMCNC: 31.2 G/DL (ref 33–36)
MCV RBC AUTO: 95.5 FL (ref 80–94)
MONOCYTES # BLD AUTO: 0.42 X10(3)/MCL (ref 0.1–1.3)
MONOCYTES NFR BLD AUTO: 8.1 %
NEUTROPHILS # BLD AUTO: 4.13 X10(3)/MCL (ref 2.1–9.2)
NEUTROPHILS NFR BLD AUTO: 79.2 %
NRBC BLD AUTO-RTO: 0.8 %
PLATELET # BLD AUTO: 181 X10(3)/MCL (ref 130–400)
PMV BLD AUTO: 10 FL (ref 7.4–10.4)
POCT GLUCOSE: 106 MG/DL (ref 70–110)
POCT GLUCOSE: 269 MG/DL (ref 70–110)
RBC # BLD AUTO: 2.45 X10(6)/MCL (ref 4.2–5.4)
SPECIMEN OUTDATE: NORMAL
UNIT NUMBER: NORMAL
WBC # SPEC AUTO: 5.2 X10(3)/MCL (ref 4.5–11.5)

## 2023-04-06 PROCEDURE — D9220A PRA ANESTHESIA: Mod: ANES,,, | Performed by: ANESTHESIOLOGY

## 2023-04-06 PROCEDURE — 63600175 PHARM REV CODE 636 W HCPCS: Performed by: INTERNAL MEDICINE

## 2023-04-06 PROCEDURE — 82962 GLUCOSE BLOOD TEST: CPT | Performed by: INTERNAL MEDICINE

## 2023-04-06 PROCEDURE — 85025 COMPLETE CBC W/AUTO DIFF WBC: CPT | Performed by: INTERNAL MEDICINE

## 2023-04-06 PROCEDURE — 86923 COMPATIBILITY TEST ELECTRIC: CPT | Performed by: EMERGENCY MEDICINE

## 2023-04-06 PROCEDURE — 37000008 HC ANESTHESIA 1ST 15 MINUTES: Performed by: INTERNAL MEDICINE

## 2023-04-06 PROCEDURE — C9113 INJ PANTOPRAZOLE SODIUM, VIA: HCPCS | Performed by: INTERNAL MEDICINE

## 2023-04-06 PROCEDURE — 37000009 HC ANESTHESIA EA ADD 15 MINS: Performed by: INTERNAL MEDICINE

## 2023-04-06 PROCEDURE — 36430 TRANSFUSION BLD/BLD COMPNT: CPT

## 2023-04-06 PROCEDURE — 90935 HEMODIALYSIS ONE EVALUATION: CPT

## 2023-04-06 PROCEDURE — D9220A PRA ANESTHESIA: Mod: CRNA,,, | Performed by: STUDENT IN AN ORGANIZED HEALTH CARE EDUCATION/TRAINING PROGRAM

## 2023-04-06 PROCEDURE — D9220A PRA ANESTHESIA: ICD-10-PCS | Mod: ANES,,, | Performed by: ANESTHESIOLOGY

## 2023-04-06 PROCEDURE — 25000003 PHARM REV CODE 250: Performed by: ANESTHESIOLOGY

## 2023-04-06 PROCEDURE — 27201423 OPTIME MED/SURG SUP & DEVICES STERILE SUPPLY: Performed by: INTERNAL MEDICINE

## 2023-04-06 PROCEDURE — 11000001 HC ACUTE MED/SURG PRIVATE ROOM

## 2023-04-06 PROCEDURE — 25000003 PHARM REV CODE 250: Performed by: INTERNAL MEDICINE

## 2023-04-06 PROCEDURE — 21400001 HC TELEMETRY ROOM

## 2023-04-06 PROCEDURE — D9220A PRA ANESTHESIA: ICD-10-PCS | Mod: CRNA,,, | Performed by: STUDENT IN AN ORGANIZED HEALTH CARE EDUCATION/TRAINING PROGRAM

## 2023-04-06 PROCEDURE — 63600175 PHARM REV CODE 636 W HCPCS: Performed by: STUDENT IN AN ORGANIZED HEALTH CARE EDUCATION/TRAINING PROGRAM

## 2023-04-06 PROCEDURE — 96366 THER/PROPH/DIAG IV INF ADDON: CPT

## 2023-04-06 PROCEDURE — 25000003 PHARM REV CODE 250: Performed by: STUDENT IN AN ORGANIZED HEALTH CARE EDUCATION/TRAINING PROGRAM

## 2023-04-06 PROCEDURE — 43270 EGD LESION ABLATION: CPT | Performed by: INTERNAL MEDICINE

## 2023-04-06 PROCEDURE — 86900 BLOOD TYPING SEROLOGIC ABO: CPT | Performed by: NURSE PRACTITIONER

## 2023-04-06 PROCEDURE — P9016 RBC LEUKOCYTES REDUCED: HCPCS | Performed by: EMERGENCY MEDICINE

## 2023-04-06 PROCEDURE — 96372 THER/PROPH/DIAG INJ SC/IM: CPT | Performed by: INTERNAL MEDICINE

## 2023-04-06 RX ORDER — LIDOCAINE HYDROCHLORIDE 20 MG/ML
INJECTION, SOLUTION EPIDURAL; INFILTRATION; INTRACAUDAL; PERINEURAL
Status: DISPENSED
Start: 2023-04-06 | End: 2023-04-07

## 2023-04-06 RX ORDER — PANTOPRAZOLE SODIUM 40 MG/10ML
40 INJECTION, POWDER, LYOPHILIZED, FOR SOLUTION INTRAVENOUS EVERY 12 HOURS
Status: DISCONTINUED | OUTPATIENT
Start: 2023-04-06 | End: 2023-04-07 | Stop reason: HOSPADM

## 2023-04-06 RX ORDER — LIDOCAINE HYDROCHLORIDE 20 MG/ML
INJECTION INTRAVENOUS
Status: DISCONTINUED | OUTPATIENT
Start: 2023-04-06 | End: 2023-04-06

## 2023-04-06 RX ORDER — DULOXETIN HYDROCHLORIDE 30 MG/1
30 CAPSULE, DELAYED RELEASE ORAL DAILY
Status: DISCONTINUED | OUTPATIENT
Start: 2023-04-06 | End: 2023-04-07 | Stop reason: HOSPADM

## 2023-04-06 RX ORDER — PROPOFOL 10 MG/ML
VIAL (ML) INTRAVENOUS
Status: DISCONTINUED | OUTPATIENT
Start: 2023-04-06 | End: 2023-04-06

## 2023-04-06 RX ORDER — LANOLIN ALCOHOL/MO/W.PET/CERES
1 CREAM (GRAM) TOPICAL DAILY
Status: DISCONTINUED | OUTPATIENT
Start: 2023-04-06 | End: 2023-04-07 | Stop reason: HOSPADM

## 2023-04-06 RX ORDER — PROPOFOL 10 MG/ML
VIAL (ML) INTRAVENOUS
Status: DISPENSED
Start: 2023-04-06 | End: 2023-04-07

## 2023-04-06 RX ORDER — SEVELAMER CARBONATE 800 MG/1
1600 TABLET, FILM COATED ORAL 3 TIMES DAILY
Status: DISCONTINUED | OUTPATIENT
Start: 2023-04-06 | End: 2023-04-07 | Stop reason: HOSPADM

## 2023-04-06 RX ORDER — SODIUM CHLORIDE, SODIUM GLUCONATE, SODIUM ACETATE, POTASSIUM CHLORIDE AND MAGNESIUM CHLORIDE 30; 37; 368; 526; 502 MG/100ML; MG/100ML; MG/100ML; MG/100ML; MG/100ML
INJECTION, SOLUTION INTRAVENOUS CONTINUOUS
Status: CANCELLED | OUTPATIENT
Start: 2023-04-06 | End: 2023-05-06

## 2023-04-06 RX ORDER — ATORVASTATIN CALCIUM 10 MG/1
20 TABLET, FILM COATED ORAL DAILY
Status: DISCONTINUED | OUTPATIENT
Start: 2023-04-06 | End: 2023-04-07 | Stop reason: HOSPADM

## 2023-04-06 RX ORDER — SODIUM CHLORIDE 0.9 % (FLUSH) 0.9 %
10 SYRINGE (ML) INJECTION
Status: DISCONTINUED | OUTPATIENT
Start: 2023-04-06 | End: 2023-04-07 | Stop reason: HOSPADM

## 2023-04-06 RX ORDER — FLUTICASONE FUROATE AND VILANTEROL 100; 25 UG/1; UG/1
1 POWDER RESPIRATORY (INHALATION) DAILY
Status: DISCONTINUED | OUTPATIENT
Start: 2023-04-06 | End: 2023-04-07 | Stop reason: HOSPADM

## 2023-04-06 RX ORDER — TALC
6 POWDER (GRAM) TOPICAL NIGHTLY PRN
Status: DISCONTINUED | OUTPATIENT
Start: 2023-04-06 | End: 2023-04-07 | Stop reason: HOSPADM

## 2023-04-06 RX ORDER — PROPOFOL 10 MG/ML
VIAL (ML) INTRAVENOUS
Status: COMPLETED
Start: 2023-04-06 | End: 2023-04-06

## 2023-04-06 RX ORDER — LACTULOSE 10 G/15ML
15 SOLUTION ORAL 3 TIMES DAILY
Status: DISCONTINUED | OUTPATIENT
Start: 2023-04-06 | End: 2023-04-07 | Stop reason: HOSPADM

## 2023-04-06 RX ORDER — CARVEDILOL 3.12 MG/1
6.25 TABLET ORAL 2 TIMES DAILY
Status: DISCONTINUED | OUTPATIENT
Start: 2023-04-06 | End: 2023-04-07 | Stop reason: HOSPADM

## 2023-04-06 RX ORDER — LEVOTHYROXINE SODIUM 125 UG/1
125 TABLET ORAL
Status: DISCONTINUED | OUTPATIENT
Start: 2023-04-06 | End: 2023-04-07 | Stop reason: HOSPADM

## 2023-04-06 RX ORDER — SODIUM CHLORIDE 9 MG/ML
INJECTION, SOLUTION INTRAVENOUS ONCE
Status: COMPLETED | OUTPATIENT
Start: 2023-04-06 | End: 2023-04-06

## 2023-04-06 RX ORDER — LIDOCAINE HYDROCHLORIDE 10 MG/ML
1 INJECTION, SOLUTION EPIDURAL; INFILTRATION; INTRACAUDAL; PERINEURAL ONCE
Status: CANCELLED | OUTPATIENT
Start: 2023-04-06 | End: 2023-04-06

## 2023-04-06 RX ADMIN — PROPOFOL 50 MG: 10 INJECTION, EMULSION INTRAVENOUS at 12:04

## 2023-04-06 RX ADMIN — LACTULOSE 15 G: 10 SOLUTION ORAL at 09:04

## 2023-04-06 RX ADMIN — LEVOTHYROXINE SODIUM 125 MCG: 125 TABLET ORAL at 06:04

## 2023-04-06 RX ADMIN — PANTOPRAZOLE SODIUM 40 MG: 40 INJECTION, POWDER, LYOPHILIZED, FOR SOLUTION INTRAVENOUS at 09:04

## 2023-04-06 RX ADMIN — INSULIN DETEMIR 5 UNITS: 100 INJECTION, SOLUTION SUBCUTANEOUS at 09:04

## 2023-04-06 RX ADMIN — PROPOFOL 40 MG: 10 INJECTION, EMULSION INTRAVENOUS at 12:04

## 2023-04-06 RX ADMIN — LIDOCAINE HYDROCHLORIDE 80 MG: 20 INJECTION, SOLUTION INTRAVENOUS at 12:04

## 2023-04-06 RX ADMIN — Medication 6 MG: at 10:04

## 2023-04-06 RX ADMIN — PROPOFOL 20 MG: 10 INJECTION, EMULSION INTRAVENOUS at 12:04

## 2023-04-06 RX ADMIN — INSULIN DETEMIR 5 UNITS: 100 INJECTION, SOLUTION SUBCUTANEOUS at 01:04

## 2023-04-06 RX ADMIN — SODIUM CHLORIDE, SODIUM GLUCONATE, SODIUM ACETATE, POTASSIUM CHLORIDE AND MAGNESIUM CHLORIDE: 526; 502; 368; 37; 30 INJECTION, SOLUTION INTRAVENOUS at 12:04

## 2023-04-06 RX ADMIN — SEVELAMER CARBONATE 1600 MG: 800 TABLET, FILM COATED ORAL at 09:04

## 2023-04-06 RX ADMIN — CARVEDILOL 6.25 MG: 3.12 TABLET, FILM COATED ORAL at 09:04

## 2023-04-06 RX ADMIN — SODIUM CHLORIDE: 9 INJECTION, SOLUTION INTRAVENOUS at 12:04

## 2023-04-06 NOTE — CONSULTS
[facility] - Emergency Dept  Nephrology  Consult Note    Patient Name: Rosette Madrid  MRN: [id number]  Admission Date: 4/5/2023  Hospital Length of Stay: 0 days  Attending Provider: Manolo Rubi MD   Primary Care Physician: JOSEFINA Lord  Principal Problem:Acute anemia    Consults  Subjective:     HPI: This is a 77 year old female with ESRD. She dialyzes at [facility] on TTS schedule followed by Dr Bourgeois. Patient was admitted through the ED with profound anemia Hgb 5.7/18.7 improved to Hgb 7.3 post PRBC transfusion. She was hospitalized in March for similar issue noted to have bleeding AVM in the duodenum. On 3/25 she was dc in stable condition with Hgb 8.1. Nephrology consulted to continue dialysis.     Patient is on dialysis now receiving 3rd unit of PRBC. Denies abdominal pain, nausea and vomiting. Patient states she did have resolution of symptoms before weakness and melena returned between admits.     Past Medical History:   Diagnosis Date    CKD (chronic kidney disease) requiring chronic dialysis     COPD (chronic obstructive pulmonary disease)     Diabetes mellitus     Hyperlipidemia     Hypertension     Renal insufficiency     Thyroid disease        Past Surgical History:   Procedure Laterality Date    AV FISTULA PLACEMENT Left     CARDIAC CATHETERIZATION      COLONOSCOPY      CORONARY ARTERY BYPASS GRAFT      EGD, WITH HEMORRHAGE CONTROL  3/24/2023    Procedure: EGD,WITH HEMORRHAGE CONTROL;  Surgeon: Go Le MD;  Location: [facility] ENDOSCOPY;  Service: Gastroenterology;;    ESOPHAGOGASTRODUODENOSCOPY      ESOPHAGOGASTRODUODENOSCOPY N/A 2/17/2023    Procedure: EGD +/- VCE PLACEMENT;  Surgeon: Morgan Gardner MD;  Location: [facility] ENDOSCOPY;  Service: Gastroenterology;  Laterality: N/A;    ESOPHAGOGASTRODUODENOSCOPY N/A 3/24/2023    Procedure: EGD;  Surgeon: Go eL MD;  Location: [facility] ENDOSCOPY;  Service: Gastroenterology;  Laterality: N/A;  with push     POLYPECTOMY      SMALL BOWEL ENTEROSCOPY Left 1/20/2023    Procedure: ENTEROSCOPY;  Surgeon: Lexi Scales MD;  Location: [facility] ENDOSCOPY;  Service: Gastroenterology;  Laterality: Left;    THYROIDECTOMY      TUBAL LIGATION         Review of patient's allergies indicates:   Allergen Reactions    Lisinopril Swelling    Azithromycin      Other reaction(s): tongue/facial swelling    Baclofen      Other reaction(s): tongue/facial swelling    Doxycycline      Other reaction(s): Angioedema     Current Facility-Administered Medications   Medication Frequency    0.9%  NaCl infusion (for blood administration) Q24H PRN    atorvastatin tablet 20 mg Daily    carvediloL tablet 6.25 mg BID    DULoxetine DR capsule 30 mg Daily    ferrous sulfate tablet 1 each Daily    fluticasone furoate-vilanteroL 100-25 mcg/dose diskus inhaler 1 puff Daily    insulin detemir U-100 injection 5 Units QHS    lactulose 10 gram/15 ml solution 15 g TID    levothyroxine tablet 125 mcg Before breakfast    melatonin tablet 6 mg Nightly PRN    pantoprazole injection 40 mg Q12H    pantoprazole injection 40 mg Q12H    sevelamer carbonate tablet 1,600 mg TID    sodium chloride 0.9% bolus 250 mL 250 mL PRN    sodium chloride 0.9% flush 10 mL PRN    tiotropium bromide 2.5 mcg/actuation inhaler 2 puff Daily     Current Outpatient Medications   Medication    aspirin (ECOTRIN) 81 MG EC tablet    BENADRYL 25 mg capsule    carvediloL (COREG) 6.25 MG tablet    clonazePAM (KLONOPIN) 0.5 MG tablet    DULoxetine (CYMBALTA) 30 MG capsule    ergocalciferol (ERGOCALCIFEROL) 50,000 unit Cap    furosemide (LASIX) 40 MG tablet    glipiZIDE (GLUCOTROL) 10 MG tablet    pantoprazole (PROTONIX) 40 MG tablet    rosuvastatin (CRESTOR) 40 MG Tab    albuterol (VENTOLIN HFA) 90 mcg/actuation inhaler    albuterol-ipratropium (DUO-NEB) 2.5 mg-0.5 mg/3 mL nebulizer solution    apixaban (ELIQUIS) 5 mg Tab    BASAGLAR KWIKPEN U-100 INSULIN glargine 100 units/mL SubQ pen    DIALYVITE  "100-1 mg Tab    ferrous sulfate (FEOSOL) 325 mg (65 mg iron) Tab tablet    fluticasone furoate-vilanteroL (BREO) 100-25 mcg/dose diskus inhaler    INVEGA SUSTENNA 156 mg/mL Syrg injection    levothyroxine (SYNTHROID) 125 MCG tablet    ONETOUCH DELICA PLUS LANCET 30 gauge Misc    ONETOUCH ULTRA TEST Strp    pen needle, diabetic 31 gauge x 5/16" Ndle    sevelamer carbonate (RENVELA) 800 mg Tab    tiotropium (SPIRIVA WITH HANDIHALER) 18 mcg inhalation capsule     Family History       Problem Relation (Age of Onset)    Hypertension Mother, Father, Sister, Sister          Tobacco Use    Smoking status: Every Day     Packs/day: 0.25     Types: Cigarettes    Smokeless tobacco: Never    Tobacco comments:     Smokes 4 cigarettes a day   Substance and Sexual Activity    Alcohol use: Yes     Comment: 1 glass every 2-3 month    Drug use: Not Currently    Sexual activity: Not Currently       Objective:     Vital Signs (Most Recent):  Temp: 97.9 °F (36.6 °C) (04/06/23 0850)  Pulse: 78 (04/06/23 0850)  Resp: 15 (04/06/23 0732)  BP: (!) 158/78 (04/06/23 0850)  SpO2: 98 % (04/06/23 0850)   Vital Signs (24h Range):  Temp:  [96.4 °F (35.8 °C)-98.1 °F (36.7 °C)] 97.9 °F (36.6 °C)  Pulse:  [58-81] 78  Resp:  [14-24] 15  SpO2:  [96 %-99 %] 98 %  BP: (109-158)/(52-78) 158/78     Weight: 95.3 kg (210 lb) (04/05/23 1912)  Body mass index is 37.2 kg/m².  Body surface area is 2.06 meters squared.    I/O last 3 completed shifts:  In: 400 [I.V.:100; Blood:300]  Out: -     Physical Exam  Constitutional:       General: She is not in acute distress.  HENT:      Head: Atraumatic.      Nose: Nose normal.      Mouth/Throat:      Mouth: Mucous membranes are moist.   Eyes:      Extraocular Movements: Extraocular movements intact.      Conjunctiva/sclera: Conjunctivae normal.   Cardiovascular:      Rate and Rhythm: Normal rate and regular rhythm.      Pulses: Normal pulses.      Heart sounds: Normal heart sounds.   Pulmonary:      Effort: Pulmonary " effort is normal.      Breath sounds: Normal breath sounds.   Abdominal:      General: There is no distension.      Palpations: Abdomen is soft.   Musculoskeletal:         General: Swelling present.      Cervical back: Normal range of motion.      Comments: Tense BLE edema, Left forearm AVF   Skin:     General: Skin is warm and dry.   Neurological:      Mental Status: She is alert and oriented to person, place, and time.   Psychiatric:         Mood and Affect: Mood normal.         Behavior: Behavior normal.       Significant Labs:  BMP:   Recent Labs   Lab 04/05/23  2043   *   K 4.0   CO2 25   BUN 31.9*   CREATININE 4.47*   CALCIUM 8.7     CBC:   Recent Labs   Lab 04/06/23  0824   WBC 5.2   RBC 2.45*   HGB 7.3*   HCT 23.4*      MCV 95.5*   MCH 29.8   MCHC 31.2*       Significant Imaging:  N/A    Assessment/Plan:     ESRD on HD  Anemia s/t acute blood loss with reported melena. Recent hospitalization for acute GI bleeding s/t duodenal AVM cauterized on 3/24  Heart failure with reduced EF 55%  PAF on Eliquis   CAD s/p CABG    Patient will continue on TTS schedule for hemodialysis while hospitalized   GI consultation pending. She is currently NPO. No distress noted.   We will follow with you.     LOGAN Gee  Nephrology  [facility] - Emergency Dept

## 2023-04-06 NOTE — CONSULTS
Gastroenterology Consultation Note    Reason for Consult:  GI bleed    PCP:   JOSEFINA Lord    Referring MD:  Manolo Rubi MD    Hospital Day: 0     Initial History of Present Illness (HPI):  This is a 77 y.o. female known to Dr. Bourgeois with PMH of ESRD on HD Tu/Th/Sa, HFpEF 55%, CAD/CABG x4 on ASA 81mg, HTN, HLD, COPD, venous insufficiency, hypothyroidism, anemia, gastric AVM, adenomatous colon polyps, Afib on Eliquis. She presented to the ED 04/05/23 at the recommendation of her nephrologist for anemia seen on outpatient labs with dialysis. She reports dark stools but does take iron supplementation.     Upon arrival, H&H 5.7/18.7, INR 1.03. Rectal exam performed by ED provider revealed dark brown hemoccult positive stool. Patient tranfused 1u PRBC with most recent H&H being 7.3/23.4, with 2u PRBC to be transfused. GI consulted for GI bleed, anemia.    Patient recently admitted for similar s/s in March 2023. Endoscopy below:  EGD with push 03/24/23 Dr. Le for anemia: AVM in 4th portion of duodenum treated with APC    Other recent endoscopy:  VCE 02/17/23: small nonbleeding erosion in distal duodenum. Recommend CTE if melena or anemia requiring transfusion off iron supplementation occurs  EGD 02/17/23 Dr. Gardner for JAYSON: normal anatomy, VCE placed  EGD 01/20/23 Dr. BJORN Scales for JAYSON: normal anatomy  Colonoscopy 07/21/21 Dr. Bourgeois for FIT+: multiple TAs throughout colon, nonbleeding internal hemorrhoids. Recommend repeat colonoscopy in 3 years.    Patient in dialysis. Just received her last blood product. She states she had one dark stool. She has not taken iron supplementation recently. Denies n/v, abd pain/tenderness. Very lethargic.    ROS:  Review of Systems   Constitutional:  Positive for malaise/fatigue.   Respiratory:  Negative for cough and shortness of breath.    Cardiovascular:  Negative for chest pain.   Gastrointestinal:  Positive for melena. Negative for abdominal pain, blood in stool,  nausea and vomiting.   Neurological:  Positive for weakness.     Medical History:   Past Medical History:   Diagnosis Date    CKD (chronic kidney disease) requiring chronic dialysis     COPD (chronic obstructive pulmonary disease)     Diabetes mellitus     Hyperlipidemia     Hypertension     Renal insufficiency     Thyroid disease        Surgical History:   Past Surgical History:   Procedure Laterality Date    AV FISTULA PLACEMENT Left     CARDIAC CATHETERIZATION      COLONOSCOPY      CORONARY ARTERY BYPASS GRAFT      EGD, WITH HEMORRHAGE CONTROL  3/24/2023    Procedure: EGD,WITH HEMORRHAGE CONTROL;  Surgeon: Go Le MD;  Location: [facility] ENDOSCOPY;  Service: Gastroenterology;;    ESOPHAGOGASTRODUODENOSCOPY      ESOPHAGOGASTRODUODENOSCOPY N/A 2/17/2023    Procedure: EGD +/- VCE PLACEMENT;  Surgeon: Moragn Gardner MD;  Location: [facility] ENDOSCOPY;  Service: Gastroenterology;  Laterality: N/A;    ESOPHAGOGASTRODUODENOSCOPY N/A 3/24/2023    Procedure: EGD;  Surgeon: Go Le MD;  Location: [facility] ENDOSCOPY;  Service: Gastroenterology;  Laterality: N/A;  with push    POLYPECTOMY      SMALL BOWEL ENTEROSCOPY Left 1/20/2023    Procedure: ENTEROSCOPY;  Surgeon: Lexi Scales MD;  Location: [facility] ENDOSCOPY;  Service: Gastroenterology;  Laterality: Left;    THYROIDECTOMY      TUBAL LIGATION         Family History:   Family History   Problem Relation Age of Onset    Hypertension Mother     Hypertension Father     Hypertension Sister     Hypertension Sister    .     Social History:   Social History     Tobacco Use    Smoking status: Every Day     Packs/day: 0.25     Types: Cigarettes    Smokeless tobacco: Never    Tobacco comments:     Smokes 4 cigarettes a day   Substance Use Topics    Alcohol use: Yes     Comment: 1 glass every 2-3 month       Allergies:  Review of patient's allergies indicates:   Allergen Reactions    Lisinopril Swelling    Azithromycin      Other reaction(s): tongue/facial  "swelling    Baclofen      Other reaction(s): tongue/facial swelling    Doxycycline      Other reaction(s): Angioedema       (Not in a hospital admission)        Objective Findings:    Vital Signs:  BP (!) 158/80   Pulse 82   Temp 97.7 °F (36.5 °C)   Resp 15   Ht 5' 3" (1.6 m)   Wt 95.3 kg (210 lb)   SpO2 98%   BMI 37.20 kg/m²   Body mass index is 37.2 kg/m².    Physical Exam:  Physical Exam  Constitutional:       General: She is not in acute distress.     Appearance: She is obese. She is ill-appearing.   HENT:      Head: Normocephalic and atraumatic.      Right Ear: External ear normal.      Left Ear: External ear normal.      Nose: Nose normal.      Mouth/Throat:      Pharynx: Oropharynx is clear.   Eyes:      Conjunctiva/sclera: Conjunctivae normal.   Pulmonary:      Effort: Pulmonary effort is normal. No respiratory distress.   Abdominal:      General: Bowel sounds are normal. There is no distension.      Palpations: Abdomen is soft.      Tenderness: There is no abdominal tenderness. There is no guarding.   Musculoskeletal:         General: Normal range of motion.      Cervical back: Normal range of motion.   Skin:     General: Skin is dry.   Neurological:      Mental Status: Mental status is at baseline. She is lethargic.   Psychiatric:         Mood and Affect: Mood normal.         Behavior: Behavior normal.       Labs:  Recent Results (from the past 24 hour(s))   CBC with Differential    Collection Time: 04/05/23  8:43 PM   Result Value Ref Range    WBC 7.1 4.5 - 11.5 x10(3)/mcL    RBC 1.83 (L) 4.20 - 5.40 x10(6)/mcL    Hgb 5.7 (LL) 12.0 - 16.0 g/dL    Hct 18.7 (LL) 37.0 - 47.0 %    .2 (H) 80.0 - 94.0 fL    MCH 31.1 (H) 27.0 - 31.0 pg    MCHC 30.5 (L) 33.0 - 36.0 g/dL    RDW 21.0 (H) 11.5 - 17.0 %    Platelet 209 130 - 400 x10(3)/mcL    MPV 10.1 7.4 - 10.4 fL    Neut % 77.1 %    Lymph % 12.3 %    Mono % 8.7 %    Eos % 1.1 %    Basophil % 0.4 %    Lymph # 0.87 0.6 - 4.6 x10(3)/mcL    Neut # 5.46 " 2.1 - 9.2 x10(3)/mcL    Mono # 0.62 0.1 - 1.3 x10(3)/mcL    Eos # 0.08 0 - 0.9 x10(3)/mcL    Baso # 0.03 0 - 0.2 x10(3)/mcL    IG# 0.03 0 - 0.04 x10(3)/mcL    IG% 0.4 %    NRBC% 0.6 %   Comprehensive Metabolic Panel    Collection Time: 04/05/23  8:43 PM   Result Value Ref Range    Sodium Level 134 (L) 136 - 145 mmol/L    Potassium Level 4.0 3.5 - 5.1 mmol/L    Chloride 100 98 - 107 mmol/L    Carbon Dioxide 25 23 - 31 mmol/L    Glucose Level 138 (H) 82 - 115 mg/dL    Blood Urea Nitrogen 31.9 (H) 9.8 - 20.1 mg/dL    Creatinine 4.47 (H) 0.55 - 1.02 mg/dL    Calcium Level Total 8.7 8.4 - 10.2 mg/dL    Protein Total 5.3 (L) 5.8 - 7.6 gm/dL    Albumin Level 2.8 (L) 3.4 - 4.8 g/dL    Globulin 2.5 2.4 - 3.5 gm/dL    Albumin/Globulin Ratio 1.1 1.1 - 2.0 ratio    Bilirubin Total 0.4 <=1.5 mg/dL    Alkaline Phosphatase 106 40 - 150 unit/L    Alanine Aminotransferase 45 0 - 55 unit/L    Aspartate Aminotransferase 42 (H) 5 - 34 unit/L    eGFR 10 mls/min/1.73/m2   Protime-INR    Collection Time: 04/05/23  8:43 PM   Result Value Ref Range    PT 13.4 12.5 - 14.5 seconds    INR 1.03 0.00 - 1.30   APTT    Collection Time: 04/05/23  8:43 PM   Result Value Ref Range    PTT 30.4 23.2 - 33.7 seconds   Type & Screen    Collection Time: 04/06/23 12:06 AM   Result Value Ref Range    Group & Rh O POS     Indirect Gideon GEL NEG     Specimen Outdate 04/09/2023 23:59    Prepare RBC 3 Units; anemia    Collection Time: 04/06/23 12:06 AM   Result Value Ref Range    UNIT NUMBER [id number]     UNIT ABO/RH O POS     DISPENSE STATUS Issued     Unit Expiration [id number]     Product Code J7049G62     Unit Blood Type Code 5100     CROSSMATCH INTERPRETATION Compatible     UNIT NUMBER [id number]     UNIT ABO/RH O POS     DISPENSE STATUS Issued     Unit Expiration [id number]     Product Code F7278H69     Unit Blood Type Code 5100     CROSSMATCH INTERPRETATION Compatible     UNIT NUMBER [id number]     UNIT ABO/RH O POS     DISPENSE STATUS  Issued     Unit Expiration [id number]     Product Code J2990N82     Unit Blood Type Code 5100     CROSSMATCH INTERPRETATION Compatible    CBC with Differential    Collection Time: 04/06/23  8:24 AM   Result Value Ref Range    WBC 5.2 4.5 - 11.5 x10(3)/mcL    RBC 2.45 (L) 4.20 - 5.40 x10(6)/mcL    Hgb 7.3 (L) 12.0 - 16.0 g/dL    Hct 23.4 (L) 37.0 - 47.0 %    MCV 95.5 (H) 80.0 - 94.0 fL    MCH 29.8 27.0 - 31.0 pg    MCHC 31.2 (L) 33.0 - 36.0 g/dL    RDW 19.0 (H) 11.5 - 17.0 %    Platelet 181 130 - 400 x10(3)/mcL    MPV 10.0 7.4 - 10.4 fL    Neut % 79.2 %    Lymph % 10.4 %    Mono % 8.1 %    Eos % 1.3 %    Basophil % 0.4 %    Lymph # 0.54 (L) 0.6 - 4.6 x10(3)/mcL    Neut # 4.13 2.1 - 9.2 x10(3)/mcL    Mono # 0.42 0.1 - 1.3 x10(3)/mcL    Eos # 0.07 0 - 0.9 x10(3)/mcL    Baso # 0.02 0 - 0.2 x10(3)/mcL    IG# 0.03 0 - 0.04 x10(3)/mcL    IG% 0.6 %    NRBC% 0.8 %       No orders to display         Assessment/Plan:  This is a 77 y.o. female known to Dr. Bourgeois with PMH of ESRD on HD Tu/Th/Sa, HFpEF 55%, CAD/CABG x4 on ASA 81mg, HTN, HLD, COPD, venous insufficiency, hypothyroidism, anemia, gastric AVM, adenomatous colon polyps, Afib on Eliquis. She presented to the ED 04/05/23 at the recommendation of her nephrologist for anemia seen on outpatient labs with dialysis.     Upon arrival, H&H 5.7/18.7, INR 1.03. Rectal exam performed by ED provider revealed dark brown hemoccult positive stool. GI consulted for GI bleed, anemia.    JAYSON, likely multifactorial, with component 2/2 GI blood loss  - outpatient labs revealed anemia - H&H 5.7/18.7  - patient denies recent iron supplementation, states she had a black BM  - dark brown hemoccult positive stool on rectal exam  - AVM in 4th portion of duodenum seen on EGD 03/24/23, treated with APC  - monitor and transfuse as needed to keep Hgb >7-8  - monitor stool for color/blood  - PPI BID  - iron supplementation as needed  - avoid NSAIDs  - NPO  - EGD with push today  ESRD  - HD  Tue/Thu/Sat  - currently undergoing HD with transfusion of blood products  Afib  - on Eliquis - held at this time    Thank you for allowing us to participate in the care of [facility].    Alley Bella PA-C  Gastroenterology  [facility]

## 2023-04-06 NOTE — PLAN OF CARE
Problem: Adult Inpatient Plan of Care  Goal: Plan of Care Review  Outcome: Ongoing, Progressing  Goal: Patient-Specific Goal (Individualized)  Outcome: Ongoing, Progressing  Goal: Absence of Hospital-Acquired Illness or Injury  Outcome: Ongoing, Progressing  Goal: Optimal Comfort and Wellbeing  Outcome: Ongoing, Progressing     Problem: Device-Related Complication Risk (Hemodialysis)  Goal: Safe, Effective Therapy Delivery  Outcome: Ongoing, Progressing     Problem: Hemodynamic Instability (Hemodialysis)  Goal: Effective Tissue Perfusion  Outcome: Ongoing, Progressing     Problem: Infection (Hemodialysis)  Goal: Absence of Infection Signs and Symptoms  Outcome: Ongoing, Progressing     Problem: Diabetes Comorbidity  Goal: Blood Glucose Level Within Targeted Range  Outcome: Ongoing, Progressing

## 2023-04-06 NOTE — ANESTHESIA POSTPROCEDURE EVALUATION
Anesthesia Post Evaluation    Patient: Rosette Madrid    Procedure(s) Performed: Procedure(s) (LRB):  EGD (N/A)  EGD,WITH HEMORRHAGE CONTROL    Final Anesthesia Type: general (/Regional//MAC)      Patient location during evaluation: PACU  Post-procedure mental status: @ basline.  Post-procedure vital signs: reviewed and stable  Pain management: adequate    PONV status: See postop meds for drugs used to control n/v if any.  Anesthetic complications: no      Cardiovascular status: blood pressure returned to baseline  Respiratory status: @ baseline.  Hydration status: euvolemic            Vitals Value Taken Time   /72 04/06/23 1334   Temp 35.8 °C (96.4 °F) 04/06/23 1202   Pulse 68 04/06/23 1334   Resp 19 04/06/23 1334   SpO2 96 % 04/06/23 1334         No case tracking events are documented in the log.      Pain/Marco Score: Marco Score: 10 (4/6/2023  1:18 PM)

## 2023-04-06 NOTE — FIRST PROVIDER EVALUATION
Medical screening examination initiated.  I have conducted a focused provider triage encounter, findings are as follows:    Brief history of present illness:  78 y/o female who presents with being told her blood counts are low from dialysis. Last ran yesterday. States stools are brown/dark.     There were no vitals filed for this visit.    Pertinent physical exam:  alert, nonlabored, walks with walker     Brief workup plan:  labs    Preliminary workup initiated; this workup will be continued and followed by the physician or advanced practice provider that is assigned to the patient when roomed.

## 2023-04-06 NOTE — PROGRESS NOTES
[facility] Hospital Medicine Progress Note        Chief Complaint: Inpatient Follow-up for severe anemia     HPI:   Patient is a 77-year-old female with a history of recurrent GI bleeding that was obscure nature to her recent admission last month when she underwent an EGD with small bowel enteroscopy showing an AVM in the 4th portion of the duodenum that was treated with APC.  She was referred to the ER tonight secondary to outpatient laboratory work showing a low H&H with dialysis yesterday.  She does report dark colored stools.  She is on Eliquis for paroxysmal atrial fibrillation.    She arrived afebrile hemodynamically stable maintaining adequate sats on room air laboratory showed a hemoglobin of 5.7.  She was given IV Protonix and hospitalist service was consulted for admission.  Interval Hx:   P-RBC transfusion in progress. GI consult obtained and plan for EGD and push enteroscopy today. Nephrology consulted for HD. Pt will continue T/T/S schedule     Objective/physical exam:  General: In no acute distress, afebrile  Chest: Clear to auscultation bilaterally  Heart: RRR, +S1, S2, no appreciable murmur  Abdomen: Soft, nontender, BS +  MSK: Warm, + lower extremity edema, no clubbing or cyanosis  Neurologic: Alert and oriented x4, Cranial nerve II-XII intact, Strength 5/5 in all 4 extremities    VITAL SIGNS: 24 HRS MIN & MAX LAST   Temp  Min: 96.4 °F (35.8 °C)  Max: 98.1 °F (36.7 °C) 96.4 °F (35.8 °C)   BP  Min: 109/57  Max: 163/67 (!) 154/69     Pulse  Min: 58  Max: 82  68   Resp  Min: 14  Max: 25 (!) 24     SpO2  Min: 95 %  Max: 100 % 97 %       Recent Labs   Lab 04/05/23 2043 04/06/23  0824   WBC 7.1 5.2   RBC 1.83* 2.45*   HGB 5.7* 7.3*   HCT 18.7* 23.4*   .2* 95.5*   MCH 31.1* 29.8   MCHC 30.5* 31.2*   RDW 21.0* 19.0*    181   MPV 10.1 10.0       Recent Labs   Lab 04/05/23 2043   *   K 4.0   CO2 25   BUN 31.9*   CREATININE 4.47*   CALCIUM 8.7   ALBUMIN 2.8*    ALKPHOS 106   ALT 45   AST 42*   BILITOT 0.4          Microbiology Results (last 7 days)       ** No results found for the last 168 hours. **             See below for Radiology    Scheduled Med:   atorvastatin  20 mg Oral Daily    carvediloL  6.25 mg Oral BID    DULoxetine  30 mg Oral Daily    ferrous sulfate  1 tablet Oral Daily    fluticasone furoate-vilanteroL  1 puff Inhalation Daily    insulin detemir U-100  5 Units Subcutaneous QHS    lactulose 10 gram/15 ml  15 g Oral TID    levothyroxine  125 mcg Oral Before breakfast    LIDOcaine (PF) 20 mg/mL (2%)        pantoprazole  40 mg Intravenous Q12H    pantoprazole  40 mg Intravenous Q12H    propofol        sevelamer carbonate  1,600 mg Oral TID    tiotropium bromide  2 puff Inhalation Daily        Continuous Infusions:       PRN Meds:  sodium chloride, melatonin, sodium chloride 0.9%, sodium chloride 0.9%       Assessment/Plan:  Acute on chronic anemia , severe   Anemia of Chronic Disease   H/O recurrent GI bleed, obscure in nature   H/O Duodenal AVM  ESRD on HD- T/T/S  Chronic diastolic heart failure , EF 55%  H/O CAD, s/p CABG  Paroxysmal Atrial fibrillation , on Eliquis   Essential HTN  Hypothyroidism   COPD without exacerbation   Diabetes Mellitus , II    Plan-   Causes of anemia are multifactorial in the setting of ESRD with possibility of GI bleed given prior h/o  gastrointestinal AVM  P-RBC transfusion x 3 units ordered on admission   Continue PPI  Hold Eliquis / ASA for now   Currently rate controlled   Pt is stable hemodynamically   GI consult obtained and plan for EGD/ enteroscopy today.   Nephrology consult for HD  Home medication are reviewed and resumed-  Coreg, Cymbalta, Levothyroxine , statin, Renvela , Spiriva     Critical care diagnosis:   Severe anemia requiring blood transfusion     Critical care diagnosis:   Critical care interventions: Hands-on evaluation, review of labs/radiographs/records and discussion with patient and family if  present  Critical care time spent: 35 minutes      VTE prophylaxis: SCDs    Patient condition:  Fair     Anticipated discharge and Disposition:         All diagnosis and differential diagnosis have been reviewed; assessment and plan has been documented; I have personally reviewed the labs and test results that are presently available; I have reviewed the patients medication list; I have reviewed the consulting providers response and recommendations. I have reviewed or attempted to review medical records based upon their availability    All of the patient's questions have been  addressed and answered. Patient's is agreeable to the above stated plan. I will continue to monitor closely and make adjustments to medical management as needed.  _____________________________________________________________________    Nutrition Status:    Radiology:  Echo  · Concentric hypertrophy and normal systolic function.  · The estimated ejection fraction is 60%.  · Indeterminate left ventricular diastolic function.  · Mild-to-moderate mitral regurgitation.  · Normal right ventricular size.  · Mild tricuspid regurgitation.     CV Ultrasound doppler venous arm right  Right upper extremity negative for deep vein thrombosis.      Laurie Turner MD   04/06/2023

## 2023-04-06 NOTE — H&P
[facility] Hospital Medicine History & Physical Examination       Patient Name: Rosette Madrid  MRN: [id number]  Patient Class: OP- Observation   Admission Date: 4/5/2023  7:15 PM  Length of Stay: 0  Admitting Service: Hospital Medicine   Attending Physician: Manolo Rubi MD   Primary Care Provider: JOSEFINA Lord  History source: EMR, patient and/or patient's family    CHIEF COMPLAINT   Abormal lab (Pt states was told to come to ED after her HD clinic called w/ critical H&H, denies cp, sob, fatigue, melena, hematemesis. States has had transfusions previously and a colonoscopy that showed a bleed 3-4 wks ago, has not followed w/ Gi since. Pt takes Eliquis. )      HISTORY OF PRESENT ILLNESS:   Patient is a 77-year-old female with a history of recurrent GI bleeding that was obscure nature to her recent admission last month when she underwent an EGD with small bowel enteroscopy showing an AVM in the 4th portion of the duodenum that was treated with APC.  She was referred to the ER [facility] secondary to outpatient laboratory work showing a low H&H with dialysis yesterday.  She does report dark colored stools.  She is on Eliquis for paroxysmal atrial fibrillation.    She arrived afebrile hemodynamically stable maintaining adequate sats on room air laboratory showed a hemoglobin of 5.7.  She was given IV Protonix and hospitalist service was consulted for admission.    PAST MEDICAL HISTORY:   ESRD on HD TTS  Heart failure with preserved EF 55%   CAD/CABG  PAF on Eliquis  COPD   Hypertension  Hyperlipidemia  Venous insufficiency   Hypothyroidism  Recurrent GI bleeding- source unclear    PAST SURGICAL HISTORY:     Past Surgical History:   Procedure Laterality Date    AV FISTULA PLACEMENT Left     CARDIAC CATHETERIZATION      COLONOSCOPY      CORONARY ARTERY BYPASS GRAFT      EGD, WITH HEMORRHAGE CONTROL  3/24/2023    Procedure: EGD,WITH HEMORRHAGE CONTROL;  Surgeon: Go Le,  MD;  Location: [facility] ENDOSCOPY;  Service: Gastroenterology;;    ESOPHAGOGASTRODUODENOSCOPY      ESOPHAGOGASTRODUODENOSCOPY N/A 2/17/2023    Procedure: EGD +/- VCE PLACEMENT;  Surgeon: Morgan Gardner MD;  Location: [facility] ENDOSCOPY;  Service: Gastroenterology;  Laterality: N/A;    ESOPHAGOGASTRODUODENOSCOPY N/A 3/24/2023    Procedure: EGD;  Surgeon: Go Le MD;  Location: [facility] ENDOSCOPY;  Service: Gastroenterology;  Laterality: N/A;  with push    POLYPECTOMY      SMALL BOWEL ENTEROSCOPY Left 1/20/2023    Procedure: ENTEROSCOPY;  Surgeon: Lexi Scales MD;  Location: [facility] ENDOSCOPY;  Service: Gastroenterology;  Laterality: Left;    THYROIDECTOMY      TUBAL LIGATION         ALLERGIES:   Lisinopril, Azithromycin, Baclofen, and Doxycycline    FAMILY HISTORY:   Reviewed and non-contributory     SOCIAL HISTORY:     Social History     Tobacco Use    Smoking status: Every Day     Packs/day: 0.25     Types: Cigarettes    Smokeless tobacco: Never    Tobacco comments:     Smokes 4 cigarettes a day   Substance Use Topics    Alcohol use: Yes     Comment: 1 glass every 2-3 month        HOME MEDICATIONS:     Prior to Admission medications    Medication Sig Start Date End Date Taking? Authorizing Provider   aspirin (ECOTRIN) 81 MG EC tablet Take 1 tablet (81 mg total) by mouth once daily. 11/23/22  Yes JOSEFINA Lord   BENADRYL 25 mg capsule Take 50 mg by mouth nightly. 2/23/22  Yes Historical Provider   carvediloL (COREG) 6.25 MG tablet Take 1 tablet (6.25 mg total) by mouth 2 (two) times daily. 12/5/22 12/5/23 Yes UYE Balderas   clonazePAM (KLONOPIN) 0.5 MG tablet Take 0.5 mg by mouth every evening. 7/13/22  Yes Historical Provider   DULoxetine (CYMBALTA) 30 MG capsule Take 30 mg by mouth once daily. 7/27/22  Yes Historical Provider   ergocalciferol (ERGOCALCIFEROL) 50,000 unit Cap Take 1 capsule (50,000 Units total) by mouth every 7 days. 3/1/23  Yes JOSEFINA Lord   furosemide  (LASIX) 40 MG tablet Take 1 tablet (40 mg total) by mouth once daily. 12/5/22 12/5/23 Yes YUE Balderas   glipiZIDE (GLUCOTROL) 10 MG tablet Take 1 tablet (10 mg total) by mouth daily with breakfast. 3/1/23  Yes JOSEFINA Lord   pantoprazole (PROTONIX) 40 MG tablet Take 1 tablet (40 mg total) by mouth 2 (two) times daily. 2/20/23 2/20/24 Yes Timo Ang MD   rosuvastatin (CRESTOR) 40 MG Tab Take 1 tablet (40 mg total) by mouth once daily. 12/5/22 12/5/23 Yes YUE Balderas   albuterol (VENTOLIN HFA) 90 mcg/actuation inhaler Inhale 2 puffs into the lungs every 6 (six) hours as needed for Wheezing. Rescue 3/1/23   JOSEFINA Lord   albuterol-ipratropium (DUO-NEB) 2.5 mg-0.5 mg/3 mL nebulizer solution Take 3 mLs by nebulization every 6 (six) hours as needed for Wheezing or Shortness of Breath. Rescue 12/19/22   JOSEFINA Lord   apixaban (ELIQUIS) 5 mg Tab Take 5 mg by mouth 2 (two) times daily.    Historical Provider   BASAGLAR KWIKPEN U-100 INSULIN glargine 100 units/mL SubQ pen Inject 30 Units into the skin Daily. 3/1/23   JOSEFINA Lord   DIALYVITE 100-1 mg Tab Take 1 tablet by mouth once daily. 8/13/22   Historical Provider   ferrous sulfate (FEOSOL) 325 mg (65 mg iron) Tab tablet Take 1 tablet (325 mg total) by mouth once daily.  Patient not taking: Reported on 3/28/2023 3/1/23   JOSEFINA Lord   fluticasone furoate-vilanteroL (BREO) 100-25 mcg/dose diskus inhaler Inhale 1 puff into the lungs once daily. 3/1/23   JOSEFINA Lord   INVEGA SUSTENNA 156 mg/mL Syrg injection Inject into the muscle. 8/2/22   Historical Provider   levothyroxine (SYNTHROID) 125 MCG tablet Take 1 tablet (125 mcg total) by mouth before breakfast. 3/1/23   Margarita Dior, JOSEFINA   ONETOUCH DELICA PLUS LANCET 30 gauge Misc 1 lancet by Other route once daily. 5/9/22   JOSEFINA Lord   ONETOUCH ULTRA TEST Strp 1 strip by Other route once daily. 11/23/22   JOSEFINA Lord   pen needle, diabetic  "31 gauge x 5/16" Ndle 2 Units by Misc.(Non-Drug; Combo Route) route 2 (two) times a day. Patient to check blood sugars twice daily (morning and night) 3/1/23   JOSEFINA Lord   sevelamer carbonate (RENVELA) 800 mg Tab Take 2 tablets (1,600 mg total) by mouth 3 (three) times daily. 3/25/23   Noah Cowan MD   tiotropium (SPIRIVA WITH HANDIHALER) 18 mcg inhalation capsule Inhale 1 capsule (18 mcg total) into the lungs Daily. 3/1/23   JOSEFINA Lord       REVIEW OF SYSTEMS:   Except as documented, all other systems reviewed and negative     PHYSICAL EXAM:   T 96.4 °F (35.8 °C)   BP (!) 109/57   P 78   RR 17   O2 99 %  GENERAL: awake, alert, oriented and in no acute distress, non-toxic appearing   HEENT: normocephalic atraumatic   NECK: supple   LUNGS: Clear bilaterally, no wheezing or rales, no accessory muscle use   CVS: Regular rate and rhythm, normal peripheral perfusion  ABD: Soft, non-tender, non-distended, bowel sounds present  EXTREMITIES: no clubbing or cyanosis  SKIN: Warm, dry.   NEURO: alert and oriented, grossly without focal deficits   PSYCHIATRIC: Cooperative    LABS AND IMAGING:     Recent Labs     04/05/23 2043   WBC 7.1   RBC 1.83*   HGB 5.7*   HCT 18.7*   .2*   MCH 31.1*   MCHC 30.5*   RDW 21.0*        No results for input(s): LACTIC in the last 72 hours.  Recent Labs     04/05/23 2043   INR 1.03     No results for input(s): HGBA1C, CHOL, TRIG, LDL, VLDL, HDL in the last 72 hours.   Recent Labs     04/05/23 2043   *   K 4.0   CHLORIDE 100   CO2 25   BUN 31.9*   CREATININE 4.47*   GLUCOSE 138*   CALCIUM 8.7   ALBUMIN 2.8*   GLOBULIN 2.5   ALKPHOS 106   ALT 45   AST 42*   BILITOT 0.4     No results for input(s): BNP, CPK, TROPONINI in the last 72 hours.       Echo  · Concentric hypertrophy and normal systolic function.  · The estimated ejection fraction is 60%.  · Indeterminate left ventricular diastolic function.  · Mild-to-moderate mitral regurgitation.  · Normal " right ventricular size.  · Mild tricuspid regurgitation.     CV Ultrasound doppler venous arm right  Right upper extremity negative for deep vein thrombosis.      ASSESSMENT & PLAN:   Melena and acute blood loss anemia from GI bleeding  EGD 03/22/2023, duodenal AVM treated with APC  ESRD on HD TTS  Heart failure with preserved EF 55%   CAD/CABG  PAF on Eliquis  COPD, without evidence of acute exacerbation  Hypertension  Hyperlipidemia  Venous insufficiency      - hold Eliquis  - iv protonix  - transfuse to hb >7  - GI consult   - nephrology consult for HD   - resume home medications as appropriate pending med rec     DVT prophylaxis: scds/amb  Code status: full code      If patient was admitted under observational status it is with my approval/permission.     At least 55 min was spent on this history and physical.  Time seen: 10PM 4/5/23  Critical care time = 35 min; Critical care diagnosis = anemia/transfusion   Manolo Rubi MD

## 2023-04-06 NOTE — ANESTHESIA PREPROCEDURE EVALUATION
04/06/2023  Rosette Madrid is a 77 y.o., female with ESRD. CAD, s/p CAB, and other medical problems listed in the EMR. Patient was admitted through the ED with profound anemia Hgb 5.7/18.7 improved to Hgb 7.3 post PRBC transfusion. She was hospitalized in March for similar issue noted to have bleeding AVM in the duodenum. On 3/25 she was dc in stable condition with Hgb 8.1.  Patient is on dialysis now receiving 3rd unit of PRBC. Denies abdominal pain, nausea and vomiting. Patient states she did have resolution of symptoms before weakness and melena returned between admits. Here for EGD      Pre-op Assessment    I have reviewed the Patient Summary Reports.     I have reviewed the Nursing Notes. I have reviewed the NPO Status.   I have reviewed the Medications.     Review of Systems      Physical Exam  General: Well nourished and Cooperative    Airway:  Mallampati: II   Mouth Opening: Normal  TM Distance: Normal  Tongue: Normal  Neck ROM: Normal ROM    Dental:  Edentulous    Chest/Lungs:  Clear to auscultation    Heart:  Rate: Normal        Anesthesia Plan  Type of Anesthesia, risks & benefits discussed:    Anesthesia Type: Gen Natural Airway  Intra-op Monitoring Plan: Standard ASA Monitors  Induction:  IV  Informed Consent: Informed consent signed with the Patient and all parties understand the risks and agree with anesthesia plan.  All questions answered.   ASA Score: 4  Day of Surgery Review of History & Physical: H&P Update referred to the surgeon/provider.  Anesthesia Plan Notes: EF nl, is just now s/p dialysis, will proceed,     Ready For Surgery From Anesthesia Perspective.     .  I explained anesthesia plan to patient/responsbile party if available.  Anesthesia consent done going over the material facts, risks, complications & alternatives, obtained which includes the possibility of altering the  "anesthesia plan.  I reviewed problem list, appropriate labs, any workup, Xray, EKG etc noted below.  Patients condition is satisfactory to proceed with anesthesia plan unless otherwise noted (see anesthesia chart for details of the anesthesia plan carried out).      Pre-operative evaluation for Procedure(s) (LRB):  EGD (N/A)    BP (!) 148/77   Pulse 79   Temp 36.5 °C (97.7 °F)   Resp 15   Ht 5' 3" (1.6 m)   Wt 95.3 kg (210 lb)   SpO2 100%   BMI 37.20 kg/m²     Patient Active Problem List   Diagnosis    Chronic heart failure with preserved ejection fraction    Cigarette nicotine dependence without complication    Coronary artery disease involving coronary bypass graft of native heart with unstable angina pectoris    Chronic obstructive pulmonary disease    HTN (hypertension), benign    Mixed hyperlipidemia    Type 2 diabetes mellitus with chronic kidney disease on chronic dialysis, with long-term current use of insulin    ESRD (end stage renal disease) on dialysis    Depressive disorder    Class 1 obesity due to excess calories with serious comorbidity and body mass index (BMI) of 34.0 to 34.9 in adult    Vitamin D deficiency    Coronary artery disease involving native coronary artery of native heart without angina pectoris    Tobacco use    Acute anemia    New onset a-fib    Anemia due to chronic kidney disease, on chronic dialysis    Hypotension    Melena    Postoperative hypothyroidism    GI bleed       Review of patient's allergies indicates:   Allergen Reactions    Lisinopril Swelling    Azithromycin      Other reaction(s): tongue/facial swelling    Baclofen      Other reaction(s): tongue/facial swelling    Doxycycline      Other reaction(s): Angioedema       Current Outpatient Medications   Medication Instructions    albuterol (VENTOLIN HFA) 90 mcg/actuation inhaler 2 puffs, Inhalation, Every 6 hours PRN, Rescue    albuterol-ipratropium (DUO-NEB) 2.5 mg-0.5 mg/3 mL nebulizer " solution 3 mLs, Nebulization, Every 6 hours PRN, Rescue    apixaban (ELIQUIS) 5 mg, Oral, 2 times daily    aspirin (ECOTRIN) 81 mg, Oral, Daily    BASAGLAR KWIKPEN U-100 INSULIN 30 Units, Subcutaneous, Daily    BenadryL 50 mg, Oral, Nightly    carvediloL (COREG) 6.25 mg, Oral, 2 times daily    clonazePAM (KLONOPIN) 0.5 mg, Oral, Nightly    DIALYVITE 100-1 mg Tab 1 tablet, Oral, Daily    DULoxetine (CYMBALTA) 30 mg, Oral, Daily    ergocalciferol (ERGOCALCIFEROL) 50,000 Units, Oral, Every 7 days    ferrous sulfate (FEOSOL) 325 mg, Oral, Daily    fluticasone furoate-vilanteroL (BREO) 100-25 mcg/dose diskus inhaler 1 puff, Inhalation, Daily    furosemide (LASIX) 40 mg, Oral, Daily    glipiZIDE (GLUCOTROL) 10 mg, Oral, With breakfast    INVEGA SUSTENNA 156 mg/mL Syrg injection Intramuscular    levothyroxine (SYNTHROID) 125 mcg, Oral, Before breakfast    ONETOUCH DELICA PLUS LANCET 30 gauge Misc 1 lancet, Other, Daily    ONETOUCH ULTRA TEST Strp 1 strip, Other, Daily    pantoprazole (PROTONIX) 40 mg, Oral, 2 times daily    pen needle, diabetic 2 Units, Misc.(Non-Drug; Combo Route), 2 times daily, Patient to check blood sugars twice daily (morning and night)    rosuvastatin (CRESTOR) 40 mg, Oral, Daily    sevelamer carbonate (RENVELA) 1,600 mg, Oral, 3 times daily    SPIRIVA WITH HANDIHALER 18 mcg, Inhalation, Daily       Past Surgical History:   Procedure Laterality Date    AV FISTULA PLACEMENT Left     CARDIAC CATHETERIZATION      COLONOSCOPY      CORONARY ARTERY BYPASS GRAFT      EGD, WITH HEMORRHAGE CONTROL  3/24/2023    Procedure: EGD,WITH HEMORRHAGE CONTROL;  Surgeon: Go Le MD;  Location: [facility] ENDOSCOPY;  Service: Gastroenterology;;    ESOPHAGOGASTRODUODENOSCOPY      ESOPHAGOGASTRODUODENOSCOPY N/A 2/17/2023    Procedure: EGD +/- VCE PLACEMENT;  Surgeon: Morgan Gardner MD;  Location: [facility] ENDOSCOPY;  Service: Gastroenterology;  Laterality: N/A;     ESOPHAGOGASTRODUODENOSCOPY N/A 3/24/2023    Procedure: EGD;  Surgeon: Go Le MD;  Location: [facility] ENDOSCOPY;  Service: Gastroenterology;  Laterality: N/A;  with push    POLYPECTOMY      SMALL BOWEL ENTEROSCOPY Left 1/20/2023    Procedure: ENTEROSCOPY;  Surgeon: Lexi Scales MD;  Location: [facility] ENDOSCOPY;  Service: Gastroenterology;  Laterality: Left;    THYROIDECTOMY      TUBAL LIGATION         Social History     Socioeconomic History    Marital status:     Number of children: 6   Tobacco Use    Smoking status: Every Day     Packs/day: 0.25     Types: Cigarettes    Smokeless tobacco: Never    Tobacco comments:     Smokes 4 cigarettes a day   Substance and Sexual Activity    Alcohol use: Yes     Comment: 1 glass every 2-3 month    Drug use: Not Currently    Sexual activity: Not Currently     Social Determinants of Health     Financial Resource Strain: Low Risk     Difficulty of Paying Living Expenses: Not hard at all   Food Insecurity: No Food Insecurity    Worried About Running Out of Food in the Last Year: Never true    Ran Out of Food in the Last Year: Never true   Transportation Needs: No Transportation Needs    Lack of Transportation (Medical): No    Lack of Transportation (Non-Medical): No   Physical Activity: Inactive    Days of Exercise per Week: 0 days    Minutes of Exercise per Session: 0 min   Stress: No Stress Concern Present    Feeling of Stress : Not at all   Social Connections: Moderately Isolated    Frequency of Communication with Friends and Family: More than three times a week    Frequency of Social Gatherings with Friends and Family: More than three times a week    Attends Tenriism Services: More than 4 times per year    Active Member of Clubs or Organizations: No    Attends Club or Organization Meetings: Never    Marital Status:    Housing Stability: Low Risk     Unable to Pay for Housing in the Last Year: No    Number of Places Lived  in the Last Year: 1    Unstable Housing in the Last Year: No       Lab Results   Component Value Date    WBC 5.2 04/06/2023    HGB 7.3 (L) 04/06/2023    HCT 23.4 (L) 04/06/2023    MCV 95.5 (H) 04/06/2023     04/06/2023          BMP  Lab Results   Component Value Date    HCT 23.4 (L) 04/06/2023     (L) 04/05/2023    K 4.0 04/05/2023    BUN 31.9 (H) 04/05/2023    CREATININE 4.47 (H) 04/05/2023    CALCIUM 8.7 04/05/2023        INR  Recent Labs     04/05/23 2043   INR 1.03   PROTIME 13.4           Diagnostic Studies:      EKG:  Results for orders placed or performed during the hospital encounter of 03/22/23   EKG 12-lead    Collection Time: 03/23/23  4:34 PM    Narrative    Test Reason : R06.02,    Vent. Rate : 072 BPM     Atrial Rate : 072 BPM     P-R Int : 262 ms          QRS Dur : 150 ms      QT Int : 436 ms       P-R-T Axes : 090 -33 136 degrees     QTc Int : 477 ms    Sinus rhythm with 1st degree A-V block with Premature atrial complexes with   Aberrant conduction  Left axis deviation  Left bundle branch block  Abnormal ECG  Confirmed by Valerio Villegas MD ([id number]) on 3/23/2023 5:20:22 PM    Referred By: [email or web address]   SELF           Confirmed By:Valerio Villegas MD

## 2023-04-06 NOTE — PROVATION PATIENT INSTRUCTIONS
Discharge Summary/Instructions after an Endoscopic Procedure  Patient Name: Rosette Madrid  Patient MRN: [id number]  Patient YOB: 1945 Thursday, April 6, 2023  Ayden Francois MD  Dear patient,  As a result of recent federal legislation (The Federal Cures Act), you may   receive lab or pathology results from your procedure in your [facility]   account before your physician is able to contact you. Your physician or   their representative will relay the results to you with their   recommendations at their soonest availability.  Thank you,  RESTRICTIONS:  During your procedure today, you received medications for sedation.  These   medications may affect your judgment, balance and coordination.  Therefore,   for 24 hours, you have the following restrictions:   - DO NOT drive a car, operate machinery, make legal/financial decisions,   sign important papers or drink alcohol.    ACTIVITY:  Today: no heavy lifting, straining or running due to procedural   sedation/anesthesia.  The following day: return to full activity including work.  DIET:  Eat and drink normally unless instructed otherwise.     TREATMENT FOR COMMON SIDE EFFECTS:  - Mild abdominal pain, nausea, belching, bloating or excessive gas:  rest,   eat lightly and use a heating pad.  - Sore Throat: treat with throat lozenges and/or gargle with warm salt   water.  - Because air was used during the procedure, expelling large amounts of air   from your rectum or belching is normal.  - If a bowel prep was taken, you may not have a bowel movement for 1-3 days.    This is normal.  SYMPTOMS TO WATCH FOR AND REPORT TO YOUR PHYSICIAN:  1. Abdominal pain or bloating, other than gas cramps.  2. Chest pain.  3. Back pain.  4. Signs of infection such as: chills or fever occurring within 24 hours   after the procedure.  5. Rectal bleeding, which would show as bright red, maroon, or black stools.   (A tablespoon of blood from the rectum is not serious, especially  if   hemorrhoids are present.)  6. Vomiting.  7. Weakness or dizziness.  GO DIRECTLY TO THE NEAREST EMERGENCY ROOM IF YOU HAVE ANY OF THE FOLLOWING:      Difficulty breathing              Chills and/or fever over 101 F   Persistent vomiting and/or vomiting blood   Severe abdominal pain   Severe chest pain   Black, tarry stools   Bleeding- more than one tablespoon   Any other symptom or condition that you feel may need urgent attention  Your doctor recommends these additional instructions:  If any biopsies were taken, your doctors clinic will contact you in 1 to 2   weeks with any results.  Will get a nuclear bleeding scan  anemia multifactorial given medical comorbidities  For questions, problems or results please call your physician - Ayden Francois MD at Work:  [phone].  [facility], EMERGENCY ROOM PHONE NUMBER: [phone]  IF A COMPLICATION OR EMERGENCY SITUATION ARISES AND YOU ARE UNABLE TO REACH   YOUR PHYSICIAN - GO DIRECTLY TO THE EMERGENCY ROOM.  MD Ayden Bowen MD  4/6/2023 1:00:54 PM  This report has been verified and signed electronically.  Dear patient,  As a result of recent federal legislation (The Federal Cures Act), you may   receive lab or pathology results from your procedure in your [facility]   account before your physician is able to contact you. Your physician or   their representative will relay the results to you with their   recommendations at their soonest availability.  Thank you,  PROVATION

## 2023-04-06 NOTE — ED PROVIDER NOTES
Encounter Date: 4/5/2023    SCRIBE #1 NOTE: I, Dennise Smith, am scribing for, and in the presence of,  Corrie Little MD. I have scribed the following portions of the note - Other sections scribed: HPI, ROS, Physical exam.     History     Chief Complaint   Patient presents with    Abormal lab     Pt states was told to come to ED after her HD clinic called w/ critical H&H, denies cp, sob, fatigue, melena, hematemesis. States has had transfusions previously and a colonoscopy that showed a bleed 3-4 wks ago, has not followed w/ Gi since. Pt takes Eliquis.        This is a 77 y.o. female, with a PMHx of Afib, HLD, HTN, DM, CKD, and COPD, who presents after having abnormal lab result with onset prior to arrival. Patient was sent to the ED for critical H&H. Patient reports that she was experiencing generalized weakness and new leg swelling yesterday. She notes that her stools have been dark brown. She denies any abdominal pain or shortness of breath. She adds that she recently took a laxative 3-4 days ago.  Daughter reports that the patient was recently admitted to the hospital for a GI bled approximately 2 weeks ago. Daughter states that the patient had a scope done and a part of her small intestine was cauterized.    Patient takes Eliquis. She is on a Tuesday, Thursday, Saturday dialysis schedule. Patient states that she received a full run Tuesday. Patient still makes a small amount of urine. Patient denies any recent changes to dialysis.    Nephrologist is Annalee Bourgeois MD. PCP is JOSEFINA Lord.     The history is provided by the patient and a relative.   GI Problem  The other symptoms of the illness do not include fever, fatigue, shortness of breath, nausea, vomiting, diarrhea or dysuria.   Symptoms associated with the illness do not include constipation or back pain.   Review of patient's allergies indicates:   Allergen Reactions    Lisinopril Swelling    Azithromycin      Other reaction(s): tongue/facial  swelling    Baclofen      Other reaction(s): tongue/facial swelling    Doxycycline      Other reaction(s): Angioedema     Past Medical History:   Diagnosis Date    CKD (chronic kidney disease) requiring chronic dialysis     COPD (chronic obstructive pulmonary disease)     Diabetes mellitus     Hyperlipidemia     Hypertension     Renal insufficiency     Thyroid disease      Past Surgical History:   Procedure Laterality Date    AV FISTULA PLACEMENT Left     CARDIAC CATHETERIZATION      COLONOSCOPY      CORONARY ARTERY BYPASS GRAFT      EGD, WITH HEMORRHAGE CONTROL  3/24/2023    Procedure: EGD,WITH HEMORRHAGE CONTROL;  Surgeon: Go Le MD;  Location: [facility] ENDOSCOPY;  Service: Gastroenterology;;    ESOPHAGOGASTRODUODENOSCOPY      ESOPHAGOGASTRODUODENOSCOPY N/A 2/17/2023    Procedure: EGD +/- VCE PLACEMENT;  Surgeon: Morgan Gardner MD;  Location: [facility] ENDOSCOPY;  Service: Gastroenterology;  Laterality: N/A;    ESOPHAGOGASTRODUODENOSCOPY N/A 3/24/2023    Procedure: EGD;  Surgeon: Go Le MD;  Location: [facility] ENDOSCOPY;  Service: Gastroenterology;  Laterality: N/A;  with push    POLYPECTOMY      SMALL BOWEL ENTEROSCOPY Left 1/20/2023    Procedure: ENTEROSCOPY;  Surgeon: Lexi Scales MD;  Location: [facility] ENDOSCOPY;  Service: Gastroenterology;  Laterality: Left;    THYROIDECTOMY      TUBAL LIGATION       Family History   Problem Relation Age of Onset    Hypertension Mother     Hypertension Father     Hypertension Sister     Hypertension Sister      Social History     Tobacco Use    Smoking status: Every Day     Packs/day: 0.25     Types: Cigarettes    Smokeless tobacco: Never    Tobacco comments:     Smokes 4 cigarettes a day   Substance Use Topics    Alcohol use: Yes     Comment: 1 glass every 2-3 month    Drug use: Not Currently     Review of Systems   Constitutional:  Negative for fatigue, fever and unexpected weight change.   HENT:  Negative for congestion and rhinorrhea.    Eyes:   Negative for pain.   Respiratory:  Negative for chest tightness, shortness of breath and wheezing.    Cardiovascular:  Positive for leg swelling. Negative for chest pain.   Gastrointestinal:  Negative for abdominal pain, constipation, diarrhea, nausea and vomiting.   Genitourinary:  Negative for dysuria.   Musculoskeletal:  Negative for back pain and neck pain.   Skin:  Negative for rash.   Allergic/Immunologic: Negative for environmental allergies, food allergies and immunocompromised state.   Neurological:  Positive for weakness (generalized). Negative for dizziness and speech difficulty.   Hematological:  Does not bruise/bleed easily.   Psychiatric/Behavioral:  Negative for sleep disturbance and suicidal ideas.      Physical Exam     Initial Vitals [04/05/23 1912]   BP Pulse Resp Temp SpO2   (!) 109/57 68 20 96.4 °F (35.8 °C) 99 %      MAP       --         Physical Exam    Nursing note and vitals reviewed.  Constitutional: She appears well-developed and well-nourished. She is not diaphoretic. No distress.   HENT:   Head: Normocephalic and atraumatic.   Nose: Nose normal.   Mouth/Throat: Oropharynx is clear and moist.   Eyes: Conjunctivae and EOM are normal. Pupils are equal, round, and reactive to light.   Neck: Trachea normal. Neck supple.   Normal range of motion.  Cardiovascular:  Normal rate, regular rhythm, normal heart sounds and intact distal pulses.           No murmur heard.  Pulmonary/Chest: No respiratory distress. She has no wheezes. She has no rhonchi. She exhibits no tenderness.   Bibasilar crackles.   Abdominal: Abdomen is soft. Bowel sounds are normal. She exhibits no distension and no mass. There is no abdominal tenderness. There is no rebound and no guarding.   Genitourinary: : Acceptable.   Genitourinary Comments: Dark brown stool, hemoccult positive.     Musculoskeletal:         General: No tenderness. Normal range of motion.      Cervical back: Normal range of motion and neck  supple.      Lumbar back: Normal. Normal range of motion.      Comments: Fistula to left forearm with palpable thrill. 3+ lower extremity edema.     Neurological: She is alert and oriented to person, place, and time. She has normal strength. No cranial nerve deficit or sensory deficit.   Skin: Skin is warm and dry. Capillary refill takes less than 2 seconds. No abscess noted. No erythema. No pallor.   Psychiatric: She has a normal mood and affect. Her behavior is normal. Judgment and thought content normal.       ED Course   Critical Care    Date/Time: 4/5/2023 11:11 PM  Performed by: Corrie Little MD  Authorized by: Corrie Little MD   Direct patient critical care time: 10 minutes  Additional history critical care time: 8 minutes  Ordering / reviewing critical care time: 5 minutes  Documentation critical care time: 8 minutes  Consulting other physicians critical care time: 5 minutes  Total critical care time (exclusive of procedural time) : 36 minutes  Critical care was necessary to treat or prevent imminent or life-threatening deterioration of the following conditions: anemia.  Critical care was time spent personally by me on the following activities: development of treatment plan with patient or surrogate, discussions with consultants, evaluation of patient's response to treatment, examination of patient, obtaining history from patient or surrogate, ordering and performing treatments and interventions, ordering and review of laboratory studies, pulse oximetry, re-evaluation of patient's condition and review of old charts.      Labs Reviewed   CBC WITH DIFFERENTIAL - Abnormal; Notable for the following components:       Result Value    RBC 1.83 (*)     Hgb 5.7 (*)     Hct 18.7 (*)     .2 (*)     MCH 31.1 (*)     MCHC 30.5 (*)     RDW 21.0 (*)     All other components within normal limits   COMPREHENSIVE METABOLIC PANEL - Abnormal; Notable for the following components:    Sodium Level 134 (*)      Glucose Level 138 (*)     Blood Urea Nitrogen 31.9 (*)     Creatinine 4.47 (*)     Protein Total 5.3 (*)     Albumin Level 2.8 (*)     Aspartate Aminotransferase 42 (*)     All other components within normal limits   PROTIME-INR - Normal   APTT - Normal   TYPE & SCREEN   PREPARE RBC SOFT          Imaging Results    None          Medications   pantoprazole (PROTONIX) 40 mg in sodium chloride 0.9 % 100 mL IVPB (MB+) (8 mg/hr Intravenous New Bag 4/5/23 2300)   0.9%  NaCl infusion (for blood administration) (has no administration in time range)   pantoprazole injection 80 mg (80 mg Intravenous Given 4/5/23 2200)   furosemide injection 80 mg (80 mg Intravenous Given 4/5/23 2200)   Medical Decision Making  The differential includes but is not limited to: anemia chronic disease, GI bleed, electrolyte derangement.  To evaluate this cbc, cmp, coags ordered and reviewed, acute significant anemia, chronic renal failure and mild hyperglycemia and hyponatremia noted  Transfusion ordered along with lasix, rectal +. Protonix bolus and infusion, admit to hospitalist  Recent admission for similar  Is on anticoagulation with recurrent gi bleeds but has a fib    Problems Addressed:  Acute anemia: acute illness or injury that poses a threat to life or bodily functions  ESRD (end stage renal disease) on dialysis: chronic illness or injury  Hypervolemia, unspecified hypervolemia type: acute illness or injury that poses a threat to life or bodily functions    Amount and/or Complexity of Data Reviewed  Independent Historian: caregiver     Details: Daughter reports that the patient was recently admitted to the hospital for a GI bled approximately 2 weeks ago. Daughter states that the patient had a scope done and a part of her small intestine was cauterized.  Labs: ordered.    Risk  OTC drugs.  Prescription drug management.  Decision regarding hospitalization.    Critical Care  Total time providing critical care: 36 minutes      Medical  Decision Making:   History:   Old Medical Records: I decided to obtain old medical records.  Old Records Summarized: records from previous admission(s).  Initial Assessment:   See hpi  Clinical Tests:   Lab Tests: Ordered and Reviewed  The following lab test(s) were unremarkable: CBC, CMP, PT and PTT  Other:   I have discussed this case with another health care provider.        Scribe Attestation:   Scribe #1: I performed the above scribed service and the documentation accurately describes the services I performed. I attest to the accuracy of the note.  Comments: Attending:   Physician Attestation Statement for Scribe #1: ICorrie MD, personally performed the services described in this documentation. All medical record entries made by the scribe were at my direction and in my presence.  I have reviewed the chart and agree that the record reflects my personal performance and is accurate and complete.        Attending Attestation:           Physician Attestation for Scribe:  Physician Attestation Statement for Scribe #1: ICorrie MD, reviewed documentation, as scribed by Dennise Smith in my presence, and it is both accurate and complete.           ED Course as of 04/05/23 2314 Wed Apr 05, 2023 2209 Paged hospitalist [AB]      ED Course User Index  [AB] Dennise Smith                 Clinical Impression:   Final diagnoses:  [D64.9] Acute anemia  [N18.6, Z99.2] ESRD (end stage renal disease) on dialysis (Primary)  [E87.70] Hypervolemia, unspecified hypervolemia type        ED Disposition Condition    Observation Stable                Corrie Little MD  04/05/23 7174

## 2023-04-06 NOTE — NURSING
04/06/23 1107   Post-Hemodialysis Assessment   Blood Volume Processed (Liters) 60.8 L   Dialyzer Clearance Clear   Duration of Treatment 180 minutes   Total UF (mL) 2000 mL   Patient Response to Treatment Pt tolerated HD tx well; NAD noted/ VSS. Total tx length 3hrs with 2 liter UF goal. 1 unit of PRBC's also given while on HD.   Post-Hemodialysis Comments Pt deaccessed per P and  p

## 2023-04-06 NOTE — TRANSFER OF CARE
"Anesthesia Transfer of Care Note    Patient: Rosette Madrid    Procedure(s) Performed: Procedure(s) (LRB):  EGD (N/A)  EGD,WITH HEMORRHAGE CONTROL    Patient location: GI    Anesthesia Type: general    Transport from OR: Transported from OR on room air with adequate spontaneous ventilation    Post pain: adequate analgesia    Post assessment: no apparent anesthetic complications    Post vital signs: stable    Level of consciousness: awake    Nausea/Vomiting: no nausea/vomiting    Complications: none    Transfer of care protocol was followed      Last vitals:   Visit Vitals  BP (!) 124/59 (BP Location: Right leg, Patient Position: Lying)   Pulse 69   Temp 35.8 °C (96.4 °F)   Resp 16   Ht 5' 3" (1.6 m)   Wt 95.3 kg (210 lb)   SpO2 96%   Breastfeeding No   BMI 37.20 kg/m²     "

## 2023-04-07 VITALS
HEIGHT: 63 IN | SYSTOLIC BLOOD PRESSURE: 119 MMHG | WEIGHT: 209.88 LBS | OXYGEN SATURATION: 97 % | RESPIRATION RATE: 18 BRPM | DIASTOLIC BLOOD PRESSURE: 65 MMHG | TEMPERATURE: 98 F | BODY MASS INDEX: 37.19 KG/M2 | HEART RATE: 65 BPM

## 2023-04-07 LAB
BASOPHILS # BLD AUTO: 0.02 X10(3)/MCL (ref 0–0.2)
BASOPHILS NFR BLD AUTO: 0.3 %
EOSINOPHIL # BLD AUTO: 0.06 X10(3)/MCL (ref 0–0.9)
EOSINOPHIL NFR BLD AUTO: 0.8 %
ERYTHROCYTE [DISTWIDTH] IN BLOOD BY AUTOMATED COUNT: 21.5 % (ref 11.5–17)
HCT VFR BLD AUTO: 27 % (ref 37–47)
HGB BLD-MCNC: 8.4 G/DL (ref 12–16)
IMM GRANULOCYTES # BLD AUTO: 0.03 X10(3)/MCL (ref 0–0.04)
IMM GRANULOCYTES NFR BLD AUTO: 0.4 %
LYMPHOCYTES # BLD AUTO: 0.54 X10(3)/MCL (ref 0.6–4.6)
LYMPHOCYTES NFR BLD AUTO: 7 %
MCH RBC QN AUTO: 29.3 PG (ref 27–31)
MCHC RBC AUTO-ENTMCNC: 31.1 G/DL (ref 33–36)
MCV RBC AUTO: 94.1 FL (ref 80–94)
MONOCYTES # BLD AUTO: 0.52 X10(3)/MCL (ref 0.1–1.3)
MONOCYTES NFR BLD AUTO: 6.7 %
NEUTROPHILS # BLD AUTO: 6.59 X10(3)/MCL (ref 2.1–9.2)
NEUTROPHILS NFR BLD AUTO: 84.8 %
NRBC BLD AUTO-RTO: 0 %
PLATELET # BLD AUTO: 171 X10(3)/MCL (ref 130–400)
PMV BLD AUTO: 9.7 FL (ref 7.4–10.4)
POCT GLUCOSE: 102 MG/DL (ref 70–110)
RBC # BLD AUTO: 2.87 X10(6)/MCL (ref 4.2–5.4)
WBC # SPEC AUTO: 7.8 X10(3)/MCL (ref 4.5–11.5)

## 2023-04-07 PROCEDURE — 85025 COMPLETE CBC W/AUTO DIFF WBC: CPT | Performed by: INTERNAL MEDICINE

## 2023-04-07 PROCEDURE — 63600175 PHARM REV CODE 636 W HCPCS: Performed by: INTERNAL MEDICINE

## 2023-04-07 PROCEDURE — 25000003 PHARM REV CODE 250: Performed by: INTERNAL MEDICINE

## 2023-04-07 PROCEDURE — 25000242 PHARM REV CODE 250 ALT 637 W/ HCPCS: Performed by: INTERNAL MEDICINE

## 2023-04-07 PROCEDURE — C9113 INJ PANTOPRAZOLE SODIUM, VIA: HCPCS | Performed by: INTERNAL MEDICINE

## 2023-04-07 RX ORDER — METHOCARBAMOL 500 MG/1
500 TABLET, FILM COATED ORAL ONCE
Status: COMPLETED | OUTPATIENT
Start: 2023-04-07 | End: 2023-04-07

## 2023-04-07 RX ORDER — FUROSEMIDE 10 MG/ML
40 INJECTION INTRAMUSCULAR; INTRAVENOUS ONCE
Status: COMPLETED | OUTPATIENT
Start: 2023-04-07 | End: 2023-04-07

## 2023-04-07 RX ADMIN — DULOXETINE 30 MG: 30 CAPSULE, DELAYED RELEASE ORAL at 08:04

## 2023-04-07 RX ADMIN — CARVEDILOL 6.25 MG: 3.12 TABLET, FILM COATED ORAL at 08:04

## 2023-04-07 RX ADMIN — FLUTICASONE FUROATE AND VILANTEROL TRIFENATATE 1 PUFF: 100; 25 POWDER RESPIRATORY (INHALATION) at 09:04

## 2023-04-07 RX ADMIN — SEVELAMER CARBONATE 1600 MG: 800 TABLET, FILM COATED ORAL at 09:04

## 2023-04-07 RX ADMIN — TIOTROPIUM BROMIDE INHALATION SPRAY 2 PUFF: 3.12 SPRAY, METERED RESPIRATORY (INHALATION) at 09:04

## 2023-04-07 RX ADMIN — ATORVASTATIN CALCIUM 20 MG: 10 TABLET, FILM COATED ORAL at 08:04

## 2023-04-07 RX ADMIN — FERROUS SULFATE TAB 325 MG (65 MG ELEMENTAL FE) 1 EACH: 325 (65 FE) TAB at 08:04

## 2023-04-07 RX ADMIN — FUROSEMIDE 40 MG: 10 INJECTION, SOLUTION INTRAMUSCULAR; INTRAVENOUS at 12:04

## 2023-04-07 RX ADMIN — LACTULOSE 15 G: 10 SOLUTION ORAL at 08:04

## 2023-04-07 RX ADMIN — METHOCARBAMOL 500 MG: 500 TABLET ORAL at 12:04

## 2023-04-07 RX ADMIN — LEVOTHYROXINE SODIUM 125 MCG: 125 TABLET ORAL at 05:04

## 2023-04-07 RX ADMIN — PANTOPRAZOLE SODIUM 40 MG: 40 INJECTION, POWDER, LYOPHILIZED, FOR SOLUTION INTRAVENOUS at 08:04

## 2023-04-07 NOTE — PROGRESS NOTES
"Gastroenterology Progress Note    Subjective/Interval History:  EGD yesterday with single nonbleeding AVM in stomach treated with APC    NM GI bleed scan with no evidence of active GI bleed    H&H 8.4/27.0 s/p 3u PRBC - appropriate rise    Patient lying in bed. She tolerated PO intake well without n/v. Denies abd pain. Had a BM earlier that was brown. She is ready to go home.    ROS:  Review of Systems   Constitutional:  Positive for malaise/fatigue.   Respiratory:  Negative for cough and shortness of breath.    Cardiovascular:  Negative for chest pain.   Gastrointestinal:  Negative for abdominal pain, blood in stool, constipation, diarrhea, melena, nausea and vomiting.   Neurological:  Negative for weakness.     Vital Signs:  /67   Pulse 62   Temp 98.4 °F (36.9 °C) (Oral)   Resp 20   Ht 5' 3" (1.6 m)   Wt 95.2 kg (209 lb 14.1 oz)   SpO2 97%   Breastfeeding No   BMI 37.18 kg/m²   Body mass index is 37.18 kg/m².    Physical Exam:  Physical Exam  Constitutional:       General: She is not in acute distress.     Appearance: She is obese. She is ill-appearing.   HENT:      Head: Normocephalic and atraumatic.      Right Ear: External ear normal.      Left Ear: External ear normal.      Nose: Nose normal.      Mouth/Throat:      Pharynx: Oropharynx is clear.   Eyes:      Conjunctiva/sclera: Conjunctivae normal.   Pulmonary:      Effort: Pulmonary effort is normal. No respiratory distress.   Abdominal:      General: Bowel sounds are normal. There is no distension.      Palpations: Abdomen is soft.      Tenderness: There is no abdominal tenderness. There is no guarding.   Musculoskeletal:         General: Normal range of motion.      Cervical back: Normal range of motion.   Skin:     General: Skin is warm and dry.      Coloration: Skin is not pale.   Neurological:      Mental Status: She is alert and oriented to person, place, and time. Mental status is at baseline.      Motor: No weakness.   Psychiatric:       "   Mood and Affect: Mood normal.         Behavior: Behavior normal.         Thought Content: Thought content normal.       Labs:  Recent Results (from the past 24 hour(s))   POCT glucose    Collection Time: 04/06/23  9:43 PM   Result Value Ref Range    POCT Glucose 269 (H) 70 - 110 mg/dL   POCT glucose    Collection Time: 04/07/23  4:00 AM   Result Value Ref Range    POCT Glucose 102 70 - 110 mg/dL   CBC with Differential    Collection Time: 04/07/23 10:22 AM   Result Value Ref Range    WBC 7.8 4.5 - 11.5 x10(3)/mcL    RBC 2.87 (L) 4.20 - 5.40 x10(6)/mcL    Hgb 8.4 (L) 12.0 - 16.0 g/dL    Hct 27.0 (L) 37.0 - 47.0 %    MCV 94.1 (H) 80.0 - 94.0 fL    MCH 29.3 27.0 - 31.0 pg    MCHC 31.1 (L) 33.0 - 36.0 g/dL    RDW 21.5 (H) 11.5 - 17.0 %    Platelet 171 130 - 400 x10(3)/mcL    MPV 9.7 7.4 - 10.4 fL    Neut % 84.8 %    Lymph % 7.0 %    Mono % 6.7 %    Eos % 0.8 %    Basophil % 0.3 %    Lymph # 0.54 (L) 0.6 - 4.6 x10(3)/mcL    Neut # 6.59 2.1 - 9.2 x10(3)/mcL    Mono # 0.52 0.1 - 1.3 x10(3)/mcL    Eos # 0.06 0 - 0.9 x10(3)/mcL    Baso # 0.02 0 - 0.2 x10(3)/mcL    IG# 0.03 0 - 0.04 x10(3)/mcL    IG% 0.4 %    NRBC% 0.0 %         Assessment/Plan:  This is a 77 y.o. female known to Dr. Bourgeois with PMH of ESRD on HD Tu/Th/Sa, HFpEF 55%, CAD/CABG x4 on ASA 81mg, HTN, HLD, COPD, venous insufficiency, hypothyroidism, anemia, gastric AVM, adenomatous colon polyps, Afib on Eliquis. She presented to the ED 04/05/23 at the recommendation of her nephrologist for anemia seen on outpatient labs with dialysis.      Upon arrival, H&H 5.7/18.7, INR 1.03. Rectal exam performed by ED provider revealed dark brown hemoccult positive stool. GI consulted for GI bleed, anemia.     JAYSON, likely multifactorial, with component 2/2 GI blood loss  - outpatient labs revealed anemia - H&H 5.7/18.7  - patient denies recent iron supplementation, states she had a black BM  - dark brown hemoccult positive stool on rectal exam  - AVM in 4th portion of duodenum  seen on EGD 03/24/23, treated with APC  - monitor and transfuse as needed to keep Hgb >7-8  - monitor stool for color/blood  - PPI BID  - iron supplementation as needed  - avoid NSAIDs  - EGD with push 04/06/23 with one nonbleeding Avm in stomach treated with APC  - NM GI bleed scan negative  - diet as tolerated  - continue to monitor  ESRD  - HD Tue/Thu/Sat  Afib  - on Eliquis - held at this time  - patient is at high risk for rebleeding from AVMs; however, if anticoagulation is necessary then it is ok to resume from a GI standpoint     No further GI recommendations at this time. GI will sign off. Please call with any questions.    Alley Bella PA-C  Gastroenterology  [facility]

## 2023-04-07 NOTE — PLAN OF CARE
Problem: Adult Inpatient Plan of Care  Goal: Plan of Care Review  4/7/2023 1551 by Antonino Vincent RN  Outcome: Met  4/7/2023 1203 by Antonino Vincent RN  Outcome: Ongoing, Progressing  Goal: Patient-Specific Goal (Individualized)  4/7/2023 1551 by Antonino Vincent RN  Outcome: Met  4/7/2023 1203 by Antonino Vincent RN  Outcome: Ongoing, Progressing  Goal: Absence of Hospital-Acquired Illness or Injury  4/7/2023 1551 by Antonino Vincent RN  Outcome: Met  4/7/2023 1203 by Antonino Vincent RN  Outcome: Ongoing, Progressing  Goal: Optimal Comfort and Wellbeing  4/7/2023 1551 by Antonino Vincent RN  Outcome: Met  4/7/2023 1203 by Antonino Vincent RN  Outcome: Ongoing, Progressing  Goal: Readiness for Transition of Care  4/7/2023 1551 by Antonino Vincent RN  Outcome: Met  4/7/2023 1203 by Antonino Vincent RN  Outcome: Ongoing, Progressing     Problem: Device-Related Complication Risk (Hemodialysis)  Goal: Safe, Effective Therapy Delivery  4/7/2023 1551 by Antonino Vincent RN  Outcome: Met  4/7/2023 1203 by Antonino Vincent RN  Outcome: Ongoing, Progressing     Problem: Hemodynamic Instability (Hemodialysis)  Goal: Effective Tissue Perfusion  4/7/2023 1551 by Antonino Vincent RN  Outcome: Met  4/7/2023 1203 by Antonino Vincent RN  Outcome: Ongoing, Progressing     Problem: Infection (Hemodialysis)  Goal: Absence of Infection Signs and Symptoms  4/7/2023 1551 by Antonino Vincent RN  Outcome: Met  4/7/2023 1203 by Antonino Vincent RN  Outcome: Ongoing, Progressing     Problem: Diabetes Comorbidity  Goal: Blood Glucose Level Within Targeted Range  4/7/2023 1551 by Antonino Vincent RN  Outcome: Met  4/7/2023 1203 by Antonino Vincent RN  Outcome: Ongoing, Progressing

## 2023-04-07 NOTE — PLAN OF CARE
Problem: Adult Inpatient Plan of Care  Goal: Plan of Care Review  Outcome: Ongoing, Progressing  Goal: Patient-Specific Goal (Individualized)  Outcome: Ongoing, Progressing  Goal: Absence of Hospital-Acquired Illness or Injury  Outcome: Ongoing, Progressing  Goal: Optimal Comfort and Wellbeing  Outcome: Ongoing, Progressing  Goal: Readiness for Transition of Care  Outcome: Ongoing, Progressing     Problem: Device-Related Complication Risk (Hemodialysis)  Goal: Safe, Effective Therapy Delivery  Outcome: Ongoing, Progressing     Problem: Hemodynamic Instability (Hemodialysis)  Goal: Effective Tissue Perfusion  Outcome: Ongoing, Progressing     Problem: Infection (Hemodialysis)  Goal: Absence of Infection Signs and Symptoms  Outcome: Ongoing, Progressing     Problem: Diabetes Comorbidity  Goal: Blood Glucose Level Within Targeted Range  Outcome: Ongoing, Progressing

## 2023-04-07 NOTE — PROGRESS NOTES
Nephrology follow up progress note    HPI:      Rosette Madrid is a 77 y.o. female is known to me from [facility] for her chronic hemodialysis on TTS schedule.  Admitted for anemia.  Transfused.  Hemoglobin improved.  She reports of no more GI bleeding.  No nausea vomiting.  No chest pain no abdominal pains.  She only wants Lasix as she is swollen and Lasix helps to on swell her.  She does make some urine.  Her regular dialysis on TTS schedule.    Interval history:          Review of Systems:       Past medical, family, surgical, and social history reviewed and unchanged from initial consult note.     Objective:   Awake alert oriented to time person place no acute respiratory distress noted.    VITAL SIGNS: 24 HR MIN & MAX LAST    Temp  Min: 96.4 °F (35.8 °C)  Max: 99 °F (37.2 °C)  98.6 °F (37 °C)        BP  Min: 115/69  Max: 168/74  115/69     Pulse  Min: 62  Max: 75  63     Resp  Min: 16  Max: 25  16    SpO2  Min: 95 %  Max: 100 %  97 %      GEN:  Moderately developed and nourished.  HEENT: Conjunctiva anicteric, pupils reactive.  Extraocular movements intact.  CV: RRR without rub or gallop.  PULM: CTAB, unlabored  ABD: Soft, NT/ND abdomen with NABS  EXT: No cyanosis, 2+ edema of the lower extremities noted and 1+ of the right upper extremity noted with the IVs are present.  SKIN: Warm and dry  PSYCH: Awake, alert, and appropriately conversant  Vascular access:  Left arm AV fistula.          Component Value Date/Time     (L) 04/05/2023 2043     (L) 03/25/2023 0403    K 4.0 04/05/2023 2043    K 4.7 03/25/2023 0403    CHLORIDE 100 04/05/2023 2043    CHLORIDE 103 03/25/2023 0403    CO2 25 04/05/2023 2043    CO2 24 03/25/2023 0403    BUN 31.9 (H) 04/05/2023 2043    BUN 46.8 (H) 03/25/2023 0403    CREATININE 4.47 (H) 04/05/2023 2043    CREATININE 3.73 (H) 03/25/2023 0403    CALCIUM 8.7 04/05/2023 2043    CALCIUM 8.9 03/25/2023 0403    PHOS 3.3 03/24/2023 1237            Component Value Date/Time    WBC 5.2  04/06/2023 0824    WBC 7.1 04/05/2023 2043    HGB 7.3 (L) 04/06/2023 0824    HGB 5.7 (LL) 04/05/2023 2043    HCT 23.4 (L) 04/06/2023 0824    HCT 18.7 (LL) 04/05/2023 2043     04/06/2023 0824     04/05/2023 2043       Imaging reviewed      Assessment / Plan:   ESRD  Anemia secondary to acute blood loss secondary to GI bleeding seems to have resolved.  Coronary artery disease and history of coronary artery bypass graft  Paroxysmal atrial fibrillation and on Eliquis.    Recommendations  Will give her Lasix 40 mg IV push x1 today as she is on that at home by mouth.  Stat hemoglobin and hematocrit checkup.  If stable only then she can go home.  If she stays in the hospital then she will need dialysis tomorrow.

## 2023-04-07 NOTE — DISCHARGE SUMMARY
[facility] Hospital Medicine Discharge Summary    Admit Date: 4/5/2023  Discharge Date and Time: 4/7/20236:58 PM  Admitting Physician:  Team  Discharging Physician: Dane Thompson MD.  Primary Care Physician: JOSEFINA Lord  Consults: Gastroenterology    Discharge Diagnoses:  Acute on chronic anemia , severe   Anemia of Chronic Disease   H/O recurrent GI bleed, obscure in nature   H/O Duodenal AVM  ESRD on HD- T/T/S  Chronic diastolic heart failure , EF 55%  H/O CAD, s/p CABG  Paroxysmal Atrial fibrillation , on Eliquis   Essential HTN  Hypothyroidism   COPD without exacerbation   Diabetes Mellitus , II       Hospital Course:   Patient is a 77-year-old female with a history of recurrent GI bleeding that was obscure nature to her recent admission last month when she underwent an EGD with small bowel enteroscopy showing an AVM in the 4th portion of the duodenum that was treated with APC.  She was referred to the ER [facility] secondary to outpatient laboratory work showing a low H&H with dialysis yesterday.  She does report dark colored stools.  She is on Eliquis for paroxysmal atrial fibrillation.    She arrived afebrile hemodynamically stable maintaining adequate sats on room air laboratory showed a hemoglobin of 5.7.  She was given IV Protonix and hospitalist service was consulted for admission.  Patient was transfused 3 units of PRBC. Hb stabilized. Seen by GI team and EGD done. Showed   Impression:            - Normal esophagus.                          - Normal examined duodenum.                          - Normal examined jejunum.                          - A single non-bleeding angioectasia in the                          stomach. Treated with argon plasma coagulation                          (APC).                          - No specimens collected.     Bleeding scan done and did not show any active bleeding. Patients hb was stable and she was discharged home on po PPI.      Pt was seen and examined on the day of discharge  Vitals:  VITAL SIGNS: 24 HRS MIN & MAX LAST   Temp  Min: 97.9 °F (36.6 °C)  Max: 99 °F (37.2 °C) 97.9 °F (36.6 °C)   BP  Min: 115/69  Max: 174/74 119/65   Pulse  Min: 62  Max: 74  65   Resp  Min: 16  Max: 20 18   SpO2  Min: 97 %  Max: 100 % 97 %       Physical Exam:  Heart RRR  Lungs clear   Abdomen soft and non tender   Neuro: No FND      Procedures Performed: No admission procedures for hospital encounter.     Significant Diagnostic Studies: See Full reports for all details    Recent Labs   Lab 04/05/23 2043 04/06/23  0824 04/07/23  1022   WBC 7.1 5.2 7.8   RBC 1.83* 2.45* 2.87*   HGB 5.7* 7.3* 8.4*   HCT 18.7* 23.4* 27.0*   .2* 95.5* 94.1*   MCH 31.1* 29.8 29.3   MCHC 30.5* 31.2* 31.1*   RDW 21.0* 19.0* 21.5*    181 171   MPV 10.1 10.0 9.7       Recent Labs   Lab 04/05/23 2043   *   K 4.0   CO2 25   BUN 31.9*   CREATININE 4.47*   CALCIUM 8.7   ALBUMIN 2.8*   ALKPHOS 106   ALT 45   AST 42*   BILITOT 0.4        Microbiology Results (last 7 days)       ** No results found for the last 168 hours. **             NM GI Bleed Study  EXAMINATION  NM GI BLEED STUDY    CLINICAL HISTORY  GI bleed;    TECHNIQUE  Planar scintigraphic imaging of the abdomen was performed following IV administration of 24.6 mCi Tc-99m pyrophosphate-labeled autologous red blood cells. Imaging was performed over the span 3 hours.    COMPARISON  None available at the time of initial interpretation.    FINDINGS  Normal radiotracer activity is seen in the blood pool of the abdominal aorta and large branch vessels.  Subsequent imaging reveals no abnormal activity outside of the blood pool or in a pattern suggestive of peristalsing transit that would be consistent with an active GI bleed.    Delayed images show expected accumulation of activity in the urinary bladder, which is consistent with expected physiologic excretion of radiotracer.    IMPRESSION  No scintigraphic  evidence of active gastrointestinal hemorrhage.    For reference, the sensitivity of this study is approximately 0.5-1 mL/minute bleeding rate.    Electronically signed by: Elton Masters  Date:    04/06/2023  Time:    18:31         Medication List        START taking these medications      ferrous sulfate 325 mg (65 mg iron) Tab tablet  Commonly known as: FEOSOL  Take 1 tablet (325 mg total) by mouth once daily.            CONTINUE taking these medications      albuterol 90 mcg/actuation inhaler  Commonly known as: VENTOLIN HFA  Inhale 2 puffs into the lungs every 6 (six) hours as needed for Wheezing. Rescue     albuterol-ipratropium 2.5 mg-0.5 mg/3 mL nebulizer solution  Commonly known as: DUO-NEB  Take 3 mLs by nebulization every 6 (six) hours as needed for Wheezing or Shortness of Breath. Rescue     apixaban 5 mg Tab  Commonly known as: ELIQUIS     aspirin 81 MG EC tablet  Commonly known as: ECOTRIN  Take 1 tablet (81 mg total) by mouth once daily.     BASAGLAR KWIKPEN U-100 INSULIN glargine 100 units/mL SubQ pen  Generic drug: insulin  Inject 30 Units into the skin Daily.     BenadryL 25 mg capsule  Generic drug: diphenhydrAMINE     carvediloL 6.25 MG tablet  Commonly known as: COREG  Take 1 tablet (6.25 mg total) by mouth 2 (two) times daily.     clonazePAM 0.5 MG tablet  Commonly known as: KlonoPIN     DIALYVITE 100-1 mg Tab  Generic drug: B complex-vitamin C-folic acid     DULoxetine 30 MG capsule  Commonly known as: CYMBALTA     ergocalciferol 50,000 unit Cap  Commonly known as: ERGOCALCIFEROL  Take 1 capsule (50,000 Units total) by mouth every 7 days.     fluticasone furoate-vilanteroL 100-25 mcg/dose diskus inhaler  Commonly known as: BREO  Inhale 1 puff into the lungs once daily.     furosemide 40 MG tablet  Commonly known as: LASIX  Take 1 tablet (40 mg total) by mouth once daily.     glipiZIDE 10 MG tablet  Commonly known as: GLUCOTROL  Take 1 tablet (10 mg total) by mouth daily with breakfast.    "  INVEGA SUSTENNA 156 mg/mL Syrg injection  Generic drug: paliperidone palmitate     levothyroxine 125 MCG tablet  Commonly known as: SYNTHROID  Take 1 tablet (125 mcg total) by mouth before breakfast.     ONETOUCH DELICA PLUS LANCET 30 gauge Misc  Generic drug: lancets  1 lancet by Other route once daily.     ONETOUCH ULTRA TEST Strp  Generic drug: blood sugar diagnostic  1 strip by Other route once daily.     pantoprazole 40 MG tablet  Commonly known as: PROTONIX  Take 1 tablet (40 mg total) by mouth 2 (two) times daily.     pen needle, diabetic 31 gauge x 5/16" Ndle  2 Units by Misc.(Non-Drug; Combo Route) route 2 (two) times a day. Patient to check blood sugars twice daily (morning and night)     rosuvastatin 40 MG Tab  Commonly known as: CRESTOR  Take 1 tablet (40 mg total) by mouth once daily.     sevelamer carbonate 800 mg Tab  Commonly known as: RENVELA  Take 2 tablets (1,600 mg total) by mouth 3 (three) times daily.     SPIRIVA WITH HANDIHALER 18 mcg inhalation capsule  Generic drug: tiotropium  Inhale 1 capsule (18 mcg total) into the lungs Daily.               Explained in detail to the patient about the discharge plan, medications, and follow-up visits. Pt understands and agrees with the treatment plan  Discharge Disposition: Home or Self Care   Discharged Condition: stable  Diet-   Dietary Orders (From admission, onward)       Start     Ordered    04/06/23 1842  DIET RENAL ON DIALYSIS  Diet effective now         04/06/23 1841                   Medications Per [place] med rec  Activities as tolerated    For further questions contact hospitalist office    Discharge time 33 minutes    For worsening symptoms, chest pain, shortness of breath, increased abdominal pain, high grade fever, stroke or stroke like symptoms, immediately go to the nearest Emergency Room or call 911 as soon as possible.      Dane Angulo M.D on 4/7/2023. at 6:58 PM.         "

## 2023-05-05 ENCOUNTER — OFFICE VISIT (OUTPATIENT)
Dept: CARDIOLOGY | Facility: CLINIC | Age: 78
End: 2023-05-05
Payer: MEDICARE

## 2023-05-05 VITALS
TEMPERATURE: 98 F | HEART RATE: 60 BPM | OXYGEN SATURATION: 95 % | WEIGHT: 198.19 LBS | BODY MASS INDEX: 33.84 KG/M2 | DIASTOLIC BLOOD PRESSURE: 74 MMHG | HEIGHT: 64 IN | RESPIRATION RATE: 18 BRPM | SYSTOLIC BLOOD PRESSURE: 168 MMHG

## 2023-05-05 DIAGNOSIS — R09.89 CAROTID BRUIT, UNSPECIFIED LATERALITY: ICD-10-CM

## 2023-05-05 DIAGNOSIS — I10 HTN (HYPERTENSION), BENIGN: Chronic | ICD-10-CM

## 2023-05-05 DIAGNOSIS — Z99.2 TYPE 2 DIABETES MELLITUS WITH CHRONIC KIDNEY DISEASE ON CHRONIC DIALYSIS, WITH LONG-TERM CURRENT USE OF INSULIN: ICD-10-CM

## 2023-05-05 DIAGNOSIS — Z79.4 TYPE 2 DIABETES MELLITUS WITH CHRONIC KIDNEY DISEASE ON CHRONIC DIALYSIS, WITH LONG-TERM CURRENT USE OF INSULIN: ICD-10-CM

## 2023-05-05 DIAGNOSIS — E11.22 TYPE 2 DIABETES MELLITUS WITH CHRONIC KIDNEY DISEASE ON CHRONIC DIALYSIS, WITH LONG-TERM CURRENT USE OF INSULIN: ICD-10-CM

## 2023-05-05 DIAGNOSIS — E66.09 CLASS 1 OBESITY DUE TO EXCESS CALORIES WITH SERIOUS COMORBIDITY AND BODY MASS INDEX (BMI) OF 34.0 TO 34.9 IN ADULT: Chronic | ICD-10-CM

## 2023-05-05 DIAGNOSIS — E78.2 MIXED HYPERLIPIDEMIA: Chronic | ICD-10-CM

## 2023-05-05 DIAGNOSIS — Z72.0 TOBACCO USE: ICD-10-CM

## 2023-05-05 DIAGNOSIS — I25.700 CORONARY ARTERY DISEASE INVOLVING CORONARY BYPASS GRAFT OF NATIVE HEART WITH UNSTABLE ANGINA PECTORIS: ICD-10-CM

## 2023-05-05 DIAGNOSIS — Z99.2 ESRD (END STAGE RENAL DISEASE) ON DIALYSIS: ICD-10-CM

## 2023-05-05 DIAGNOSIS — N18.6 ESRD (END STAGE RENAL DISEASE) ON DIALYSIS: ICD-10-CM

## 2023-05-05 DIAGNOSIS — N18.6 TYPE 2 DIABETES MELLITUS WITH CHRONIC KIDNEY DISEASE ON CHRONIC DIALYSIS, WITH LONG-TERM CURRENT USE OF INSULIN: ICD-10-CM

## 2023-05-05 DIAGNOSIS — I50.32 CHRONIC HEART FAILURE WITH PRESERVED EJECTION FRACTION: Primary | Chronic | ICD-10-CM

## 2023-05-05 PROCEDURE — 99215 OFFICE O/P EST HI 40 MIN: CPT | Mod: PBBFAC | Performed by: INTERNAL MEDICINE

## 2023-05-05 RX ORDER — LEVOMEFOLATE CALCIUM 15 MG
1 TABLET ORAL
COMMUNITY
Start: 2023-04-26

## 2023-05-05 RX ORDER — LACTULOSE 10 G/15ML
30 SOLUTION ORAL; RECTAL
COMMUNITY
Start: 2023-04-18

## 2023-05-05 NOTE — PATIENT INSTRUCTIONS
NV in 4 weeks - bring meds and BP log    Carotid ultra sound - Call [phone] to schedule    Follow up in 4 months

## 2023-05-05 NOTE — PROGRESS NOTES
05/05/2023 9:48 AM    Subjective:     CHIEF COMPLAINT:   Chief Complaint   Patient presents with    Follow-up     4 mos f/u denies cardiac targets                           HPI:    Ms. Rosette Madrid is a 77 y.o. female.      Today the patient comes for a follow-up appointment.    CP:  The patient has no chest discomfort.      SOB:  The patient denies shortness of breath Has BLAS    EDEMA:  The patient has edema.  Has pre-tibial edema      ORTHOPNEA:  The patient denies orthopnea.  No PND.      SYNCOPE:  The patient denies near syncope.  No syncope.   Denies getting lightheaded.    PALPITATIONS:  The patient has no palpitations.    LEVEL OF EXERTION:  The patient does ADL.  The patient has symptoms with this level of exertion.  The patient's level of exertion is limited.  METS:  The patient does the following activities:    wash dishes (2 METS)    DATA FOR RCRI:  The patient:   Denies any upcoming procedure/surgery  Has CAD.  (MI, Abnormal stress test, Angina, Use of NTG, MI on EKG)   Has CHF  Has TIA or CVA.  Is being treated with insulin.   Creatinine is > 2 mg/dL.   Lab Results   Component Value Date    CREATININE 4.47 (H) 04/05/2023     The patient's RCRI is 3 or >3.  Based on this score the patient's 30-day risk of death, MI, or cardiac arrest with surgery is 15.0%.    Past Medical History    Patient Active Problem List   Diagnosis    Chronic heart failure with preserved ejection fraction    Cigarette nicotine dependence without complication    Coronary artery disease involving coronary bypass graft of native heart with unstable angina pectoris    Chronic obstructive pulmonary disease    HTN (hypertension), benign    Mixed hyperlipidemia    Type 2 diabetes mellitus with chronic kidney disease on chronic dialysis, with long-term current use of insulin    ESRD (end stage renal disease) on dialysis    Depressive disorder    Class 1 obesity due to excess calories with serious comorbidity and body mass index (BMI)  of 34.0 to 34.9 in adult    Vitamin D deficiency    Coronary artery disease involving native coronary artery of native heart without angina pectoris    Tobacco use    Acute anemia    New onset a-fib    Anemia due to chronic kidney disease, on chronic dialysis    Hypotension    Melena    Postoperative hypothyroidism    GI bleed       Surgical History    Past Surgical History:   Procedure Laterality Date    AV FISTULA PLACEMENT Left     CARDIAC CATHETERIZATION      COLONOSCOPY      CORONARY ARTERY BYPASS GRAFT      EGD, WITH HEMORRHAGE CONTROL  3/24/2023    Procedure: EGD,WITH HEMORRHAGE CONTROL;  Surgeon: Go Le MD;  Location: [facility] ENDOSCOPY;  Service: Gastroenterology;;    EGD, WITH HEMORRHAGE CONTROL  4/6/2023    Procedure: EGD,WITH HEMORRHAGE CONTROL;  Surgeon: Ayden Francois MD;  Location: [facility] ENDOSCOPY;  Service: Gastroenterology;;    ESOPHAGOGASTRODUODENOSCOPY      ESOPHAGOGASTRODUODENOSCOPY N/A 2/17/2023    Procedure: EGD +/- VCE PLACEMENT;  Surgeon: Morgan Gardner MD;  Location: [facility] ENDOSCOPY;  Service: Gastroenterology;  Laterality: N/A;    ESOPHAGOGASTRODUODENOSCOPY N/A 3/24/2023    Procedure: EGD;  Surgeon: Go Le MD;  Location: [facility] ENDOSCOPY;  Service: Gastroenterology;  Laterality: N/A;  with push    ESOPHAGOGASTRODUODENOSCOPY N/A 4/6/2023    Procedure: EGD;  Surgeon: Ayden Francois MD;  Location: [facility] ENDOSCOPY;  Service: Gastroenterology;  Laterality: N/A;  with push    POLYPECTOMY      SMALL BOWEL ENTEROSCOPY Left 1/20/2023    Procedure: ENTEROSCOPY;  Surgeon: Lexi Scales MD;  Location: [facility] ENDOSCOPY;  Service: Gastroenterology;  Laterality: Left;    THYROIDECTOMY      TUBAL LIGATION         Social History     Socioeconomic History    Marital status:     Number of children: 6   Tobacco Use    Smoking status: Every Day     Packs/day: 0.25     Types: Cigarettes    Smokeless tobacco: Never    Tobacco comments:     Smokes 4  cigarettes a day   Substance and Sexual Activity    Alcohol use: Yes     Comment: 1 glass every 2-3 month    Drug use: Not Currently    Sexual activity: Not Currently     Social Determinants of Health     Financial Resource Strain: Low Risk     Difficulty of Paying Living Expenses: Not hard at all   Food Insecurity: No Food Insecurity    Worried About Running Out of Food in the Last Year: Never true    Ran Out of Food in the Last Year: Never true   Transportation Needs: No Transportation Needs    Lack of Transportation (Medical): No    Lack of Transportation (Non-Medical): No   Physical Activity: Inactive    Days of Exercise per Week: 0 days    Minutes of Exercise per Session: 0 min   Stress: No Stress Concern Present    Feeling of Stress : Not at all   Social Connections: Moderately Isolated    Frequency of Communication with Friends and Family: More than three times a week    Frequency of Social Gatherings with Friends and Family: More than three times a week    Attends Sabianist Services: More than 4 times per year    Active Member of Clubs or Organizations: No    Attends Club or Organization Meetings: Never    Marital Status:    Housing Stability: Low Risk     Unable to Pay for Housing in the Last Year: No    Number of Places Lived in the Last Year: 1    Unstable Housing in the Last Year: No       Family History   Problem Relation Age of Onset    Hypertension Mother     Hypertension Father     Hypertension Sister     Hypertension Sister      Review of patient's allergies indicates:   Allergen Reactions    Lisinopril Swelling    Azithromycin      Other reaction(s): tongue/facial swelling    Baclofen      Other reaction(s): tongue/facial swelling    Doxycycline      Other reaction(s): Angioedema       Current Medications    Current Outpatient Medications   Medication Instructions    albuterol (VENTOLIN HFA) 90 mcg/actuation inhaler 2 puffs, Inhalation, Every 6 hours PRN, Rescue    albuterol-ipratropium  "(DUO-NEB) 2.5 mg-0.5 mg/3 mL nebulizer solution 3 mLs, Nebulization, Every 6 hours PRN, Rescue    apixaban (ELIQUIS) 5 mg, Oral, 2 times daily    aspirin (ECOTRIN) 81 mg, Oral, Daily    BASAGLAR KWIKPEN U-100 INSULIN 30 Units, Subcutaneous, Daily    BenadryL 50 mg, Oral, Nightly    carvediloL (COREG) 6.25 mg, Oral, 2 times daily    clonazePAM (KLONOPIN) 0.5 mg, Oral, Nightly    DIALYVITE 100-1 mg Tab 1 tablet, Oral, Daily    DULoxetine (CYMBALTA) 30 mg, Oral, Daily    ergocalciferol (ERGOCALCIFEROL) 50,000 Units, Oral, Every 7 days    ferrous sulfate (FEOSOL) 325 mg, Oral, Daily    fluticasone furoate-vilanteroL (BREO) 100-25 mcg/dose diskus inhaler 1 puff, Inhalation, Daily    furosemide (LASIX) 40 mg, Oral, Daily    glipiZIDE (GLUCOTROL) 10 mg, Oral, With breakfast    INVEGA SUSTENNA 156 mg/mL Syrg injection Intramuscular    L-METHYLFOLATE 15 mg Tab 1 tablet, Oral    lactulose (CHRONULAC) 10 gram/15 mL solution 30 mLs, Oral    levothyroxine (SYNTHROID) 125 mcg, Oral, Before breakfast    ONETOUCH DELICA PLUS LANCET 30 gauge Misc 1 lancet, Other, Daily    ONETOUCH ULTRA TEST Strp 1 strip, Other, Daily    pantoprazole (PROTONIX) 40 mg, Oral, 2 times daily    pen needle, diabetic 2 Units, Misc.(Non-Drug; Combo Route), 2 times daily, Patient to check blood sugars twice daily (morning and night)    rosuvastatin (CRESTOR) 40 mg, Oral, Daily    sevelamer carbonate (RENVELA) 1,600 mg, Oral, 3 times daily    SPIRIVA WITH HANDIHALER 18 mcg, Inhalation, Daily       ROS:     GEN   No fever  No weakness  RESP  No SOB  Occas wheezing  SKIN  No pruritus  No rash  CARD  No CP  No palpitations        VASC  No cyanosis  No claudication  HEM   No adenopathy  + easy bruising   GI  No heart burn  No melena    NEURO  No dizziness  No syncope    Objective:     PE:  Blood pressure (!) 166/71, pulse 60, temperature 98.1 °F (36.7 °C), resp. rate 18, height 5' 4" (1.626 m), weight 89.9 kg (198 lb 3.2 oz), SpO2 95 %.     BP Readings from Last " 3 Encounters:   05/05/23 (!) 166/71   04/07/23 119/65   03/25/23 (!) 145/83        Pulse Readings from Last 3 Encounters:   05/05/23 60   04/07/23 65   03/25/23 84        Temp Readings from Last 3 Encounters:   05/05/23 98.1 °F (36.7 °C)   04/07/23 97.9 °F (36.6 °C) (Oral)   03/25/23 98.1 °F (36.7 °C) (Oral)       Wt Readings from Last 3 Encounters:   05/05/23 89.9 kg (198 lb 3.2 oz)   04/06/23 95.2 kg (209 lb 14.1 oz)   03/25/23 95.2 kg (209 lb 14.1 oz)         GENERAL:  Uses walker  CONSTITUTIONAL:  No acute distress.  Not ill appearing.  NECK:  No cervical adenopathy.  Left carotid bruit.  CARDIOVASCULAR:  Normal rate.  Regular rhythm.  No murmur.  PULMONARY:  No respiratory distress.  No wheezing.  No crackles.  ABDOMINAL:  No distention.  No tenderness.  MUSCULOSKELETAL:  No deformity.  1+ edema  SKIN:  No bruising.  No rash.  NEUROLOGICAL:  Oriented x 3.  No weakness.                                                                                                                                                                                                                                                                                                                                                                                                                                                                               CARDIAC TESTING:  Echocardiogram  Results for orders placed during the hospital encounter of 03/22/23    Echo    Interpretation Summary  · Concentric hypertrophy and normal systolic function.  · The estimated ejection fraction is 60%.  · Indeterminate left ventricular diastolic function.  · Mild-to-moderate mitral regurgitation.  · Normal right ventricular size.  · Mild tricuspid regurgitation.      Stress Test  No results found for this or any previous visit.      Coronary Angiogram  No results found for this or any previous visit.     Last BMP  Lab Results   Component Value Date      (L) 04/05/2023    K 4.0 04/05/2023    CO2 25 04/05/2023    BUN 31.9 (H) 04/05/2023    CREATININE 4.47 (H) 04/05/2023    CALCIUM 8.7 04/05/2023    EGFRNORACEVR 10 04/05/2023       Lab Results   Component Value Date    CREATININE 4.47 (H) 04/05/2023    CREATININE 3.73 (H) 03/25/2023    CREATININE 3.53 (H) 03/24/2023     Last CBC     Lab Results   Component Value Date    WBC 7.8 04/07/2023    HGB 8.4 (L) 04/07/2023    HCT 27.0 (L) 04/07/2023    MCV 94.1 (H) 04/07/2023     04/07/2023           Last lipids    Lab Results   Component Value Date    CHOL 119 02/27/2023    CHOL 117 09/30/2022    CHOL 117 12/15/2021    HDL 44 02/27/2023    HDL 42 09/30/2022    HDL 47 12/15/2021    LDL 56.00 02/27/2023    LDL 62.00 09/30/2022    LDL 57.00 12/15/2021    TRIG 95 02/27/2023    TRIG 66 09/30/2022    TRIG 64 12/15/2021    TOTALCHOLEST 3 02/27/2023    TOTALCHOLEST 3 09/30/2022    TOTALCHOLEST 2 12/15/2021       LFT   No components found for: LFT    Assessment:     1. Chronic heart failure with preserved ejection fraction    2. Coronary artery disease involving coronary bypass graft of native heart with unstable angina pectoris    3. ESRD (end stage renal disease) on dialysis    4. Class 1 obesity due to excess calories with serious comorbidity and body mass index (BMI) of 34.0 to 34.9 in adult    5. Type 2 diabetes mellitus with chronic kidney disease on chronic dialysis, with long-term current use of insulin    6. Tobacco use    7. Mixed hyperlipidemia    8. HTN (hypertension), benign      10 Year Cardiovascular Risk:  The ASCVD Risk score (Luis LANDRUM, et al., 2019) failed to calculate for the following reasons:    The valid total cholesterol range is 130 to 320 mg/dL    BMI:  Body mass index is 34.02 kg/m².  Patient is obese (BMI 30-34.9)  Tobacco:  smokes 3 cig / day  Alcohol:  social  Substance abuse:  none   Last PCP visit:  3/1/2023    Plan:     Medications:  refills are done by PCP    Smoking cessation education:     Tobacco:  encouraged to quit   Smoking cessation education provided.      Diet:  Avoid processed foods and drinks, sugar, salt, fried/greasy foods, and fast foods    Recommend 10 servings of fruits and vegetables per day    Exercise:  activities as tolerated    Nurse visit for BP check    Work up  Carotid u/s due to left bruit    Follow up:  4 months    Josh Dubon MD  Cardiology Attending     Future Appointments   Date Time Provider Department Center   6/10/2023  8:30 AM SATURDAY PROVIDER, [facility] INTERNAL MEDICINE RESIDENTS [facility] MARC Ruiz   9/1/2023  9:00 AM JOSEFINA Lord [facility] ROGER Ruiz

## 2023-05-26 ENCOUNTER — TELEPHONE (OUTPATIENT)
Dept: INTERNAL MEDICINE | Facility: CLINIC | Age: 78
End: 2023-05-26
Payer: MEDICARE

## 2023-05-26 NOTE — TELEPHONE ENCOUNTER
Returned call. She informed me that had a message on her voicemail concerning her diabetic shoes - to go Saturday on the seventh floor.   I informed her that she is scheduled for Saturday clinic for her diabetic shoes  Diana 10,2023 at 0830. Instructed to come to entrance 1 to sign in just like her regular clinic visits.  She voiced understanding.

## 2023-06-07 ENCOUNTER — HOSPITAL ENCOUNTER (OUTPATIENT)
Dept: RADIOLOGY | Facility: HOSPITAL | Age: 78
Discharge: HOME | End: 2023-06-07
Attending: INTERNAL MEDICINE
Payer: MEDICARE

## 2023-06-07 DIAGNOSIS — R09.89 CAROTID BRUIT, UNSPECIFIED LATERALITY: ICD-10-CM

## 2023-06-07 DIAGNOSIS — I65.23 CAROTID STENOSIS, ASYMPTOMATIC, BILATERAL: Primary | ICD-10-CM

## 2023-06-07 LAB
LEFT CCA DIST DIAS: 12 CM/S
LEFT CCA DIST SYS: 66 CM/S
LEFT CCA PROX DIAS: 11 CM/S
LEFT CCA PROX SYS: 129 CM/S
LEFT ECA DIAS: 2 CM/S
LEFT ECA SYS: 82 CM/S
LEFT ICA DIST DIAS: 13 CM/S
LEFT ICA DIST SYS: 54 CM/S
LEFT ICA MID DIAS: 13 CM/S
LEFT ICA MID SYS: 45 CM/S
LEFT ICA PROX DIAS: 10 CM/S
LEFT ICA PROX SYS: 74 CM/S
LEFT VERTEBRAL DIAS: 8 CM/S
LEFT VERTEBRAL SYS: 36 CM/S
OHS CV CAROTID RIGHT ICA EDV HIGHEST: 52
OHS CV CAROTID ULTRASOUND LEFT ICA/CCA RATIO: 1.12
OHS CV CAROTID ULTRASOUND RIGHT ICA/CCA RATIO: 3.7
OHS CV PV CAROTID LEFT HIGHEST CCA: 129
OHS CV PV CAROTID LEFT HIGHEST ICA: 74
OHS CV PV CAROTID RIGHT HIGHEST CCA: 88
OHS CV PV CAROTID RIGHT HIGHEST ICA: 226
OHS CV US CAROTID LEFT HIGHEST EDV: 13
RIGHT CCA DIST DIAS: 8 CM/S
RIGHT CCA DIST SYS: 61 CM/S
RIGHT CCA PROX DIAS: 11 CM/S
RIGHT CCA PROX SYS: 88 CM/S
RIGHT ECA DIAS: 5 CM/S
RIGHT ECA SYS: 77 CM/S
RIGHT ICA DIST DIAS: 25 CM/S
RIGHT ICA DIST SYS: 146 CM/S
RIGHT ICA MID DIAS: 25 CM/S
RIGHT ICA MID SYS: 156 CM/S
RIGHT ICA PROX DIAS: 52 CM/S
RIGHT ICA PROX SYS: 226 CM/S
RIGHT VERTEBRAL DIAS: 8 CM/S
RIGHT VERTEBRAL SYS: 42 CM/S

## 2023-06-07 PROCEDURE — 93880 EXTRACRANIAL BILAT STUDY: CPT

## 2023-06-08 ENCOUNTER — TELEPHONE (OUTPATIENT)
Dept: CARDIOLOGY | Facility: CLINIC | Age: 78
End: 2023-06-08
Payer: MEDICARE

## 2023-06-08 NOTE — TELEPHONE ENCOUNTER
As requested by Dr. Dubon, Message given to her son, Jah Le, to expect a call from Vascular clinic for an appointment due to abnormal results of carotid US. He verbalizes understanding.

## 2023-06-15 ENCOUNTER — HOSPITAL ENCOUNTER (INPATIENT)
Facility: HOSPITAL | Age: 78
LOS: 6 days | Discharge: HOME | DRG: 377 | End: 2023-06-21
Attending: STUDENT IN AN ORGANIZED HEALTH CARE EDUCATION/TRAINING PROGRAM | Admitting: INTERNAL MEDICINE
Payer: MEDICARE

## 2023-06-15 DIAGNOSIS — E89.0 POSTOPERATIVE HYPOTHYROIDISM: Chronic | ICD-10-CM

## 2023-06-15 DIAGNOSIS — D62 ACUTE BLOOD LOSS ANEMIA: Primary | ICD-10-CM

## 2023-06-15 DIAGNOSIS — K92.2 GI BLEED: ICD-10-CM

## 2023-06-15 DIAGNOSIS — D64.9 SYMPTOMATIC ANEMIA: ICD-10-CM

## 2023-06-15 DIAGNOSIS — R07.9 CHEST PAIN: ICD-10-CM

## 2023-06-15 LAB
ALBUMIN SERPL-MCNC: 3.1 G/DL (ref 3.4–4.8)
ALBUMIN/GLOB SERPL: 1.1 RATIO (ref 1.1–2)
ALP SERPL-CCNC: 113 UNIT/L (ref 40–150)
ALT SERPL-CCNC: 46 UNIT/L (ref 0–55)
AST SERPL-CCNC: 51 UNIT/L (ref 5–34)
BASOPHILS # BLD AUTO: 0.01 X10(3)/MCL
BASOPHILS NFR BLD AUTO: 0.1 %
BILIRUBIN DIRECT+TOT PNL SERPL-MCNC: 0.3 MG/DL
BUN SERPL-MCNC: 45 MG/DL (ref 9.8–20.1)
CALCIUM SERPL-MCNC: 8.6 MG/DL (ref 8.4–10.2)
CHLORIDE SERPL-SCNC: 100 MMOL/L (ref 98–107)
CO2 SERPL-SCNC: 26 MMOL/L (ref 23–31)
CREAT SERPL-MCNC: 5.2 MG/DL (ref 0.55–1.02)
EOSINOPHIL # BLD AUTO: 0.08 X10(3)/MCL (ref 0–0.9)
EOSINOPHIL NFR BLD AUTO: 0.9 %
ERYTHROCYTE [DISTWIDTH] IN BLOOD BY AUTOMATED COUNT: 21.2 % (ref 11.5–17)
FERRITIN SERPL-MCNC: 3095.41 NG/ML (ref 4.63–204)
GFR SERPLBLD CREATININE-BSD FMLA CKD-EPI: 8 MLS/MIN/1.73/M2
GLOBULIN SER-MCNC: 2.8 GM/DL (ref 2.4–3.5)
GLUCOSE SERPL-MCNC: 227 MG/DL (ref 82–115)
GROUP & RH: NORMAL
HCT VFR BLD AUTO: 16.3 % (ref 37–47)
HGB BLD-MCNC: 4.9 G/DL (ref 12–16)
IMM GRANULOCYTES # BLD AUTO: 0.05 X10(3)/MCL (ref 0–0.04)
IMM GRANULOCYTES NFR BLD AUTO: 0.6 %
INDIRECT COOMBS GEL: NORMAL
IRON SATN MFR SERPL: 30 % (ref 20–50)
IRON SERPL-MCNC: 69 UG/DL (ref 50–170)
LYMPHOCYTES # BLD AUTO: 0.85 X10(3)/MCL (ref 0.6–4.6)
LYMPHOCYTES NFR BLD AUTO: 9.5 %
MCH RBC QN AUTO: 27.7 PG (ref 27–31)
MCHC RBC AUTO-ENTMCNC: 30.1 G/DL (ref 33–36)
MCV RBC AUTO: 92.1 FL (ref 80–94)
MONOCYTES # BLD AUTO: 0.47 X10(3)/MCL (ref 0.1–1.3)
MONOCYTES NFR BLD AUTO: 5.2 %
NEUTROPHILS # BLD AUTO: 7.53 X10(3)/MCL (ref 2.1–9.2)
NEUTROPHILS NFR BLD AUTO: 83.7 %
NRBC BLD AUTO-RTO: 0.3 %
PLATELET # BLD AUTO: 186 X10(3)/MCL (ref 130–400)
PMV BLD AUTO: 11.1 FL (ref 7.4–10.4)
POCT GLUCOSE: 123 MG/DL (ref 70–110)
POTASSIUM SERPL-SCNC: 4.1 MMOL/L (ref 3.5–5.1)
PROT SERPL-MCNC: 5.9 GM/DL (ref 5.8–7.6)
RBC # BLD AUTO: 1.77 X10(6)/MCL (ref 4.2–5.4)
SODIUM SERPL-SCNC: 135 MMOL/L (ref 136–145)
SPECIMEN OUTDATE: NORMAL
TIBC SERPL-MCNC: 158 UG/DL (ref 70–310)
TIBC SERPL-MCNC: 227 UG/DL (ref 250–450)
TRANSFERRIN SERPL-MCNC: 196 MG/DL (ref 173–360)
VIT B12 SERPL-MCNC: 1094 PG/ML (ref 213–816)
WBC # SPEC AUTO: 8.99 X10(3)/MCL (ref 4.5–11.5)

## 2023-06-15 PROCEDURE — 36430 TRANSFUSION BLD/BLD COMPNT: CPT

## 2023-06-15 PROCEDURE — 82607 VITAMIN B-12: CPT | Performed by: INTERNAL MEDICINE

## 2023-06-15 PROCEDURE — 86923 COMPATIBILITY TEST ELECTRIC: CPT | Mod: 91 | Performed by: STUDENT IN AN ORGANIZED HEALTH CARE EDUCATION/TRAINING PROGRAM

## 2023-06-15 PROCEDURE — 85025 COMPLETE CBC W/AUTO DIFF WBC: CPT | Performed by: PHYSICIAN ASSISTANT

## 2023-06-15 PROCEDURE — 83550 IRON BINDING TEST: CPT | Performed by: INTERNAL MEDICINE

## 2023-06-15 PROCEDURE — 82962 GLUCOSE BLOOD TEST: CPT

## 2023-06-15 PROCEDURE — 11000001 HC ACUTE MED/SURG PRIVATE ROOM

## 2023-06-15 PROCEDURE — 99285 EMERGENCY DEPT VISIT HI MDM: CPT | Mod: 25

## 2023-06-15 PROCEDURE — 86900 BLOOD TYPING SEROLOGIC ABO: CPT | Performed by: PHYSICIAN ASSISTANT

## 2023-06-15 PROCEDURE — P9016 RBC LEUKOCYTES REDUCED: HCPCS | Performed by: STUDENT IN AN ORGANIZED HEALTH CARE EDUCATION/TRAINING PROGRAM

## 2023-06-15 PROCEDURE — C9113 INJ PANTOPRAZOLE SODIUM, VIA: HCPCS | Performed by: STUDENT IN AN ORGANIZED HEALTH CARE EDUCATION/TRAINING PROGRAM

## 2023-06-15 PROCEDURE — 82728 ASSAY OF FERRITIN: CPT | Performed by: INTERNAL MEDICINE

## 2023-06-15 PROCEDURE — 21400001 HC TELEMETRY ROOM

## 2023-06-15 PROCEDURE — 96374 THER/PROPH/DIAG INJ IV PUSH: CPT

## 2023-06-15 PROCEDURE — 63600175 PHARM REV CODE 636 W HCPCS: Performed by: STUDENT IN AN ORGANIZED HEALTH CARE EDUCATION/TRAINING PROGRAM

## 2023-06-15 PROCEDURE — 80053 COMPREHEN METABOLIC PANEL: CPT | Performed by: PHYSICIAN ASSISTANT

## 2023-06-15 RX ORDER — CLONAZEPAM 0.5 MG/1
0.5 TABLET ORAL NIGHTLY
Status: DISCONTINUED | OUTPATIENT
Start: 2023-06-16 | End: 2023-06-21 | Stop reason: HOSPADM

## 2023-06-15 RX ORDER — FUROSEMIDE 40 MG/1
40 TABLET ORAL DAILY
Status: DISCONTINUED | OUTPATIENT
Start: 2023-06-16 | End: 2023-06-21 | Stop reason: HOSPADM

## 2023-06-15 RX ORDER — SODIUM CHLORIDE 0.9 % (FLUSH) 0.9 %
10 SYRINGE (ML) INJECTION
Status: DISCONTINUED | OUTPATIENT
Start: 2023-06-15 | End: 2023-06-21 | Stop reason: HOSPADM

## 2023-06-15 RX ORDER — HYDROCODONE BITARTRATE AND ACETAMINOPHEN 500; 5 MG/1; MG/1
TABLET ORAL
Status: DISCONTINUED | OUTPATIENT
Start: 2023-06-15 | End: 2023-06-21 | Stop reason: HOSPADM

## 2023-06-15 RX ORDER — DIPHENHYDRAMINE HCL 25 MG
50 CAPSULE ORAL NIGHTLY PRN
Status: DISCONTINUED | OUTPATIENT
Start: 2023-06-15 | End: 2023-06-21 | Stop reason: HOSPADM

## 2023-06-15 RX ORDER — IPRATROPIUM BROMIDE AND ALBUTEROL SULFATE 2.5; .5 MG/3ML; MG/3ML
3 SOLUTION RESPIRATORY (INHALATION) EVERY 6 HOURS PRN
Status: DISCONTINUED | OUTPATIENT
Start: 2023-06-15 | End: 2023-06-21 | Stop reason: HOSPADM

## 2023-06-15 RX ORDER — FLUTICASONE FUROATE AND VILANTEROL 100; 25 UG/1; UG/1
1 POWDER RESPIRATORY (INHALATION) DAILY
Status: DISCONTINUED | OUTPATIENT
Start: 2023-06-16 | End: 2023-06-21 | Stop reason: HOSPADM

## 2023-06-15 RX ORDER — SEVELAMER CARBONATE 800 MG/1
1600 TABLET, FILM COATED ORAL
Status: DISCONTINUED | OUTPATIENT
Start: 2023-06-16 | End: 2023-06-21 | Stop reason: HOSPADM

## 2023-06-15 RX ORDER — CARVEDILOL 3.12 MG/1
6.25 TABLET ORAL 2 TIMES DAILY
Status: DISCONTINUED | OUTPATIENT
Start: 2023-06-15 | End: 2023-06-21 | Stop reason: HOSPADM

## 2023-06-15 RX ORDER — ACETAMINOPHEN 500 MG
1000 TABLET ORAL EVERY 6 HOURS PRN
Status: DISCONTINUED | OUTPATIENT
Start: 2023-06-15 | End: 2023-06-21 | Stop reason: HOSPADM

## 2023-06-15 RX ORDER — HYDRALAZINE HYDROCHLORIDE 20 MG/ML
10 INJECTION INTRAMUSCULAR; INTRAVENOUS EVERY 4 HOURS PRN
Status: DISCONTINUED | OUTPATIENT
Start: 2023-06-15 | End: 2023-06-21 | Stop reason: HOSPADM

## 2023-06-15 RX ORDER — TALC
6 POWDER (GRAM) TOPICAL NIGHTLY PRN
Status: DISCONTINUED | OUTPATIENT
Start: 2023-06-15 | End: 2023-06-21 | Stop reason: HOSPADM

## 2023-06-15 RX ORDER — PANTOPRAZOLE SODIUM 40 MG/10ML
80 INJECTION, POWDER, LYOPHILIZED, FOR SOLUTION INTRAVENOUS
Status: COMPLETED | OUTPATIENT
Start: 2023-06-15 | End: 2023-06-15

## 2023-06-15 RX ORDER — CLONIDINE HYDROCHLORIDE 0.2 MG/1
0.2 TABLET ORAL 3 TIMES DAILY PRN
Status: DISCONTINUED | OUTPATIENT
Start: 2023-06-15 | End: 2023-06-21 | Stop reason: HOSPADM

## 2023-06-15 RX ORDER — ACETAMINOPHEN 325 MG/1
650 TABLET ORAL EVERY 4 HOURS PRN
Status: DISCONTINUED | OUTPATIENT
Start: 2023-06-15 | End: 2023-06-21 | Stop reason: HOSPADM

## 2023-06-15 RX ORDER — PANTOPRAZOLE SODIUM 40 MG/10ML
40 INJECTION, POWDER, LYOPHILIZED, FOR SOLUTION INTRAVENOUS
Status: DISCONTINUED | OUTPATIENT
Start: 2023-06-16 | End: 2023-06-21 | Stop reason: HOSPADM

## 2023-06-15 RX ORDER — MAG HYDROX/ALUMINUM HYD/SIMETH 200-200-20
30 SUSPENSION, ORAL (FINAL DOSE FORM) ORAL 4 TIMES DAILY PRN
Status: DISCONTINUED | OUTPATIENT
Start: 2023-06-15 | End: 2023-06-21 | Stop reason: HOSPADM

## 2023-06-15 RX ORDER — POLYETHYLENE GLYCOL 3350 17 G/17G
17 POWDER, FOR SOLUTION ORAL 2 TIMES DAILY PRN
Status: DISCONTINUED | OUTPATIENT
Start: 2023-06-15 | End: 2023-06-21 | Stop reason: HOSPADM

## 2023-06-15 RX ORDER — DULOXETIN HYDROCHLORIDE 30 MG/1
30 CAPSULE, DELAYED RELEASE ORAL DAILY
Status: DISCONTINUED | OUTPATIENT
Start: 2023-06-16 | End: 2023-06-21 | Stop reason: HOSPADM

## 2023-06-15 RX ORDER — LEVOTHYROXINE SODIUM 125 UG/1
125 TABLET ORAL
Status: DISCONTINUED | OUTPATIENT
Start: 2023-06-16 | End: 2023-06-21 | Stop reason: HOSPADM

## 2023-06-15 RX ORDER — INSULIN ASPART 100 [IU]/ML
1-10 INJECTION, SOLUTION INTRAVENOUS; SUBCUTANEOUS EVERY 6 HOURS PRN
Status: DISCONTINUED | OUTPATIENT
Start: 2023-06-15 | End: 2023-06-21 | Stop reason: HOSPADM

## 2023-06-15 RX ORDER — GLUCAGON 1 MG
1 KIT INJECTION
Status: DISCONTINUED | OUTPATIENT
Start: 2023-06-15 | End: 2023-06-21 | Stop reason: HOSPADM

## 2023-06-15 RX ORDER — ATORVASTATIN CALCIUM 40 MG/1
40 TABLET, FILM COATED ORAL DAILY
Status: DISCONTINUED | OUTPATIENT
Start: 2023-06-16 | End: 2023-06-21 | Stop reason: HOSPADM

## 2023-06-15 RX ORDER — AMOXICILLIN 250 MG
2 CAPSULE ORAL 2 TIMES DAILY PRN
Status: DISCONTINUED | OUTPATIENT
Start: 2023-06-15 | End: 2023-06-21 | Stop reason: HOSPADM

## 2023-06-15 RX ORDER — ONDANSETRON 2 MG/ML
4 INJECTION INTRAMUSCULAR; INTRAVENOUS EVERY 4 HOURS PRN
Status: DISCONTINUED | OUTPATIENT
Start: 2023-06-15 | End: 2023-06-21 | Stop reason: HOSPADM

## 2023-06-15 RX ADMIN — PANTOPRAZOLE SODIUM 80 MG: 40 INJECTION, POWDER, LYOPHILIZED, FOR SOLUTION INTRAVENOUS at 06:06

## 2023-06-15 NOTE — FIRST PROVIDER EVALUATION
Medical screening examination initiated.  I have conducted a focused provider triage encounter, findings are as follows:    Chief Complaint   Patient presents with    abnormal labs     Pt presents with abnormal labs from dialysis center. Report shows H/H of 5/18. States that she has hx of GI bleed. Denies blood in stool. Denies SOB. Denies weakness. Denies dizziness.         Brief history of present illness:  77 y.o. female presents to the E.D. with c/o low h/h from dialysis. She reports she feels slightly weak. Dialysis on Tu,Th, Sat. Patient denies any blood stools.     Vitals:    06/15/23 1340   BP: 109/62   Pulse: 60   Resp: 18   Temp: 98.3 °F (36.8 °C)   TempSrc: Oral   SpO2: 100%   Weight: 113.4 kg (250 lb)       Pertinent physical exam:  Awake, Alert, Oriented, Non labored breathing       Brief workup plan:  labs, type and screen.     Preliminary workup initiated; this workup will be continued and followed by the physician or advanced practice provider that is assigned to the patient when roomed.

## 2023-06-15 NOTE — Clinical Note
Diagnosis: GI bleed [314654]   Admitting Provider:: DORIS MELENDEZ [[id number]]   Future Attending Provider: DORIS MELENDEZ [[id number]]   Reason for IP Medical Treatment  (Clinical interventions that can only be accomplished in the IP setting? ) :: GI bleed, anemia, dialysis   I certify that Inpatient services for greater than or equal to 2 midnights are medically necessary:: Yes   Plans for Post-Acute care--if anticipated (pick the single best option):: A. No post acute care anticipated at this time

## 2023-06-15 NOTE — ED PROVIDER NOTES
Encounter Date: 6/15/2023    SCRIBE #1 NOTE: I, Thomas Johnson, am scribing for, and in the presence of,  Raghu Hernandez MD. I have scribed the following portions of the note - Other sections scribed: HPi,ROS,PE.     History     Chief Complaint   Patient presents with    abnormal labs     Pt presents with abnormal labs from dialysis center. Report shows H/H of 5/18. States that she has hx of GI bleed. Denies blood in stool. Denies SOB. Denies weakness. Denies dizziness.     Patient is a77 y/o female with PMHx of ESRD (T,Th,S), DM, HLD, HTN, and thyroid disease presents to ED from dialysis center for abnormal labs today. Pt reports the dialysis center stated she has a low blood count and was sent to ED. Pt denies bleeding, abdominal pain, and thinners. Pt states she didn't do her dialysis run today.     Nephrologist: Dr. Annalee Bourgeois MD    The history is provided by the patient. No  was used.   General Illness   The current episode started just prior to arrival. The problem occurs rarely. The problem has been unchanged. The pain is at a severity of 0/10. Nothing relieves the symptoms. Nothing aggravates the symptoms. Pertinent negatives include no fever, no abdominal pain, no sore throat, no shortness of breath and no rash.   Review of patient's allergies indicates:   Allergen Reactions    Lisinopril Swelling    Azithromycin      Other reaction(s): tongue/facial swelling    Baclofen      Other reaction(s): tongue/facial swelling    Doxycycline      Other reaction(s): Angioedema     Past Medical History:   Diagnosis Date    CKD (chronic kidney disease) requiring chronic dialysis     COPD (chronic obstructive pulmonary disease)     Diabetes mellitus     Hyperlipidemia     Hypertension     Renal insufficiency     Thyroid disease      Past Surgical History:   Procedure Laterality Date    AV FISTULA PLACEMENT Left     CARDIAC CATHETERIZATION      COLONOSCOPY      CORONARY ARTERY BYPASS GRAFT       EGD, WITH HEMORRHAGE CONTROL  3/24/2023    Procedure: EGD,WITH HEMORRHAGE CONTROL;  Surgeon: Go Le MD;  Location: [facility] ENDOSCOPY;  Service: Gastroenterology;;    EGD, WITH HEMORRHAGE CONTROL  4/6/2023    Procedure: EGD,WITH HEMORRHAGE CONTROL;  Surgeon: Ayden Francois MD;  Location: [facility] ENDOSCOPY;  Service: Gastroenterology;;    ESOPHAGOGASTRODUODENOSCOPY      ESOPHAGOGASTRODUODENOSCOPY N/A 2/17/2023    Procedure: EGD +/- VCE PLACEMENT;  Surgeon: Morgan Gardner MD;  Location: [facility] ENDOSCOPY;  Service: Gastroenterology;  Laterality: N/A;    ESOPHAGOGASTRODUODENOSCOPY N/A 3/24/2023    Procedure: EGD;  Surgeon: Go Le MD;  Location: [facility] ENDOSCOPY;  Service: Gastroenterology;  Laterality: N/A;  with push    ESOPHAGOGASTRODUODENOSCOPY N/A 4/6/2023    Procedure: EGD;  Surgeon: Ayden Francois MD;  Location: [facility] ENDOSCOPY;  Service: Gastroenterology;  Laterality: N/A;  with push    ESOPHAGOGASTRODUODENOSCOPY N/A 6/16/2023    Procedure: EGD W/ PUSH;  Surgeon: Morgan Gardner MD;  Location: [facility] ENDOSCOPY;  Service: Gastroenterology;  Laterality: N/A;    POLYPECTOMY      SMALL BOWEL ENTEROSCOPY Left 1/20/2023    Procedure: ENTEROSCOPY;  Surgeon: Lexi Scales MD;  Location: [facility] ENDOSCOPY;  Service: Gastroenterology;  Laterality: Left;    THYROIDECTOMY      TUBAL LIGATION       Family History   Problem Relation Age of Onset    Hypertension Mother     Hypertension Father     Hypertension Sister     Hypertension Sister      Social History     Tobacco Use    Smoking status: Every Day     Packs/day: 0.25     Types: Cigarettes    Smokeless tobacco: Never    Tobacco comments:     Smokes 4 cigarettes a day   Substance Use Topics    Alcohol use: Yes     Comment: 1 glass every 2-3 month    Drug use: Not Currently     Review of Systems   Constitutional:  Negative for fever.   HENT:  Negative for sore throat.    Eyes:  Negative for visual disturbance.    Respiratory:  Negative for shortness of breath.    Cardiovascular:  Negative for chest pain.   Gastrointestinal:  Negative for abdominal pain and anal bleeding.   Genitourinary:  Negative for dysuria.   Musculoskeletal:  Negative for joint swelling.   Skin:  Negative for rash.   Neurological:  Negative for weakness.   Psychiatric/Behavioral:  Negative for confusion.      Physical Exam     Initial Vitals [06/15/23 1340]   BP Pulse Resp Temp SpO2   109/62 60 18 98.3 °F (36.8 °C) 100 %      MAP       --         Physical Exam    Nursing note and vitals reviewed.  Constitutional: She appears well-developed and well-nourished. She is Obese .   HENT:   Head: Normocephalic and atraumatic.   Eyes: EOM are normal. Pupils are equal, round, and reactive to light.   Neck:   Normal range of motion.  Cardiovascular:  Normal rate, regular rhythm, normal heart sounds and intact distal pulses.           No murmur heard.  Fistula with good thrill   Pulmonary/Chest: Breath sounds normal. No respiratory distress. She has no wheezes. She has no rales.   Abdominal: Abdomen is soft. She exhibits no distension. There is no abdominal tenderness. There is no rebound.   Genitourinary:    Genitourinary Comments: melanotic stool. Hemoccult positive.  Female chaperone present.     Musculoskeletal:         General: No tenderness or edema. Normal range of motion.      Cervical back: Normal range of motion.     Neurological: She is alert. She has normal strength. No cranial nerve deficit. GCS score is 15. GCS eye subscore is 4. GCS verbal subscore is 5. GCS motor subscore is 6.   Skin: Skin is warm and dry. Capillary refill takes less than 2 seconds. No rash noted. No erythema.   Psychiatric: She has a normal mood and affect.       ED Course   Critical Care    Date/Time: 6/15/2023 8:00 PM  Performed by: Raghu Hernandez MD  Authorized by: Raghu Hernandez MD   Direct patient critical care time: 15 minutes  Additional history critical care time: 8  minutes  Ordering / reviewing critical care time: 6 minutes  Documentation critical care time: 5 minutes  Consulting other physicians critical care time: 5 minutes  Total critical care time (exclusive of procedural time) : 39 minutes  Critical care time was exclusive of separately billable procedures and treating other patients and teaching time.  Critical care was necessary to treat or prevent imminent or life-threatening deterioration of the following conditions: metabolic crisis, renal failure, cardiac failure and circulatory failure.  Critical care was time spent personally by me on the following activities: development of treatment plan with patient or surrogate, discussions with consultants, interpretation of cardiac output measurements, evaluation of patient's response to treatment, examination of patient, obtaining history from patient or surrogate, ordering and performing treatments and interventions, ordering and review of laboratory studies, ordering and review of radiographic studies, pulse oximetry, re-evaluation of patient's condition and review of old charts.      Labs Reviewed   COMPREHENSIVE METABOLIC PANEL - Abnormal; Notable for the following components:       Result Value    Sodium Level 135 (*)     Glucose Level 227 (*)     Blood Urea Nitrogen 45.0 (*)     Creatinine 5.20 (*)     Albumin Level 3.1 (*)     Aspartate Aminotransferase 51 (*)     All other components within normal limits   CBC WITH DIFFERENTIAL - Abnormal; Notable for the following components:    RBC 1.77 (*)     Hgb 4.9 (*)     Hct 16.3 (*)     MCHC 30.1 (*)     RDW 21.2 (*)     MPV 11.1 (*)     IG# 0.05 (*)     All other components within normal limits   IRON AND TIBC - Abnormal; Notable for the following components:    Iron Binding Capacity Total 227 (*)     All other components within normal limits   VITAMIN B12 - Abnormal; Notable for the following components:    Vitamin B12 Level 1,094 (*)     All other components within  normal limits   FERRITIN - Abnormal; Notable for the following components:    Ferritin Level 3,095.41 (*)     All other components within normal limits   POCT GLUCOSE - Abnormal; Notable for the following components:    POCT Glucose 123 (*)     All other components within normal limits   CBC W/ AUTO DIFFERENTIAL    Narrative:     The following orders were created for panel order CBC auto differential.  Procedure                               Abnormality         Status                     ---------                               -----------         ------                     CBC with Differential[[id number]]        Abnormal            Final result                 Please view results for these tests on the individual orders.   TYPE & SCREEN          Imaging Results    None          Medications   pantoprazole injection 80 mg (80 mg Intravenous Given 6/15/23 1802)   furosemide injection 40 mg (40 mg Intravenous Given 6/16/23 0130)   propofol (DIPRIVAN) 10 mg/mL IVP injection (  Override pull for Anesthesia 6/16/23 1159)   LIDOcaine (PF) 20 mg/mL (2%) 20 mg/mL (2 %) injection (  Override pull for Anesthesia 6/16/23 1159)   Medical Decision Making  Problems Addressed:  GI bleed: acute illness or injury that poses a threat to life or bodily functions  Symptomatic anemia: acute illness or injury that poses a threat to life or bodily functions    Amount and/or Complexity of Data Reviewed  Labs: ordered.    Risk  Parenteral controlled substances.  Decision regarding hospitalization.       Medical Decision Making:   History:   I obtained history from: someone other than patient.       <> Summary of History: Collateral from family at bedside.  Old Medical Records: I decided to obtain old medical records.  Old Records Summarized: records from clinic visits, records from previous admission(s) and records from another hospital.       <> Summary of Records: Reviewed old records, history of anemia.  ESRD on hemodialysis.  Initial  Assessment:   Anemia  Differential Diagnosis:   Judging by the patient's chief complaint and pertinent history, the patient has the following possible differential diagnoses, including but not limited to the following.  Some of these are deemed to be lower likelihood and some more likely based on my physical exam and history combined with possible lab work and/or imaging studies.   Please see the pertinent studies, and refer to the HPI.  Some of these diagnoses will take further evaluation to fully rule out, perhaps as an outpatient and the patient was encouraged to follow up when discharged for more comprehensive evaluation.    Anemia:  Iron deficiency anemia.  Macrocytic anemia, anemia of renal disease  Upper GI bleed: PUD, gastritis, varices, AVM, tumors, erosions, AE fistula  Lower GI bleed: diverticular bleed, colon cancer, AVM, hemorrhoid, AE fistula     Clinical Tests:   Lab Tests: Ordered and Reviewed  Radiological Study: Ordered and Reviewed  ED Management:    Patient is a 77-year-old female presents to the emergency department for anemia.  See HPI.  See physical exam.  Symptomatic, labs and imaging as noted.  Patient transfused 3 units.  Given Protonix.  Discussed with hospital medicine for admission.  All results discussed with the patient and family.  Answered all questions time.  Verbalized understanding agreed to plan.  Discussed case with Nephrology for dialysis.  Other:   I have discussed this case with another health care provider.        Scribe Attestation:   Scribe #1: I performed the above scribed service and the documentation accurately describes the services I performed. I attest to the accuracy of the note.    Attending Attestation:           Physician Attestation for Scribe:  Physician Attestation Statement for Scribe #1: I, Raghu Hernandez MD, reviewed documentation, as scribed by Thomas Johnson in my presence, and it is both accurate and complete.           ED Course as of 06/23/23 1510   Thu  Chris 15, 2023   1640 Paged Dr. Annalee Bourgeois MD [MM]   [id number] Discussed with Nephrology.  [RP]   1710 Paged hospitalist.  [MM]   1715 Call and consult with hospitalist.  [MM]      ED Course User Index  [MM] Cynthia Moreno  [RP] Raghu Hernandez MD                   Clinical Impression:   Final diagnoses:  [K92.2] GI bleed  [D64.9] Symptomatic anemia        ED Disposition Condition    Admit                 Raghu Hernandez MD  06/23/23 2162

## 2023-06-16 ENCOUNTER — ANESTHESIA EVENT (OUTPATIENT)
Dept: ENDOSCOPY | Facility: HOSPITAL | Age: 78
DRG: 377 | End: 2023-06-16
Payer: MEDICARE

## 2023-06-16 ENCOUNTER — ANESTHESIA (OUTPATIENT)
Dept: ENDOSCOPY | Facility: HOSPITAL | Age: 78
DRG: 377 | End: 2023-06-16
Payer: MEDICARE

## 2023-06-16 LAB
ALBUMIN SERPL-MCNC: 2.9 G/DL (ref 3.4–4.8)
ALBUMIN/GLOB SERPL: 1.4 RATIO (ref 1.1–2)
ALP SERPL-CCNC: 90 UNIT/L (ref 40–150)
ALT SERPL-CCNC: 37 UNIT/L (ref 0–55)
AST SERPL-CCNC: 34 UNIT/L (ref 5–34)
BILIRUBIN DIRECT+TOT PNL SERPL-MCNC: 0.3 MG/DL
BUN SERPL-MCNC: 50 MG/DL (ref 9.8–20.1)
CALCIUM SERPL-MCNC: 8.6 MG/DL (ref 8.4–10.2)
CHLORIDE SERPL-SCNC: 104 MMOL/L (ref 98–107)
CO2 SERPL-SCNC: 27 MMOL/L (ref 23–31)
CREAT SERPL-MCNC: 5.17 MG/DL (ref 0.55–1.02)
ERYTHROCYTE [DISTWIDTH] IN BLOOD BY AUTOMATED COUNT: 18.5 % (ref 11.5–17)
GFR SERPLBLD CREATININE-BSD FMLA CKD-EPI: 8 MLS/MIN/1.73/M2
GLOBULIN SER-MCNC: 2.1 GM/DL (ref 2.4–3.5)
GLUCOSE SERPL-MCNC: 70 MG/DL (ref 82–115)
HCT VFR BLD AUTO: 21.8 % (ref 37–47)
HGB BLD-MCNC: 7.1 G/DL (ref 12–16)
MAGNESIUM SERPL-MCNC: 1.8 MG/DL (ref 1.6–2.6)
MCH RBC QN AUTO: 29.3 PG (ref 27–31)
MCHC RBC AUTO-ENTMCNC: 32.6 G/DL (ref 33–36)
MCV RBC AUTO: 90.1 FL (ref 80–94)
NRBC BLD AUTO-RTO: 0.2 %
PHOSPHATE SERPL-MCNC: 3.8 MG/DL (ref 2.3–4.7)
PLATELET # BLD AUTO: 179 X10(3)/MCL (ref 130–400)
PMV BLD AUTO: 10.9 FL (ref 7.4–10.4)
POCT GLUCOSE: 114 MG/DL (ref 70–110)
POCT GLUCOSE: 240 MG/DL (ref 70–110)
POCT GLUCOSE: 259 MG/DL (ref 70–110)
POCT GLUCOSE: 73 MG/DL (ref 70–110)
POTASSIUM SERPL-SCNC: 3.7 MMOL/L (ref 3.5–5.1)
PROT SERPL-MCNC: 5 GM/DL (ref 5.8–7.6)
RBC # BLD AUTO: 2.42 X10(6)/MCL (ref 4.2–5.4)
SODIUM SERPL-SCNC: 138 MMOL/L (ref 136–145)
WBC # SPEC AUTO: 8.39 X10(3)/MCL (ref 4.5–11.5)

## 2023-06-16 PROCEDURE — 63600175 PHARM REV CODE 636 W HCPCS: Mod: JZ,JG | Performed by: INTERNAL MEDICINE

## 2023-06-16 PROCEDURE — 63600175 PHARM REV CODE 636 W HCPCS: Performed by: INTERNAL MEDICINE

## 2023-06-16 PROCEDURE — C9113 INJ PANTOPRAZOLE SODIUM, VIA: HCPCS | Performed by: INTERNAL MEDICINE

## 2023-06-16 PROCEDURE — 94761 N-INVAS EAR/PLS OXIMETRY MLT: CPT

## 2023-06-16 PROCEDURE — 83735 ASSAY OF MAGNESIUM: CPT | Performed by: NURSE PRACTITIONER

## 2023-06-16 PROCEDURE — 43255 EGD CONTROL BLEEDING ANY: CPT | Performed by: STUDENT IN AN ORGANIZED HEALTH CARE EDUCATION/TRAINING PROGRAM

## 2023-06-16 PROCEDURE — D9220A PRA ANESTHESIA: Mod: ANES,,, | Performed by: ANESTHESIOLOGY

## 2023-06-16 PROCEDURE — 37000009 HC ANESTHESIA EA ADD 15 MINS: Performed by: STUDENT IN AN ORGANIZED HEALTH CARE EDUCATION/TRAINING PROGRAM

## 2023-06-16 PROCEDURE — D9220A PRA ANESTHESIA: ICD-10-PCS | Mod: ANES,,, | Performed by: ANESTHESIOLOGY

## 2023-06-16 PROCEDURE — 80053 COMPREHEN METABOLIC PANEL: CPT | Performed by: INTERNAL MEDICINE

## 2023-06-16 PROCEDURE — D9220A PRA ANESTHESIA: ICD-10-PCS | Mod: CRNA,,, | Performed by: NURSE ANESTHETIST, CERTIFIED REGISTERED

## 2023-06-16 PROCEDURE — D9220A PRA ANESTHESIA: Mod: CRNA,,, | Performed by: NURSE ANESTHETIST, CERTIFIED REGISTERED

## 2023-06-16 PROCEDURE — 63600175 PHARM REV CODE 636 W HCPCS: Performed by: NURSE ANESTHETIST, CERTIFIED REGISTERED

## 2023-06-16 PROCEDURE — 84100 ASSAY OF PHOSPHORUS: CPT | Performed by: NURSE PRACTITIONER

## 2023-06-16 PROCEDURE — 25000003 PHARM REV CODE 250: Performed by: INTERNAL MEDICINE

## 2023-06-16 PROCEDURE — 25000003 PHARM REV CODE 250: Performed by: NURSE ANESTHETIST, CERTIFIED REGISTERED

## 2023-06-16 PROCEDURE — 37000008 HC ANESTHESIA 1ST 15 MINUTES: Performed by: STUDENT IN AN ORGANIZED HEALTH CARE EDUCATION/TRAINING PROGRAM

## 2023-06-16 PROCEDURE — 21400001 HC TELEMETRY ROOM

## 2023-06-16 PROCEDURE — 85027 COMPLETE CBC AUTOMATED: CPT | Performed by: INTERNAL MEDICINE

## 2023-06-16 RX ORDER — LIDOCAINE HYDROCHLORIDE 20 MG/ML
INJECTION, SOLUTION EPIDURAL; INFILTRATION; INTRACAUDAL; PERINEURAL
Status: COMPLETED
Start: 2023-06-16 | End: 2023-06-16

## 2023-06-16 RX ORDER — LIDOCAINE HYDROCHLORIDE 10 MG/ML
1 INJECTION, SOLUTION EPIDURAL; INFILTRATION; INTRACAUDAL; PERINEURAL ONCE
Status: CANCELLED | OUTPATIENT
Start: 2023-06-16 | End: 2023-06-16

## 2023-06-16 RX ORDER — ACETAMINOPHEN 10 MG/ML
1000 INJECTION, SOLUTION INTRAVENOUS ONCE
Status: CANCELLED | OUTPATIENT
Start: 2023-06-16 | End: 2023-06-16

## 2023-06-16 RX ORDER — MIDAZOLAM HYDROCHLORIDE 1 MG/ML
2 INJECTION INTRAMUSCULAR; INTRAVENOUS ONCE AS NEEDED
Status: CANCELLED | OUTPATIENT
Start: 2023-06-16 | End: 2034-11-12

## 2023-06-16 RX ORDER — ONDANSETRON 2 MG/ML
4 INJECTION INTRAMUSCULAR; INTRAVENOUS DAILY PRN
Status: CANCELLED | OUTPATIENT
Start: 2023-06-16

## 2023-06-16 RX ORDER — DIPHENHYDRAMINE HYDROCHLORIDE 50 MG/ML
25 INJECTION INTRAMUSCULAR; INTRAVENOUS EVERY 6 HOURS PRN
Status: CANCELLED | OUTPATIENT
Start: 2023-06-16

## 2023-06-16 RX ORDER — PROPOFOL 10 MG/ML
VIAL (ML) INTRAVENOUS
Status: COMPLETED
Start: 2023-06-16 | End: 2023-06-16

## 2023-06-16 RX ORDER — PROPOFOL 10 MG/ML
VIAL (ML) INTRAVENOUS
Status: DISCONTINUED | OUTPATIENT
Start: 2023-06-16 | End: 2023-06-16

## 2023-06-16 RX ORDER — LIDOCAINE HYDROCHLORIDE 20 MG/ML
INJECTION, SOLUTION EPIDURAL; INFILTRATION; INTRACAUDAL; PERINEURAL
Status: DISCONTINUED | OUTPATIENT
Start: 2023-06-16 | End: 2023-06-16

## 2023-06-16 RX ORDER — HYDROMORPHONE HYDROCHLORIDE 2 MG/ML
0.2 INJECTION, SOLUTION INTRAMUSCULAR; INTRAVENOUS; SUBCUTANEOUS EVERY 5 MIN PRN
Status: CANCELLED | OUTPATIENT
Start: 2023-06-16

## 2023-06-16 RX ORDER — FUROSEMIDE 10 MG/ML
40 INJECTION INTRAMUSCULAR; INTRAVENOUS ONCE
Status: COMPLETED | OUTPATIENT
Start: 2023-06-16 | End: 2023-06-16

## 2023-06-16 RX ORDER — SODIUM CHLORIDE, SODIUM GLUCONATE, SODIUM ACETATE, POTASSIUM CHLORIDE AND MAGNESIUM CHLORIDE 30; 37; 368; 526; 502 MG/100ML; MG/100ML; MG/100ML; MG/100ML; MG/100ML
INJECTION, SOLUTION INTRAVENOUS CONTINUOUS
Status: CANCELLED | OUTPATIENT
Start: 2023-06-16 | End: 2023-07-16

## 2023-06-16 RX ORDER — GLUCAGON 1 MG
KIT INJECTION
Status: DISPENSED
Start: 2023-06-16 | End: 2023-06-17

## 2023-06-16 RX ADMIN — LEVOTHYROXINE SODIUM 125 MCG: 0.12 TABLET ORAL at 05:06

## 2023-06-16 RX ADMIN — DULOXETINE HYDROCHLORIDE 30 MG: 30 CAPSULE, DELAYED RELEASE ORAL at 08:06

## 2023-06-16 RX ADMIN — SODIUM CHLORIDE: 9 INJECTION, SOLUTION INTRAVENOUS at 11:06

## 2023-06-16 RX ADMIN — CARVEDILOL 6.25 MG: 3.12 TABLET, FILM COATED ORAL at 08:06

## 2023-06-16 RX ADMIN — DIPHENHYDRAMINE HYDROCHLORIDE 50 MG: 25 CAPSULE ORAL at 08:06

## 2023-06-16 RX ADMIN — INSULIN ASPART 3 UNITS: 100 INJECTION, SOLUTION INTRAVENOUS; SUBCUTANEOUS at 08:06

## 2023-06-16 RX ADMIN — GLUCAGON 1 MG: 1 INJECTION, POWDER, LYOPHILIZED, FOR SOLUTION INTRAMUSCULAR; INTRAVENOUS at 12:06

## 2023-06-16 RX ADMIN — SEVELAMER CARBONATE 1600 MG: 800 TABLET, FILM COATED ORAL at 06:06

## 2023-06-16 RX ADMIN — PANTOPRAZOLE SODIUM 40 MG: 40 INJECTION, POWDER, FOR SOLUTION INTRAVENOUS at 05:06

## 2023-06-16 RX ADMIN — PROPOFOL 50 MG: 10 INJECTION, EMULSION INTRAVENOUS at 12:06

## 2023-06-16 RX ADMIN — LIDOCAINE HYDROCHLORIDE 100 MG: 20 INJECTION, SOLUTION INTRAVENOUS at 11:06

## 2023-06-16 RX ADMIN — PROPOFOL 50 MG: 10 INJECTION, EMULSION INTRAVENOUS at 11:06

## 2023-06-16 RX ADMIN — PANTOPRAZOLE SODIUM 40 MG: 40 INJECTION, POWDER, FOR SOLUTION INTRAVENOUS at 06:06

## 2023-06-16 RX ADMIN — FUROSEMIDE 40 MG: 40 TABLET ORAL at 08:06

## 2023-06-16 RX ADMIN — CLONAZEPAM 0.5 MG: 0.5 TABLET ORAL at 08:06

## 2023-06-16 RX ADMIN — ATORVASTATIN CALCIUM 40 MG: 40 TABLET, FILM COATED ORAL at 08:06

## 2023-06-16 RX ADMIN — ACETAMINOPHEN 1000 MG: 500 TABLET, FILM COATED ORAL at 08:06

## 2023-06-16 RX ADMIN — FUROSEMIDE 40 MG: 10 INJECTION, SOLUTION INTRAMUSCULAR; INTRAVENOUS at 01:06

## 2023-06-16 RX ADMIN — NEPHROCAP 1 CAPSULE: 1 CAP ORAL at 08:06

## 2023-06-16 NOTE — PROGRESS NOTES
"Inpatient Nutrition Evaluation    Admit Date: 6/15/2023   Total duration of encounter: 1 day    Nutrition Recommendation/Prescription     -Continue diet as ordered and tolerated.   -Add cafe mocha Novasource Renal (provides 475 kcal, 22 g protein per serving) BID for additional nutrition.     Nutrition Assessment     Chart Review    Reason Seen: malnutrition screening tool (MST)    Malnutrition Screening Tool Results   Have you recently lost weight without trying?: Unsure  Have you been eating poorly because of a decreased appetite?: No   MST Score: 2     Diagnosis:  Symptomatic acute on chronic anemia  Recurrent obscure GI bleed/melena    Relevant Medical History:   Past Medical History:   Diagnosis Date    CKD (chronic kidney disease) requiring chronic dialysis     COPD (chronic obstructive pulmonary disease)     Diabetes mellitus     Hyperlipidemia     Hypertension     Renal insufficiency     Thyroid disease      Nutrition-Related Medications: clonazepam, furosemide, glucagon, levothyroxine, pantoprazole, sevelamer carbonate, renal vitamin    Nutrition-Related Labs:  6/16/23: BUN 50, Crea 5.17, eGFR 8, Gluc 70, Total pro 5, Alb 2.9    Diet Order: DIET RENAL ON DIALYSIS  Oral Supplement Order: none  Appetite/Oral Intake: NPO/NPO  Factors Affecting Nutritional Intake: NPO  Food/Sabianism/Cultural Preferences: none reported  Food Allergies: none reported       Wound(s):       Comments    6/16/23 Pt s/p EGD, still NPO. Denies any unintentional wt loss or decrease in appetite prior to admit. On dialysis, states familiar with protein supplements. Ordered cafe mocha nova source BID. No muscle or fat loss observed.     Anthropometrics    Height: 5' 6" (167.6 cm)    Last Weight: 89.2 kg (196 lb 9.6 oz) (06/16/23 0000) Weight Method: Bed Scale  BMI (Calculated): 31.7  BMI Classification: obese grade I (BMI 30-34.9)     Ideal Body Weight (IBW), Female: 130 lb     % Ideal Body Weight, Female (lb): 151.23 %                  "            Usual Weight Provided By: unable to obtain usual weight    Wt Readings from Last 5 Encounters:   06/16/23 89.2 kg (196 lb 9.6 oz)   05/05/23 89.9 kg (198 lb 3.2 oz)   04/06/23 95.2 kg (209 lb 14.1 oz)   03/25/23 95.2 kg (209 lb 14.1 oz)   03/01/23 94.4 kg (208 lb 3.2 oz)     Weight Change(s) Since Admission:  Admit Weight: 113.4 kg (250 lb) (06/15/23 1340)  6/16/23 89.2kg admit wt, similar to wt 1 month ago. Pt thinks dry UBW ~200lbs. No significant wt loss. Will continue to monitor.     Patient Education    Not applicable.    Monitoring & Evaluation     Dietitian will monitor energy intake, weight change, electrolyte/renal panel, and glucose/endocrine profile.  Nutrition Risk/Follow-Up: low (follow-up in 5-7 days)  Patients assigned 'low nutrition risk' status do not qualify for a full nutritional assessment but will be monitored and re-evaluated in a 5-7 day time period. Please consult if re-evaluation needed sooner.

## 2023-06-16 NOTE — CONSULTS
Nephrology Initial Consult Note    Patient Name: Rosette Madrid  Age: 77 y.o.  : 1945  MRN: [id number]  Admission Date: 6/15/2023    Reason for Consult:      ESRD  Self, Aaareferral    HPI:     Rosette Madrid is a 77 y.o. female with anemia.  Past medical history significant for ESRD on hemodialysis TTS at [facility], HFpEF, coronary artery disease, AFib, COPD, anemia, and recurrent GI bleeds.  Se presented with anemia and reporting black loose stools.  At admission she was found to have a hemoglobin of 4.9 and given 2 units PRBC with improvement in her hemoglobin of 7.1.  Her last hemodialysis before admission was 6/10/23.  EGD was done 2023, awaiting report.  Nephrology consulted for ESRD management.  She denies chest pain, shortness of breath, abdominal pain, nausea, vomiting, or lower extremity edema.        Current Facility-Administered Medications   Medication Dose Route Frequency Provider Last Rate Last Admin    0.9%  NaCl infusion (for blood administration)   Intravenous Q24H PRN Raghu Hernandez MD        acetaminophen tablet 1,000 mg  1,000 mg Oral Q6H PRN Seb Bernal MD   1,000 mg at 23 0833    acetaminophen tablet 650 mg  650 mg Oral Q4H PRN Seb Bernal MD        albuterol-ipratropium 2.5 mg-0.5 mg/3 mL nebulizer solution 3 mL  3 mL Nebulization Q6H PRN Seb Bernal MD        aluminum-magnesium hydroxide-simethicone 200-200-20 mg/5 mL suspension 30 mL  30 mL Oral QID PRN Seb Bernal MD        atorvastatin tablet 40 mg  40 mg Oral Daily Seb Bernal MD   40 mg at 23 0833    carvediloL tablet 6.25 mg  6.25 mg Oral BID Seb Bernal MD   6.25 mg at 23 0833    clonazePAM tablet 0.5 mg  0.5 mg Oral QHS Seb Bernal MD        cloNIDine tablet 0.2 mg  0.2 mg Oral TID PRN Seb Bernal MD        dextrose 10% bolus 125 mL 125 mL  12.5 g Intravenous PRN Seb Bernal MD        dextrose 10% bolus 250 mL 250 mL  25 g Intravenous PRN Seb Bernal MD        diphenhydrAMINE capsule 50 mg   50 mg Oral Nightly PRN [facility] MD Gabe        DULoxetine DR capsule 30 mg  30 mg Oral Daily Ali M MD Gabe   30 mg at 06/16/23 0834    fluticasone furoate-vilanteroL 100-25 mcg/dose diskus inhaler 1 puff  1 puff Inhalation Daily Ali M MD Gabe        furosemide tablet 40 mg  40 mg Oral Daily [facility] MD Gabe   40 mg at 06/16/23 0833    glucagon (human recombinant) 1 mg injection             glucagon (human recombinant) injection 1 mg  1 mg Intramuscular PRN Ali M MD Gabe   1 mg at 06/16/23 1212    hydrALAZINE injection 10 mg  10 mg Intravenous Q4H PRN Ali M MD Gabe        insulin aspart U-100 injection 1-10 Units  1-10 Units Subcutaneous Q6H PRN Ali M MD Gabe        levothyroxine tablet 125 mcg  125 mcg Oral Before breakfast Ali M MD Gabe   125 mcg at 06/16/23 0526    melatonin tablet 6 mg  6 mg Oral Nightly PRN [facility] MD Gabe        ondansetron injection 4 mg  4 mg Intravenous Q4H PRN [facility] MD Gabe        pantoprazole injection 40 mg  40 mg Intravenous Q12H Ali M MD Gabe   40 mg at 06/16/23 0526    polyethylene glycol packet 17 g  17 g Oral BID PRN Ali M MD Gabe        senna-docusate 8.6-50 mg per tablet 2 tablet  2 tablet Oral BID PRN Ali M MD Gabe        sevelamer carbonate tablet 1,600 mg  1,600 mg Oral TID WM Ali M MD Gabe        sodium chloride 0.9% flush 10 mL  10 mL Intravenous PRN Ali M MD Gabe        tiotropium bromide 2.5 mcg/actuation inhaler 2 puff  2 puff Inhalation Daily Ali M MD Gabe        vitamin renal formula (B-complex-vitamin c-folic acid) 1 mg per capsule 1 capsule  1 capsule Oral Daily [facility] MD Gabe   1 capsule at 06/16/23 0833       JOSEFINA Lord    Past Medical History:   Diagnosis Date    CKD (chronic kidney disease) requiring chronic dialysis     COPD (chronic obstructive pulmonary disease)     Diabetes mellitus     Hyperlipidemia     Hypertension     Renal insufficiency     Thyroid disease       Past Surgical History:   Procedure Laterality Date    AV  FISTULA PLACEMENT Left     CARDIAC CATHETERIZATION      COLONOSCOPY      CORONARY ARTERY BYPASS GRAFT      EGD, WITH HEMORRHAGE CONTROL  3/24/2023    Procedure: EGD,WITH HEMORRHAGE CONTROL;  Surgeon: Go Le MD;  Location: [facility] ENDOSCOPY;  Service: Gastroenterology;;    EGD, WITH HEMORRHAGE CONTROL  4/6/2023    Procedure: EGD,WITH HEMORRHAGE CONTROL;  Surgeon: Ayden Francois MD;  Location: [facility] ENDOSCOPY;  Service: Gastroenterology;;    ESOPHAGOGASTRODUODENOSCOPY      ESOPHAGOGASTRODUODENOSCOPY N/A 2/17/2023    Procedure: EGD +/- VCE PLACEMENT;  Surgeon: Morgan Gardner MD;  Location: [facility] ENDOSCOPY;  Service: Gastroenterology;  Laterality: N/A;    ESOPHAGOGASTRODUODENOSCOPY N/A 3/24/2023    Procedure: EGD;  Surgeon: Go Le MD;  Location: [facility] ENDOSCOPY;  Service: Gastroenterology;  Laterality: N/A;  with push    ESOPHAGOGASTRODUODENOSCOPY N/A 4/6/2023    Procedure: EGD;  Surgeon: Ayden Francois MD;  Location: [facility] ENDOSCOPY;  Service: Gastroenterology;  Laterality: N/A;  with push    POLYPECTOMY      SMALL BOWEL ENTEROSCOPY Left 1/20/2023    Procedure: ENTEROSCOPY;  Surgeon: Lexi Scales MD;  Location: [facility] ENDOSCOPY;  Service: Gastroenterology;  Laterality: Left;    THYROIDECTOMY      TUBAL LIGATION        Family History   Problem Relation Age of Onset    Hypertension Mother     Hypertension Father     Hypertension Sister     Hypertension Sister      Social History     Tobacco Use    Smoking status: Every Day     Packs/day: 0.25     Types: Cigarettes    Smokeless tobacco: Never    Tobacco comments:     Smokes 4 cigarettes a day   Substance Use Topics    Alcohol use: Yes     Comment: 1 glass every 2-3 month     Medications Prior to Admission   Medication Sig Dispense Refill Last Dose    albuterol (VENTOLIN HFA) 90 mcg/actuation inhaler Inhale 2 puffs into the lungs every 6 (six) hours as needed for Wheezing. Rescue 18 g 1 6/14/2023     albuterol-ipratropium (DUO-NEB) 2.5 mg-0.5 mg/3 mL nebulizer solution Take 3 mLs by nebulization every 6 (six) hours as needed for Wheezing or Shortness of Breath. Rescue 75 mL 0 6/14/2023    apixaban (ELIQUIS) 5 mg Tab Take 1 tablet (5 mg total) by mouth 2 (two) times daily. 60 tablet 1 6/14/2023    aspirin (ECOTRIN) 81 MG EC tablet Take 1 tablet (81 mg total) by mouth once daily. 90 tablet 1 6/15/2023    BENADRYL 25 mg capsule Take 50 mg by mouth nightly.   6/15/2023    carvediloL (COREG) 6.25 MG tablet Take 1 tablet (6.25 mg total) by mouth 2 (two) times daily. 180 tablet 3 6/15/2023    clonazePAM (KLONOPIN) 0.5 MG tablet Take 0.5 mg by mouth every evening.   6/15/2023    DIALYVITE 100-1 mg Tab Take 1 tablet by mouth once daily.   6/15/2023    DULoxetine (CYMBALTA) 30 MG capsule Take 30 mg by mouth once daily.   6/15/2023    ergocalciferol (ERGOCALCIFEROL) 50,000 unit Cap Take 1 capsule (50,000 Units total) by mouth every 7 days. 12 capsule 2 6/15/2023    ferrous sulfate (FEOSOL) 325 mg (65 mg iron) Tab tablet Take 1 tablet (325 mg total) by mouth once daily. 90 tablet 2 6/15/2023    fluticasone furoate-vilanteroL (BREO) 100-25 mcg/dose diskus inhaler Inhale 1 puff into the lungs once daily. 90 each 2 6/15/2023    furosemide (LASIX) 40 MG tablet Take 1 tablet (40 mg total) by mouth once daily. 90 tablet 3 6/15/2023    glipiZIDE (GLUCOTROL) 10 MG tablet Take 1 tablet (10 mg total) by mouth daily with breakfast. 90 tablet 2 6/15/2023    INVEGA SUSTENNA 156 mg/mL Syrg injection Inject into the muscle.   6/15/2023    L-METHYLFOLATE 15 mg Tab Take 1 tablet by mouth.   6/14/2023    lactulose (CHRONULAC) 10 gram/15 mL solution Take 30 mLs by mouth.   6/14/2023    levothyroxine (SYNTHROID) 125 MCG tablet Take 1 tablet (125 mcg total) by mouth before breakfast. 90 tablet 2 6/15/2023    pantoprazole (PROTONIX) 40 MG tablet Take 1 tablet (40 mg total) by mouth 2 (two) times daily. 60 tablet 11 6/15/2023    rosuvastatin  "(CRESTOR) 40 MG Tab Take 1 tablet (40 mg total) by mouth once daily. 90 tablet 3 6/14/2023    sevelamer carbonate (RENVELA) 800 mg Tab Take 2 tablets (1,600 mg total) by mouth 3 (three) times daily. 90 tablet 0 6/14/2023    tiotropium (SPIRIVA WITH HANDIHALER) 18 mcg inhalation capsule Inhale 1 capsule (18 mcg total) into the lungs Daily. 90 capsule 2 6/14/2023    BASAGLAR KWIKPEN U-100 INSULIN glargine 100 units/mL SubQ pen Inject 30 Units into the skin Daily. 27 mL 2 Unknown    ONETOUCH DELICA PLUS LANCET 30 gauge Misc 1 lancet by Other route once daily. 100 each 2     ONETOUCH ULTRA TEST Strp 1 strip by Other route once daily. 200 strip 2 Unknown    pen needle, diabetic 31 gauge x 5/16" Ndle 2 Units by Misc.(Non-Drug; Combo Route) route 2 (two) times a day. Patient to check blood sugars twice daily (morning and night) 200 each 2 Unknown     Review of patient's allergies indicates:   Allergen Reactions    Lisinopril Swelling    Azithromycin      Other reaction(s): tongue/facial swelling    Baclofen      Other reaction(s): tongue/facial swelling    Doxycycline      Other reaction(s): Angioedema            Review of Systems:     Comprehensive 10pt ROS negative except as noted per HPI.      Objective:       VITAL SIGNS: 24 HR MIN & MAX LAST    Temp  Min: 97 °F (36.1 °C)  Max: 98.6 °F (37 °C)  97 °F (36.1 °C)        BP  Min: 92/45  Max: 170/68  101/61     Pulse  Min: 57  Max: 69  (!) 57     Resp  Min: 10  Max: 22  (!) 22    SpO2  Min: 94 %  Max: 100 %  95 %      GEN: Chronically ill appearing AAF in NAD  HEENT: Conjunctiva anicteric, pupils equal   CV: RRR +S1,S2 without murmur  PULM: CTAB, unlabored  ABD: Soft, NT/ND abdomen with NABS  EXT: No cyanosis or edema  SKIN: Warm and dry  PSYCH: Awake, alert and appropriately conversant.   Vascular access:  LUE AVF with good pulsation and thrill            Component Value Date/Time     06/16/2023 0709     (L) 06/15/2023 1403    K 3.7 06/16/2023 0709    K 4.1 " 06/15/2023 1403    CHLORIDE 104 06/16/2023 0709    CHLORIDE 100 06/15/2023 1403    CO2 27 06/16/2023 0709    CO2 26 06/15/2023 1403    BUN 50.0 (H) 06/16/2023 0709    BUN 45.0 (H) 06/15/2023 1403    CREATININE 5.17 (H) 06/16/2023 0709    CREATININE 5.20 (H) 06/15/2023 1403    CALCIUM 8.6 06/16/2023 0709    CALCIUM 8.6 06/15/2023 1403    PHOS 3.8 06/16/2023 0709            Component Value Date/Time    WBC 8.39 06/16/2023 0709    WBC 8.99 06/15/2023 1403    HGB 7.1 (L) 06/16/2023 0709    HGB 4.9 (LL) 06/15/2023 1403    HCT 21.8 (L) 06/16/2023 0709    HCT 16.3 (LL) 06/15/2023 1403     06/16/2023 0709     06/15/2023 1403             No orders to display       Assessment / Plan:       Active Hospital Problems    Diagnosis  POA    *Symptomatic anemia [D64.9]  Yes    Melena [K92.1]  Yes      Resolved Hospital Problems   No resolved problems to display.       ESRD - normally TTS at [facility]  Anemia - EPO 20,000 units TTS  GI bleed - GI following    Plan:  No acute indication for hemodialysis today.  We will plan to dialyze her tomorrow on regular TTS schedule.  Start EPO for anemia.    Thank you for the consult.  We will follow along.    Fco Donis DO  Nephrology  [facility]  Clinic number: [phone]

## 2023-06-16 NOTE — ANESTHESIA PREPROCEDURE EVALUATION
"                                                                                                             06/16/2023  Rosette Madrid is a 77 y.o., female with ESRD. CAD, s/p CAB, and other medical problems listed in the EMR. Patient was admitted through the ED with profound anemia.  This is a chronic problem, and she has been here recently for similar procedure.  She is feeling better after PRBC transfusion.  Hemoglobin improved from 4.9 to 7.1.    "Transthoracic echo (TTE) complete  Order# [id number]  Reading physician: Valerio Villegas MD Ordering physician: Lissette Oliveira MD Study date: 3/23/23     Reason for Exam  Priority: Routine  Dx: 1st degree AV block [I44.0 (ICD-10-CM)]     View Images Vital Vitrea     Show images for Echo  Summary     Concentric hypertrophy and normal systolic function.   The estimated ejection fraction is 60%.   Indeterminate left ventricular diastolic function.   Mild-to-moderate mitral regurgitation.   Normal right ventricular size.   Mild tricuspid regurgitation."      Pre-op Assessment    I have reviewed the Patient Summary Reports.     I have reviewed the Nursing Notes. I have reviewed the NPO Status.   I have reviewed the Medications.     Review of Systems      Physical Exam  General: Well nourished and Cooperative    Airway:  Mallampati: II   Mouth Opening: Normal  TM Distance: Normal  Tongue: Normal  Neck ROM: Normal ROM    Dental:  Edentulous    Chest/Lungs:  Clear to auscultation    Heart:  Rate: Normal        Anesthesia Plan  Type of Anesthesia, risks & benefits discussed:    Anesthesia Type: Gen Natural Airway  Intra-op Monitoring Plan: Standard ASA Monitors  Induction:  IV  Informed Consent: Informed consent signed with the Patient and all parties understand the risks and agree with anesthesia plan.  All questions answered.   ASA Score: 4  Day of Surgery Review of History & Physical: H&P Update referred to the surgeon/provider.  Anesthesia Plan Notes: Questions answered, " she wishes to proceed.    Ready For Surgery From Anesthesia Perspective.

## 2023-06-16 NOTE — PLAN OF CARE
06/16/23 0950   Discharge Assessment   Assessment Type Discharge Planning Assessment   Confirmed/corrected address, phone number and insurance Yes   Confirmed Demographics Correct on Facesheet   Source of Information patient   Communicated YUN with patient/caregiver Date not available/Unable to determine   Reason For Admission Anemia   People in Home child(shawn), adult  (Pt lives with her daughterCarroll in a single story home and one step to enter)   Do you expect to return to your current living situation? Yes   Do you have help at home or someone to help you manage your care at home? Yes   Who are your caregiver(s) and their phone number(s)? pt reports family and daughter, Carroll ([phone]) will be able to assist pt upon dc   Prior to hospitilization cognitive status: Alert/Oriented   Current cognitive status: Alert/Oriented   Walking or Climbing Stairs ambulation difficulty, requires equipment   Mobility Management rollator and a cane   Home Layout Able to live on 1st floor   Equipment Currently Used at Home rollator;cane, straight   Patient currently being followed by outpatient case management? No   Do you currently have service(s) that help you manage your care at home? No   Who is going to help you get home at discharge? family   How do you get to doctors appointments? family or friend will provide   Are you on dialysis? No   Discharge Plan A Home with family   Discharge Plan B Home with family   DME Needed Upon Discharge  other (see comments)  (TBD)   Discharge Plan discussed with: Patient   Transition of Care Barriers None   Housing Stability   In the last 12 months, was there a time when you were not able to pay the mortgage or rent on time? N   Transportation Needs   In the past 12 months, has lack of transportation kept you from medical appointments or from getting medications? no   Food Insecurity   Within the past 12 months, you worried that your food would run out before you got the money to  buy more. Never true   OTHER   Name(s) of People in Home daughterCarroll     Pt's PCP is JOSEFINA Flor who is located at [facility]. Her  is her daughter, Marlen ([phone]). She had HH services in the past however does not recall the name of the agency. She uses [facility] off of [address]. Pt does not drive. Her dgtr drives her where she needs to go. Pt has no dc needs at this time.

## 2023-06-16 NOTE — NURSING
Nurses Note -- 4 Eyes      6/16/2023   5:36 AM      Skin assessed during: Admit      [x] No Altered Skin Integrity Present    []Prevention Measures Documented      [] Yes- Altered Skin Integrity Present or Discovered   [] LDA Added if Not in Epic (Describe Wound)   [] New Altered Skin Integrity was Present on Admit and Documented in LDA   [] Wound Image Taken    Wound Care Consulted? No    Attending Nurse:  Marizol Serrano LPN     Second RN/Staff Member:  Ludy Jonas

## 2023-06-16 NOTE — CONSULTS
Gastroenterology Consultation Note    Reason for Consult:  Anemia, melena     PCP:   JOSEFINA Lord      History of Present Illness (HPI):  This is a 77 y.o. female known to Dr. Bourgeois with PMH of ESRD on HD Tu/Th/Sa, HFpEF 55%, CAD/CABG x4 on ASA 81mg, HTN, HLD, COPD, venous insufficiency, hypothyroidism, anemia, gastric AVM, adenomatous colon polyps, Afib on Eliquis. She presented to the ED with outpatient labs significant for anemia associated with weakness, fatigue, dyspnea on exertion and black loose stool.      Upon arrival, patient hypertensive with labs notable for hemoglobin of 4.9, sodium 135, ferritin 3095.  She was transfused 2 units PRBC with plans to transfuse additional unit in dialysis today. Rectal exam by ED physician show guaiac-positive melanotic stool.    Patient seen this morning with somewhat inconsistent history obtained.  Per chart review, patient initially reported a 2 day history of black loose stool in the ED. On exam this morning, she reports that her most recent bowel movement was over a week ago dark in color but has been without a BM since.  Discussed care with the nurse who denies any additional bowel movements since admit.   She denies associated abdominal pain, cramping, nausea or vomiting.  Denies changes in appetite.  Denies chronic NSAID use.    Patient recently admitted for similar s/s in April 2023- Hemoglobin 5.7 at this time.  EGD 04/06/2023 with normal esophagus, duodenum, and jejunum.  A single nonbleeding AVM noted in the stomach treated with APC.     Other recent endoscopy:  EGD with push 03/24/23 Dr. Le for anemia: AVM in 4th portion of duodenum treated with APC  VCE 02/17/23: small nonbleeding erosion in distal duodenum. Recommend CTE if melena or anemia requiring transfusion off iron supplementation occurs  EGD 02/17/23 Dr. Gardner for JAYSON: normal anatomy, VCE placed  EGD 01/20/23 Dr. BJORN Scales for JAYSON: normal anatomy  Colonoscopy 07/21/21 Dr. Bourgeois for FIT+:  multiple TAs throughout colon, nonbleeding internal hemorrhoids. Recommend repeat colonoscopy in 3 years    ROS:  Review of Systems   Constitutional:  Negative for chills and fever.   HENT:  Negative for sore throat.    Respiratory:  Negative for shortness of breath, wheezing and stridor.    Gastrointestinal:  Positive for melena (+, uncertain of timeline). Negative for abdominal pain, heartburn, nausea and vomiting.   Skin:  Negative for itching and rash.   Neurological:  Positive for weakness. Negative for seizures and loss of consciousness.   Psychiatric/Behavioral:  The patient is not nervous/anxious.      Medical History:   Past Medical History:   Diagnosis Date    CKD (chronic kidney disease) requiring chronic dialysis     COPD (chronic obstructive pulmonary disease)     Diabetes mellitus     Hyperlipidemia     Hypertension     Renal insufficiency     Thyroid disease        Surgical History:   Past Surgical History:   Procedure Laterality Date    AV FISTULA PLACEMENT Left     CARDIAC CATHETERIZATION      COLONOSCOPY      CORONARY ARTERY BYPASS GRAFT      EGD, WITH HEMORRHAGE CONTROL  3/24/2023    Procedure: EGD,WITH HEMORRHAGE CONTROL;  Surgeon: Go Le MD;  Location: [facility] ENDOSCOPY;  Service: Gastroenterology;;    EGD, WITH HEMORRHAGE CONTROL  4/6/2023    Procedure: EGD,WITH HEMORRHAGE CONTROL;  Surgeon: yAden Francois MD;  Location: [facility] ENDOSCOPY;  Service: Gastroenterology;;    ESOPHAGOGASTRODUODENOSCOPY      ESOPHAGOGASTRODUODENOSCOPY N/A 2/17/2023    Procedure: EGD +/- VCE PLACEMENT;  Surgeon: Morgan Gardner MD;  Location: [facility] ENDOSCOPY;  Service: Gastroenterology;  Laterality: N/A;    ESOPHAGOGASTRODUODENOSCOPY N/A 3/24/2023    Procedure: EGD;  Surgeon: Go Le MD;  Location: [facility] ENDOSCOPY;  Service: Gastroenterology;  Laterality: N/A;  with push    ESOPHAGOGASTRODUODENOSCOPY N/A 4/6/2023    Procedure: EGD;  Surgeon: Ayden Francois MD;  [facility] ENDOSCOPY;  Service: Gastroenterology;  Laterality: N/A;  with push    POLYPECTOMY      SMALL BOWEL ENTEROSCOPY Left 1/20/2023    Procedure: ENTEROSCOPY;  Surgeon: Lexi Scales MD;  Location: [facility] ENDOSCOPY;  Service: Gastroenterology;  Laterality: Left;    THYROIDECTOMY      TUBAL LIGATION         Family History:   Family History   Problem Relation Age of Onset    Hypertension Mother     Hypertension Father     Hypertension Sister     Hypertension Sister    .     Social History:   Social History     Tobacco Use    Smoking status: Every Day     Packs/day: 0.25     Types: Cigarettes    Smokeless tobacco: Never    Tobacco comments:     Smokes 4 cigarettes a day   Substance Use Topics    Alcohol use: Yes     Comment: 1 glass every 2-3 month       Allergies:  Review of patient's allergies indicates:   Allergen Reactions    Lisinopril Swelling    Azithromycin      Other reaction(s): tongue/facial swelling    Baclofen      Other reaction(s): tongue/facial swelling    Doxycycline      Other reaction(s): Angioedema       Medications Prior to Admission   Medication Sig Dispense Refill Last Dose    albuterol (VENTOLIN HFA) 90 mcg/actuation inhaler Inhale 2 puffs into the lungs every 6 (six) hours as needed for Wheezing. Rescue 18 g 1 6/14/2023    albuterol-ipratropium (DUO-NEB) 2.5 mg-0.5 mg/3 mL nebulizer solution Take 3 mLs by nebulization every 6 (six) hours as needed for Wheezing or Shortness of Breath. Rescue 75 mL 0 6/14/2023    apixaban (ELIQUIS) 5 mg Tab Take 1 tablet (5 mg total) by mouth 2 (two) times daily. 60 tablet 1 6/14/2023    aspirin (ECOTRIN) 81 MG EC tablet Take 1 tablet (81 mg total) by mouth once daily. 90 tablet 1 6/15/2023    BENADRYL 25 mg capsule Take 50 mg by mouth nightly.   6/15/2023    carvediloL (COREG) 6.25 MG tablet Take 1 tablet (6.25 mg total) by mouth 2 (two) times daily. 180 tablet 3 6/15/2023    clonazePAM (KLONOPIN) 0.5 MG tablet Take 0.5 mg by mouth every evening.    6/15/2023    DIALYVITE 100-1 mg Tab Take 1 tablet by mouth once daily.   6/15/2023    DULoxetine (CYMBALTA) 30 MG capsule Take 30 mg by mouth once daily.   6/15/2023    ergocalciferol (ERGOCALCIFEROL) 50,000 unit Cap Take 1 capsule (50,000 Units total) by mouth every 7 days. 12 capsule 2 6/15/2023    ferrous sulfate (FEOSOL) 325 mg (65 mg iron) Tab tablet Take 1 tablet (325 mg total) by mouth once daily. 90 tablet 2 6/15/2023    fluticasone furoate-vilanteroL (BREO) 100-25 mcg/dose diskus inhaler Inhale 1 puff into the lungs once daily. 90 each 2 6/15/2023    furosemide (LASIX) 40 MG tablet Take 1 tablet (40 mg total) by mouth once daily. 90 tablet 3 6/15/2023    glipiZIDE (GLUCOTROL) 10 MG tablet Take 1 tablet (10 mg total) by mouth daily with breakfast. 90 tablet 2 6/15/2023    INVEGA SUSTENNA 156 mg/mL Syrg injection Inject into the muscle.   6/15/2023    L-METHYLFOLATE 15 mg Tab Take 1 tablet by mouth.   6/14/2023    lactulose (CHRONULAC) 10 gram/15 mL solution Take 30 mLs by mouth.   6/14/2023    levothyroxine (SYNTHROID) 125 MCG tablet Take 1 tablet (125 mcg total) by mouth before breakfast. 90 tablet 2 6/15/2023    pantoprazole (PROTONIX) 40 MG tablet Take 1 tablet (40 mg total) by mouth 2 (two) times daily. 60 tablet 11 6/15/2023    rosuvastatin (CRESTOR) 40 MG Tab Take 1 tablet (40 mg total) by mouth once daily. 90 tablet 3 6/14/2023    sevelamer carbonate (RENVELA) 800 mg Tab Take 2 tablets (1,600 mg total) by mouth 3 (three) times daily. 90 tablet 0 6/14/2023    tiotropium (SPIRIVA WITH HANDIHALER) 18 mcg inhalation capsule Inhale 1 capsule (18 mcg total) into the lungs Daily. 90 capsule 2 6/14/2023    BASAGLAR KWIKPEN U-100 INSULIN glargine 100 units/mL SubQ pen Inject 30 Units into the skin Daily. 27 mL 2 Unknown    ONETOUCH DELICA PLUS LANCET 30 gauge Misc 1 lancet by Other route once daily. 100 each 2     ONETOUCH ULTRA TEST Strp 1 strip by Other route once daily. 200 strip 2 Unknown    pen needle,  "diabetic 31 gauge x 5/16" Ndle 2 Units by Misc.(Non-Drug; Combo Route) route 2 (two) times a day. Patient to check blood sugars twice daily (morning and night) 200 each 2 Unknown         Objective Findings:    Vital Signs:  BP (!) 148/70   Pulse 65   Temp 98.2 °F (36.8 °C) (Oral)   Resp 18   Ht 5' 6" (1.676 m)   Wt 89.2 kg (196 lb 9.6 oz)   SpO2 99%   BMI 31.73 kg/m²   Body mass index is 31.73 kg/m².    Physical Exam:  Physical Exam  Constitutional:       General: She is not in acute distress.     Appearance: She is ill-appearing. She is not diaphoretic.   HENT:      Head: Normocephalic and atraumatic.      Nose: Nose normal.   Eyes:      Extraocular Movements: Extraocular movements intact.   Cardiovascular:      Pulses: Normal pulses.   Pulmonary:      Effort: No respiratory distress.      Breath sounds: No stridor.   Abdominal:      General: There is no distension.      Palpations: Abdomen is soft.      Tenderness: There is no abdominal tenderness. There is no guarding.   Skin:     General: Skin is warm and dry.   Neurological:      Mental Status: She is alert.   Psychiatric:         Mood and Affect: Mood normal.         Behavior: Behavior normal.         Thought Content: Thought content normal.       Labs:  Recent Results (from the past 24 hour(s))   Comprehensive metabolic panel    Collection Time: 06/15/23  2:03 PM   Result Value Ref Range    Sodium Level 135 (L) 136 - 145 mmol/L    Potassium Level 4.1 3.5 - 5.1 mmol/L    Chloride 100 98 - 107 mmol/L    Carbon Dioxide 26 23 - 31 mmol/L    Glucose Level 227 (H) 82 - 115 mg/dL    Blood Urea Nitrogen 45.0 (H) 9.8 - 20.1 mg/dL    Creatinine 5.20 (H) 0.55 - 1.02 mg/dL    Calcium Level Total 8.6 8.4 - 10.2 mg/dL    Protein Total 5.9 5.8 - 7.6 gm/dL    Albumin Level 3.1 (L) 3.4 - 4.8 g/dL    Globulin 2.8 2.4 - 3.5 gm/dL    Albumin/Globulin Ratio 1.1 1.1 - 2.0 ratio    Bilirubin Total 0.3 <=1.5 mg/dL    Alkaline Phosphatase 113 40 - 150 unit/L    Alanine " Aminotransferase 46 0 - 55 unit/L    Aspartate Aminotransferase 51 (H) 5 - 34 unit/L    eGFR 8 mls/min/1.73/m2   Type & Screen    Collection Time: 06/15/23  2:03 PM   Result Value Ref Range    Group & Rh O POS     Indirect Gideon GEL NEG     Specimen Outdate 06/18/2023 23:59    CBC with Differential    Collection Time: 06/15/23  2:03 PM   Result Value Ref Range    WBC 8.99 4.50 - 11.50 x10(3)/mcL    RBC 1.77 (L) 4.20 - 5.40 x10(6)/mcL    Hgb 4.9 (LL) 12.0 - 16.0 g/dL    Hct 16.3 (LL) 37.0 - 47.0 %    MCV 92.1 80.0 - 94.0 fL    MCH 27.7 27.0 - 31.0 pg    MCHC 30.1 (L) 33.0 - 36.0 g/dL    RDW 21.2 (H) 11.5 - 17.0 %    Platelet 186 130 - 400 x10(3)/mcL    MPV 11.1 (H) 7.4 - 10.4 fL    Neut % 83.7 %    Lymph % 9.5 %    Mono % 5.2 %    Eos % 0.9 %    Basophil % 0.1 %    Lymph # 0.85 0.6 - 4.6 x10(3)/mcL    Neut # 7.53 2.1 - 9.2 x10(3)/mcL    Mono # 0.47 0.1 - 1.3 x10(3)/mcL    Eos # 0.08 0 - 0.9 x10(3)/mcL    Baso # 0.01 <=0.2 x10(3)/mcL    IG# 0.05 (H) 0 - 0.04 x10(3)/mcL    IG% 0.6 %    NRBC% 0.3 %   Prepare RBC 3 Units; Anemia    Collection Time: 06/15/23  2:03 PM   Result Value Ref Range    UNIT NUMBER [id number]     UNIT ABO/RH O POS     DISPENSE STATUS Transfused     Unit Expiration [id number]     Product Code Z6443S20     Unit Blood Type Code 5100     CROSSMATCH INTERPRETATION Compatible     UNIT NUMBER [id number]     UNIT ABO/RH O POS     DISPENSE STATUS Selected     Unit Expiration [id number]     Product Code L0770D07     Unit Blood Type Code 5100     CROSSMATCH INTERPRETATION Compatible     UNIT NUMBER [id number]     UNIT ABO/RH O POS     DISPENSE STATUS Transfused     Unit Expiration [id number]     Product Code Y2744R98     Unit Blood Type Code 5100     CROSSMATCH INTERPRETATION Compatible    Iron and TIBC    Collection Time: 06/15/23  2:03 PM   Result Value Ref Range    Iron Binding Capacity Unsaturated 158 70 - 310 ug/dL    Iron Level 69 50 - 170 ug/dL    Transferrin 196 173 - 360 mg/dL     Iron Binding Capacity Total 227 (L) 250 - 450 ug/dL    Iron Saturation 30 20 - 50 %   Vitamin B12    Collection Time: 06/15/23  2:03 PM   Result Value Ref Range    Vitamin B12 Level 1,094 (H) 213 - 816 pg/mL   Ferritin    Collection Time: 06/15/23  2:03 PM   Result Value Ref Range    Ferritin Level 3,095.41 (H) 4.63 - 204.00 ng/mL   POCT glucose    Collection Time: 06/15/23 10:32 PM   Result Value Ref Range    POCT Glucose 123 (H) 70 - 110 mg/dL   POCT glucose    Collection Time: 06/16/23  5:25 AM   Result Value Ref Range    POCT Glucose 73 70 - 110 mg/dL       No orders to display       Assessment/Plan:  This is a 77 y.o. female known to Dr. Bourgeois with PMH of ESRD on HD Tu/Th/Sa, HFpEF 55%, CAD/CABG x4 on ASA 81mg, HTN, HLD, COPD, venous insufficiency, hypothyroidism, anemia, gastric AVM, adenomatous colon polyps, Afib on Eliquis. She presented to the ED with outpatient labs significant for anemia associated with weakness, fatigue, dyspnea on exertion and black loose stool.     Chronic Anemia, hx bleeding AVMs  - initial hgb 4.9--2uPRBC, recommend she receive planned additional unit prior to endoscopy   - continue PPI  - Keep npo with plans for EGD today  ESRD on HD  Afib on eliquis  - currently held      Thank you for allowing us to participate in the care of Rosette Madrid.    Morelia Serrano PA-C  Gastroenterology  [facility]

## 2023-06-16 NOTE — PROVATION PATIENT INSTRUCTIONS
Discharge Summary/Instructions after an Endoscopic Procedure  Patient Name: Rosette Madrid  Patient MRN: [id number]  Patient YOB: 1945 Friday, June 16, 2023  Morgan Gardner MD  Dear patient,  As a result of recent federal legislation (The Federal Cures Act), you may   receive lab or pathology results from your procedure in your [facility]   account before your physician is able to contact you. Your physician or   their representative will relay the results to you with their   recommendations at their soonest availability.  Thank you,  RESTRICTIONS:  During your procedure today, you received medications for sedation.  These   medications may affect your judgment, balance and coordination.  Therefore,   for 24 hours, you have the following restrictions:   - DO NOT drive a car, operate machinery, make legal/financial decisions,   sign important papers or drink alcohol.    ACTIVITY:  Today: no heavy lifting, straining or running due to procedural   sedation/anesthesia.  The following day: return to full activity including work.  DIET:  Eat and drink normally unless instructed otherwise.     TREATMENT FOR COMMON SIDE EFFECTS:  - Mild abdominal pain, nausea, belching, bloating or excessive gas:  rest,   eat lightly and use a heating pad.  - Sore Throat: treat with throat lozenges and/or gargle with warm salt   water.  - Because air was used during the procedure, expelling large amounts of air   from your rectum or belching is normal.  - If a bowel prep was taken, you may not have a bowel movement for 1-3 days.    This is normal.  SYMPTOMS TO WATCH FOR AND REPORT TO YOUR PHYSICIAN:  1. Abdominal pain or bloating, other than gas cramps.  2. Chest pain.  3. Back pain.  4. Signs of infection such as: chills or fever occurring within 24 hours   after the procedure.  5. Rectal bleeding, which would show as bright red, maroon, or black stools.   (A tablespoon of blood from the rectum is not serious, especially if    hemorrhoids are present.)  6. Vomiting.  7. Weakness or dizziness.  GO DIRECTLY TO THE NEAREST EMERGENCY ROOM IF YOU HAVE ANY OF THE FOLLOWING:      Difficulty breathing              Chills and/or fever over 101 F   Persistent vomiting and/or vomiting blood   Severe abdominal pain   Severe chest pain   Black, tarry stools   Bleeding- more than one tablespoon   Any other symptom or condition that you feel may need urgent attention  Your doctor recommends these additional instructions:  If any biopsies were taken, your doctors clinic will contact you in 1 to 2   weeks with any results.  - Return patient to hospital [facility] for observation.   - Resume previous diet.   - Continue present medications.   - Resume Eliquis (apixaban) at prior dose tomorrow.   - Repeat upper endoscopy in 6 weeks with Dr. Bourgeois off anticoagulation for   duodenal polypectomy.  For questions, problems or results please call your physician - Morgan Gardner MD at Work:  [phone].  [facility], EMERGENCY ROOM PHONE NUMBER: [phone]  IF A COMPLICATION OR EMERGENCY SITUATION ARISES AND YOU ARE UNABLE TO REACH   YOUR PHYSICIAN - GO DIRECTLY TO THE EMERGENCY ROOM.  MD Morgan Ruiz MD  6/16/2023 1:36:22 PM  This report has been verified and signed electronically.  Dear patient,  As a result of recent federal legislation (The Federal Cures Act), you may   receive lab or pathology results from your procedure in your [facility]   account before your physician is able to contact you. Your physician or   their representative will relay the results to you with their   recommendations at their soonest availability.  Thank you,  PROVATION

## 2023-06-16 NOTE — ANESTHESIA POSTPROCEDURE EVALUATION
Anesthesia Post Evaluation    Patient: Rosette Madrid    Procedure(s) Performed: Procedure(s) (LRB):  EGD W/ PUSH (N/A)    Final Anesthesia Type: general      Patient location during evaluation: PACU  Patient participation: Yes- Able to Participate  Level of consciousness: awake and alert  Post-procedure vital signs: reviewed and stable  Pain management: adequate  Airway patency: patent      Anesthetic complications: no      Cardiovascular status: blood pressure returned to baseline  Respiratory status: unassisted  Hydration status: euvolemic  Follow-up not needed.          Vitals Value Taken Time   /61 06/16/23 1244   Temp 36.1 °C (97 °F) 06/16/23 1219   Pulse 57 06/16/23 1244   Resp 22 06/16/23 1244   SpO2 95 % 06/16/23 1244         No case tracking events are documented in the log.      Pain/Marco Score: Pain Rating Prior to Med Admin: 4 (6/16/2023  8:33 AM)  Pain Rating Post Med Admin: 2 (6/16/2023  8:00 AM)  Marco Score: 10 (6/16/2023 12:44 PM)

## 2023-06-16 NOTE — PROGRESS NOTES
[facility]  Hospital Medicine Progress Note        Chief Complaint: Inpatient Follow-up for     Subjective:  This is a 77-year-old female medical history of ESRD on hemodialysis, Chronic HFpEF, CAD/CABG, PAF on Eliquis, COPD, anemia of chronic disease, recurrent obscure GI bleeding with last EGD on 04/06/2023 showing single 3 mm AVM with no bleeding in the gastric body treated with APC/with notes of it being small and likely clinically insignificant.     Present to the ED with chief complaint of low hemoglobin at labs done at dialysis center and 3-4 day history of generalized weakness, fatigue and dyspnea on exertion and reports 2 day history of black loose stool.  Denies abdominal pain, nausea or vomiting.  Denies chest pain, fever or chills.     On arrival to ED she was afebrile, hypertensive, saturating 96% on room air.  Labs notable for hemoglobin 4.9.  Rectal exam by ED physician show guaiac-positive melanotic stool.  She was given Protonix 80 mg IV and started on blood transfusion and referred to hospital medicine service for further evaluation and management.     Seen and examined the patient.  Afebrile vitals stable, she responded to transfusion waiting for EGD.    Objective/physical exam:  General: In no acute distress, AAOx3   Chest: Clear to auscultation bilaterally, non-labored   Heart: RRR, +S1, S2, no appreciable murmur  Abdomen: Soft, nontender, BS +  MSK: Warm, no lower extremity edema, no clubbing or cyanosis  Neurologic:  Cranial nerve II-XII intact, Strength 5/5 in all 4 extremities    VITAL SIGNS: 24 HRS MIN & MAX LAST   Temp  Min: 97.5 °F (36.4 °C)  Max: 98.3 °F (36.8 °C) 98 °F (36.7 °C)   BP  Min: 104/51  Max: 170/68 (!) 148/70   Pulse  Min: 60  Max: 69  61   Resp  Min: 10  Max: 18 17   SpO2  Min: 96 %  Max: 100 % 99 %       Labs, Microbiology and Imaging were Reviewed.      Microbiology Results (last 7 days)       ** No results found for the last 168 hours. **                Medications   atorvastatin  40 mg Oral Daily    carvediloL  6.25 mg Oral BID    clonazePAM  0.5 mg Oral QHS    DULoxetine  30 mg Oral Daily    fluticasone furoate-vilanteroL  1 puff Inhalation Daily    furosemide  40 mg Oral Daily    levothyroxine  125 mcg Oral Before breakfast    pantoprazole  40 mg Intravenous Q12H    sevelamer carbonate  1,600 mg Oral TID WM    tiotropium bromide  2 puff Inhalation Daily    vitamin renal formula (B-complex-vitamin c-folic acid)  1 capsule Oral Daily        sodium chloride, acetaminophen, acetaminophen, albuterol-ipratropium, aluminum-magnesium hydroxide-simethicone, cloNIDine, dextrose 10%, dextrose 10%, diphenhydrAMINE, glucagon (human recombinant), hydrALAZINE, insulin aspart U-100, melatonin, ondansetron, polyethylene glycol, senna-docusate 8.6-50 mg, sodium chloride 0.9%     Radiology:  CV Ultrasound Bilateral Doppler Carotid  The right internal carotid artery demonstrated a stenosis on the upper end   of the 50-69% stenosis range based on PSV and ICA/ CCA ratio.  The right ICA/CCA ratio was 3.7.  There is a moderate amount of calcified plaque in the right carotid bulb   and proximal ICA.  The right vertebral artery was patent with antegrade flow.    The left internal carotid artery demonstrated less than a 50% stenosis.  There is a mild amount of calcified plaque in the left carotid bulb and   proximal ICA.  The left vertebral artery was patent with antegrade flow.      Assessment/Plan:  Symptomatic acute on chronic anemia  Recurrent obscure GI bleed/melena     HX ESRD on HD, CAD/CABG, PAF on Eliquis, COPD, HTN, HLD, hypothyroid     Plan:     - patient responded to transfusion,   -continue Protonix b.i.d. pending EGD.    -appreciate further GI recs.    -    All diagnosis and differential diagnosis have been reviewed; assessment and plan has been documented; I have personally reviewed the labs and test results that are presently available; I have reviewed the  patients medication list; I have reviewed the consulting providers response and recommendations. I have reviewed or attempted to review medical records based upon their availability.       Dewey Mckeon MD   06/16/2023

## 2023-06-16 NOTE — H&P
[facility] Hospital Medicine - H&P Note    Patient Name: Rosette Madrid  : 1945  MRN: [id number]  PCP: JOSEFINA Lord  Admitting Physician: Seb Bernal MD  Admission Class: IP- Inpatient   Length of Stay: 0  Face-to-Face encounter date: 06/15/2023  Code status: Full    Chief Complaint   Abnormal labs at dialysis center-hemoglobin 5.0    History of Present Illness   This is a 77-year-old female medical history of ESRD on hemodialysis, Chronic HFpEF, CAD/CABG, PAF on Eliquis, COPD, anemia of chronic disease, recurrent obscure GI bleeding with last EGD on 2023 showing single 3 mm AVM with no bleeding in the gastric body treated with APC/with notes of it being small and likely clinically insignificant.    Present to the ED with chief complaint of low hemoglobin at labs done at dialysis center and 3-4 day history of generalized weakness, fatigue and dyspnea on exertion and reports 2 day history of black loose stool.  Denies abdominal pain, nausea or vomiting.  Denies chest pain, fever or chills.    On arrival to ED she was afebrile, hypertensive, saturating 96% on room air.  Labs notable for hemoglobin 4.9.  Rectal exam by ED physician show guaiac-positive melanotic stool.  She was given Protonix 80 mg IV and started on blood transfusion and referred to hospital medicine service for further evaluation and management.    ROS   Except as documented, all other systems reviewed and negative     Past Medical History   ESRD on HD TTS  HFpEF 55%   CAD/CABG  PAF on Eliquis  COPD   Hypertension  Hyperlipidemia  Venous insufficiency   Hypothyroidism  Recurrent obscure GI bleeding  Anemia of chronic disease and blood loss    Past Surgical History     Past Surgical History:   Procedure Laterality Date    AV FISTULA PLACEMENT Left     CARDIAC CATHETERIZATION      COLONOSCOPY      CORONARY ARTERY BYPASS GRAFT      EGD, WITH HEMORRHAGE CONTROL  3/24/2023    Procedure: EGD,WITH HEMORRHAGE  CONTROL;  Surgeon: Go Le MD;  Location: [facility] ENDOSCOPY;  Service: Gastroenterology;;    EGD, WITH HEMORRHAGE CONTROL  4/6/2023    Procedure: EGD,WITH HEMORRHAGE CONTROL;  Surgeon: Ayden Francois MD;  Location: [facility] ENDOSCOPY;  Service: Gastroenterology;;    ESOPHAGOGASTRODUODENOSCOPY      ESOPHAGOGASTRODUODENOSCOPY N/A 2/17/2023    Procedure: EGD +/- VCE PLACEMENT;  Surgeon: Morgan Gardner MD;  Location: [facility] ENDOSCOPY;  Service: Gastroenterology;  Laterality: N/A;    ESOPHAGOGASTRODUODENOSCOPY N/A 3/24/2023    Procedure: EGD;  Surgeon: Go Le MD;  Location: [facility] ENDOSCOPY;  Service: Gastroenterology;  Laterality: N/A;  with push    ESOPHAGOGASTRODUODENOSCOPY N/A 4/6/2023    Procedure: EGD;  Surgeon: Ayden Francois MD;  Location: [facility] ENDOSCOPY;  Service: Gastroenterology;  Laterality: N/A;  with push    POLYPECTOMY      SMALL BOWEL ENTEROSCOPY Left 1/20/2023    Procedure: ENTEROSCOPY;  Surgeon: Lexi Scales MD;  Location: [facility] ENDOSCOPY;  Service: Gastroenterology;  Laterality: Left;    THYROIDECTOMY      TUBAL LIGATION         Social History     Social History     Tobacco Use    Smoking status: Every Day     Packs/day: 0.25     Types: Cigarettes    Smokeless tobacco: Never    Tobacco comments:     Smokes 4 cigarettes a day   Substance Use Topics    Alcohol use: Yes     Comment: 1 glass every 2-3 month        Family History   Reviewed and negative    Allergies   Lisinopril, Azithromycin, Baclofen, and Doxycycline    Home Medications     Prior to Admission medications    Medication Sig Start Date End Date Taking? Authorizing Provider   albuterol (VENTOLIN HFA) 90 mcg/actuation inhaler Inhale 2 puffs into the lungs every 6 (six) hours as needed for Wheezing. Rescue 3/1/23  Yes JOSEFINA Lord   albuterol-ipratropium (DUO-NEB) 2.5 mg-0.5 mg/3 mL nebulizer solution Take 3 mLs by nebulization every 6 (six) hours as needed for Wheezing or Shortness of  Breath. Rescue 12/19/22  Yes JOSEFINA Lord   apixaban (ELIQUIS) 5 mg Tab Take 1 tablet (5 mg total) by mouth 2 (two) times daily. 5/16/23  Yes JOSEFINA Castellanos   aspirin (ECOTRIN) 81 MG EC tablet Take 1 tablet (81 mg total) by mouth once daily. 11/23/22  Yes JOSEFINA Lord   BENADRYL 25 mg capsule Take 50 mg by mouth nightly. 2/23/22  Yes Historical Provider   carvediloL (COREG) 6.25 MG tablet Take 1 tablet (6.25 mg total) by mouth 2 (two) times daily. 12/5/22 12/5/23 Yes YUE Balderas   clonazePAM (KLONOPIN) 0.5 MG tablet Take 0.5 mg by mouth every evening. 7/13/22  Yes Historical Provider   DIALYVITE 100-1 mg Tab Take 1 tablet by mouth once daily. 8/13/22  Yes Historical Provider   DULoxetine (CYMBALTA) 30 MG capsule Take 30 mg by mouth once daily. 7/27/22  Yes Historical Provider   ergocalciferol (ERGOCALCIFEROL) 50,000 unit Cap Take 1 capsule (50,000 Units total) by mouth every 7 days. 3/1/23  Yes JOSEFINA Lord   ferrous sulfate (FEOSOL) 325 mg (65 mg iron) Tab tablet Take 1 tablet (325 mg total) by mouth once daily. 3/1/23  Yes JOSEFINA Lord   fluticasone furoate-vilanteroL (BREO) 100-25 mcg/dose diskus inhaler Inhale 1 puff into the lungs once daily. 3/1/23  Yes JOSEFINA Lord   furosemide (LASIX) 40 MG tablet Take 1 tablet (40 mg total) by mouth once daily. 12/5/22 12/5/23 Yes YUE Balderas   glipiZIDE (GLUCOTROL) 10 MG tablet Take 1 tablet (10 mg total) by mouth daily with breakfast. 3/1/23  Yes JOSEFINA Lord   INVEGA SUSTENNA 156 mg/mL Syrg injection Inject into the muscle. 8/2/22  Yes Historical Provider   L-METHYLFOLATE 15 mg Tab Take 1 tablet by mouth. 4/26/23  Yes Historical Provider   lactulose (CHRONULAC) 10 gram/15 mL solution Take 30 mLs by mouth. 4/18/23  Yes Historical Provider   levothyroxine (SYNTHROID) 125 MCG tablet Take 1 tablet (125 mcg total) by mouth before breakfast. 3/1/23  Yes JOSEFINA Lord   pantoprazole (PROTONIX) 40 MG  "tablet Take 1 tablet (40 mg total) by mouth 2 (two) times daily. 2/20/23 2/20/24 Yes Timo Ang MD   rosuvastatin (CRESTOR) 40 MG Tab Take 1 tablet (40 mg total) by mouth once daily. 12/5/22 12/5/23 Yes YUE Balderas   sevelamer carbonate (RENVELA) 800 mg Tab Take 2 tablets (1,600 mg total) by mouth 3 (three) times daily. 3/25/23  Yes Noah Cowan MD   tiotropium (SPIRIVA WITH HANDIHALER) 18 mcg inhalation capsule Inhale 1 capsule (18 mcg total) into the lungs Daily. 3/1/23  Yes JOSEFINA Lord   BASAGLAR KWIKPEN U-100 INSULIN glargine 100 units/mL SubQ pen Inject 30 Units into the skin Daily. 3/1/23   JOSEFINA Lord   ONETOUCH DELICA PLUS LANCET 30 gauge Misc 1 lancet by Other route once daily. 5/9/22   JOSEFINA Lord   ONETOUCH ULTRA TEST Strp 1 strip by Other route once daily. 11/23/22   JOSEFINA Lord   pen needle, diabetic 31 gauge x 5/16" Ndle 2 Units by Misc.(Non-Drug; Combo Route) route 2 (two) times a day. Patient to check blood sugars twice daily (morning and night) 3/1/23   JOSEFINA Lord        Physical Exam   Vital Signs  Temp:  [97.5 °F (36.4 °C)-98.3 °F (36.8 °C)]   Pulse:  [60-68]   Resp:  [10-18]   BP: (104-170)/(37-69)   SpO2:  [96 %-100 %]    General: Appears comfortable  HEENT: NC/AT  Neck:  No JVD  Chest: CTABL  CVS: Regular rhythm. Normal S1/S2.  Trace pedal edema  Abdomen: nondistended, normoactive BS, soft and non-tender.  MSK: No obvious deformity or joint swelling  Skin: Warm and dry  Neuro: AAOx3, no focal neurological deficit  Psych: Cooperative    Labs     Recent Labs     06/15/23  1403   WBC 8.99   RBC 1.77*   HGB 4.9*   HCT 16.3*   MCV 92.1   MCH 27.7   MCHC 30.1*   RDW 21.2*           Recent Labs     06/15/23  1403   *   K 4.1   CHLORIDE 100   CO2 26   BUN 45.0*   CREATININE 5.20*   EGFRNORACEVR 8   GLUCOSE 227*   CALCIUM 8.6   ALBUMIN 3.1*   GLOBULIN 2.8   ALKPHOS 113   ALT 46   AST 51*   BILITOT 0.3          Imaging     No orders " to display     Assessment & Plan   Symptomatic acute on chronic anemia  Recurrent obscure GI bleed/melena    HX ESRD on HD, CAD/CABG, PAF on Eliquis, COPD, HTN, HLD, hypothyroid    Plan:    Transfuse 2 units PRBC tonight, Lasix 40 mg IV x1 (patient still produce urine and takes Lasix 40 mg daily at home)  Transfuse 1 unit PRBC with dialysis tomorrow  Repeat CBC in a.m.  Protonix 40 mg IV BID  Hold aspirin and Eliquis  Remaining home medication reviewed and resumed  GI and nephrology consulted  VTE Prophylaxis:  SCDs     Critical care time:  35 minutes  Critical care diagnosis:  GI bleed with symptomatic anemia    Seb Bernal MD  Internal Medicine

## 2023-06-17 LAB
ABO + RH BLD: NORMAL
ALBUMIN SERPL-MCNC: 2.5 G/DL (ref 3.4–4.8)
ALBUMIN/GLOB SERPL: 1.3 RATIO (ref 1.1–2)
ALP SERPL-CCNC: 128 UNIT/L (ref 40–150)
ALT SERPL-CCNC: 50 UNIT/L (ref 0–55)
AST SERPL-CCNC: 52 UNIT/L (ref 5–34)
BASOPHILS # BLD AUTO: 0.02 X10(3)/MCL
BASOPHILS NFR BLD AUTO: 0.3 %
BILIRUBIN DIRECT+TOT PNL SERPL-MCNC: 0.3 MG/DL
BLD PROD TYP BPU: NORMAL
BLOOD UNIT EXPIRATION DATE: NORMAL
BLOOD UNIT TYPE CODE: 5100
BUN SERPL-MCNC: 61 MG/DL (ref 9.8–20.1)
CALCIUM SERPL-MCNC: 7.9 MG/DL (ref 8.4–10.2)
CHLORIDE SERPL-SCNC: 102 MMOL/L (ref 98–107)
CO2 SERPL-SCNC: 28 MMOL/L (ref 23–31)
CREAT SERPL-MCNC: 5.27 MG/DL (ref 0.55–1.02)
CROSSMATCH INTERPRETATION: NORMAL
DISPENSE STATUS: NORMAL
EOSINOPHIL # BLD AUTO: 0.09 X10(3)/MCL (ref 0–0.9)
EOSINOPHIL NFR BLD AUTO: 1.2 %
ERYTHROCYTE [DISTWIDTH] IN BLOOD BY AUTOMATED COUNT: 19.3 % (ref 11.5–17)
GFR SERPLBLD CREATININE-BSD FMLA CKD-EPI: 8 MLS/MIN/1.73/M2
GLOBULIN SER-MCNC: 1.9 GM/DL (ref 2.4–3.5)
GLUCOSE SERPL-MCNC: 136 MG/DL (ref 82–115)
HCT VFR BLD AUTO: 20.1 % (ref 37–47)
HGB BLD-MCNC: 6.2 G/DL (ref 12–16)
IMM GRANULOCYTES # BLD AUTO: 0.03 X10(3)/MCL (ref 0–0.04)
IMM GRANULOCYTES NFR BLD AUTO: 0.4 %
LYMPHOCYTES # BLD AUTO: 0.88 X10(3)/MCL (ref 0.6–4.6)
LYMPHOCYTES NFR BLD AUTO: 12 %
MCH RBC QN AUTO: 27.9 PG (ref 27–31)
MCHC RBC AUTO-ENTMCNC: 30.8 G/DL (ref 33–36)
MCV RBC AUTO: 90.5 FL (ref 80–94)
MONOCYTES # BLD AUTO: 0.52 X10(3)/MCL (ref 0.1–1.3)
MONOCYTES NFR BLD AUTO: 7.1 %
NEUTROPHILS # BLD AUTO: 5.8 X10(3)/MCL (ref 2.1–9.2)
NEUTROPHILS NFR BLD AUTO: 79 %
NRBC BLD AUTO-RTO: 0 %
PLATELET # BLD AUTO: 171 X10(3)/MCL (ref 130–400)
PMV BLD AUTO: 11 FL (ref 7.4–10.4)
POCT GLUCOSE: 132 MG/DL (ref 70–110)
POCT GLUCOSE: 176 MG/DL (ref 70–110)
POCT GLUCOSE: 177 MG/DL (ref 70–110)
POTASSIUM SERPL-SCNC: 4.1 MMOL/L (ref 3.5–5.1)
PROT SERPL-MCNC: 4.4 GM/DL (ref 5.8–7.6)
RBC # BLD AUTO: 2.22 X10(6)/MCL (ref 4.2–5.4)
SODIUM SERPL-SCNC: 137 MMOL/L (ref 136–145)
UNIT NUMBER: NORMAL
WBC # SPEC AUTO: 7.34 X10(3)/MCL (ref 4.5–11.5)

## 2023-06-17 PROCEDURE — 80053 COMPREHEN METABOLIC PANEL: CPT | Performed by: STUDENT IN AN ORGANIZED HEALTH CARE EDUCATION/TRAINING PROGRAM

## 2023-06-17 PROCEDURE — P9016 RBC LEUKOCYTES REDUCED: HCPCS | Performed by: STUDENT IN AN ORGANIZED HEALTH CARE EDUCATION/TRAINING PROGRAM

## 2023-06-17 PROCEDURE — 21400001 HC TELEMETRY ROOM

## 2023-06-17 PROCEDURE — 80100016 HC MAINTENANCE HEMODIALYSIS

## 2023-06-17 PROCEDURE — C9113 INJ PANTOPRAZOLE SODIUM, VIA: HCPCS | Performed by: INTERNAL MEDICINE

## 2023-06-17 PROCEDURE — 63600175 PHARM REV CODE 636 W HCPCS: Performed by: INTERNAL MEDICINE

## 2023-06-17 PROCEDURE — 85025 COMPLETE CBC W/AUTO DIFF WBC: CPT | Performed by: STUDENT IN AN ORGANIZED HEALTH CARE EDUCATION/TRAINING PROGRAM

## 2023-06-17 PROCEDURE — 25000003 PHARM REV CODE 250: Performed by: INTERNAL MEDICINE

## 2023-06-17 PROCEDURE — 63600175 PHARM REV CODE 636 W HCPCS: Mod: JZ | Performed by: STUDENT IN AN ORGANIZED HEALTH CARE EDUCATION/TRAINING PROGRAM

## 2023-06-17 RX ORDER — SODIUM CHLORIDE, SODIUM LACTATE, POTASSIUM CHLORIDE, CALCIUM CHLORIDE 600; 310; 30; 20 MG/100ML; MG/100ML; MG/100ML; MG/100ML
INJECTION, SOLUTION INTRAVENOUS CONTINUOUS
Status: DISCONTINUED | OUTPATIENT
Start: 2023-06-17 | End: 2023-06-17

## 2023-06-17 RX ADMIN — PANTOPRAZOLE SODIUM 40 MG: 40 INJECTION, POWDER, FOR SOLUTION INTRAVENOUS at 06:06

## 2023-06-17 RX ADMIN — SEVELAMER CARBONATE 1600 MG: 800 TABLET, FILM COATED ORAL at 07:06

## 2023-06-17 RX ADMIN — NEPHROCAP 1 CAPSULE: 1 CAP ORAL at 08:06

## 2023-06-17 RX ADMIN — CARVEDILOL 6.25 MG: 3.12 TABLET, FILM COATED ORAL at 08:06

## 2023-06-17 RX ADMIN — DULOXETINE HYDROCHLORIDE 30 MG: 30 CAPSULE, DELAYED RELEASE ORAL at 08:06

## 2023-06-17 RX ADMIN — LEVOTHYROXINE SODIUM 125 MCG: 0.12 TABLET ORAL at 05:06

## 2023-06-17 RX ADMIN — SEVELAMER CARBONATE 1600 MG: 800 TABLET, FILM COATED ORAL at 06:06

## 2023-06-17 RX ADMIN — ATORVASTATIN CALCIUM 40 MG: 40 TABLET, FILM COATED ORAL at 08:06

## 2023-06-17 RX ADMIN — PANTOPRAZOLE SODIUM 40 MG: 40 INJECTION, POWDER, FOR SOLUTION INTRAVENOUS at 05:06

## 2023-06-17 RX ADMIN — SEVELAMER CARBONATE 1600 MG: 800 TABLET, FILM COATED ORAL at 12:06

## 2023-06-17 RX ADMIN — CLONAZEPAM 0.5 MG: 0.5 TABLET ORAL at 08:06

## 2023-06-17 RX ADMIN — EPOETIN ALFA-EPBX 20000 UNITS: 10000 INJECTION, SOLUTION INTRAVENOUS; SUBCUTANEOUS at 08:06

## 2023-06-17 NOTE — PROGRESS NOTES
Nephrology consult follow up note    HPI:      Rosette Madrid is a 77 y.o. female  with anemia.  Past medical history significant for ESRD on hemodialysis TTS at [facility], HFpEF, coronary artery disease, AFib, COPD, anemia, and recurrent GI bleeds.  Se presented with anemia and reporting black loose stools.  At admission she was found to have a hemoglobin of 4.9 and given 2 units PRBC with initial improvement in her hemoglobin of 7.1.  Her last hemodialysis before admission was 6/10/23.  EGD was done 06/16/2023, which did not show any active bleeding.  Nephrology consulted for ESRD management.    Interval history:     No acute events overnight.  No new complaints.  She denies chest pain, shortness of breath, abdominal pain, nausea, or vomiting.  She does have mild lower extremity edema.     Review of Systems:     Comprehensive 10pt ROS negative except as noted per HPI.    Past medical, family, surgical, and social history reviewed and unchanged from initial consult note.     Objective:       VITAL SIGNS: 24 HR MIN & MAX LAST    Temp  Min: 97 °F (36.1 °C)  Max: 98.6 °F (37 °C)  98.3 °F (36.8 °C)        BP  Min: 92/45  Max: 133/60  (!) 108/58     Pulse  Min: 57  Max: 64  60     Resp  Min: 15  Max: 22  16    SpO2  Min: 94 %  Max: 100 %  97 %      GEN: Chronically ill appearing AAF in NAD  CV: RRR +S1,S2 with systolic murmur  PULM: CTAB, unlabored  ABD: Soft, NT/ND abdomen with NABS  EXT:  1+ lower extremity edema  SKIN: Warm and dry  PSYCH: Awake, alert and appropriately conversant.   Vascular access:  LUE AVF with good pulsation and thrill            Component Value Date/Time     06/17/2023 0516     06/16/2023 0709    K 4.1 06/17/2023 0516    K 3.7 06/16/2023 0709    CHLORIDE 102 06/17/2023 0516    CHLORIDE 104 06/16/2023 0709    CO2 28 06/17/2023 0516    CO2 27 06/16/2023 0709    BUN 61.0 (H) 06/17/2023 0516    BUN 50.0 (H) 06/16/2023 0709    CREATININE 5.27 (H) 06/17/2023 0516    CREATININE 5.17 (H)  06/16/2023 0709    CALCIUM 7.9 (L) 06/17/2023 0516    CALCIUM 8.6 06/16/2023 0709    PHOS 3.8 06/16/2023 0709            Component Value Date/Time    WBC 7.34 06/17/2023 0516    WBC 8.39 06/16/2023 0709    HGB 6.2 (L) 06/17/2023 0516    HGB 7.1 (L) 06/16/2023 0709    HCT 20.1 (L) 06/17/2023 0516    HCT 21.8 (L) 06/16/2023 0709     06/17/2023 0516     06/16/2023 0709         Imaging reviewed      Assessment / Plan:       Active Hospital Problems    Diagnosis  POA    *Symptomatic anemia [D64.9]  Yes    Melena [K92.1]  Yes      Resolved Hospital Problems   No resolved problems to display.       ESRD - normally TTS at [facility]  Anemia - EPO 20,000 units TTS  GI bleed - GI following.  EGD with no active bleeding 6/16/23.     Plan:  Plan for hemodialysis today on regular TTS schedule.  2-3 L UF as tolerated.  Hemoglobin continues to decline.  We will give 2 units PRBC with dialysis today.    Fco Donis DO  Nephrology  [facility]  Clinic number: [phone]

## 2023-06-17 NOTE — PROGRESS NOTES
[facility]  Hospital Medicine Progress Note        Chief Complaint: Inpatient Follow-up for     Subjective:  This is a 77-year-old female medical history of ESRD on hemodialysis, Chronic HFpEF, CAD/CABG, PAF on Eliquis, COPD, anemia of chronic disease, recurrent obscure GI bleeding with last EGD on 04/06/2023 showing single 3 mm AVM with no bleeding in the gastric body treated with APC/with notes of it being small and likely clinically insignificant.     Present to the ED with chief complaint of low hemoglobin at labs done at dialysis center and 3-4 day history of generalized weakness, fatigue and dyspnea on exertion and reports 2 day history of black loose stool.  Denies abdominal pain, nausea or vomiting.  Denies chest pain, fever or chills.     On arrival to ED she was afebrile, hypertensive, saturating 96% on room air.  Labs notable for hemoglobin 4.9.  Rectal exam by ED physician show guaiac-positive melanotic stool.  She was given Protonix 80 mg IV and started on blood transfusion and referred to hospital medicine service for further evaluation and management.     Seen and examined the pt. Agree with above history pe and management.   Hg is continuing to trend down receiving RBCs with HD today.     Objective/physical exam:  General: In no acute distress, AAOx3   Chest: Clear to auscultation bilaterally, non-labored   Heart: RRR, +S1, S2, no appreciable murmur  Abdomen: Soft, nontender, BS +  MSK: Warm, no lower extremity edema, no clubbing or cyanosis  Neurologic:  Cranial nerve II-XII intact, Strength 5/5 in all 4 extremities    VITAL SIGNS: 24 HRS MIN & MAX LAST   Temp  Min: 97 °F (36.1 °C)  Max: 98.6 °F (37 °C) 98.3 °F (36.8 °C)   BP  Min: 92/45  Max: 133/60 (!) 108/58   Pulse  Min: 57  Max: 64  60   Resp  Min: 15  Max: 22 16   SpO2  Min: 94 %  Max: 100 % 97 %       Labs, Microbiology and Imaging were Reviewed.      Microbiology Results (last 7 days)       ** No results found  for the last 168 hours. **               Medications   atorvastatin  40 mg Oral Daily    carvediloL  6.25 mg Oral BID    clonazePAM  0.5 mg Oral QHS    DULoxetine  30 mg Oral Daily    epoetin alexis-ebpx (RETACRIT) injection  20,000 Units Subcutaneous Every Tues, Thurs, Sat    fluticasone furoate-vilanteroL  1 puff Inhalation Daily    furosemide  40 mg Oral Daily    levothyroxine  125 mcg Oral Before breakfast    pantoprazole  40 mg Intravenous Q12H    sevelamer carbonate  1,600 mg Oral TID WM    tiotropium bromide  2 puff Inhalation Daily    vitamin renal formula (B-complex-vitamin c-folic acid)  1 capsule Oral Daily        sodium chloride, acetaminophen, acetaminophen, albuterol-ipratropium, aluminum-magnesium hydroxide-simethicone, cloNIDine, dextrose 10%, dextrose 10%, diphenhydrAMINE, glucagon (human recombinant), hydrALAZINE, insulin aspart U-100, melatonin, ondansetron, polyethylene glycol, senna-docusate 8.6-50 mg, sodium chloride 0.9%     Radiology:  CV Ultrasound Bilateral Doppler Carotid  The right internal carotid artery demonstrated a stenosis on the upper end   of the 50-69% stenosis range based on PSV and ICA/ CCA ratio.  The right ICA/CCA ratio was 3.7.  There is a moderate amount of calcified plaque in the right carotid bulb   and proximal ICA.  The right vertebral artery was patent with antegrade flow.    The left internal carotid artery demonstrated less than a 50% stenosis.  There is a mild amount of calcified plaque in the left carotid bulb and   proximal ICA.  The left vertebral artery was patent with antegrade flow.      Assessment/Plan:  Symptomatic acute on chronic anemia  Recurrent obscure GI bleed/melena     HX ESRD on HD, CAD/CABG, PAF on Eliquis, COPD, HTN, HLD, hypothyroid     Plan:     - Patient responded to transfusion,   - Continue Protonix b.i.d. pending EGD.    - Appreciate further GI recs.    - Receiving RBCs with HD.     CC diagnosis symptomatic anemia   CC time was given 45 min.      All diagnosis and differential diagnosis have been reviewed; assessment and plan has been documented; I have personally reviewed the labs and test results that are presently available; I have reviewed the patients medication list; I have reviewed the consulting providers response and recommendations. I have reviewed or attempted to review medical records based upon their availability.       Dewey Mckeon MD   06/17/2023

## 2023-06-17 NOTE — PROGRESS NOTES
"Gastroenterology Progress Note    Subjective:  Patient without any acute complaints.  Denies abdominal pain, nausea or vomiting.  Has not had a BM since admit.    Objective:    ROS:    Review of Systems   Constitutional:  Negative for chills and fever.   HENT:  Negative for sore throat.    Respiratory:  Negative for shortness of breath, wheezing and stridor.    Gastrointestinal:  Negative for abdominal pain, blood in stool, heartburn, melena, nausea and vomiting.   Musculoskeletal:  Negative for falls.   Skin:  Negative for itching and rash.   Neurological:  Negative for seizures and loss of consciousness.   Psychiatric/Behavioral:  The patient is not nervous/anxious.        Vital Signs:  BP (!) 108/58 (BP Location: Right arm, Patient Position: Lying)   Pulse 60   Temp 98.3 °F (36.8 °C) (Oral)   Resp 16   Ht 5' 6" (1.676 m)   Wt 89.2 kg (196 lb 9.6 oz)   SpO2 97%   BMI 31.73 kg/m²   Body mass index is 31.73 kg/m².    Physical Exam:    Physical Exam  Constitutional:       General: She is not in acute distress.  HENT:      Head: Normocephalic and atraumatic.      Nose: Nose normal.   Eyes:      Extraocular Movements: Extraocular movements intact.   Cardiovascular:      Pulses: Normal pulses.   Pulmonary:      Effort: No respiratory distress.      Breath sounds: No stridor.   Abdominal:      Tenderness: There is no abdominal tenderness. There is no guarding.   Skin:     General: Skin is warm and dry.   Neurological:      General: No focal deficit present.      Mental Status: She is alert. Mental status is at baseline.   Psychiatric:         Mood and Affect: Mood normal.         Behavior: Behavior normal.         Thought Content: Thought content normal.         Judgment: Judgment normal.       Labs:  Recent Results (from the past 24 hour(s))   POCT glucose    Collection Time: 06/16/23 11:39 AM   Result Value Ref Range    POCT Glucose 114 (H) 70 - 110 mg/dL   POCT glucose    Collection Time: 06/16/23  4:43 PM "   Result Value Ref Range    POCT Glucose 240 (H) 70 - 110 mg/dL   POCT glucose    Collection Time: 06/16/23  8:50 PM   Result Value Ref Range    POCT Glucose 259 (H) 70 - 110 mg/dL   Comprehensive Metabolic Panel    Collection Time: 06/17/23  5:16 AM   Result Value Ref Range    Sodium Level 137 136 - 145 mmol/L    Potassium Level 4.1 3.5 - 5.1 mmol/L    Chloride 102 98 - 107 mmol/L    Carbon Dioxide 28 23 - 31 mmol/L    Glucose Level 136 (H) 82 - 115 mg/dL    Blood Urea Nitrogen 61.0 (H) 9.8 - 20.1 mg/dL    Creatinine 5.27 (H) 0.55 - 1.02 mg/dL    Calcium Level Total 7.9 (L) 8.4 - 10.2 mg/dL    Protein Total 4.4 (L) 5.8 - 7.6 gm/dL    Albumin Level 2.5 (L) 3.4 - 4.8 g/dL    Globulin 1.9 (L) 2.4 - 3.5 gm/dL    Albumin/Globulin Ratio 1.3 1.1 - 2.0 ratio    Bilirubin Total 0.3 <=1.5 mg/dL    Alkaline Phosphatase 128 40 - 150 unit/L    Alanine Aminotransferase 50 0 - 55 unit/L    Aspartate Aminotransferase 52 (H) 5 - 34 unit/L    eGFR 8 mls/min/1.73/m2   CBC with Differential    Collection Time: 06/17/23  5:16 AM   Result Value Ref Range    WBC 7.34 4.50 - 11.50 x10(3)/mcL    RBC 2.22 (L) 4.20 - 5.40 x10(6)/mcL    Hgb 6.2 (L) 12.0 - 16.0 g/dL    Hct 20.1 (L) 37.0 - 47.0 %    MCV 90.5 80.0 - 94.0 fL    MCH 27.9 27.0 - 31.0 pg    MCHC 30.8 (L) 33.0 - 36.0 g/dL    RDW 19.3 (H) 11.5 - 17.0 %    Platelet 171 130 - 400 x10(3)/mcL    MPV 11.0 (H) 7.4 - 10.4 fL    Neut % 79.0 %    Lymph % 12.0 %    Mono % 7.1 %    Eos % 1.2 %    Basophil % 0.3 %    Lymph # 0.88 0.6 - 4.6 x10(3)/mcL    Neut # 5.80 2.1 - 9.2 x10(3)/mcL    Mono # 0.52 0.1 - 1.3 x10(3)/mcL    Eos # 0.09 0 - 0.9 x10(3)/mcL    Baso # 0.02 <=0.2 x10(3)/mcL    IG# 0.03 0 - 0.04 x10(3)/mcL    IG% 0.4 %    NRBC% 0.0 %   POCT glucose    Collection Time: 06/17/23  5:57 AM   Result Value Ref Range    POCT Glucose 132 (H) 70 - 110 mg/dL         Assessment/Plan:  This is a 77 y.o. female known to Dr. Bourgeois with PMH of ESRD on HD Tu/Th/Sa, HFpEF 55%, CAD/CABG x4 on ASA 81mg,  HTN, HLD, COPD, venous insufficiency, hypothyroidism, anemia, gastric AVM, adenomatous colon polyps, Afib on Eliquis. She presented to the ED with outpatient labs significant for anemia associated with weakness, fatigue, dyspnea on exertion and black loose stool.      Chronic Anemia, hx bleeding AVMs  -s/p EGD w/ push with 1 nonbleeding AVM in the duodenum treated coagulation.  A single duodenal polyp-resection not attempted.  - initial hgb 4.9--2uPRBC--7.1--6.2--2u---  - continue PPI  - hold anticoagulation today and agree with transfusion  - Though no overt evidence of bleeding, if inappropriate response to transfusion we will need to consider video capsule endoscopy.   ESRD on HD  Afib on eliquis          Thank you for allowing us to participate in the care of Rosette Madrid.       Morelia Serrano PA-C  Gastroenterology  [facility]     I have seen seen and examined Rosette Madrid along with the following provider. I agree with the findings and plan.    No evidence of over bleeding at this time. If Hgb trends down again, will need to consider VCE to further evaluate    Morgan Gardner MD  Gastroenterology

## 2023-06-17 NOTE — NURSING
06/17/23 1816   Post-Hemodialysis Assessment   Blood Volume Processed (Liters) 71.2 L   Dialyzer Clearance Clear   Duration of Treatment 180 minutes   Total UF (mL) 2000 mL   Patient Response to Treatment Tolerated well   Post-Hemodialysis Comments Tx ended, PT reinfused, Hemostasis achieved.

## 2023-06-17 NOTE — PLAN OF CARE
Problem: Adult Inpatient Plan of Care  Goal: Plan of Care Review  Outcome: Ongoing, Progressing  Goal: Patient-Specific Goal (Individualized)  Outcome: Ongoing, Progressing  Goal: Absence of Hospital-Acquired Illness or Injury  Outcome: Ongoing, Progressing  Goal: Optimal Comfort and Wellbeing  Outcome: Ongoing, Progressing  Goal: Readiness for Transition of Care  Outcome: Ongoing, Progressing     Problem: Bariatric Environmental Safety  Goal: Safety Maintained with Care  Outcome: Ongoing, Progressing     Problem: Diabetes Comorbidity  Goal: Blood Glucose Level Within Targeted Range  Outcome: Ongoing, Progressing     Problem: Device-Related Complication Risk (Hemodialysis)  Goal: Safe, Effective Therapy Delivery  Outcome: Ongoing, Progressing     Problem: Hemodynamic Instability (Hemodialysis)  Goal: Effective Tissue Perfusion  Outcome: Ongoing, Progressing     Problem: Infection (Hemodialysis)  Goal: Absence of Infection Signs and Symptoms  Outcome: Ongoing, Progressing     Problem: Pain Acute  Goal: Acceptable Pain Control and Functional Ability  Outcome: Ongoing, Progressing

## 2023-06-18 LAB
BASOPHILS # BLD AUTO: 0.02 X10(3)/MCL
BASOPHILS NFR BLD AUTO: 0.2 %
EOSINOPHIL # BLD AUTO: 0.12 X10(3)/MCL (ref 0–0.9)
EOSINOPHIL NFR BLD AUTO: 1.4 %
ERYTHROCYTE [DISTWIDTH] IN BLOOD BY AUTOMATED COUNT: 18.4 % (ref 11.5–17)
HCT VFR BLD AUTO: 25.8 % (ref 37–47)
HGB BLD-MCNC: 8.3 G/DL (ref 12–16)
IMM GRANULOCYTES # BLD AUTO: 0.03 X10(3)/MCL (ref 0–0.04)
IMM GRANULOCYTES NFR BLD AUTO: 0.3 %
LYMPHOCYTES # BLD AUTO: 0.73 X10(3)/MCL (ref 0.6–4.6)
LYMPHOCYTES NFR BLD AUTO: 8.5 %
MCH RBC QN AUTO: 28.6 PG (ref 27–31)
MCHC RBC AUTO-ENTMCNC: 32.2 G/DL (ref 33–36)
MCV RBC AUTO: 89 FL (ref 80–94)
MONOCYTES # BLD AUTO: 0.67 X10(3)/MCL (ref 0.1–1.3)
MONOCYTES NFR BLD AUTO: 7.8 %
NEUTROPHILS # BLD AUTO: 7.04 X10(3)/MCL (ref 2.1–9.2)
NEUTROPHILS NFR BLD AUTO: 81.8 %
NRBC BLD AUTO-RTO: 0 %
PLATELET # BLD AUTO: 167 X10(3)/MCL (ref 130–400)
PMV BLD AUTO: 10.2 FL (ref 7.4–10.4)
POCT GLUCOSE: 115 MG/DL (ref 70–110)
POCT GLUCOSE: 144 MG/DL (ref 70–110)
POCT GLUCOSE: 151 MG/DL (ref 70–110)
POCT GLUCOSE: 280 MG/DL (ref 70–110)
RBC # BLD AUTO: 2.9 X10(6)/MCL (ref 4.2–5.4)
WBC # SPEC AUTO: 8.61 X10(3)/MCL (ref 4.5–11.5)

## 2023-06-18 PROCEDURE — 21400001 HC TELEMETRY ROOM

## 2023-06-18 PROCEDURE — 25000242 PHARM REV CODE 250 ALT 637 W/ HCPCS: Performed by: INTERNAL MEDICINE

## 2023-06-18 PROCEDURE — C9113 INJ PANTOPRAZOLE SODIUM, VIA: HCPCS | Performed by: INTERNAL MEDICINE

## 2023-06-18 PROCEDURE — 85025 COMPLETE CBC W/AUTO DIFF WBC: CPT

## 2023-06-18 PROCEDURE — 25000003 PHARM REV CODE 250: Performed by: INTERNAL MEDICINE

## 2023-06-18 PROCEDURE — 63600175 PHARM REV CODE 636 W HCPCS: Performed by: INTERNAL MEDICINE

## 2023-06-18 RX ADMIN — SEVELAMER CARBONATE 1600 MG: 800 TABLET, FILM COATED ORAL at 07:06

## 2023-06-18 RX ADMIN — FLUTICASONE FUROATE AND VILANTEROL TRIFENATATE 1 PUFF: 100; 25 POWDER RESPIRATORY (INHALATION) at 09:06

## 2023-06-18 RX ADMIN — DULOXETINE HYDROCHLORIDE 30 MG: 30 CAPSULE, DELAYED RELEASE ORAL at 09:06

## 2023-06-18 RX ADMIN — SEVELAMER CARBONATE 1600 MG: 800 TABLET, FILM COATED ORAL at 12:06

## 2023-06-18 RX ADMIN — DIPHENHYDRAMINE HYDROCHLORIDE 50 MG: 25 CAPSULE ORAL at 11:06

## 2023-06-18 RX ADMIN — PANTOPRAZOLE SODIUM 40 MG: 40 INJECTION, POWDER, FOR SOLUTION INTRAVENOUS at 05:06

## 2023-06-18 RX ADMIN — PANTOPRAZOLE SODIUM 40 MG: 40 INJECTION, POWDER, FOR SOLUTION INTRAVENOUS at 04:06

## 2023-06-18 RX ADMIN — CARVEDILOL 6.25 MG: 3.12 TABLET, FILM COATED ORAL at 09:06

## 2023-06-18 RX ADMIN — SEVELAMER CARBONATE 1600 MG: 800 TABLET, FILM COATED ORAL at 04:06

## 2023-06-18 RX ADMIN — LEVOTHYROXINE SODIUM 125 MCG: 0.12 TABLET ORAL at 05:06

## 2023-06-18 RX ADMIN — FUROSEMIDE 40 MG: 40 TABLET ORAL at 09:06

## 2023-06-18 RX ADMIN — ATORVASTATIN CALCIUM 40 MG: 40 TABLET, FILM COATED ORAL at 09:06

## 2023-06-18 RX ADMIN — CLONAZEPAM 0.5 MG: 0.5 TABLET ORAL at 08:06

## 2023-06-18 RX ADMIN — NEPHROCAP 1 CAPSULE: 1 CAP ORAL at 09:06

## 2023-06-18 RX ADMIN — TIOTROPIUM BROMIDE INHALATION SPRAY 2 PUFF: 3.12 SPRAY, METERED RESPIRATORY (INHALATION) at 09:06

## 2023-06-18 RX ADMIN — CARVEDILOL 6.25 MG: 3.12 TABLET, FILM COATED ORAL at 08:06

## 2023-06-18 RX ADMIN — INSULIN ASPART 6 UNITS: 100 INJECTION, SOLUTION INTRAVENOUS; SUBCUTANEOUS at 12:06

## 2023-06-18 NOTE — PROGRESS NOTES
[facility]  Hospital Medicine Progress Note        Chief Complaint: Inpatient Follow-up for     Subjective:  This is a 77-year-old female medical history of ESRD on hemodialysis, Chronic HFpEF, CAD/CABG, PAF on Eliquis, COPD, anemia of chronic disease, recurrent obscure GI bleeding with last EGD on 04/06/2023 showing single 3 mm AVM with no bleeding in the gastric body treated with APC/with notes of it being small and likely clinically insignificant.     Present to the ED with chief complaint of low hemoglobin at labs done at dialysis center and 3-4 day history of generalized weakness, fatigue and dyspnea on exertion and reports 2 day history of black loose stool.  Denies abdominal pain, nausea or vomiting.  Denies chest pain, fever or chills.     On arrival to ED she was afebrile, hypertensive, saturating 96% on room air.  Labs notable for hemoglobin 4.9.  Rectal exam by ED physician show guaiac-positive melanotic stool.  She was given Protonix 80 mg IV and started on blood transfusion and referred to hospital medicine service for further evaluation and management.     Hg is trended up fter transfusion.     Objective/physical exam:  General: In no acute distress, AAOx3   Chest: Clear to auscultation bilaterally, non-labored   Heart: RRR, +S1, S2, no appreciable murmur  Abdomen: Soft, nontender, BS +  MSK: Warm, no lower extremity edema, no clubbing or cyanosis  Neurologic:  Cranial nerve II-XII intact, Strength 5/5 in all 4 extremities    VITAL SIGNS: 24 HRS MIN & MAX LAST   Temp  Min: 97.4 °F (36.3 °C)  Max: 98.3 °F (36.8 °C) 98.1 °F (36.7 °C)   BP  Min: 116/58  Max: 151/59 125/69   Pulse  Min: 56  Max: 74  (!) 57   Resp  Min: 16  Max: 18 18   SpO2  Min: 96 %  Max: 100 % 96 %       Labs, Microbiology and Imaging were Reviewed.      Microbiology Results (last 7 days)       ** No results found for the last 168 hours. **               Medications   atorvastatin  40 mg Oral Daily     carvediloL  6.25 mg Oral BID    clonazePAM  0.5 mg Oral QHS    DULoxetine  30 mg Oral Daily    epoetin alexis-ebpx (RETACRIT) injection  20,000 Units Subcutaneous Every Tues, Thurs, Sat    fluticasone furoate-vilanteroL  1 puff Inhalation Daily    furosemide  40 mg Oral Daily    levothyroxine  125 mcg Oral Before breakfast    pantoprazole  40 mg Intravenous Q12H    sevelamer carbonate  1,600 mg Oral TID WM    tiotropium bromide  2 puff Inhalation Daily    vitamin renal formula (B-complex-vitamin c-folic acid)  1 capsule Oral Daily        sodium chloride, acetaminophen, acetaminophen, albuterol-ipratropium, aluminum-magnesium hydroxide-simethicone, cloNIDine, dextrose 10%, dextrose 10%, diphenhydrAMINE, glucagon (human recombinant), hydrALAZINE, insulin aspart U-100, melatonin, ondansetron, polyethylene glycol, senna-docusate 8.6-50 mg, sodium chloride 0.9%     Radiology:  CV Ultrasound Bilateral Doppler Carotid  The right internal carotid artery demonstrated a stenosis on the upper end   of the 50-69% stenosis range based on PSV and ICA/ CCA ratio.  The right ICA/CCA ratio was 3.7.  There is a moderate amount of calcified plaque in the right carotid bulb   and proximal ICA.  The right vertebral artery was patent with antegrade flow.    The left internal carotid artery demonstrated less than a 50% stenosis.  There is a mild amount of calcified plaque in the left carotid bulb and   proximal ICA.  The left vertebral artery was patent with antegrade flow.      Assessment/Plan:  Symptomatic acute on chronic anemia  Recurrent obscure GI bleed/melena     HX ESRD on HD, CAD/CABG, PAF on Eliquis, COPD, HTN, HLD, hypothyroid     Plan:    - Patient responded to transfusion,   Keep monitoring.   - Continue Protonix b.i.d. pending EGD.    - Appreciate further GI recs.    - Receiving RBCs with HD.     CC diagnosis symptomatic anemia   CC time was given 45 min.     All diagnosis and differential diagnosis have been reviewed;  assessment and plan has been documented; I have personally reviewed the labs and test results that are presently available; I have reviewed the patients medication list; I have reviewed the consulting providers response and recommendations. I have reviewed or attempted to review medical records based upon their availability.       Dewey Mckeon MD   06/18/2023

## 2023-06-18 NOTE — PROGRESS NOTES
"Gastroenterology Progress Note    Subjective:  Hgb trend: 4.9--1uPRBC--7.1-6.2--2u PRBC--8.3 today- appropriate response to transfusion.     Patient without complaints this morning.  Has been tolerating p.o. without issues denies abdominal pain, nausea or vomiting.  She reports a BM yesterday afternoon and this morning both described to be brown without melena or hematochezia- unwitnessed by nursing staff.  Discussed alerting staff following next BM to confirm color.    Objective:    ROS:    Review of Systems   Constitutional:  Negative for chills and fever.   HENT:  Negative for sore throat.    Respiratory:  Negative for shortness of breath, wheezing and stridor.    Gastrointestinal:  Negative for abdominal pain, blood in stool, heartburn, melena, nausea and vomiting.   Musculoskeletal:  Negative for falls.   Skin:  Negative for itching and rash.   Neurological:  Negative for seizures and loss of consciousness.   Psychiatric/Behavioral:  The patient is not nervous/anxious.        Vital Signs:  /69   Pulse (!) 57   Temp 98.1 °F (36.7 °C) (Oral)   Resp 18   Ht 5' 6" (1.676 m)   Wt 89.2 kg (196 lb 9.6 oz)   SpO2 96%   BMI 31.73 kg/m²   Body mass index is 31.73 kg/m².    Physical Exam:    Physical Exam  Constitutional:       General: She is not in acute distress.  HENT:      Head: Normocephalic and atraumatic.      Nose: Nose normal.   Eyes:      Extraocular Movements: Extraocular movements intact.   Cardiovascular:      Pulses: Normal pulses.   Pulmonary:      Effort: No respiratory distress.      Breath sounds: No stridor.   Abdominal:      Tenderness: There is no abdominal tenderness. There is no guarding.   Skin:     General: Skin is warm and dry.   Neurological:      General: No focal deficit present.      Mental Status: She is alert. Mental status is at baseline.   Psychiatric:         Mood and Affect: Mood normal.         Behavior: Behavior normal.         Thought Content: Thought content normal.    "      Judgment: Judgment normal.       Labs:  Recent Results (from the past 24 hour(s))   POCT glucose    Collection Time: 06/17/23 12:08 PM   Result Value Ref Range    POCT Glucose 177 (H) 70 - 110 mg/dL   POCT glucose    Collection Time: 06/17/23  7:46 PM   Result Value Ref Range    POCT Glucose 176 (H) 70 - 110 mg/dL   POCT glucose    Collection Time: 06/18/23  5:19 AM   Result Value Ref Range    POCT Glucose 115 (H) 70 - 110 mg/dL   CBC with Differential    Collection Time: 06/18/23  7:54 AM   Result Value Ref Range    WBC 8.61 4.50 - 11.50 x10(3)/mcL    RBC 2.90 (L) 4.20 - 5.40 x10(6)/mcL    Hgb 8.3 (L) 12.0 - 16.0 g/dL    Hct 25.8 (L) 37.0 - 47.0 %    MCV 89.0 80.0 - 94.0 fL    MCH 28.6 27.0 - 31.0 pg    MCHC 32.2 (L) 33.0 - 36.0 g/dL    RDW 18.4 (H) 11.5 - 17.0 %    Platelet 167 130 - 400 x10(3)/mcL    MPV 10.2 7.4 - 10.4 fL    Neut % 81.8 %    Lymph % 8.5 %    Mono % 7.8 %    Eos % 1.4 %    Basophil % 0.2 %    Lymph # 0.73 0.6 - 4.6 x10(3)/mcL    Neut # 7.04 2.1 - 9.2 x10(3)/mcL    Mono # 0.67 0.1 - 1.3 x10(3)/mcL    Eos # 0.12 0 - 0.9 x10(3)/mcL    Baso # 0.02 <=0.2 x10(3)/mcL    IG# 0.03 0 - 0.04 x10(3)/mcL    IG% 0.3 %    NRBC% 0.0 %         Assessment/Plan:  This is a 77 y.o. female known to Dr. Bourgeois with PMH of ESRD on HD Tu/Th/Sa, HFpEF 55%, CAD/CABG x4 on ASA 81mg, HTN, HLD, COPD, venous insufficiency, hypothyroidism, anemia, gastric AVM, adenomatous colon polyps, Afib on Eliquis. She presented to the ED with outpatient labs significant for anemia associated with weakness, fatigue, dyspnea on exertion and black loose stool.      Chronic Anemia, hx bleeding AVMs  -s/p EGD w/ push with 1 nonbleeding AVM in the duodenum treated coagulation.  A single duodenal polyp-resection not attempted.  - initial hgb 4.9--2uPRBC--7.1--6.2--2u---8.2  - continue PPI  - can consider VCE should anemia persist despite transfusion, however pt responded appropriately to 2u- continue supp care for now   ESRD on HD  Afib on  eliquis  - currently held         Thank you for allowing us to participate in the care of [address] Menendez.     Morelia Serrano PA-C  Gastroenterology  [facility]

## 2023-06-19 LAB
ABO + RH BLD: NORMAL
ABO + RH BLD: NORMAL
BASOPHILS # BLD AUTO: 0.03 X10(3)/MCL
BASOPHILS NFR BLD AUTO: 0.4 %
BLD PROD TYP BPU: NORMAL
BLD PROD TYP BPU: NORMAL
BLOOD UNIT EXPIRATION DATE: NORMAL
BLOOD UNIT EXPIRATION DATE: NORMAL
BLOOD UNIT TYPE CODE: 5100
BLOOD UNIT TYPE CODE: 5100
CROSSMATCH INTERPRETATION: NORMAL
CROSSMATCH INTERPRETATION: NORMAL
DISPENSE STATUS: NORMAL
DISPENSE STATUS: NORMAL
EOSINOPHIL # BLD AUTO: 0.16 X10(3)/MCL (ref 0–0.9)
EOSINOPHIL NFR BLD AUTO: 2.1 %
ERYTHROCYTE [DISTWIDTH] IN BLOOD BY AUTOMATED COUNT: 18.3 % (ref 11.5–17)
HCT VFR BLD AUTO: 25.5 % (ref 37–47)
HGB BLD-MCNC: 8.2 G/DL (ref 12–16)
IMM GRANULOCYTES # BLD AUTO: 0.05 X10(3)/MCL (ref 0–0.04)
IMM GRANULOCYTES NFR BLD AUTO: 0.7 %
LYMPHOCYTES # BLD AUTO: 0.82 X10(3)/MCL (ref 0.6–4.6)
LYMPHOCYTES NFR BLD AUTO: 10.7 %
MCH RBC QN AUTO: 28.8 PG (ref 27–31)
MCHC RBC AUTO-ENTMCNC: 32.2 G/DL (ref 33–36)
MCV RBC AUTO: 89.5 FL (ref 80–94)
MONOCYTES # BLD AUTO: 0.65 X10(3)/MCL (ref 0.1–1.3)
MONOCYTES NFR BLD AUTO: 8.5 %
NEUTROPHILS # BLD AUTO: 5.93 X10(3)/MCL (ref 2.1–9.2)
NEUTROPHILS NFR BLD AUTO: 77.6 %
NRBC BLD AUTO-RTO: 0 %
PLATELET # BLD AUTO: 171 X10(3)/MCL (ref 130–400)
PMV BLD AUTO: 10.5 FL (ref 7.4–10.4)
POCT GLUCOSE: 113 MG/DL (ref 70–110)
POCT GLUCOSE: 150 MG/DL (ref 70–110)
POCT GLUCOSE: 322 MG/DL (ref 70–110)
RBC # BLD AUTO: 2.85 X10(6)/MCL (ref 4.2–5.4)
UNIT NUMBER: NORMAL
UNIT NUMBER: NORMAL
WBC # SPEC AUTO: 7.64 X10(3)/MCL (ref 4.5–11.5)

## 2023-06-19 PROCEDURE — 25000003 PHARM REV CODE 250: Performed by: INTERNAL MEDICINE

## 2023-06-19 PROCEDURE — C9113 INJ PANTOPRAZOLE SODIUM, VIA: HCPCS | Performed by: INTERNAL MEDICINE

## 2023-06-19 PROCEDURE — 85025 COMPLETE CBC W/AUTO DIFF WBC: CPT

## 2023-06-19 PROCEDURE — 25000242 PHARM REV CODE 250 ALT 637 W/ HCPCS: Performed by: INTERNAL MEDICINE

## 2023-06-19 PROCEDURE — 63600175 PHARM REV CODE 636 W HCPCS: Performed by: INTERNAL MEDICINE

## 2023-06-19 PROCEDURE — 21400001 HC TELEMETRY ROOM

## 2023-06-19 RX ADMIN — CARVEDILOL 6.25 MG: 3.12 TABLET, FILM COATED ORAL at 08:06

## 2023-06-19 RX ADMIN — INSULIN ASPART 8 UNITS: 100 INJECTION, SOLUTION INTRAVENOUS; SUBCUTANEOUS at 12:06

## 2023-06-19 RX ADMIN — FUROSEMIDE 40 MG: 40 TABLET ORAL at 08:06

## 2023-06-19 RX ADMIN — LEVOTHYROXINE SODIUM 125 MCG: 0.12 TABLET ORAL at 05:06

## 2023-06-19 RX ADMIN — SEVELAMER CARBONATE 1600 MG: 800 TABLET, FILM COATED ORAL at 08:06

## 2023-06-19 RX ADMIN — CLONAZEPAM 0.5 MG: 0.5 TABLET ORAL at 08:06

## 2023-06-19 RX ADMIN — PANTOPRAZOLE SODIUM 40 MG: 40 INJECTION, POWDER, FOR SOLUTION INTRAVENOUS at 05:06

## 2023-06-19 RX ADMIN — DIPHENHYDRAMINE HYDROCHLORIDE 50 MG: 25 CAPSULE ORAL at 08:06

## 2023-06-19 RX ADMIN — DULOXETINE HYDROCHLORIDE 30 MG: 30 CAPSULE, DELAYED RELEASE ORAL at 08:06

## 2023-06-19 RX ADMIN — SEVELAMER CARBONATE 1600 MG: 800 TABLET, FILM COATED ORAL at 04:06

## 2023-06-19 RX ADMIN — SEVELAMER CARBONATE 1600 MG: 800 TABLET, FILM COATED ORAL at 12:06

## 2023-06-19 RX ADMIN — ATORVASTATIN CALCIUM 40 MG: 40 TABLET, FILM COATED ORAL at 08:06

## 2023-06-19 RX ADMIN — FLUTICASONE FUROATE AND VILANTEROL TRIFENATATE 1 PUFF: 100; 25 POWDER RESPIRATORY (INHALATION) at 08:06

## 2023-06-19 RX ADMIN — TIOTROPIUM BROMIDE INHALATION SPRAY 2 PUFF: 3.12 SPRAY, METERED RESPIRATORY (INHALATION) at 08:06

## 2023-06-19 RX ADMIN — NEPHROCAP 1 CAPSULE: 1 CAP ORAL at 08:06

## 2023-06-19 NOTE — PROGRESS NOTES
Nephrology consult follow up note    HPI:      Rosette Madrid is a 77 y.o. female  with anemia.  Past medical history significant for ESRD on hemodialysis [place] at [facility] [place], HFpEF, coronary artery disease, AFib, COPD, anemia, and recurrent GI bleeds.  Se presented with anemia and reporting black loose stools.  At admission she was found to have a hemoglobin of 4.9 and given 2 units PRBC with initial improvement in her hemoglobin of 7.1. She again required transfusion bringing total PRBC this admission to 4.   EGD was done 06/16/2023, which did not show any active bleeding.  Nephrology following for ESRD management.    Interval history:   Patient sitting in chair without complaints. She has eaten a good portion of breakfast without issue.       Objective:       VITAL SIGNS: 24 HR MIN & MAX LAST    Temp  Min: 97.8 °F (36.6 °C)  Max: 98.3 °F (36.8 °C)  98.3 °F (36.8 °C)        BP  Min: 125/69  Max: 148/71  (!) 147/70     Pulse  Min: 55  Max: 60  (!) 55     Resp  Min: 18  Max: 20  18    SpO2  Min: 97 %  Max: 100 %  100 %      GEN: Chronically ill appearing AAF in NAD  CV: RRR +S1,S2 with systolic murmur  PULM: CTAB, unlabored  ABD: Soft, NT/ND abdomen with NABS  EXT:  1+ lower extremity edema  SKIN: Warm and dry  PSYCH: Awake, alert and appropriately conversant.   Vascular access:  LUE AVF with good pulsation and thrill            Component Value Date/Time     06/17/2023 0516     06/16/2023 0709    K 4.1 06/17/2023 0516    K 3.7 06/16/2023 0709    CHLORIDE 102 06/17/2023 0516    CHLORIDE 104 06/16/2023 0709    CO2 28 06/17/2023 0516    CO2 27 06/16/2023 0709    BUN 61.0 (H) 06/17/2023 0516    BUN 50.0 (H) 06/16/2023 0709    CREATININE 5.27 (H) 06/17/2023 0516    CREATININE 5.17 (H) 06/16/2023 0709    CALCIUM 7.9 (L) 06/17/2023 0516    CALCIUM 8.6 06/16/2023 0709    PHOS 3.8 06/16/2023 0709            Component Value Date/Time    WBC 7.64 06/19/2023 0536    WBC 8.61 06/18/2023 0754    HGB 8.2 (L) 06/19/2023  0536    HGB 8.3 (L) 06/18/2023 0754    HCT 25.5 (L) 06/19/2023 0536    HCT 25.8 (L) 06/18/2023 0754     06/19/2023 0536     06/18/2023 0754         Assessment / Plan:     ESRD - normally TTS at [facility]  Anemia - EPO 20,000 units TTS  GI bleed - GI following.  EGD with no active bleeding 6/16/23.     Plan:  Plan for hemodialysis on regular TTS schedule.    No need for PRBC today   OK to dc from renal standpoint.

## 2023-06-19 NOTE — PROGRESS NOTES
[facility]  Hospital Medicine Progress Note        Chief Complaint: Inpatient Follow-up for     Subjective:  This is a 77-year-old female medical history of ESRD on hemodialysis, Chronic HFpEF, CAD/CABG, PAF on Eliquis, COPD, anemia of chronic disease, recurrent obscure GI bleeding with last EGD on 04/06/2023 showing single 3 mm AVM with no bleeding in the gastric body treated with APC/with notes of it being small and likely clinically insignificant.     Present to the ED with chief complaint of low hemoglobin at labs done at dialysis center and 3-4 day history of generalized weakness, fatigue and dyspnea on exertion and reports 2 day history of black loose stool.  Denies abdominal pain, nausea or vomiting.  Denies chest pain, fever or chills.     On arrival to ED she was afebrile, hypertensive, saturating 96% on room air.  Labs notable for hemoglobin 4.9.  Rectal exam by ED physician show guaiac-positive melanotic stool.  She was given Protonix 80 mg IV and started on blood transfusion and referred to hospital medicine service for further evaluation and management.     Seen and examined the patient.  Afebrile vitals stable, patient's hemoglobin stable today does not have any complaints at this time.      Objective/physical exam:  General: In no acute distress, AAOx3   Chest: Clear to auscultation bilaterally, non-labored   Heart: RRR, +S1, S2, no appreciable murmur  Abdomen: Soft, nontender, BS +  MSK: Warm, no lower extremity edema, no clubbing or cyanosis  Neurologic:  Cranial nerve II-XII intact, Strength 5/5 in all 4 extremities    VITAL SIGNS: 24 HRS MIN & MAX LAST   Temp  Min: 97.8 °F (36.6 °C)  Max: 98.3 °F (36.8 °C) 98.3 °F (36.8 °C)   BP  Min: 125/69  Max: 148/71 137/63   Pulse  Min: 57  Max: 60  60   Resp  Min: 18  Max: 20 18   SpO2  Min: 97 %  Max: 100 % 100 %       Labs, Microbiology and Imaging were Reviewed.      Microbiology Results (last 7 days)       ** No results  found for the last 168 hours. **               Medications   atorvastatin  40 mg Oral Daily    carvediloL  6.25 mg Oral BID    clonazePAM  0.5 mg Oral QHS    DULoxetine  30 mg Oral Daily    epoetin alexis-ebpx (RETACRIT) injection  20,000 Units Subcutaneous Every Tues, Thurs, Sat    fluticasone furoate-vilanteroL  1 puff Inhalation Daily    furosemide  40 mg Oral Daily    levothyroxine  125 mcg Oral Before breakfast    pantoprazole  40 mg Intravenous Q12H    sevelamer carbonate  1,600 mg Oral TID WM    tiotropium bromide  2 puff Inhalation Daily    vitamin renal formula (B-complex-vitamin c-folic acid)  1 capsule Oral Daily        sodium chloride, acetaminophen, acetaminophen, albuterol-ipratropium, aluminum-magnesium hydroxide-simethicone, cloNIDine, dextrose 10%, dextrose 10%, diphenhydrAMINE, glucagon (human recombinant), hydrALAZINE, insulin aspart U-100, melatonin, ondansetron, polyethylene glycol, senna-docusate 8.6-50 mg, sodium chloride 0.9%     Radiology:  CV Ultrasound Bilateral Doppler Carotid  The right internal carotid artery demonstrated a stenosis on the upper end   of the 50-69% stenosis range based on PSV and ICA/ CCA ratio.  The right ICA/CCA ratio was 3.7.  There is a moderate amount of calcified plaque in the right carotid bulb   and proximal ICA.  The right vertebral artery was patent with antegrade flow.    The left internal carotid artery demonstrated less than a 50% stenosis.  There is a mild amount of calcified plaque in the left carotid bulb and   proximal ICA.  The left vertebral artery was patent with antegrade flow.      Assessment/Plan:  Symptomatic acute on chronic anemia  Recurrent obscure GI bleed/melena     HX ESRD on HD, CAD/CABG, PAF on Eliquis, COPD, HTN, HLD, hypothyroid     Plan:  - nuclear scan is negative, well.  - Patient responded to transfusion,   Keep monitoring.   - Continue Protonix b.i.d.   - Appreciate further GI recs.    - Receiving RBCs with HD.   AMINTA  tomorrow.      All diagnosis and differential diagnosis have been reviewed; assessment and plan has been documented; I have personally reviewed the labs and test results that are presently available; I have reviewed the patients medication list; I have reviewed the consulting providers response and recommendations. I have reviewed or attempted to review medical records based upon their availability.       Dewey Mckeon MD   06/19/2023

## 2023-06-19 NOTE — PROGRESS NOTES
"Gastroenterology Progress Note    Subjective/Interval History:  No complaints. H/H stable.  Apparently had a brown BM yesterday.     Objective:  ROS:  Review of Systems   Constitutional:  Negative for chills and fever.   HENT:  Negative for sore throat.    Respiratory:  Negative for shortness of breath, wheezing and stridor.    Gastrointestinal:  Negative for abdominal pain, blood in stool, heartburn, melena, nausea and vomiting.   Musculoskeletal:  Negative for falls.   Skin:  Negative for itching and rash.   Neurological:  Negative for seizures and loss of consciousness.   Psychiatric/Behavioral:  The patient is not nervous/anxious.        Vital Signs:  BP (!) 147/70   Pulse (!) 55   Temp 98.3 °F (36.8 °C) (Oral)   Resp 18   Ht 5' 6" (1.676 m)   Wt 89.2 kg (196 lb 9.6 oz)   SpO2 100%   BMI 31.73 kg/m²   Body mass index is 31.73 kg/m².    Physical Exam:    Physical Exam  Constitutional:       General: She is not in acute distress.  HENT:      Head: Normocephalic and atraumatic.      Nose: Nose normal.   Eyes:      Extraocular Movements: Extraocular movements intact.   Cardiovascular:      Pulses: Normal pulses.   Pulmonary:      Effort: No respiratory distress.      Breath sounds: No stridor.   Abdominal:      Tenderness: There is no abdominal tenderness. There is no guarding.   Skin:     General: Skin is warm and dry.   Neurological:      General: No focal deficit present.      Mental Status: She is alert. Mental status is at baseline.   Psychiatric:         Mood and Affect: Mood normal.         Behavior: Behavior normal.         Thought Content: Thought content normal.         Judgment: Judgment normal.       Labs:  Recent Results (from the past 24 hour(s))   POCT glucose    Collection Time: 06/18/23 11:04 AM   Result Value Ref Range    POCT Glucose 280 (H) 70 - 110 mg/dL   POCT glucose    Collection Time: 06/18/23  3:53 PM   Result Value Ref Range    POCT Glucose 144 (H) 70 - 110 mg/dL   POCT glucose    " Collection Time: 06/18/23  7:49 PM   Result Value Ref Range    POCT Glucose 151 (H) 70 - 110 mg/dL   POCT glucose    Collection Time: 06/19/23  5:21 AM   Result Value Ref Range    POCT Glucose 113 (H) 70 - 110 mg/dL   CBC with Differential    Collection Time: 06/19/23  5:36 AM   Result Value Ref Range    WBC 7.64 4.50 - 11.50 x10(3)/mcL    RBC 2.85 (L) 4.20 - 5.40 x10(6)/mcL    Hgb 8.2 (L) 12.0 - 16.0 g/dL    Hct 25.5 (L) 37.0 - 47.0 %    MCV 89.5 80.0 - 94.0 fL    MCH 28.8 27.0 - 31.0 pg    MCHC 32.2 (L) 33.0 - 36.0 g/dL    RDW 18.3 (H) 11.5 - 17.0 %    Platelet 171 130 - 400 x10(3)/mcL    MPV 10.5 (H) 7.4 - 10.4 fL    Neut % 77.6 %    Lymph % 10.7 %    Mono % 8.5 %    Eos % 2.1 %    Basophil % 0.4 %    Lymph # 0.82 0.6 - 4.6 x10(3)/mcL    Neut # 5.93 2.1 - 9.2 x10(3)/mcL    Mono # 0.65 0.1 - 1.3 x10(3)/mcL    Eos # 0.16 0 - 0.9 x10(3)/mcL    Baso # 0.03 <=0.2 x10(3)/mcL    IG# 0.05 (H) 0 - 0.04 x10(3)/mcL    IG% 0.7 %    NRBC% 0.0 %         Assessment/Plan:  This is a 77 y.o. female known to Dr. Bourgeois with PMH of ESRD on HD Tu/Th/Sa, HFpEF 55%, CAD/CABG x4 on ASA 81mg, HTN, HLD, COPD, venous insufficiency, hypothyroidism, anemia, gastric/dudodenal AVM, adenomatous colon polyps, Afib on Eliquis. She presented to the ED with outpatient labs significant for anemia associated with weakness, fatigue, dyspnea on exertion and black loose stool.     Acute on chronic anemia.   Multifactorial secondary to chronic disease and occult losses.   Baseline hgb 8  Melena: resolved    s/p EGD w/ push enteroscopy 6/16/23: 1 nonbleeding AVM in the duodenum treated with APC.  A single duodenal polyp-resection not attempted.  hgb 4.9--2uPRBC--7.1--6.2--2u---8.3 -- 8.2    - continue PPI daily  - monitor stools for bleeding.   - monitor h/h and transfuse as needed.   - EPO per renal  - ok to resume Eliquis from GI perspective.   - Needs Repeat upper endoscopy in 6 weeks with Dr. Bourgeois off anticoagulation for duodenal polypectomy. Our  office will arrange.     GI available if needed.     Thank you for allowing us to participate in the care of Rosette Madrid.

## 2023-06-20 LAB
ANION GAP SERPL CALC-SCNC: 7 MEQ/L
BASOPHILS # BLD AUTO: 0.03 X10(3)/MCL
BASOPHILS NFR BLD AUTO: 0.4 %
BUN SERPL-MCNC: 59.5 MG/DL (ref 9.8–20.1)
CALCIUM SERPL-MCNC: 8.5 MG/DL (ref 8.4–10.2)
CHLORIDE SERPL-SCNC: 95 MMOL/L (ref 98–107)
CO2 SERPL-SCNC: 26 MMOL/L (ref 23–31)
CREAT SERPL-MCNC: 4.27 MG/DL (ref 0.55–1.02)
CREAT/UREA NIT SERPL: 14
EOSINOPHIL # BLD AUTO: 0.14 X10(3)/MCL (ref 0–0.9)
EOSINOPHIL NFR BLD AUTO: 1.9 %
ERYTHROCYTE [DISTWIDTH] IN BLOOD BY AUTOMATED COUNT: 18.1 % (ref 11.5–17)
GFR SERPLBLD CREATININE-BSD FMLA CKD-EPI: 10 MLS/MIN/1.73/M2
GLUCOSE SERPL-MCNC: 162 MG/DL (ref 82–115)
HCT VFR BLD AUTO: 26.1 % (ref 37–47)
HGB BLD-MCNC: 8.2 G/DL (ref 12–16)
IMM GRANULOCYTES # BLD AUTO: 0.05 X10(3)/MCL (ref 0–0.04)
IMM GRANULOCYTES NFR BLD AUTO: 0.7 %
LYMPHOCYTES # BLD AUTO: 0.67 X10(3)/MCL (ref 0.6–4.6)
LYMPHOCYTES NFR BLD AUTO: 9.3 %
MCH RBC QN AUTO: 28.8 PG (ref 27–31)
MCHC RBC AUTO-ENTMCNC: 31.4 G/DL (ref 33–36)
MCV RBC AUTO: 91.6 FL (ref 80–94)
MONOCYTES # BLD AUTO: 0.62 X10(3)/MCL (ref 0.1–1.3)
MONOCYTES NFR BLD AUTO: 8.6 %
NEUTROPHILS # BLD AUTO: 5.71 X10(3)/MCL (ref 2.1–9.2)
NEUTROPHILS NFR BLD AUTO: 79.1 %
NRBC BLD AUTO-RTO: 0 %
PLATELET # BLD AUTO: 172 X10(3)/MCL (ref 130–400)
PMV BLD AUTO: 10.4 FL (ref 7.4–10.4)
POCT GLUCOSE: 142 MG/DL (ref 70–110)
POCT GLUCOSE: 174 MG/DL (ref 70–110)
POCT GLUCOSE: 191 MG/DL (ref 70–110)
POCT GLUCOSE: 197 MG/DL (ref 70–110)
POCT GLUCOSE: 274 MG/DL (ref 70–110)
POTASSIUM SERPL-SCNC: 4.2 MMOL/L (ref 3.5–5.1)
RBC # BLD AUTO: 2.85 X10(6)/MCL (ref 4.2–5.4)
SODIUM SERPL-SCNC: 128 MMOL/L (ref 136–145)
WBC # SPEC AUTO: 7.22 X10(3)/MCL (ref 4.5–11.5)

## 2023-06-20 PROCEDURE — 25000242 PHARM REV CODE 250 ALT 637 W/ HCPCS: Performed by: INTERNAL MEDICINE

## 2023-06-20 PROCEDURE — 21400001 HC TELEMETRY ROOM

## 2023-06-20 PROCEDURE — C9113 INJ PANTOPRAZOLE SODIUM, VIA: HCPCS | Performed by: INTERNAL MEDICINE

## 2023-06-20 PROCEDURE — 85025 COMPLETE CBC W/AUTO DIFF WBC: CPT | Performed by: NURSE PRACTITIONER

## 2023-06-20 PROCEDURE — 63600175 PHARM REV CODE 636 W HCPCS: Mod: JZ | Performed by: STUDENT IN AN ORGANIZED HEALTH CARE EDUCATION/TRAINING PROGRAM

## 2023-06-20 PROCEDURE — 80048 BASIC METABOLIC PNL TOTAL CA: CPT | Performed by: NURSE PRACTITIONER

## 2023-06-20 PROCEDURE — 97161 PT EVAL LOW COMPLEX 20 MIN: CPT

## 2023-06-20 PROCEDURE — 80100016 HC MAINTENANCE HEMODIALYSIS

## 2023-06-20 PROCEDURE — 97165 OT EVAL LOW COMPLEX 30 MIN: CPT

## 2023-06-20 PROCEDURE — 63600175 PHARM REV CODE 636 W HCPCS: Performed by: INTERNAL MEDICINE

## 2023-06-20 PROCEDURE — 25000003 PHARM REV CODE 250: Performed by: INTERNAL MEDICINE

## 2023-06-20 RX ADMIN — TIOTROPIUM BROMIDE INHALATION SPRAY 2 PUFF: 3.12 SPRAY, METERED RESPIRATORY (INHALATION) at 12:06

## 2023-06-20 RX ADMIN — LEVOTHYROXINE SODIUM 125 MCG: 0.12 TABLET ORAL at 05:06

## 2023-06-20 RX ADMIN — CARVEDILOL 6.25 MG: 3.12 TABLET, FILM COATED ORAL at 12:06

## 2023-06-20 RX ADMIN — CARVEDILOL 6.25 MG: 3.12 TABLET, FILM COATED ORAL at 08:06

## 2023-06-20 RX ADMIN — INSULIN ASPART 2 UNITS: 100 INJECTION, SOLUTION INTRAVENOUS; SUBCUTANEOUS at 12:06

## 2023-06-20 RX ADMIN — DULOXETINE HYDROCHLORIDE 30 MG: 30 CAPSULE, DELAYED RELEASE ORAL at 12:06

## 2023-06-20 RX ADMIN — FLUTICASONE FUROATE AND VILANTEROL TRIFENATATE 1 PUFF: 100; 25 POWDER RESPIRATORY (INHALATION) at 12:06

## 2023-06-20 RX ADMIN — PANTOPRAZOLE SODIUM 40 MG: 40 INJECTION, POWDER, FOR SOLUTION INTRAVENOUS at 05:06

## 2023-06-20 RX ADMIN — CLONAZEPAM 0.5 MG: 0.5 TABLET ORAL at 08:06

## 2023-06-20 RX ADMIN — EPOETIN ALFA-EPBX 20000 UNITS: 10000 INJECTION, SOLUTION INTRAVENOUS; SUBCUTANEOUS at 10:06

## 2023-06-20 RX ADMIN — SEVELAMER CARBONATE 1600 MG: 800 TABLET, FILM COATED ORAL at 05:06

## 2023-06-20 RX ADMIN — NEPHROCAP 1 CAPSULE: 1 CAP ORAL at 12:06

## 2023-06-20 RX ADMIN — SEVELAMER CARBONATE 1600 MG: 800 TABLET, FILM COATED ORAL at 12:06

## 2023-06-20 RX ADMIN — SEVELAMER CARBONATE 1600 MG: 800 TABLET, FILM COATED ORAL at 07:06

## 2023-06-20 RX ADMIN — INSULIN ASPART 6 UNITS: 100 INJECTION, SOLUTION INTRAVENOUS; SUBCUTANEOUS at 05:06

## 2023-06-20 RX ADMIN — DIPHENHYDRAMINE HYDROCHLORIDE 50 MG: 25 CAPSULE ORAL at 08:06

## 2023-06-20 RX ADMIN — FUROSEMIDE 40 MG: 40 TABLET ORAL at 12:06

## 2023-06-20 RX ADMIN — ATORVASTATIN CALCIUM 40 MG: 40 TABLET, FILM COATED ORAL at 12:06

## 2023-06-20 NOTE — PROGRESS NOTES
06/20/23 1100   Post-Hemodialysis Assessment   Blood Volume Processed (Liters) 60 L   Dialyzer Clearance Lightly streaked   Duration of Treatment 150 minutes   Total UF (mL) 1607 mL   Patient Response to Treatment Pt tx was shortened per Dr. Chowdhury verbal order due to another urgent pt needing dialysis. Otherwise pt tolerated tx well

## 2023-06-20 NOTE — PT/OT/SLP EVAL
"Occupational Therapy   Evaluation and Discharge Note    Name: Rosette Madrid  MRN: [id number]  Admitting Diagnosis: Symptomatic anemia  Recent Surgery: Procedure(s) (LRB):  EGD W/ PUSH (N/A) 4 Days Post-Op    Recommendations:     Discharge Recommendations:  (home with family support)  Discharge Equipment Recommendations:  pt has necessary DME      Assessment:     Rosette Madrid is a 77 y.o. female with a medical diagnosis of Symptomatic anemia., ESRD on HD. At this time, patient is functioning at their prior level of function and does not require further acute OT services. Pt feels she is at her baseline.  Reports assist needed with dressing/bathing.  Daughter can continue to assist her.    Plan:     During this hospitalization, patient does not require further acute OT services.  Please re-consult if situation changes.    Plan of Care Reviewed with: patient    Subjective     Chief Complaint: no complaints  Patient/Family Comments/goals: "go home with my family"    Occupational Profile:  Living Environment: lives with daughter and son in law, son in law at home during the day while daughter at work, 1 step no rail, tub/shower with chair  Previous level of function: utilizes rollator, dtr assists with dressing and bathing; pt performs toileting without assist; utilizes rollator at all times, no falls, has transportation to HD.  Roles and Routines: mother, retired from  as a baker  Equipment Used at home:  (rollator, shower chair)  Assistance upon Discharge: daughter and son in law    Pain/Comfort:  Pain Rating 1: 0/10    Patients cultural, spiritual, Mormonism conflicts given the current situation:      Objective:   Patient found up in chair with telemetry, pulse ox (continuous) upon OT entry to room.    General Precautions: Standard,    Orthopedic Precautions: N/A  Braces: N/A  Respiratory Status: Room air   Vital Signs: HR: stable, 70s      Occupational Performance:    Functional Mobility/Transfers:  Patient " 2028 Spinal dermatomes by CRNA at bedside  2031 SVE: 10/100/0 "completed Sit <> Stand Transfer with stand by assistance  with  rollator   Patient completed Toilet Transfer Step Transfer technique with stand by assistance with  rollator  Functional Mobility: SBA ambulation to toilet with rollator; good safety, no LOB.  Able to utilize grab bar to stand from hospital toilet, reports toilet at home is higher.    Activities of Daily Living:  Toileting: stand by assistance      Cognitive/Visual Perceptual:  Oriented to self, place, not oriented to year "2022"    Physical Exam:  UB strength WFL      AMPA 6 Click ADL:  [facility] Total Score:      Therapeutic Positioning  Risk for acquired pressure injuries is decreased due to ability to get to BSC/toilet with nursing assist.    OT interventions performed during the course of today's session in an effort to prevent and/or reduce acquired pressure injuries:   Education on Pressure Ulcer Prevention provided    Skin assessment: all bony prominences were assessed    Findings: no redness or breakdown noted    OT recommendations for therapeutic positioning throughout hospitalization:   Follow [facility] Pressure Injury Prevention Protocol    Patient Education:  Patient provided with verbal education regarding OT role/goals/POC, fall prevention, and Discharge/DME recommendations.  Understanding was verbalized.     Patient left up in chair with all lines intact and call button in reach    GOALS:   Multidisciplinary Problems       Occupational Therapy Goals       Not on file                    History:     Past Medical History:   Diagnosis Date    CKD (chronic kidney disease) requiring chronic dialysis     COPD (chronic obstructive pulmonary disease)     Diabetes mellitus     Hyperlipidemia     Hypertension     Renal insufficiency     Thyroid disease          Past Surgical History:   Procedure Laterality Date    AV FISTULA PLACEMENT Left     CARDIAC CATHETERIZATION      COLONOSCOPY      CORONARY ARTERY BYPASS GRAFT      EGD, WITH HEMORRHAGE CONTROL  " 3/24/2023    Procedure: EGD,WITH HEMORRHAGE CONTROL;  Surgeon: Go Le MD;  Location: [facility] ENDOSCOPY;  Service: Gastroenterology;;    EGD, WITH HEMORRHAGE CONTROL  4/6/2023    Procedure: EGD,WITH HEMORRHAGE CONTROL;  Surgeon: Ayden Francois MD;  Location: [facility] ENDOSCOPY;  Service: Gastroenterology;;    ESOPHAGOGASTRODUODENOSCOPY      ESOPHAGOGASTRODUODENOSCOPY N/A 2/17/2023    Procedure: EGD +/- VCE PLACEMENT;  Surgeon: Morgan Gardner MD;  Location: [facility] ENDOSCOPY;  Service: Gastroenterology;  Laterality: N/A;    ESOPHAGOGASTRODUODENOSCOPY N/A 3/24/2023    Procedure: EGD;  Surgeon: Go Le MD;  Location: [facility] ENDOSCOPY;  Service: Gastroenterology;  Laterality: N/A;  with push    ESOPHAGOGASTRODUODENOSCOPY N/A 4/6/2023    Procedure: EGD;  Surgeon: Ayden Francois MD;  Location: [facility] ENDOSCOPY;  Service: Gastroenterology;  Laterality: N/A;  with push    ESOPHAGOGASTRODUODENOSCOPY N/A 6/16/2023    Procedure: EGD W/ PUSH;  Surgeon: Morgan Gardner MD;  Location: [facility] ENDOSCOPY;  Service: Gastroenterology;  Laterality: N/A;    POLYPECTOMY      SMALL BOWEL ENTEROSCOPY Left 1/20/2023    Procedure: ENTEROSCOPY;  Surgeon: Lexi Scales MD;  Location: [facility] ENDOSCOPY;  Service: Gastroenterology;  Laterality: Left;    THYROIDECTOMY      TUBAL LIGATION         Time Tracking:     OT Date of Treatment:    OT Start Time: 1350  OT Stop Time: 1401  OT Total Time (min): 11 min    Billable Minutes:Evaluation LOW    6/20/2023

## 2023-06-20 NOTE — PT/OT/SLP EVAL
Physical Therapy Evaluation and Discharge Note    Patient Name:  Rosette Madrid   MRN:  [id number]    Recommendations:     Discharge Recommendations: home  Discharge Equipment Recommendations: none   Barriers to discharge:  medical dx    Assessment:     Rosette Madrid is a 77 y.o. female admitted with a medical diagnosis of Symptomatic anemia; recurrent obscure GI bleed s/p transfusion.   At this time, patient is functioning at their prior level of function and does not require further acute PT services.     Recent Surgery: Procedure(s) (LRB):  EGD W/ PUSH (N/A) 4 Days Post-Op    Plan:     During this hospitalization, patient does not require further acute PT services.  Please re-consult if situation changes.      Subjective     Chief Complaint: n/a  Patient/Family Comments/goals: to go home   Pain/Comfort:  Pain Rating 1: 0/10    Patients cultural, spiritual, Mandaen conflicts given the current situation: no    Living Environment:  Pt lives with daughter and son-in-law in a [facility]  Prior to admission, patients level of function was independent with mobility; reported needing assistance for bathing, dressing, cooking, and cleaning.  Equipment used/owned at home: rollator.  Upon discharge, patient will have assistance from family.    Objective:     Communicated with NSG prior to session.  Patient found HOB elevated with pulse ox (continuous), telemetry upon PT entry to room.    General Precautions: Standard,      Orthopedic Precautions:N/A   Braces: N/A  Respiratory Status: Room air  Blood Pressure:     Exams:  Cognitive Exam:  Patient is oriented to Person, Place, Time, and Situation  RLE Strength: WFL  LLE Strength: WFL    Functional Mobility:  Bed Mobility:     Supine to Sit: minimum assistance  Transfers:     Sit to Stand:  stand by assistance with rollator; required increased time to come to a fully erect posture   Gait: pt ambulated approx 120 ft with use of rollator and SBA; slow but steady step through  pattern; no overt LOB or safety concerns noted       Patient provided with verbal education regarding POC, mobility, safety.  Understanding was verbalized.     Patient left up in chair with all lines intact, call button in reach, and food tray in front .    GOALS:   Multidisciplinary Problems       Physical Therapy Goals       Not on file                    History:     Past Medical History:   Diagnosis Date    CKD (chronic kidney disease) requiring chronic dialysis     COPD (chronic obstructive pulmonary disease)     Diabetes mellitus     Hyperlipidemia     Hypertension     Renal insufficiency     Thyroid disease        Past Surgical History:   Procedure Laterality Date    AV FISTULA PLACEMENT Left     CARDIAC CATHETERIZATION      COLONOSCOPY      CORONARY ARTERY BYPASS GRAFT      EGD, WITH HEMORRHAGE CONTROL  3/24/2023    Procedure: EGD,WITH HEMORRHAGE CONTROL;  Surgeon: Go Le MD;  Location: [facility] ENDOSCOPY;  Service: Gastroenterology;;    EGD, WITH HEMORRHAGE CONTROL  4/6/2023    Procedure: EGD,WITH HEMORRHAGE CONTROL;  Surgeon: Ayden Francois MD;  Location: [facility] ENDOSCOPY;  Service: Gastroenterology;;    ESOPHAGOGASTRODUODENOSCOPY      ESOPHAGOGASTRODUODENOSCOPY N/A 2/17/2023    Procedure: EGD +/- VCE PLACEMENT;  Surgeon: Morgan Gardner MD;  Location: [facility] ENDOSCOPY;  Service: Gastroenterology;  Laterality: N/A;    ESOPHAGOGASTRODUODENOSCOPY N/A 3/24/2023    Procedure: EGD;  Surgeon: Go Le MD;  Location: [facility] ENDOSCOPY;  Service: Gastroenterology;  Laterality: N/A;  with push    ESOPHAGOGASTRODUODENOSCOPY N/A 4/6/2023    Procedure: EGD;  Surgeon: Ayden Francois MD;  Location: [facility] ENDOSCOPY;  Service: Gastroenterology;  Laterality: N/A;  with push    ESOPHAGOGASTRODUODENOSCOPY N/A 6/16/2023    Procedure: EGD W/ PUSH;  Surgeon: Morgan Gardner MD;  Location: [facility] ENDOSCOPY;  Service: Gastroenterology;  Laterality: N/A;    POLYPECTOMY      SMALL BOWEL  ENTEROSCOPY Left 1/20/2023    Procedure: ENTEROSCOPY;  Surgeon: Lexi Scales MD;  Location: [facility] ENDOSCOPY;  Service: Gastroenterology;  Laterality: Left;    THYROIDECTOMY      TUBAL LIGATION         Time Tracking:     PT Received On:    PT Start Time: 1324     PT Stop Time: 1334  PT Total Time (min): 10 min     Billable Minutes: Evaluation low      06/20/2023

## 2023-06-20 NOTE — PROGRESS NOTES
Nephrology consult follow up note    HPI:      Rosette Madrid is a 77 y.o. female  with anemia.  Past medical history significant for ESRD on hemodialysis TTS at [facility], HFpEF, coronary artery disease, AFib, COPD, anemia, and recurrent GI bleeds.  Se presented with anemia and reporting black loose stools.  At admission she was found to have a hemoglobin of 4.9 and given 2 units PRBC with initial improvement in her hemoglobin of 7.1. She again required transfusion bringing total PRBC this admission to 4.   EGD was done 06/16/2023, which did not show any active bleeding.  Nephrology following for ESRD management.    Interval history:   Patient currently tolerating dialysis without complaints.  She rested well yesterday evening.  No nausea, vomiting, abdominal cramping or diarrhea.       Objective:       VITAL SIGNS: 24 HR MIN & MAX LAST    Temp  Min: 97.9 °F (36.6 °C)  Max: 98.6 °F (37 °C)  98.6 °F (37 °C)        BP  Min: 128/72  Max: 147/70  132/74     Pulse  Min: 54  Max: 70  70     Resp  Min: 16  Max: 18  18    SpO2  Min: 98 %  Max: 100 %  99 %      GEN: Chronically ill appearing AAF in NAD  CV: RRR +S1,S2 with systolic murmur  PULM: CTAB, unlabored  ABD: Soft, NT/ND abdomen with NABS  EXT:  1+ lower extremity edema  SKIN: Warm and dry  PSYCH: Awake, alert and appropriately conversant.   Vascular access:  Left forearm AV fistula functioning well for dialysis.            Component Value Date/Time     (L) 06/20/2023 0547     06/17/2023 0516    K 4.2 06/20/2023 0547    K 4.1 06/17/2023 0516    CHLORIDE 95 (L) 06/20/2023 0547    CHLORIDE 102 06/17/2023 0516    CO2 26 06/20/2023 0547    CO2 28 06/17/2023 0516    BUN 59.5 (H) 06/20/2023 0547    BUN 61.0 (H) 06/17/2023 0516    CREATININE 4.27 (H) 06/20/2023 0547    CREATININE 5.27 (H) 06/17/2023 0516    CALCIUM 8.5 06/20/2023 0547    CALCIUM 7.9 (L) 06/17/2023 0516    PHOS 3.8 06/16/2023 0709            Component Value Date/Time    WBC 7.22 06/20/2023 0547     WBC 7.64 06/19/2023 0536    HGB 8.2 (L) 06/20/2023 0547    HGB 8.2 (L) 06/19/2023 0536    HCT 26.1 (L) 06/20/2023 0547    HCT 25.5 (L) 06/19/2023 0536     06/20/2023 0547     06/19/2023 0536         Assessment / Plan:     ESRD - normally TTS at [facility]  Anemia - EPO 20,000 units TTS  GI bleed - GI following.  EGD with no active bleeding 6/16/23.     Plan:  Patient is currently tolerating dialysis via left forearm AV fistula without incident.     Hemoglobin appears stable and she is asymptomatic.  No indication for transfusion.  No new recommendations from renal standpoint.

## 2023-06-20 NOTE — PLAN OF CARE
Problem: Adult Inpatient Plan of Care  Goal: Optimal Comfort and Wellbeing  Outcome: Ongoing, Progressing     Problem: Adult Inpatient Plan of Care  Goal: Readiness for Transition of Care  Outcome: Ongoing, Progressing     Problem: Diabetes Comorbidity  Goal: Blood Glucose Level Within Targeted Range  Outcome: Ongoing, Progressing     Problem: Device-Related Complication Risk (Hemodialysis)  Goal: Safe, Effective Therapy Delivery  Outcome: Ongoing, Progressing     Problem: Hemodynamic Instability (Hemodialysis)  Goal: Effective Tissue Perfusion  Outcome: Ongoing, Progressing     Problem: Infection (Hemodialysis)  Goal: Absence of Infection Signs and Symptoms  Outcome: Ongoing, Progressing

## 2023-06-21 VITALS
SYSTOLIC BLOOD PRESSURE: 157 MMHG | WEIGHT: 202.31 LBS | TEMPERATURE: 98 F | RESPIRATION RATE: 16 BRPM | BODY MASS INDEX: 32.51 KG/M2 | OXYGEN SATURATION: 100 % | DIASTOLIC BLOOD PRESSURE: 60 MMHG | HEIGHT: 66 IN | HEART RATE: 56 BPM

## 2023-06-21 LAB
POCT GLUCOSE: 124 MG/DL (ref 70–110)
POCT GLUCOSE: 279 MG/DL (ref 70–110)

## 2023-06-21 PROCEDURE — 63600175 PHARM REV CODE 636 W HCPCS: Performed by: INTERNAL MEDICINE

## 2023-06-21 PROCEDURE — 25000003 PHARM REV CODE 250: Performed by: INTERNAL MEDICINE

## 2023-06-21 PROCEDURE — C9113 INJ PANTOPRAZOLE SODIUM, VIA: HCPCS | Performed by: INTERNAL MEDICINE

## 2023-06-21 PROCEDURE — 25000242 PHARM REV CODE 250 ALT 637 W/ HCPCS: Performed by: INTERNAL MEDICINE

## 2023-06-21 RX ADMIN — SEVELAMER CARBONATE 1600 MG: 800 TABLET, FILM COATED ORAL at 07:06

## 2023-06-21 RX ADMIN — FLUTICASONE FUROATE AND VILANTEROL TRIFENATATE 1 PUFF: 100; 25 POWDER RESPIRATORY (INHALATION) at 08:06

## 2023-06-21 RX ADMIN — TIOTROPIUM BROMIDE INHALATION SPRAY 2 PUFF: 3.12 SPRAY, METERED RESPIRATORY (INHALATION) at 08:06

## 2023-06-21 RX ADMIN — FUROSEMIDE 40 MG: 40 TABLET ORAL at 08:06

## 2023-06-21 RX ADMIN — LEVOTHYROXINE SODIUM 125 MCG: 0.12 TABLET ORAL at 05:06

## 2023-06-21 RX ADMIN — INSULIN ASPART 6 UNITS: 100 INJECTION, SOLUTION INTRAVENOUS; SUBCUTANEOUS at 10:06

## 2023-06-21 RX ADMIN — SEVELAMER CARBONATE 1600 MG: 800 TABLET, FILM COATED ORAL at 11:06

## 2023-06-21 RX ADMIN — CARVEDILOL 6.25 MG: 3.12 TABLET, FILM COATED ORAL at 08:06

## 2023-06-21 RX ADMIN — DULOXETINE HYDROCHLORIDE 30 MG: 30 CAPSULE, DELAYED RELEASE ORAL at 08:06

## 2023-06-21 RX ADMIN — ATORVASTATIN CALCIUM 40 MG: 40 TABLET, FILM COATED ORAL at 08:06

## 2023-06-21 RX ADMIN — PANTOPRAZOLE SODIUM 40 MG: 40 INJECTION, POWDER, FOR SOLUTION INTRAVENOUS at 05:06

## 2023-06-21 RX ADMIN — NEPHROCAP 1 CAPSULE: 1 CAP ORAL at 08:06

## 2023-06-21 NOTE — PLAN OF CARE
06/21/23 1036   Medicare Message   Important Message from Medicare regarding Discharge Appeal Rights Explained to patient/caregiver

## 2023-06-21 NOTE — PLAN OF CARE
Problem: Adult Inpatient Plan of Care  Goal: Absence of Hospital-Acquired Illness or Injury  Outcome: Ongoing, Progressing     Problem: Adult Inpatient Plan of Care  Goal: Optimal Comfort and Wellbeing  Outcome: Ongoing, Progressing     Problem: Adult Inpatient Plan of Care  Goal: Readiness for Transition of Care  Outcome: Ongoing, Progressing     Problem: Bariatric Environmental Safety  Goal: Safety Maintained with Care  Outcome: Ongoing, Progressing     Problem: Diabetes Comorbidity  Goal: Blood Glucose Level Within Targeted Range  Outcome: Ongoing, Progressing     Problem: Device-Related Complication Risk (Hemodialysis)  Goal: Safe, Effective Therapy Delivery  Outcome: Ongoing, Progressing     Problem: Hemodynamic Instability (Hemodialysis)  Goal: Effective Tissue Perfusion  Outcome: Ongoing, Progressing     Problem: Infection (Hemodialysis)  Goal: Absence of Infection Signs and Symptoms  Outcome: Ongoing, Progressing

## 2023-06-21 NOTE — PLAN OF CARE
06/21/23 1030   Final Note   Assessment Type Final Discharge Note   Anticipated Discharge Disposition Home   Post-Acute Status   Discharge Delays None known at this time

## 2023-06-21 NOTE — PLAN OF CARE
Patient choice list printed for home health. Patient stated that she has had home health in the past and does not feel that she needs at this time

## 2023-06-22 ENCOUNTER — PATIENT OUTREACH (OUTPATIENT)
Dept: ADMINISTRATIVE | Facility: CLINIC | Age: 78
End: 2023-06-22
Payer: MEDICARE

## 2023-06-22 NOTE — PROGRESS NOTES
C3 nurse attempted to contact Rosette Madrid  for a TCC post hospital discharge follow up call. No answer. Left voicemail with callback information. The patient does not have a scheduled HOSFU appointment. Message sent to PCP staff for assistance with scheduling visit with patient.

## 2023-06-23 NOTE — PROGRESS NOTES
C3 nurse attempted to contact Rosette Madrid  for a TCC post hospital discharge follow up call. No answer. Left voicemail with callback information. The patient does not have a scheduled HOSFU appointment noted.  Appointment with JOSEFINA Lord 09/01/2023 @ 9 am.  Appointment with [facility]OL[facility] on 07/25/2023 @ 3 pm.

## 2023-06-23 NOTE — PROGRESS NOTES
C3 nurse attempted to contact Rosette Madrid  for a TCC post hospital discharge follow up call. No answer. Left voicemail with callback information. The patient does not have a scheduled HOSFU appointment noted.  Appointment with JOSEFINA Lord 09/01/2023 @ 9 am.

## 2023-06-26 PROBLEM — K92.2 GI BLEED: Status: RESOLVED | Noted: 2023-03-22 | Resolved: 2023-06-26

## 2023-07-23 NOTE — DISCHARGE SUMMARY
[facility] Hospital Medicine Discharge Summary    Admit Date: 6/15/2023  Discharge Date and Time: 6/21/2023  Discharging Physician: Dewey Mckeon MD.  Consults: Gastroenterology and Nephrology    Discharge Diagnoses:  Symptomatic acute on chronic anemia  Recurrent obscure GI bleed/melena     HX ESRD on HD, CAD/CABG, PAF on Eliquis, COPD, HTN, HLD, hypothyroid      Hospital Course:   This is a 77-year-old female medical history of ESRD on hemodialysis, Chronic HFpEF, CAD/CABG, PAF on Eliquis, COPD, anemia of chronic disease, recurrent obscure GI bleeding with last EGD on 04/06/2023 showing single 3 mm AVM with no bleeding in the gastric body treated with APC/with notes of it being small and likely clinically insignificant.     Present to the ED with chief complaint of low hemoglobin at labs done at dialysis center and 3-4 day history of generalized weakness, fatigue and dyspnea on exertion and reports 2 day history of black loose stool.  Denies abdominal pain, nausea or vomiting.  Denies chest pain, fever or chills.     On arrival to ED she was afebrile, hypertensive, saturating 96% on room air.  Labs notable for hemoglobin 4.9.  Rectal exam by ED physician show guaiac-positive melanotic stool.  She was given Protonix 80 mg IV and started on blood transfusion and referred to hospital medicine service for further evaluation and management.     - nuclear scan is negative  - Patient responded to transfusion,   - Continue Protonix b.i.d.   - Appreciate further GI recs.    GI recommends 6 weeks of endoscopy, restarting eliquis and.       Objective/physical exam:  General: In no acute distress, AAOx3   Chest: Clear to auscultation bilaterally, non-labored   Heart: RRR, +S1, S2, no appreciable murmur  Abdomen: Soft, nontender, BS +  MSK: Warm, no lower extremity edema, no clubbing or cyanosis  Neurologic:  Cranial nerve II-XII intact, Strength 5/5 in all 4 extremities    Vitals:  VITAL SIGNS: 24  HRS MIN & MAX LAST   No data recorded 97.8 °F (36.6 °C)   No data recorded (!) 157/60   No data recorded  (!) 56   No data recorded 16   No data recorded 100 %         Procedures Performed: No admission procedures for hospital encounter.     Significant Diagnostic Studies: See Full reports for all details    No results for input(s): WBC, RBC, HGB, HCT, MCV, MCH, MCHC, RDW, PLT, MPV, GRAN, LYMPH, MONO, BASO, NRBC in the last 168 hours.    No results for input(s): NA, K, CL, CO2, ANIONGAP, BUN, CREATININE, GLU, CALCIUM, PH, MG, ALBUMIN, PROT, ALKPHOS, ALT, AST, BILITOT in the last 168 hours.     Microbiology Results (last 7 days)       ** No results found for the last 168 hours. **             CV Ultrasound Bilateral Doppler Carotid  The right internal carotid artery demonstrated a stenosis on the upper end   of the 50-69% stenosis range based on PSV and ICA/ CCA ratio.  The right ICA/CCA ratio was 3.7.  There is a moderate amount of calcified plaque in the right carotid bulb   and proximal ICA.  The right vertebral artery was patent with antegrade flow.    The left internal carotid artery demonstrated less than a 50% stenosis.  There is a mild amount of calcified plaque in the left carotid bulb and   proximal ICA.  The left vertebral artery was patent with antegrade flow.         Medication List        CONTINUE taking these medications      albuterol 90 mcg/actuation inhaler  Commonly known as: VENTOLIN HFA  Inhale 2 puffs into the lungs every 6 (six) hours as needed for Wheezing. Rescue     albuterol-ipratropium 2.5 mg-0.5 mg/3 mL nebulizer solution  Commonly known as: DUO-NEB  Take 3 mLs by nebulization every 6 (six) hours as needed for Wheezing or Shortness of Breath. Rescue     apixaban 5 mg Tab  Commonly known as: ELIQUIS  Take 1 tablet (5 mg total) by mouth 2 (two) times daily.     BASAGLAR KWIKPEN U-100 INSULIN glargine 100 units/mL SubQ pen  Generic drug: insulin  Inject 30 Units into the skin Daily.    "  BenadryL 25 mg capsule  Generic drug: diphenhydrAMINE     carvediloL 6.25 MG tablet  Commonly known as: COREG  Take 1 tablet (6.25 mg total) by mouth 2 (two) times daily.     clonazePAM 0.5 MG tablet  Commonly known as: KlonoPIN     DIALYVITE 100-1 mg Tab  Generic drug: B complex-vitamin C-folic acid     DULoxetine 30 MG capsule  Commonly known as: CYMBALTA     ergocalciferol 50,000 unit Cap  Commonly known as: ERGOCALCIFEROL  Take 1 capsule (50,000 Units total) by mouth every 7 days.     ferrous sulfate 325 mg (65 mg iron) Tab tablet  Commonly known as: FEOSOL  Take 1 tablet (325 mg total) by mouth once daily.     fluticasone furoate-vilanteroL 100-25 mcg/dose diskus inhaler  Commonly known as: BREO  Inhale 1 puff into the lungs once daily.     furosemide 40 MG tablet  Commonly known as: LASIX  Take 1 tablet (40 mg total) by mouth once daily.     glipiZIDE 10 MG tablet  Commonly known as: GLUCOTROL  Take 1 tablet (10 mg total) by mouth daily with breakfast.     INVEGA SUSTENNA 156 mg/mL Syrg injection  Generic drug: paliperidone palmitate     L-METHYLFOLATE 15 mg Tab  Generic drug: levomefolate calcium     lactulose 10 gram/15 mL solution  Commonly known as: CHRONULAC     levothyroxine 125 MCG tablet  Commonly known as: SYNTHROID  Take 1 tablet (125 mcg total) by mouth before breakfast.     ONETOUCH DELICA PLUS LANCET 30 gauge Misc  Generic drug: lancets  1 lancet by Other route once daily.     ONETOUCH ULTRA TEST Strp  Generic drug: blood sugar diagnostic  1 strip by Other route once daily.     pantoprazole 40 MG tablet  Commonly known as: PROTONIX  Take 1 tablet (40 mg total) by mouth 2 (two) times daily.     pen needle, diabetic 31 gauge x 5/16" Ndle  2 Units by Misc.(Non-Drug; Combo Route) route 2 (two) times a day. Patient to check blood sugars twice daily (morning and night)     rosuvastatin 40 MG Tab  Commonly known as: CRESTOR  Take 1 tablet (40 mg total) by mouth once daily.     sevelamer carbonate 800 " mg Tab  Commonly known as: RENVELA  Take 2 tablets (1,600 mg total) by mouth 3 (three) times daily.     SPIRIVA WITH HANDIHALER 18 mcg inhalation capsule  Generic drug: tiotropium  Inhale 1 capsule (18 mcg total) into the lungs Daily.            STOP taking these medications      aspirin 81 MG EC tablet  Commonly known as: ECOTRIN               Explained in detail to the patient about the discharge plan, medications, and follow-up visits. Pt understands and agrees with the treatment plan  Discharge Disposition: Home or Self Care   Discharged Condition: stable  Diet-    Medications Per [facility] med rec  Activities as tolerated   Follow-up Information       JOSEFINA Lord Follow up in 1 week(s).    Specialty: Family Medicine  Contact information:  [facility] [place] [address]  [phone]                           For further questions contact hospitalist office    Discharge time 33 minutes    For worsening symptoms, chest pain, shortness of breath, increased abdominal pain, high grade fever, stroke or stroke like symptoms, immediately go to the nearest Emergency Room or call 911 as soon as possible.      Dewey Mora M.D, on 7/23/2023. at 5:13 PM.

## 2023-08-02 DIAGNOSIS — N18.9: ICD-10-CM

## 2023-08-02 DIAGNOSIS — N93.9 VAGINAL BLEEDING: Primary | ICD-10-CM

## 2023-08-16 LAB
INR PPP: 1
INR PPP: 1
PROTHROMBIN TIME: 13.3 SECONDS (ref 11.4–14)
PROTHROMBIN TIME: 13.4 SECONDS (ref 11.4–14)

## 2023-09-01 ENCOUNTER — OFFICE VISIT (OUTPATIENT)
Dept: INTERNAL MEDICINE | Facility: CLINIC | Age: 78
End: 2023-09-01
Payer: MEDICARE

## 2023-09-01 ENCOUNTER — HOSPITAL ENCOUNTER (OUTPATIENT)
Dept: RADIOLOGY | Facility: HOSPITAL | Age: 78
Discharge: HOME | End: 2023-09-01
Attending: NURSE PRACTITIONER
Payer: MEDICARE

## 2023-09-01 VITALS
WEIGHT: 192.63 LBS | SYSTOLIC BLOOD PRESSURE: 122 MMHG | BODY MASS INDEX: 30.96 KG/M2 | HEIGHT: 66 IN | HEART RATE: 56 BPM | TEMPERATURE: 98 F | DIASTOLIC BLOOD PRESSURE: 67 MMHG | RESPIRATION RATE: 20 BRPM

## 2023-09-01 DIAGNOSIS — Z99.2 TYPE 2 DIABETES MELLITUS WITH CHRONIC KIDNEY DISEASE ON CHRONIC DIALYSIS, WITH LONG-TERM CURRENT USE OF INSULIN: Chronic | ICD-10-CM

## 2023-09-01 DIAGNOSIS — N18.6 TYPE 2 DIABETES MELLITUS WITH CHRONIC KIDNEY DISEASE ON CHRONIC DIALYSIS, WITH LONG-TERM CURRENT USE OF INSULIN: Chronic | ICD-10-CM

## 2023-09-01 DIAGNOSIS — R05.1 ACUTE COUGH: ICD-10-CM

## 2023-09-01 DIAGNOSIS — J44.9 CHRONIC OBSTRUCTIVE PULMONARY DISEASE, UNSPECIFIED COPD TYPE: Chronic | ICD-10-CM

## 2023-09-01 DIAGNOSIS — I50.32 CHRONIC HEART FAILURE WITH PRESERVED EJECTION FRACTION: Chronic | ICD-10-CM

## 2023-09-01 DIAGNOSIS — E11.22 TYPE 2 DIABETES MELLITUS WITH CHRONIC KIDNEY DISEASE ON CHRONIC DIALYSIS, WITH LONG-TERM CURRENT USE OF INSULIN: Chronic | ICD-10-CM

## 2023-09-01 DIAGNOSIS — I10 HTN (HYPERTENSION), BENIGN: Chronic | ICD-10-CM

## 2023-09-01 DIAGNOSIS — Z99.2 ESRD (END STAGE RENAL DISEASE) ON DIALYSIS: Chronic | ICD-10-CM

## 2023-09-01 DIAGNOSIS — E78.2 MIXED HYPERLIPIDEMIA: Chronic | ICD-10-CM

## 2023-09-01 DIAGNOSIS — R05.1 ACUTE COUGH: Primary | ICD-10-CM

## 2023-09-01 DIAGNOSIS — Z79.4 TYPE 2 DIABETES MELLITUS WITH CHRONIC KIDNEY DISEASE ON CHRONIC DIALYSIS, WITH LONG-TERM CURRENT USE OF INSULIN: Chronic | ICD-10-CM

## 2023-09-01 DIAGNOSIS — N18.6 ESRD (END STAGE RENAL DISEASE) ON DIALYSIS: Chronic | ICD-10-CM

## 2023-09-01 DIAGNOSIS — F17.210 CIGARETTE NICOTINE DEPENDENCE WITHOUT COMPLICATION: Chronic | ICD-10-CM

## 2023-09-01 DIAGNOSIS — E89.0 POSTOPERATIVE HYPOTHYROIDISM: Chronic | ICD-10-CM

## 2023-09-01 DIAGNOSIS — E66.09 CLASS 1 OBESITY DUE TO EXCESS CALORIES WITH SERIOUS COMORBIDITY AND BODY MASS INDEX (BMI) OF 31.0 TO 31.9 IN ADULT: Chronic | ICD-10-CM

## 2023-09-01 DIAGNOSIS — I48.91 ATRIAL FIBRILLATION, UNSPECIFIED TYPE: Chronic | ICD-10-CM

## 2023-09-01 PROBLEM — Z72.0 TOBACCO USE: Status: RESOLVED | Noted: 2022-12-05 | Resolved: 2023-09-01

## 2023-09-01 PROCEDURE — 1126F PR PAIN SEVERITY QUANTIFIED, NO PAIN PRESENT: ICD-10-PCS | Mod: CPTII,,, | Performed by: NURSE PRACTITIONER

## 2023-09-01 PROCEDURE — 1160F RVW MEDS BY RX/DR IN RCRD: CPT | Mod: CPTII,,, | Performed by: NURSE PRACTITIONER

## 2023-09-01 PROCEDURE — 71046 X-RAY EXAM CHEST 2 VIEWS: CPT | Mod: TC

## 2023-09-01 PROCEDURE — 3078F DIAST BP <80 MM HG: CPT | Mod: CPTII,,, | Performed by: NURSE PRACTITIONER

## 2023-09-01 PROCEDURE — 1126F AMNT PAIN NOTED NONE PRSNT: CPT | Mod: CPTII,,, | Performed by: NURSE PRACTITIONER

## 2023-09-01 PROCEDURE — 1101F PT FALLS ASSESS-DOCD LE1/YR: CPT | Mod: CPTII,,, | Performed by: NURSE PRACTITIONER

## 2023-09-01 PROCEDURE — 3078F PR MOST RECENT DIASTOLIC BLOOD PRESSURE < 80 MM HG: ICD-10-PCS | Mod: CPTII,,, | Performed by: NURSE PRACTITIONER

## 2023-09-01 PROCEDURE — 1160F PR REVIEW ALL MEDS BY PRESCRIBER/CLIN PHARMACIST DOCUMENTED: ICD-10-PCS | Mod: CPTII,,, | Performed by: NURSE PRACTITIONER

## 2023-09-01 PROCEDURE — 99215 OFFICE O/P EST HI 40 MIN: CPT | Mod: PBBFAC,25 | Performed by: NURSE PRACTITIONER

## 2023-09-01 PROCEDURE — 1101F PR PT FALLS ASSESS DOC 0-1 FALLS W/OUT INJ PAST YR: ICD-10-PCS | Mod: CPTII,,, | Performed by: NURSE PRACTITIONER

## 2023-09-01 PROCEDURE — 3288F PR FALLS RISK ASSESSMENT DOCUMENTED: ICD-10-PCS | Mod: CPTII,,, | Performed by: NURSE PRACTITIONER

## 2023-09-01 PROCEDURE — 3074F SYST BP LT 130 MM HG: CPT | Mod: CPTII,,, | Performed by: NURSE PRACTITIONER

## 2023-09-01 PROCEDURE — 1159F MED LIST DOCD IN RCRD: CPT | Mod: CPTII,,, | Performed by: NURSE PRACTITIONER

## 2023-09-01 PROCEDURE — 99214 OFFICE O/P EST MOD 30 MIN: CPT | Mod: S$PBB,,, | Performed by: NURSE PRACTITIONER

## 2023-09-01 PROCEDURE — 3074F PR MOST RECENT SYSTOLIC BLOOD PRESSURE < 130 MM HG: ICD-10-PCS | Mod: CPTII,,, | Performed by: NURSE PRACTITIONER

## 2023-09-01 PROCEDURE — 3288F FALL RISK ASSESSMENT DOCD: CPT | Mod: CPTII,,, | Performed by: NURSE PRACTITIONER

## 2023-09-01 PROCEDURE — 1159F PR MEDICATION LIST DOCUMENTED IN MEDICAL RECORD: ICD-10-PCS | Mod: CPTII,,, | Performed by: NURSE PRACTITIONER

## 2023-09-01 PROCEDURE — 99214 PR OFFICE/OUTPT VISIT, EST, LEVL IV, 30-39 MIN: ICD-10-PCS | Mod: S$PBB,,, | Performed by: NURSE PRACTITIONER

## 2023-09-01 RX ORDER — ERGOCALCIFEROL 1.25 MG/1
50000 CAPSULE ORAL
Qty: 12 CAPSULE | Refills: 2 | Status: SHIPPED | OUTPATIENT
Start: 2023-09-01 | End: 2023-11-29

## 2023-09-01 RX ORDER — LEVOTHYROXINE SODIUM 125 UG/1
125 TABLET ORAL
Qty: 90 TABLET | Refills: 2 | Status: SHIPPED | OUTPATIENT
Start: 2023-09-01

## 2023-09-01 RX ORDER — FLUTICASONE FUROATE AND VILANTEROL 100; 25 UG/1; UG/1
1 POWDER RESPIRATORY (INHALATION) DAILY
Qty: 90 EACH | Refills: 2 | Status: SHIPPED | OUTPATIENT
Start: 2023-09-01

## 2023-09-01 RX ORDER — AMLODIPINE BESYLATE 5 MG/1
5 TABLET ORAL DAILY
COMMUNITY
Start: 2023-06-09 | End: 2023-11-29 | Stop reason: SDUPTHER

## 2023-09-01 RX ORDER — TIOTROPIUM BROMIDE 18 UG/1
18 CAPSULE ORAL; RESPIRATORY (INHALATION) DAILY
Qty: 90 CAPSULE | Refills: 2 | Status: SHIPPED | OUTPATIENT
Start: 2023-09-01

## 2023-09-01 RX ORDER — GLIPIZIDE 10 MG/1
10 TABLET ORAL
Qty: 90 TABLET | Refills: 2 | Status: ON HOLD | OUTPATIENT
Start: 2023-09-01 | End: 2024-01-09 | Stop reason: HOSPADM

## 2023-09-01 RX ORDER — INSULIN GLARGINE 100 [IU]/ML
30 INJECTION, SOLUTION SUBCUTANEOUS DAILY
Qty: 27 ML | Refills: 2 | Status: SHIPPED | OUTPATIENT
Start: 2023-09-01 | End: 2023-12-14 | Stop reason: CLARIF

## 2023-09-01 NOTE — PROGRESS NOTES
Patient ID: [id number]     Chief Complaint: Cough    HPI:     Rosette Madrid is a 78 y.o. female with diagnosis of HTN, HLD, Diastolic Dysfunction, Class III HFpEF 55%, Hx of CABG x4, A-FIB, DM2, ESRD with HD on Tu/Thur/Sat, COPD, Hypothyroidism S/T Thyroidectomy, Tobacco Use. Patient seen in clinic today for follow up. Patient last seen in clinic on 03/01/2023.  Today, patient states productive cough x3 weeks. Patient denies SOB, chest pain. Patient has diagnosis of COPD. Patient currently prescribed Spiriva daily, Breo daily, Albuterol prn. Patient is a current tobacco user.   Patient states taking her medications as prescribed, tolerating well.   Patient continued on Insulin Glargine 30 units daily, Glipizide 10 mg daily for DM2. Previous A1c 0; current A1c 5.7. Patient states she checks her CBGs at home and denies hypoglycemia.   EGD completed on 04/06/2023; indicated normal esophagus, Normal examined duodenum, Normal examined jejunum, A single non-bleeding angioectasia in the stomach, treated with argon plasma coagulation (APC), no specimens collected.      Patient followed by Nephrology, Dr. Bourgeois.      Patient followed by Cardiology. Last appointment on 05/05/2023. Dx: Chronic heart failure with preserved ejection fraction. Coronary artery disease involving coronary bypass graft of native heart with unstable angina pectoris. ESRD on dialysis. HLD. HTN. DM2. Obesity. Plan: Medications: refills are done by PCP. Smoking cessation education: encouraged to quit, Smoking cessation education provided. Diet: Avoid processed foods and drinks, sugar, salt, fried/greasy foods, and fast foods. Recommend 10 servings of fruits and vegetables per day. Exercise: activities as tolerated. Nurse visit for BP check. Work up: Carotid u/s due to left bruit. Follow up: 4 months. Patient has follow up appointment scheduled for 09/19/2023.     Patient followed by [facility], Dr. Marshal Espinal.     Review of patient's  allergies indicates:   Allergen Reactions    Lisinopril Swelling    Azithromycin      Other reaction(s): tongue/facial swelling    Baclofen      Other reaction(s): tongue/facial swelling    Doxycycline      Other reaction(s): Angioedema     Breast Cancer Screening: MMG negative on 10/28/2022  Cervical Cancer Screening: deferred due to age  Colorectal Cancer Screening: Colonoscopy on 2021 with recommended repeat in 3 years  Diabetic Eye Exam: ordered 2023  Diabetic Foot Exam: 2023  Lung Cancer Screening: refuses  Prostate Cancer Screening: N/A  AAA Screening: N/A  Osteoporosis Screenin2022, normal  Medicare Wellness: ordered  Immunizations:   Immunization History   Administered Date(s) Administered    COVID-19, MRNA, LN-S, PF (MODERNA FULL 0.5 ML DOSE) 2021    COVID-19, MRNA, LN-S, PF (Pfizer) (Purple Cap) 03/10/2021    Influenza - High Dose - PF (65 years and older) 10/31/2016    Influenza - Quadrivalent - High Dose - PF (65 years and older) 11/15/2017, 2019, 2021, 10/18/2022    PPD Test 11/15/2017    Pneumococcal Polysaccharide - 23 Valent 10/31/2016     Past Surgical History:   Procedure Laterality Date    AV FISTULA PLACEMENT Left     CARDIAC CATHETERIZATION      COLONOSCOPY      CORONARY ARTERY BYPASS GRAFT      EGD, WITH HEMORRHAGE CONTROL  3/24/2023    Procedure: EGD,WITH HEMORRHAGE CONTROL;  Surgeon: Go Le MD;  Location: [facility] ENDOSCOPY;  Service: Gastroenterology;;    EGD, WITH HEMORRHAGE CONTROL  2023    Procedure: EGD,WITH HEMORRHAGE CONTROL;  Surgeon: Ayden Francois MD;  Location: [facility] ENDOSCOPY;  Service: Gastroenterology;;    ESOPHAGOGASTRODUODENOSCOPY      ESOPHAGOGASTRODUODENOSCOPY N/A 2023    Procedure: EGD +/- VCE PLACEMENT;  Surgeon: Morgan Gardner MD;  Location: [facility] ENDOSCOPY;  Service: Gastroenterology;  Laterality: N/A;    ESOPHAGOGASTRODUODENOSCOPY N/A 3/24/2023    Procedure: EGD;  Surgeon: Go Le MD;   Location: [facility] ENDOSCOPY;  Service: Gastroenterology;  Laterality: N/A;  with push    ESOPHAGOGASTRODUODENOSCOPY N/A 4/6/2023    Procedure: EGD;  Surgeon: Ayden Francois MD;  Location: [facility] ENDOSCOPY;  Service: Gastroenterology;  Laterality: N/A;  with push    ESOPHAGOGASTRODUODENOSCOPY N/A 6/16/2023    Procedure: EGD W/ PUSH;  Surgeon: Morgan Gardner MD;  Location: [facility] ENDOSCOPY;  Service: Gastroenterology;  Laterality: N/A;    POLYPECTOMY      SMALL BOWEL ENTEROSCOPY Left 1/20/2023    Procedure: ENTEROSCOPY;  Surgeon: Lexi Scales MD;  Location: [facility] ENDOSCOPY;  Service: Gastroenterology;  Laterality: Left;    THYROIDECTOMY      TUBAL LIGATION       family history includes Hypertension in her father, mother, sister, and sister.    Social History     Socioeconomic History    Marital status:     Number of children: 6   Tobacco Use    Smoking status: Every Day     Current packs/day: 0.25     Types: Cigarettes    Smokeless tobacco: Never    Tobacco comments:     Smokes 3 cigarettes a day   Substance and Sexual Activity    Alcohol use: Not Currently     Comment: 1 glass every 2-3 month    Drug use: Not Currently    Sexual activity: Not Currently     Social Determinants of Health     Financial Resource Strain: Low Risk  (1/20/2023)    Overall Financial Resource Strain (CARDIA)     Difficulty of Paying Living Expenses: Not hard at all   Food Insecurity: No Food Insecurity (6/16/2023)    Hunger Vital Sign     Worried About Running Out of Food in the Last Year: Never true     Ran Out of Food in the Last Year: Never true   Transportation Needs: No Transportation Needs (6/16/2023)    PRAPARE - Transportation     Lack of Transportation (Medical): No     Lack of Transportation (Non-Medical): No   Physical Activity: Inactive (1/20/2023)    Exercise Vital Sign     Days of Exercise per Week: 0 days     Minutes of Exercise per Session: 0 min   Stress: No Stress Concern Present (1/20/2023)     [facility] of Occupational Health - Occupational Stress Questionnaire     Feeling of Stress : Not at all   Social Connections: Moderately Isolated (1/20/2023)    Social Connection and Isolation Panel [NHANES]     Frequency of Communication with Friends and Family: More than three times a week     Frequency of Social Gatherings with Friends and Family: More than three times a week     Attends Latter-day Services: More than 4 times per year     Active Member of Clubs or Organizations: No     Attends Club or Organization Meetings: Never     Marital Status:    Housing Stability: Low Risk  (6/16/2023)    Housing Stability Vital Sign     Unable to Pay for Housing in the Last Year: No     Number of Places Lived in the Last Year: 1     Unstable Housing in the Last Year: No     Current Outpatient Medications   Medication Instructions    albuterol (VENTOLIN HFA) 90 mcg/actuation inhaler 2 puffs, Inhalation, Every 6 hours PRN, Rescue    albuterol-ipratropium (DUO-NEB) 2.5 mg-0.5 mg/3 mL nebulizer solution 3 mLs, Nebulization, Every 6 hours PRN, Rescue    amLODIPine (NORVASC) 5 mg, Oral, Daily    apixaban (ELIQUIS) 5 mg, Oral, 2 times daily    BASAGLAR KWIKPEN U-100 INSULIN 30 Units, Subcutaneous, Daily    BenadryL 50 mg, Oral, Nightly    carvediloL (COREG) 6.25 mg, Oral, 2 times daily    clonazePAM (KLONOPIN) 0.5 mg, Oral, Nightly    DIALYVITE 100-1 mg Tab 1 tablet, Oral, Daily    DULoxetine (CYMBALTA) 30 mg, Oral, Daily    ergocalciferol (ERGOCALCIFEROL) 50,000 Units, Oral, Every 7 days    fluticasone furoate-vilanteroL (BREO) 100-25 mcg/dose diskus inhaler 1 puff, Inhalation, Daily    furosemide (LASIX) 40 mg, Oral, Daily    glipiZIDE (GLUCOTROL) 10 mg, Oral, With breakfast    INVEGA SUSTENNA 156 mg/mL Syrg injection Intramuscular    L-METHYLFOLATE 15 mg Tab 1 tablet, Oral    lactulose (CHRONULAC) 10 gram/15 mL solution 30 mLs, Oral    levothyroxine (SYNTHROID) 125 mcg, Oral, Before breakfast    ONETOUCH  "DELICA PLUS LANCET 30 gauge Misc 1 lancet , Other, Daily    ONETOUCH ULTRA TEST Strp 1 strip, Other, Daily    pen needle, diabetic 2 Units, Misc.(Non-Drug; Combo Route), 2 times daily, Patient to check blood sugars twice daily (morning and night)    rosuvastatin (CRESTOR) 40 mg, Oral, Daily    sevelamer carbonate (RENVELA) 1,600 mg, Oral, 3 times daily    tiotropium (SPIRIVA WITH HANDIHALER) 18 mcg, Inhalation, Daily       Subjective:     Review of Systems   Constitutional: Negative.    HENT: Negative.     Eyes: Negative.    Respiratory:  Positive for cough.    Cardiovascular: Negative.    Gastrointestinal: Negative.    Endocrine: Negative.    Genitourinary: Negative.    Musculoskeletal: Negative.    Skin: Negative.    Allergic/Immunologic: Negative.    Neurological: Negative.    Hematological: Negative.    Psychiatric/Behavioral: Negative.         Objective:     Visit Vitals  /67 (BP Location: Right arm, Patient Position: Sitting, BP Method: Medium (Automatic))   Pulse (!) 56   Temp 98.2 °F (36.8 °C) (Oral)   Resp 20   Ht 5' 5.98" (1.676 m)   Wt 87.4 kg (192 lb 9.6 oz)   BMI 31.10 kg/m²       Physical Exam  Vitals reviewed.   Constitutional:       Appearance: Normal appearance.   HENT:      Head: Normocephalic and atraumatic.      Mouth/Throat:      Mouth: Mucous membranes are moist.      Pharynx: Oropharynx is clear.   Cardiovascular:      Rate and Rhythm: Normal rate. Rhythm irregular.      Heart sounds: Murmur heard.   Pulmonary:      Effort: Pulmonary effort is normal.      Breath sounds: Normal breath sounds.   Abdominal:      General: Bowel sounds are normal.   Musculoskeletal:         General: Normal range of motion.      Cervical back: Normal range of motion.      Right lower leg: No edema.      Left lower leg: No edema.   Skin:     General: Skin is warm and dry.   Neurological:      Mental Status: She is alert and oriented to person, place, and time.   Psychiatric:         Mood and Affect: Mood " normal.         Behavior: Behavior normal.       Labs Reviewed:     Hematology:  Lab Results   Component Value Date    WBC 5.91 09/01/2023    HGB 12.3 09/01/2023    HCT 41.7 09/01/2023     09/01/2023     Chemistry:  Lab Results   Component Value Date     09/01/2023    K 4.2 09/01/2023    CHLORIDE 100 09/01/2023    BUN 18.7 09/01/2023    CREATININE 3.93 (H) 09/01/2023    EGFRNORACEVR 11 09/01/2023    GLUCOSE 110 09/01/2023    CALCIUM 10.3 (H) 09/01/2023    ALKPHOS 105 09/01/2023    LABPROT 7.2 09/01/2023    ALBUMIN 3.4 09/01/2023    BILIDIR <0.1 03/22/2022    IBILI >0.10 03/22/2022    AST 38 (H) 09/01/2023    ALT 47 09/01/2023    MG 1.80 06/16/2023    PHOS 3.8 06/16/2023    SOPZRBVR25NM 38.1 09/01/2023     Lab Results   Component Value Date    HGBA1C 5.7 09/01/2023     Lipid Panel:  Lab Results   Component Value Date    CHOL 124 09/01/2023    HDL 48 09/01/2023    LDL 65.00 09/01/2023    TRIG 54 09/01/2023    TOTALCHOLEST 3 09/01/2023     Thyroid:  Lab Results   Component Value Date    TSH 0.197 (L) 09/01/2023    T3FREE 2.04 (L) 01/07/2020     Urine:  Lab Results   Component Value Date    APPEARANCEUA Hazy 09/30/2021    PHUA 7.0 09/30/2021    PROTEINUA 30 (A) 09/30/2021    LEUKOCYTESUR Negative 09/30/2021    RBCUA 0-2 09/30/2021    WBCUA 0-2 09/30/2021    BACTERIA Moderate (A) 09/30/2021    SQEPUA >100 (A) 09/30/2021    HYALINECASTS 0-2 (A) 09/30/2021    CREATRANDUR 375.0 09/26/2017    PROTEINURINE 1,032.6 09/26/2017        Assessment:       ICD-10-CM ICD-9-CM   1. Acute cough  R05.1 786.2   2. Chronic obstructive pulmonary disease, unspecified COPD type  J44.9 496   3. Type 2 diabetes mellitus with chronic kidney disease on chronic dialysis, with long-term current use of insulin  E11.22 250.40    N18.6 585.9    Z99.2 V45.11    Z79.4 V58.67   4. Mixed hyperlipidemia  E78.2 272.2   5. HTN (hypertension), benign  I10 401.1   6. Chronic heart failure with preserved ejection fraction  I50.32 428.9   7. Atrial  fibrillation, unspecified type  I48.91 427.31   8. ESRD (end stage renal disease) on dialysis  N18.6 585.6    Z99.2 V45.11   9. Postoperative hypothyroidism  E89.0 244.0   10. Class 1 obesity due to excess calories with serious comorbidity and body mass index (BMI) of 31.0 to 31.9 in adult  E66.09 278.00    Z68.31 V85.31   11. Cigarette nicotine dependence without complication  F17.210 305.1       Plan:     1. Acute cough  CXR ordered  BBS clear  Cough x3 weeks  Smoking cessation encouraged  - X-Ray Chest PA And Lateral; Future    2. Chronic obstructive pulmonary disease, unspecified COPD type  PFTs: none noted  Continue Albuterol prn, Breo 1 puff daily, Spiriva 2 puffs bid    3. Type 2 diabetes mellitus with chronic kidney disease on chronic dialysis, with long-term current use of insulin  Continue Medications.  Encouraged home CBG monitoring.  Hypoglycemic episodes:  Body mass index is 31.1 kg/m².  Hemoglobin A1c   Date Value Ref Range Status   09/01/2023 5.7 <=7.0 % Final   On Rosuvastatin  No ACEi/ARB noted  Weight Loss Encouraged  ADA Diet    4. Mixed hyperlipidemia  Continue Rosuvastatin 40 mg daily  Weight loss encouraged  Low fat/high fiber diet  Increase physical activity  Tobacco cessation encouraged  Cholesterol Total   Date Value Ref Range Status   09/01/2023 124 <=200 mg/dL Final     HDL Cholesterol   Date Value Ref Range Status   09/01/2023 48 35 - 60 mg/dL Final     Triglyceride   Date Value Ref Range Status   09/01/2023 54 37 - 140 mg/dL Final     LDL Cholesterol   Date Value Ref Range Status   09/01/2023 65.00 50.00 - 140.00 mg/dL Final     5. HTN (hypertension), benign  Vitals:    09/01/23 1003   BP: 122/67   Pulse: (!) 56   Resp: 20   Temp: 98.2 °F (36.8 °C)      No results found for this or any previous visit.  Results for orders placed or performed during the hospital encounter of 03/22/23   EKG 12-lead    Collection Time: 03/23/23  4:34 PM    Narrative    Test Reason : R06.02,    Vent. Rate :  072 BPM     Atrial Rate : 072 BPM     P-R Int : 262 ms          QRS Dur : 150 ms      QT Int : 436 ms       P-R-T Axes : 090 -33 136 degrees     QTc Int : 477 ms    Sinus rhythm with 1st degree A-V block with Premature atrial complexes with   Aberrant conduction  Left axis deviation  Left bundle branch block  Abnormal ECG  Confirmed by Valerio Villegas MD ([id number]) on 3/23/2023 5:20:22 PM    Referred By: [email or web address]   SELF           Confirmed By:Valerio Villegas MD   Continue Amlodipine 5 mg daily, Carvedilol 6.25 mg bid, Lasix 40 mg daily  DASH diet  Encouraged home blood pressure monitoring    6. Chronic heart failure with preserved ejection fraction  Followed by Cardiology  Continue Coreg 6.25 mg bid, Lasix 40 mg daily    7. Atrial fibrillation, unspecified type  Followed by Cardiology  Continue Eliquis 5 mg bid    8. ESRD (end stage renal disease) on dialysis  Followed by Nephrology   Latest Reference Range & Units 09/01/23 09:04   BUN 9.8 - 20.1 mg/dL 18.7   Creatinine 0.55 - 1.02 mg/dL 3.93 (H)   eGFR mls/min/1.73/m2 11     9. Postoperative hypothyroidism  TSH: 0.197  Continue Levothyroxine 125 mcg daily  Repeat TSH in 8 weeks    10. Class 1 obesity due to excess calories with serious comorbidity and body mass index (BMI) of 31.0 to 31.9 in adult  Body mass index is 31.1 kg/m².  Thyroid Stimulating Hormone   Date Value Ref Range Status   09/01/2023 0.197 (L) 0.350 - 4.940 uIU/mL Final     Vit D 25 OH   Date Value Ref Range Status   09/01/2023 38.1 30.0 - 80.0 ng/mL Final     Hemoglobin A1c   Date Value Ref Range Status   09/01/2023 5.7 <=7.0 % Final   Sleep Study: none noted  Weight Loss Encouraged  Increase Physical Activity    11. Cigarette nicotine dependence without complication  Smoking cessation education provided, encouraged. Informed of smoking cessation program. Patient refused smoking cessation at this time. I spent 3 minutes discussing smoking cessation with patient.       Follow up in about 8 weeks (around  10/27/2023) for Labs. In addition to their scheduled follow up, the patient has also been instructed to follow up on as needed basis.     JOSEFINA Lord

## 2023-09-05 NOTE — PROGRESS NOTES
[facility]  Hospital Medicine Progress Note        Chief Complaint: Inpatient Follow-up for anemia    Subjective:  This is a 77-year-old female medical history of ESRD on hemodialysis, Chronic HFpEF, CAD/CABG, PAF on Eliquis, COPD, anemia of chronic disease, recurrent obscure GI bleeding with last EGD on 04/06/2023 showing single 3 mm AVM with no bleeding in the gastric body treated with APC/with notes of it being small and likely clinically insignificant.     Present to the ED with chief complaint of low hemoglobin at labs done at dialysis center and 3-4 day history of generalized weakness, fatigue and dyspnea on exertion and reports 2 day history of black loose stool.  Denies abdominal pain, nausea or vomiting.  Denies chest pain, fever or chills.     On arrival to ED she was afebrile, hypertensive, saturating 96% on room air.  Labs notable for hemoglobin 4.9.  Rectal exam by ED physician show guaiac-positive melanotic stool.  She was given Protonix 80 mg IV and started on blood transfusion and referred to hospital medicine service for further evaluation and management.     Seen and examined the patient.  Afebrile vitals stable, patient's hemoglobin stable today does not have any complaints at this time.      Objective/physical exam:  General: In no acute distress, AAOx3   Chest: Clear to auscultation bilaterally, non-labored   Heart: RRR, +S1, S2, no appreciable murmur  Abdomen: Soft, nontender, BS +  MSK: Warm, no lower extremity edema, no clubbing or cyanosis  Neurologic:  Cranial nerve II-XII intact, Strength 5/5 in all 4 extremities    VITAL SIGNS: 24 HRS MIN & MAX LAST   No data recorded 97.8 °F (36.6 °C)   No data recorded (!) 157/60   No data recorded  (!) 56   No data recorded 16   No data recorded 100 %       Labs, Microbiology and Imaging were Reviewed.      Microbiology Results (last 7 days)       ** No results found for the last 168 hours. **             Radiology:  X-Ray  Chest PA And Lateral  Narrative: EXAMINATION:  XR CHEST PA AND LATERAL    CLINICAL HISTORY:  productive cough x3 weeks; Acute cough    TECHNIQUE:  PA and lateral views of the chest were performed.    COMPARISON:  03/23/2023    FINDINGS:  There is linear density on the left most consistent with atelectasis.  No infiltrates are seen.  Heart is borderline in size.  Patient has had a previous sternotomy.  There is vascular calcification noted.  Impression: Changes as described above, stable from the previous exam    Electronically signed by: Aaron Sahu MD  Date:    09/01/2023  Time:    11:25      Assessment/Plan:  Symptomatic acute on chronic anemia  Recurrent obscure GI bleed/melena     HX ESRD on HD, CAD/CABG, PAF on Eliquis, COPD, HTN, HLD, hypothyroid     Plan:  - nuclear scan is negative, well.  - Patient responded to transfusion,   Keep monitoring.   - Continue Protonix b.i.d.   - Appreciate further GI recs.    - Receiving RBCs with HD.   DC tomorrow.      All diagnosis and differential diagnosis have been reviewed; assessment and plan has been documented; I have personally reviewed the labs and test results that are presently available; I have reviewed the patients medication list; I have reviewed the consulting providers response and recommendations. I have reviewed or attempted to review medical records based upon their availability.       Dewey Mckeon MD   09/05/2023

## 2023-09-06 ENCOUNTER — TELEPHONE (OUTPATIENT)
Dept: INTERNAL MEDICINE | Facility: CLINIC | Age: 78
End: 2023-09-06
Payer: MEDICARE

## 2023-09-16 RX ORDER — ALBUTEROL SULFATE 90 UG/1
2 AEROSOL, METERED RESPIRATORY (INHALATION) EVERY 6 HOURS PRN
Qty: 18 G | Refills: 1 | Status: SHIPPED | OUTPATIENT
Start: 2023-09-16

## 2023-11-18 ENCOUNTER — OFFICE VISIT (OUTPATIENT)
Dept: INTERNAL MEDICINE | Facility: CLINIC | Age: 78
End: 2023-11-18
Payer: MEDICARE

## 2023-11-18 VITALS
HEIGHT: 65 IN | OXYGEN SATURATION: 96 % | TEMPERATURE: 98 F | DIASTOLIC BLOOD PRESSURE: 76 MMHG | BODY MASS INDEX: 32.99 KG/M2 | WEIGHT: 198 LBS | HEART RATE: 55 BPM | SYSTOLIC BLOOD PRESSURE: 137 MMHG

## 2023-11-18 DIAGNOSIS — E66.3 OVERWEIGHT: ICD-10-CM

## 2023-11-18 DIAGNOSIS — H91.90 HEARING LOSS, UNSPECIFIED HEARING LOSS TYPE, UNSPECIFIED LATERALITY: ICD-10-CM

## 2023-11-18 DIAGNOSIS — Z79.899 POLYPHARMACY: ICD-10-CM

## 2023-11-18 DIAGNOSIS — Z79.4 TYPE 2 DIABETES MELLITUS WITH CHRONIC KIDNEY DISEASE ON CHRONIC DIALYSIS, WITH LONG-TERM CURRENT USE OF INSULIN: ICD-10-CM

## 2023-11-18 DIAGNOSIS — E11.22 TYPE 2 DIABETES MELLITUS WITH CHRONIC KIDNEY DISEASE ON CHRONIC DIALYSIS, WITH LONG-TERM CURRENT USE OF INSULIN: ICD-10-CM

## 2023-11-18 DIAGNOSIS — Z99.2 TYPE 2 DIABETES MELLITUS WITH CHRONIC KIDNEY DISEASE ON CHRONIC DIALYSIS, WITH LONG-TERM CURRENT USE OF INSULIN: ICD-10-CM

## 2023-11-18 DIAGNOSIS — Z00.00 ENCOUNTER FOR PREVENTIVE HEALTH EXAMINATION: Primary | ICD-10-CM

## 2023-11-18 DIAGNOSIS — N18.6 TYPE 2 DIABETES MELLITUS WITH CHRONIC KIDNEY DISEASE ON CHRONIC DIALYSIS, WITH LONG-TERM CURRENT USE OF INSULIN: ICD-10-CM

## 2023-11-18 DIAGNOSIS — Z23 NEED FOR PNEUMOCOCCAL 20-VALENT CONJUGATE VACCINATION: ICD-10-CM

## 2023-11-18 PROCEDURE — 99215 OFFICE O/P EST HI 40 MIN: CPT | Mod: PBBFAC | Performed by: STUDENT IN AN ORGANIZED HEALTH CARE EDUCATION/TRAINING PROGRAM

## 2023-11-18 NOTE — PROGRESS NOTES
"Rosette Madrid presented for an initial Medicare AWV today. The following components were reviewed and updated:    Medical history  Family History  Social history  Allergies and Current Medications  Health Risk Assessment  Health Maintenance  Care Team    **See Completed Assessments for Annual Wellness visit with in the encounter summary    The following assessments were completed:  Depression Screening  Cognitive function Screening  Timed Get Up Test  Whisper Test      Opioid documentation:      Patient does not have a current opioid prescription.          Vitals:    11/18/23 1029   BP: 137/76   BP Location: Right arm   Patient Position: Sitting   BP Method: Large (Automatic)   Pulse: (!) 55   Temp: 98.2 °F (36.8 °C)   TempSrc: Oral   SpO2: 96%   Weight: 89.8 kg (198 lb)   Height: 5' 5" (1.651 m)     Body mass index is 32.95 kg/m².   ]    General appearance: alert, appears stated age, cooperative and no distress  Head: Normocephalic, without obvious abnormality, atraumatic  Neck: no adenopathy, no carotid bruit, no JVD and supple, symmetrical, trachea midline  Lungs: clear to auscultation bilaterally  Heart: regular rate and rhythm, S1, S2 normal, systolic murmur, click, rub or gallop  Abdomen: soft, non-tender; bowel sounds normal; no masses,  no organomegaly  Extremities: extremities normal, atraumatic, no cyanosis or edema, AV fistula to left forearm, intact bruit  Pulses: 2+ and symmetric  Skin: Skin color, texture, turgor normal. No rashes or lesions  Neurologic: Grossly normal      Diagnoses and health risks identified today and associated recommendations/orders:  1. Encounter for preventive health examination  - discussed health screening, UTD    2. Hearing loss, unspecified hearing loss type, unspecified laterality  - reporting some progressive hearing loss, recommend auditory exam referral by PCP    3. Polypharmacy  - has help with family setting out medications in pill box    4. Overweight  - multiple " comorbidity    5. Type 2 diabetes mellitus with chronic kidney disease on chronic dialysis, with long-term current use of insulin  - diabetic eye screening scheduled    6. Need for pneumococcal 20-valent conjugate vaccination  - do not have time, will get next visit    7. A.fib  - has been out of eliquis for A fib prophylaxis  - will give one month refill until they can get in touch with primary      Provided Rosette with a 5-10 year written screening schedule and personal prevention plan. Recommendations were developed using the USPSTF age appropriate recommendations. Education, counseling, and referrals were provided as needed.  After Visit Summary printed and given to patient which includes a list of additional screenings\tests needed.        Follow up in about 1 year (around 11/18/2024) for for medicare annual wellness.      Seb Bardales DO  [facility], [facility]  11/18/2023

## 2023-11-18 NOTE — PATIENT INSTRUCTIONS
Counseling and Referral of Other Preventative  (Italic type indicates deductible and co-insurance are waived)    Patient Name: Rosette Madrid  Today's Date: 11/18/2023    Health Maintenance       Date Due Completion Date    Eye Exam Never done ---    TETANUS VACCINE Never done ---    RSV Vaccine (Age 60+) (1 - 1-dose 60+ series) Never done ---    Pneumococcal Vaccines (Age 65+) (2 - PCV) 10/31/2017 10/31/2016    Influenza Vaccine (1) 09/01/2023 10/18/2022    COVID-19 Vaccine (3 - 2023-24 season) 09/01/2023 11/9/2021    Shingles Vaccine (1 of 2) 03/01/2024 (Originally 7/11/1997) ---    Hemoglobin A1c 03/01/2024 9/1/2023    Lipid Panel 09/01/2024 9/1/2023    DEXA Scan 04/11/2025 4/11/2022        Orders Placed This Encounter   Procedures    (In Office Administered) Pneumococcal Conjugate Vaccine (20 Valent) (IM) (Preferred)     The following information is provided to all patients.  This information is to help you find resources for any of the problems found today that may be affecting your health:                Living healthy guide: www.Count includes the [facility].[place].gov      Understanding Diabetes: www.diabetes.org      Eating healthy: www.cdc.gov/healthyweight      CDC home safety checklist: www.cdc.gov/steadi/patient.html      Agency on Aging: www.goea.[place].[facility]      Alcoholics anonymous (AA): www.aa.org      Physical Activity: www.[place].nih.gov/az0afnp      Tobacco use: www.quitwithusla.org

## 2023-11-22 NOTE — PROGRESS NOTES
CHIEF COMPLAINT:   Chief Complaint   Patient presents with    Follow-up     Clearance for colonoscopy/EGD Pt c/o occassional lft side cp states she smokes 3 cigarettes a day and is unsure if this is cause. She states she feels this most after dialysis                   Review of patient's allergies indicates:   Allergen Reactions    Lisinopril Swelling    Azithromycin      Other reaction(s): tongue/facial swelling    Baclofen      Other reaction(s): tongue/facial swelling    Doxycycline      Other reaction(s): Angioedema                                          HPI:  Rosette St Leiian 78 y.o. with a past medical history of CAD s/p CABG prior to 2005, ESRD on HD (TTHSat), HTN, DM, HFpEF, and HLD presents for follow up and ongoing care.  Today she is requesting cardiac risk stratification for EGD/colonoscopy procedure.  Patient completed an Echocardiogram on 12.30.21 which revealed an ejection fraction of approximately 50 to 55%. See report below.  At last office visit patient denied any cardiac complaints other than bilateral lower extremity edema for which she follows with Dr. Singh for venous insufficiency.     Today the patient states that she is feeling fairly well overall.  However, she does endorse some chest pain that has been occurring more frequently.  She states that typically occurs during her dialysis treatments but on occasion it does occur outside dialysis at rest and with exertion.  She denies any shortness of breath, palpitations, PND, orthopnea, lightheadedness, dizziness, or syncope.  She does continue to have occasional lower extremity edema, but states that it was typically only present in her left lower extremity.  Mild swelling today.  She states that she is able to complete her ADLs (cooking, cleaning, bathing) without any issues or ischemic symptoms.  She does require 1% assistance.  She also requires rolling walker or cane for ambulation assistance.  She reports compliance with all her current  medications.  She also reports compliance with her hemodialysis on Tuesday, Thursday, and Saturdays.  She continues to endorse smoking approximately 3-4 cigarettes per day but is trying to quit on her own      CARDIAC TESTING  Echocardiogram December 30, 2021:  Left ventricular ejection fraction is measured at approximately 50 to 55%.  Grade 2 diastolic dysfunction.  Aortic valve is tricuspid.  Aortic valve trileaflet are mildly thickened.  Mitral annular calcification is present.  Mild mitral regurgitation.  There is mild tricuspid regurgitation with RVSP estimated at 56 mmHg.                                                                                                                                                                                                                                                                                                                                                                                                                                                                                   Patient Active Problem List   Diagnosis    Chronic heart failure with preserved ejection fraction    Cigarette nicotine dependence without complication    Coronary artery disease involving coronary bypass graft of native heart with unstable angina pectoris    Chronic obstructive pulmonary disease    HTN (hypertension), benign    Mixed hyperlipidemia    Type 2 diabetes mellitus with chronic kidney disease on chronic dialysis, with long-term current use of insulin    ESRD (end stage renal disease) on dialysis    Depressive disorder    Class 1 obesity due to excess calories with serious comorbidity and body mass index (BMI) of 31.0 to 31.9 in adult    Vitamin D deficiency    Coronary artery disease involving native coronary artery of native heart without angina pectoris    Symptomatic anemia    A-fib    Anemia due to chronic kidney disease, on chronic dialysis    Hypotension    Melena     Postoperative hypothyroidism     Past Surgical History:   Procedure Laterality Date    AV FISTULA PLACEMENT Left     CARDIAC CATHETERIZATION      CARDIAC VALVE REPLACEMENT      COLONOSCOPY      CORONARY ARTERY BYPASS GRAFT      EGD, WITH HEMORRHAGE CONTROL  03/24/2023    Procedure: EGD,WITH HEMORRHAGE CONTROL;  Surgeon: Go Le MD;  Location: [facility] ENDOSCOPY;  Service: Gastroenterology;;    EGD, WITH HEMORRHAGE CONTROL  04/06/2023    Procedure: EGD,WITH HEMORRHAGE CONTROL;  Surgeon: Ayden Francois MD;  Location: [facility] ENDOSCOPY;  Service: Gastroenterology;;    ESOPHAGOGASTRODUODENOSCOPY      ESOPHAGOGASTRODUODENOSCOPY N/A 02/17/2023    Procedure: EGD +/- VCE PLACEMENT;  Surgeon: Morgan Gardner MD;  Location: [facility] ENDOSCOPY;  Service: Gastroenterology;  Laterality: N/A;    ESOPHAGOGASTRODUODENOSCOPY N/A 03/24/2023    Procedure: EGD;  Surgeon: Go Le MD;  Location: [facility] ENDOSCOPY;  Service: Gastroenterology;  Laterality: N/A;  with push    ESOPHAGOGASTRODUODENOSCOPY N/A 04/06/2023    Procedure: EGD;  Surgeon: Ayden Francois MD;  Location: [facility] ENDOSCOPY;  Service: Gastroenterology;  Laterality: N/A;  with push    ESOPHAGOGASTRODUODENOSCOPY N/A 06/16/2023    Procedure: EGD W/ PUSH;  Surgeon: Morgan Gardner MD;  Location: [facility] ENDOSCOPY;  Service: Gastroenterology;  Laterality: N/A;    EYE SURGERY      POLYPECTOMY      SMALL BOWEL ENTEROSCOPY Left 01/20/2023    Procedure: ENTEROSCOPY;  Surgeon: Lexi Scales MD;  Location: [facility] ENDOSCOPY;  Service: Gastroenterology;  Laterality: Left;    THYROIDECTOMY      TUBAL LIGATION       Social History     Socioeconomic History    Marital status:     Number of children: 6   Tobacco Use    Smoking status: Former     Current packs/day: 2.00     Average packs/day: 2.0 packs/day for 15.0 years (30.0 ttl pk-yrs)     Types: Cigarettes    Smokeless tobacco: Never    Tobacco comments:     Smokes 3 cigarettes a day    Substance and Sexual Activity    Alcohol use: Not Currently     Comment: 1 glass every 2-3 month    Drug use: Never    Sexual activity: Not Currently     Social Determinants of Health     Financial Resource Strain: Low Risk  (1/20/2023)    Overall Financial Resource Strain (CARDIA)     Difficulty of Paying Living Expenses: Not hard at all   Food Insecurity: No Food Insecurity (6/16/2023)    Hunger Vital Sign     Worried About Running Out of Food in the Last Year: Never true     Ran Out of Food in the Last Year: Never true   Transportation Needs: No Transportation Needs (6/16/2023)    PRAPARE - Transportation     Lack of Transportation (Medical): No     Lack of Transportation (Non-Medical): No   Physical Activity: Inactive (1/20/2023)    Exercise Vital Sign     Days of Exercise per Week: 0 days     Minutes of Exercise per Session: 0 min   Stress: No Stress Concern Present (1/20/2023)    [employer] [place] [employer] - Occupational Stress Questionnaire     Feeling of Stress : Not at all   Social Connections: Moderately Isolated (1/20/2023)    Social Connection and Isolation Panel [NHANES]     Frequency of Communication with Friends and Family: More than three times a week     Frequency of Social Gatherings with Friends and Family: More than three times a week     Attends Church Services: More than 4 times per year     Active Member of Clubs or Organizations: No     Attends Club or Organization Meetings: Never     Marital Status:    Housing Stability: Low Risk  (6/16/2023)    Housing Stability Vital Sign     Unable to Pay for Housing in the Last Year: No     Number of Places Lived in the Last Year: 1     Unstable Housing in the Last Year: No        Family History   Problem Relation Age of Onset    Hypertension Mother     Hypertension Father     Hypertension Sister     Hypertension Sister          Current Outpatient Medications:     azithromycin (Z-ELIECER) 250 MG tablet, Take by mouth., Disp: , Rfl:  "    BD ULTRA-FINE MINI PEN NEEDLE 31 gauge x 3/16" Ndle, Inject into the skin 2 (two) times daily., Disp: , Rfl:     BELSOMRA 5 mg Tab, Take 1 tablet by mouth every evening., Disp: , Rfl:     pantoprazole (PROTONIX) 40 MG tablet, Take 40 mg by mouth 2 (two) times daily., Disp: , Rfl:     albuterol (VENTOLIN HFA) 90 mcg/actuation inhaler, Inhale 2 puffs into the lungs every 6 (six) hours as needed for Wheezing. Rescue, Disp: 18 g, Rfl: 1    albuterol-ipratropium (DUO-NEB) 2.5 mg-0.5 mg/3 mL nebulizer solution, Take 3 mLs by nebulization every 6 (six) hours as needed for Wheezing or Shortness of Breath. Rescue, Disp: 75 mL, Rfl: 0    amLODIPine (NORVASC) 5 MG tablet, Take 1 tablet (5 mg total) by mouth once daily., Disp: 30 tablet, Rfl: 11    apixaban (ELIQUIS) 5 mg Tab, Take 1 tablet (5 mg total) by mouth 2 (two) times daily., Disp: 60 tablet, Rfl: 0    BASAGLAR KWIKPEN U-100 INSULIN glargine 100 units/mL SubQ pen, Inject 30 Units into the skin Daily., Disp: 27 mL, Rfl: 2    BENADRYL 25 mg capsule, Take 50 mg by mouth nightly., Disp: , Rfl:     carvediloL (COREG) 6.25 MG tablet, Take 1 tablet (6.25 mg total) by mouth 2 (two) times daily., Disp: 180 tablet, Rfl: 3    clonazePAM (KLONOPIN) 0.5 MG tablet, Take 0.5 mg by mouth every evening., Disp: , Rfl:     DIALYVITE 100-1 mg Tab, Take 1 tablet by mouth once daily., Disp: , Rfl:     DULoxetine (CYMBALTA) 30 MG capsule, Take 30 mg by mouth once daily., Disp: , Rfl:     fluticasone furoate-vilanteroL (BREO) 100-25 mcg/dose diskus inhaler, Inhale 1 puff into the lungs once daily., Disp: 90 each, Rfl: 2    furosemide (LASIX) 40 MG tablet, Take 1 tablet (40 mg total) by mouth once daily., Disp: 90 tablet, Rfl: 3    glipiZIDE (GLUCOTROL) 10 MG tablet, Take 1 tablet (10 mg total) by mouth daily with breakfast., Disp: 90 tablet, Rfl: 2    INVEGA SUSTENNA 156 mg/mL Syrg injection, Inject into the muscle., Disp: , Rfl:     L-METHYLFOLATE 15 mg Tab, Take 1 tablet by mouth., Disp: " ", Rfl:     lactulose (CHRONULAC) 10 gram/15 mL solution, Take 30 mLs by mouth., Disp: , Rfl:     levothyroxine (SYNTHROID) 125 MCG tablet, Take 1 tablet (125 mcg total) by mouth before breakfast., Disp: 90 tablet, Rfl: 2    ONETOUCH DELICA PLUS LANCET 30 gauge Misc, 1 lancet by Other route once daily., Disp: 100 each, Rfl: 2    ONETOUCH ULTRA TEST Strp, 1 strip by Other route once daily., Disp: 200 strip, Rfl: 2    pen needle, diabetic 31 gauge x 5/16" Ndle, 2 Units by Misc.(Non-Drug; Combo Route) route 2 (two) times a day. Patient to check blood sugars twice daily (morning and night), Disp: 200 each, Rfl: 2    rosuvastatin (CRESTOR) 40 MG Tab, Take 1 tablet (40 mg total) by mouth once daily., Disp: 90 tablet, Rfl: 3    sevelamer carbonate (RENVELA) 800 mg Tab, Take 2 tablets (1,600 mg total) by mouth 3 (three) times daily. (Patient not taking: Reported on 11/18/2023), Disp: 90 tablet, Rfl: 0    tiotropium (SPIRIVA WITH HANDIHALER) 18 mcg inhalation capsule, Inhale 1 capsule (18 mcg total) into the lungs Daily., Disp: 90 capsule, Rfl: 2     ROS:                                                                                                                                                                             Review of Systems   Constitutional: Negative.    HENT: Negative.     Eyes: Negative.    Respiratory: Negative.  Negative for shortness of breath.    Cardiovascular:  Positive for chest pain and leg swelling. Negative for palpitations, orthopnea, claudication and PND.   Gastrointestinal: Negative.    Genitourinary: Negative.    Musculoskeletal: Negative.    Skin: Negative.    Neurological: Negative.    Endo/Heme/Allergies: Negative.    Psychiatric/Behavioral: Negative.          Blood pressure 135/61, pulse (!) 52, temperature 97.7 °F (36.5 °C), resp. rate 18, height 5' 3" (1.6 m), weight 86.6 kg (190 lb 14.7 oz), SpO2 97 %.   PE:  Physical Exam  HENT:      Head: Normocephalic.      Nose: Nose normal.      " Mouth/Throat:      Mouth: Mucous membranes are moist.   Eyes:      Extraocular Movements: Extraocular movements intact.   Cardiovascular:      Rate and Rhythm: Normal rate and regular rhythm.      Pulses: Normal pulses.   Pulmonary:      Effort: Pulmonary effort is normal.      Breath sounds: Normal breath sounds.   Abdominal:      General: There is no distension.   Musculoskeletal:         General: Normal range of motion.      Cervical back: Normal range of motion.      Right lower leg: Edema (Mild) present.      Left lower leg: Edema (Mild) present.   Skin:     General: Skin is warm.   Neurological:      General: No focal deficit present.      Mental Status: She is alert.   Psychiatric:         Mood and Affect: Mood normal.          ASSESSMENT/PLAN:  HFpEF - EF 50-55% per Echo Dec. 2021 - NYHA Class II  Patient denies SOB; continues to have some BLE edema, but associates this with chronic venous insufficiency   Echo Jan. 2018 showed diastolic dysfunction, EF of 66%, E/A flow reversal, aortic regurg, and moderately thickened leaflets without aortic stenosis  She is euvolemic, warm, and dry on exam today  Continue Lasix and hemodialysis as directed  Counseled on low salt diet  Will have patient complete echocardiogram given need for accurate cardiac risk stratification and for an increase in chest pain symptoms    ABIMAEL  Denies any palpitations, lightheadedness, dizziness, or tachycardic symptoms  On Eliquis, tolerating well  Appeared to be in regular rhythm on auscultation today  EKG today with sinus bradycardia with first-degree AV block, unchanged from prior    CAD s/p CABG prior to 2005  Reports an increase in chest pain recently.  She states that it typically occurs while at dialysis, however it has occurred outside dialysis at home with both rest and exertion.  NSTEMI Type II (Supply/Demand) in the setting of Severe Anemia due to GI Bleed Dec. 2021 - recent hospital admission  EF 50% per Echo Dec. 2021  Stress  Echo 2018 showed no significant ischemia but a moderately large fixed anterior defect of moderate intensity  Continue Aspirin, Coreg, and Crestor  Counseled on heart healthy diet   Will have patient complete nuclear stress test given significant CAD history, current symptoms, and for accurate cardiac risk stratification.  Patient unable to complete treadmill stress test due to mobility issues and the need for ambulation assistance with cane or rolling walker    COPD   Management per PCP     HTN  BP at goal today-135/61  Ongoing occasional low BP with HD  Reports compliance with medications  Continuing current therapy   Will defer BP management to Nephrology  Counseled on low salt diet and exercise as tolerated    HLD  LDL at goal - 65 per labs September 2023  Continue Crestor 40mg daily  Counseled on low cholesterol diet    Diabetes  A1C - 5.7  Continue management per PCP    Tobacco use  Counseled on the importance of smoking cessation   She smokes 3-4 cigarettes per day - states she is trying to quit on her own      ESRD on HD T/TH/Sat  Continue nephrology management    Venous Insufficiency   s/p percutaneous endovenous Microfoam ablation with Varithena of the left GSV with Dr. Singh on May 17, 2021  Recommend compression stockings  Instructed on low salt diet  Follow up with Dr. Singh as directed- last appointment with him May 2023    Cardiac Risk Assessment  Once patient completes echocardiogram and nuclear stress test, will proceed with cardiac risk assessment      Complete Echo   Complete nuclear stress test   Will provide risk stratification once testing is complete, pending results   Follow up in cardiology clinic in 4 months or sooner if needed  Follow up with PCP as directed

## 2023-11-29 ENCOUNTER — OFFICE VISIT (OUTPATIENT)
Dept: CARDIOLOGY | Facility: CLINIC | Age: 78
End: 2023-11-29
Payer: MEDICARE

## 2023-11-29 VITALS
RESPIRATION RATE: 18 BRPM | HEIGHT: 63 IN | HEART RATE: 52 BPM | DIASTOLIC BLOOD PRESSURE: 61 MMHG | OXYGEN SATURATION: 97 % | BODY MASS INDEX: 33.83 KG/M2 | WEIGHT: 190.94 LBS | TEMPERATURE: 98 F | SYSTOLIC BLOOD PRESSURE: 135 MMHG

## 2023-11-29 DIAGNOSIS — I50.32 CHRONIC HEART FAILURE WITH PRESERVED EJECTION FRACTION: Chronic | ICD-10-CM

## 2023-11-29 DIAGNOSIS — E78.2 MIXED HYPERLIPIDEMIA: Chronic | ICD-10-CM

## 2023-11-29 DIAGNOSIS — R07.9 CHEST PAIN, UNSPECIFIED TYPE: ICD-10-CM

## 2023-11-29 DIAGNOSIS — F17.210 CIGARETTE NICOTINE DEPENDENCE WITHOUT COMPLICATION: Chronic | ICD-10-CM

## 2023-11-29 DIAGNOSIS — I25.10 CORONARY ARTERY DISEASE INVOLVING NATIVE CORONARY ARTERY OF NATIVE HEART WITHOUT ANGINA PECTORIS: ICD-10-CM

## 2023-11-29 DIAGNOSIS — I25.700 CORONARY ARTERY DISEASE INVOLVING CORONARY BYPASS GRAFT OF NATIVE HEART WITH UNSTABLE ANGINA PECTORIS: Primary | ICD-10-CM

## 2023-11-29 DIAGNOSIS — I48.91 ATRIAL FIBRILLATION, UNSPECIFIED TYPE: Chronic | ICD-10-CM

## 2023-11-29 DIAGNOSIS — Z01.810 ENCOUNTER FOR PRE-OPERATIVE CARDIOVASCULAR CLEARANCE: ICD-10-CM

## 2023-11-29 DIAGNOSIS — I10 HTN (HYPERTENSION), BENIGN: Chronic | ICD-10-CM

## 2023-11-29 PROCEDURE — 1126F AMNT PAIN NOTED NONE PRSNT: CPT | Mod: CPTII,,,

## 2023-11-29 PROCEDURE — 3078F PR MOST RECENT DIASTOLIC BLOOD PRESSURE < 80 MM HG: ICD-10-PCS | Mod: CPTII,,,

## 2023-11-29 PROCEDURE — 1101F PT FALLS ASSESS-DOCD LE1/YR: CPT | Mod: CPTII,,,

## 2023-11-29 PROCEDURE — 3075F PR MOST RECENT SYSTOLIC BLOOD PRESS GE 130-139MM HG: ICD-10-PCS | Mod: CPTII,,,

## 2023-11-29 PROCEDURE — 1101F PR PT FALLS ASSESS DOC 0-1 FALLS W/OUT INJ PAST YR: ICD-10-PCS | Mod: CPTII,,,

## 2023-11-29 PROCEDURE — 99214 PR OFFICE/OUTPT VISIT, EST, LEVL IV, 30-39 MIN: ICD-10-PCS | Mod: S$PBB,,,

## 2023-11-29 PROCEDURE — 99214 OFFICE O/P EST MOD 30 MIN: CPT | Mod: S$PBB,,,

## 2023-11-29 PROCEDURE — 3288F PR FALLS RISK ASSESSMENT DOCUMENTED: ICD-10-PCS | Mod: CPTII,,,

## 2023-11-29 PROCEDURE — 3078F DIAST BP <80 MM HG: CPT | Mod: CPTII,,,

## 2023-11-29 PROCEDURE — 3075F SYST BP GE 130 - 139MM HG: CPT | Mod: CPTII,,,

## 2023-11-29 PROCEDURE — 1126F PR PAIN SEVERITY QUANTIFIED, NO PAIN PRESENT: ICD-10-PCS | Mod: CPTII,,,

## 2023-11-29 PROCEDURE — 99214 OFFICE O/P EST MOD 30 MIN: CPT | Mod: PBBFAC

## 2023-11-29 PROCEDURE — 3288F FALL RISK ASSESSMENT DOCD: CPT | Mod: CPTII,,,

## 2023-11-29 RX ORDER — PEN NEEDLE, DIABETIC 31 GX5/16"
NEEDLE, DISPOSABLE MISCELLANEOUS 2 TIMES DAILY
Status: ON HOLD | COMMUNITY
Start: 2023-11-22 | End: 2024-01-09 | Stop reason: HOSPADM

## 2023-11-29 RX ORDER — AMLODIPINE BESYLATE 5 MG/1
5 TABLET ORAL DAILY
Qty: 30 TABLET | Refills: 11 | Status: SHIPPED | OUTPATIENT
Start: 2023-11-29 | End: 2024-01-29 | Stop reason: SDUPTHER

## 2023-11-29 RX ORDER — FUROSEMIDE 40 MG/1
40 TABLET ORAL DAILY
Qty: 90 TABLET | Refills: 3 | Status: SHIPPED | OUTPATIENT
Start: 2023-11-29 | End: 2024-11-28

## 2023-11-29 RX ORDER — ROSUVASTATIN CALCIUM 40 MG/1
40 TABLET, COATED ORAL DAILY
Qty: 90 TABLET | Refills: 3 | Status: SHIPPED | OUTPATIENT
Start: 2023-11-29 | End: 2024-11-28

## 2023-11-29 RX ORDER — PANTOPRAZOLE SODIUM 40 MG/1
40 TABLET, DELAYED RELEASE ORAL 2 TIMES DAILY
COMMUNITY
Start: 2023-09-26

## 2023-11-29 RX ORDER — SUVOREXANT 5 MG/1
1 TABLET, FILM COATED ORAL NIGHTLY
COMMUNITY
Start: 2023-10-26

## 2023-11-29 RX ORDER — CARVEDILOL 6.25 MG/1
6.25 TABLET ORAL 2 TIMES DAILY
Qty: 180 TABLET | Refills: 3 | Status: SHIPPED | OUTPATIENT
Start: 2023-11-29 | End: 2024-11-28

## 2023-11-29 RX ORDER — AZITHROMYCIN 250 MG/1
TABLET, FILM COATED ORAL
COMMUNITY
Start: 2023-11-23 | End: 2023-12-14 | Stop reason: ALTCHOICE

## 2023-11-29 NOTE — PATIENT INSTRUCTIONS
Complete Echo   Complete nuclear stress test   Will provide risk stratification once testing is complete, pending results   Follow up in cardiology clinic in 4 months or sooner if needed  Follow up with PCP as directed

## 2023-12-06 ENCOUNTER — HOSPITAL ENCOUNTER (INPATIENT)
Facility: HOSPITAL | Age: 78
LOS: 1 days | Discharge: HOME | DRG: 377 | End: 2023-12-09
Attending: EMERGENCY MEDICINE | Admitting: INTERNAL MEDICINE
Payer: MEDICARE

## 2023-12-06 DIAGNOSIS — K92.2 GI BLEED: ICD-10-CM

## 2023-12-06 DIAGNOSIS — R53.1 WEAKNESS: ICD-10-CM

## 2023-12-06 DIAGNOSIS — R00.1 BRADYCARDIA: ICD-10-CM

## 2023-12-06 DIAGNOSIS — K92.1 MELENA: ICD-10-CM

## 2023-12-06 DIAGNOSIS — Z13.9 SCREENING DUE: ICD-10-CM

## 2023-12-06 DIAGNOSIS — K92.2 GASTROINTESTINAL HEMORRHAGE, UNSPECIFIED GASTROINTESTINAL HEMORRHAGE TYPE: ICD-10-CM

## 2023-12-06 DIAGNOSIS — D63.1 ANEMIA DUE TO CHRONIC KIDNEY DISEASE, ON CHRONIC DIALYSIS: ICD-10-CM

## 2023-12-06 DIAGNOSIS — Z99.2 ANEMIA DUE TO CHRONIC KIDNEY DISEASE, ON CHRONIC DIALYSIS: ICD-10-CM

## 2023-12-06 DIAGNOSIS — N18.6 ESRD WITH ANEMIA: ICD-10-CM

## 2023-12-06 DIAGNOSIS — N18.6 ANEMIA DUE TO CHRONIC KIDNEY DISEASE, ON CHRONIC DIALYSIS: ICD-10-CM

## 2023-12-06 DIAGNOSIS — R07.9 CHEST PAIN: ICD-10-CM

## 2023-12-06 DIAGNOSIS — D64.9 ANEMIA, UNSPECIFIED TYPE: Primary | ICD-10-CM

## 2023-12-06 DIAGNOSIS — D63.1 ESRD WITH ANEMIA: ICD-10-CM

## 2023-12-06 LAB
ALBUMIN SERPL-MCNC: 3.1 G/DL (ref 3.4–4.8)
ALBUMIN/GLOB SERPL: 1 RATIO (ref 1.1–2)
ALP SERPL-CCNC: 102 UNIT/L (ref 40–150)
ALT SERPL-CCNC: 43 UNIT/L (ref 0–55)
AMPHET UR QL SCN: NEGATIVE
ANISOCYTOSIS BLD QL SMEAR: ABNORMAL
APPEARANCE UR: ABNORMAL
AST SERPL-CCNC: 37 UNIT/L (ref 5–34)
BACTERIA #/AREA URNS AUTO: ABNORMAL /HPF
BARBITURATE SCN PRESENT UR: NEGATIVE
BASOPHILS # BLD AUTO: 0.02 X10(3)/MCL
BASOPHILS NFR BLD AUTO: 0.3 %
BENZODIAZ UR QL SCN: NEGATIVE
BILIRUB SERPL-MCNC: 0.4 MG/DL
BILIRUB UR QL STRIP.AUTO: NEGATIVE
BUN SERPL-MCNC: 37.5 MG/DL (ref 9.8–20.1)
BURR CELLS (OLG): ABNORMAL
CALCIUM SERPL-MCNC: 8.8 MG/DL (ref 8.4–10.2)
CANNABINOIDS UR QL SCN: NEGATIVE
CHLORIDE SERPL-SCNC: 103 MMOL/L (ref 98–107)
CK MB SERPL-MCNC: 3 NG/ML
CK SERPL-CCNC: 149 U/L (ref 29–168)
CO2 SERPL-SCNC: 28 MMOL/L (ref 23–31)
COCAINE UR QL SCN: NEGATIVE
COLOR UR AUTO: YELLOW
CREAT SERPL-MCNC: 4 MG/DL (ref 0.55–1.02)
ELLIPTOCYTOSIS (OHS): ABNORMAL
EOSINOPHIL # BLD AUTO: 0.07 X10(3)/MCL (ref 0–0.9)
EOSINOPHIL NFR BLD AUTO: 1.2 %
ERYTHROCYTE [DISTWIDTH] IN BLOOD BY AUTOMATED COUNT: 19.6 % (ref 11.5–17)
EST. AVERAGE GLUCOSE BLD GHB EST-MCNC: 145.6 MG/DL
FENTANYL UR QL SCN: NEGATIVE
FERRITIN SERPL-MCNC: 1897.18 NG/ML (ref 4.63–204)
GFR SERPLBLD CREATININE-BSD FMLA CKD-EPI: 11 MLS/MIN/1.73/M2
GLOBULIN SER-MCNC: 3.2 GM/DL (ref 2.4–3.5)
GLUCOSE SERPL-MCNC: 161 MG/DL (ref 82–115)
GLUCOSE UR QL STRIP.AUTO: NORMAL
GROUP & RH: NORMAL
HBA1C MFR BLD: 6.7 %
HCT VFR BLD AUTO: 29.8 % (ref 37–47)
HCT VFR BLD AUTO: 31.2 % (ref 37–47)
HCT VFR BLD AUTO: 33.7 % (ref 37–47)
HGB BLD-MCNC: 8.6 G/DL (ref 12–16)
HGB BLD-MCNC: 9 G/DL (ref 12–16)
HGB BLD-MCNC: 9.8 G/DL (ref 12–16)
HOLD SPECIMEN: NORMAL
HYALINE CASTS #/AREA URNS LPF: ABNORMAL /LPF
HYPOCHROMIA BLD QL SMEAR: ABNORMAL
IMM GRANULOCYTES # BLD AUTO: 0.03 X10(3)/MCL (ref 0–0.04)
IMM GRANULOCYTES NFR BLD AUTO: 0.5 %
INDIRECT COOMBS GEL: NORMAL
IRON SATN MFR SERPL: 56 % (ref 20–50)
IRON SERPL-MCNC: 113 UG/DL (ref 50–170)
KETONES UR QL STRIP.AUTO: NEGATIVE
LACTATE SERPL-SCNC: 1.3 MMOL/L (ref 0.5–2.2)
LEUKOCYTE ESTERASE UR QL STRIP.AUTO: 250
LIPASE SERPL-CCNC: 20 U/L
LYMPHOCYTES # BLD AUTO: 0.65 X10(3)/MCL (ref 0.6–4.6)
LYMPHOCYTES NFR BLD AUTO: 11.1 %
MAGNESIUM SERPL-MCNC: 2 MG/DL (ref 1.6–2.6)
MCH RBC QN AUTO: 24.4 PG (ref 27–31)
MCHC RBC AUTO-ENTMCNC: 28.9 G/DL (ref 33–36)
MCV RBC AUTO: 84.7 FL (ref 80–94)
MDMA UR QL SCN: NEGATIVE
MONOCYTES # BLD AUTO: 0.46 X10(3)/MCL (ref 0.1–1.3)
MONOCYTES NFR BLD AUTO: 7.9 %
MUCOUS THREADS URNS QL MICRO: ABNORMAL /LPF
NEUTROPHILS # BLD AUTO: 4.61 X10(3)/MCL (ref 2.1–9.2)
NEUTROPHILS NFR BLD AUTO: 79 %
NITRITE UR QL STRIP.AUTO: NEGATIVE
NRBC BLD AUTO-RTO: 0 %
OPIATES UR QL SCN: NEGATIVE
PCP UR QL: NEGATIVE
PH UR STRIP.AUTO: 5 [PH]
PH UR: 5 [PH] (ref 3–11)
PLATELET # BLD AUTO: 142 X10(3)/MCL (ref 130–400)
PLATELET # BLD EST: ADEQUATE 10*3/UL
PMV BLD AUTO: 11.7 FL (ref 7.4–10.4)
POCT GLUCOSE: 143 MG/DL (ref 70–110)
POCT GLUCOSE: 176 MG/DL (ref 70–110)
POCT GLUCOSE: 44 MG/DL (ref 70–110)
POLYCHROMASIA BLD QL SMEAR: ABNORMAL
POTASSIUM SERPL-SCNC: 4.5 MMOL/L (ref 3.5–5.1)
PROT SERPL-MCNC: 6.3 GM/DL (ref 5.8–7.6)
PROT UR QL STRIP.AUTO: ABNORMAL
RBC # BLD AUTO: 3.52 X10(6)/MCL (ref 4.2–5.4)
RBC #/AREA URNS AUTO: ABNORMAL /HPF
RBC MORPH BLD: ABNORMAL
RBC UR QL AUTO: ABNORMAL
SODIUM SERPL-SCNC: 138 MMOL/L (ref 136–145)
SP GR UR STRIP.AUTO: 1.01 (ref 1–1.03)
SPECIMEN OUTDATE: NORMAL
SQUAMOUS #/AREA URNS LPF: ABNORMAL /HPF
TIBC SERPL-MCNC: 203 UG/DL (ref 250–450)
TIBC SERPL-MCNC: 90 UG/DL (ref 70–310)
TRANSFERRIN SERPL-MCNC: 166 MG/DL (ref 173–360)
TROPONIN I SERPL-MCNC: 0.07 NG/ML (ref 0–0.04)
URATE CRY URNS QL MICRO: ABNORMAL /HPF
UROBILINOGEN UR STRIP-ACNC: NORMAL
WBC # SPEC AUTO: 5.84 X10(3)/MCL (ref 4.5–11.5)
WBC #/AREA URNS AUTO: ABNORMAL /HPF

## 2023-12-06 PROCEDURE — 99285 EMERGENCY DEPT VISIT HI MDM: CPT | Mod: 25

## 2023-12-06 PROCEDURE — 83690 ASSAY OF LIPASE: CPT | Performed by: EMERGENCY MEDICINE

## 2023-12-06 PROCEDURE — 25000003 PHARM REV CODE 250

## 2023-12-06 PROCEDURE — G0378 HOSPITAL OBSERVATION PER HR: HCPCS

## 2023-12-06 PROCEDURE — 63600175 PHARM REV CODE 636 W HCPCS: Performed by: EMERGENCY MEDICINE

## 2023-12-06 PROCEDURE — 85025 COMPLETE CBC W/AUTO DIFF WBC: CPT | Performed by: EMERGENCY MEDICINE

## 2023-12-06 PROCEDURE — 82728 ASSAY OF FERRITIN: CPT | Performed by: NURSE PRACTITIONER

## 2023-12-06 PROCEDURE — 96375 TX/PRO/DX INJ NEW DRUG ADDON: CPT

## 2023-12-06 PROCEDURE — 83540 ASSAY OF IRON: CPT | Performed by: NURSE PRACTITIONER

## 2023-12-06 PROCEDURE — 83735 ASSAY OF MAGNESIUM: CPT | Performed by: EMERGENCY MEDICINE

## 2023-12-06 PROCEDURE — 82550 ASSAY OF CK (CPK): CPT | Performed by: EMERGENCY MEDICINE

## 2023-12-06 PROCEDURE — 96366 THER/PROPH/DIAG IV INF ADDON: CPT

## 2023-12-06 PROCEDURE — 63600175 PHARM REV CODE 636 W HCPCS: Mod: JA | Performed by: STUDENT IN AN ORGANIZED HEALTH CARE EDUCATION/TRAINING PROGRAM

## 2023-12-06 PROCEDURE — 94640 AIRWAY INHALATION TREATMENT: CPT

## 2023-12-06 PROCEDURE — 96365 THER/PROPH/DIAG IV INF INIT: CPT

## 2023-12-06 PROCEDURE — C9113 INJ PANTOPRAZOLE SODIUM, VIA: HCPCS | Performed by: EMERGENCY MEDICINE

## 2023-12-06 PROCEDURE — 93005 ELECTROCARDIOGRAM TRACING: CPT

## 2023-12-06 PROCEDURE — 82553 CREATINE MB FRACTION: CPT | Performed by: EMERGENCY MEDICINE

## 2023-12-06 PROCEDURE — 82962 GLUCOSE BLOOD TEST: CPT

## 2023-12-06 PROCEDURE — 25000242 PHARM REV CODE 250 ALT 637 W/ HCPCS: Performed by: STUDENT IN AN ORGANIZED HEALTH CARE EDUCATION/TRAINING PROGRAM

## 2023-12-06 PROCEDURE — 85014 HEMATOCRIT: CPT | Mod: 91 | Performed by: STUDENT IN AN ORGANIZED HEALTH CARE EDUCATION/TRAINING PROGRAM

## 2023-12-06 PROCEDURE — 83605 ASSAY OF LACTIC ACID: CPT | Performed by: EMERGENCY MEDICINE

## 2023-12-06 PROCEDURE — 25000003 PHARM REV CODE 250: Performed by: EMERGENCY MEDICINE

## 2023-12-06 PROCEDURE — 25000003 PHARM REV CODE 250: Performed by: STUDENT IN AN ORGANIZED HEALTH CARE EDUCATION/TRAINING PROGRAM

## 2023-12-06 PROCEDURE — 86850 RBC ANTIBODY SCREEN: CPT | Performed by: EMERGENCY MEDICINE

## 2023-12-06 PROCEDURE — 81001 URINALYSIS AUTO W/SCOPE: CPT | Performed by: EMERGENCY MEDICINE

## 2023-12-06 PROCEDURE — 84484 ASSAY OF TROPONIN QUANT: CPT | Performed by: EMERGENCY MEDICINE

## 2023-12-06 PROCEDURE — 80053 COMPREHEN METABOLIC PANEL: CPT | Performed by: EMERGENCY MEDICINE

## 2023-12-06 PROCEDURE — 80307 DRUG TEST PRSMV CHEM ANLYZR: CPT | Performed by: EMERGENCY MEDICINE

## 2023-12-06 PROCEDURE — 96374 THER/PROPH/DIAG INJ IV PUSH: CPT

## 2023-12-06 PROCEDURE — 96361 HYDRATE IV INFUSION ADD-ON: CPT

## 2023-12-06 PROCEDURE — 83036 HEMOGLOBIN GLYCOSYLATED A1C: CPT | Performed by: STUDENT IN AN ORGANIZED HEALTH CARE EDUCATION/TRAINING PROGRAM

## 2023-12-06 PROCEDURE — 87086 URINE CULTURE/COLONY COUNT: CPT | Performed by: EMERGENCY MEDICINE

## 2023-12-06 RX ORDER — INSULIN ASPART 100 [IU]/ML
0-5 INJECTION, SOLUTION INTRAVENOUS; SUBCUTANEOUS EVERY 6 HOURS PRN
Status: DISCONTINUED | OUTPATIENT
Start: 2023-12-06 | End: 2023-12-09 | Stop reason: HOSPADM

## 2023-12-06 RX ORDER — IPRATROPIUM BROMIDE 0.5 MG/2.5ML
0.5 SOLUTION RESPIRATORY (INHALATION) EVERY 6 HOURS PRN
Status: DISCONTINUED | OUTPATIENT
Start: 2023-12-06 | End: 2023-12-06

## 2023-12-06 RX ORDER — IPRATROPIUM BROMIDE AND ALBUTEROL SULFATE 2.5; .5 MG/3ML; MG/3ML
3 SOLUTION RESPIRATORY (INHALATION) EVERY 6 HOURS PRN
Status: DISCONTINUED | OUTPATIENT
Start: 2023-12-06 | End: 2023-12-09 | Stop reason: HOSPADM

## 2023-12-06 RX ORDER — ONDANSETRON 2 MG/ML
4 INJECTION INTRAMUSCULAR; INTRAVENOUS EVERY 8 HOURS PRN
Status: DISCONTINUED | OUTPATIENT
Start: 2023-12-06 | End: 2023-12-09 | Stop reason: HOSPADM

## 2023-12-06 RX ORDER — AMLODIPINE BESYLATE 5 MG/1
5 TABLET ORAL DAILY
Status: DISCONTINUED | OUTPATIENT
Start: 2023-12-06 | End: 2023-12-09 | Stop reason: HOSPADM

## 2023-12-06 RX ORDER — DULOXETIN HYDROCHLORIDE 30 MG/1
30 CAPSULE, DELAYED RELEASE ORAL DAILY
Status: DISCONTINUED | OUTPATIENT
Start: 2023-12-06 | End: 2023-12-06

## 2023-12-06 RX ORDER — CLONAZEPAM 0.5 MG/1
0.5 TABLET ORAL NIGHTLY
Status: DISCONTINUED | OUTPATIENT
Start: 2023-12-06 | End: 2023-12-09 | Stop reason: HOSPADM

## 2023-12-06 RX ORDER — FUROSEMIDE 20 MG/1
40 TABLET ORAL DAILY
Status: DISCONTINUED | OUTPATIENT
Start: 2023-12-07 | End: 2023-12-07

## 2023-12-06 RX ORDER — GLUCAGON 1 MG
1 KIT INJECTION
Status: DISCONTINUED | OUTPATIENT
Start: 2023-12-06 | End: 2023-12-09 | Stop reason: HOSPADM

## 2023-12-06 RX ORDER — LEVOFLOXACIN 250 MG/1
250 TABLET ORAL EVERY OTHER DAY
Status: DISCONTINUED | OUTPATIENT
Start: 2023-12-08 | End: 2023-12-07

## 2023-12-06 RX ORDER — LEVOFLOXACIN 500 MG/1
500 TABLET, FILM COATED ORAL ONCE
Status: COMPLETED | OUTPATIENT
Start: 2023-12-06 | End: 2023-12-06

## 2023-12-06 RX ORDER — IBUPROFEN 200 MG
16 TABLET ORAL
Status: DISCONTINUED | OUTPATIENT
Start: 2023-12-06 | End: 2023-12-09 | Stop reason: HOSPADM

## 2023-12-06 RX ORDER — CEPHALEXIN 250 MG/1
250 CAPSULE ORAL DAILY
Status: DISCONTINUED | OUTPATIENT
Start: 2023-12-06 | End: 2023-12-06

## 2023-12-06 RX ORDER — FUROSEMIDE 20 MG/1
40 TABLET ORAL DAILY
Status: DISCONTINUED | OUTPATIENT
Start: 2023-12-06 | End: 2023-12-06

## 2023-12-06 RX ORDER — SODIUM CHLORIDE 0.9 % (FLUSH) 0.9 %
10 SYRINGE (ML) INJECTION EVERY 12 HOURS PRN
Status: DISCONTINUED | OUTPATIENT
Start: 2023-12-06 | End: 2023-12-09 | Stop reason: HOSPADM

## 2023-12-06 RX ORDER — SEVELAMER CARBONATE 800 MG/1
1600 TABLET, FILM COATED ORAL 3 TIMES DAILY
Status: DISCONTINUED | OUTPATIENT
Start: 2023-12-06 | End: 2023-12-07

## 2023-12-06 RX ORDER — CARVEDILOL 3.12 MG/1
6.25 TABLET ORAL 2 TIMES DAILY
Status: DISCONTINUED | OUTPATIENT
Start: 2023-12-06 | End: 2023-12-07

## 2023-12-06 RX ORDER — PANTOPRAZOLE SODIUM 40 MG/10ML
80 INJECTION, POWDER, LYOPHILIZED, FOR SOLUTION INTRAVENOUS
Status: COMPLETED | OUTPATIENT
Start: 2023-12-06 | End: 2023-12-06

## 2023-12-06 RX ORDER — ALBUTEROL SULFATE 0.83 MG/ML
2.5 SOLUTION RESPIRATORY (INHALATION) EVERY 6 HOURS PRN
Status: DISCONTINUED | OUTPATIENT
Start: 2023-12-06 | End: 2023-12-06

## 2023-12-06 RX ORDER — IBUPROFEN 200 MG
24 TABLET ORAL
Status: DISCONTINUED | OUTPATIENT
Start: 2023-12-06 | End: 2023-12-09 | Stop reason: HOSPADM

## 2023-12-06 RX ORDER — NALOXONE HCL 0.4 MG/ML
0.02 VIAL (ML) INJECTION
Status: DISCONTINUED | OUTPATIENT
Start: 2023-12-06 | End: 2023-12-09 | Stop reason: HOSPADM

## 2023-12-06 RX ADMIN — IPRATROPIUM BROMIDE AND ALBUTEROL SULFATE 3 ML: .5; 3 SOLUTION RESPIRATORY (INHALATION) at 08:12

## 2023-12-06 RX ADMIN — OCTREOTIDE ACETATE 50 MCG/HR: 500 INJECTION, SOLUTION INTRAVENOUS; SUBCUTANEOUS at 02:12

## 2023-12-06 RX ADMIN — AMLODIPINE BESYLATE 5 MG: 5 TABLET ORAL at 02:12

## 2023-12-06 RX ADMIN — CARVEDILOL 6.25 MG: 3.12 TABLET, FILM COATED ORAL at 09:12

## 2023-12-06 RX ADMIN — SEVELAMER CARBONATE 1600 MG: 800 TABLET, FILM COATED ORAL at 09:12

## 2023-12-06 RX ADMIN — SEVELAMER CARBONATE 1600 MG: 800 TABLET, FILM COATED ORAL at 02:12

## 2023-12-06 RX ADMIN — SODIUM CHLORIDE 1000 ML: 9 INJECTION, SOLUTION INTRAVENOUS at 08:12

## 2023-12-06 RX ADMIN — LEVOFLOXACIN 500 MG: 500 TABLET, FILM COATED ORAL at 10:12

## 2023-12-06 RX ADMIN — IPRATROPIUM BROMIDE AND ALBUTEROL SULFATE 3 ML: .5; 3 SOLUTION RESPIRATORY (INHALATION) at 03:12

## 2023-12-06 RX ADMIN — PANTOPRAZOLE SODIUM 80 MG: 40 INJECTION, POWDER, FOR SOLUTION INTRAVENOUS at 09:12

## 2023-12-06 RX ADMIN — CLONAZEPAM 0.5 MG: 0.5 TABLET ORAL at 09:12

## 2023-12-06 NOTE — H&P
[facility] Wards History & Physical Note     Resident Team: [facility] Medicine List 2  Attending Physician: Florentin Keiht MD  Resident: Demond Molina MD  Intern: Jose Kat MD     Date of Admit: 12/6/2023    Chief Complaint     Melena, Cough, and Back Pain (Dialysis pt w report of black stool w lower back pain and cough x 3 days.  Denies ABD PAIN, NO CP OR SOB REPORTED. HR 51 EKG OBTAINED. )      Subjective:      History of Present Illness:  Rosette Madrid is a 78 y.o. American female with a history of ESRD on Monday Wednesday Friday schedule, paroxysmal AFib on Eliquis, CAD with CABG x4, HFpEF, COPD gold stage II, hypertension, insulin-dependent type 2 diabetes, hypothyroidism, schizophrenia who presented to [facility] ED with 3 day history multiple formed melanotic stools, fatigue, and generalized abdominal pain, and back pain.  Of note, she has had multiple EGDs within the last year with last EGD 06/2023 which showed gastric angiodysplasia and 5 mm duodenal polyp which was not removed at the time due to being on anticoagulants.  She currently denies any chest pain, palpitations, extremity edema, dizziness or lightheadedness.     In the ED, lab significant for anemia, downtrending from previous.  Hemoccult test was positive.  She was administered IV Protonix, type and screened.  Internal Medicine service was consulted for evaluation of GI bleed.       Past Medical History:  Past Medical History:   Diagnosis Date    CKD (chronic kidney disease) requiring chronic dialysis     COPD (chronic obstructive pulmonary disease)     Diabetes mellitus     Hyperlipidemia     Hypertension     Renal insufficiency     Thyroid disease        Past Surgical History:  Past Surgical History:   Procedure Laterality Date    AV FISTULA PLACEMENT Left     CARDIAC CATHETERIZATION      CARDIAC VALVE REPLACEMENT      COLONOSCOPY      CORONARY ARTERY BYPASS GRAFT      EGD, WITH HEMORRHAGE CONTROL  03/24/2023    Procedure: EGD,WITH HEMORRHAGE  CONTROL;  Surgeon: Go Le MD;  Location: [facility] ENDOSCOPY;  Service: Gastroenterology;;    EGD, WITH HEMORRHAGE CONTROL  04/06/2023    Procedure: EGD,WITH HEMORRHAGE CONTROL;  Surgeon: Ayden Francois MD;  Location: [facility] ENDOSCOPY;  Service: Gastroenterology;;    ESOPHAGOGASTRODUODENOSCOPY      ESOPHAGOGASTRODUODENOSCOPY N/A 02/17/2023    Procedure: EGD +/- VCE PLACEMENT;  Surgeon: Morgan Gardner MD;  Location: [facility] ENDOSCOPY;  Service: Gastroenterology;  Laterality: N/A;    ESOPHAGOGASTRODUODENOSCOPY N/A 03/24/2023    Procedure: EGD;  Surgeon: Go Le MD;  Location: [facility] ENDOSCOPY;  Service: Gastroenterology;  Laterality: N/A;  with push    ESOPHAGOGASTRODUODENOSCOPY N/A 04/06/2023    Procedure: EGD;  Surgeon: Ayden Francois MD;  Location: [facility] ENDOSCOPY;  Service: Gastroenterology;  Laterality: N/A;  with push    ESOPHAGOGASTRODUODENOSCOPY N/A 06/16/2023    Procedure: EGD W/ PUSH;  Surgeon: Morgan Gardner MD;  Location: [facility] ENDOSCOPY;  Service: Gastroenterology;  Laterality: N/A;    EYE SURGERY      POLYPECTOMY      SMALL BOWEL ENTEROSCOPY Left 01/20/2023    Procedure: ENTEROSCOPY;  Surgeon: Lexi Scales MD;  Location: [facility] ENDOSCOPY;  Service: Gastroenterology;  Laterality: Left;    THYROIDECTOMY      TUBAL LIGATION         Family History:  Family History   Problem Relation Age of Onset    Hypertension Mother     Hypertension Father     Hypertension Sister     Hypertension Sister        Social History:  Social History     Tobacco Use    Smoking status: Former     Current packs/day: 2.00     Average packs/day: 2.0 packs/day for 15.0 years (30.0 ttl pk-yrs)     Types: Cigarettes    Smokeless tobacco: Never    Tobacco comments:     Smokes 3 cigarettes a day   Substance Use Topics    Alcohol use: Not Currently     Comment: 1 glass every 2-3 month    Drug use: Never       Allergies:  Review of patient's allergies indicates:   Allergen Reactions     "Lisinopril Swelling    Azithromycin      Other reaction(s): tongue/facial swelling    Baclofen      Other reaction(s): tongue/facial swelling    Doxycycline      Other reaction(s): Angioedema       Home Medications:  Prior to Admission medications    Medication Sig Start Date End Date Taking? Authorizing Provider   albuterol (VENTOLIN HFA) 90 mcg/actuation inhaler Inhale 2 puffs into the lungs every 6 (six) hours as needed for Wheezing. Rescue 9/16/23  Yes Margartia Dior FNP   albuterol-ipratropium (DUO-NEB) 2.5 mg-0.5 mg/3 mL nebulizer solution Take 3 mLs by nebulization every 6 (six) hours as needed for Wheezing or Shortness of Breath. Rescue 12/19/22  Yes Margarita Dior FNP   amLODIPine (NORVASC) 5 MG tablet Take 1 tablet (5 mg total) by mouth once daily. 11/29/23 11/28/24 Yes DauterZakiya marlow PA-C   apixaban (ELIQUIS) 5 mg Tab Take 1 tablet (5 mg total) by mouth 2 (two) times daily. 11/29/23 11/28/24 Yes DauterZakiya marlow PA-C   azithromycin (Z-ELIECER) 250 MG tablet Take by mouth. 11/23/23  Yes Provider, Historical   BASAGLAR KWIKPEN U-100 INSULIN glargine 100 units/mL SubQ pen Inject 30 Units into the skin Daily. 9/1/23  Yes Margarita Dior FNP   BD ULTRA-FINE MINI PEN NEEDLE 31 gauge x 3/16" Ndle Inject into the skin 2 (two) times daily. 11/22/23  Yes Provider, Historical   BELSOMRA 5 mg Tab Take 1 tablet by mouth every evening. 10/26/23  Yes Provider, Historical   BENADRYL 25 mg capsule Take 50 mg by mouth nightly. 2/23/22  Yes Provider, Historical   carvediloL (COREG) 6.25 MG tablet Take 1 tablet (6.25 mg total) by mouth 2 (two) times daily. 11/29/23 11/28/24 Yes DauteriveZakiya PA-KALE   clonazePAM (KLONOPIN) 0.5 MG tablet Take 0.5 mg by mouth every evening. 7/13/22  Yes Provider, Historical   DIALYVITE 100-1 mg Tab Take 1 tablet by mouth once daily. 8/13/22  Yes Provider, Historical   DULoxetine (CYMBALTA) 30 MG capsule Take 30 mg by mouth once daily. 7/27/22  Yes Provider, Historical " "  fluticasone furoate-vilanteroL (BREO) 100-25 mcg/dose diskus inhaler Inhale 1 puff into the lungs once daily. 23  Yes Margarita Dior FNP   furosemide (LASIX) 40 MG tablet Take 1 tablet (40 mg total) by mouth once daily. 23 Yes DauterZakiya marlow PA-C   glipiZIDE (GLUCOTROL) 10 MG tablet Take 1 tablet (10 mg total) by mouth daily with breakfast. 23  Yes Margarita Dior FNP   INVEGA SUSTENNA 156 mg/mL Syrg injection Inject into the muscle. 22  Yes Provider, Historical   L-METHYLFOLATE 15 mg Tab Take 1 tablet by mouth. 23  Yes Provider, Historical   lactulose (CHRONULAC) 10 gram/15 mL solution Take 30 mLs by mouth. 23  Yes Provider, Historical   levothyroxine (SYNTHROID) 125 MCG tablet Take 1 tablet (125 mcg total) by mouth before breakfast. 23  Yes Margarita Dior FNP   ONETOUCH DELICA PLUS LANCET 30 gauge Misc 1 lancet by Other route once daily. 22  Yes Margarita Dior FNP   ONETOUCH ULTRA TEST Strp 1 strip by Other route once daily. 22  Yes Margarita Dior FNP   pantoprazole (PROTONIX) 40 MG tablet Take 40 mg by mouth 2 (two) times daily. 23  Yes Provider, Historical   pen needle, diabetic 31 gauge x 5/16" Ndle 2 Units by Misc.(Non-Drug; Combo Route) route 2 (two) times a day. Patient to check blood sugars twice daily (morning and night) 3/1/23  Yes Margarita Dior FNP   rosuvastatin (CRESTOR) 40 MG Tab Take 1 tablet (40 mg total) by mouth once daily. 23 Yes DauterZakiya marlow PA-C   sevelamer carbonate (RENVELA) 800 mg Tab Take 2 tablets (1,600 mg total) by mouth 3 (three) times daily. 3/25/23  Yes Noah Cowan MD   tiotropium (SPIRIVA WITH HANDIHALER) 18 mcg inhalation capsule Inhale 1 capsule (18 mcg total) into the lungs Daily. 23  Yes Margarita Dior, FNP         Review of Systems:  See HPI         Objective:   Last 24 Hour Vital Signs:  BP  Min: 125/58  Max: 141/64  Temp  Av.3 °F (36.3 °C)  Min: 97.3 °F (36.3 °C)  " Max: 97.3 °F (36.3 °C)  Pulse  Av.7  Min: 51  Max: 63  Resp  Av  Min: 18  Max: 23  SpO2  Av.2 %  Min: 95 %  Max: 100 %  Weight  Av.3 kg (194 lb 12.4 oz)  Min: 88.3 kg (194 lb 12.4 oz)  Max: 88.3 kg (194 lb 12.4 oz)  Body mass index is 34.5 kg/m².  No intake/output data recorded.    Physical Examination:  Gen:  Lying comfortably in bed  HEENT:  Pupils equal and reactive.  Nares patent without rhinorrhea.  Oropharynx clear without oropharyngeal erythema or edema.  Poor dentition  Neck:  Neck is supple, no thyromegaly  Heart:  Bradycardia, irregular rhythm.  No visible JVD or hepatojugular reflux.  No lower extremity edema.  Lungs:  Faint diffuse expiratory wheezes  Abd:  Abdomen is soft, nontender  Extremities:  Moving all extremities  MSK:  No significant visible deformities  Neuro:  Alert and responding appropriately to questions and commands, assistance of  with answering details  Skin:  No visible wounds or rashes    Laboratory:  Most Recent Data:  CBC:   Lab Results   Component Value Date    WBC 5.84 2023    HGB 8.6 (L) 2023    HCT 29.8 (L) 2023     2023    MCV 84.7 2023    RDW 19.6 (H) 2023     WBC Differential:   Recent Labs   Lab 23  0834   WBC 5.84   HGB 8.6*   HCT 29.8*      MCV 84.7     BMP:   Lab Results   Component Value Date     2023    K 4.5 2023    CO2 28 2023    BUN 37.5 (H) 2023    CREATININE 4.00 (H) 2023    CALCIUM 8.8 2023    MG 2.00 2023    PHOS 3.8 2023     LFTs:   Lab Results   Component Value Date    ALBUMIN 3.1 (L) 2023    BILITOT 0.4 2023    AST 37 (H) 2023    ALKPHOS 102 2023    ALT 43 2023     Coags:   Lab Results   Component Value Date    INR 1.03 2023    PROTIME 13.4 2023    PTT 30.4 2023     FLP:   Lab Results   Component Value Date    CHOL 124 2023    HDL 48 2023    TRIG 54 2023     DM:    Lab Results   Component Value Date    HGBA1C 6.7 12/06/2023    HGBA1C 5.7 09/01/2023    HGBA1C 5.0 02/27/2023    CREATININE 4.00 (H) 12/06/2023     Thyroid:   Lab Results   Component Value Date    TSH 0.197 (L) 09/01/2023      Anemia:   Lab Results   Component Value Date    IRON 69 06/15/2023    TIBC 227 (L) 06/15/2023    FERRITIN 3,095.41 (H) 06/15/2023       Lab Results   Component Value Date    BUCNWTZS77 1,094 (H) 06/15/2023       Lab Results   Component Value Date    FOLATE 15.0 01/19/2023        Cardiac:   Lab Results   Component Value Date    TROPONINI 0.066 (H) 12/06/2023    .9 (H) 02/09/2023     Urinalysis:   Lab Results   Component Value Date    COLORU YELLOW 09/30/2021    PHUA 7.0 09/30/2021    CLARITYU Clear 02/14/2018    SPECGRAV 1.020 02/14/2018    NITRITE Negative 09/30/2021    KETONESU Negative 09/30/2021    UROBILINOGEN Normal 09/30/2021    WBCUA 0-2 09/30/2021       Trended Lab Data:  Recent Labs   Lab 12/06/23  0834   WBC 5.84   HGB 8.6*   HCT 29.8*      MCV 84.7   RDW 19.6*      K 4.5   CO2 28   BUN 37.5*   CREATININE 4.00*   ALBUMIN 3.1*   BILITOT 0.4   AST 37*   ALKPHOS 102   ALT 43       Trended Cardiac Data:  Recent Labs   Lab 12/06/23  0834   TROPONINI 0.066*       Microbiology Data:  Microbiology Results (last 7 days)       ** No results found for the last 168 hours. **             Other Results:  EKG (my interpretation): unchanged from previous tracings    Radiology:  Imaging Results    None          Assessment & Plan:     Acute upper GI bleed       - H/H 8.6/29.8 from previous hemoglobin greater than 12       - Given IV Protonix 80 mg x 1, will continue 40 mg b.i.d..  Started on octreotide drip       - NPO       - Holding anticoagulation       - Consult to GI for evaluation    AFib on Eliquis   Heart failure with a preserved ejection fraction   CAD status post CABG x4       - Follows with Dr. Rogers       - EKG without significant change from previous       - Last  echo in 2021 with EF 55%       - Minimally elevated troponin 0.06       - Holding home Eliquis in setting of GI bleed       - continue home Coreg and Lasix    ESRD on hemodialysis       - Will consult Nephrology for regular scheduled dialysis    Insulin-dependent type 2 diabetes        - A1c 6.7 today       - Home med Basaglar 30 units at night, and Glipizide 10 mg daily       - Will give detemir 20 units plus low-dose sliding scale       - Monitor CBGs    HTN       - BP well controlled       - Home meds amlodipine 5 mg, carvedilol 6.25 mg to be continued    COPD Gold Stage 2       - Continue home Duo-Nebs q6h PRN    Schizophrenia  Depression       - Holding home Cymbalta    Hypothyroidism        - Continue home synthroid 125 mcg AM    CODE STATUS:  Full code  Access:  PIV  Antibiotics:  N/A  Diet: NPO  DVT Prophylaxis: Holding due to bleeding  GI Prophylaxis: IV protonix  Fluids: none      Disposition: Admit to internal medicine service for GI bleed      Demond Molina MD  [facility] HO-3

## 2023-12-06 NOTE — ED PROVIDER NOTES
Encounter Date: 12/6/2023       History     Chief Complaint   Patient presents with    Melena    Cough    Back Pain     Dialysis pt w report of black stool w lower back pain and cough x 3 days.  Denies ABD PAIN, NO CP OR SOB REPORTED. HR 51 EKG OBTAINED.      Melenic stools, apixiban, afib, hx gi bleed ;      GI Problem  The other symptoms of the illness include melena and nausea. The other symptoms of the illness do not include fever, jaundice, vomiting, diarrhea, dysuria, hematemesis or hematochezia.   The episodes of melena began 2 days ago. The melena has been unchanged since the its onset. The melena has occurred 1 time per day.   Nausea began yesterday.   The patient states that she believes she is currently not pregnant. The patient has not had a change in bowel habit. Additional symptoms associated with the illness include back pain. Symptoms associated with the illness do not include chills, anorexia, diaphoresis, heartburn, constipation, urgency, hematuria or frequency. Significant associated medical issues include GERD. Significant associated medical issues do not include inflammatory bowel disease, diabetes, sickle cell disease, gallstones, liver disease, substance abuse, diverticulitis, HIV or cardiac disease.     Review of patient's allergies indicates:   Allergen Reactions    Lisinopril Swelling    Azithromycin      Other reaction(s): tongue/facial swelling    Baclofen      Other reaction(s): tongue/facial swelling    Doxycycline      Other reaction(s): Angioedema     Past Medical History:   Diagnosis Date    CKD (chronic kidney disease) requiring chronic dialysis     COPD (chronic obstructive pulmonary disease)     Diabetes mellitus     Hyperlipidemia     Hypertension     Renal insufficiency     Thyroid disease      Past Surgical History:   Procedure Laterality Date    AV FISTULA PLACEMENT Left     CARDIAC CATHETERIZATION      CARDIAC VALVE REPLACEMENT      COLONOSCOPY      CORONARY ARTERY BYPASS  GRAFT      EGD, WITH HEMORRHAGE CONTROL  03/24/2023    Procedure: EGD,WITH HEMORRHAGE CONTROL;  Surgeon: Go Le MD;  Location: [facility] ENDOSCOPY;  Service: Gastroenterology;;    EGD, WITH HEMORRHAGE CONTROL  04/06/2023    Procedure: EGD,WITH HEMORRHAGE CONTROL;  Surgeon: Ayden Francois MD;  Location: [facility] ENDOSCOPY;  Service: Gastroenterology;;    ESOPHAGOGASTRODUODENOSCOPY      ESOPHAGOGASTRODUODENOSCOPY N/A 02/17/2023    Procedure: EGD +/- VCE PLACEMENT;  Surgeon: Morgan Gardner MD;  Location: [facility] ENDOSCOPY;  Service: Gastroenterology;  Laterality: N/A;    ESOPHAGOGASTRODUODENOSCOPY N/A 03/24/2023    Procedure: EGD;  Surgeon: Go Le MD;  Location: [facility] ENDOSCOPY;  Service: Gastroenterology;  Laterality: N/A;  with push    ESOPHAGOGASTRODUODENOSCOPY N/A 04/06/2023    Procedure: EGD;  Surgeon: Ayden Francois MD;  Location: [facility] ENDOSCOPY;  Service: Gastroenterology;  Laterality: N/A;  with push    ESOPHAGOGASTRODUODENOSCOPY N/A 06/16/2023    Procedure: EGD W/ PUSH;  Surgeon: Morgan Gardner MD;  Location: [facility] ENDOSCOPY;  Service: Gastroenterology;  Laterality: N/A;    EYE SURGERY      POLYPECTOMY      SMALL BOWEL ENTEROSCOPY Left 01/20/2023    Procedure: ENTEROSCOPY;  Surgeon: Lexi Scales MD;  Location: [facility] ENDOSCOPY;  Service: Gastroenterology;  Laterality: Left;    THYROIDECTOMY      TUBAL LIGATION       Family History   Problem Relation Age of Onset    Hypertension Mother     Hypertension Father     Hypertension Sister     Hypertension Sister      Social History     Tobacco Use    Smoking status: Former     Current packs/day: 2.00     Average packs/day: 2.0 packs/day for 15.0 years (30.0 ttl pk-yrs)     Types: Cigarettes    Smokeless tobacco: Never    Tobacco comments:     Smokes 3 cigarettes a day   Substance Use Topics    Alcohol use: Not Currently     Comment: 1 glass every 2-3 month    Drug use: Never     Review of Systems    Constitutional:  Negative for chills, diaphoresis and fever.   Gastrointestinal:  Positive for melena and nausea. Negative for anorexia, constipation, diarrhea, heartburn, hematemesis, hematochezia, jaundice and vomiting.   Genitourinary:  Negative for dysuria, frequency, hematuria and urgency.   Musculoskeletal:  Positive for back pain.   All other systems reviewed and are negative.      Physical Exam     Initial Vitals [12/06/23 0747]   BP Pulse Resp Temp SpO2   126/71 (!) 51 18 97.3 °F (36.3 °C) 98 %      MAP       --         Physical Exam    Nursing note and vitals reviewed.  Constitutional: She appears well-developed and well-nourished. She is not diaphoretic. No distress.   HENT:   Head: Normocephalic and atraumatic.   Right Ear: External ear normal.   Left Ear: External ear normal.   Eyes: EOM are normal. Pupils are equal, round, and reactive to light. Right eye exhibits no discharge. Left eye exhibits no discharge.   Neck: Neck supple. No thyromegaly present. No tracheal deviation present. No JVD present.   Normal range of motion.  Cardiovascular:  Normal rate, regular rhythm, normal heart sounds and intact distal pulses.     Exam reveals no gallop and no friction rub.       No murmur heard.  Pulmonary/Chest: Breath sounds normal. No stridor. No respiratory distress. She has no wheezes. She has no rhonchi. She has no rales.   Abdominal: Abdomen is soft. Bowel sounds are normal. She exhibits no distension. There is no abdominal tenderness. There is no rebound and no guarding.   Genitourinary: Rectum:      Guaiac result positive.   Guaiac positive stool. : Acceptable.  Musculoskeletal:         General: No tenderness or edema. Normal range of motion.      Cervical back: Normal range of motion and neck supple.     Neurological: She is alert and oriented to person, place, and time. She has normal strength. No cranial nerve deficit or sensory deficit. GCS score is 15. GCS eye subscore is 4. GCS  verbal subscore is 5. GCS motor subscore is 6.   Skin: Skin is warm and dry. Capillary refill takes less than 2 seconds. No rash and no abscess noted. No erythema. No pallor.   Psychiatric: She has a normal mood and affect. Her behavior is normal. Judgment and thought content normal.         ED Course   Procedures  Labs Reviewed   COMPREHENSIVE METABOLIC PANEL - Abnormal; Notable for the following components:       Result Value    Glucose Level 161 (*)     Blood Urea Nitrogen 37.5 (*)     Creatinine 4.00 (*)     Albumin Level 3.1 (*)     Albumin/Globulin Ratio 1.0 (*)     Aspartate Aminotransferase 37 (*)     All other components within normal limits   TROPONIN I - Abnormal; Notable for the following components:    Troponin-I 0.066 (*)     All other components within normal limits   CBC WITH DIFFERENTIAL - Abnormal; Notable for the following components:    RBC 3.52 (*)     Hgb 8.6 (*)     Hct 29.8 (*)     MCH 24.4 (*)     MCHC 28.9 (*)     RDW 19.6 (*)     MPV 11.7 (*)     All other components within normal limits   BLOOD SMEAR MICROSCOPIC EXAM (OLG) - Abnormal; Notable for the following components:    RBC Morph Abnormal (*)     Anisocytosis 2+ (*)     [place] Cells 1+ (*)     Elliptocytosis 1+ (*)     Hypochromasia 2+ (*)     Polychromasia 1+ (*)     All other components within normal limits   POCT GLUCOSE - Abnormal; Notable for the following components:    POCT Glucose 176 (*)     All other components within normal limits   LIPASE - Normal   MAGNESIUM - Normal   LACTIC ACID, PLASMA - Normal   CK-MB - Normal   CK - Normal   CBC W/ AUTO DIFFERENTIAL    Narrative:     The following orders were created for panel order CBC auto differential.  Procedure                               Abnormality         Status                     ---------                               -----------         ------                     CBC with Differential[[id number]]       Abnormal            Final result                 Please view results  for these tests on the individual orders.   EXTRA TUBES    Narrative:     The following orders were created for panel order EXTRA TUBES.  Procedure                               Abnormality         Status                     ---------                               -----------         ------                     Light Blue Top Hold[[id number]]                             Final result               Gold Top Hold[[id number]]                                   Final result                 Please view results for these tests on the individual orders.   LIGHT BLUE TOP HOLD   GOLD TOP HOLD   URINALYSIS, REFLEX TO URINE CULTURE   DRUG SCREEN, URINE (BEAKER)   TYPE & SCREEN     EKG Readings: (Independently Interpreted)   NSR @ 51, non acute and non ischemic appearing ;     ECG Results              EKG 12-lead (Weakness) Age > 50 (In process)  Result time 12/06/23 07:58:30      In process by Interface, Lab In [facility] (12/06/23 07:58:30)                   Narrative:    Test Reason : R53.1,    Vent. Rate : 051 BPM     Atrial Rate : 051 BPM     P-R Int : 246 ms          QRS Dur : 154 ms      QT Int : 524 ms       P-R-T Axes : 114 -58 118 degrees     QTc Int : 482 ms    Sinus bradycardia with 1st degree A-V block with Premature atrial complexes  Left axis deviation  Left bundle branch block  Abnormal ECG  When compared with ECG of 23-MAR-2023 16:34,  No significant change was found    Referred By: [email or web address]   SELF           Confirmed By:                                   Imaging Results    None          Medications   sodium chloride 0.9% bolus 1,000 mL 1,000 mL (1,000 mLs Intravenous New Bag 12/6/23 0838)   pantoprazole injection 80 mg (80 mg Intravenous Given 12/6/23 0924)     Medical Decision Making  78-year-old woman presents to the emergency department today complaining of 2 days of melenic stools, mild nausea, mild crampy epigastric pain.  She is not endorsing any overt syncopal or presyncopal symptoms.  Hemodynamic parameters  appear normal the patient is beta blocked and also taking a calcium channel blocker.  Hemoccult is positive, stool is melenic in appearance.    Amount and/or Complexity of Data Reviewed  External Data Reviewed: notes.     Details: Records confirm history of apparent GI bleed without a bleeding diathesis identified.   Labs: ordered. Decision-making details documented in ED Course.     Details: Moderate anemia (normocytic) on hemogram ;  ECG/medicine tests: ordered and independent interpretation performed. Decision-making details documented in ED Course.     Details: - NSR @ 51, non acute and non ischemic appearing ;  Discussion of management or test interpretation with external provider(s): Internal medicine team aware of case, plan admit;    Risk  Prescription drug management.  Decision regarding hospitalization.  Risk Details: Patient with considerable anemia today, history of apparent GI bleed, melenic stools today.  Risk found sufficient to warrant admission for further evaluation, supervision of clinical course.  Patient remained stable in the emergency department with initial interventions.               ED Course as of 12/06/23 1120   Wed Dec 06, 2023   0850 Mildly elevated poc glucose ; [CT]   0921 Normal lactate ; [CT]   0942 Low positive troponin ; [CT]   0942 Normal magnesium ; [CT]   0942 Chemistries in sequence with recent comparisons (ESRD on HD) ; [CT]   0942 Normal total ck, normal mb fraction ; [CT]   1039 Hemogram demonstrates moderate anemia; normocytic ; [CT]      ED Course User Index  [CT] Eduardo Shell MD                             Clinical Impression:  Final diagnoses:  [R53.1] Weakness  [Z13.9] Screening due  [D64.9] Anemia, unspecified type (Primary)  [K92.1] Melena          ED Disposition Condition    Observation Stable                Eduardo Shell MD  12/06/23 1121

## 2023-12-06 NOTE — CONSULTS
Gastroenterology/Hepatology Initial Consult Note    Patient Name: Rosette Madrid  Age: 78 y.o.  : 1945  MRN: [id number]  Admission Date: 2023    Reason for Consult:      Medical Management  Chief Complaint   Patient presents with    Melena    Cough    Back Pain     Dialysis pt w report of black stool w lower back pain and cough x 3 days.  Denies ABD PAIN, NO CP OR SOB REPORTED. HR 51 EKG OBTAINED.          Self, Aaareferral    HPI:     Rosette Madrid is a 78 y.o. female with  history of ESRD (on HD MWF schedule), type 2 diabetes, COPD, HFpEF, coronary artery disease, CABG x4, hypertension, AF (on Eliquis), dyslipidemia, hypothyroidism, anemia, AVM, colon polyps and schizophrenia who presented to [facility] ED on 23 with complaints of melanotic stools, fatigue, generalized abdominal pain, and back pain x 3 days.      ED course: VS:  /71, P 51, R 18, T 97.3° F, SpO2 98% on room air.  Labs:  H&H 8.6/29.8, , creatinine 4.0, BUN 37.5, glucose 161, Alb 3.1, AST 37, otherwise LFTs WNL, troponin 0.066.    GI service consulted for upper GI bleed.     Patient reports that she typically has a bowel movement once daily.  She lactulose every other day in order to keep her bowel movements regular. Otherwise, she will have constipation. She reports having dark black, formed stools for the past 3 days.  Her last bowel movement was yesterday which she describes as dark black and formed. She denies bright red blood.  She denies abdominal pain, nausea, vomiting, hematemesis, reflux symptoms, difficulty swallowing, diarrhea, hematochezia, decreased appetite, dysuria and hematuria.  Despite reporting a good appetite, she states that her pants have become loose fitting over the past few weeks.     Denies NSAID use.  She does take aspirin 81 mg daily and Eliquis daily Admits to smoking 4 cigarettes per day for the past 3 years but previously smoked 1/2 ppd since she was 18 years old.  Admits to occasional alcohol  use, usually once every 7-8 months. Reports last drank 1/2 glass of vodka 3 days ago. Denies recreational drug use. Denies a family history of IBD, colon polyps or colon cancer.        Prior endoscopies:     Colonoscopy on 07/21/2021 for positive fecal immunochemical test per Dr. Trung Bourgeois:  three 5 to 9 mm semi-sessile polyps were found in the transverse colon and were removed with a cold snare. Resection and retrieval were complete.  Seven 6-9 mm semi-sessile polyps were found in the descending colon and were removed with a cold snare. Resection and retrieval were complete.  A 12 mm semi-sessile polyp was found in the descending colon was removed with a hot snare. Resection and retrieval were complete. Two 3 to 5 mm semi-sessile polyps were found in the sigmoid colon and were removed with a cold snare. Resection and retrieval were complete.  Non-bleeding internal hemorrhoids were found during retroflexion.  The hemorrhoids were medium-sized and grade II  (internal hemorrhoids that prolapse but reduced spontaneously). Recommendations await pathology results. Repeat colonoscopy in 3 years for surveillance.  Pathology: Transverse colon, polyp x3, biopsy: Tubular adenoma, multiple fragments. Descending colon, polyp x8, biopsy:  Tubular adenoma, multiple fragments.  Sigmoid colon, polyp x2, biopsy:  Tubular adenoma, multiple fragments.    EGD on 12/31/2021 for melena per Dr. Manolo Winter:  The esophagus was normal.  A small hiatal hernia was present.  A single small nonbleeding angioectasia was found in the gastric antrum.  Coagulation for hemostasis using argon beam was successful.  The examined duodenum was normal.  Recommendations:  We will plan on capsule endoscopy tomorrow.  Consider Sandostatin to help prevent any other possible AVM bleeding/oozing.  Overall, test situation with her not wanting to receive any blood.     Capsule endoscopy for anemia and history of gastric arteriovenous  malformation  on 1/2/2022:  Technically incomplete study.  Visibility and stomach good until patient ingested food at 8:00 a.m..  Capsule did not leave the stomach.  Probable AVM ablation site identified within the stomach.  No other signs of bleeding were seen throughout the stomach.  Recommendations: Incomplete study due to delayed gastric emptying.  Consider repeating study with a capsule placed endoscopically if warranted.    EGD/push enteroscopy on 1/20/2023 for unexplained iron deficiency anemia per Dr. Lexi Scales:  Normal esophagus.  Normal stomach.  Normal examined duodenum. No proximal small bowel bleeding or AVMs seen. No specimens collected. Recommendations:  To visualize the small bowel, perform video capsule endoscopy as outpatient.  Recommend an iron supplement.    EGD on 2/17/23 for iron deficiency anemia due to suspected upper gastrointestinal bleeding per Dr. Morgan Gardner:  Normal EGD with successful completion of video capsule enteroscopy placement.  No specimens collected.    Capsule endoscopy done on 02/17/2023 per Dr.James Gardner:  Small nonbleeding erosion in the distal duodenum, otherwise unremarkable capsule endoscopy.  No intervention warranted at this time.  Avoid NSAIDs.  Recommendation: If patient were to develop melanotic stools are transfusion dependent anemia off iron supplementation, consider CT enterography.    EGD push enteroscopy on 3/24/23 for anemia per Dr. Go Le : AVM in the 4th portion of the duodenum, APC applied     EGD on 4/6/23 for iron-deficiency anemia per Dr. Ayden Francois:  Normal esophagus, duodenum, jejunum.  Single 3 mm angio ectasia with no bleeding found in the gastric body treated with APC.  This AVM was small and likely clinically insignificant.  No specimens collected.  Recommendation to get a nuclear bleeding scan.    EGD on 6/16/23 for active GI bleeding per Dr. Morgan Gardner:  Normal esophagus and stomach.  3 mm, nonbleeding angiodysplastic  lesion in the duodenum treated with APC.  Single duodenal polyp, resection not attempted.  Normal examined jejunum.  No specimens collected.  Recommendations resume Eliquis at prior dose tomorrow.  Repeat upper endoscopy in 6 weeks with Dr. Bourgeois off anticoagulation for duodenal polypectomy.            Margarita Dior, JAIMEP    Past Medical History:   Diagnosis Date    CKD (chronic kidney disease) requiring chronic dialysis     COPD (chronic obstructive pulmonary disease)     Diabetes mellitus     Hyperlipidemia     Hypertension     Renal insufficiency     Thyroid disease         Past Surgical History:   Procedure Laterality Date    AV FISTULA PLACEMENT Left     CARDIAC CATHETERIZATION      CARDIAC VALVE REPLACEMENT      COLONOSCOPY      CORONARY ARTERY BYPASS GRAFT      EGD, WITH HEMORRHAGE CONTROL  03/24/2023    Procedure: EGD,WITH HEMORRHAGE CONTROL;  Surgeon: Go Le MD;  Location: [facility] ENDOSCOPY;  Service: Gastroenterology;;    EGD, WITH HEMORRHAGE CONTROL  04/06/2023    Procedure: EGD,WITH HEMORRHAGE CONTROL;  Surgeon: Ayden Francois MD;  Location: [facility] ENDOSCOPY;  Service: Gastroenterology;;    ESOPHAGOGASTRODUODENOSCOPY      ESOPHAGOGASTRODUODENOSCOPY N/A 02/17/2023    Procedure: EGD +/- VCE PLACEMENT;  Surgeon: Morgan Gardner MD;  Location: [facility] ENDOSCOPY;  Service: Gastroenterology;  Laterality: N/A;    ESOPHAGOGASTRODUODENOSCOPY N/A 03/24/2023    Procedure: EGD;  Surgeon: Go Le MD;  Location: [facility] ENDOSCOPY;  Service: Gastroenterology;  Laterality: N/A;  with push    ESOPHAGOGASTRODUODENOSCOPY N/A 04/06/2023    Procedure: EGD;  Surgeon: Ayden Francois MD;  Location: [facility] ENDOSCOPY;  Service: Gastroenterology;  Laterality: N/A;  with push    ESOPHAGOGASTRODUODENOSCOPY N/A 06/16/2023    Procedure: EGD W/ PUSH;  Surgeon: Morgan Gardner MD;  Location: [facility] ENDOSCOPY;  Service: Gastroenterology;  Laterality: N/A;    EYE SURGERY      POLYPECTOMY       "SMALL BOWEL ENTEROSCOPY Left 01/20/2023    Procedure: ENTEROSCOPY;  Surgeon: Lexi Scales MD;  Location: [facility] ENDOSCOPY;  Service: Gastroenterology;  Laterality: Left;    THYROIDECTOMY      TUBAL LIGATION          Family History   Problem Relation Age of Onset    Hypertension Mother     Hypertension Father     Hypertension Sister     Hypertension Sister        Social History     Tobacco Use    Smoking status: Former     Current packs/day: 2.00     Average packs/day: 2.0 packs/day for 15.0 years (30.0 ttl pk-yrs)     Types: Cigarettes    Smokeless tobacco: Never    Tobacco comments:     Smokes 3 cigarettes a day   Substance Use Topics    Alcohol use: Not Currently     Comment: 1 glass every 2-3 month         Review of patient's allergies indicates:   Allergen Reactions    Lisinopril Swelling    Azithromycin      Other reaction(s): tongue/facial swelling    Baclofen      Other reaction(s): tongue/facial swelling    Doxycycline      Other reaction(s): Angioedema        Medications Prior to Admission   Medication Sig Dispense Refill Last Dose    albuterol (VENTOLIN HFA) 90 mcg/actuation inhaler Inhale 2 puffs into the lungs every 6 (six) hours as needed for Wheezing. Rescue 18 g 1 12/6/2023    albuterol-ipratropium (DUO-NEB) 2.5 mg-0.5 mg/3 mL nebulizer solution Take 3 mLs by nebulization every 6 (six) hours as needed for Wheezing or Shortness of Breath. Rescue 75 mL 0 12/6/2023    amLODIPine (NORVASC) 5 MG tablet Take 1 tablet (5 mg total) by mouth once daily. 30 tablet 11 12/6/2023    apixaban (ELIQUIS) 5 mg Tab Take 1 tablet (5 mg total) by mouth 2 (two) times daily. 60 tablet 11 12/6/2023    azithromycin (Z-ELIECER) 250 MG tablet Take by mouth.   12/6/2023    BASAGLAR KWIKPEN U-100 INSULIN glargine 100 units/mL SubQ pen Inject 30 Units into the skin Daily. 27 mL 2 12/6/2023    BD ULTRA-FINE MINI PEN NEEDLE 31 gauge x 3/16" Ndle Inject into the skin 2 (two) times daily.   12/6/2023    BELSOMRA 5 mg Tab Take 1 " "tablet by mouth every evening.   12/6/2023    BENADRYL 25 mg capsule Take 50 mg by mouth nightly.   12/6/2023    carvediloL (COREG) 6.25 MG tablet Take 1 tablet (6.25 mg total) by mouth 2 (two) times daily. 180 tablet 3 12/6/2023    clonazePAM (KLONOPIN) 0.5 MG tablet Take 0.5 mg by mouth every evening.   12/6/2023    DIALYVITE 100-1 mg Tab Take 1 tablet by mouth once daily.   12/6/2023    DULoxetine (CYMBALTA) 30 MG capsule Take 30 mg by mouth once daily.   12/6/2023    fluticasone furoate-vilanteroL (BREO) 100-25 mcg/dose diskus inhaler Inhale 1 puff into the lungs once daily. 90 each 2 12/6/2023    furosemide (LASIX) 40 MG tablet Take 1 tablet (40 mg total) by mouth once daily. 90 tablet 3 12/6/2023    glipiZIDE (GLUCOTROL) 10 MG tablet Take 1 tablet (10 mg total) by mouth daily with breakfast. 90 tablet 2 12/6/2023    INVEGA SUSTENNA 156 mg/mL Syrg injection Inject into the muscle.   12/6/2023    L-METHYLFOLATE 15 mg Tab Take 1 tablet by mouth.   12/6/2023    lactulose (CHRONULAC) 10 gram/15 mL solution Take 30 mLs by mouth.   12/6/2023    levothyroxine (SYNTHROID) 125 MCG tablet Take 1 tablet (125 mcg total) by mouth before breakfast. 90 tablet 2 12/6/2023    ONETOUCH DELICA PLUS LANCET 30 gauge Misc 1 lancet by Other route once daily. 100 each 2 12/6/2023    ONETOUCH ULTRA TEST Strp 1 strip by Other route once daily. 200 strip 2 12/6/2023    pantoprazole (PROTONIX) 40 MG tablet Take 40 mg by mouth 2 (two) times daily.   12/6/2023    pen needle, diabetic 31 gauge x 5/16" Ndle 2 Units by Misc.(Non-Drug; Combo Route) route 2 (two) times a day. Patient to check blood sugars twice daily (morning and night) 200 each 2 12/6/2023    rosuvastatin (CRESTOR) 40 MG Tab Take 1 tablet (40 mg total) by mouth once daily. 90 tablet 3 12/6/2023    sevelamer carbonate (RENVELA) 800 mg Tab Take 2 tablets (1,600 mg total) by mouth 3 (three) times daily. 90 tablet 0 12/6/2023    tiotropium (SPIRIVA WITH HANDIHALER) 18 mcg " inhalation capsule Inhale 1 capsule (18 mcg total) into the lungs Daily. 90 capsule 2 12/6/2023         INPATIENT MEDICATIONS    Scheduled Meds:   amLODIPine  5 mg Oral Daily    carvediloL  6.25 mg Oral BID    clonazePAM  0.5 mg Oral QHS    [START ON 12/7/2023] furosemide  40 mg Oral Daily    insulin detemir U-100  20 Units Subcutaneous QHS    [START ON 12/7/2023] levothyroxine  125 mcg Oral Before breakfast    sevelamer carbonate  1,600 mg Oral TID     Continuous Infusions:   octreotide (SANDOSTATIN) 500 mcg in sodium chloride 0.9% 100 mL infusion 50 mcg/hr (12/06/23 1449)     PRN Meds:  albuterol-ipratropium, dextrose 10%, dextrose 10%, dextrose 10%, dextrose 10%, glucagon (human recombinant), glucagon (human recombinant), glucose, glucose, insulin aspart U-100, naloxone, ondansetron, sodium chloride 0.9%          Review of Systems:       Review of Systems   Constitutional:  Negative for appetite change, chills, fatigue, fever and unexpected weight change.   HENT:  Negative for trouble swallowing.    Respiratory:  Positive for cough and wheezing. Negative for shortness of breath.    Cardiovascular:  Negative for chest pain.   Gastrointestinal:  Positive for blood in stool and constipation (occasional). Negative for abdominal distention, abdominal pain, anal bleeding, diarrhea, nausea, vomiting and reflux.   Genitourinary:  Positive for decreased urine volume (hx of ESRD on dialysis). Negative for dysuria and hematuria.   Musculoskeletal:  Positive for back pain (across lower back).   Integumentary:  Negative for color change and mole/lesion.   Neurological:  Negative for dizziness, weakness and light-headedness.          Objective:       VITAL SIGNS: 24 HR MIN & MAX LAST    Temp  Min: 97.3 °F (36.3 °C)  Max: 98.1 °F (36.7 °C)  98.1 °F (36.7 °C)        BP  Min: 125/58  Max: 151/75  (!) 151/75     Pulse  Min: 45  Max: 63  60     Resp  Min: 18  Max: 23  20    SpO2  Min: 95 %  Max: 100 %  99 %        Physical  "Exam  Constitutional:       General: She is not in acute distress.     Appearance: She is obese.   HENT:      Mouth/Throat:      Mouth: Mucous membranes are moist.   Eyes:      General: No scleral icterus.     Conjunctiva/sclera: Conjunctivae normal.   Cardiovascular:      Rate and Rhythm: Bradycardia present. Rhythm irregular.      Pulses: Normal pulses.   Pulmonary:      Effort: Pulmonary effort is normal.      Breath sounds: Wheezing present.   Abdominal:      General: Bowel sounds are normal. There is no distension.      Palpations: Abdomen is soft.      Tenderness: There is no abdominal tenderness. There is no right CVA tenderness, left CVA tenderness, guarding or rebound.   Skin:     General: Skin is warm and dry.      Coloration: Skin is not jaundiced or pale.   Neurological:      General: No focal deficit present.      Mental Status: She is alert and oriented to person, place, and time.              LABS:      Recent Labs   Lab 12/06/23  0834 12/06/23  1543   WBC 5.84  --    HGB 8.6* 9.8*   HCT 29.8* 33.7*     --    MCV 84.7  --        Recent Labs   Lab 12/06/23  0834 12/06/23  1543   HGB 8.6* 9.8*   HCT 29.8* 33.7*        Recent Labs   Lab 12/06/23  0834      K 4.5   CO2 28   BUN 37.5*   CREATININE 4.00*   BILITOT 0.4   ALKPHOS 102   AST 37*   ALT 43   LABPROT 6.3   ALBUMIN 3.1*        No results for input(s): "INR", "PROTIME", "PTT" in the last 168 hours.     No results for input(s): "IRON", "FERRITIN" in the last 168 hours.       IMAGING:   No results found.      Assessment / Plan:   Anemia, melanotic stools, hx of AVM and colon polyps  -Hgb on admit 8.6--> 9.8  -Monitor Hgb. Transfuse for Hgb <7.   - Will check iron panel, ferritin levels.   -Monitor stools.   -Recommend push enteroscopy which could be arranged outpatient if Hgb remains stable.     "

## 2023-12-07 LAB
ALBUMIN SERPL-MCNC: 3 G/DL (ref 3.4–4.8)
ALBUMIN/GLOB SERPL: 0.9 RATIO (ref 1.1–2)
ALP SERPL-CCNC: 87 UNIT/L (ref 40–150)
ALT SERPL-CCNC: 35 UNIT/L (ref 0–55)
AST SERPL-CCNC: 28 UNIT/L (ref 5–34)
BASOPHILS # BLD AUTO: 0.03 X10(3)/MCL
BASOPHILS NFR BLD AUTO: 0.6 %
BILIRUB SERPL-MCNC: 0.4 MG/DL
BUN SERPL-MCNC: 41.7 MG/DL (ref 9.8–20.1)
CALCIUM SERPL-MCNC: 8.7 MG/DL (ref 8.4–10.2)
CHLORIDE SERPL-SCNC: 103 MMOL/L (ref 98–107)
CO2 SERPL-SCNC: 26 MMOL/L (ref 23–31)
CREAT SERPL-MCNC: 4.46 MG/DL (ref 0.55–1.02)
EOSINOPHIL # BLD AUTO: 0.11 X10(3)/MCL (ref 0–0.9)
EOSINOPHIL NFR BLD AUTO: 2.2 %
ERYTHROCYTE [DISTWIDTH] IN BLOOD BY AUTOMATED COUNT: 19.9 % (ref 11.5–17)
GFR SERPLBLD CREATININE-BSD FMLA CKD-EPI: 10 MLS/MIN/1.73/M2
GLOBULIN SER-MCNC: 3.2 GM/DL (ref 2.4–3.5)
GLUCOSE SERPL-MCNC: 158 MG/DL (ref 82–115)
HCT VFR BLD AUTO: 30.8 % (ref 37–47)
HGB BLD-MCNC: 8.8 G/DL (ref 12–16)
IMM GRANULOCYTES # BLD AUTO: 0.01 X10(3)/MCL (ref 0–0.04)
IMM GRANULOCYTES NFR BLD AUTO: 0.2 %
LYMPHOCYTES # BLD AUTO: 1.04 X10(3)/MCL (ref 0.6–4.6)
LYMPHOCYTES NFR BLD AUTO: 21 %
MCH RBC QN AUTO: 24.2 PG (ref 27–31)
MCHC RBC AUTO-ENTMCNC: 28.6 G/DL (ref 33–36)
MCV RBC AUTO: 84.8 FL (ref 80–94)
MONOCYTES # BLD AUTO: 0.71 X10(3)/MCL (ref 0.1–1.3)
MONOCYTES NFR BLD AUTO: 14.3 %
NEUTROPHILS # BLD AUTO: 3.06 X10(3)/MCL (ref 2.1–9.2)
NEUTROPHILS NFR BLD AUTO: 61.7 %
NRBC BLD AUTO-RTO: 0 %
PLATELET # BLD AUTO: 129 X10(3)/MCL (ref 130–400)
PLATELETS.RETICULATED NFR BLD AUTO: 5.8 % (ref 0.9–11.2)
PMV BLD AUTO: 11.1 FL (ref 7.4–10.4)
POCT GLUCOSE: 149 MG/DL (ref 70–110)
POCT GLUCOSE: 223 MG/DL (ref 70–110)
POTASSIUM SERPL-SCNC: 4.5 MMOL/L (ref 3.5–5.1)
PROT SERPL-MCNC: 6.2 GM/DL (ref 5.8–7.6)
RBC # BLD AUTO: 3.63 X10(6)/MCL (ref 4.2–5.4)
SODIUM SERPL-SCNC: 135 MMOL/L (ref 136–145)
WBC # SPEC AUTO: 4.96 X10(3)/MCL (ref 4.5–11.5)

## 2023-12-07 PROCEDURE — G0257 UNSCHED DIALYSIS ESRD PT HOS: HCPCS

## 2023-12-07 PROCEDURE — 94640 AIRWAY INHALATION TREATMENT: CPT | Mod: XB

## 2023-12-07 PROCEDURE — 96366 THER/PROPH/DIAG IV INF ADDON: CPT

## 2023-12-07 PROCEDURE — 63600175 PHARM REV CODE 636 W HCPCS: Mod: JZ | Performed by: INTERNAL MEDICINE

## 2023-12-07 PROCEDURE — 96376 TX/PRO/DX INJ SAME DRUG ADON: CPT

## 2023-12-07 PROCEDURE — 63600175 PHARM REV CODE 636 W HCPCS: Mod: JA | Performed by: STUDENT IN AN ORGANIZED HEALTH CARE EDUCATION/TRAINING PROGRAM

## 2023-12-07 PROCEDURE — 96372 THER/PROPH/DIAG INJ SC/IM: CPT | Performed by: STUDENT IN AN ORGANIZED HEALTH CARE EDUCATION/TRAINING PROGRAM

## 2023-12-07 PROCEDURE — C9113 INJ PANTOPRAZOLE SODIUM, VIA: HCPCS | Performed by: STUDENT IN AN ORGANIZED HEALTH CARE EDUCATION/TRAINING PROGRAM

## 2023-12-07 PROCEDURE — 96372 THER/PROPH/DIAG INJ SC/IM: CPT | Performed by: INTERNAL MEDICINE

## 2023-12-07 PROCEDURE — 84100 ASSAY OF PHOSPHORUS: CPT | Performed by: INTERNAL MEDICINE

## 2023-12-07 PROCEDURE — 93005 ELECTROCARDIOGRAM TRACING: CPT

## 2023-12-07 PROCEDURE — 85025 COMPLETE CBC W/AUTO DIFF WBC: CPT | Performed by: STUDENT IN AN ORGANIZED HEALTH CARE EDUCATION/TRAINING PROGRAM

## 2023-12-07 PROCEDURE — 80053 COMPREHEN METABOLIC PANEL: CPT | Performed by: STUDENT IN AN ORGANIZED HEALTH CARE EDUCATION/TRAINING PROGRAM

## 2023-12-07 PROCEDURE — 25000003 PHARM REV CODE 250: Performed by: STUDENT IN AN ORGANIZED HEALTH CARE EDUCATION/TRAINING PROGRAM

## 2023-12-07 PROCEDURE — 25000242 PHARM REV CODE 250 ALT 637 W/ HCPCS: Performed by: STUDENT IN AN ORGANIZED HEALTH CARE EDUCATION/TRAINING PROGRAM

## 2023-12-07 PROCEDURE — 99900035 HC TECH TIME PER 15 MIN (STAT)

## 2023-12-07 PROCEDURE — 25000003 PHARM REV CODE 250: Performed by: INTERNAL MEDICINE

## 2023-12-07 PROCEDURE — G0378 HOSPITAL OBSERVATION PER HR: HCPCS

## 2023-12-07 RX ORDER — PANTOPRAZOLE SODIUM 40 MG/10ML
40 INJECTION, POWDER, LYOPHILIZED, FOR SOLUTION INTRAVENOUS DAILY
Status: DISCONTINUED | OUTPATIENT
Start: 2023-12-07 | End: 2023-12-09 | Stop reason: HOSPADM

## 2023-12-07 RX ORDER — LACTULOSE 10 G/15ML
20 SOLUTION ORAL DAILY PRN
Status: DISCONTINUED | OUTPATIENT
Start: 2023-12-07 | End: 2023-12-09 | Stop reason: HOSPADM

## 2023-12-07 RX ORDER — MUPIROCIN 20 MG/G
OINTMENT TOPICAL 2 TIMES DAILY
Status: DISCONTINUED | OUTPATIENT
Start: 2023-12-07 | End: 2023-12-09 | Stop reason: HOSPADM

## 2023-12-07 RX ADMIN — IPRATROPIUM BROMIDE AND ALBUTEROL SULFATE 3 ML: .5; 3 SOLUTION RESPIRATORY (INHALATION) at 12:12

## 2023-12-07 RX ADMIN — OCTREOTIDE ACETATE 50 MCG/HR: 500 INJECTION, SOLUTION INTRAVENOUS; SUBCUTANEOUS at 06:12

## 2023-12-07 RX ADMIN — AMLODIPINE BESYLATE 5 MG: 5 TABLET ORAL at 08:12

## 2023-12-07 RX ADMIN — EPOETIN ALFA-EPBX 10000 UNITS: 10000 INJECTION, SOLUTION INTRAVENOUS; SUBCUTANEOUS at 04:12

## 2023-12-07 RX ADMIN — INSULIN DETEMIR 10 UNITS: 100 INJECTION, SOLUTION SUBCUTANEOUS at 09:12

## 2023-12-07 RX ADMIN — SEVELAMER CARBONATE 1600 MG: 800 TABLET, FILM COATED ORAL at 08:12

## 2023-12-07 RX ADMIN — CARVEDILOL 6.25 MG: 3.12 TABLET, FILM COATED ORAL at 08:12

## 2023-12-07 RX ADMIN — PANTOPRAZOLE SODIUM 40 MG: 40 INJECTION, POWDER, FOR SOLUTION INTRAVENOUS at 09:12

## 2023-12-07 RX ADMIN — MUPIROCIN: 20 OINTMENT TOPICAL at 10:12

## 2023-12-07 RX ADMIN — INSULIN ASPART 1 UNITS: 100 INJECTION, SOLUTION INTRAVENOUS; SUBCUTANEOUS at 09:12

## 2023-12-07 RX ADMIN — LEVOTHYROXINE SODIUM 125 MCG: 0.1 TABLET ORAL at 05:12

## 2023-12-07 RX ADMIN — FUROSEMIDE 40 MG: 20 TABLET ORAL at 08:12

## 2023-12-07 RX ADMIN — CLONAZEPAM 0.5 MG: 0.5 TABLET ORAL at 09:12

## 2023-12-07 RX ADMIN — IPRATROPIUM BROMIDE AND ALBUTEROL SULFATE 3 ML: .5; 3 SOLUTION RESPIRATORY (INHALATION) at 07:12

## 2023-12-07 RX ADMIN — MUPIROCIN: 20 OINTMENT TOPICAL at 09:12

## 2023-12-07 NOTE — CONSULTS
Nephrology Consultation    Date of Consultation: 12/7/23    Consultation Requested By: Demond Molina MD    Reason for Consultation: ESRD    History of Present Illness:  This is a 78 y.o. female with PMH of ESRD (T/T/S HD), COPD, DM, HLD, HTN, CAD, Afib who presents to [facility] with complaint of melenic stools x3 days as well as generalized abdominal pain and back pain. Has had this in the past with workup including EGD at [facility] . FOBT was positive and GI was consulted, appears at this time with plan to pursue outpatient workup. Hemoglobin has been stable. Patient's last dialysis was Tuesday 12/5/23. Patient does not report increase in swelling, shortness of breath, or chest pain. Labs this admission do not indicate any significant acidosis or electrolyte derangements. Nephrology consulted for ESRD and dialysis needs.     Review of patient's allergies indicates:   Allergen Reactions    Lisinopril Swelling    Azithromycin      Other reaction(s): tongue/facial swelling    Baclofen      Other reaction(s): tongue/facial swelling    Doxycycline      Other reaction(s): Angioedema       Review of systems: 12 point review of systems conducted, negative except as stated in the HPI.     Past Medical History:   Past Medical History:   Diagnosis Date    CKD (chronic kidney disease) requiring chronic dialysis     COPD (chronic obstructive pulmonary disease)     Diabetes mellitus     Hyperlipidemia     Hypertension     Renal insufficiency     Thyroid disease        Procedure History:   Past Surgical History:   Procedure Laterality Date    AV FISTULA PLACEMENT Left     CARDIAC CATHETERIZATION      CARDIAC VALVE REPLACEMENT      COLONOSCOPY      CORONARY ARTERY BYPASS GRAFT      EGD, WITH HEMORRHAGE CONTROL  03/24/2023    Procedure: EGD,WITH HEMORRHAGE CONTROL;  Surgeon: Go Le MD;  Location: [facility] ENDOSCOPY;  Service: Gastroenterology;;    EGD, WITH HEMORRHAGE CONTROL  04/06/2023    Procedure: EGD,WITH HEMORRHAGE CONTROL;   Surgeon: Ayden Francois MD;  Location: [facility] ENDOSCOPY;  Service: Gastroenterology;;    ESOPHAGOGASTRODUODENOSCOPY      ESOPHAGOGASTRODUODENOSCOPY N/A 02/17/2023    Procedure: EGD +/- VCE PLACEMENT;  Surgeon: Morgan Gardner MD;  Location: [facility] ENDOSCOPY;  Service: Gastroenterology;  Laterality: N/A;    ESOPHAGOGASTRODUODENOSCOPY N/A 03/24/2023    Procedure: EGD;  Surgeon: Go Le MD;  Location: [facility] ENDOSCOPY;  Service: Gastroenterology;  Laterality: N/A;  with push    ESOPHAGOGASTRODUODENOSCOPY N/A 04/06/2023    Procedure: EGD;  Surgeon: Ayden Francois MD;  Location: [facility] ENDOSCOPY;  Service: Gastroenterology;  Laterality: N/A;  with push    ESOPHAGOGASTRODUODENOSCOPY N/A 06/16/2023    Procedure: EGD W/ PUSH;  Surgeon: Morgan Gardner MD;  Location: [facility] ENDOSCOPY;  Service: Gastroenterology;  Laterality: N/A;    EYE SURGERY      POLYPECTOMY      SMALL BOWEL ENTEROSCOPY Left 01/20/2023    Procedure: ENTEROSCOPY;  Surgeon: Lexi Scales MD;  Location: [facility] ENDOSCOPY;  Service: Gastroenterology;  Laterality: Left;    THYROIDECTOMY      TUBAL LIGATION         Family History: family history includes Hypertension in her father, mother, sister, and sister.    Social History:  reports that she has quit smoking. Her smoking use included cigarettes. She has a 30.0 pack-year smoking history. She has never used smokeless tobacco. She reports that she does not currently use alcohol. She reports that she does not use drugs.    Home Medications:   Medications Prior to Admission   Medication Sig Dispense Refill Last Dose    albuterol (VENTOLIN HFA) 90 mcg/actuation inhaler Inhale 2 puffs into the lungs every 6 (six) hours as needed for Wheezing. Rescue 18 g 1 12/6/2023    albuterol-ipratropium (DUO-NEB) 2.5 mg-0.5 mg/3 mL nebulizer solution Take 3 mLs by nebulization every 6 (six) hours as needed for Wheezing or Shortness of Breath. Rescue 75 mL 0 12/6/2023    amLODIPine  "(NORVASC) 5 MG tablet Take 1 tablet (5 mg total) by mouth once daily. 30 tablet 11 12/6/2023    apixaban (ELIQUIS) 5 mg Tab Take 1 tablet (5 mg total) by mouth 2 (two) times daily. 60 tablet 11 12/6/2023    azithromycin (Z-ELIECER) 250 MG tablet Take by mouth.   12/6/2023    BASAGLAR KWIKPEN U-100 INSULIN glargine 100 units/mL SubQ pen Inject 30 Units into the skin Daily. 27 mL 2 12/6/2023    BD ULTRA-FINE MINI PEN NEEDLE 31 gauge x 3/16" Ndle Inject into the skin 2 (two) times daily.   12/6/2023    BELSOMRA 5 mg Tab Take 1 tablet by mouth every evening.   12/6/2023    BENADRYL 25 mg capsule Take 50 mg by mouth nightly.   12/6/2023    carvediloL (COREG) 6.25 MG tablet Take 1 tablet (6.25 mg total) by mouth 2 (two) times daily. 180 tablet 3 12/6/2023    clonazePAM (KLONOPIN) 0.5 MG tablet Take 0.5 mg by mouth every evening.   12/6/2023    DIALYVITE 100-1 mg Tab Take 1 tablet by mouth once daily.   12/6/2023    DULoxetine (CYMBALTA) 30 MG capsule Take 30 mg by mouth once daily.   12/6/2023    fluticasone furoate-vilanteroL (BREO) 100-25 mcg/dose diskus inhaler Inhale 1 puff into the lungs once daily. 90 each 2 12/6/2023    furosemide (LASIX) 40 MG tablet Take 1 tablet (40 mg total) by mouth once daily. 90 tablet 3 12/6/2023    glipiZIDE (GLUCOTROL) 10 MG tablet Take 1 tablet (10 mg total) by mouth daily with breakfast. 90 tablet 2 12/6/2023    INVEGA SUSTENNA 156 mg/mL Syrg injection Inject into the muscle.   12/6/2023    L-METHYLFOLATE 15 mg Tab Take 1 tablet by mouth.   12/6/2023    lactulose (CHRONULAC) 10 gram/15 mL solution Take 30 mLs by mouth.   12/6/2023    levothyroxine (SYNTHROID) 125 MCG tablet Take 1 tablet (125 mcg total) by mouth before breakfast. 90 tablet 2 12/6/2023    ONETOUCH DELICA PLUS LANCET 30 gauge Misc 1 lancet by Other route once daily. 100 each 2 12/6/2023    ONETOUCH ULTRA TEST Strp 1 strip by Other route once daily. 200 strip 2 12/6/2023    pantoprazole (PROTONIX) 40 MG tablet Take 40 mg by " "mouth 2 (two) times daily.   12/6/2023    pen needle, diabetic 31 gauge x 5/16" Ndle 2 Units by Misc.(Non-Drug; Combo Route) route 2 (two) times a day. Patient to check blood sugars twice daily (morning and night) 200 each 2 12/6/2023    rosuvastatin (CRESTOR) 40 MG Tab Take 1 tablet (40 mg total) by mouth once daily. 90 tablet 3 12/6/2023    sevelamer carbonate (RENVELA) 800 mg Tab Take 2 tablets (1,600 mg total) by mouth 3 (three) times daily. 90 tablet 0 12/6/2023    tiotropium (SPIRIVA WITH HANDIHALER) 18 mcg inhalation capsule Inhale 1 capsule (18 mcg total) into the lungs Daily. 90 capsule 2 12/6/2023       Inpatient Medications:     Current Facility-Administered Medications:     albuterol-ipratropium 2.5 mg-0.5 mg/3 mL nebulizer solution 3 mL, 3 mL, Nebulization, Q6H PRN, Demond Molina MD, 3 mL at 12/07/23 0714    amLODIPine tablet 5 mg, 5 mg, Oral, Daily, Demond Molina MD, 5 mg at 12/07/23 0825    carvediloL tablet 6.25 mg, 6.25 mg, Oral, BID, Demond Molina MD, 6.25 mg at 12/07/23 0825    clonazePAM tablet 0.5 mg, 0.5 mg, Oral, QHS, Demond Molina MD, 0.5 mg at 12/06/23 2108    dextrose 10% bolus 125 mL 125 mL, 12.5 g, Intravenous, PRN, Demond Molina MD    dextrose 10% bolus 125 mL 125 mL, 12.5 g, Intravenous, PRN, Demond Molina MD    dextrose 10% bolus 250 mL 250 mL, 25 g, Intravenous, PRN, Demond Molina MD    dextrose 10% bolus 250 mL 250 mL, 25 g, Intravenous, PRN, Demond Molina MD    epoetin alexis-epbx injection 10,000 Units, 10,000 Units, Subcutaneous, Once, Yaz Cheng MD    glucagon (human recombinant) injection 1 mg, 1 mg, Intramuscular, PRN, Demond Molina MD    glucagon (human recombinant) injection 1 mg, 1 mg, Intramuscular, PRN, Demond Molina MD    glucose chewable tablet 16 g, 16 g, Oral, PRN, Demond Molina MD    glucose chewable tablet 24 g, 24 g, Oral, PRN, Demond Molina MD    insulin aspart U-100 injection 0-5 Units, 0-5 Units, Subcutaneous, Q6H PRN, Jesse, " Demond STREETER MD    insulin detemir U-100 injection 20 Units, 20 Units, Subcutaneous, QHS, Demond Molina MD    [START ON 12/8/2023] levoFLOXacin tablet 250 mg, 250 mg, Oral, Every other day, Ghada Lainez MD    levothyroxine tablet 125 mcg, 125 mcg, Oral, Before breakfast, Demond Molina MD, 125 mcg at 12/07/23 0556    mupirocin 2 % ointment, , Nasal, BID, Yaz Cheng MD    naloxone 0.4 mg/mL injection 0.02 mg, 0.02 mg, Intravenous, PRN, Demond Molina MD    octreotide (SANDOSTATIN) 500 mcg in sodium chloride 0.9% 100 mL infusion, 50 mcg/hr, Intravenous, Continuous, Demond Molina MD, Last Rate: 10 mL/hr at 12/07/23 0634, 50 mcg/hr at 12/07/23 0634    ondansetron injection 4 mg, 4 mg, Intravenous, Q8H PRN, Demond Molina MD    pantoprazole injection 40 mg, 40 mg, Intravenous, Daily, Demond Molina MD, 40 mg at 12/07/23 0908    sodium chloride 0.9% flush 10 mL, 10 mL, Intravenous, Q12H PRN, Demond Molina MD     Laboratory Data:   Recent Results (from the past 24 hour(s))   Hemoglobin A1c if not done in past 3 months    Collection Time: 12/06/23 12:59 PM   Result Value Ref Range    Hemoglobin A1c 6.7 <=7.0 %    Estimated Average Glucose 145.6 mg/dL   Hemoglobin and Hematocrit    Collection Time: 12/06/23  3:43 PM   Result Value Ref Range    Hgb 9.8 (L) 12.0 - 16.0 g/dL    Hct 33.7 (L) 37.0 - 47.0 %   Light Green Top Hold    Collection Time: 12/06/23  3:57 PM   Result Value Ref Range    Extra Tube Hold for add-ons.    Urinalysis, Reflex to Urine Culture    Collection Time: 12/06/23  6:54 PM    Specimen: Urine   Result Value Ref Range    Color, UA Yellow Yellow, Light-Yellow, Dark Yellow, Susan, Straw    Appearance, UA Turbid (A) Clear    Specific Gravity, UA 1.012 1.005 - 1.030    pH, UA 5.0 5.0 - 8.5    Protein, UA Trace (A) Negative    Glucose, UA Normal Negative, Normal    Ketones, UA Negative Negative    Blood, UA Trace (A) Negative    Bilirubin, UA Negative Negative    Urobilinogen, UA Normal 0.2,  1.0, Normal    Nitrites, UA Negative Negative    Leukocyte Esterase,  (A) Negative    WBC, UA 11-20 (A) None Seen, 0-2, 3-5, 0-5 /HPF    Bacteria, UA Many (A) None Seen /HPF    Squamous Epithelial Cells, UA Moderate (A) None Seen /HPF    Mucous, UA Trace (A) None Seen /LPF    Hyaline Casts, UA None Seen None Seen /lpf    Uric Acid Crystals, UA Trace (A) None Seen /HPF    RBC, UA 0-5 None Seen, 0-2, 3-5, 0-5 /HPF   Drug Screen, Urine    Collection Time: 12/06/23  6:54 PM   Result Value Ref Range    Amphetamines, Urine Negative Negative    Barbituates, Urine Negative Negative    Benzodiazepine, Urine Negative Negative    Cannabinoids, Urine Negative Negative    Cocaine, Urine Negative Negative    Fentanyl, Urine Negative Negative    MDMA, Urine Negative Negative    Opiates, Urine Negative Negative    Phencyclidine, Urine Negative Negative    pH, Urine 5.0 3.0 - 11.0   Urine culture    Collection Time: 12/06/23  6:54 PM    Specimen: Urine   Result Value Ref Range    Urine Culture No Growth At 24 Hours    POCT glucose    Collection Time: 12/06/23  8:55 PM   Result Value Ref Range    POCT Glucose 44 (LL) 70 - 110 mg/dL   Hemoglobin and Hematocrit    Collection Time: 12/06/23  9:52 PM   Result Value Ref Range    Hgb 9.0 (L) 12.0 - 16.0 g/dL    Hct 31.2 (L) 37.0 - 47.0 %   POCT glucose    Collection Time: 12/06/23 10:20 PM   Result Value Ref Range    POCT Glucose 143 (H) 70 - 110 mg/dL   Comprehensive Metabolic Panel    Collection Time: 12/07/23  5:27 AM   Result Value Ref Range    Sodium Level 135 (L) 136 - 145 mmol/L    Potassium Level 4.5 3.5 - 5.1 mmol/L    Chloride 103 98 - 107 mmol/L    Carbon Dioxide 26 23 - 31 mmol/L    Glucose Level 158 (H) 82 - 115 mg/dL    Blood Urea Nitrogen 41.7 (H) 9.8 - 20.1 mg/dL    Creatinine 4.46 (H) 0.55 - 1.02 mg/dL    Calcium Level Total 8.7 8.4 - 10.2 mg/dL    Protein Total 6.2 5.8 - 7.6 gm/dL    Albumin Level 3.0 (L) 3.4 - 4.8 g/dL    Globulin 3.2 2.4 - 3.5 gm/dL     "Albumin/Globulin Ratio 0.9 (L) 1.1 - 2.0 ratio    Bilirubin Total 0.4 <=1.5 mg/dL    Alkaline Phosphatase 87 40 - 150 unit/L    Alanine Aminotransferase 35 0 - 55 unit/L    Aspartate Aminotransferase 28 5 - 34 unit/L    eGFR 10 mls/min/1.73/m2   CBC with Differential    Collection Time: 12/07/23  5:27 AM   Result Value Ref Range    WBC 4.96 4.50 - 11.50 x10(3)/mcL    RBC 3.63 (L) 4.20 - 5.40 x10(6)/mcL    Hgb 8.8 (L) 12.0 - 16.0 g/dL    Hct 30.8 (L) 37.0 - 47.0 %    MCV 84.8 80.0 - 94.0 fL    MCH 24.2 (L) 27.0 - 31.0 pg    MCHC 28.6 (L) 33.0 - 36.0 g/dL    RDW 19.9 (H) 11.5 - 17.0 %    Platelet 129 (L) 130 - 400 x10(3)/mcL    MPV 11.1 (H) 7.4 - 10.4 fL    IPF 5.8 0.9 - 11.2 %    Neut % 61.7 %    Lymph % 21.0 %    Mono % 14.3 %    Eos % 2.2 %    Basophil % 0.6 %    Lymph # 1.04 0.6 - 4.6 x10(3)/mcL    Neut # 3.06 2.1 - 9.2 x10(3)/mcL    Mono # 0.71 0.1 - 1.3 x10(3)/mcL    Eos # 0.11 0 - 0.9 x10(3)/mcL    Baso # 0.03 <=0.2 x10(3)/mcL    IG# 0.01 0 - 0.04 x10(3)/mcL    IG% 0.2 %    NRBC% 0.0 %   POCT glucose    Collection Time: 12/07/23  6:12 AM   Result Value Ref Range    POCT Glucose 149 (H) 70 - 110 mg/dL       Imaging:  No orders to display       Physical Exam:   /72   Pulse 95   Temp 98 °F (36.7 °C) (Oral)   Resp 18   Ht 5' 3" (1.6 m)   Wt 88 kg (194 lb 0.1 oz)   SpO2 99%   BMI 34.37 kg/m²  Body mass index is 34.37 kg/m².    General appearance: Patient is in no acute distress.  HEENT: PERRLA, EOMI, no scleral icterus, no JVD. Neck is supple.  Chest: Clear to auscultation bilaterally, normal respiratory effort  Heart: Regular rate, irregularly irregular rhythm  Abdomen: Soft, nontender, nondistended.  Extremities: Trace pitting edema bilateral lower extremities. LUE AVF present with palpable thrill  Skin: Warm and dry  Neuro: No focal deficits.     Impression:     ESRD: Will dialyze today, continue T/Th/S schedule while inpatient  Anemia: Will give EPO with dialysis today  Melena/GI bleed: GI " following, transfuse for Hb<7    Rest of care per primary.    Thank you very much for your consultation. Will continue to follow.    Morgan Mcnair MD  \[facility]\"" Internal Medicine, PGY3  [facility] Nephrology

## 2023-12-07 NOTE — PLAN OF CARE
Problem: Adult Inpatient Plan of Care  Goal: Plan of Care Review  Outcome: Ongoing, Progressing  Flowsheets (Taken 12/7/2023 0030)  Plan of Care Reviewed With: patient     Problem: Diabetes Comorbidity  Goal: Blood Glucose Level Within Targeted Range  Outcome: Ongoing, Progressing  Intervention: Monitor and Manage Glycemia  Flowsheets (Taken 12/7/2023 0030)  Glycemic Management: blood glucose monitored

## 2023-12-07 NOTE — NURSING
Reported Bradycardia rhythm to Medical team on the unit. MD ordered to hold Coreg and a stat EKG.

## 2023-12-07 NOTE — NURSING
12/07/23 1745   Post-Hemodialysis Assessment   Rinseback Volume (mL) 250 mL   Blood Volume Processed (Liters) 71 L   Dialyzer Clearance Clear   Duration of Treatment 180 minutes   Total UF (mL) 1000 mL   Patient Response to Treatment Tolerated well   Post-Hemodialysis Comments Tx ended, Pt reinfused, Hemostasis achieved.

## 2023-12-07 NOTE — PLAN OF CARE
Problem: Adult Inpatient Plan of Care  Goal: Plan of Care Review  Outcome: Ongoing, Progressing  Goal: Patient-Specific Goal (Individualized)  Outcome: Ongoing, Progressing  Goal: Absence of Hospital-Acquired Illness or Injury  Outcome: Ongoing, Progressing  Goal: Optimal Comfort and Wellbeing  Outcome: Ongoing, Progressing  Goal: Readiness for Transition of Care  Outcome: Ongoing, Progressing     Problem: Diabetes Comorbidity  Goal: Blood Glucose Level Within Targeted Range  Outcome: Ongoing, Progressing     Problem: Adjustment to Illness (Gastrointestinal Bleeding)  Goal: Optimal Coping with Acute Illness  Outcome: Ongoing, Progressing     Problem: Bleeding (Gastrointestinal Bleeding)  Goal: Hemostasis  Outcome: Ongoing, Progressing     Problem: Device-Related Complication Risk (Hemodialysis)  Goal: Safe, Effective Therapy Delivery  Outcome: Ongoing, Progressing     Problem: Hemodynamic Instability (Hemodialysis)  Goal: Effective Tissue Perfusion  Outcome: Ongoing, Progressing     Problem: Infection (Hemodialysis)  Goal: Absence of Infection Signs and Symptoms  Outcome: Ongoing, Progressing

## 2023-12-07 NOTE — PROGRESS NOTES
[facility] Medicine Wards History & Physical Note     Resident Team: [facility] Medicine List 2  Attending Physician: Florentin Keith MD  Resident: Demond Molina MD  Intern: Jose Kat MD     Date of Admit: 12/6/2023    Chief Complaint     Melena, Cough, and Back Pain (Dialysis pt w report of black stool w lower back pain and cough x 3 days.  Denies ABD PAIN, NO CP OR SOB REPORTED. HR 51 EKG OBTAINED. )      Subjective:      History of Present Illness:  Rosette Madrid is a 78 y.o. American female with a history of ESRD on Monday Wednesday Friday schedule, paroxysmal AFib on Eliquis, CAD with CABG x4, HFpEF, COPD gold stage II, hypertension, insulin-dependent type 2 diabetes, hypothyroidism, schizophrenia who presented to [facility] ED with 3 day history multiple formed melanotic stools, fatigue, and generalized abdominal pain, and back pain.  Of note, she has had multiple EGDs within the last year with last EGD 06/2023 which showed gastric angiodysplasia and 5 mm duodenal polyp which was not removed at the time due to being on anticoagulants.  She currently denies any chest pain, palpitations, extremity edema, dizziness or lightheadedness.     In the ED, lab significant for anemia, downtrending from previous.  Hemoccult test was positive.  She was administered IV Protonix, type and screened.  Internal Medicine service was consulted for evaluation of GI bleed.     Interval Hx:  Bradycardia, asymptomatic.  No further bloody stools overnight. Overall feels well today. Patient has no complaints.  Tolerating p.o.    Past Medical History:  Past Medical History:   Diagnosis Date    CKD (chronic kidney disease) requiring chronic dialysis     COPD (chronic obstructive pulmonary disease)     Diabetes mellitus     Hyperlipidemia     Hypertension     Renal insufficiency     Thyroid disease        Past Surgical History:  Past Surgical History:   Procedure Laterality Date    AV FISTULA PLACEMENT Left     CARDIAC CATHETERIZATION      CARDIAC  VALVE REPLACEMENT      COLONOSCOPY      CORONARY ARTERY BYPASS GRAFT      EGD, WITH HEMORRHAGE CONTROL  03/24/2023    Procedure: EGD,WITH HEMORRHAGE CONTROL;  Surgeon: Go Le MD;  Location: [facility] ENDOSCOPY;  Service: Gastroenterology;;    EGD, WITH HEMORRHAGE CONTROL  04/06/2023    Procedure: EGD,WITH HEMORRHAGE CONTROL;  Surgeon: Ayden Francois MD;  Location: [facility] ENDOSCOPY;  Service: Gastroenterology;;    ESOPHAGOGASTRODUODENOSCOPY      ESOPHAGOGASTRODUODENOSCOPY N/A 02/17/2023    Procedure: EGD +/- VCE PLACEMENT;  Surgeon: Morgan Gardner MD;  Location: [facility] ENDOSCOPY;  Service: Gastroenterology;  Laterality: N/A;    ESOPHAGOGASTRODUODENOSCOPY N/A 03/24/2023    Procedure: EGD;  Surgeon: Go Le MD;  Location: [facility] ENDOSCOPY;  Service: Gastroenterology;  Laterality: N/A;  with push    ESOPHAGOGASTRODUODENOSCOPY N/A 04/06/2023    Procedure: EGD;  Surgeon: Ayden Francois MD;  Location: [facility] ENDOSCOPY;  Service: Gastroenterology;  Laterality: N/A;  with push    ESOPHAGOGASTRODUODENOSCOPY N/A 06/16/2023    Procedure: EGD W/ PUSH;  Surgeon: Morgan Gardner MD;  Location: [facility] ENDOSCOPY;  Service: Gastroenterology;  Laterality: N/A;    EYE SURGERY      POLYPECTOMY      SMALL BOWEL ENTEROSCOPY Left 01/20/2023    Procedure: ENTEROSCOPY;  Surgeon: Lexi Scales MD;  Location: [facility] ENDOSCOPY;  Service: Gastroenterology;  Laterality: Left;    THYROIDECTOMY      TUBAL LIGATION         Family History:  Family History   Problem Relation Age of Onset    Hypertension Mother     Hypertension Father     Hypertension Sister     Hypertension Sister        Social History:  Social History     Tobacco Use    Smoking status: Former     Current packs/day: 2.00     Average packs/day: 2.0 packs/day for 15.0 years (30.0 ttl pk-yrs)     Types: Cigarettes    Smokeless tobacco: Never    Tobacco comments:     Smokes 3 cigarettes a day   Substance Use Topics    Alcohol use: Not  "Currently     Comment: 1 glass every 2-3 month    Drug use: Never       Allergies:  Review of patient's allergies indicates:   Allergen Reactions    Lisinopril Swelling    Azithromycin      Other reaction(s): tongue/facial swelling    Baclofen      Other reaction(s): tongue/facial swelling    Doxycycline      Other reaction(s): Angioedema       Home Medications:  Prior to Admission medications    Medication Sig Start Date End Date Taking? Authorizing Provider   albuterol (VENTOLIN HFA) 90 mcg/actuation inhaler Inhale 2 puffs into the lungs every 6 (six) hours as needed for Wheezing. Rescue 9/16/23  Yes Margarita Dior FNP   albuterol-ipratropium (DUO-NEB) 2.5 mg-0.5 mg/3 mL nebulizer solution Take 3 mLs by nebulization every 6 (six) hours as needed for Wheezing or Shortness of Breath. Rescue 12/19/22  Yes Margarita Dior FNP   amLODIPine (NORVASC) 5 MG tablet Take 1 tablet (5 mg total) by mouth once daily. 11/29/23 11/28/24 Yes Zakiya Torres PA-C   apixaban (ELIQUIS) 5 mg Tab Take 1 tablet (5 mg total) by mouth 2 (two) times daily. 11/29/23 11/28/24 Yes Zakiya Torres PA-C   azithromycin (Z-ELIECER) 250 MG tablet Take by mouth. 11/23/23  Yes Provider, Historical   BASAGLAR KWIKPEN U-100 INSULIN glargine 100 units/mL SubQ pen Inject 30 Units into the skin Daily. 9/1/23  Yes Margarita Dior FNP   BD ULTRA-FINE MINI PEN NEEDLE 31 gauge x 3/16" Ndle Inject into the skin 2 (two) times daily. 11/22/23  Yes Provider, Historical   BELSOMRA 5 mg Tab Take 1 tablet by mouth every evening. 10/26/23  Yes Provider, Historical   BENADRYL 25 mg capsule Take 50 mg by mouth nightly. 2/23/22  Yes Provider, Historical   carvediloL (COREG) 6.25 MG tablet Take 1 tablet (6.25 mg total) by mouth 2 (two) times daily. 11/29/23 11/28/24 Yes Dauterive, Zakiya K., PA-C   clonazePAM (KLONOPIN) 0.5 MG tablet Take 0.5 mg by mouth every evening. 7/13/22  Yes Provider, Historical   DIALYVITE 100-1 mg Tab Take 1 tablet by mouth once daily. " "8/13/22  Yes Provider, Historical   DULoxetine (CYMBALTA) 30 MG capsule Take 30 mg by mouth once daily. 7/27/22  Yes Provider, Historical   fluticasone furoate-vilanteroL (BREO) 100-25 mcg/dose diskus inhaler Inhale 1 puff into the lungs once daily. 9/1/23  Yes Margarita Dior FNP   furosemide (LASIX) 40 MG tablet Take 1 tablet (40 mg total) by mouth once daily. 11/29/23 11/28/24 Yes Zakiya Torres PA-C   glipiZIDE (GLUCOTROL) 10 MG tablet Take 1 tablet (10 mg total) by mouth daily with breakfast. 9/1/23  Yes Margarita Dior FNP   INVEGA SUSTENNA 156 mg/mL Syrg injection Inject into the muscle. 8/2/22  Yes Provider, Historical   L-METHYLFOLATE 15 mg Tab Take 1 tablet by mouth. 4/26/23  Yes Provider, Historical   lactulose (CHRONULAC) 10 gram/15 mL solution Take 30 mLs by mouth. 4/18/23  Yes Provider, Historical   levothyroxine (SYNTHROID) 125 MCG tablet Take 1 tablet (125 mcg total) by mouth before breakfast. 9/1/23  Yes Margarita Dior FNP   ONETOUCH DELICA PLUS LANCET 30 gauge Misc 1 lancet by Other route once daily. 5/9/22  Yes Margarita Dior FNP   ONETOUCH ULTRA TEST Strp 1 strip by Other route once daily. 11/23/22  Yes Margarita Dior FNP   pantoprazole (PROTONIX) 40 MG tablet Take 40 mg by mouth 2 (two) times daily. 9/26/23  Yes Provider, Historical   pen needle, diabetic 31 gauge x 5/16" Ndle 2 Units by Misc.(Non-Drug; Combo Route) route 2 (two) times a day. Patient to check blood sugars twice daily (morning and night) 3/1/23  Yes Margarita Dior FNP   rosuvastatin (CRESTOR) 40 MG Tab Take 1 tablet (40 mg total) by mouth once daily. 11/29/23 11/28/24 Yes Zakiya Torres PA-C   sevelamer carbonate (RENVELA) 800 mg Tab Take 2 tablets (1,600 mg total) by mouth 3 (three) times daily. 3/25/23  Yes Noah Cowan MD   tiotropium (SPIRIVA WITH HANDIHALER) 18 mcg inhalation capsule Inhale 1 capsule (18 mcg total) into the lungs Daily. 9/1/23  Yes Margarita Dior, FNP         Review of Systems:  See " "HPI         Objective:   Last 24 Hour Vital Signs:  BP  Min: 113/62  Max: 151/75  Temp  Av °F (36.7 °C)  Min: 97.7 °F (36.5 °C)  Max: 98.1 °F (36.7 °C)  Pulse  Av.9  Min: 45  Max: 95  Resp  Av.1  Min: 18  Max: 22  SpO2  Av.8 %  Min: 93 %  Max: 100 %  Height  Av' 3" (160 cm)  Min: 5' 3" (160 cm)  Max: 5' 3" (160 cm)  Weight  Av kg (194 lb 0.1 oz)  Min: 88 kg (194 lb 0.1 oz)  Max: 88 kg (194 lb 0.1 oz)  Body mass index is 34.37 kg/m².  I/O last 3 completed shifts:  In: 363.4 [I.V.:20.4; IV Piggyback:343]  Out: -     Physical Examination:  Gen:  Lying comfortably in bed  HEENT:  Pupils equal and reactive.  Nares patent without rhinorrhea.  Oropharynx clear without oropharyngeal erythema or edema.  Poor dentition  Neck:  Neck is supple, no thyromegaly  Heart:  Bradycardia, irregular rhythm.  No visible JVD or hepatojugular reflux.  No lower extremity edema.  Lungs:  Faint diffuse expiratory wheezes  Abd:  Abdomen is soft, nontender  Extremities:  Moving all extremities  MSK:  No significant visible deformities  Neuro:  Alert and responding appropriately to questions and commands, assistance of  with answering details  Skin:  No visible wounds or rashes    Laboratory:  Most Recent Data:  CBC:   Lab Results   Component Value Date    WBC 4.96 2023    HGB 8.8 (L) 2023    HCT 30.8 (L) 2023     (L) 2023    MCV 84.8 2023    RDW 19.9 (H) 2023     WBC Differential:   Recent Labs   Lab 23  0834 23  1543 23  2152 23  0527   WBC 5.84  --   --  4.96   HGB 8.6* 9.8* 9.0* 8.8*   HCT 29.8* 33.7* 31.2* 30.8*     --   --  129*   MCV 84.7  --   --  84.8     BMP:   Lab Results   Component Value Date     (L) 2023    K 4.5 2023    CO2 26 2023    BUN 41.7 (H) 2023    CREATININE 4.46 (H) 2023    CALCIUM 8.7 2023    MG 2.00 2023    PHOS 3.8 2023     LFTs:   Lab Results   Component " Value Date    ALBUMIN 3.0 (L) 12/07/2023    BILITOT 0.4 12/07/2023    AST 28 12/07/2023    ALKPHOS 87 12/07/2023    ALT 35 12/07/2023     Coags:   Lab Results   Component Value Date    INR 1.03 04/05/2023    PROTIME 13.4 04/05/2023    PTT 30.4 04/05/2023     FLP:   Lab Results   Component Value Date    CHOL 124 09/01/2023    HDL 48 09/01/2023    TRIG 54 09/01/2023     DM:   Lab Results   Component Value Date    HGBA1C 6.7 12/06/2023    HGBA1C 5.7 09/01/2023    HGBA1C 5.0 02/27/2023    CREATININE 4.46 (H) 12/07/2023     Thyroid:   Lab Results   Component Value Date    TSH 0.197 (L) 09/01/2023      Anemia:   Lab Results   Component Value Date    IRON 113 12/06/2023    TIBC 203 (L) 12/06/2023    FERRITIN 1,897.18 (H) 12/06/2023       Lab Results   Component Value Date    WFDLARFC43 1,094 (H) 06/15/2023       Lab Results   Component Value Date    FOLATE 15.0 01/19/2023        Cardiac:   Lab Results   Component Value Date    TROPONINI 0.066 (H) 12/06/2023    .9 (H) 02/09/2023     Urinalysis:   Lab Results   Component Value Date    LABURIN No Growth At 24 Hours 12/06/2023    COLORU YELLOW 09/30/2021    PHUA 5.0 12/06/2023    CLARITYU Clear 02/14/2018    SPECGRAV 1.020 02/14/2018    NITRITE Negative 09/30/2021    KETONESU Negative 09/30/2021    UROBILINOGEN Normal 12/06/2023    WBCUA 11-20 (A) 12/06/2023       Trended Lab Data:  Recent Labs   Lab 12/06/23  0834 12/06/23  1543 12/06/23  2152 12/07/23  0527   WBC 5.84  --   --  4.96   HGB 8.6* 9.8* 9.0* 8.8*   HCT 29.8* 33.7* 31.2* 30.8*     --   --  129*   MCV 84.7  --   --  84.8   RDW 19.6*  --   --  19.9*     --   --  135*   K 4.5  --   --  4.5   CO2 28  --   --  26   BUN 37.5*  --   --  41.7*   CREATININE 4.00*  --   --  4.46*   ALBUMIN 3.1*  --   --  3.0*   BILITOT 0.4  --   --  0.4   AST 37*  --   --  28   ALKPHOS 102  --   --  87   ALT 43  --   --  35       Trended Cardiac Data:  Recent Labs   Lab 12/06/23  0834   TROPONINI 0.066*        Microbiology Data:  Microbiology Results (last 7 days)       Procedure Component Value Units Date/Time    Urine culture [[id number]] Collected: 12/06/23 1854    Order Status: Completed Specimen: Urine Updated: 12/07/23 0632     Urine Culture No Growth At 24 Hours             Other Results:  EKG (my interpretation): unchanged from previous tracings    Radiology:  Imaging Results    None          Assessment & Plan:     Acute upper GI bleed       - H/H remained overnight, no additional bloody stools       - Given IV Protonix 80 mg x 1, will continue 40 mg b.i.d.       - Octreotide drip discontinued secondary to bradycardia    AFib on Eliquis   Heart failure with a preserved ejection fraction   CAD status post CABG x4  Bradycardia       - Follows with Dr. Rogers       - EKG today shows A fib with SVR       - Last echo in 2021 with EF 55%       - Minimally elevated troponin 0.06       - Holding home Eliquis in setting of GI bleed       - Continue home Coreg and Lasix       - Octreotide drip and Coreg held due to Bradycardia    ESRD on hemodialysis       - Will consult Nephrology for regular scheduled dialysis    Insulin-dependent type 2 diabetes        - A1c 6.7 today       - Home med Basaglar 30 units at night, and Glipizide 10 mg daily       - Will give detemir 20 units plus low-dose sliding scale       - Monitor CBGs    HTN       - BP well controlled       - Home meds amlodipine 5 mg, carvedilol 6.25 mg to be continued    COPD Gold Stage 2       - Continue home Duo-Nebs q6h PRN    Schizophrenia  Depression       - Holding home Cymbalta    Hypothyroidism        - Continue home synthroid 125 mcg AM    CODE STATUS:  Full code  Access:  PIV  Antibiotics:  N/A  Diet: NPO  DVT Prophylaxis: Holding due to bleeding  GI Prophylaxis: IV protonix  Fluids: none      Disposition: Continue current care, pending GI recs, cardiac monitoring for bradycardia. Presumed discharge 12/8/23 pending course.      Demond Molina MD  [employer]  Family Medicine -3

## 2023-12-07 NOTE — PROGRESS NOTES
"Inpatient Nutrition Evaluation    Admit Date: 2023   Total duration of encounter: 1 day    Nutrition Recommendation/Prescription     Diabetic, Heart Healthy diet  Monitor Weight Daily     Nutrition Assessment     Chart Review    Reason Seen: continuous nutrition monitoring    Malnutrition Screening Tool Results   Have you recently lost weight without trying?: No  Have you been eating poorly because of a decreased appetite?: No   MST Score: 0     Diagnosis:  Anemia, Melanotic stool, Afib, HF, CAD, ESRD on HD, DM, HTN, COPD    Relevant Medical History: ESRD on HD, DM, HF, CAD, AF, Dyslipidemia, Hypothyroidism, Anemia, Colon Polyps, Schizophrenia    Nutrition-Related Medications: Octreotide, Amlodipine, Sevelamer, Lasix, Insulin, Levothyroxine    Nutrition-Related Labs:  23 -- H/H 8.8/30.8 L, Glu 158 H, Na 135 L, K 4.5, .7 H, Cr 4.4 H, Hgb A1C 6.7 H    Diet Order: Diet diabetic  Oral Supplement Order: none  Appetite/Oral Intake: good/% of meals  Factors Affecting Nutritional Intake: none identified  Food/[employer]ist/Cultural Preferences: none reported  Food Allergies: no known food allergies       Wound(s):   skin intact    Comments    23 -- Pt with good appetite, denies difficulty eating, observed 100% of breakfast consumed; no n/v, LBM  with melanotic stool; Pt reports UBW ~ 200 lb, wt loss not significant; BUN/Cr (H) - ESRD on HD, suggest low Na diet; Glu (H) -- h/o DM, continue diabetic diet     Anthropometrics    Height: 5' 3" (160 cm) Height Method: Stated  Last Weight: 88 kg (194 lb 0.1 oz) (23 0541) Weight Method: Standard Scale  BMI (Calculated): 34.4  BMI Classification: obese grade I (BMI 30-34.9)     Ideal Body Weight (IBW), Female: 115 lb                          Usual Body Weight (UBW), k.9 kg  % Usual Body Weight: 97.01     Usual Weight Provided By: patient    Wt Readings from Last 5 Encounters:   23 88 kg (194 lb 0.1 oz)   23 86.6 kg (190 lb 14.7 oz) "   11/18/23 89.8 kg (198 lb)   09/01/23 87.4 kg (192 lb 9.6 oz)   06/21/23 91.8 kg (202 lb 4.8 oz)     Weight Change(s) Since Admission:  Admit Weight: 88.3 kg (194 lb 12.4 oz) (12/06/23 0747)      Patient Education    Not applicable.    Monitoring & Evaluation     Dietitian will monitor food and beverage intake, weight change, glucose/endocrine profile, and gastrointestinal profile.  Nutrition Risk/Follow-Up: low (follow-up in 5-7 days)  Patients assigned 'low nutrition risk' status do not qualify for a full nutritional assessment but will be monitored and re-evaluated in a 5-7 day time period. Please consult if re-evaluation needed sooner.

## 2023-12-08 ENCOUNTER — ANESTHESIA EVENT (OUTPATIENT)
Dept: ENDOSCOPY | Facility: HOSPITAL | Age: 78
DRG: 377 | End: 2023-12-08
Payer: MEDICARE

## 2023-12-08 ENCOUNTER — ANESTHESIA (OUTPATIENT)
Dept: ENDOSCOPY | Facility: HOSPITAL | Age: 78
DRG: 377 | End: 2023-12-08
Payer: MEDICARE

## 2023-12-08 LAB
ALBUMIN SERPL-MCNC: 2.9 G/DL (ref 3.4–4.8)
ALBUMIN/GLOB SERPL: 0.9 RATIO (ref 1.1–2)
ALP SERPL-CCNC: 88 UNIT/L (ref 40–150)
ALT SERPL-CCNC: 26 UNIT/L (ref 0–55)
AST SERPL-CCNC: 20 UNIT/L (ref 5–34)
BACTERIA UR CULT: NORMAL
BASOPHILS # BLD AUTO: 0.03 X10(3)/MCL
BASOPHILS NFR BLD AUTO: 0.5 %
BILIRUB SERPL-MCNC: 0.3 MG/DL
BUN SERPL-MCNC: 24.8 MG/DL (ref 9.8–20.1)
CALCIUM SERPL-MCNC: 8.8 MG/DL (ref 8.4–10.2)
CHLORIDE SERPL-SCNC: 104 MMOL/L (ref 98–107)
CO2 SERPL-SCNC: 24 MMOL/L (ref 23–31)
CREAT SERPL-MCNC: 3.49 MG/DL (ref 0.55–1.02)
EOSINOPHIL # BLD AUTO: 0.11 X10(3)/MCL (ref 0–0.9)
EOSINOPHIL NFR BLD AUTO: 1.9 %
ERYTHROCYTE [DISTWIDTH] IN BLOOD BY AUTOMATED COUNT: 20.1 % (ref 11.5–17)
GFR SERPLBLD CREATININE-BSD FMLA CKD-EPI: 13 MLS/MIN/1.73/M2
GLOBULIN SER-MCNC: 3.1 GM/DL (ref 2.4–3.5)
GLUCOSE SERPL-MCNC: 90 MG/DL (ref 82–115)
HCT VFR BLD AUTO: 28 % (ref 37–47)
HGB BLD-MCNC: 8.3 G/DL (ref 12–16)
IMM GRANULOCYTES # BLD AUTO: 0.03 X10(3)/MCL (ref 0–0.04)
IMM GRANULOCYTES NFR BLD AUTO: 0.5 %
LYMPHOCYTES # BLD AUTO: 0.99 X10(3)/MCL (ref 0.6–4.6)
LYMPHOCYTES NFR BLD AUTO: 16.9 %
MCH RBC QN AUTO: 25.3 PG (ref 27–31)
MCHC RBC AUTO-ENTMCNC: 29.6 G/DL (ref 33–36)
MCV RBC AUTO: 85.4 FL (ref 80–94)
MONOCYTES # BLD AUTO: 0.66 X10(3)/MCL (ref 0.1–1.3)
MONOCYTES NFR BLD AUTO: 11.3 %
NEUTROPHILS # BLD AUTO: 4.04 X10(3)/MCL (ref 2.1–9.2)
NEUTROPHILS NFR BLD AUTO: 68.9 %
NRBC BLD AUTO-RTO: 0 %
PHOSPHATE SERPL-MCNC: 3.5 MG/DL (ref 2.3–4.7)
PLATELET # BLD AUTO: 136 X10(3)/MCL (ref 130–400)
PLATELETS.RETICULATED NFR BLD AUTO: 5.9 % (ref 0.9–11.2)
PMV BLD AUTO: 10.7 FL (ref 7.4–10.4)
POCT GLUCOSE: 111 MG/DL (ref 70–110)
POCT GLUCOSE: 131 MG/DL (ref 70–110)
POCT GLUCOSE: 254 MG/DL (ref 70–110)
POCT GLUCOSE: 83 MG/DL (ref 70–110)
POTASSIUM SERPL-SCNC: 4.3 MMOL/L (ref 3.5–5.1)
PROT SERPL-MCNC: 6 GM/DL (ref 5.8–7.6)
RBC # BLD AUTO: 3.28 X10(6)/MCL (ref 4.2–5.4)
SODIUM SERPL-SCNC: 134 MMOL/L (ref 136–145)
WBC # SPEC AUTO: 5.86 X10(3)/MCL (ref 4.5–11.5)

## 2023-12-08 PROCEDURE — 63600175 PHARM REV CODE 636 W HCPCS: Performed by: ANESTHESIOLOGY

## 2023-12-08 PROCEDURE — 63600175 PHARM REV CODE 636 W HCPCS: Performed by: NURSE ANESTHETIST, CERTIFIED REGISTERED

## 2023-12-08 PROCEDURE — 25000242 PHARM REV CODE 250 ALT 637 W/ HCPCS: Performed by: ANESTHESIOLOGY

## 2023-12-08 PROCEDURE — D9220A PRA ANESTHESIA: ICD-10-PCS | Mod: ,,, | Performed by: NURSE ANESTHETIST, CERTIFIED REGISTERED

## 2023-12-08 PROCEDURE — 82962 GLUCOSE BLOOD TEST: CPT | Performed by: INTERNAL MEDICINE

## 2023-12-08 PROCEDURE — 88305 TISSUE EXAM BY PATHOLOGIST: CPT | Mod: TC | Performed by: INTERNAL MEDICINE

## 2023-12-08 PROCEDURE — 94640 AIRWAY INHALATION TREATMENT: CPT

## 2023-12-08 PROCEDURE — 80053 COMPREHEN METABOLIC PANEL: CPT | Performed by: STUDENT IN AN ORGANIZED HEALTH CARE EDUCATION/TRAINING PROGRAM

## 2023-12-08 PROCEDURE — 27201423 OPTIME MED/SURG SUP & DEVICES STERILE SUPPLY: Performed by: INTERNAL MEDICINE

## 2023-12-08 PROCEDURE — 63600175 PHARM REV CODE 636 W HCPCS: Performed by: STUDENT IN AN ORGANIZED HEALTH CARE EDUCATION/TRAINING PROGRAM

## 2023-12-08 PROCEDURE — 43251 EGD REMOVE LESION SNARE: CPT | Performed by: INTERNAL MEDICINE

## 2023-12-08 PROCEDURE — 25000003 PHARM REV CODE 250: Performed by: INTERNAL MEDICINE

## 2023-12-08 PROCEDURE — 25000242 PHARM REV CODE 250 ALT 637 W/ HCPCS: Performed by: STUDENT IN AN ORGANIZED HEALTH CARE EDUCATION/TRAINING PROGRAM

## 2023-12-08 PROCEDURE — 96374 THER/PROPH/DIAG INJ IV PUSH: CPT | Mod: 59

## 2023-12-08 PROCEDURE — C9113 INJ PANTOPRAZOLE SODIUM, VIA: HCPCS | Performed by: STUDENT IN AN ORGANIZED HEALTH CARE EDUCATION/TRAINING PROGRAM

## 2023-12-08 PROCEDURE — 25000003 PHARM REV CODE 250: Performed by: STUDENT IN AN ORGANIZED HEALTH CARE EDUCATION/TRAINING PROGRAM

## 2023-12-08 PROCEDURE — 37000008 HC ANESTHESIA 1ST 15 MINUTES: Performed by: INTERNAL MEDICINE

## 2023-12-08 PROCEDURE — 94761 N-INVAS EAR/PLS OXIMETRY MLT: CPT

## 2023-12-08 PROCEDURE — 25000003 PHARM REV CODE 250: Performed by: NURSE ANESTHETIST, CERTIFIED REGISTERED

## 2023-12-08 PROCEDURE — D9220A PRA ANESTHESIA: Mod: ,,, | Performed by: NURSE ANESTHETIST, CERTIFIED REGISTERED

## 2023-12-08 PROCEDURE — 37000009 HC ANESTHESIA EA ADD 15 MINS: Performed by: INTERNAL MEDICINE

## 2023-12-08 PROCEDURE — 21400001 HC TELEMETRY ROOM

## 2023-12-08 PROCEDURE — 85025 COMPLETE CBC W/AUTO DIFF WBC: CPT | Performed by: STUDENT IN AN ORGANIZED HEALTH CARE EDUCATION/TRAINING PROGRAM

## 2023-12-08 RX ORDER — PROPOFOL 10 MG/ML
INJECTION, EMULSION INTRAVENOUS
Status: DISCONTINUED | OUTPATIENT
Start: 2023-12-08 | End: 2023-12-08

## 2023-12-08 RX ORDER — LIDOCAINE HYDROCHLORIDE 20 MG/ML
INJECTION INTRAVENOUS
Status: DISCONTINUED | OUTPATIENT
Start: 2023-12-08 | End: 2023-12-08

## 2023-12-08 RX ORDER — LIDOCAINE HYDROCHLORIDE 10 MG/ML
1 INJECTION, SOLUTION EPIDURAL; INFILTRATION; INTRACAUDAL; PERINEURAL ONCE
Status: CANCELLED | OUTPATIENT
Start: 2023-12-08 | End: 2023-12-08

## 2023-12-08 RX ORDER — SODIUM CHLORIDE, SODIUM LACTATE, POTASSIUM CHLORIDE, CALCIUM CHLORIDE 600; 310; 30; 20 MG/100ML; MG/100ML; MG/100ML; MG/100ML
INJECTION, SOLUTION INTRAVENOUS CONTINUOUS
Status: DISCONTINUED | OUTPATIENT
Start: 2023-12-08 | End: 2023-12-09 | Stop reason: HOSPADM

## 2023-12-08 RX ORDER — IPRATROPIUM BROMIDE AND ALBUTEROL SULFATE 2.5; .5 MG/3ML; MG/3ML
3 SOLUTION RESPIRATORY (INHALATION) ONCE
Status: COMPLETED | OUTPATIENT
Start: 2023-12-08 | End: 2023-12-08

## 2023-12-08 RX ADMIN — INSULIN ASPART 1 UNITS: 100 INJECTION, SOLUTION INTRAVENOUS; SUBCUTANEOUS at 08:12

## 2023-12-08 RX ADMIN — PANTOPRAZOLE SODIUM 40 MG: 40 INJECTION, POWDER, FOR SOLUTION INTRAVENOUS at 08:12

## 2023-12-08 RX ADMIN — SODIUM CHLORIDE, POTASSIUM CHLORIDE, SODIUM LACTATE AND CALCIUM CHLORIDE: 600; 310; 30; 20 INJECTION, SOLUTION INTRAVENOUS at 01:12

## 2023-12-08 RX ADMIN — MUPIROCIN: 20 OINTMENT TOPICAL at 08:12

## 2023-12-08 RX ADMIN — PROPOFOL 75 MG: 10 INJECTION, EMULSION INTRAVENOUS at 02:12

## 2023-12-08 RX ADMIN — PROPOFOL 25 MG: 10 INJECTION, EMULSION INTRAVENOUS at 02:12

## 2023-12-08 RX ADMIN — LEVOTHYROXINE SODIUM 125 MCG: 0.1 TABLET ORAL at 06:12

## 2023-12-08 RX ADMIN — IPRATROPIUM BROMIDE AND ALBUTEROL SULFATE 3 ML: .5; 3 SOLUTION RESPIRATORY (INHALATION) at 01:12

## 2023-12-08 RX ADMIN — INSULIN DETEMIR 10 UNITS: 100 INJECTION, SOLUTION SUBCUTANEOUS at 08:12

## 2023-12-08 RX ADMIN — LIDOCAINE HYDROCHLORIDE 50 MG: 20 INJECTION INTRAVENOUS at 02:12

## 2023-12-08 RX ADMIN — CLONAZEPAM 0.5 MG: 0.5 TABLET ORAL at 08:12

## 2023-12-08 RX ADMIN — IPRATROPIUM BROMIDE AND ALBUTEROL SULFATE 3 ML: .5; 3 SOLUTION RESPIRATORY (INHALATION) at 07:12

## 2023-12-08 RX ADMIN — AMLODIPINE BESYLATE 5 MG: 5 TABLET ORAL at 08:12

## 2023-12-08 RX ADMIN — IPRATROPIUM BROMIDE AND ALBUTEROL SULFATE 3 ML: .5; 3 SOLUTION RESPIRATORY (INHALATION) at 08:12

## 2023-12-08 NOTE — HPI
Rosette Madrid is a 78 y.o. American female with a history of ESRD on Monday Wednesday Friday schedule, paroxysmal AFib on Eliquis, CAD with CABG x4, HFpEF, COPD gold stage II, hypertension, insulin-dependent type 2 diabetes, hypothyroidism, schizophrenia who presented to [facility] ED with 3 day history multiple formed melanotic stools, fatigue, and generalized abdominal pain, and back pain.  Of note, she has had multiple EGDs within the last year with last EGD 06/2023 which showed gastric angiodysplasia and 5 mm duodenal polyp which was not removed at the time due to being on anticoagulants.  She currently denies any chest pain, palpitations, extremity edema, dizziness or lightheadedness.      In the ED, lab significant for anemia, downtrending from previous.  Hemoccult test was positive.  She was administered IV Protonix, type and screened.  Internal Medicine service was consulted for evaluation of GI bleed.

## 2023-12-08 NOTE — PLAN OF CARE
Problem: Adult Inpatient Plan of Care  Goal: Plan of Care Review  Outcome: Ongoing, Progressing  Flowsheets (Taken 12/7/2023 2241)  Plan of Care Reviewed With: patient     Problem: Diabetes Comorbidity  Goal: Blood Glucose Level Within Targeted Range  Outcome: Ongoing, Progressing  Intervention: Monitor and Manage Glycemia  Flowsheets (Taken 12/7/2023 2241)  Glycemic Management: blood glucose monitored

## 2023-12-08 NOTE — TRANSFER OF CARE
Anesthesia Transfer of Care Note    Patient: Rosette Madrid    Procedure(s) Performed: Procedure(s) (LRB):  EGD, WITH POLYPECTOMY USING SNARE (Left)    Patient location: GI    Anesthesia Type: general    Transport from OR: Transported from OR on room air with adequate spontaneous ventilation    Post pain: adequate analgesia    Post assessment: no apparent anesthetic complications and tolerated procedure well    Post vital signs: stable    Level of consciousness: sedated    Nausea/Vomiting: no nausea/vomiting    Complications: none    Transfer of care protocol was followedComments: Report to Luis DEAN      Last vitals: 141/77 P77 r24 sat 94 fm t36

## 2023-12-08 NOTE — PROGRESS NOTES
"[facility]  Nephrology Progress Note    Patient Name: Rosette Madrid  Age: 78 y.o.  : 1945  MRN: [id number]  Admission Date: 2023    Chief complaint: Melena, Cough, and Back Pain (Dialysis pt w report of black stool w lower back pain and cough x 3 days.  Denies ABD PAIN, NO CP OR SOB REPORTED. HR 51 EKG OBTAINED. )      Hospital course  Rosette Madrid is a 78 y.o. Black or  female with past medical history of end-stage renal disease (on hemodialysis  schedule), COPD, diabetes mellitus type 2, dyslipidemia, hypertension, coronary artery disease, and atrial fibrillation.  Patient presented to the emergency department complaints of abdominal and pain along with melanotic stools.  She was admitted to medicine service for further evaluation and management.  Nephrology was consulted for assistance with management of ESRD.    Subjective  Patient is resting in bed, no acute events overnight, denies complaints this morning.      Review of Systems  Respiratory: Negative for shortness of breath   Cardiovascular:  Negative for chest pain     Objective  BP (!) 148/76   Pulse 60   Temp 97.9 °F (36.6 °C) (Oral)   Resp 18   Ht 5' 3" (1.6 m)   Wt 88 kg (194 lb 0.1 oz)   SpO2 100%   BMI 34.37 kg/m²     Intake/Output Summary (Last 24 hours) at 2023 0516  Last data filed at 2023 1932  Gross per 24 hour   Intake --   Output 1001 ml   Net -1001 ml       Physical Exam  General appearance: Patient is in no acute distress.  HEENT: PERRLA, EOMI, no scleral icterus, no JVD. Neck is supple.  Chest: Respirations are unlabored. Lungs sounds are diminished to auscultation.   Heart: S1, S2.   Abdomen: Benign.  : Deferred.  Extremities: No edema, peripheral pulses are palpable.  Left forearm AV fistula with audible bruit and palpable thrill  Neuro: No focal deficits.     Medications  Scheduled Meds:   amLODIPine  5 mg Oral Daily    clonazePAM  0.5 " mg Oral QHS    insulin detemir U-100  10 Units Subcutaneous QHS    levothyroxine  125 mcg Oral Before breakfast    mupirocin   Nasal BID    pantoprazole  40 mg Intravenous Daily     Continuous Infusions:  PRN Meds:.albuterol-ipratropium, dextrose 10%, dextrose 10%, dextrose 10%, dextrose 10%, glucagon (human recombinant), glucagon (human recombinant), glucose, glucose, insulin aspart U-100, lactulose 10 gram/15 ml, naloxone, ondansetron, sodium chloride 0.9%     Imaging:    Reviewed    Laboratory Data:    Lab Results   Component Value Date    WBC 5.86 12/08/2023    HGB 8.3 (L) 12/08/2023    HCT 28.0 (L) 12/08/2023     12/08/2023     (L) 12/08/2023    K 4.3 12/08/2023    CHLORIDE 104 12/08/2023    CO2 24 12/08/2023    BUN 24.8 (H) 12/08/2023    CREATININE 3.49 (H) 12/08/2023    EGFRNORACEVR 13 12/08/2023    GLUCOSE 90 12/08/2023    CALCIUM 8.8 12/08/2023    ALKPHOS 88 12/08/2023    LABPROT 6.0 12/08/2023    ALBUMIN 2.9 (L) 12/08/2023    BILIDIR <0.1 03/22/2022    IBILI >0.10 03/22/2022    AST 20 12/08/2023    ALT 26 12/08/2023    MG 2.00 12/06/2023    PHOS 3.5 12/07/2023     Lab Results   Component Value Date    IRON 113 12/06/2023    TIBC 203 (L) 12/06/2023    TRANS 166 (L) 12/06/2023    LABIRON 56 (H) 12/06/2023    FERRITIN 1,897.18 (H) 12/06/2023    FOLATE 15.0 01/19/2023    [id number] 1,094 (H) 06/15/2023       Lab Results   Component Value Date    HEPCAB Nonreactive 11/13/2017    HEPBSURFAG Nonreactive 01/20/2023    HEPBSAB Reactive (A) 01/20/2023         Impression and plan    -End-stage renal disease   Continue hemodialysis per Tuesday, Thursday, Saturday schedule hospitalized    -Hypertension   Blood pressure readings are at goal.  Continue current antihypertensive.    -Anemia  Gastroenterology service has been consulted, patient is scheduled for push enteroscopy.  Patient told me she is not willing to accept blood transfusions due to her Bahai beliefs (patient is Taoist). Will  need to optimize anemia management, start ANALISA on dialysis days.    -COPD/diabetes mellitus type 2/coronary artery disease/atrial fibrillation/dyslipidemia  Management per primary service.    Isabelle Salazar NP  [facility] Nephrology   12/8/2023

## 2023-12-08 NOTE — PROGRESS NOTES
Gastroenterology/Hepatology Progress Note    Patient Name: Rosette Madrid  Age: 78 y.o.  : 1945  MRN: [id number]  Admission Date: 2023      Self, Aaareferral    SUBJECTIVE:      NPO for planned push enteroscopy today. She denies abdominal pain, nausea or vomiting. She had a BM this AM described as black and hard.     Review of patient's allergies indicates:   Allergen Reactions    Lisinopril Swelling    Azithromycin      Other reaction(s): tongue/facial swelling    Baclofen      Other reaction(s): tongue/facial swelling    Doxycycline      Other reaction(s): Angioedema          INPATIENT MEDICATIONS    Scheduled Meds:   amLODIPine  5 mg Oral Daily    clonazePAM  0.5 mg Oral QHS    [START ON 2023] epoetin alexis-epbx  10,000 Units Subcutaneous Every Tues, Th, Sat    insulin detemir U-100  10 Units Subcutaneous QHS    levothyroxine  125 mcg Oral Before breakfast    mupirocin   Nasal BID    pantoprazole  40 mg Intravenous Daily     Continuous Infusions:   lactated ringers 50 mL/hr at 23 1404     PRN Meds:  albuterol-ipratropium, dextrose 10%, dextrose 10%, dextrose 10%, dextrose 10%, glucagon (human recombinant), glucagon (human recombinant), glucose, glucose, insulin aspart U-100, lactulose 10 gram/15 ml, naloxone, ondansetron, sodium chloride 0.9%          Review of Systems:       Review of Systems   Constitutional:  Negative for appetite change, chills, fatigue, fever and unexpected weight change.   HENT:  Negative for trouble swallowing.    Respiratory:  Negative for shortness of breath and wheezing.    Cardiovascular:  Negative for chest pain.   Gastrointestinal:  Positive for constipation. Negative for abdominal distention, abdominal pain, anal bleeding, diarrhea, nausea, vomiting and reflux.   Genitourinary:  Negative for dysuria and hematuria.   Musculoskeletal:  Negative for back pain.   Integumentary:  Negative for color change and mole/lesion.   Neurological:  Negative for dizziness,  weakness and light-headedness.          Objective:       VITAL SIGNS: 24 HR MIN & MAX LAST    Temp  Min: 97.7 °F (36.5 °C)  Max: 98.8 °F (37.1 °C)  97.9 °F (36.6 °C)        BP  Min: 122/60  Max: 150/80  (!) 150/80     Pulse  Min: 58  Max: 73  69     Resp  Min: 16  Max: 22  (!) 22    SpO2  Min: 96 %  Max: 100 %  100 %        Physical Exam  Constitutional:       General: She is not in acute distress.     Appearance: She is obese.   HENT:      Mouth/Throat:      Mouth: Mucous membranes are moist.   Eyes:      General: No scleral icterus.     Conjunctiva/sclera: Conjunctivae normal.   Cardiovascular:      Rate and Rhythm: Normal rate.      Pulses: Normal pulses.   Pulmonary:      Effort: Pulmonary effort is normal.      Breath sounds: Wheezing present.   Abdominal:      General: Bowel sounds are normal. There is no distension.      Palpations: Abdomen is soft.      Tenderness: There is no abdominal tenderness. There is no right CVA tenderness, left CVA tenderness, guarding or rebound.   Skin:     General: Skin is warm and dry.      Coloration: Skin is not jaundiced or pale.   Neurological:      General: No focal deficit present.      Mental Status: She is alert and oriented to person, place, and time.              LABS:    Recent Labs   Lab 12/06/23  0834 12/06/23  1543 12/06/23 2152 12/07/23 0527 12/08/23 0418   WBC 5.84  --   --  4.96 5.86   HGB 8.6* 9.8* 9.0* 8.8* 8.3*   HCT 29.8* 33.7* 31.2* 30.8* 28.0*     --   --  129* 136   MCV 84.7  --   --  84.8 85.4       Recent Labs   Lab 12/06/23  0834 12/06/23  1543 12/06/23 2152 12/07/23 0527 12/08/23 0418   HGB 8.6* 9.8* 9.0* 8.8* 8.3*   HCT 29.8* 33.7* 31.2* 30.8* 28.0*        Recent Labs   Lab 12/06/23  0834 12/07/23 0527 12/08/23 0418    135* 134*   K 4.5 4.5 4.3   CO2 28 26 24   BUN 37.5* 41.7* 24.8*   CREATININE 4.00* 4.46* 3.49*   BILITOT 0.4 0.4 0.3   ALKPHOS 102 87 88   AST 37* 28 20   ALT 43 35 26   LABPROT 6.3 6.2 6.0   ALBUMIN 3.1* 3.0*  "2.9*        No results for input(s): "INR", "PROTIME", "PTT" in the last 168 hours.     Recent Labs   Lab 12/06/23  0834   IRON 113   FERRITIN 1,897.18*            IMAGING:   No results found.      Assessment / Plan:     Anemia, melanotic stools, hx of AVM and colon polyps  -Hgb on admit 8.6--> 9.8->9.0->8.8->8.3  -Monitor Hgb. Transfuse for Hgb <7.   -Monitor stools.   -Start bowel regimen.   -Plan for push enteroscopy today.   -NPO for procedure.     "

## 2023-12-08 NOTE — ANESTHESIA POSTPROCEDURE EVALUATION
Anesthesia Post Evaluation    Patient: Rosette Madrid    Procedure(s) Performed: Procedure(s) (LRB):  EGD, WITH POLYPECTOMY USING SNARE (Left)    Final Anesthesia Type: general      Patient location during evaluation: GI PACU  Patient participation: Yes- Able to Participate  Level of consciousness: sedated  Post-procedure vital signs: reviewed and stable  Pain management: adequate  Airway patency: patent    PONV status at discharge: No PONV  Anesthetic complications: no      Cardiovascular status: hemodynamically stable  Respiratory status: unassisted, spontaneous ventilation and face mask  Follow-up not needed.              Vitals Value Taken Time   /80 12/08/23 1333   Temp 36.6 °C (97.9 °F) 12/08/23 1124   Pulse 69 12/08/23 1333   Resp 22 12/08/23 1333   SpO2 100 % 12/08/23 1333         No case tracking events are documented in the log.      Pain/Marco Score: No data recorded

## 2023-12-08 NOTE — ANESTHESIA PREPROCEDURE EVALUATION
12/08/2023  Rosette Madrid is a 78 y.o., female  with  history of ESRD (on HD MWF schedule), type 2 diabetes, COPD, HFpEF, coronary artery disease, CABG x4, hypertension, AF (on Eliquis), dyslipidemia, hypothyroidism, anemia, AVM, colon polyps and schizophrenia who presented to [facility] ED on 12/6/23 with complaints of melanotic stools, fatigue, generalized abdominal pain, and back pain x 3 days  for EGD.      Pre-op Assessment    I have reviewed the Patient Summary Reports.     I have reviewed the Nursing Notes. I have reviewed the NPO Status.   I have reviewed the Medications.     Review of Systems  Anesthesia Hx:  No problems with previous Anesthesia             Denies Family Hx of Anesthesia complications.    Denies Personal Hx of Anesthesia complications.                    Hematology/Oncology:  Hematology Normal   Oncology Normal                                   EENT/Dental:  EENT/Dental Normal    Ears General/Symptom(s)    Ear Disease:  Tympanic Membrane Problem        Cardiovascular:  Cardiovascular Normal                                            Pulmonary:  Pulmonary Normal                       Renal/:  Renal/ Normal                 Hepatic/GI:  Hepatic/GI Normal                 Musculoskeletal:  Musculoskeletal Normal                Neurological:  Neurology Normal                                      Endocrine:  Endocrine Normal            Dermatological:  Skin Normal    Psych:  Psychiatric Normal                    Physical Exam  General: Well nourished    Airway:  Mallampati: III   Mouth Opening: Normal  TM Distance: Normal  Tongue: Normal  Neck ROM: Normal ROM    Dental:  Edentulous    Chest/Lungs:  Normal Respiratory Rate  expiratory wheezes    Heart:  Rate: Normal  Rhythm: Regular Rhythm        Anesthesia Plan  Type of Anesthesia, risks & benefits discussed:    Anesthesia Type: Gen Natural  Airway  Intra-op Monitoring Plan: Standard ASA Monitors  Induction:  IV  Informed Consent: Informed consent signed with the Patient and all parties understand the risks and agree with anesthesia plan.  All questions answered.   ASA Score: 4 Emergent  Day of Surgery Review of History & Physical: H&P Update referred to the surgeon/provider.I have interviewed and examined the patient. I have reviewed the patient's H&P dated: There are no significant changes. H&P completed by Anesthesiologist.    Ready For Surgery From Anesthesia Perspective.     .

## 2023-12-08 NOTE — PROGRESS NOTES
[facility] Medicine Wards Progress Note     Resident Team: [facility] Medicine List 2  Attending Physician: Florentin Keith MD  Resident: Dr. Bardales  Intern: Jose Kat MD       Subjective:      Brief HPI:  Rosette Madrid is a 78 y.o. American female with a history of ESRD on  schedule, paroxysmal AFib on Eliquis, CAD with CABG x4, HFpEF, COPD gold stage II, hypertension, insulin-dependent type 2 diabetes, hypothyroidism, schizophrenia who presented to [facility] ED with 3 day history multiple formed melanotic stools, fatigue, and generalized abdominal pain, and back pain.  Of note, she has had multiple EGDs within the last year with last EGD 2023 which showed gastric angiodysplasia and 5 mm duodenal polyp which was not removed at the time due to being on anticoagulants.  She currently denies any chest pain, palpitations, extremity edema, dizziness or lightheadedness.      In the ED, lab significant for anemia, downtrending from previous.  Hemoccult test was positive.  She was administered IV Protonix, type and screened.  Internal Medicine service was consulted for evaluation of GI bleed.       Interval History:   Doing well, no acute complaints.  Denies having any bleeding overnight.  No dizziness or chest pain      Review of Systems:  ROS completed and negative except as indicated above.     Objective:     Last 24 Hour Vital Signs:  BP  Min: 113/62  Max: 150/73  Temp  Av.2 °F (36.8 °C)  Min: 97.7 °F (36.5 °C)  Max: 98.8 °F (37.1 °C)  Pulse  Av.5  Min: 50  Max: 62  Resp  Av  Min: 16  Max: 20  SpO2  Av.6 %  Min: 93 %  Max: 100 %  I/O last 3 completed shifts:  In: -   Out: 1001 [Urine:1; Other:1000]    Physical Examination:  Physical Exam  Constitutional:       General: She is not in acute distress.     Appearance: She is not ill-appearing.   Cardiovascular:      Rate and Rhythm: Bradycardia present. Rhythm irregular.      Pulses: Normal pulses.      Heart sounds: No murmur heard.      No gallop.   Pulmonary:      Effort: No respiratory distress.      Breath sounds: Normal breath sounds. No wheezing or rales.   Abdominal:      Palpations: Abdomen is soft.      Tenderness: There is no abdominal tenderness.   Skin:     General: Skin is warm and dry.   Neurological:      Motor: No weakness.   Psychiatric:         Mood and Affect: Mood normal.         Behavior: Behavior normal.           Laboratory:  Most Recent Data:  CBC:   Lab Results   Component Value Date    WBC 5.86 12/08/2023    HGB 8.3 (L) 12/08/2023    HCT 28.0 (L) 12/08/2023     12/08/2023    MCV 85.4 12/08/2023    RDW 20.1 (H) 12/08/2023     WBC Differential:   Recent Labs   Lab 12/06/23  0834 12/06/23  1543 12/06/23  2152 12/07/23  0527 12/08/23  0418   WBC 5.84  --   --  4.96 5.86   HGB 8.6* 9.8* 9.0* 8.8* 8.3*   HCT 29.8* 33.7* 31.2* 30.8* 28.0*     --   --  129* 136   MCV 84.7  --   --  84.8 85.4     BMP:   Lab Results   Component Value Date     (L) 12/08/2023    K 4.3 12/08/2023    CO2 24 12/08/2023    BUN 24.8 (H) 12/08/2023    CREATININE 3.49 (H) 12/08/2023    CALCIUM 8.8 12/08/2023    MG 2.00 12/06/2023    PHOS 3.5 12/07/2023     LFTs:   Lab Results   Component Value Date    ALBUMIN 2.9 (L) 12/08/2023    BILITOT 0.3 12/08/2023    AST 20 12/08/2023    ALKPHOS 88 12/08/2023    ALT 26 12/08/2023     Coags:   Lab Results   Component Value Date    INR 1.03 04/05/2023    PROTIME 13.4 04/05/2023    PTT 30.4 04/05/2023     FLP:   Lab Results   Component Value Date    CHOL 124 09/01/2023    HDL 48 09/01/2023    TRIG 54 09/01/2023     DM:   Lab Results   Component Value Date    HGBA1C 6.7 12/06/2023    HGBA1C 5.7 09/01/2023    HGBA1C 5.0 02/27/2023    CREATININE 3.49 (H) 12/08/2023     Thyroid:   Lab Results   Component Value Date    TSH 0.197 (L) 09/01/2023      Anemia:   Lab Results   Component Value Date    IRON 113 12/06/2023    TIBC 203 (L) 12/06/2023    FERRITIN 1,897.18 (H) 12/06/2023       Lab Results   Component  Value Date    [id number] 1,094 (H) 06/15/2023       Lab Results   Component Value Date    FOLATE 15.0 01/19/2023        Cardiac:   Lab Results   Component Value Date    TROPONINI 0.066 (H) 12/06/2023    .9 (H) 02/09/2023         Microbiology Data:  Microbiology Results (last 7 days)       Procedure Component Value Units Date/Time    Urine culture [[id number]] Collected: 12/06/23 1854    Order Status: Completed Specimen: Urine Updated: 12/08/23 0738     Urine Culture No Significant Growth             Other Results:  EKG   Results for orders placed or performed during the hospital encounter of 12/06/23   EKG 12-lead    Collection Time: 12/07/23 11:33 AM    Narrative    Test Reason : K92.2,    Vent. Rate : 040 BPM     Atrial Rate : 053 BPM     P-R Int : 000 ms          QRS Dur : 152 ms      QT Int : 546 ms       P-R-T Axes : 000 -50 118 degrees     QTc Int : 444 ms    Atrial fibrillation with slow ventricular response  Left axis deviation  Left bundle branch block  Abnormal ECG  When compared with ECG of 06-DEC-2023 07:52,  Atrial fibrillation has replaced Sinus rhythm  Confirmed by Valerio Villegas MD ([id number]) on 12/8/2023 12:52:58 AM    Referred By: [email or web address]   SELF           Confirmed By:Valerio Villegas MD       Radiology:  Imaging Results    None         Current Medications:     Infusions:       Scheduled:   amLODIPine  5 mg Oral Daily    clonazePAM  0.5 mg Oral QHS    [START ON 12/9/2023] epoetin alexis-epbx  10,000 Units Subcutaneous Every Tues, Thurs, Sat    insulin detemir U-100  10 Units Subcutaneous QHS    levothyroxine  125 mcg Oral Before breakfast    mupirocin   Nasal BID    pantoprazole  40 mg Intravenous Daily        PRN:  albuterol-ipratropium, dextrose 10%, dextrose 10%, dextrose 10%, dextrose 10%, glucagon (human recombinant), glucagon (human recombinant), glucose, glucose, insulin aspart U-100, lactulose 10 gram/15 ml, naloxone, ondansetron, sodium chloride 0.9%      Assessment & Plan:     Acute upper GI  bleed       - H/H remained overnight, no additional bloody stools       - Given IV Protonix 80 mg x 1, will continue 40 mg b.i.d.       - Octreotide drip discontinued secondary to bradycardia  - Push enterscopy today.     AFib on Eliquis   Heart failure with a preserved ejection fraction   CAD status post CABG x4  Bradycardia       - Follows with Dr. Rogers       - EKG today shows A fib with SVR       - Last echo in 2021 with EF 55%       - Minimally elevated troponin 0.06       - Holding home Eliquis in setting of GI bleed       - Continue home Coreg and Lasix       - Octreotide drip and Coreg held due to Bradycardia     ESRD on hemodialysis       - Dialysis TTS   - Start ANALISA on dialysis days      Insulin-dependent type 2 diabetes        - A1c 6.7 today       - Home med Basaglar 30 units at night, and Glipizide 10 mg daily       - Will give detemir 20 units plus low-dose sliding scale       - Monitor CBGs     HTN       - BP well controlled       - Home meds amlodipine 5 mg, carvedilol 6.25 mg to be continued     COPD Gold Stage 2       - Continue home Duo-Nebs q6h PRN     Schizophrenia  Depression       - Holding home Cymbalta     Hypothyroidism        - Continue home synthroid 125 mcg AM     CODE STATUS:  Full code  Access:  PIV  Antibiotics:  N/A  Diet: NPO  DVT Prophylaxis: Holding due to bleeding  GI Prophylaxis: IV protonix  Fluids: none      Disposition: Continue current care, pending GI recs, cardiac monitoring for bradycardia. Presumed discharge 12/8/23 pending course.        CODE STATUS: Full  Diet:   DVT Prophylaxis:   Anticoagulants   Medication Route Frequency           Jose Kat MD  [facility], PGY-1

## 2023-12-08 NOTE — PROVATION PATIENT INSTRUCTIONS
Discharge Summary/Instructions after an Endoscopic Procedure  Patient Name: Rosette Madrid  Patient MRN: [id number]  Patient YOB: 1945 Friday, December 8, 2023  Lexi Scales MD  Dear patient,  As a result of recent federal legislation (The Federal Cures Act), you may   receive lab or pathology results from your procedure in your [facility]   account before your physician is able to contact you. Your physician or   their representative will relay the results to you with their   recommendations at their soonest availability.  Thank you,  RESTRICTIONS:  During your procedure today, you received medications for sedation.  These   medications may affect your judgment, balance and coordination.  Therefore,   for 24 hours, you have the following restrictions:   - DO NOT drive a car, operate machinery, make legal/financial decisions,   sign important papers or drink alcohol.    ACTIVITY:  Today: no heavy lifting, straining or running due to procedural   sedation/anesthesia.  The following day: return to full activity including work.  DIET:  Eat and drink normally unless instructed otherwise.     TREATMENT FOR COMMON SIDE EFFECTS:  - Mild abdominal pain, nausea, belching, bloating or excessive gas:  rest,   eat lightly and use a heating pad.  - Sore Throat: treat with throat lozenges and/or gargle with warm salt   water.  - Because air was used during the procedure, expelling large amounts of air   from your rectum or belching is normal.  - If a bowel prep was taken, you may not have a bowel movement for 1-3 days.    This is normal.  SYMPTOMS TO WATCH FOR AND REPORT TO YOUR PHYSICIAN:  1. Abdominal pain or bloating, other than gas cramps.  2. Chest pain.  3. Back pain.  4. Signs of infection such as: chills or fever occurring within 24 hours   after the procedure.  5. Rectal bleeding, which would show as bright red, maroon, or black stools.   (A tablespoon of blood from the rectum is not serious,  especially if   hemorrhoids are present.)  6. Vomiting.  7. Weakness or dizziness.  GO DIRECTLY TO THE NEAREST EMERGENCY ROOM IF YOU HAVE ANY OF THE FOLLOWING:      Difficulty breathing              Chills and/or fever over 101 F   Persistent vomiting and/or vomiting blood   Severe abdominal pain   Severe chest pain   Black, tarry stools   Bleeding- more than one tablespoon   Any other symptom or condition that you feel may need urgent attention  Your doctor recommends these additional instructions:  If any biopsies were taken, your doctors clinic will contact you in 1 to 2   weeks with any results.  - Return patient to [facility] for ongoing care.   - Advance diet as tolerated.   - Continue present medications.   - Await pathology results.  For questions, problems or results please call your physician - Lexi Scales MD at Work:  [phone], Work:  [phone].  [facility] , EMERGENCY ROOM PHONE NUMBER: [phone]  IF A COMPLICATION OR EMERGENCY SITUATION ARISES AND YOU ARE UNABLE TO REACH   YOUR PHYSICIAN - GO DIRECTLY TO THE EMERGENCY ROOM.  Lexi Scales MD  12/8/2023 5:31:33 PM  This report has been verified and signed electronically.  Dear patient,  As a result of recent federal legislation (The Federal Cures Act), you may   receive lab or pathology results from your procedure in your [facility]   account before your physician is able to contact you. Your physician or   their representative will relay the results to you with their   recommendations at their soonest availability.  Thank you,  PROVATION

## 2023-12-09 VITALS
TEMPERATURE: 97 F | RESPIRATION RATE: 18 BRPM | DIASTOLIC BLOOD PRESSURE: 62 MMHG | SYSTOLIC BLOOD PRESSURE: 148 MMHG | HEIGHT: 63 IN | OXYGEN SATURATION: 96 % | BODY MASS INDEX: 33.6 KG/M2 | HEART RATE: 68 BPM | WEIGHT: 189.63 LBS

## 2023-12-09 PROBLEM — E78.5 HYPERLIPIDEMIA: Status: ACTIVE | Noted: 2022-08-08

## 2023-12-09 PROBLEM — I50.9 CHRONIC CONGESTIVE HEART FAILURE: Status: ACTIVE | Noted: 2022-08-08

## 2023-12-09 LAB
ALBUMIN SERPL-MCNC: 2.9 G/DL (ref 3.4–4.8)
ALBUMIN/GLOB SERPL: 0.9 RATIO (ref 1.1–2)
ALP SERPL-CCNC: 103 UNIT/L (ref 40–150)
ALT SERPL-CCNC: 23 UNIT/L (ref 0–55)
AST SERPL-CCNC: 22 UNIT/L (ref 5–34)
BASOPHILS # BLD AUTO: 0.02 X10(3)/MCL
BASOPHILS NFR BLD AUTO: 0.3 %
BILIRUB SERPL-MCNC: 0.3 MG/DL
BUN SERPL-MCNC: 37.7 MG/DL (ref 9.8–20.1)
CALCIUM SERPL-MCNC: 8.7 MG/DL (ref 8.4–10.2)
CHLORIDE SERPL-SCNC: 104 MMOL/L (ref 98–107)
CO2 SERPL-SCNC: 23 MMOL/L (ref 23–31)
CREAT SERPL-MCNC: 4.39 MG/DL (ref 0.55–1.02)
EOSINOPHIL # BLD AUTO: 0.1 X10(3)/MCL (ref 0–0.9)
EOSINOPHIL NFR BLD AUTO: 1.4 %
ERYTHROCYTE [DISTWIDTH] IN BLOOD BY AUTOMATED COUNT: 20.5 % (ref 11.5–17)
GFR SERPLBLD CREATININE-BSD FMLA CKD-EPI: 10 MLS/MIN/1.73/M2
GLOBULIN SER-MCNC: 3.1 GM/DL (ref 2.4–3.5)
GLUCOSE SERPL-MCNC: 100 MG/DL (ref 82–115)
HCT VFR BLD AUTO: 27.5 % (ref 37–47)
HGB BLD-MCNC: 8.2 G/DL (ref 12–16)
IMM GRANULOCYTES # BLD AUTO: 0.04 X10(3)/MCL (ref 0–0.04)
IMM GRANULOCYTES NFR BLD AUTO: 0.6 %
LYMPHOCYTES # BLD AUTO: 0.68 X10(3)/MCL (ref 0.6–4.6)
LYMPHOCYTES NFR BLD AUTO: 9.7 %
MCH RBC QN AUTO: 25.2 PG (ref 27–31)
MCHC RBC AUTO-ENTMCNC: 29.8 G/DL (ref 33–36)
MCV RBC AUTO: 84.4 FL (ref 80–94)
MONOCYTES # BLD AUTO: 0.56 X10(3)/MCL (ref 0.1–1.3)
MONOCYTES NFR BLD AUTO: 8 %
NEUTROPHILS # BLD AUTO: 5.64 X10(3)/MCL (ref 2.1–9.2)
NEUTROPHILS NFR BLD AUTO: 80 %
NRBC BLD AUTO-RTO: 0 %
PLATELET # BLD AUTO: 143 X10(3)/MCL (ref 130–400)
PMV BLD AUTO: 10.6 FL (ref 7.4–10.4)
POCT GLUCOSE: 130 MG/DL (ref 70–110)
POCT GLUCOSE: 135 MG/DL (ref 70–110)
POCT GLUCOSE: 91 MG/DL (ref 70–110)
POTASSIUM SERPL-SCNC: 4.5 MMOL/L (ref 3.5–5.1)
PROT SERPL-MCNC: 6 GM/DL (ref 5.8–7.6)
RBC # BLD AUTO: 3.26 X10(6)/MCL (ref 4.2–5.4)
SODIUM SERPL-SCNC: 133 MMOL/L (ref 136–145)
WBC # SPEC AUTO: 7.04 X10(3)/MCL (ref 4.5–11.5)

## 2023-12-09 PROCEDURE — 25000003 PHARM REV CODE 250: Performed by: STUDENT IN AN ORGANIZED HEALTH CARE EDUCATION/TRAINING PROGRAM

## 2023-12-09 PROCEDURE — 63600175 PHARM REV CODE 636 W HCPCS: Mod: JZ | Performed by: NURSE PRACTITIONER

## 2023-12-09 PROCEDURE — 94761 N-INVAS EAR/PLS OXIMETRY MLT: CPT

## 2023-12-09 PROCEDURE — 63600175 PHARM REV CODE 636 W HCPCS: Performed by: STUDENT IN AN ORGANIZED HEALTH CARE EDUCATION/TRAINING PROGRAM

## 2023-12-09 PROCEDURE — 25000242 PHARM REV CODE 250 ALT 637 W/ HCPCS: Performed by: STUDENT IN AN ORGANIZED HEALTH CARE EDUCATION/TRAINING PROGRAM

## 2023-12-09 PROCEDURE — C9113 INJ PANTOPRAZOLE SODIUM, VIA: HCPCS | Performed by: STUDENT IN AN ORGANIZED HEALTH CARE EDUCATION/TRAINING PROGRAM

## 2023-12-09 PROCEDURE — 80053 COMPREHEN METABOLIC PANEL: CPT | Performed by: STUDENT IN AN ORGANIZED HEALTH CARE EDUCATION/TRAINING PROGRAM

## 2023-12-09 PROCEDURE — 25000003 PHARM REV CODE 250

## 2023-12-09 PROCEDURE — 80100016 HC MAINTENANCE HEMODIALYSIS

## 2023-12-09 PROCEDURE — 85025 COMPLETE CBC W/AUTO DIFF WBC: CPT | Performed by: STUDENT IN AN ORGANIZED HEALTH CARE EDUCATION/TRAINING PROGRAM

## 2023-12-09 PROCEDURE — 94640 AIRWAY INHALATION TREATMENT: CPT

## 2023-12-09 RX ORDER — CARVEDILOL 3.12 MG/1
6.25 TABLET ORAL 2 TIMES DAILY
Status: DISCONTINUED | OUTPATIENT
Start: 2023-12-09 | End: 2023-12-09 | Stop reason: HOSPADM

## 2023-12-09 RX ADMIN — IPRATROPIUM BROMIDE AND ALBUTEROL SULFATE 3 ML: .5; 3 SOLUTION RESPIRATORY (INHALATION) at 01:12

## 2023-12-09 RX ADMIN — AMLODIPINE BESYLATE 5 MG: 5 TABLET ORAL at 12:12

## 2023-12-09 RX ADMIN — PANTOPRAZOLE SODIUM 40 MG: 40 INJECTION, POWDER, FOR SOLUTION INTRAVENOUS at 12:12

## 2023-12-09 RX ADMIN — CARVEDILOL 6.25 MG: 3.12 TABLET, FILM COATED ORAL at 01:12

## 2023-12-09 RX ADMIN — LEVOTHYROXINE SODIUM 125 MCG: 0.1 TABLET ORAL at 06:12

## 2023-12-09 RX ADMIN — MUPIROCIN: 20 OINTMENT TOPICAL at 12:12

## 2023-12-09 RX ADMIN — CARVEDILOL 6.25 MG: 3.12 TABLET, FILM COATED ORAL at 12:12

## 2023-12-09 RX ADMIN — EPOETIN ALFA-EPBX 10000 UNITS: 10000 INJECTION, SOLUTION INTRAVENOUS; SUBCUTANEOUS at 09:12

## 2023-12-09 NOTE — PLAN OF CARE
Problem: Adult Inpatient Plan of Care  Goal: Plan of Care Review  Outcome: Ongoing, Progressing  Flowsheets (Taken 12/9/2023 0109)  Plan of Care Reviewed With: patient     Problem: Diabetes Comorbidity  Goal: Blood Glucose Level Within Targeted Range  Outcome: Ongoing, Progressing  Intervention: Monitor and Manage Glycemia  Flowsheets (Taken 12/9/2023 0109)  Glycemic Management: blood glucose monitored     Problem: Bleeding (Gastrointestinal Bleeding)  Goal: Hemostasis  Intervention: Manage Gastrointestinal Bleeding  Flowsheets (Taken 12/9/2023 0109)  Environmental Support: calm environment promoted

## 2023-12-09 NOTE — PROGRESS NOTES
Faculty addendum:     I have reviewed the patients history, residents  findings on physical examination, diagnosis and treatment plan. Care provided was reasonable and necessary.    Patient seen and examined this morning.    Completing hemodialysis today once finished, can be discharged.    Can restart Eliquis     Fully dictated resident discharge summary forthcoming.

## 2023-12-09 NOTE — HOSPITAL COURSE
Patient admitted for subacute GI bleed.  Gastric angiodysplasia and duodenal polyp was present from scope 06/2023.  Planned for outpatient holding of Eliquis and polypectomy.  She was started on IV Protonix and octreotide drip, developed episodes of bradycardia for which octreotide was discontinued as well as Coreg.  Push enteroscopy was performed 12/8/23 with diffuse mucosal atrophy, polypectomy performed.

## 2023-12-09 NOTE — PROGRESS NOTES
"[facility]  Nephrology Progress Note    Patient Name: Rosette Madrid  Age: 78 y.o.  : 1945  MRN: [id number]  Admission Date: 2023    Chief complaint: Melena, Cough, and Back Pain (Dialysis pt w report of black stool w lower back pain and cough x 3 days.  Denies ABD PAIN, NO CP OR SOB REPORTED. HR 51 EKG OBTAINED. )      Hospital course  Rosette Madrid is a 78 y.o. Black or  female with past medical history of end-stage renal disease (on hemodialysis  schedule), COPD, diabetes mellitus type 2, dyslipidemia, hypertension, coronary artery disease, and atrial fibrillation.  Patient presented to the emergency department complaints of abdominal and pain along with melanotic stools.  She was admitted to medicine service for further evaluation and management.  Nephrology was consulted for assistance with management of ESRD.    Subjective  Patient is resting in bed, no acute events overnight. S/op endoscopy yesterday.    Review of Systems  Respiratory: Negative for shortness of breath   Cardiovascular:  Negative for chest pain     Objective  BP (!) 142/62   Pulse 60   Temp 98.9 °F (37.2 °C) (Oral)   Resp 20   Ht 5' 3" (1.6 m)   Wt 86 kg (189 lb 9.5 oz)   SpO2 98%   BMI 33.59 kg/m²   No intake or output data in the 24 hours ending 23 0810      Physical Exam  General appearance: Patient is in no acute distress.  HEENT: PERRLA, EOMI, no scleral icterus, no JVD. Neck is supple.  Chest: Respirations are unlabored. Lungs sounds are diminished to auscultation.   Heart: S1, S2.   Abdomen: Benign.  : Deferred.  Extremities: No edema, peripheral pulses are palpable.  Left forearm AV fistula with audible bruit and palpable thrill  Neuro: No focal deficits.     Medications  Scheduled Meds:   amLODIPine  5 mg Oral Daily    carvediloL  6.25 mg Oral BID    clonazePAM  0.5 mg Oral QHS    epoetin alexis-epbx  10,000 Units Subcutaneous Every Tues, " Thurs, Sat    insulin detemir U-100  10 Units Subcutaneous QHS    levothyroxine  125 mcg Oral Before breakfast    mupirocin   Nasal BID    pantoprazole  40 mg Intravenous Daily     Continuous Infusions:   lactated ringers 50 mL/hr at 12/08/23 1426     PRN Meds:.albuterol-ipratropium, dextrose 10%, dextrose 10%, dextrose 10%, dextrose 10%, glucagon (human recombinant), glucagon (human recombinant), glucose, glucose, insulin aspart U-100, lactulose 10 gram/15 ml, naloxone, ondansetron, sodium chloride 0.9%     Imaging:    Reviewed    Laboratory Data:    Lab Results   Component Value Date    WBC 5.86 12/08/2023    HGB 8.3 (L) 12/08/2023    HCT 28.0 (L) 12/08/2023     12/08/2023     (L) 12/08/2023    K 4.3 12/08/2023    CHLORIDE 104 12/08/2023    CO2 24 12/08/2023    BUN 24.8 (H) 12/08/2023    CREATININE 3.49 (H) 12/08/2023    EGFRNORACEVR 13 12/08/2023    GLUCOSE 90 12/08/2023    CALCIUM 8.8 12/08/2023    ALKPHOS 88 12/08/2023    LABPROT 6.0 12/08/2023    ALBUMIN 2.9 (L) 12/08/2023    BILIDIR <0.1 03/22/2022    IBILI >0.10 03/22/2022    AST 20 12/08/2023    ALT 26 12/08/2023    MG 2.00 12/06/2023    PHOS 3.5 12/07/2023     Lab Results   Component Value Date    IRON 113 12/06/2023    TIBC 203 (L) 12/06/2023    TRANS 166 (L) 12/06/2023    LABIRON 56 (H) 12/06/2023    FERRITIN 1,897.18 (H) 12/06/2023    FOLATE 15.0 01/19/2023    ESPSHMMM68 1,094 (H) 06/15/2023       Lab Results   Component Value Date    HEPCAB Nonreactive 11/13/2017    HEPBSURFAG Nonreactive 01/20/2023    HEPBSAB Reactive (A) 01/20/2023         Impression and plan    -End-stage renal disease   Continue hemodialysis per Tuesday, Thursday, Saturday schedule hospitalized    -Hypertension   Blood pressure readings are at goal.  Continue current antihypertensive regimen.    -Anemia  Patient is s/ EGD on 12/8/2023 (Esophagus was normal, there was diffuse atrophic mucosa in the entire examined stomach. A single 8 mm sessile polyp was found in the  duodenal bulb. The polyp was removed with a cold snare).  Laboratory results are pending.  Will continue ANALISA on dialysis days.    -COPD/diabetes mellitus type 2/coronary artery disease/atrial fibrillation/dyslipidemia  Management per primary service.    Isabelle Salazar NP  [facility] Nephrology   12/9/2023

## 2023-12-09 NOTE — PLAN OF CARE
Problem: Adult Inpatient Plan of Care  Goal: Plan of Care Review  Outcome: Met  Goal: Patient-Specific Goal (Individualized)  Outcome: Met  Goal: Absence of Hospital-Acquired Illness or Injury  Outcome: Met  Goal: Optimal Comfort and Wellbeing  Outcome: Met  Goal: Readiness for Transition of Care  Outcome: Met     Problem: Diabetes Comorbidity  Goal: Blood Glucose Level Within Targeted Range  Outcome: Met     Problem: Bleeding (Gastrointestinal Bleeding)  Goal: Hemostasis  Outcome: Met

## 2023-12-09 NOTE — PROGRESS NOTES
12/09/23 1224   Post-Hemodialysis Assessment   Blood Volume Processed (Liters) 72 L   Dialyzer Clearance Lightly streaked   Duration of Treatment 180 minutes   Total UF (mL) 2000 mL   Patient Response to Treatment Pt tolerated tx well. Hemostasis achieved.

## 2023-12-10 NOTE — DISCHARGE SUMMARY
[facility] Discharge Summary    Admitting Physician: Florentin Keith MD  Attending Physician: No att. providers found  Date of Admit: 12/6/2023  Date of Discharge: 12/9/2023    Condition: Good  Outcome: Condition has improved and patient is now ready for discharge.  DISPOSITION: Home or Self Care          Discharge Diagnoses     Patient Active Problem List   Diagnosis    Chronic congestive heart failure    Tobacco dependence syndrome    Atherosclerosis of coronary artery bypass graft    Chronic obstructive pulmonary disease    Hypertension    Hyperlipidemia    Type 2 diabetes mellitus with chronic kidney disease on chronic dialysis, with long-term current use of insulin    ESRD (end stage renal disease) on dialysis    Depressive disorder    Class 1 obesity due to excess calories with serious comorbidity and body mass index (BMI) of 31.0 to 31.9 in adult    Vitamin D deficiency    Coronary artery disease involving native coronary artery of native heart without angina pectoris    Anemia    A-fib    Anemia due to chronic kidney disease, on chronic dialysis    Hypotension    Melena    Acquired hypothyroidism    Aortic heart murmur    Diverticulosis of colon with hemorrhage    Chronic renal impairment    Status post coronary artery bypass graft       Principal Problem:  Anemia    Consultants and Procedures     Consultants:  IP CONSULT TO GI  IP CONSULT TO NEPHROLOGY    Procedures:   Procedure(s) (LRB):  EGD, WITH POLYPECTOMY USING SNARE (Left)     Brief Admission History      Rosette Madrid is a 78 y.o. American female with a history of ESRD on Monday Wednesday Friday schedule, paroxysmal AFib on Eliquis, CAD with CABG x4, HFpEF, COPD gold stage II, hypertension, insulin-dependent type 2 diabetes, hypothyroidism, schizophrenia who presented to [facility] ED with 3 day history multiple formed melanotic stools, fatigue, and generalized abdominal pain, and back pain.  Of note, she has had multiple EGDs within the  "last year with last EGD 06/2023 which showed gastric angiodysplasia and 5 mm duodenal polyp which was not removed at the time due to being on anticoagulants.  She currently denies any chest pain, palpitations, extremity edema, dizziness or lightheadedness.      In the ED, lab significant for anemia, downtrending from previous.  Hemoccult test was positive.  She was administered IV Protonix, type and screened.  Internal Medicine service was consulted for evaluation of GI bleed.  Lesion has not episodes of bradycardia and AFib but these resolve spontaneously    Hospital Course with Pertinent Findings     H&H a 0.6 on admission.  Improved to 9.8, but began to decline to 8.3.  She was given Protonix IV 40 mg b.i.d. Start patient on octreotide drip inhaled her on anticoagulation.  Her troponin has been minimally elevated during the admission, no higher than 0.06.  We continued her home Coreg and Lasix.  Nephrology was consulted as Reglan manage her dialysis on Tuesday, Thursday, Saturday schedule.  Gastroenterology decided to do a push endoscopy on the patient.  Patient tolerated the procedure well, and a 8 mm polyp was found and the specimen was sent to pathology.  Patient was stable and there the examination.  Patient going to restart her Eliquis.    Discharge physical exam:  Vitals  BP: (!) 148/62  Temp: 96.9 °F (36.1 °C)  Temp Source: Oral  Pulse: 68  Resp: 18  SpO2: 96 %  Height: 5' 3" (160 cm)  Weight: 86 kg (189 lb 9.5 oz)    Physical Exam  Constitutional:       General: She is not in acute distress.     Appearance: She is not ill-appearing.   Cardiovascular:      Rate and Rhythm: Normal rate and regular rhythm.      Pulses: Normal pulses.      Heart sounds: No murmur heard.     No gallop.   Pulmonary:      Effort: No respiratory distress.      Breath sounds: Normal breath sounds. No wheezing or rales.   Abdominal:      General: There is no distension.      Palpations: Abdomen is soft.      Tenderness: There is no " abdominal tenderness.   Skin:     General: Skin is warm and dry.   Neurological:      Motor: No weakness.   Psychiatric:         Mood and Affect: Mood normal.         Behavior: Behavior normal.         TIME SPENT ON DISCHARGE: 60 minutes    Discharge Medications        Medication List        CONTINUE taking these medications      albuterol 90 mcg/actuation inhaler  Commonly known as: VENTOLIN HFA  Inhale 2 puffs into the lungs every 6 (six) hours as needed for Wheezing. Rescue     albuterol-ipratropium 2.5 mg-0.5 mg/3 mL nebulizer solution  Commonly known as: DUO-NEB  Take 3 mLs by nebulization every 6 (six) hours as needed for Wheezing or Shortness of Breath. Rescue     amLODIPine 5 MG tablet  Commonly known as: NORVASC  Take 1 tablet (5 mg total) by mouth once daily.     apixaban 5 mg Tab  Commonly known as: ELIQUIS  Take 1 tablet (5 mg total) by mouth 2 (two) times daily.     azithromycin 250 MG tablet  Commonly known as: Z-ELIECER     BASAGLAR KWIKPEN U-100 INSULIN glargine 100 units/mL SubQ pen  Generic drug: insulin  Inject 30 Units into the skin Daily.     BELSOMRA 5 mg Tab  Generic drug: suvorexant     BenadryL 25 mg capsule  Generic drug: diphenhydrAMINE     carvediloL 6.25 MG tablet  Commonly known as: COREG  Take 1 tablet (6.25 mg total) by mouth 2 (two) times daily.     clonazePAM 0.5 MG tablet  Commonly known as: KlonoPIN     DIALYVITE 100-1 mg Tab  Generic drug: B complex-vitamin C-folic acid     DULoxetine 30 MG capsule  Commonly known as: CYMBALTA     fluticasone furoate-vilanteroL 100-25 mcg/dose diskus inhaler  Commonly known as: BREO  Inhale 1 puff into the lungs once daily.     furosemide 40 MG tablet  Commonly known as: LASIX  Take 1 tablet (40 mg total) by mouth once daily.     glipiZIDE 10 MG tablet  Commonly known as: GLUCOTROL  Take 1 tablet (10 mg total) by mouth daily with breakfast.     INVEGA SUSTENNA 156 mg/mL Syrg injection  Generic drug: paliperidone palmitate     L-METHYLFOLATE 15 mg  "Tab  Generic drug: levomefolate calcium     lactulose 10 gram/15 mL solution  Commonly known as: CHRONULAC     levothyroxine 125 MCG tablet  Commonly known as: SYNTHROID  Take 1 tablet (125 mcg total) by mouth before breakfast.     ONETOUCH DELICA PLUS LANCET 30 gauge Misc  Generic drug: lancets  1 lancet by Other route once daily.     ONETOUCH ULTRA TEST Strp  Generic drug: blood sugar diagnostic  1 strip by Other route once daily.     pantoprazole 40 MG tablet  Commonly known as: PROTONIX     * pen needle, diabetic 31 gauge x 5/16" Ndle  2 Units by Misc.(Non-Drug; Combo Route) route 2 (two) times a day. Patient to check blood sugars twice daily (morning and night)     * BD ULTRA-FINE MINI PEN NEEDLE 31 gauge x 3/16" Ndle  Generic drug: pen needle, diabetic     rosuvastatin 40 MG Tab  Commonly known as: CRESTOR  Take 1 tablet (40 mg total) by mouth once daily.     sevelamer carbonate 800 mg Tab  Commonly known as: RENVELA  Take 2 tablets (1,600 mg total) by mouth 3 (three) times daily.     tiotropium 18 mcg inhalation capsule  Commonly known as: SPIRIVA WITH HANDIHALER  Inhale 1 capsule (18 mcg total) into the lungs Daily.           * This list has 2 medication(s) that are the same as other medications prescribed for you. Read the directions carefully, and ask your doctor or other care provider to review them with you.                  Discharge Information:     Admitted for upper GI bleed.  Her blood pressure readings are lower than goal.  Because of her [place], blood transfusions were not performed during the admission   Patient was started on ANALISA on her dialysis days  Patient to follow up with PCP.  -see details below.   Follow-up Information       [facility]. Call on 1/3/2024.    Specialty: Family Medicine  Why: PATIENT HAS APPT. WITH PCP ON 1/3/2023@2:20PM.  Contact information:  [address] [facility] [address] [phone]                             Future " Appointments   Date Time Provider Department Center   12/27/2023  7:00 AM CV [facility] EXERCISE STRESS 01 [facility] CARDIA [place]   12/27/2023  8:00 AM [facility] NM2  LB LIMIT [facility] NUCMED [place] [facility]   12/27/2023  8:15 AM [facility] NM2  LB [facility]   12/27/2023  9:30 AM [facility] ECHO 02 [facility] ECHO [place] [facility]   1/3/2024  2:20 PM Margarita Dior FNP [facility] INTMED [place] [facility]   4/1/2024 11:15 AM Zakiya Torres PA-C [facility] CARD [place]   5/22/2024 10:15 AM [facility], [facility] GYN [facility] GYN [place] [facility]         Jose Kat MD  [facility], PGY-1

## 2023-12-11 ENCOUNTER — PATIENT OUTREACH (OUTPATIENT)
Dept: ADMINISTRATIVE | Facility: CLINIC | Age: 78
End: 2023-12-11
Payer: MEDICARE

## 2023-12-11 LAB — POCT GLUCOSE: 104 MG/DL (ref 70–110)

## 2023-12-11 NOTE — PROGRESS NOTES
C3 nurse attempted to contact Rosette Madrid  for a TCC post hospital discharge follow up call. No answer. Left voicemail with callback information. The patient does not have a scheduled HOSFU appointment with Margarita Dior FNP  within 5-7 days post hospital discharge date 12/09/2023.

## 2023-12-11 NOTE — PROGRESS NOTES
C3 nurse attempted to contact Rosette Madrid  for a TCC post hospital discharge follow up call. No answer. Left voicemail with callback information. The patient does not have a scheduled HOSFU appointment with Margarita Dior FNP  within 5-7 days post hospital discharge date 12/09/2023. Appointment with Margarita Dior FNP 01/03/2024 @ 220 pm.    Message sent to PCP staff requesting they contact patient and schedule follow up appointment.

## 2023-12-12 NOTE — PROGRESS NOTES
C3 nurse attempted to contact Rosette Madrid  for a TCC post hospital discharge follow up call. No answer. Left voicemail with callback information. The patient does not have a scheduled HOSFU appointment with Margarita Dior FNP  within 5-7 days post hospital discharge date 12/09/2023. Appointment with Margarita Dior FNP 01/03/2024 @ 220 pm.

## 2023-12-13 LAB
ESTROGEN SERPL-MCNC: NORMAL PG/ML
INSULIN SERPL-ACNC: NORMAL U[IU]/ML
LAB AP CLINICAL INFORMATION: NORMAL
LAB AP GROSS DESCRIPTION: NORMAL
LAB AP REPORT FOOTNOTES: NORMAL
T3RU NFR SERPL: NORMAL %

## 2023-12-14 ENCOUNTER — OFFICE VISIT (OUTPATIENT)
Dept: INTERNAL MEDICINE | Facility: CLINIC | Age: 78
End: 2023-12-14
Payer: MEDICARE

## 2023-12-14 ENCOUNTER — TELEPHONE (OUTPATIENT)
Dept: ENDOSCOPY | Facility: HOSPITAL | Age: 78
End: 2023-12-14
Payer: MEDICARE

## 2023-12-14 VITALS
WEIGHT: 196.81 LBS | BODY MASS INDEX: 34.87 KG/M2 | SYSTOLIC BLOOD PRESSURE: 125 MMHG | HEIGHT: 63 IN | TEMPERATURE: 98 F | DIASTOLIC BLOOD PRESSURE: 78 MMHG | HEART RATE: 60 BPM | RESPIRATION RATE: 18 BRPM

## 2023-12-14 DIAGNOSIS — Z99.2 ESRD (END STAGE RENAL DISEASE) ON DIALYSIS: Chronic | ICD-10-CM

## 2023-12-14 DIAGNOSIS — Z79.4 TYPE 2 DIABETES MELLITUS WITH CHRONIC KIDNEY DISEASE ON CHRONIC DIALYSIS, WITH LONG-TERM CURRENT USE OF INSULIN: Chronic | ICD-10-CM

## 2023-12-14 DIAGNOSIS — Z99.2 TYPE 2 DIABETES MELLITUS WITH CHRONIC KIDNEY DISEASE ON CHRONIC DIALYSIS, WITH LONG-TERM CURRENT USE OF INSULIN: Chronic | ICD-10-CM

## 2023-12-14 DIAGNOSIS — D64.9 ANEMIA, UNSPECIFIED TYPE: ICD-10-CM

## 2023-12-14 DIAGNOSIS — J06.9 UPPER RESPIRATORY TRACT INFECTION, UNSPECIFIED TYPE: Primary | ICD-10-CM

## 2023-12-14 DIAGNOSIS — N18.6 TYPE 2 DIABETES MELLITUS WITH CHRONIC KIDNEY DISEASE ON CHRONIC DIALYSIS, WITH LONG-TERM CURRENT USE OF INSULIN: Chronic | ICD-10-CM

## 2023-12-14 DIAGNOSIS — E11.22 TYPE 2 DIABETES MELLITUS WITH CHRONIC KIDNEY DISEASE ON CHRONIC DIALYSIS, WITH LONG-TERM CURRENT USE OF INSULIN: Chronic | ICD-10-CM

## 2023-12-14 DIAGNOSIS — N18.6 ESRD (END STAGE RENAL DISEASE) ON DIALYSIS: Chronic | ICD-10-CM

## 2023-12-14 PROCEDURE — 99215 OFFICE O/P EST HI 40 MIN: CPT | Mod: S$PBB,,, | Performed by: NURSE PRACTITIONER

## 2023-12-14 PROCEDURE — 1101F PR PT FALLS ASSESS DOC 0-1 FALLS W/OUT INJ PAST YR: ICD-10-PCS | Mod: CPTII,,, | Performed by: NURSE PRACTITIONER

## 2023-12-14 PROCEDURE — 1101F PT FALLS ASSESS-DOCD LE1/YR: CPT | Mod: CPTII,,, | Performed by: NURSE PRACTITIONER

## 2023-12-14 PROCEDURE — 1111F DSCHRG MED/CURRENT MED MERGE: CPT | Mod: CPTII,,, | Performed by: NURSE PRACTITIONER

## 2023-12-14 PROCEDURE — 99215 PR OFFICE/OUTPT VISIT, EST, LEVL V, 40-54 MIN: ICD-10-PCS | Mod: S$PBB,,, | Performed by: NURSE PRACTITIONER

## 2023-12-14 PROCEDURE — 3078F PR MOST RECENT DIASTOLIC BLOOD PRESSURE < 80 MM HG: ICD-10-PCS | Mod: CPTII,,, | Performed by: NURSE PRACTITIONER

## 2023-12-14 PROCEDURE — 1159F PR MEDICATION LIST DOCUMENTED IN MEDICAL RECORD: ICD-10-PCS | Mod: CPTII,,, | Performed by: NURSE PRACTITIONER

## 2023-12-14 PROCEDURE — 3074F PR MOST RECENT SYSTOLIC BLOOD PRESSURE < 130 MM HG: ICD-10-PCS | Mod: CPTII,,, | Performed by: NURSE PRACTITIONER

## 2023-12-14 PROCEDURE — 3288F FALL RISK ASSESSMENT DOCD: CPT | Mod: CPTII,,, | Performed by: NURSE PRACTITIONER

## 2023-12-14 PROCEDURE — 1126F PR PAIN SEVERITY QUANTIFIED, NO PAIN PRESENT: ICD-10-PCS | Mod: CPTII,,, | Performed by: NURSE PRACTITIONER

## 2023-12-14 PROCEDURE — 1159F MED LIST DOCD IN RCRD: CPT | Mod: CPTII,,, | Performed by: NURSE PRACTITIONER

## 2023-12-14 PROCEDURE — 1160F PR REVIEW ALL MEDS BY PRESCRIBER/CLIN PHARMACIST DOCUMENTED: ICD-10-PCS | Mod: CPTII,,, | Performed by: NURSE PRACTITIONER

## 2023-12-14 PROCEDURE — 3288F PR FALLS RISK ASSESSMENT DOCUMENTED: ICD-10-PCS | Mod: CPTII,,, | Performed by: NURSE PRACTITIONER

## 2023-12-14 PROCEDURE — 1126F AMNT PAIN NOTED NONE PRSNT: CPT | Mod: CPTII,,, | Performed by: NURSE PRACTITIONER

## 2023-12-14 PROCEDURE — 3074F SYST BP LT 130 MM HG: CPT | Mod: CPTII,,, | Performed by: NURSE PRACTITIONER

## 2023-12-14 PROCEDURE — 99215 OFFICE O/P EST HI 40 MIN: CPT | Mod: PBBFAC | Performed by: NURSE PRACTITIONER

## 2023-12-14 PROCEDURE — 1160F RVW MEDS BY RX/DR IN RCRD: CPT | Mod: CPTII,,, | Performed by: NURSE PRACTITIONER

## 2023-12-14 PROCEDURE — 1111F PR DISCHARGE MEDS RECONCILED W/ CURRENT OUTPATIENT MED LIST: ICD-10-PCS | Mod: CPTII,,, | Performed by: NURSE PRACTITIONER

## 2023-12-14 PROCEDURE — 3078F DIAST BP <80 MM HG: CPT | Mod: CPTII,,, | Performed by: NURSE PRACTITIONER

## 2023-12-14 RX ORDER — INSULIN DETEMIR 100 [IU]/ML
30 INJECTION, SOLUTION SUBCUTANEOUS NIGHTLY
Qty: 27 ML | Refills: 2 | Status: ON HOLD | OUTPATIENT
Start: 2023-12-14 | End: 2024-01-09 | Stop reason: HOSPADM

## 2023-12-14 RX ORDER — LORATADINE 10 MG/1
10 TABLET ORAL DAILY
Qty: 14 TABLET | Refills: 0 | Status: SHIPPED | OUTPATIENT
Start: 2023-12-14 | End: 2024-01-29

## 2023-12-14 RX ORDER — FLUTICASONE PROPIONATE 50 MCG
1 SPRAY, SUSPENSION (ML) NASAL DAILY
Qty: 11.1 ML | Refills: 1 | Status: SHIPPED | OUTPATIENT
Start: 2023-12-14 | End: 2023-12-28

## 2023-12-14 NOTE — PROGRESS NOTES
Patient ID: [id number]     Chief Complaint: Follow-up (Post koehler visit. Denies having any bloody stools./Cold 3 weeks, runny nose and watery eyes.)    HPI:     Rosette Madrid is a 78 y.o. female with diagnosis of HTN, HLD, Diastolic Dysfunction, Class III HFpEF 55%, Hx of CABG x4, A-FIB, DM2, ESRD with HD on Tu/Thur/Sat, COPD, Hypothyroidism S/T Thyroidectomy, Tobacco Use. Patient seen in clinic today for hospital follow up. Patient last seen in clinic on 09/01/2023.  Patient presented to ED at [facility] on 12/06/2023 for dark colored stools x3 days. Patient admitted for anemia. She has had multiple EGDs within the last year with last EGD 06/2023 which showed gastric angiodysplasia and 5 mm duodenal polyp which was not removed at the time due to being on anticoagulants. She was administered IV Protonix, type and screened. Internal Medicine service was consulted for evaluation of GI bleed. Patient started on octreotide drip and held her anticoagulation. Nephrology was consulted to manage her dialysis on Tuesday, Thursday, Saturday schedule. Gastroenterology decided to do a push endoscopy on the patient. Patient tolerated the procedure well, and a 8 mm polyp was found and the specimen was sent to pathology. Restarted her Eliquis. Patient discharged home on 12/09/2023.  Today, patient states cough, congestion, watery eyes. Patient denies SOB, chest pain. Patient denies taking OTC medications for symptoms. Patient has diagnosis of COPD, currently prescribed Spiriva daily, Breo daily, Albuterol prn. Patient is a current everyday tobacco user.   Patient states taking her medications as prescribed, tolerating well.   Patient denies any other acute complaints.     Patient followed by Nephrology, Dr. Bourgeois.      Patient followed by Cardiology. Last appointment on 11/29/2023. HFpEF - EF 50-55% per Echo Dec. 2021 - NYHA Class II: Patient denies SOB; continues to have some BLE edema, but associates this with chronic venous  insufficiency. Echo Jan. 2018 showed diastolic dysfunction, EF of 66%, E/A flow reversal, aortic regurg, and moderately thickened leaflets without aortic stenosis. She is euvolemic, warm, and dry on exam today. Continue Lasix and hemodialysis as directed. Counseled on low salt diet. Will have patient complete echocardiogram given need for accurate cardiac risk stratification and for an increase in chest pain symptoms. AFIB: Denies any palpitations, lightheadedness, dizziness, or tachycardic symptoms. On Eliquis, tolerating well. Appeared to be in regular rhythm on auscultation today. EKG today with sinus bradycardia with first-degree AV block, unchanged from prior. CAD s/p CABG prior to 2005: Reports an increase in chest pain recently. She states that it typically occurs while at dialysis, however it has occurred outside dialysis at home with both rest and exertion. NSTEMI Type II (Supply/Demand) in the setting of Severe Anemia due to GI Bleed Dec. 2021 - recent hospital admission. EF 50% per Echo Dec. 2021. Stress Echo 2018 showed no significant ischemia but a moderately large fixed anterior defect of moderate intensity. Continue Aspirin, Coreg, and Crestor. Counseled on heart healthy diet. Will have patient complete nuclear stress test given significant CAD history, current symptoms, and for accurate cardiac risk stratification. Patient unable to complete treadmill stress test due to mobility issues and the need for ambulation assistance with cane or rolling walker. COPD: Management per PCP. HTN: BP at goal today-135/61. Ongoing occasional low BP with HD. Reports compliance with medications. Continuing current therapy. Will defer BP management to Nephrology. Counseled on low salt diet and exercise as tolerated. HLD: LDL at goal - 65 per labs September 2023. Continue Crestor 40mg daily. Counseled on low cholesterol diet. Diabetes: A1C - 5.7. Continue management per PCP. Tobacco use: Counseled on the importance of  smoking cessation. She smokes 3-4 cigarettes per day - states she is trying to quit on her own. ESRD on HD T//Sat: Continue nephrology management. Venous Insufficiency: s/p percutaneous endovenous Microfoam ablation with Varithena of the left GSV with Dr. Singh on May 17, 2021. Recommend compression stockings. Instructed on low salt diet. Follow up with Dr. Singh as directed- last appointment with him May 2023. Cardiac Risk Assessment: Once patient completes echocardiogram and nuclear stress test, will proceed with cardiac risk assessment. Patient has follow up appointment scheduled for 2024.     Patient followed by [facility], Dr. Marshal Espinal.     I spent 45 minutes reviewed hospital notes, patient's chart, examining patient, calling pharmacy.     Review of patient's allergies indicates:   Allergen Reactions    Lisinopril Swelling    Azithromycin      Other reaction(s): tongue/facial swelling    Baclofen      Other reaction(s): tongue/facial swelling    Doxycycline      Other reaction(s): Angioedema     Breast Cancer Screening: MMG negative on 10/28/2022  Cervical Cancer Screening: deferred due to age  Colorectal Cancer Screening: Colonoscopy on 2021 with recommended repeat in 3 years  Diabetic Eye Exam: ordered 2023  Diabetic Foot Exam: 2023  Lung Cancer Screening: refuses  Prostate Cancer Screening: N/A  AAA Screening: N/A  Osteoporosis Screenin2022, normal  Medicare Wellness: 2023  Immunizations:   Immunization History   Administered Date(s) Administered    COVID-19 Vaccine 2021    COVID-19, MRNA, LN-S, PF (MODERNA FULL 0.5 ML DOSE) 2021    COVID-19, MRNA, LN-S, PF (Pfizer) (Purple Cap) 03/10/2021    Influenza - High Dose - PF (65 years and older) 10/31/2016    Influenza - Quadrivalent - High Dose - PF (65 years and older) 11/15/2017, 2019, 2021, 10/18/2022    PPD Test 11/15/2017    Pneumococcal Polysaccharide - 23 Valent 10/31/2016      Past Surgical History:   Procedure Laterality Date    AV FISTULA PLACEMENT Left     CARDIAC CATHETERIZATION      CARDIAC VALVE REPLACEMENT      COLONOSCOPY      CORONARY ARTERY BYPASS GRAFT      EGD, WITH HEMORRHAGE CONTROL  03/24/2023    Procedure: EGD,WITH HEMORRHAGE CONTROL;  Surgeon: Go Le MD;  Location: [facility] ENDOSCOPY;  Service: Gastroenterology;;    EGD, WITH HEMORRHAGE CONTROL  04/06/2023    Procedure: EGD,WITH HEMORRHAGE CONTROL;  Surgeon: Ayden Francois MD;  Location: [facility] ENDOSCOPY;  Service: Gastroenterology;;    EGD, WITH POLYPECTOMY USING SNARE Left 12/8/2023    Procedure: EGD, WITH POLYPECTOMY USING SNARE;  Surgeon: Lexi Scales MD;  Location: [facility] ENDOSCOPY;  Service: Gastroenterology;  Laterality: Left;    ESOPHAGOGASTRODUODENOSCOPY      ESOPHAGOGASTRODUODENOSCOPY N/A 02/17/2023    Procedure: EGD +/- VCE PLACEMENT;  Surgeon: Morgan Gardner MD;  Location: [facility] ENDOSCOPY;  Service: Gastroenterology;  Laterality: N/A;    ESOPHAGOGASTRODUODENOSCOPY N/A 03/24/2023    Procedure: EGD;  Surgeon: Go Le MD;  Location: [facility] ENDOSCOPY;  Service: Gastroenterology;  Laterality: N/A;  with push    ESOPHAGOGASTRODUODENOSCOPY N/A 04/06/2023    Procedure: EGD;  Surgeon: Ayden Francois MD;  Location: [facility] ENDOSCOPY;  Service: Gastroenterology;  Laterality: N/A;  with push    ESOPHAGOGASTRODUODENOSCOPY N/A 06/16/2023    Procedure: EGD W/ PUSH;  Surgeon: Morgan Gardner MD;  Location: [facility] ENDOSCOPY;  Service: Gastroenterology;  Laterality: N/A;    EYE SURGERY      POLYPECTOMY      SMALL BOWEL ENTEROSCOPY Left 01/20/2023    Procedure: ENTEROSCOPY;  Surgeon: Lexi Scales MD;  [facility]  Service: Gastroenterology;  Laterality: Left;    THYROIDECTOMY      TUBAL LIGATION       family history includes Hypertension in her father, mother, sister, and sister.    Social History     Socioeconomic History    Marital status:      Number of children: 6   Tobacco Use    Smoking status: Some Days     Current packs/day: 2.00     Average packs/day: 2.0 packs/day for 15.0 years (30.0 ttl pk-yrs)     Types: Cigarettes    Smokeless tobacco: Never    Tobacco comments:     Smokes 3 cigarettes a day   Substance and Sexual Activity    Alcohol use: Not Currently     Comment: 1 glass every 2-3 month    Drug use: Never    Sexual activity: Not Currently     Social Determinants of Health     Financial Resource Strain: Low Risk  (1/20/2023)    Overall Financial Resource Strain (CARDIA)     Difficulty of Paying Living Expenses: Not hard at all   Food Insecurity: No Food Insecurity (6/16/2023)    Hunger Vital Sign     Worried About Running Out of Food in the Last Year: Never true     Ran Out of Food in the Last Year: Never true   Transportation Needs: No Transportation Needs (6/16/2023)    PRAPARE - Transportation     Lack of Transportation (Medical): No     Lack of Transportation (Non-Medical): No   Physical Activity: Inactive (1/20/2023)    Exercise Vital Sign     Days of Exercise per Week: 0 days     Minutes of Exercise per Session: 0 min   Stress: No Stress Concern Present (1/20/2023)    [employer] [place] [employer] - Occupational Stress Questionnaire     Feeling of Stress : Not at all   Social Connections: Moderately Isolated (1/20/2023)    Social Connection and Isolation Panel [NHANES]     Frequency of Communication with Friends and Family: More than three times a week     Frequency of Social Gatherings with Friends and Family: More than three times a week     Attends Protestant Services: More than 4 times per year     Active Member of Clubs or Organizations: No     Attends Club or Organization Meetings: Never     Marital Status:    Housing Stability: Low Risk  (6/16/2023)    Housing Stability Vital Sign     Unable to Pay for Housing in the Last Year: No     Number of Places Lived in the Last Year: 1     Unstable Housing in the Last  "Year: No     Current Outpatient Medications   Medication Instructions    albuterol (VENTOLIN HFA) 90 mcg/actuation inhaler 2 puffs, Inhalation, Every 6 hours PRN, Rescue    albuterol-ipratropium (DUO-NEB) 2.5 mg-0.5 mg/3 mL nebulizer solution 3 mLs, Nebulization, Every 6 hours PRN, Rescue    amLODIPine (NORVASC) 5 mg, Oral, Daily    apixaban (ELIQUIS) 5 mg, Oral, 2 times daily    BD ULTRA-FINE MINI PEN NEEDLE 31 gauge x 3/16" Ndle Subcutaneous, 2 times daily    BELSOMRA 5 mg Tab 1 tablet, Oral, Nightly    BenadryL 50 mg, Oral, Nightly    carvediloL (COREG) 6.25 mg, Oral, 2 times daily    clonazePAM (KLONOPIN) 0.5 mg, Oral, Nightly    DIALYVITE 100-1 mg Tab 1 tablet, Oral, Daily    DULoxetine (CYMBALTA) 30 mg, Oral, Daily    fluticasone furoate-vilanteroL (BREO) 100-25 mcg/dose diskus inhaler 1 puff, Inhalation, Daily    fluticasone propionate (FLONASE) 50 mcg, Each Nostril, Daily    furosemide (LASIX) 40 mg, Oral, Daily    glipiZIDE (GLUCOTROL) 10 mg, Oral, With breakfast    INVEGA SUSTENNA 156 mg/mL Syrg injection Intramuscular    L-METHYLFOLATE 15 mg Tab 1 tablet, Oral    lactulose (CHRONULAC) 10 gram/15 mL solution 30 mLs, Oral    LEVEMIR FLEXPEN 30 Units, Subcutaneous, Nightly    levothyroxine (SYNTHROID) 125 mcg, Oral, Before breakfast    loratadine (CLARITIN) 10 mg, Oral, Daily    ONETOUCH DELICA PLUS LANCET 30 gauge Misc 1 lancet , Other, Daily    ONETOUCH ULTRA TEST Strp 1 strip, Other, Daily    pantoprazole (PROTONIX) 40 mg, Oral, 2 times daily    pen needle, diabetic 2 Units, Misc.(Non-Drug; Combo Route), 2 times daily, Patient to check blood sugars twice daily (morning and night)    rosuvastatin (CRESTOR) 40 mg, Oral, Daily    sevelamer carbonate (RENVELA) 1,600 mg, Oral, 3 times daily    tiotropium (SPIRIVA WITH HANDIHALER) 18 mcg, Inhalation, Daily       Subjective:     Review of Systems   Constitutional: Negative.    HENT: Negative.     Eyes: Negative.    Respiratory:  Positive for cough.  " "  Cardiovascular: Negative.    Gastrointestinal: Negative.    Endocrine: Negative.    Genitourinary: Negative.    Musculoskeletal: Negative.    Skin: Negative.    Allergic/Immunologic: Negative.    Neurological: Negative.    Hematological: Negative.    Psychiatric/Behavioral: Negative.         Objective:     Visit Vitals  /78 (BP Location: Right arm, Patient Position: Sitting, BP Method: Medium (Automatic))   Pulse 60   Temp 98.1 °F (36.7 °C) (Oral)   Resp 18   Ht 5' 2.99" (1.6 m)   Wt 89.3 kg (196 lb 12.8 oz)   BMI 34.87 kg/m²       Physical Exam  Vitals reviewed.   Constitutional:       Appearance: Normal appearance.   HENT:      Head: Normocephalic and atraumatic.      Mouth/Throat:      Mouth: Mucous membranes are moist.      Pharynx: Oropharynx is clear.   Cardiovascular:      Rate and Rhythm: Normal rate. Rhythm irregular.      Heart sounds: Murmur heard.   Pulmonary:      Effort: Pulmonary effort is normal.      Breath sounds: Normal breath sounds.   Abdominal:      General: Bowel sounds are normal.   Musculoskeletal:         General: Normal range of motion.      Cervical back: Normal range of motion.      Right lower leg: No edema.      Left lower leg: No edema.   Skin:     General: Skin is warm and dry.   Neurological:      Mental Status: She is alert and oriented to person, place, and time.   Psychiatric:         Mood and Affect: Mood normal.         Behavior: Behavior normal.       Labs Reviewed:     Hematology:  Lab Results   Component Value Date    WBC 7.04 12/09/2023    HGB 8.2 (L) 12/09/2023    HCT 27.5 (L) 12/09/2023     12/09/2023     Chemistry:  Lab Results   Component Value Date     (L) 12/09/2023    K 4.5 12/09/2023    CHLORIDE 104 12/09/2023    BUN 37.7 (H) 12/09/2023    CREATININE 4.39 (H) 12/09/2023    EGFRNORACEVR 10 12/09/2023    GLUCOSE 100 12/09/2023    CALCIUM 8.7 12/09/2023    ALKPHOS 103 12/09/2023    LABPROT 6.0 12/09/2023    ALBUMIN 2.9 (L) 12/09/2023    BILIDIR " <0.1 03/22/2022    IBILI >0.10 03/22/2022    AST 22 12/09/2023    ALT 23 12/09/2023    MG 2.00 12/06/2023    PHOS 3.5 12/07/2023    LXGYCDPQ13MM 38.1 09/01/2023     Lab Results   Component Value Date    HGBA1C 6.7 12/06/2023     Lipid Panel:  Lab Results   Component Value Date    CHOL 124 09/01/2023    HDL 48 09/01/2023    LDL 65.00 09/01/2023    TRIG 54 09/01/2023    TOTALCHOLEST 3 09/01/2023     Thyroid:  Lab Results   Component Value Date    TSH 0.197 (L) 09/01/2023    T3FREE 2.04 (L) 01/07/2020     Urine:  Lab Results   Component Value Date    COLORUA Yellow 12/06/2023    APPEARANCEUA Turbid (A) 12/06/2023    SGUA 1.012 12/06/2023    PHUA 5.0 12/06/2023    PROTEINUA Trace (A) 12/06/2023    GLUCOSEUA Normal 12/06/2023    KETONESUA Negative 12/06/2023    BLOODUA Trace (A) 12/06/2023    NITRITESUA Negative 12/06/2023    LEUKOCYTESUR 250 (A) 12/06/2023    RBCUA 0-5 12/06/2023    WBCUA 11-20 (A) 12/06/2023    BACTERIA Many (A) 12/06/2023    SQEPUA Moderate (A) 12/06/2023    HYALINECASTS None Seen 12/06/2023    CREATRANDUR 375.0 09/26/2017    PROTEINURINE 1,032.6 09/26/2017        Assessment:       ICD-10-CM ICD-9-CM   1. Upper respiratory tract infection, unspecified type  J06.9 465.9   2. Anemia, unspecified type  D64.9 285.9   3. Type 2 diabetes mellitus with chronic kidney disease on chronic dialysis, with long-term current use of insulin  E11.22 250.40    N18.6 585.9    Z99.2 V45.11    Z79.4 V58.67   4. ESRD (end stage renal disease) on dialysis  N18.6 585.6    Z99.2 V45.11       Plan:     1. Upper respiratory tract infection, unspecified type  Start Claritin 10 mg daily x2 weeks, Flonase nasal bid x2 weeks  Notify clinic if no improvement    2. Anemia, unspecified type  RBC   Date Value Ref Range Status   12/09/2023 3.26 (L) 4.20 - 5.40 x10(6)/mcL Final     Hgb   Date Value Ref Range Status   12/09/2023 8.2 (L) 12.0 - 16.0 g/dL Final     Hct   Date Value Ref Range Status   12/09/2023 27.5 (L) 37.0 - 47.0 % Final      MCV   Date Value Ref Range Status   12/09/2023 84.4 80.0 - 94.0 fL Final     Iron Level   Date Value Ref Range Status   12/06/2023 113 50 - 170 ug/dL Final     Iron Binding Capacity Total   Date Value Ref Range Status   12/06/2023 203 (L) 250 - 450 ug/dL Final     Ferritin Level   Date Value Ref Range Status   12/06/2023 1,897.18 (H) 4.63 - 204.00 ng/mL Final   Repeat CBC ordered  - CBC Auto Differential; Future    3. Type 2 diabetes mellitus with chronic kidney disease on chronic dialysis, with long-term current use of insulin  Continue Glipizide 10 mg Qam  Stop Basaglar Insulin Glargine due to insurance coverage  Start Levemir 30 units daily  Encouraged home CBG monitoring.  Hypoglycemic episodes: denies  Body mass index is 34.87 kg/m².  Hemoglobin A1c   Date Value Ref Range Status   12/06/2023 6.7 <=7.0 % Final   On Rosuvastatin  No ACEi/ARB noted  Weight Loss Encouraged  ADA Diet    4. ESRD (end stage renal disease) on dialysis  Followed by Nephrology  BUN/Cr: 37.7/4.39  GFR: 10      Follow up in about 6 weeks (around 1/25/2024) for Labs. In addition to their scheduled follow up, the patient has also been instructed to follow up on as needed basis.     JOSEFINA Lord

## 2023-12-14 NOTE — TELEPHONE ENCOUNTER
Patient contacted and given pathology results. Patient verbalized understanding.     ----- Message from Lexi Scales MD sent at 12/13/2023  5:35 PM CST -----  Please let patient know that polyp removed at EGD was benign polyp. Please call patient with results.

## 2023-12-19 NOTE — PROGRESS NOTES
I have reviewed and concur with the resident's history, physical, assessment, and plan.  I have discussed with him all issues related to the diagnosis, workup and treatment plan. Care provided as reasonable and necessary.   Afib on [email or web address] bleed on octreotide  Florentin Keith MD  [facility]

## 2023-12-27 ENCOUNTER — HOSPITAL ENCOUNTER (OUTPATIENT)
Dept: CARDIOLOGY | Facility: HOSPITAL | Age: 78
Discharge: HOME | End: 2023-12-27
Payer: MEDICARE

## 2023-12-27 ENCOUNTER — HOSPITAL ENCOUNTER (OUTPATIENT)
Dept: RADIOLOGY | Facility: HOSPITAL | Age: 78
Discharge: HOME | End: 2023-12-27
Payer: MEDICARE

## 2023-12-27 VITALS
WEIGHT: 196 LBS | HEART RATE: 60 BPM | DIASTOLIC BLOOD PRESSURE: 58 MMHG | HEIGHT: 63 IN | DIASTOLIC BLOOD PRESSURE: 63 MMHG | BODY MASS INDEX: 34.73 KG/M2 | SYSTOLIC BLOOD PRESSURE: 148 MMHG | BODY MASS INDEX: 34.73 KG/M2 | RESPIRATION RATE: 20 BRPM | HEIGHT: 63 IN | SYSTOLIC BLOOD PRESSURE: 138 MMHG | WEIGHT: 196 LBS

## 2023-12-27 DIAGNOSIS — R07.9 CHEST PAIN, UNSPECIFIED TYPE: ICD-10-CM

## 2023-12-27 DIAGNOSIS — I25.700 CORONARY ARTERY DISEASE INVOLVING CORONARY BYPASS GRAFT OF NATIVE HEART WITH UNSTABLE ANGINA PECTORIS: ICD-10-CM

## 2023-12-27 DIAGNOSIS — Z01.810 ENCOUNTER FOR PRE-OPERATIVE CARDIOVASCULAR CLEARANCE: ICD-10-CM

## 2023-12-27 LAB
AV INDEX (PROSTH): 0.73
AV MEAN GRADIENT: 9 MMHG
AV PEAK GRADIENT: 15 MMHG
AV VALVE AREA BY VELOCITY RATIO: 2.29 CM²
AV VALVE AREA: 2.31 CM²
AV VELOCITY RATIO: 0.72
BSA FOR ECHO PROCEDURE: 1.99 M2
CV ECHO LV RWT: 0.44 CM
CV STRESS BASE HR: 59 BPM
DIASTOLIC BLOOD PRESSURE: 63 MMHG
DOP CALC AO PEAK VEL: 1.94 M/S
DOP CALC AO VTI: 45.6 CM
DOP CALC LVOT AREA: 3.2 CM2
DOP CALC LVOT DIAMETER: 2.01 CM
DOP CALC LVOT PEAK VEL: 1.4 M/S
DOP CALC LVOT STROKE VOLUME: 105.29 CM3
DOP CALC MV VTI: 45.4 CM
DOP CALCLVOT PEAK VEL VTI: 33.2 CM
E WAVE DECELERATION TIME: 204.63 MSEC
E/A RATIO: 2.36
ECHO LV POSTERIOR WALL: 1 CM (ref 0.6–1.1)
FRACTIONAL SHORTENING: 34 % (ref 28–44)
HR MV ECHO: 58 BPM
INTERVENTRICULAR SEPTUM: 1.11 CM (ref 0.6–1.1)
IVC DIAMETER: 1.7 CM
LEFT ATRIUM SIZE: 4.14 CM
LEFT ATRIUM VOLUME INDEX MOD: 27.5 ML/M2
LEFT ATRIUM VOLUME MOD: 52.81 CM3
LEFT INTERNAL DIMENSION IN SYSTOLE: 3.03 CM (ref 2.1–4)
LEFT VENTRICLE DIASTOLIC VOLUME INDEX: 50.57 ML/M2
LEFT VENTRICLE DIASTOLIC VOLUME: 97.09 ML
LEFT VENTRICLE MASS INDEX: 89 G/M2
LEFT VENTRICLE SYSTOLIC VOLUME INDEX: 18.6 ML/M2
LEFT VENTRICLE SYSTOLIC VOLUME: 35.73 ML
LEFT VENTRICULAR INTERNAL DIMENSION IN DIASTOLE: 4.59 CM (ref 3.5–6)
LEFT VENTRICULAR MASS: 170.38 G
LV LATERAL E/E' RATIO: 19.57 M/S
LVOT MG: 3.84 MMHG
LVOT MV: 0.92 CM/S
MV MEAN GRADIENT: 3 MMHG
MV PEAK A VEL: 0.58 M/S
MV PEAK E VEL: 1.37 M/S
MV PEAK GRADIENT: 11 MMHG
MV VALVE AREA BY CONTINUITY EQUATION: 2.32 CM2
NUC REST EJECTION FRACTION: 62
NUC STRESS EJECTION FRACTION: 63 %
OHS CV CPX 85 PERCENT MAX PREDICTED HEART RATE MALE: 122
OHS CV CPX ESTIMATED METS: 1
OHS CV CPX MAX PREDICTED HEART RATE: 144
OHS CV CPX PATIENT IS FEMALE: 1
OHS CV CPX PATIENT IS MALE: 0
OHS CV CPX PEAK DIASTOLIC BLOOD PRESSURE: 63 MMHG
OHS CV CPX PEAK HEAR RATE: 69 BPM
OHS CV CPX PEAK RATE PRESSURE PRODUCT: NORMAL
OHS CV CPX PEAK SYSTOLIC BLOOD PRESSURE: 148 MMHG
OHS CV CPX PERCENT MAX PREDICTED HEART RATE ACHIEVED: 50
OHS CV CPX RATE PRESSURE PRODUCT PRESENTING: 8732
OHS LV EJECTION FRACTION SIMPSONS BIPLANE MOD: 68 %
PISA MRMAX VEL: 6.01 M/S
PISA TR MAX VEL: 3.71 M/S
RA MAJOR: 5 CM
RA PRESSURE ESTIMATED: 8 MMHG
RV TB RVSP: 12 MMHG
STRESS ECHO POST EXERCISE DUR MIN: 0 MINUTES
STRESS ECHO POST EXERCISE DUR SEC: 56 SECONDS
SYSTOLIC BLOOD PRESSURE: 148 MMHG
TDI LATERAL: 0.07 M/S
TR MAX PG: 55 MMHG
TRICUSPID ANNULAR PLANE SYSTOLIC EXCURSION: 1.3 CM
TV REST PULMONARY ARTERY PRESSURE: 63 MMHG
Z-SCORE OF LEFT VENTRICULAR DIMENSION IN END DIASTOLE: -1.66
Z-SCORE OF LEFT VENTRICULAR DIMENSION IN END SYSTOLE: -0.76

## 2023-12-27 PROCEDURE — 93017 CV STRESS TEST TRACING ONLY: CPT

## 2023-12-27 PROCEDURE — A9502 TC99M TETROFOSMIN: HCPCS

## 2023-12-27 PROCEDURE — 93306 TTE W/DOPPLER COMPLETE: CPT

## 2023-12-27 PROCEDURE — 78452 HT MUSCLE IMAGE SPECT MULT: CPT

## 2023-12-27 PROCEDURE — 63600175 PHARM REV CODE 636 W HCPCS

## 2023-12-27 RX ORDER — REGADENOSON 0.08 MG/ML
0.4 INJECTION, SOLUTION INTRAVENOUS
Status: COMPLETED | OUTPATIENT
Start: 2023-12-27 | End: 2023-12-27

## 2023-12-27 RX ADMIN — REGADENOSON 0.4 MG: 0.08 INJECTION, SOLUTION INTRAVENOUS at 09:12

## 2024-01-02 ENCOUNTER — PATIENT MESSAGE (OUTPATIENT)
Dept: INTERNAL MEDICINE | Facility: CLINIC | Age: 79
End: 2024-01-02
Payer: MEDICARE

## 2024-01-02 ENCOUNTER — PATIENT MESSAGE (OUTPATIENT)
Dept: CARDIOLOGY | Facility: CLINIC | Age: 79
End: 2024-01-02
Payer: MEDICARE

## 2024-01-03 ENCOUNTER — HOSPITAL ENCOUNTER (INPATIENT)
Facility: HOSPITAL | Age: 79
LOS: 6 days | Discharge: HOME HEALTH | DRG: 377 | End: 2024-01-09
Attending: STUDENT IN AN ORGANIZED HEALTH CARE EDUCATION/TRAINING PROGRAM | Admitting: INTERNAL MEDICINE
Payer: MEDICARE

## 2024-01-03 DIAGNOSIS — J44.9 CHRONIC OBSTRUCTIVE PULMONARY DISEASE, UNSPECIFIED COPD TYPE: ICD-10-CM

## 2024-01-03 DIAGNOSIS — K62.5 RECTAL BLEEDING: ICD-10-CM

## 2024-01-03 DIAGNOSIS — Z99.2 ESRD (END STAGE RENAL DISEASE) ON DIALYSIS: Chronic | ICD-10-CM

## 2024-01-03 DIAGNOSIS — Z95.1 STATUS POST CORONARY ARTERY BYPASS GRAFT: ICD-10-CM

## 2024-01-03 DIAGNOSIS — N18.6 ESRD (END STAGE RENAL DISEASE) ON DIALYSIS: Chronic | ICD-10-CM

## 2024-01-03 DIAGNOSIS — K92.2 GI BLEED: Primary | ICD-10-CM

## 2024-01-03 DIAGNOSIS — D62 ACUTE BLOOD LOSS ANEMIA: ICD-10-CM

## 2024-01-03 DIAGNOSIS — D64.9 ANEMIA, UNSPECIFIED TYPE: ICD-10-CM

## 2024-01-03 DIAGNOSIS — D64.9 ANEMIA: ICD-10-CM

## 2024-01-03 LAB
ABO + RH BLD: NORMAL
ABO + RH BLD: NORMAL
ALBUMIN SERPL-MCNC: 3 G/DL (ref 3.4–4.8)
ALBUMIN/GLOB SERPL: 1 RATIO (ref 1.1–2)
ALP SERPL-CCNC: 108 UNIT/L (ref 40–150)
ALT SERPL-CCNC: 37 UNIT/L (ref 0–55)
ANISOCYTOSIS BLD QL SMEAR: ABNORMAL
AST SERPL-CCNC: 34 UNIT/L (ref 5–34)
BASOPHILS # BLD AUTO: 0.02 X10(3)/MCL
BASOPHILS NFR BLD AUTO: 0.3 %
BILIRUB SERPL-MCNC: 0.3 MG/DL
BLD PROD TYP BPU: NORMAL
BLD PROD TYP BPU: NORMAL
BLOOD UNIT EXPIRATION DATE: NORMAL
BLOOD UNIT EXPIRATION DATE: NORMAL
BLOOD UNIT TYPE CODE: 5100
BLOOD UNIT TYPE CODE: 5100
BUN SERPL-MCNC: 33 MG/DL (ref 9.8–20.1)
CALCIUM SERPL-MCNC: 9 MG/DL (ref 8.4–10.2)
CHLORIDE SERPL-SCNC: 102 MMOL/L (ref 98–107)
CO2 SERPL-SCNC: 27 MMOL/L (ref 23–31)
CREAT SERPL-MCNC: 4.39 MG/DL (ref 0.55–1.02)
CROSSMATCH INTERPRETATION: NORMAL
CROSSMATCH INTERPRETATION: NORMAL
DISPENSE STATUS: NORMAL
DISPENSE STATUS: NORMAL
EOSINOPHIL # BLD AUTO: 0.06 X10(3)/MCL (ref 0–0.9)
EOSINOPHIL NFR BLD AUTO: 0.8 %
ERYTHROCYTE [DISTWIDTH] IN BLOOD BY AUTOMATED COUNT: 23.9 % (ref 11.5–17)
GFR SERPLBLD CREATININE-BSD FMLA CKD-EPI: 10 MLS/MIN/1.73/M2
GLOBULIN SER-MCNC: 3.1 GM/DL (ref 2.4–3.5)
GLUCOSE SERPL-MCNC: 212 MG/DL (ref 82–115)
GROUP & RH: NORMAL
HCT VFR BLD AUTO: 17.7 % (ref 37–47)
HGB BLD-MCNC: 5.3 G/DL (ref 12–16)
HYPOCHROMIA BLD QL SMEAR: ABNORMAL
IMM GRANULOCYTES # BLD AUTO: 0.04 X10(3)/MCL (ref 0–0.04)
IMM GRANULOCYTES NFR BLD AUTO: 0.5 %
INDIRECT COOMBS: NORMAL
INR PPP: 1.3
LYMPHOCYTES # BLD AUTO: 0.74 X10(3)/MCL (ref 0.6–4.6)
LYMPHOCYTES NFR BLD AUTO: 10 %
MACROCYTES BLD QL SMEAR: ABNORMAL
MCH RBC QN AUTO: 27.9 PG (ref 27–31)
MCHC RBC AUTO-ENTMCNC: 29.9 G/DL (ref 33–36)
MCV RBC AUTO: 93.2 FL (ref 80–94)
MICROCYTES BLD QL SMEAR: ABNORMAL
MONOCYTES # BLD AUTO: 0.47 X10(3)/MCL (ref 0.1–1.3)
MONOCYTES NFR BLD AUTO: 6.4 %
NEUTROPHILS # BLD AUTO: 6.07 X10(3)/MCL (ref 2.1–9.2)
NEUTROPHILS NFR BLD AUTO: 82 %
NRBC BLD AUTO-RTO: 0.4 %
PLATELET # BLD AUTO: 174 X10(3)/MCL (ref 130–400)
PLATELET # BLD EST: NORMAL 10*3/UL
PMV BLD AUTO: 10.2 FL (ref 7.4–10.4)
POCT GLUCOSE: 241 MG/DL (ref 70–110)
POIKILOCYTOSIS BLD QL SMEAR: ABNORMAL
POLYCHROMASIA BLD QL SMEAR: ABNORMAL
POTASSIUM SERPL-SCNC: 4 MMOL/L (ref 3.5–5.1)
PROT SERPL-MCNC: 6.1 GM/DL (ref 5.8–7.6)
PROTHROMBIN TIME: 15.9 SECONDS (ref 12.5–14.5)
RBC # BLD AUTO: 1.9 X10(6)/MCL (ref 4.2–5.4)
RBC MORPH BLD: ABNORMAL
SODIUM SERPL-SCNC: 138 MMOL/L (ref 136–145)
SPECIMEN OUTDATE: NORMAL
STOMATOCYTES (OLG): ABNORMAL
TARGETS BLD QL SMEAR: ABNORMAL
UNIT NUMBER: NORMAL
UNIT NUMBER: NORMAL
WBC # SPEC AUTO: 7.4 X10(3)/MCL (ref 4.5–11.5)

## 2024-01-03 PROCEDURE — 11000001 HC ACUTE MED/SURG PRIVATE ROOM

## 2024-01-03 PROCEDURE — 63600175 PHARM REV CODE 636 W HCPCS: Performed by: STUDENT IN AN ORGANIZED HEALTH CARE EDUCATION/TRAINING PROGRAM

## 2024-01-03 PROCEDURE — 63600175 PHARM REV CODE 636 W HCPCS: Performed by: INTERNAL MEDICINE

## 2024-01-03 PROCEDURE — 96374 THER/PROPH/DIAG INJ IV PUSH: CPT

## 2024-01-03 PROCEDURE — 85025 COMPLETE CBC W/AUTO DIFF WBC: CPT

## 2024-01-03 PROCEDURE — 86901 BLOOD TYPING SEROLOGIC RH(D): CPT

## 2024-01-03 PROCEDURE — 30233N1 TRANSFUSION OF NONAUTOLOGOUS RED BLOOD CELLS INTO PERIPHERAL VEIN, PERCUTANEOUS APPROACH: ICD-10-PCS | Performed by: STUDENT IN AN ORGANIZED HEALTH CARE EDUCATION/TRAINING PROGRAM

## 2024-01-03 PROCEDURE — 36430 TRANSFUSION BLD/BLD COMPNT: CPT

## 2024-01-03 PROCEDURE — 86923 COMPATIBILITY TEST ELECTRIC: CPT | Mod: 91 | Performed by: STUDENT IN AN ORGANIZED HEALTH CARE EDUCATION/TRAINING PROGRAM

## 2024-01-03 PROCEDURE — 85610 PROTHROMBIN TIME: CPT

## 2024-01-03 PROCEDURE — 80053 COMPREHEN METABOLIC PANEL: CPT

## 2024-01-03 PROCEDURE — C9113 INJ PANTOPRAZOLE SODIUM, VIA: HCPCS | Performed by: STUDENT IN AN ORGANIZED HEALTH CARE EDUCATION/TRAINING PROGRAM

## 2024-01-03 PROCEDURE — C9113 INJ PANTOPRAZOLE SODIUM, VIA: HCPCS | Performed by: INTERNAL MEDICINE

## 2024-01-03 PROCEDURE — P9016 RBC LEUKOCYTES REDUCED: HCPCS | Performed by: STUDENT IN AN ORGANIZED HEALTH CARE EDUCATION/TRAINING PROGRAM

## 2024-01-03 PROCEDURE — 25000003 PHARM REV CODE 250: Performed by: INTERNAL MEDICINE

## 2024-01-03 PROCEDURE — 99285 EMERGENCY DEPT VISIT HI MDM: CPT | Mod: 25

## 2024-01-03 RX ORDER — AMLODIPINE BESYLATE 5 MG/1
5 TABLET ORAL DAILY
Status: DISCONTINUED | OUTPATIENT
Start: 2024-01-04 | End: 2024-01-09 | Stop reason: HOSPADM

## 2024-01-03 RX ORDER — GLUCAGON 1 MG
1 KIT INJECTION
Status: DISCONTINUED | OUTPATIENT
Start: 2024-01-03 | End: 2024-01-09 | Stop reason: HOSPADM

## 2024-01-03 RX ORDER — ACETAMINOPHEN 325 MG/1
650 TABLET ORAL EVERY 4 HOURS PRN
Status: DISCONTINUED | OUTPATIENT
Start: 2024-01-03 | End: 2024-01-09 | Stop reason: HOSPADM

## 2024-01-03 RX ORDER — IBUPROFEN 200 MG
24 TABLET ORAL
Status: DISCONTINUED | OUTPATIENT
Start: 2024-01-03 | End: 2024-01-09 | Stop reason: HOSPADM

## 2024-01-03 RX ORDER — SEVELAMER CARBONATE 800 MG/1
1600 TABLET, FILM COATED ORAL 3 TIMES DAILY
Status: DISCONTINUED | OUTPATIENT
Start: 2024-01-03 | End: 2024-01-09 | Stop reason: HOSPADM

## 2024-01-03 RX ORDER — FLUTICASONE FUROATE AND VILANTEROL 100; 25 UG/1; UG/1
1 POWDER RESPIRATORY (INHALATION) DAILY
Status: DISCONTINUED | OUTPATIENT
Start: 2024-01-04 | End: 2024-01-09 | Stop reason: HOSPADM

## 2024-01-03 RX ORDER — ALBUTEROL SULFATE 90 UG/1
2 AEROSOL, METERED RESPIRATORY (INHALATION) EVERY 6 HOURS PRN
Status: DISCONTINUED | OUTPATIENT
Start: 2024-01-03 | End: 2024-01-09 | Stop reason: HOSPADM

## 2024-01-03 RX ORDER — LEVOTHYROXINE SODIUM 125 UG/1
125 TABLET ORAL
Status: DISCONTINUED | OUTPATIENT
Start: 2024-01-04 | End: 2024-01-09 | Stop reason: HOSPADM

## 2024-01-03 RX ORDER — HYDROCODONE BITARTRATE AND ACETAMINOPHEN 500; 5 MG/1; MG/1
TABLET ORAL
Status: DISCONTINUED | OUTPATIENT
Start: 2024-01-03 | End: 2024-01-09 | Stop reason: HOSPADM

## 2024-01-03 RX ORDER — ACETAMINOPHEN 325 MG/1
650 TABLET ORAL EVERY 8 HOURS PRN
Status: DISCONTINUED | OUTPATIENT
Start: 2024-01-03 | End: 2024-01-09 | Stop reason: HOSPADM

## 2024-01-03 RX ORDER — IBUPROFEN 200 MG
16 TABLET ORAL
Status: DISCONTINUED | OUTPATIENT
Start: 2024-01-03 | End: 2024-01-09 | Stop reason: HOSPADM

## 2024-01-03 RX ORDER — SODIUM CHLORIDE 0.9 % (FLUSH) 0.9 %
10 SYRINGE (ML) INJECTION EVERY 12 HOURS PRN
Status: DISCONTINUED | OUTPATIENT
Start: 2024-01-03 | End: 2024-01-09 | Stop reason: HOSPADM

## 2024-01-03 RX ORDER — PANTOPRAZOLE SODIUM 40 MG/10ML
40 INJECTION, POWDER, LYOPHILIZED, FOR SOLUTION INTRAVENOUS
Status: COMPLETED | OUTPATIENT
Start: 2024-01-03 | End: 2024-01-03

## 2024-01-03 RX ORDER — DIPHENHYDRAMINE HCL 25 MG
50 CAPSULE ORAL NIGHTLY
Status: DISCONTINUED | OUTPATIENT
Start: 2024-01-03 | End: 2024-01-09 | Stop reason: HOSPADM

## 2024-01-03 RX ORDER — ATORVASTATIN CALCIUM 40 MG/1
40 TABLET, FILM COATED ORAL DAILY
Status: DISCONTINUED | OUTPATIENT
Start: 2024-01-04 | End: 2024-01-09 | Stop reason: HOSPADM

## 2024-01-03 RX ORDER — PANTOPRAZOLE SODIUM 40 MG/10ML
40 INJECTION, POWDER, LYOPHILIZED, FOR SOLUTION INTRAVENOUS 2 TIMES DAILY
Status: DISCONTINUED | OUTPATIENT
Start: 2024-01-03 | End: 2024-01-08

## 2024-01-03 RX ORDER — IPRATROPIUM BROMIDE AND ALBUTEROL SULFATE 2.5; .5 MG/3ML; MG/3ML
3 SOLUTION RESPIRATORY (INHALATION) EVERY 6 HOURS PRN
Status: DISCONTINUED | OUTPATIENT
Start: 2024-01-03 | End: 2024-01-09 | Stop reason: HOSPADM

## 2024-01-03 RX ORDER — ONDANSETRON HYDROCHLORIDE 2 MG/ML
4 INJECTION, SOLUTION INTRAVENOUS EVERY 4 HOURS PRN
Status: DISCONTINUED | OUTPATIENT
Start: 2024-01-03 | End: 2024-01-09 | Stop reason: HOSPADM

## 2024-01-03 RX ORDER — DULOXETIN HYDROCHLORIDE 30 MG/1
30 CAPSULE, DELAYED RELEASE ORAL DAILY
Status: DISCONTINUED | OUTPATIENT
Start: 2024-01-04 | End: 2024-01-09 | Stop reason: HOSPADM

## 2024-01-03 RX ORDER — INSULIN ASPART 100 [IU]/ML
0-10 INJECTION, SOLUTION INTRAVENOUS; SUBCUTANEOUS
Status: DISCONTINUED | OUTPATIENT
Start: 2024-01-03 | End: 2024-01-09 | Stop reason: HOSPADM

## 2024-01-03 RX ORDER — CARVEDILOL 3.12 MG/1
6.25 TABLET ORAL 2 TIMES DAILY
Status: DISCONTINUED | OUTPATIENT
Start: 2024-01-03 | End: 2024-01-09 | Stop reason: HOSPADM

## 2024-01-03 RX ADMIN — SEVELAMER CARBONATE 1600 MG: 800 TABLET, FILM COATED ORAL at 08:01

## 2024-01-03 RX ADMIN — DIPHENHYDRAMINE HYDROCHLORIDE 50 MG: 25 CAPSULE ORAL at 08:01

## 2024-01-03 RX ADMIN — CARVEDILOL 6.25 MG: 3.12 TABLET, FILM COATED ORAL at 08:01

## 2024-01-03 RX ADMIN — INSULIN DETEMIR 20 UNITS: 100 INJECTION, SOLUTION SUBCUTANEOUS at 09:01

## 2024-01-03 RX ADMIN — PANTOPRAZOLE SODIUM 40 MG: 40 INJECTION, POWDER, FOR SOLUTION INTRAVENOUS at 03:01

## 2024-01-03 NOTE — ED PROVIDER NOTES
Encounter Date: 1/3/2024    SCRIBE #1 NOTE: I, Mina Swann, am scribing for, and in the presence of,  Navdeep Rae IV, MD. I have scribed the following portions of the note - Other sections scribed: HPI, ROS, PE.       History     Chief Complaint   Patient presents with    Rectal Bleeding     C/o black stools x2 days with abnormal lab work yesterday from dialysis, on Eliquis. HD on TTS, last run yesterday. Denies weakness, SOB, or CP.      79 y/o female with a history of CAD s/p CABG, A fib on Eliquis, recurrent GI bleeds, HTN, HLD, DM, ESRD on dialysis TTS presents to the ED with 2 episodes of dark stools over the past 2 days. Pt's daughter at bedside reports that pt was called by dialysis clinic and told to come in after lab work done yesterday at dialysis showed a low blood count. There is no bright red blood in the stool. Pt has some generalized weakness but no other complaints.    Per chart review, pt has been admitted for GI bleeds multiple times and has had a recent EGD done showing gastric angiodysplasia.    The history is provided by the patient and a relative. No  was used.     Review of patient's allergies indicates:   Allergen Reactions    Lisinopril Swelling    Azithromycin      Other reaction(s): tongue/facial swelling    Baclofen      Other reaction(s): tongue/facial swelling    Doxycycline      Other reaction(s): Angioedema     Past Medical History:   Diagnosis Date    CKD (chronic kidney disease) requiring chronic dialysis     COPD (chronic obstructive pulmonary disease)     Diabetes mellitus     Hyperlipidemia     Hypertension     Renal insufficiency     Thyroid disease      Past Surgical History:   Procedure Laterality Date    AV FISTULA PLACEMENT Left     CARDIAC CATHETERIZATION      CARDIAC VALVE REPLACEMENT      COLONOSCOPY      CORONARY ARTERY BYPASS GRAFT      EGD, WITH HEMORRHAGE CONTROL  03/24/2023    Procedure: EGD,WITH HEMORRHAGE CONTROL;  Surgeon: Go Le MD;   Location: [facility] ENDOSCOPY;  Service: Gastroenterology;;    EGD, WITH HEMORRHAGE CONTROL  04/06/2023    Procedure: EGD,WITH HEMORRHAGE CONTROL;  Surgeon: Ayden Francois MD;  Location: [facility] ENDOSCOPY;  Service: Gastroenterology;;    EGD, WITH POLYPECTOMY USING SNARE Left 12/8/2023    Procedure: EGD, WITH POLYPECTOMY USING SNARE;  Surgeon: Lexi Scales MD;  Location: [facility] ENDOSCOPY;  Service: Gastroenterology;  Laterality: Left;    ESOPHAGOGASTRODUODENOSCOPY      ESOPHAGOGASTRODUODENOSCOPY N/A 02/17/2023    Procedure: EGD +/- VCE PLACEMENT;  Surgeon: Morgan Gardner MD;  Location: [facility] ENDOSCOPY;  Service: Gastroenterology;  Laterality: N/A;    ESOPHAGOGASTRODUODENOSCOPY N/A 03/24/2023    Procedure: EGD;  Surgeon: Go Le MD;  Location: [facility] ENDOSCOPY;  Service: Gastroenterology;  Laterality: N/A;  with push    ESOPHAGOGASTRODUODENOSCOPY N/A 04/06/2023    Procedure: EGD;  Surgeon: Ayden Francois MD;  Location: [facility] ENDOSCOPY;  Service: Gastroenterology;  Laterality: N/A;  with push    ESOPHAGOGASTRODUODENOSCOPY N/A 06/16/2023    Procedure: EGD W/ PUSH;  Surgeon: Morgan Gardner MD;  Location: [facility] ENDOSCOPY;  Service: Gastroenterology;  Laterality: N/A;    EYE SURGERY      POLYPECTOMY      SMALL BOWEL ENTEROSCOPY Left 01/20/2023    Procedure: ENTEROSCOPY;  Surgeon: Lexi Scales MD;  Location: [facility] ENDOSCOPY;  Service: Gastroenterology;  Laterality: Left;    THYROIDECTOMY      TUBAL LIGATION       Family History   Problem Relation Age of Onset    Hypertension Mother     Hypertension Father     Hypertension Sister     Hypertension Sister      Social History     Tobacco Use    Smoking status: Some Days     Current packs/day: 2.00     Average packs/day: 2.0 packs/day for 15.0 years (30.0 ttl pk-yrs)     Types: Cigarettes    Smokeless tobacco: Never    Tobacco comments:     Smokes 3 cigarettes a day   Substance Use Topics    Alcohol use: Not Currently      Comment: 1 glass every 2-3 month    Drug use: Never     Review of Systems   Constitutional:  Negative for chills and fever.        Generalized weakness   HENT:  Negative for congestion, rhinorrhea and sore throat.    Eyes:  Negative for visual disturbance.   Respiratory:  Negative for cough and shortness of breath.    Cardiovascular:  Negative for chest pain and leg swelling.   Gastrointestinal:  Negative for abdominal pain, nausea and vomiting.        Dark stools   Genitourinary:  Negative for dysuria, hematuria, vaginal bleeding and vaginal discharge.   Musculoskeletal:  Negative for joint swelling.   Skin:  Negative for rash.   Neurological:  Negative for weakness.   Psychiatric/Behavioral:  Negative for confusion.        Physical Exam     Initial Vitals [01/03/24 1338]   BP Pulse Resp Temp SpO2   127/72 69 20 97.6 °F (36.4 °C) 96 %      MAP       --         Physical Exam    Nursing note and vitals reviewed.  Constitutional: She is not diaphoretic. No distress.   Cardiovascular:  Normal rate and regular rhythm.           No murmur heard.  Pulmonary/Chest: Breath sounds normal. No respiratory distress. She has no wheezes. She has no rales.   Abdominal: Abdomen is soft. She exhibits no distension. There is no abdominal tenderness.   Genitourinary:    Genitourinary Comments: Chaperoned by Batool Estes RN  Hemoccult positive dark stool on exam, no bright red blood       Neurological: She is alert.   Psychiatric: She has a normal mood and affect.         ED Course   Critical Care    Date/Time: 1/3/2024 4:43 PM    Performed by: Navdeep Rae IV, MD  Authorized by: Navdeep Rae IV, MD  Total critical care time (exclusive of procedural time) : 43 minutes  Critical care time was exclusive of separately billable procedures and treating other patients.  Critical care was necessary to treat or prevent imminent or life-threatening deterioration of the following conditions: acute blood loss anemia.  Critical care was  time spent personally by me on the following activities: development of treatment plan with patient or surrogate, blood draw for specimens, discussions with consultants, interpretation of cardiac output measurements, evaluation of patient's response to treatment, examination of patient, ordering and performing treatments and interventions, obtaining history from patient or surrogate, pulse oximetry, re-evaluation of patient's condition, ordering and review of radiographic studies, ordering and review of laboratory studies and review of old charts.        Labs Reviewed   COMPREHENSIVE METABOLIC PANEL - Abnormal; Notable for the following components:       Result Value    Glucose Level 212 (*)     Blood Urea Nitrogen 33.0 (*)     Creatinine 4.39 (*)     Albumin Level 3.0 (*)     Albumin/Globulin Ratio 1.0 (*)     All other components within normal limits   PROTIME-INR - Abnormal; Notable for the following components:    PT 15.9 (*)     All other components within normal limits   CBC WITH DIFFERENTIAL - Abnormal; Notable for the following components:    RBC 1.90 (*)     Hgb 5.3 (*)     Hct 17.7 (*)     MCHC 29.9 (*)     RDW 23.9 (*)     All other components within normal limits   BLOOD SMEAR MICROSCOPIC EXAM (OLG) - Abnormal; Notable for the following components:    RBC Morph Abnormal (*)     Anisocytosis 1+ (*)     Hypochromasia 1+ (*)     Macrocytosis 1+ (*)     Microcytosis 1+ (*)     Poikilocytosis 1+ (*)     Polychromasia 1+ (*)     Stomatocytes 1+ (*)     Target Cells 1+ (*)     All other components within normal limits   CBC W/ AUTO DIFFERENTIAL    Narrative:     The following orders were created for panel order CBC auto differential.  Procedure                               Abnormality         Status                     ---------                               -----------         ------                     CBC with Differential[[id number]]       Abnormal            Final result                 Please view  results for these tests on the individual orders.   URINALYSIS, REFLEX TO URINE CULTURE   TYPE & SCREEN   PREPARE RBC SOFT          Imaging Results    None          Medications   0.9%  NaCl infusion (for blood administration) (has no administration in time range)   sodium chloride 0.9% flush 10 mL (has no administration in time range)   ondansetron injection 4 mg (has no administration in time range)   acetaminophen tablet 650 mg (has no administration in time range)   acetaminophen tablet 650 mg (has no administration in time range)   glucose chewable tablet 16 g (has no administration in time range)   glucose chewable tablet 24 g (has no administration in time range)   glucagon (human recombinant) injection 1 mg (has no administration in time range)   dextrose 10% bolus 125 mL 125 mL (has no administration in time range)   dextrose 10% bolus 250 mL 250 mL (has no administration in time range)   pantoprazole injection 40 mg (40 mg Intravenous Given 1/3/24 1527)     Medical Decision Making  This is a 78-year-old extensive medical history including CAD AFib on Eliquis ESRD on dialysis history of recurrent GI bleed attributed to angiodysplasia presenting with black stools for several days abnormal lab work after dialysis  Patient has Hemoccult-positive stool on exam has a hemoglobin of 5.3  Discussed with GI will treat with Protonix blood transfusion discussed with hospitalist who accepts admission    The differential diagnosis includes, but is not limited to:  Upper GI bleed: PUD, gastritis, varices, AVM, tumors, erosions, AE fistula  Lower GI bleed: diverticular bleed, colon cancer, AVM, hemorrhoid, AE fistula       Problems Addressed:  Acute blood loss anemia: acute illness or injury that poses a threat to life or bodily functions  GI bleed: acute illness or injury that poses a threat to life or bodily functions  Rectal bleeding: acute illness or injury that poses a threat to life or bodily functions    Amount  and/or Complexity of Data Reviewed  Independent Historian: caregiver     Details: Pt's daughter at bedside reports that pt was called by dialysis clinic and told to come in after lab work done yesterday at dialysis showed a low blood count. She has had 2 episodes of dark stool, but there is no bright red blood in the stool.   External Data Reviewed: notes.     Details: Per chart review, pt has been admitted for GI bleeds multiple times and has had a recent EGD done showing gastric angiodysplasia.  Labs: ordered.  Discussion of management or test interpretation with external provider(s): Discussion with GI Dr. Alberts - PPI, blood transfusion, admission   Discussion with hospitalist who will accept admission - Dr. Lo     Risk  Prescription drug management.  Decision regarding hospitalization.    Critical Care  Total time providing critical care: 43 minutes            Scribe Attestation:   Scribe #1: I performed the above scribed service and the documentation accurately describes the services I performed. I attest to the accuracy of the note.    Attending Attestation:           Physician Attestation for Scribe:  Physician Attestation Statement for Scribe #1: I, reviewed documentation, as scribed by Mina Swann in my presence, and it is both accurate and complete.             ED Course as of 01/03/24 1645   Wed Jan 03, 2024   1532 GI paged [JG]      ED Course User Index  [JG] Mina Swann                           Clinical Impression:  Final diagnoses:  [K92.2] GI bleed (Primary)  [K62.5] Rectal bleeding  [D62] Acute blood loss anemia          ED Disposition Condition    Admit Stable                Navdeep Rae IV, MD  01/03/24 [id number]

## 2024-01-03 NOTE — Clinical Note
Diagnosis: GI bleed [457686]   Future Attending Provider: ISMAEL RODRIGEZ [[id number]]   Admitting Provider:: ISMAEL RODRIGEZ [[id number]]   Admit to which facility:: [facility] [[id number]]   Reason for IP Medical Treatment  (Clinical interventions that can only be accomplished in the IP setting? ) :: blood transfusion, GI   I certify that Inpatient services for greater than or equal to 2 midnights are medically necessary:: Yes   Plans for Post-Acute care--if anticipated (pick the single best option):: A. No post acute care anticipated at this time   Special Needs:: No Special Needs [1]

## 2024-01-03 NOTE — Clinical Note
Willie accompanied their child to the emergency department on 1/3/2024. They may return to work on 01/04/2024.      If you have any questions or concerns, please don't hesitate to call.      JERMAINE Renae

## 2024-01-03 NOTE — CONSULTS
Gastroenterology Consultation Note    Reason for Consult:  The encounter diagnosis was GI bleed.    PCP:   Margarita Dior FNP    Referring MD:  Self, Aaareferral  No address on file    Hospital Day: 0     Initial History of Present Illness (HPI):  This is a 78 y.o. female presented to [facility] on 01/03/2024 with c/o dark stools x1 day with a history of GI bleeds.   Daughter at bedside today    She is a patient of Dr. Trung Bourgeois with our GI practice- Colonoscopy in 2021 (see below). But she has also had some procedures/follow up with Lexi Scales at [facility].     She does have a history of ESRD (on HD MWF schedule), type 2 diabetes, COPD, HFpEF, coronary artery disease, CABG x4, hypertension, AF (on Eliquis), dyslipidemia, hypothyroidism, anemia, AVM, colon polyps and schizophrenia who presented to [facility] ED on 12/6/23 with complaints of melanotic stools, fatigue, generalized abdominal pain, and back pain x 3 days.       ED course: VS:  /71, P 51, R 18, T 97.3° F, SpO2 98% on room air.  Labs:  H&H 8.6/29.8, , creatinine 4.0, BUN 37.5, glucose 161, Alb 3.1, AST 37, otherwise LFTs WNL, troponin 0.066.     GI service consulted for Anemia with history of GI bleed     Patient reports that she typically has a bowel movement once daily.  She takes lactulose every other day to avoid constipation.     She denies bright red blood.  She denies abdominal pain, nausea, vomiting, hematemesis, reflux symptoms, difficulty swallowing, diarrhea, hematochezia, decreased appetite, dysuria and hematuria.  Weight is stable     Denies NSAID use.  She does take aspirin 81 mg daily and Eliquis daily Admits to smoking 4 cigarettes per day for the past 3 years but previously smoked 1/2 ppd since she was 18 years old.  Admits to occasional alcohol use, usually once every 7-8 months. Denies recreational drug use. Denies a family history of IBD, colon polyps or colon cancer.    Prior endoscopies:   Colonoscopy on 07/21/2021 for  positive fecal immunochemical test per Dr. Trung Bourgeois:  three 5 to 9 mm semi-sessile polyps were found in the transverse colon and were removed with a cold snare. Resection and retrieval were complete.  Seven 6-9 mm semi-sessile polyps were found in the descending colon and were removed with a cold snare. Resection and retrieval were complete.  A 12 mm semi-sessile polyp was found in the descending colon was removed with a hot snare. Resection and retrieval were complete. Two 3 to 5 mm semi-sessile polyps were found in the sigmoid colon and were removed with a cold snare. Resection and retrieval were complete.  Non-bleeding internal hemorrhoids were found during retroflexion.  The hemorrhoids were medium-sized and grade II  (internal hemorrhoids that prolapse but reduced spontaneously). Recommendations await pathology results. Repeat colonoscopy in 3 years for surveillance.  Pathology: Transverse colon, polyp x3, biopsy: Tubular adenoma, multiple fragments. Descending colon, polyp x8, biopsy:  Tubular adenoma, multiple fragments.  Sigmoid colon, polyp x2, biopsy:  Tubular adenoma, multiple fragments.     EGD on 12/31/2021 for melena per Dr. Manolo Winter:  The esophagus was normal.  A small hiatal hernia was present.  A single small nonbleeding angioectasia was found in the gastric antrum.  Coagulation for hemostasis using argon beam was successful.  The examined duodenum was normal.  Recommendations:  We will plan on capsule endoscopy tomorrow.  Consider Sandostatin to help prevent any other possible AVM bleeding/oozing.  Overall, test situation with her not wanting to receive any blood.     Capsule endoscopy for anemia and history of gastric arteriovenous malformation  on 1/2/2022:  Technically incomplete study.  Visibility and stomach good until patient ingested food at 8:00 a.m..  Capsule did not leave the stomach.  Probable AVM ablation site identified within the stomach.  No other signs of  bleeding were seen throughout the stomach.  Recommendations: Incomplete study due to delayed gastric emptying.  Consider repeating study with a capsule placed endoscopically if warranted.     EGD/push enteroscopy on 1/20/2023 for unexplained iron deficiency anemia per Dr. Lexi Scales:  Normal esophagus.  Normal stomach.  Normal examined duodenum. No proximal small bowel bleeding or AVMs seen. No specimens collected. Recommendations:  To visualize the small bowel, perform video capsule endoscopy as outpatient.  Recommend an iron supplement.     EGD on 2/17/23 for iron deficiency anemia due to suspected upper gastrointestinal bleeding per Dr. Morgan Gardner:  Normal EGD with successful completion of video capsule enteroscopy placement.  No specimens collected.     Capsule endoscopy done on 02/17/2023 per Dr.James Gardner:  Small nonbleeding erosion in the distal duodenum, otherwise unremarkable capsule endoscopy.  No intervention warranted at this time.  Avoid NSAIDs.  Recommendation: If patient were to develop melanotic stools are transfusion dependent anemia off iron supplementation, consider CT enterography.     EGD push enteroscopy on 3/24/23 for anemia per Dr. Go Le : AVM in the 4th portion of the duodenum, APC applied      EGD on 4/6/23 for iron-deficiency anemia per Dr. Ayden Francois:  Normal esophagus, duodenum, jejunum.  Single 3 mm angio ectasia with no bleeding found in the gastric body treated with APC.  This AVM was small and likely clinically insignificant.  No specimens collected.  Recommendation to get a nuclear bleeding scan.     EGD on 6/16/23 for active GI bleeding per Dr. Morgan Gardner:  Normal esophagus and stomach.  3 mm, nonbleeding angiodysplastic lesion in the duodenum treated with APC.  Single duodenal polyp, resection not attempted.  Normal examined jejunum.  No specimens collected.  Recommendations resume Eliquis at prior dose tomorrow.  Repeat upper endoscopy in 6 weeks with  Dr. Bourgeois off anticoagulation for duodenal polypectomy.    EGD 12/8/2023: Dr. Lexi Scales for [facility]: Findings:       The esophagus was normal. A medium amount of food (residue) was found in the gastric fundus and in the gastric body.        Diffuse atrophic mucosa was found in the entire examined stomach.        A single 8 mm sessile polyp was found in the duodenal bulb. The        polyp was removed with a cold snare. Resection and retrieval were        complete.        The exam of the duodenum was otherwise normal.   Impression:    - Normal esophagus.                          - A medium amount of food (residue) in the stomach.                          - Gastric mucosal atrophy.                          - A single duodenal polyp. Resected and retrieved- Brunner gland hyperplasia- Negative for dysplasia or malignancy.        ROS:  Review of Systems   Constitutional:  Negative for chills and fever.   HENT:  Negative for hearing loss.    Eyes:  Negative for blurred vision.   Respiratory:  Positive for shortness of breath. Negative for cough and sputum production.    Cardiovascular:  Positive for leg swelling. Negative for chest pain.   Gastrointestinal:  Positive for abdominal pain (chronic lower abdominal pains) and melena. Negative for constipation, diarrhea, heartburn, nausea and vomiting.   Musculoskeletal:  Positive for myalgias.   Skin:  Negative for rash.   Neurological:  Positive for weakness.   Psychiatric/Behavioral:  Negative for depression and memory loss. The patient is not nervous/anxious.        Medical History:   Past Medical History:   Diagnosis Date    CKD (chronic kidney disease) requiring chronic dialysis     COPD (chronic obstructive pulmonary disease)     Diabetes mellitus     Hyperlipidemia     Hypertension     Renal insufficiency     Thyroid disease        Surgical History:   Past Surgical History:   Procedure Laterality Date    AV FISTULA PLACEMENT Left     CARDIAC CATHETERIZATION       CARDIAC VALVE REPLACEMENT      COLONOSCOPY      CORONARY ARTERY BYPASS GRAFT      EGD, WITH HEMORRHAGE CONTROL  03/24/2023    Procedure: EGD,WITH HEMORRHAGE CONTROL;  Surgeon: Go Le MD;  Location: [facility] ENDOSCOPY;  Service: Gastroenterology;;    EGD, WITH HEMORRHAGE CONTROL  04/06/2023    Procedure: EGD,WITH HEMORRHAGE CONTROL;  Surgeon: Ayden Francois MD;  Location: [facility] ENDOSCOPY;  Service: Gastroenterology;;    EGD, WITH POLYPECTOMY USING SNARE Left 12/8/2023    Procedure: EGD, WITH POLYPECTOMY USING SNARE;  Surgeon: Lexi Scales MD;  Location: [facility] ENDOSCOPY;  Service: Gastroenterology;  Laterality: Left;    ESOPHAGOGASTRODUODENOSCOPY      ESOPHAGOGASTRODUODENOSCOPY N/A 02/17/2023    Procedure: EGD +/- VCE PLACEMENT;  Surgeon: Morgan Gardner MD;  Location: [facility] ENDOSCOPY;  Service: Gastroenterology;  Laterality: N/A;    ESOPHAGOGASTRODUODENOSCOPY N/A 03/24/2023    Procedure: EGD;  Surgeon: Go Le MD;  Location: [facility] ENDOSCOPY;  Service: Gastroenterology;  Laterality: N/A;  with push    ESOPHAGOGASTRODUODENOSCOPY N/A 04/06/2023    Procedure: EGD;  Surgeon: Ayden Francois MD;  Location: [facility] ENDOSCOPY;  Service: Gastroenterology;  Laterality: N/A;  with push    ESOPHAGOGASTRODUODENOSCOPY N/A 06/16/2023    Procedure: EGD W/ PUSH;  Surgeon: Morgan Gardner MD;  Location: [facility] ENDOSCOPY;  Service: Gastroenterology;  Laterality: N/A;    EYE SURGERY      POLYPECTOMY      SMALL BOWEL ENTEROSCOPY Left 01/20/2023    Procedure: ENTEROSCOPY;  Surgeon: Lexi Scales MD;  Location: [facility] ENDOSCOPY;  Service: Gastroenterology;  Laterality: Left;    THYROIDECTOMY      TUBAL LIGATION         Family History:   Family History   Problem Relation Age of Onset    Hypertension Mother     Hypertension Father     Hypertension Sister     Hypertension Sister    .     Social History:   Social History     Tobacco Use    Smoking status: Some Days     Current  "packs/day: 2.00     Average packs/day: 2.0 packs/day for 15.0 years (30.0 ttl pk-yrs)     Types: Cigarettes    Smokeless tobacco: Never    Tobacco comments:     Smokes 3 cigarettes a day   Substance Use Topics    Alcohol use: Not Currently     Comment: 1 glass every 2-3 month       Allergies:  Review of patient's allergies indicates:   Allergen Reactions    Lisinopril Swelling    Azithromycin      Other reaction(s): tongue/facial swelling    Baclofen      Other reaction(s): tongue/facial swelling    Doxycycline      Other reaction(s): Angioedema       (Not in a hospital admission)        Objective Findings:    Vital Signs:  BP (!) 142/58   Pulse 69   Temp 97.7 °F (36.5 °C) (Oral)   Resp 20   Ht 5' 6" (1.676 m)   Wt 89.8 kg (198 lb)   SpO2 100%   BMI 31.96 kg/m²   Body mass index is 31.96 kg/m².    Physical Exam:  Physical Exam  Constitutional:       General: She is not in acute distress.     Appearance: She is obese.   HENT:      Head: Normocephalic.      Mouth/Throat:      Mouth: Mucous membranes are moist.      Pharynx: Oropharynx is clear.   Eyes:      Conjunctiva/sclera: Conjunctivae normal.      Pupils: Pupils are equal, round, and reactive to light.   Cardiovascular:      Rate and Rhythm: Normal rate.      Heart sounds: Murmur heard.   Pulmonary:      Effort: No respiratory distress.      Breath sounds: No stridor. No wheezing or rhonchi.   Abdominal:      General: Bowel sounds are normal. There is no distension.      Palpations: Abdomen is soft.      Tenderness: There is no abdominal tenderness. There is no guarding.   Musculoskeletal:         General: Swelling present.   Skin:     General: Skin is warm and dry.      Coloration: Skin is not jaundiced.      Comments: Left arm HD graft   Neurological:      Mental Status: She is alert and oriented to person, place, and time.      Motor: Weakness present.   Psychiatric:         Mood and Affect: Mood normal.         Behavior: Behavior normal. "         Labs:  Recent Results (from the past 48 hour(s))   Comprehensive metabolic panel    Collection Time: 01/03/24  1:48 PM   Result Value Ref Range    Sodium Level 138 136 - 145 mmol/L    Potassium Level 4.0 3.5 - 5.1 mmol/L    Chloride 102 98 - 107 mmol/L    Carbon Dioxide 27 23 - 31 mmol/L    Glucose Level 212 (H) 82 - 115 mg/dL    Blood Urea Nitrogen 33.0 (H) 9.8 - 20.1 mg/dL    Creatinine 4.39 (H) 0.55 - 1.02 mg/dL    Calcium Level Total 9.0 8.4 - 10.2 mg/dL    Protein Total 6.1 5.8 - 7.6 gm/dL    Albumin Level 3.0 (L) 3.4 - 4.8 g/dL    Globulin 3.1 2.4 - 3.5 gm/dL    Albumin/Globulin Ratio 1.0 (L) 1.1 - 2.0 ratio    Bilirubin Total 0.3 <=1.5 mg/dL    Alkaline Phosphatase 108 40 - 150 unit/L    Alanine Aminotransferase 37 0 - 55 unit/L    Aspartate Aminotransferase 34 5 - 34 unit/L    eGFR 10 mls/min/1.73/m2   Protime-INR    Collection Time: 01/03/24  1:48 PM   Result Value Ref Range    PT 15.9 (H) 12.5 - 14.5 seconds    INR 1.3 <=1.3   Type & Screen    Collection Time: 01/03/24  1:48 PM   Result Value Ref Range    Group & Rh O POS     Indirect Gideon GEL NEG     Specimen Outdate 01/06/2024 23:59    CBC with Differential    Collection Time: 01/03/24  1:48 PM   Result Value Ref Range    WBC 7.40 4.50 - 11.50 x10(3)/mcL    RBC 1.90 (L) 4.20 - 5.40 x10(6)/mcL    Hgb 5.3 (LL) 12.0 - 16.0 g/dL    Hct 17.7 (LL) 37.0 - 47.0 %    MCV 93.2 80.0 - 94.0 fL    MCH 27.9 27.0 - 31.0 pg    MCHC 29.9 (L) 33.0 - 36.0 g/dL    RDW 23.9 (H) 11.5 - 17.0 %    Platelet 174 130 - 400 x10(3)/mcL    MPV 10.2 7.4 - 10.4 fL    Neut % 82.0 %    Lymph % 10.0 %    Mono % 6.4 %    Eos % 0.8 %    Basophil % 0.3 %    Lymph # 0.74 0.6 - 4.6 x10(3)/mcL    Neut # 6.07 2.1 - 9.2 x10(3)/mcL    Mono # 0.47 0.1 - 1.3 x10(3)/mcL    Eos # 0.06 0 - 0.9 x10(3)/mcL    Baso # 0.02 <=0.2 x10(3)/mcL    IG# 0.04 0 - 0.04 x10(3)/mcL    IG% 0.5 %    NRBC% 0.4 %   Blood Smear Microscopic Exam    Collection Time: 01/03/24  1:48 PM   Result Value Ref Range     RBC Morph Abnormal (A) Normal    Anisocytosis 1+ (A) (none)    Hypochromasia 1+ (A) (none)    Macrocytosis 1+ (A) (none)    Microcytosis 1+ (A) (none)    Poikilocytosis 1+ (A) (none)    Polychromasia 1+ (A) (none)    Stomatocytes 1+ (A) (none)    Target Cells 1+ (A) (none)    Platelets Normal Normal, Adequate   Prepare RBC 2 Units; rectal bleeding    Collection Time: 01/03/24  1:48 PM   Result Value Ref Range    UNIT NUMBER [id number]     UNIT ABO/RH O POS     DISPENSE STATUS Selected     Unit Expiration [id number]     Product Code H1363M53     Unit Blood Type Code 5100     CROSSMATCH INTERPRETATION Compatible     UNIT NUMBER [id number]     UNIT ABO/RH O POS     DISPENSE STATUS Issued     Unit Expiration [id number]     Product Code L6780G24     Unit Blood Type Code 5100     CROSSMATCH INTERPRETATION Compatible        No orders to display     Assessment/Plan:  Anemia- normocytic  History of GI bleeds: AVMs treated in 4th portion of duodenum in 2023 (see above); 8mm duodenal bulb polyp removed via cold snare 12/8/23  EGD push in AM  A fib on Eliquis- may want to consider a Watchman if possible  ESRD- on HD    Thank you for allowing us to participate in the care of [facility].    Sydnee Richard NP as scribe for Dr. Morgan Scales MD

## 2024-01-03 NOTE — FIRST PROVIDER EVALUATION
"Medical screening examination initiated.  I have conducted a focused provider triage encounter, findings are as follows:    Brief history of present illness:  Patient is a 78 year old female who presents to ER with dark stools onset yesterday. Family states dialysis called today and said that her H/H was low. TTS dialysis, full run yesterday. Denies weakness    Vitals:    01/03/24 1338   BP: 127/72   Pulse: 69   Resp: 20   Temp: 97.6 °F (36.4 °C)   TempSrc: Oral   SpO2: 96%   Weight: 89.8 kg (198 lb)   Height: 5' 6" (1.676 m)       Pertinent physical exam:  Awake and alert, NAD.    Brief workup plan:  Labs, UA    Preliminary workup initiated; this workup will be continued and followed by the physician or advanced practice provider that is assigned to the patient when roomed.  "

## 2024-01-04 ENCOUNTER — ANESTHESIA EVENT (OUTPATIENT)
Dept: ENDOSCOPY | Facility: HOSPITAL | Age: 79
DRG: 377 | End: 2024-01-04
Payer: MEDICARE

## 2024-01-04 ENCOUNTER — ANESTHESIA (OUTPATIENT)
Dept: ENDOSCOPY | Facility: HOSPITAL | Age: 79
DRG: 377 | End: 2024-01-04
Payer: MEDICARE

## 2024-01-04 LAB
ALBUMIN SERPL-MCNC: 2.9 G/DL (ref 3.4–4.8)
ALBUMIN/GLOB SERPL: 0.9 RATIO (ref 1.1–2)
ALP SERPL-CCNC: 88 UNIT/L (ref 40–150)
ALT SERPL-CCNC: 38 UNIT/L (ref 0–55)
AST SERPL-CCNC: 31 UNIT/L (ref 5–34)
BASOPHILS # BLD AUTO: 0.03 X10(3)/MCL
BASOPHILS NFR BLD AUTO: 0.5 %
BILIRUB SERPL-MCNC: 0.5 MG/DL
BUN SERPL-MCNC: 39.1 MG/DL (ref 9.8–20.1)
CALCIUM SERPL-MCNC: 8.7 MG/DL (ref 8.4–10.2)
CHLORIDE SERPL-SCNC: 100 MMOL/L (ref 98–107)
CO2 SERPL-SCNC: 27 MMOL/L (ref 23–31)
CREAT SERPL-MCNC: 4.43 MG/DL (ref 0.55–1.02)
EOSINOPHIL # BLD AUTO: 0.1 X10(3)/MCL (ref 0–0.9)
EOSINOPHIL NFR BLD AUTO: 1.6 %
ERYTHROCYTE [DISTWIDTH] IN BLOOD BY AUTOMATED COUNT: 21.3 % (ref 11.5–17)
EST. AVERAGE GLUCOSE BLD GHB EST-MCNC: 119.8 MG/DL
GFR SERPLBLD CREATININE-BSD FMLA CKD-EPI: 10 MLS/MIN/1.73/M2
GLOBULIN SER-MCNC: 3.1 GM/DL (ref 2.4–3.5)
GLUCOSE SERPL-MCNC: 51 MG/DL (ref 82–115)
HBA1C MFR BLD: 5.8 %
HCT VFR BLD AUTO: 22.7 % (ref 37–47)
HCT VFR BLD AUTO: 25.3 % (ref 37–47)
HGB BLD-MCNC: 7.1 G/DL (ref 12–16)
HGB BLD-MCNC: 7.7 G/DL (ref 12–16)
IMM GRANULOCYTES # BLD AUTO: 0.03 X10(3)/MCL (ref 0–0.04)
IMM GRANULOCYTES NFR BLD AUTO: 0.5 %
LYMPHOCYTES # BLD AUTO: 0.84 X10(3)/MCL (ref 0.6–4.6)
LYMPHOCYTES NFR BLD AUTO: 13.5 %
MAGNESIUM SERPL-MCNC: 1.9 MG/DL (ref 1.6–2.6)
MCH RBC QN AUTO: 28.2 PG (ref 27–31)
MCHC RBC AUTO-ENTMCNC: 31.3 G/DL (ref 33–36)
MCV RBC AUTO: 90.1 FL (ref 80–94)
MONOCYTES # BLD AUTO: 0.53 X10(3)/MCL (ref 0.1–1.3)
MONOCYTES NFR BLD AUTO: 8.5 %
NEUTROPHILS # BLD AUTO: 4.68 X10(3)/MCL (ref 2.1–9.2)
NEUTROPHILS NFR BLD AUTO: 75.4 %
NRBC BLD AUTO-RTO: 1 %
PHOSPHATE SERPL-MCNC: 4.5 MG/DL (ref 2.3–4.7)
PLATELET # BLD AUTO: 169 X10(3)/MCL (ref 130–400)
PMV BLD AUTO: 10.5 FL (ref 7.4–10.4)
POCT GLUCOSE: 148 MG/DL (ref 70–110)
POCT GLUCOSE: 192 MG/DL (ref 70–110)
POCT GLUCOSE: 61 MG/DL (ref 70–110)
POTASSIUM SERPL-SCNC: 3.7 MMOL/L (ref 3.5–5.1)
PROT SERPL-MCNC: 6 GM/DL (ref 5.8–7.6)
RBC # BLD AUTO: 2.52 X10(6)/MCL (ref 4.2–5.4)
SODIUM SERPL-SCNC: 137 MMOL/L (ref 136–145)
TSH SERPL-ACNC: 2.23 UIU/ML (ref 0.35–4.94)
WBC # SPEC AUTO: 6.21 X10(3)/MCL (ref 4.5–11.5)

## 2024-01-04 PROCEDURE — C9113 INJ PANTOPRAZOLE SODIUM, VIA: HCPCS | Performed by: INTERNAL MEDICINE

## 2024-01-04 PROCEDURE — D9220A PRA ANESTHESIA: Mod: CRNA,,, | Performed by: NURSE ANESTHETIST, CERTIFIED REGISTERED

## 2024-01-04 PROCEDURE — 25000003 PHARM REV CODE 250: Performed by: NURSE PRACTITIONER

## 2024-01-04 PROCEDURE — 0DJ08ZZ INSPECTION OF UPPER INTESTINAL TRACT, VIA NATURAL OR ARTIFICIAL OPENING ENDOSCOPIC: ICD-10-PCS | Performed by: SPECIALIST

## 2024-01-04 PROCEDURE — D9220A PRA ANESTHESIA: Mod: ANES,,, | Performed by: ANESTHESIOLOGY

## 2024-01-04 PROCEDURE — 25000003 PHARM REV CODE 250: Performed by: ANESTHESIOLOGY

## 2024-01-04 PROCEDURE — 84100 ASSAY OF PHOSPHORUS: CPT | Performed by: INTERNAL MEDICINE

## 2024-01-04 PROCEDURE — 80053 COMPREHEN METABOLIC PANEL: CPT | Performed by: PHYSICIAN ASSISTANT

## 2024-01-04 PROCEDURE — 11000001 HC ACUTE MED/SURG PRIVATE ROOM

## 2024-01-04 PROCEDURE — 84443 ASSAY THYROID STIM HORMONE: CPT | Performed by: INTERNAL MEDICINE

## 2024-01-04 PROCEDURE — 83735 ASSAY OF MAGNESIUM: CPT | Performed by: INTERNAL MEDICINE

## 2024-01-04 PROCEDURE — 44360 SMALL BOWEL ENDOSCOPY: CPT | Performed by: INTERNAL MEDICINE

## 2024-01-04 PROCEDURE — G0257 UNSCHED DIALYSIS ESRD PT HOS: HCPCS

## 2024-01-04 PROCEDURE — 5A1D70Z PERFORMANCE OF URINARY FILTRATION, INTERMITTENT, LESS THAN 6 HOURS PER DAY: ICD-10-PCS | Performed by: STUDENT IN AN ORGANIZED HEALTH CARE EDUCATION/TRAINING PROGRAM

## 2024-01-04 PROCEDURE — 85025 COMPLETE CBC W/AUTO DIFF WBC: CPT | Performed by: PHYSICIAN ASSISTANT

## 2024-01-04 PROCEDURE — 25000003 PHARM REV CODE 250: Performed by: INTERNAL MEDICINE

## 2024-01-04 PROCEDURE — 37000008 HC ANESTHESIA 1ST 15 MINUTES: Performed by: INTERNAL MEDICINE

## 2024-01-04 PROCEDURE — 63600175 PHARM REV CODE 636 W HCPCS: Performed by: NURSE ANESTHETIST, CERTIFIED REGISTERED

## 2024-01-04 PROCEDURE — 83036 HEMOGLOBIN GLYCOSYLATED A1C: CPT | Performed by: INTERNAL MEDICINE

## 2024-01-04 PROCEDURE — 25000242 PHARM REV CODE 250 ALT 637 W/ HCPCS: Performed by: INTERNAL MEDICINE

## 2024-01-04 PROCEDURE — 21400001 HC TELEMETRY ROOM

## 2024-01-04 PROCEDURE — 85018 HEMOGLOBIN: CPT | Performed by: INTERNAL MEDICINE

## 2024-01-04 PROCEDURE — 25000003 PHARM REV CODE 250: Performed by: NURSE ANESTHETIST, CERTIFIED REGISTERED

## 2024-01-04 PROCEDURE — 37000009 HC ANESTHESIA EA ADD 15 MINS: Performed by: INTERNAL MEDICINE

## 2024-01-04 PROCEDURE — 63600175 PHARM REV CODE 636 W HCPCS: Performed by: INTERNAL MEDICINE

## 2024-01-04 RX ORDER — LIDOCAINE HYDROCHLORIDE 10 MG/ML
1 INJECTION, SOLUTION EPIDURAL; INFILTRATION; INTRACAUDAL; PERINEURAL ONCE
Status: CANCELLED | OUTPATIENT
Start: 2024-01-04 | End: 2024-01-04

## 2024-01-04 RX ORDER — SODIUM CHLORIDE 9 MG/ML
INJECTION, SOLUTION INTRAVENOUS
Status: CANCELLED | OUTPATIENT
Start: 2024-01-04

## 2024-01-04 RX ORDER — IPRATROPIUM BROMIDE AND ALBUTEROL SULFATE 2.5; .5 MG/3ML; MG/3ML
SOLUTION RESPIRATORY (INHALATION)
Status: DISPENSED
Start: 2024-01-04 | End: 2024-01-04

## 2024-01-04 RX ORDER — LIDOCAINE HYDROCHLORIDE 10 MG/ML
INJECTION, SOLUTION EPIDURAL; INFILTRATION; INTRACAUDAL; PERINEURAL
Status: COMPLETED
Start: 2024-01-04 | End: 2024-01-04

## 2024-01-04 RX ORDER — SODIUM CHLORIDE, SODIUM GLUCONATE, SODIUM ACETATE, POTASSIUM CHLORIDE AND MAGNESIUM CHLORIDE 30; 37; 368; 526; 502 MG/100ML; MG/100ML; MG/100ML; MG/100ML; MG/100ML
INJECTION, SOLUTION INTRAVENOUS CONTINUOUS
Status: CANCELLED | OUTPATIENT
Start: 2024-01-04 | End: 2024-02-03

## 2024-01-04 RX ORDER — PROPOFOL 10 MG/ML
VIAL (ML) INTRAVENOUS
Status: COMPLETED
Start: 2024-01-04 | End: 2024-01-04

## 2024-01-04 RX ORDER — SODIUM CHLORIDE 9 MG/ML
INJECTION, SOLUTION INTRAVENOUS ONCE
Status: CANCELLED | OUTPATIENT
Start: 2024-01-04 | End: 2024-01-04

## 2024-01-04 RX ORDER — PROPOFOL 10 MG/ML
VIAL (ML) INTRAVENOUS
Status: DISCONTINUED | OUTPATIENT
Start: 2024-01-04 | End: 2024-01-04

## 2024-01-04 RX ORDER — MUPIROCIN 20 MG/G
OINTMENT TOPICAL 2 TIMES DAILY
Status: COMPLETED | OUTPATIENT
Start: 2024-01-04 | End: 2024-01-08

## 2024-01-04 RX ORDER — SODIUM, POTASSIUM,MAG SULFATES 17.5-3.13G
1 SOLUTION, RECONSTITUTED, ORAL ORAL 2 TIMES DAILY
Status: CANCELLED | OUTPATIENT
Start: 2024-01-04 | End: 2024-01-05

## 2024-01-04 RX ORDER — PROPOFOL 10 MG/ML
VIAL (ML) INTRAVENOUS CONTINUOUS PRN
Status: DISCONTINUED | OUTPATIENT
Start: 2024-01-04 | End: 2024-01-04

## 2024-01-04 RX ORDER — LIDOCAINE HYDROCHLORIDE 10 MG/ML
INJECTION, SOLUTION EPIDURAL; INFILTRATION; INTRACAUDAL; PERINEURAL
Status: DISCONTINUED | OUTPATIENT
Start: 2024-01-04 | End: 2024-01-04

## 2024-01-04 RX ORDER — SODIUM CHLORIDE 9 MG/ML
INJECTION, SOLUTION INTRAVENOUS CONTINUOUS
Status: DISCONTINUED | OUTPATIENT
Start: 2024-01-04 | End: 2024-01-05

## 2024-01-04 RX ORDER — IPRATROPIUM BROMIDE AND ALBUTEROL SULFATE 2.5; .5 MG/3ML; MG/3ML
3 SOLUTION RESPIRATORY (INHALATION)
Status: DISCONTINUED | OUTPATIENT
Start: 2024-01-04 | End: 2024-01-04 | Stop reason: HOSPADM

## 2024-01-04 RX ADMIN — PANTOPRAZOLE SODIUM 40 MG: 40 INJECTION, POWDER, FOR SOLUTION INTRAVENOUS at 09:01

## 2024-01-04 RX ADMIN — SEVELAMER CARBONATE 1600 MG: 800 TABLET, FILM COATED ORAL at 09:01

## 2024-01-04 RX ADMIN — AMLODIPINE BESYLATE 5 MG: 5 TABLET ORAL at 09:01

## 2024-01-04 RX ADMIN — LEVOTHYROXINE SODIUM 125 MCG: 125 TABLET ORAL at 06:01

## 2024-01-04 RX ADMIN — DEXTROSE MONOHYDRATE 100 ML: 100 INJECTION, SOLUTION INTRAVENOUS at 10:01

## 2024-01-04 RX ADMIN — DIPHENHYDRAMINE HYDROCHLORIDE 50 MG: 25 CAPSULE ORAL at 09:01

## 2024-01-04 RX ADMIN — IPRATROPIUM BROMIDE AND ALBUTEROL SULFATE 3 ML: .5; 3 SOLUTION RESPIRATORY (INHALATION) at 10:01

## 2024-01-04 RX ADMIN — CARVEDILOL 6.25 MG: 3.12 TABLET, FILM COATED ORAL at 09:01

## 2024-01-04 RX ADMIN — MUPIROCIN: 20 OINTMENT TOPICAL at 09:01

## 2024-01-04 RX ADMIN — INSULIN DETEMIR 15 UNITS: 100 INJECTION, SOLUTION SUBCUTANEOUS at 09:01

## 2024-01-04 RX ADMIN — SODIUM CHLORIDE: 9 INJECTION, SOLUTION INTRAVENOUS at 11:01

## 2024-01-04 RX ADMIN — ATORVASTATIN CALCIUM 40 MG: 40 TABLET, FILM COATED ORAL at 09:01

## 2024-01-04 RX ADMIN — PROPOFOL 150 MCG/KG/MIN: 10 INJECTION, EMULSION INTRAVENOUS at 12:01

## 2024-01-04 RX ADMIN — PROPOFOL 100 MG: 10 INJECTION, EMULSION INTRAVENOUS at 12:01

## 2024-01-04 RX ADMIN — LIDOCAINE HYDROCHLORIDE 50 MG: 10 INJECTION, SOLUTION EPIDURAL; INFILTRATION; INTRACAUDAL; PERINEURAL at 12:01

## 2024-01-04 RX ADMIN — DULOXETINE HYDROCHLORIDE 30 MG: 30 CAPSULE, DELAYED RELEASE ORAL at 09:01

## 2024-01-04 NOTE — H&P
[facility] Medicine History & Physical Examination       Patient Name: Rosette Madrid  MRN: [id number]  Patient Class: IP- Inpatient   Admission Date: 1/3/2024   Admitting Physician: ANGELO Service   Length of Stay: 0  Attending Physician: Duarte Lo MD  Primary Care Provider: Margarita Dior FNP  Face-to-Face encounter date: 01/03/2024  Code Status: Full Code  Chief Complaint: Rectal Bleeding (C/o black stools x2 days with abnormal lab work yesterday from dialysis, on Eliquis. HD on TTS, last run yesterday. Denies weakness, SOB, or CP. )          HISTORY OF PRESENT ILLNESS:     77 yo AA woman with recent diagnosis of 8mm duodenal bulb polyp, gastric angiodysplasia and 5 mm duodenal polyp, ESRD on HD MWF, Afib on eliquis,  CAD with CABG x4, HFpEF, COPD gold stage II, hypertension, insulin-dependent type 2 diabetes, hypothyroidism, schizophrenia.    She was recently seen for GIB, diagnosed with  discharged on 12/9/23. She was diagnosed with 8mm duodenal polyp which was removed with a cold snare. Eliquis was resumed at [facility]. 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    Today she presents with worsening melena. Denies abd pain , diarrhea, nausea or any other GI symptoms. Also denies SOB, syn cope, dizziness. Denies NSAID use. Lives with daughter at baseline. At presentation she was HDS. Labs showed acute drop in Hb to 5.3 from last labs, ESRD. Received iv ppi, two units of PRBCs were ordered and then admitted to  for further eval and management of acute anemia.      PAST MEDICAL HISTORY:     Past Medical History:   Diagnosis Date    CKD (chronic kidney disease) requiring chronic dialysis     COPD (chronic obstructive pulmonary disease)     Diabetes mellitus     Hyperlipidemia     Hypertension     Renal insufficiency     Thyroid disease        PAST SURGICAL HISTORY:     Past Surgical History:   Procedure Laterality Date    AV FISTULA PLACEMENT Left      CARDIAC CATHETERIZATION      CARDIAC VALVE REPLACEMENT      COLONOSCOPY      CORONARY ARTERY BYPASS GRAFT      EGD, WITH HEMORRHAGE CONTROL  03/24/2023    Procedure: EGD,WITH HEMORRHAGE CONTROL;  Surgeon: Go Le MD;  Location: [facility] ENDOSCOPY;  Service: Gastroenterology;;    EGD, WITH HEMORRHAGE CONTROL  04/06/2023    Procedure: EGD,WITH HEMORRHAGE CONTROL;  Surgeon: Ayden Francois MD;  Location: [facility] ENDOSCOPY;  Service: Gastroenterology;;    EGD, WITH POLYPECTOMY USING SNARE Left 12/8/2023    Procedure: EGD, WITH POLYPECTOMY USING SNARE;  Surgeon: Lexi Scales MD;  Location: [facility] ENDOSCOPY;  Service: Gastroenterology;  Laterality: Left;    ESOPHAGOGASTRODUODENOSCOPY      ESOPHAGOGASTRODUODENOSCOPY N/A 02/17/2023    Procedure: EGD +/- VCE PLACEMENT;  Surgeon: Morgan Gardner MD;  Location: [facility] ENDOSCOPY;  Service: Gastroenterology;  Laterality: N/A;    ESOPHAGOGASTRODUODENOSCOPY N/A 03/24/2023    Procedure: EGD;  Surgeon: Go Le MD;  Location: [facility] ENDOSCOPY;  Service: Gastroenterology;  Laterality: N/A;  with push    ESOPHAGOGASTRODUODENOSCOPY N/A 04/06/2023    Procedure: EGD;  Surgeon: Ayden Francois MD;  Location: [facility] ENDOSCOPY;  Service: Gastroenterology;  Laterality: N/A;  with push    ESOPHAGOGASTRODUODENOSCOPY N/A 06/16/2023    Procedure: EGD W/ PUSH;  Surgeon: Morgan Gardner MD;  Location: [facility] ENDOSCOPY;  Service: Gastroenterology;  Laterality: N/A;    EYE SURGERY      POLYPECTOMY      SMALL BOWEL ENTEROSCOPY Left 01/20/2023    Procedure: ENTEROSCOPY;  Surgeon: Lexi Scales MD;  Location: [facility] ENDOSCOPY;  Service: Gastroenterology;  Laterality: Left;    THYROIDECTOMY      TUBAL LIGATION         ALLERGIES:   Lisinopril, Azithromycin, Baclofen, and Doxycycline    FAMILY HISTORY:   Reviewed and negative    SOCIAL HISTORY:     Social History     Tobacco Use    Smoking status: Some Days     Current packs/day: 2.00     Average  "packs/day: 2.0 packs/day for 15.0 years (30.0 ttl pk-yrs)     Types: Cigarettes    Smokeless tobacco: Never    Tobacco comments:     Smokes 3 cigarettes a day   Substance Use Topics    Alcohol use: Not Currently     Comment: 1 glass every 2-3 month        HOME MEDICATIONS:     Prior to Admission medications    Medication Sig Start Date End Date Taking? Authorizing Provider   albuterol (VENTOLIN HFA) 90 mcg/actuation inhaler Inhale 2 puffs into the lungs every 6 (six) hours as needed for Wheezing. Rescue 9/16/23   Margarita Dior FNP   albuterol-ipratropium (DUO-NEB) 2.5 mg-0.5 mg/3 mL nebulizer solution Take 3 mLs by nebulization every 6 (six) hours as needed for Wheezing or Shortness of Breath. Rescue 12/19/22   Margarita Dior FNP   amLODIPine (NORVASC) 5 MG tablet Take 1 tablet (5 mg total) by mouth once daily. 11/29/23 11/28/24  Zakiya Torres PA-C   apixaban (ELIQUIS) 5 mg Tab Take 1 tablet (5 mg total) by mouth 2 (two) times daily. 11/29/23 11/28/24  Zakiya Torres PA-C   BD ULTRA-FINE MINI PEN NEEDLE 31 gauge x 3/16" Ndle Inject into the skin 2 (two) times daily. 11/22/23   Provider, Historical   BELSOMRA 5 mg Tab Take 1 tablet by mouth every evening. 10/26/23   Provider, Historical   BENADRYL 25 mg capsule Take 50 mg by mouth nightly. 2/23/22   Provider, Historical   carvediloL (COREG) 6.25 MG tablet Take 1 tablet (6.25 mg total) by mouth 2 (two) times daily. 11/29/23 11/28/24  Zakiya Torres PA-C   clonazePAM (KLONOPIN) 0.5 MG tablet Take 0.5 mg by mouth every evening. 7/13/22   Provider, Historical   DIALYVITE 100-1 mg Tab Take 1 tablet by mouth once daily. 8/13/22   Provider, Historical   DULoxetine (CYMBALTA) 30 MG capsule Take 30 mg by mouth once daily. 7/27/22   Provider, Historical   fluticasone furoate-vilanteroL (BREO) 100-25 mcg/dose diskus inhaler Inhale 1 puff into the lungs once daily. 9/1/23   Margarita Dior, JAIMEP   furosemide (LASIX) 40 MG tablet Take 1 tablet (40 mg total) " "by mouth once daily. 11/29/23 11/28/24  DaZakiya de luna PA-C   glipiZIDE (GLUCOTROL) 10 MG tablet Take 1 tablet (10 mg total) by mouth daily with breakfast. 9/1/23   Margarita Dior FNP   insulin detemir U-100, Levemir, (LEVEMIR FLEXPEN) 100 unit/mL (3 mL) InPn pen Inject 30 Units into the skin every evening. 12/14/23   Margarita Dior FNP   INVEGA SUSTENNA 156 mg/mL Syrg injection Inject into the muscle. 8/2/22   Provider, Historical   L-METHYLFOLATE 15 mg Tab Take 1 tablet by mouth. 4/26/23   Provider, Historical   lactulose (CHRONULAC) 10 gram/15 mL solution Take 30 mLs by mouth. 4/18/23   Provider, Historical   levothyroxine (SYNTHROID) 125 MCG tablet Take 1 tablet (125 mcg total) by mouth before breakfast. 9/1/23   Margarita Dior FNP   loratadine (CLARITIN) 10 mg tablet Take 1 tablet (10 mg total) by mouth once daily. for 14 days 12/14/23 12/28/23  Margarita Dior FNP   ONETOUCH DELICA PLUS LANCET 30 gauge Misc 1 lancet by Other route once daily. 5/9/22   Margarita Dior FNP   ONETOUCH ULTRA TEST Strp 1 strip by Other route once daily. 11/23/22   Margarita Dior FNP   pantoprazole (PROTONIX) 40 MG tablet Take 40 mg by mouth 2 (two) times daily. 9/26/23   Provider, Historical   pen needle, diabetic 31 gauge x 5/16" Ndle 2 Units by Misc.(Non-Drug; Combo Route) route 2 (two) times a day. Patient to check blood sugars twice daily (morning and night) 3/1/23   Margarita Dior FNP   rosuvastatin (CRESTOR) 40 MG Tab Take 1 tablet (40 mg total) by mouth once daily. 11/29/23 11/28/24  DauterZakiya marlow PA-C   sevelamer carbonate (RENVELA) 800 mg Tab Take 2 tablets (1,600 mg total) by mouth 3 (three) times daily. 3/25/23   Noah Cowan MD   tiotropium (SPIRIVA WITH HANDIHALER) 18 mcg inhalation capsule Inhale 1 capsule (18 mcg total) into the lungs Daily. 9/1/23   Margarita Dior, FNP       REVIEW OF SYSTEMS:   Except as documented, all other systems reviewed and negative     PHYSICAL EXAM:     VITAL " SIGNS: 24 HRS MIN & MAX LAST   Temp  Min: 97.6 °F (36.4 °C)  Max: 97.7 °F (36.5 °C) 97.7 °F (36.5 °C)   BP  Min: 125/62  Max: 145/62 (!) 145/62   Pulse  Min: 69  Max: 72  70   Resp  Min: 18  Max: 23 18   SpO2  Min: 95 %  Max: 100 % 95 %     General appearance: Well-developed, well-nourished female in no apparent distress.  HENT: Atraumatic head. Moist mucous membranes of oral cavity.  Eyes: Normal extraocular movements.   Neck: Supple.   Lungs: Clear to auscultation bilaterally. No wheezing present.   Heart: Regular rate and rhythm. S1 and S2 present with no murmurs/gallop/rub. No pedal edema. No JVD present.   Abdomen: Soft, non-distended, non-tender. No rebound tenderness/guarding. Bowel sounds are normal.   Extremities: No cyanosis, clubbing, or edema.  Skin: No Rash.   Neuro: Motor and sensory exams grossly intact. Good tone. Muscle strength 5/5 in all 4 extremities  Psych/mental status: Appropriate mood and affect. Responds appropriately to questions.     LABS AND IMAGING:     Recent Labs   Lab 01/03/24  1348   WBC 7.40   RBC 1.90*   HGB 5.3*   HCT 17.7*   MCV 93.2   MCH 27.9   MCHC 29.9*   RDW 23.9*      MPV 10.2       Recent Labs   Lab 01/03/24  1348      K 4.0   CO2 27   BUN 33.0*   CREATININE 4.39*   CALCIUM 9.0   ALBUMIN 3.0*   ALKPHOS 108   ALT 37   AST 34   BILITOT 0.3       Microbiology Results (last 7 days)       ** No results found for the last 168 hours. **             Nuclear Stress - Cardiology Interpreted    Abnormal myocardial perfusion scan.    There is a moderate to severe intensity, moderate sized, mostly   reversible perfusion abnormality with some fixed areas in the lateral   apical wall(s) in the typical distribution of the LCX territory.    There are no other significant perfusion abnormalities.    The gated perfusion images showed an ejection fraction of 62% at rest.   The gated perfusion images showed an ejection fraction of 63% post stress.    There were no arrhythmias  during stress.    The nuclear stress test is  fair quality.    On the nuclear stress test the EF is normal.  If the patient has an echo,   the EF on the echo is considered more accurate.        The patient has a moderate risk nuclear stress test.     If patient is symptomatic, consider management with two anti-anginals         Echo    Left Ventricle: The left ventricle is normal in size. Normal wall   thickness. There is normal systolic function. Biplane (2D) method of discs   ejection fraction is 68%.    Right Ventricle: Normal right ventricular cavity size. Systolic   function is borderline low.    Left Atrium: Left atrium is mildly dilated.    Right Atrium: Right atrium is mildly dilated.    Aortic Valve: The aortic valve is a trileaflet valve. There is moderate   aortic valve sclerosis.    Mitral Valve: There is severe posterior mitral annular calcification   present. There is moderate to severe regurgitation.    Tricuspid Valve: There is moderate annular calcification present. There   is moderate regurgitation.    IVC/SVC: Intermediate venous pressure at 8 mmHg.      ASSESSMENT & PLAN:       Acute anemia due to GIB  H/o AVM, duodenal polyp    Hx: ESRD on HD MWF, Afib on eliquis,  CAD with CABG x4, HFpEF, COPD gold stage II, hypertension, insulin-dependent type 2 diabetes, hypothyroidism, schizophrenia.    Plan:  - Transfuse 2 units of blood, keep iv protonix, consult GI. Hold Eliquis  - CLD, NPO from midnight  - Resume Detemir at low dose, keep correction aspart  - Will review and renew other apt home meds    SCDs    Critical care diagnosis: Acute anemia due to GI bleed requiring blood transfusion  Critical care time: 50 minutes      __________________________________________________________________________  INPATIENT LIST OF MEDICATIONS     Current Facility-Administered Medications:     0.9%  NaCl infusion (for blood administration), , Intravenous, Q24H PRN, Navdeep Rae IV, MD    acetaminophen tablet 650  "mg, 650 mg, Oral, Q8H PRN, Judith Rubi, PA-C    acetaminophen tablet 650 mg, 650 mg, Oral, Q4H PRN, Judith Rubi, PA-KALE    dextrose 10% bolus 125 mL 125 mL, 12.5 g, Intravenous, PRN, Judith Rubi, PA-C    dextrose 10% bolus 250 mL 250 mL, 25 g, Intravenous, PRN, Judith Rubi, PA-KALE    glucagon (human recombinant) injection 1 mg, 1 mg, Intramuscular, PRN, Judith Rubi, PA-C    glucose chewable tablet 16 g, 16 g, Oral, PRN, Judith Rubi, PA-C    glucose chewable tablet 24 g, 24 g, Oral, PRN, Judith Rubi, PA-KALE    ondansetron injection 4 mg, 4 mg, Intravenous, Q4H PRN, Judith Rubi, PA-KALE    sodium chloride 0.9% flush 10 mL, 10 mL, Intravenous, Q12H PRN, Judith Rubi, PA-KALE    Current Outpatient Medications:     albuterol (VENTOLIN HFA) 90 mcg/actuation inhaler, Inhale 2 puffs into the lungs every 6 (six) hours as needed for Wheezing. Rescue, Disp: 18 g, Rfl: 1    albuterol-ipratropium (DUO-NEB) 2.5 mg-0.5 mg/3 mL nebulizer solution, Take 3 mLs by nebulization every 6 (six) hours as needed for Wheezing or Shortness of Breath. Rescue, Disp: 75 mL, Rfl: 0    amLODIPine (NORVASC) 5 MG tablet, Take 1 tablet (5 mg total) by mouth once daily., Disp: 30 tablet, Rfl: 11    apixaban (ELIQUIS) 5 mg Tab, Take 1 tablet (5 mg total) by mouth 2 (two) times daily., Disp: 60 tablet, Rfl: 11    BD ULTRA-FINE MINI PEN NEEDLE 31 gauge x 3/16" Ndle, Inject into the skin 2 (two) times daily., Disp: , Rfl:     BELSOMRA 5 mg Tab, Take 1 tablet by mouth every evening., Disp: , Rfl:     BENADRYL 25 mg capsule, Take 50 mg by mouth nightly., Disp: , Rfl:     carvediloL (COREG) 6.25 MG tablet, Take 1 tablet (6.25 mg total) by mouth 2 (two) times daily., Disp: 180 tablet, Rfl: 3    clonazePAM (KLONOPIN) 0.5 MG tablet, Take 0.5 mg by mouth every evening., Disp: , Rfl:     DIALYVITE 100-1 mg Tab, Take 1 tablet by mouth once daily., Disp: , Rfl:     DULoxetine (CYMBALTA) 30 MG capsule, Take 30 mg by mouth " "once daily., Disp: , Rfl:     fluticasone furoate-vilanteroL (BREO) 100-25 mcg/dose diskus inhaler, Inhale 1 puff into the lungs once daily., Disp: 90 each, Rfl: 2    furosemide (LASIX) 40 MG tablet, Take 1 tablet (40 mg total) by mouth once daily., Disp: 90 tablet, Rfl: 3    glipiZIDE (GLUCOTROL) 10 MG tablet, Take 1 tablet (10 mg total) by mouth daily with breakfast., Disp: 90 tablet, Rfl: 2    insulin detemir U-100, Levemir, (LEVEMIR FLEXPEN) 100 unit/mL (3 mL) InPn pen, Inject 30 Units into the skin every evening., Disp: 27 mL, Rfl: 2    INVEGA SUSTENNA 156 mg/mL Syrg injection, Inject into the muscle., Disp: , Rfl:     L-METHYLFOLATE 15 mg Tab, Take 1 tablet by mouth., Disp: , Rfl:     lactulose (CHRONULAC) 10 gram/15 mL solution, Take 30 mLs by mouth., Disp: , Rfl:     levothyroxine (SYNTHROID) 125 MCG tablet, Take 1 tablet (125 mcg total) by mouth before breakfast., Disp: 90 tablet, Rfl: 2    loratadine (CLARITIN) 10 mg tablet, Take 1 tablet (10 mg total) by mouth once daily. for 14 days, Disp: 14 tablet, Rfl: 0    ONETOUCH DELICA PLUS LANCET 30 gauge Misc, 1 lancet by Other route once daily., Disp: 100 each, Rfl: 2    ONETOUCH ULTRA TEST Strp, 1 strip by Other route once daily., Disp: 200 strip, Rfl: 2    pantoprazole (PROTONIX) 40 MG tablet, Take 40 mg by mouth 2 (two) times daily., Disp: , Rfl:     pen needle, diabetic 31 gauge x 5/16" Ndle, 2 Units by Misc.(Non-Drug; Combo Route) route 2 (two) times a day. Patient to check blood sugars twice daily (morning and night), Disp: 200 each, Rfl: 2    rosuvastatin (CRESTOR) 40 MG Tab, Take 1 tablet (40 mg total) by mouth once daily., Disp: 90 tablet, Rfl: 3    sevelamer carbonate (RENVELA) 800 mg Tab, Take 2 tablets (1,600 mg total) by mouth 3 (three) times daily., Disp: 90 tablet, Rfl: 0    tiotropium (SPIRIVA WITH HANDIHALER) 18 mcg inhalation capsule, Inhale 1 capsule (18 mcg total) into the lungs Daily., Disp: 90 capsule, Rfl: 2      Scheduled " Meds:  Continuous Infusions:  PRN Meds:.0.9%  NaCl infusion (for blood administration), acetaminophen, acetaminophen, dextrose 10%, dextrose 10%, glucagon (human recombinant), glucose, glucose, ondansetron, sodium chloride 0.9%      Duarte Lo MD   01/03/2024

## 2024-01-04 NOTE — PROGRESS NOTES
[facility] Medicine Progress Note        Chief Complaint: Inpatient Follow-up for     HPI:   79 yo AA woman with recent diagnosis of 8mm duodenal bulb polyp, gastric angiodysplasia and 5 mm duodenal polyp, ESRD on HD MWF, Afib on eliquis,  CAD with CABG x4, HFpEF, COPD gold stage II, hypertension, insulin-dependent type 2 diabetes, hypothyroidism, schizophrenia.     She was recently seen for GIB, diagnosed with  discharged on 12/9/23. She was diagnosed with 8mm duodenal polyp which was removed with a cold snare. [facility]. 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     Presented with worsening melena. Denied abd pain , diarrhea, nausea or any other GI symptoms. Also denies SOB, syncope, dizziness. Denies NSAID use. Lives with daughter at baseline. At presentation she was HDS. Labs showed acute drop in Hb to 5.3 from last labs, ESRD. Received iv ppi, two units of PRBCs were ordered and then admitted to  for further eval and management of acute anemia.    Interval Hx:   Hemodynamically stable overnight.  Doing well on room air.  She was alert, comfortably resting in the bed.  No family members were seen at bedside.  Scheduled for EGD today.  Tolerating p.o. with no issues.  Denies any shortness a breath, chest pain,  nausea or vomiting.  Hemoglobin improved appropriately with transfusion.  Low sugars noted this morning    Objective/physical exam:  Vitals:    01/04/24 0401 01/04/24 0431 01/04/24 0502 01/04/24 0639   BP: 125/74 127/69 135/69 (!) 126/52   BP Location:    Right arm   Pulse: (!) 59 (!) 58 62 60   Resp: 14 17 20 20   Temp:       TempSrc:       SpO2: 98% 98% 96% 95%   Weight:       Height:         General: In no acute distress, afebrile  Respiratory: Clear to auscultation bilaterally  Cardiovascular: S1, S2, no appreciable murmur  Abdomen: Soft, nontender, BS +  MSK: Warm, no lower extremity edema, no clubbing or  cyanosis  Neurologic: Alert and oriented x4, moving all extremities with good strength     Lab Results   Component Value Date     01/04/2024    K 3.7 01/04/2024    CO2 27 01/04/2024    BUN 39.1 (H) 01/04/2024    CREATININE 4.43 (H) 01/04/2024    CALCIUM 8.7 01/04/2024    EGFRNONAA 10 03/22/2022      Lab Results   Component Value Date    ALT 38 01/04/2024    AST 31 01/04/2024    ALKPHOS 88 01/04/2024    BILITOT 0.5 01/04/2024      Lab Results   Component Value Date    WBC 6.21 01/04/2024    HGB 7.1 (L) 01/04/2024    HCT 22.7 (L) 01/04/2024    MCV 90.1 01/04/2024     01/04/2024           Medications:   amLODIPine  5 mg Oral Daily    atorvastatin  40 mg Oral Daily    carvediloL  6.25 mg Oral BID    diphenhydrAMINE  50 mg Oral Nightly    DULoxetine  30 mg Oral Daily    fluticasone furoate-vilanteroL  1 puff Inhalation Daily    insulin detemir U-100  15 Units Subcutaneous QHS    levothyroxine  125 mcg Oral Before breakfast    pantoprazole  40 mg Intravenous BID    sevelamer carbonate  1,600 mg Oral TID    tiotropium bromide  2 puff Inhalation Daily      0.9%  NaCl infusion (for blood administration), acetaminophen, acetaminophen, albuterol, albuterol-ipratropium, dextrose 10%, dextrose 10%, dextrose 10%, dextrose 10%, glucagon (human recombinant), glucagon (human recombinant), glucose, glucose, glucose, glucose, insulin aspart U-100, ondansetron, sodium chloride 0.9%     Assessment/Plan:    Acute anemia due to GIB s/p 2 units of PRBC  H/o AVM, duodenal polyp     Hx: ESRD on HD MWF, Afib on eliquis,  CAD with CABG x4, HFpEF, COPD gold stage II, hypertension, insulin-dependent type 2 diabetes, hypothyroidism, schizophrenia.    Plan:  -EGD today.  Continue Protonix.  GI following  -hold Eliquis.  Watchman procedure to be considered in the setting of repeat GI bleed  -reduce detemir to 15 units.  Continue correction  -nephrology on board for HD needs.  Appreciate assistance   -other medications were reviewed and  renewed    SCDs         Duarte Lo MD

## 2024-01-04 NOTE — PROGRESS NOTES
Gastroenterology Progress Note    Subjective/Interval History:  78 y.o. female presented to [facility] on 01/03/2024 with c/o dark stools x1 day with a history of GI bleeds.      She is a patient of Dr. Trung Bourgeois with our GI practice- Colonoscopy in 2021 (see below). some procedures/follow up with Lexi Scales at [facility].      She does have a history of ESRD (on HD MWF schedule), type 2 diabetes, COPD, HFpEF, coronary artery disease, CABG x4, hypertension, AF (on Eliquis), dyslipidemia, hypothyroidism, anemia, AVM, colon polyps and schizophrenia who presented to [facility] ED on 12/6/23 with complaints of melanotic stools, fatigue, generalized abdominal pain, and back pain x 3 days.  Labs:  H&H 8.6/29.8   GI service consulted for Anemia with history of GI bleed     Patient reports that she typically has a bowel movement once daily.  She takes lactulose every other day to avoid constipation.      She denies bright red blood.  She denies abdominal pain, nausea, vomiting, hematemesis, reflux symptoms, difficulty swallowing, diarrhea, hematochezia, decreased appetite, dysuria and hematuria.  Weight is stable      Denies NSAID use.  She does take aspirin 81 mg daily and Eliquis daily Admits to smoking 4 cigarettes per day for the past 3 years but previously smoked 1/2 ppd since she was 18 years old.  Admits to occasional alcohol use, usually once every 7-8 months. Denies recreational drug use. Denies a family history of IBD, colon polyps or colon cancer.     Prior endoscopies:   Colonoscopy on 07/21/2021 for positive fecal immunochemical test per Dr. Trung Bourgeois:  three 5 to 9 mm semi-sessile polyps were found in the transverse colon and were removed with a cold snare. Resection and retrieval were complete.  Seven 6-9 mm semi-sessile polyps were found in the descending colon and were removed with a cold snare. Resection and retrieval were complete.  A 12 mm semi-sessile polyp was found in the descending colon was removed  with a hot snare. Resection and retrieval were complete. Two 3 to 5 mm semi-sessile polyps were found in the sigmoid colon and were removed with a cold snare. Resection and retrieval were complete.  Non-bleeding internal hemorrhoids were found during retroflexion.  The hemorrhoids were medium-sized and grade II  (internal hemorrhoids that prolapse but reduced spontaneously). Recommendations await pathology results. Repeat colonoscopy in 3 years for surveillance.  Pathology: Transverse colon, polyp x3, biopsy: Tubular adenoma, multiple fragments. Descending colon, polyp x8, biopsy:  Tubular adenoma, multiple fragments.  Sigmoid colon, polyp x2, biopsy:  Tubular adenoma, multiple fragments.     EGD 12/31/2021 for melena per Dr. Manolo Winter:  The esophagus was normal.  A small hiatal hernia was present.  A single small nonbleeding angioectasia was found in the gastric antrum.  Coagulation for hemostasis using argon beam was successful.  The examined duodenum was normal.  Recommendations:  We will plan on capsule endoscopy tomorrow.  Consider Sandostatin to help prevent any other possible AVM bleeding/oozing.  Overall, test situation with her not wanting to receive any blood.     Capsule endoscopy on 1/2/2022  incomplete study.  Capsule did not leave the stomach.  Probable AVM ablation site identified within the stomach.  No other signs of bleeding were seen throughout the stomach.  Recommendations: Incomplete study due to delayed gastric emptying.  Consider repeating study with a capsule placed endoscopically if warranted.     EGD/push 1/20/2023 for unexplained iron deficiency anemia per Dr. Lexi Scales:  Normal esophagus.  Normal stomach.  Normal examined duodenum. No proximal small bowel bleeding or AVMs seen. No specimens collected. Recommendations:  To visualize the small bowel, perform video capsule endoscopy as outpatient.  Recommend an iron supplement.     EGD 2/17/23 for iron deficiency anemia  due to suspected upper gastrointestinal bleeding per Dr. Morgan Gardner:  Normal EGD with successful completion of video capsule enteroscopy placement.  No specimens collected.     Capsule endoscopy 02/17/2023 per Dr.James Gardner:  Small nonbleeding erosion in the distal duodenum, otherwise unremarkable capsule endoscopy.  No intervention warranted at this time.  Avoid NSAIDs.  Recommendation: If patient were to develop melanotic stools are transfusion dependent anemia off iron supplementation, consider CT enterography.     EGD push enteroscopy on 3/24/23 for anemia per Dr. Go Le : AVM in the 4th portion of the duodenum, APC applied      EGD on 4/6/23 for iron-deficiency anemia per Dr. Ayden Francois:  Normal esophagus, duodenum, jejunum.  Single 3 mm angio ectasia with no bleeding found in the gastric body treated with APC.  This AVM was small and likely clinically insignificant.  No specimens collected.  Recommendation to get a nuclear bleeding scan.     EGD on 6/16/23 for active GI bleeding per Dr. Morgan Gardner:  Normal esophagus and stomach.  3 mm, nonbleeding angiodysplastic lesion in the duodenum treated with APC.  Single duodenal polyp, resection not attempted.  Normal examined jejunum.  No specimens collected.  Recommendations resume Eliquis at prior dose tomorrow.  Repeat upper endoscopy in 6 weeks with Dr. Bourgeois off anticoagulation for duodenal polypectomy.     EGD 12/8/2023: Dr. Lexi Scales for [facility]: Findings:       The esophagus was normal. A medium amount of food (residue) was found in the gastric fundus and in the gastric body.        Diffuse atrophic mucosa was found in the entire examined stomach.        A single 8 mm sessile polyp was found in the duodenal bulb. The        polyp was removed with a cold snare. Resection and retrieval were        complete.        The exam of the duodenum was otherwise normal.   Impression:    - Normal esophagus.                          - A medium amount  "of food (residue) in the stomach.                          - Gastric mucosal atrophy.                          - A single duodenal polyp. Resected and retrieved- Brunner gland hyperplasia- Negative for dysplasia or malignancy.            ROS:  ROS    Vital Signs:  BP (!) 148/74   Pulse 100   Temp 97.7 °F (36.5 °C)   Resp 20   Ht 5' 6" (1.676 m)   Wt 89.8 kg (198 lb)   SpO2 99% Comment: RA  BMI 31.96 kg/m²   Body mass index is 31.96 kg/m².    Physical Exam:  Physical Exam  Constitutional:       Appearance: Normal appearance. She is obese.   HENT:      Nose: Nose normal.      Mouth/Throat:      Mouth: Mucous membranes are moist.   Eyes:      Extraocular Movements: Extraocular movements intact.   Cardiovascular:      Rate and Rhythm: Normal rate. Rhythm irregular.      Heart sounds: Normal heart sounds.   Pulmonary:      Breath sounds: Normal breath sounds.   Abdominal:      Palpations: Abdomen is soft.      Tenderness: There is no abdominal tenderness.   Skin:     General: Skin is dry.   Neurological:      General: No focal deficit present.      Mental Status: She is alert and oriented to person, place, and time.   Psychiatric:         Mood and Affect: Mood normal.         Behavior: Behavior normal.         Labs:  Recent Results (from the past 48 hour(s))   Comprehensive metabolic panel    Collection Time: 01/03/24  1:48 PM   Result Value Ref Range    Sodium Level 138 136 - 145 mmol/L    Potassium Level 4.0 3.5 - 5.1 mmol/L    Chloride 102 98 - 107 mmol/L    Carbon Dioxide 27 23 - 31 mmol/L    Glucose Level 212 (H) 82 - 115 mg/dL    Blood Urea Nitrogen 33.0 (H) 9.8 - 20.1 mg/dL    Creatinine 4.39 (H) 0.55 - 1.02 mg/dL    Calcium Level Total 9.0 8.4 - 10.2 mg/dL    Protein Total 6.1 5.8 - 7.6 gm/dL    Albumin Level 3.0 (L) 3.4 - 4.8 g/dL    Globulin 3.1 2.4 - 3.5 gm/dL    Albumin/Globulin Ratio 1.0 (L) 1.1 - 2.0 ratio    Bilirubin Total 0.3 <=1.5 mg/dL    Alkaline Phosphatase 108 40 - 150 unit/L    Alanine " Aminotransferase 37 0 - 55 unit/L    Aspartate Aminotransferase 34 5 - 34 unit/L    eGFR 10 mls/min/1.73/m2   Protime-INR    Collection Time: 01/03/24  1:48 PM   Result Value Ref Range    PT 15.9 (H) 12.5 - 14.5 seconds    INR 1.3 <=1.3   Type & Screen    Collection Time: 01/03/24  1:48 PM   Result Value Ref Range    Group & Rh O POS     Indirect Gideon GEL NEG     Specimen Outdate 01/06/2024 23:59    CBC with Differential    Collection Time: 01/03/24  1:48 PM   Result Value Ref Range    WBC 7.40 4.50 - 11.50 x10(3)/mcL    RBC 1.90 (L) 4.20 - 5.40 x10(6)/mcL    Hgb 5.3 (LL) 12.0 - 16.0 g/dL    Hct 17.7 (LL) 37.0 - 47.0 %    MCV 93.2 80.0 - 94.0 fL    MCH 27.9 27.0 - 31.0 pg    MCHC 29.9 (L) 33.0 - 36.0 g/dL    RDW 23.9 (H) 11.5 - 17.0 %    Platelet 174 130 - 400 x10(3)/mcL    MPV 10.2 7.4 - 10.4 fL    Neut % 82.0 %    Lymph % 10.0 %    Mono % 6.4 %    Eos % 0.8 %    Basophil % 0.3 %    Lymph # 0.74 0.6 - 4.6 x10(3)/mcL    Neut # 6.07 2.1 - 9.2 x10(3)/mcL    Mono # 0.47 0.1 - 1.3 x10(3)/mcL    Eos # 0.06 0 - 0.9 x10(3)/mcL    Baso # 0.02 <=0.2 x10(3)/mcL    IG# 0.04 0 - 0.04 x10(3)/mcL    IG% 0.5 %    NRBC% 0.4 %   Blood Smear Microscopic Exam    Collection Time: 01/03/24  1:48 PM   Result Value Ref Range    RBC Morph Abnormal (A) Normal    Anisocytosis 1+ (A) (none)    Hypochromasia 1+ (A) (none)    Macrocytosis 1+ (A) (none)    Microcytosis 1+ (A) (none)    Poikilocytosis 1+ (A) (none)    Polychromasia 1+ (A) (none)    Stomatocytes 1+ (A) (none)    Target Cells 1+ (A) (none)    Platelets Normal Normal, Adequate   Prepare RBC 2 Units; rectal bleeding    Collection Time: 01/03/24  1:48 PM   Result Value Ref Range    UNIT NUMBER [id number]     UNIT ABO/RH O POS     DISPENSE STATUS Transfused     Unit Expiration [id number]     Product Code O8332Z22     Unit Blood Type Code 5100     CROSSMATCH INTERPRETATION Compatible     UNIT NUMBER [id number]     UNIT ABO/RH O POS     DISPENSE STATUS Transfused     Unit  Expiration [id number]     Product Code K8637S22     Unit Blood Type Code 5100     CROSSMATCH INTERPRETATION Compatible    POCT glucose    Collection Time: 01/03/24  8:27 PM   Result Value Ref Range    POCT Glucose 241 (H) 70 - 110 mg/dL   Comprehensive Metabolic Panel (CMP)    Collection Time: 01/04/24  2:52 AM   Result Value Ref Range    Sodium Level 137 136 - 145 mmol/L    Potassium Level 3.7 3.5 - 5.1 mmol/L    Chloride 100 98 - 107 mmol/L    Carbon Dioxide 27 23 - 31 mmol/L    Glucose Level 51 (L) 82 - 115 mg/dL    Blood Urea Nitrogen 39.1 (H) 9.8 - 20.1 mg/dL    Creatinine 4.43 (H) 0.55 - 1.02 mg/dL    Calcium Level Total 8.7 8.4 - 10.2 mg/dL    Protein Total 6.0 5.8 - 7.6 gm/dL    Albumin Level 2.9 (L) 3.4 - 4.8 g/dL    Globulin 3.1 2.4 - 3.5 gm/dL    Albumin/Globulin Ratio 0.9 (L) 1.1 - 2.0 ratio    Bilirubin Total 0.5 <=1.5 mg/dL    Alkaline Phosphatase 88 40 - 150 unit/L    Alanine Aminotransferase 38 0 - 55 unit/L    Aspartate Aminotransferase 31 5 - 34 unit/L    eGFR 10 mls/min/1.73/m2   Magnesium    Collection Time: 01/04/24  2:52 AM   Result Value Ref Range    Magnesium Level 1.90 1.60 - 2.60 mg/dL   Phosphorus    Collection Time: 01/04/24  2:52 AM   Result Value Ref Range    Phosphorus Level 4.5 2.3 - 4.7 mg/dL   Hemoglobin A1C    Collection Time: 01/04/24  2:52 AM   Result Value Ref Range    Hemoglobin A1c 5.8 <=7.0 %    Estimated Average Glucose 119.8 mg/dL   TSH    Collection Time: 01/04/24  2:52 AM   Result Value Ref Range    TSH 2.230 0.350 - 4.940 uIU/mL   CBC with Differential    Collection Time: 01/04/24  2:52 AM   Result Value Ref Range    WBC 6.21 4.50 - 11.50 x10(3)/mcL    RBC 2.52 (L) 4.20 - 5.40 x10(6)/mcL    Hgb 7.1 (L) 12.0 - 16.0 g/dL    Hct 22.7 (L) 37.0 - 47.0 %    MCV 90.1 80.0 - 94.0 fL    MCH 28.2 27.0 - 31.0 pg    MCHC 31.3 (L) 33.0 - 36.0 g/dL    RDW 21.3 (H) 11.5 - 17.0 %    Platelet 169 130 - 400 x10(3)/mcL    MPV 10.5 (H) 7.4 - 10.4 fL    Neut % 75.4 %    Lymph % 13.5 %     Mono % 8.5 %    Eos % 1.6 %    Basophil % 0.5 %    Lymph # 0.84 0.6 - 4.6 x10(3)/mcL    Neut # 4.68 2.1 - 9.2 x10(3)/mcL    Mono # 0.53 0.1 - 1.3 x10(3)/mcL    Eos # 0.10 0 - 0.9 x10(3)/mcL    Baso # 0.03 <=0.2 x10(3)/mcL    IG# 0.03 0 - 0.04 x10(3)/mcL    IG% 0.5 %    NRBC% 1.0 %   Hemoglobin and Hematocrit    Collection Time: 01/04/24  8:17 AM   Result Value Ref Range    Hgb 7.7 (L) 12.0 - 16.0 g/dL    Hct 25.3 (L) 37.0 - 47.0 %   POCT glucose    Collection Time: 01/04/24  9:55 AM   Result Value Ref Range    POCT Glucose 61 (L) 70 - 110 mg/dL   POCT glucose    Collection Time: 01/04/24 10:27 AM   Result Value Ref Range    POCT Glucose 148 (H) 70 - 110 mg/dL       Imaging:  Nuclear Stress - Cardiology Interpreted    Result Date: 12/27/2023    Abnormal myocardial perfusion scan.   There is a moderate to severe intensity, moderate sized, mostly reversible perfusion abnormality with some fixed areas in the lateral apical wall(s) in the typical distribution of the LCX territory.   There are no other significant perfusion abnormalities.   The gated perfusion images showed an ejection fraction of 62% at rest. The gated perfusion images showed an ejection fraction of 63% post stress.   There were no arrhythmias during stress. The nuclear stress test is  fair quality.  On the nuclear stress test the EF is normal.  If the patient has an echo, the EF on the echo is considered more accurate.    The patient has a moderate risk nuclear stress test. If patient is symptomatic, consider management with two anti-anginals        Echo    Result Date: 12/27/2023    Left Ventricle: The left ventricle is normal in size. Normal wall thickness. There is normal systolic function. Biplane (2D) method of discs ejection fraction is 68%.   Right Ventricle: Normal right ventricular cavity size. Systolic function is borderline low.   Left Atrium: Left atrium is mildly dilated.   Right Atrium: Right atrium is mildly dilated.   Aortic Valve: The  aortic valve is a trileaflet valve. There is moderate aortic valve sclerosis.   Mitral Valve: There is severe posterior mitral annular calcification present. There is moderate to severe regurgitation.   Tricuspid Valve: There is moderate annular calcification present. There is moderate regurgitation.   IVC/SVC: Intermediate venous pressure at 8 mmHg.          Assessment/Plan:  Complex 78-year-old with recurrent and profound anemia.  History of angiodysplastic lesions.  Push enteroscopy seems a prudent endeavor.  Her hemoglobin did rise appropriately with last night's transfusion.      Addendum:  Push enteroscopy failed to find any bleed source beyond the ligament of Treitz.  Her stool is dark brown.  Her last colonoscopy took place in 2021 with multiple polyps addressed...  I will see if she wants to proceed with a colonoscopy tomorrow while in-house.  If not, she will need to have it addressed in the short term for completeness.  The Watchman certainly seems like a prudent endeavor       Morgan Scales PA-C

## 2024-01-04 NOTE — ANESTHESIA POSTPROCEDURE EVALUATION
Anesthesia Post Evaluation    Patient: Rosette Madrid    Procedure(s) Performed: Procedure(s) (LRB):  EGD (N/A)    Final Anesthesia Type: general      Patient location during evaluation: PACU  Patient participation: Yes- Able to Participate  Level of consciousness: awake and alert  Post-procedure vital signs: reviewed and stable  Pain management: adequate  Airway patency: patent    PONV status at discharge: No PONV  Anesthetic complications: no      Cardiovascular status: hemodynamically stable  Respiratory status: unassisted  Hydration status: euvolemic  Follow-up not needed.              Vitals Value Taken Time   /53 01/04/24 1221   Temp 36 °C (96.8 °F) 01/04/24 1221   Pulse 55 01/04/24 1221   Resp 18 01/04/24 1221   SpO2 100 % 01/04/24 1221         No case tracking events are documented in the log.      Pain/Marco Score: No data recorded

## 2024-01-04 NOTE — NURSING
Nurses Note -- 4 Eyes      1/4/2024   5:36 PM      Skin assessed during: Admit      [x] No Altered Skin Integrity Present    []Prevention Measures Documented      [] Yes- Altered Skin Integrity Present or Discovered   [] LDA Added if Not in Epic (Describe Wound)   [] New Altered Skin Integrity was Present on Admit and Documented in LDA   [] Wound Image Taken    Wound Care Consulted? No    Attending Nurse:  Kathy Orr LPN    Second RN/Staff Member: Jas Fernandez RN

## 2024-01-04 NOTE — CONSULTS
Nephrology Initial Consult Note    Patient Name: Rosette Madrid  Age: 78 y.o.  : 1945  MRN: [id number]  Admission Date: 1/3/2024    Reason for Consult:      ESRD    HPI:     Rosette Madrid is a 78 y.o. female with dialysis dependent ESRD recent diagnosis of duodenal bulb polyp, gastric angiodysplasia during hospitalization for abdominal pain and melanotic stool (EGD ).  She has atrial fibrillation on Eliquis, CAD status post CABG x4, heart failure with preserved EF, COPD, hypertension, diabetes mellitus type 2, hypothyroidism, schizophrenia.  She dialyzes on  schedule at [facility] followed by Dr. Bourgeois.     She presented to [facility] ED on  with complaints of melanotic stool and low H&H per outpatient lab.  Initial workup at this ER showed Hgb 5.3/ Hct 17.  She was transfused 2 units PRBC Hgb now 7.1. EGD pending today.  Nephrology consulted for dialysis needs.      Current Facility-Administered Medications   Medication Dose Route Frequency Provider Last Rate Last Admin    0.9%  NaCl infusion (for blood administration)   Intravenous Q24H PRN Navdeep Rae IV, MD        acetaminophen tablet 650 mg  650 mg Oral Q8H PRN Judith Rubi PA-C        acetaminophen tablet 650 mg  650 mg Oral Q4H PRN Judith Rubi PA-C        albuterol inhaler 2 puff  2 puff Inhalation Q6H PRN Duarte Lo MD        albuterol-ipratropium 2.5 mg-0.5 mg/3 mL nebulizer solution 3 mL  3 mL Nebulization Q6H PRN Duarte Lo MD        amLODIPine tablet 5 mg  5 mg Oral Daily Duarte Lo MD        atorvastatin tablet 40 mg  40 mg Oral Daily Duarte Lo MD        carvediloL tablet 6.25 mg  6.25 mg Oral BID Duarte Lo MD   6.25 mg at 24    dextrose 10% bolus 125 mL 125 mL  12.5 g Intravenous PRN Judith Rubi PA-C        dextrose 10% bolus 125 mL 125 mL  12.5 g Intravenous PRN Duarte Lo MD        dextrose 10% bolus 250 mL 250 mL  25 g  Intravenous PRN Judith Rubi PA-C        dextrose 10% bolus 250 mL 250 mL  25 g Intravenous PRN Duarte Lo MD        diphenhydrAMINE capsule 50 mg  50 mg Oral Nightly Duarte Lo MD   50 mg at 01/03/24 2043    DULoxetine DR capsule 30 mg  30 mg Oral Daily Duarte Lo MD        fluticasone furoate-vilanteroL 100-25 mcg/dose diskus inhaler 1 puff  1 puff Inhalation Daily Duarte Lo MD        glucagon (human recombinant) injection 1 mg  1 mg Intramuscular PRN Judith Rubi, PA-KALE        glucagon (human recombinant) injection 1 mg  1 mg Intramuscular PRN Duarte Lo MD        glucose chewable tablet 16 g  16 g Oral PRN Judith Rubi, KALA        glucose chewable tablet 16 g  16 g Oral PRN Duarte Lo MD        glucose chewable tablet 24 g  24 g Oral PRN Judith Rubi, KALA        glucose chewable tablet 24 g  24 g Oral PRN Duarte Lo MD        insulin aspart U-100 injection 0-10 Units  0-10 Units Subcutaneous QID (AC + HS) PRN Duarte Lo MD        insulin detemir U-100 injection 15 Units  15 Units Subcutaneous QHS Duarte Lo MD        levothyroxine tablet 125 mcg  125 mcg Oral Before breakfast Duarte Lo MD   125 mcg at 01/04/24 0600    mupirocin 2 % ointment   Nasal BID Lynn Tripathi, LOGAN        ondansetron injection 4 mg  4 mg Intravenous Q4H PRN Judith Rubi PA-C        pantoprazole injection 40 mg  40 mg Intravenous BID Duarte Lo MD        sevelamer carbonate tablet 1,600 mg  1,600 mg Oral TID Duarte Lo MD   1,600 mg at 01/03/24 2041    sodium chloride 0.9% flush 10 mL  10 mL Intravenous Q12H PRN Judith Rubi PA-C        tiotropium bromide 2.5 mcg/actuation inhaler 2 puff  2 puff Inhalation Daily Duarte Lo MD         Current Outpatient Medications   Medication Sig Dispense Refill    albuterol (VENTOLIN HFA) 90 mcg/actuation inhaler Inhale 2 puffs into the lungs every 6  "(six) hours as needed for Wheezing. Rescue 18 g 1    albuterol-ipratropium (DUO-NEB) 2.5 mg-0.5 mg/3 mL nebulizer solution Take 3 mLs by nebulization every 6 (six) hours as needed for Wheezing or Shortness of Breath. Rescue 75 mL 0    amLODIPine (NORVASC) 5 MG tablet Take 1 tablet (5 mg total) by mouth once daily. 30 tablet 11    apixaban (ELIQUIS) 5 mg Tab Take 1 tablet (5 mg total) by mouth 2 (two) times daily. 60 tablet 11    BD ULTRA-FINE MINI PEN NEEDLE 31 gauge x 3/16" Ndle Inject into the skin 2 (two) times daily.      BELSOMRA 5 mg Tab Take 1 tablet by mouth every evening.      BENADRYL 25 mg capsule Take 50 mg by mouth nightly.      carvediloL (COREG) 6.25 MG tablet Take 1 tablet (6.25 mg total) by mouth 2 (two) times daily. 180 tablet 3    clonazePAM (KLONOPIN) 0.5 MG tablet Take 0.5 mg by mouth every evening.      DIALYVITE 100-1 mg Tab Take 1 tablet by mouth once daily.      DULoxetine (CYMBALTA) 30 MG capsule Take 30 mg by mouth once daily.      fluticasone furoate-vilanteroL (BREO) 100-25 mcg/dose diskus inhaler Inhale 1 puff into the lungs once daily. 90 each 2    furosemide (LASIX) 40 MG tablet Take 1 tablet (40 mg total) by mouth once daily. 90 tablet 3    glipiZIDE (GLUCOTROL) 10 MG tablet Take 1 tablet (10 mg total) by mouth daily with breakfast. 90 tablet 2    insulin detemir U-100, Levemir, (LEVEMIR FLEXPEN) 100 unit/mL (3 mL) InPn pen Inject 30 Units into the skin every evening. 27 mL 2    INVEGA SUSTENNA 156 mg/mL Syrg injection Inject into the muscle.      L-METHYLFOLATE 15 mg Tab Take 1 tablet by mouth.      lactulose (CHRONULAC) 10 gram/15 mL solution Take 30 mLs by mouth.      levothyroxine (SYNTHROID) 125 MCG tablet Take 1 tablet (125 mcg total) by mouth before breakfast. 90 tablet 2    loratadine (CLARITIN) 10 mg tablet Take 1 tablet (10 mg total) by mouth once daily. for 14 days 14 tablet 0    ONETOUCH DELICA PLUS LANCET 30 gauge Misc 1 lancet by Other route once daily. 100 each 2    " "ONETOUCH ULTRA TEST Strp 1 strip by Other route once daily. 200 strip 2    pantoprazole (PROTONIX) 40 MG tablet Take 40 mg by mouth 2 (two) times daily.      pen needle, diabetic 31 gauge x 5/16" Ndle 2 Units by Misc.(Non-Drug; Combo Route) route 2 (two) times a day. Patient to check blood sugars twice daily (morning and night) 200 each 2    rosuvastatin (CRESTOR) 40 MG Tab Take 1 tablet (40 mg total) by mouth once daily. 90 tablet 3    sevelamer carbonate (RENVELA) 800 mg Tab Take 2 tablets (1,600 mg total) by mouth 3 (three) times daily. 90 tablet 0    tiotropium (SPIRIVA WITH HANDIHALER) 18 mcg inhalation capsule Inhale 1 capsule (18 mcg total) into the lungs Daily. 90 capsule 2       Margarita Dior, JAIMEP    Past Medical History:   Diagnosis Date    CKD (chronic kidney disease) requiring chronic dialysis     COPD (chronic obstructive pulmonary disease)     Diabetes mellitus     Hyperlipidemia     Hypertension     Renal insufficiency     Thyroid disease       Past Surgical History:   Procedure Laterality Date    AV FISTULA PLACEMENT Left     CARDIAC CATHETERIZATION      CARDIAC VALVE REPLACEMENT      COLONOSCOPY      CORONARY ARTERY BYPASS GRAFT      EGD, WITH HEMORRHAGE CONTROL  03/24/2023    Procedure: EGD,WITH HEMORRHAGE CONTROL;  Surgeon: Go Le MD;  Location: [facility] ENDOSCOPY;  Service: Gastroenterology;;    EGD, WITH HEMORRHAGE CONTROL  04/06/2023    Procedure: EGD,WITH HEMORRHAGE CONTROL;  Surgeon: Ayden Francois MD;  Location: [facility] ENDOSCOPY;  Service: Gastroenterology;;    EGD, WITH POLYPECTOMY USING SNARE Left 12/8/2023    Procedure: EGD, WITH POLYPECTOMY USING SNARE;  Surgeon: Lexi Scales MD;  Location: [facility] ENDOSCOPY;  Service: Gastroenterology;  Laterality: Left;    ESOPHAGOGASTRODUODENOSCOPY      ESOPHAGOGASTRODUODENOSCOPY N/A 02/17/2023    Procedure: EGD +/- VCE PLACEMENT;  Surgeon: Morgan Gardner MD;  Location: [facility] ENDOSCOPY;  Service: Gastroenterology;  " Laterality: N/A;    ESOPHAGOGASTRODUODENOSCOPY N/A 03/24/2023    Procedure: EGD;  Surgeon: Go eL MD;  Location: [facility] ENDOSCOPY;  Service: Gastroenterology;  Laterality: N/A;  with push    ESOPHAGOGASTRODUODENOSCOPY N/A 04/06/2023    Procedure: EGD;  Surgeon: Ayden Francois MD;  Location: [facility] ENDOSCOPY;  Service: Gastroenterology;  Laterality: N/A;  with push    ESOPHAGOGASTRODUODENOSCOPY N/A 06/16/2023    Procedure: EGD W/ PUSH;  Surgeon: Morgan Gardner MD;  Location: [facility] ENDOSCOPY;  Service: Gastroenterology;  Laterality: N/A;    EYE SURGERY      POLYPECTOMY      SMALL BOWEL ENTEROSCOPY Left 01/20/2023    Procedure: ENTEROSCOPY;  Surgeon: Lexi Scales MD;  Location: [facility] ENDOSCOPY;  Service: Gastroenterology;  Laterality: Left;    THYROIDECTOMY      TUBAL LIGATION        Family History   Problem Relation Age of Onset    Hypertension Mother     Hypertension Father     Hypertension Sister     Hypertension Sister      Social History     Tobacco Use    Smoking status: Some Days     Current packs/day: 2.00     Average packs/day: 2.0 packs/day for 15.0 years (30.0 ttl pk-yrs)     Types: Cigarettes    Smokeless tobacco: Never    Tobacco comments:     Smokes 3 cigarettes a day   Substance Use Topics    Alcohol use: Not Currently     Comment: 1 glass every 2-3 month     (Not in a hospital admission)    Review of patient's allergies indicates:   Allergen Reactions    Lisinopril Swelling    Azithromycin      Other reaction(s): tongue/facial swelling    Baclofen      Other reaction(s): tongue/facial swelling    Doxycycline      Other reaction(s): Angioedema            Review of Systems:     Review of Systems   Constitutional:  Negative for chills and fever.   Cardiovascular:  Positive for leg swelling. Negative for chest pain.   Gastrointestinal:  Positive for blood in stool. Negative for abdominal pain, constipation and diarrhea.         Objective:       VITAL SIGNS: 24 HR MIN & MAX  LAST    Temp  Min: 97.6 °F (36.4 °C)  Max: 98.4 °F (36.9 °C)  97.7 °F (36.5 °C)        BP  Min: 117/46  Max: 157/56  (!) 117/46     Pulse  Min: 57  Max: 72  60     Resp  Min: 14  Max: 23  14    SpO2  Min: 95 %  Max: 100 %  100 %      GEN: Chronically ill appearing in NAD  HEENT: Conjunctiva anicteric, pupils equal, MMM, OP benign  CV: RRR +S1,S2 without murmur  PULM:  Right-sided expiratory wheezing, nonlabored on room air  ABD: Soft, NT/ND abdomen with NABS  EXT: No cyanosis, pitting edema to bilateral lower extremities extending through hips greater to left side  SKIN: Warm and dry  PSYCH: Awake, alert, and appropriately conversant  Vascular access:  Left forearm AV fistula          Component Value Date/Time     01/04/2024 0252     01/03/2024 1348    K 3.7 01/04/2024 0252    K 4.0 01/03/2024 1348    CHLORIDE 100 01/04/2024 0252    CHLORIDE 102 01/03/2024 1348    CO2 27 01/04/2024 0252    CO2 27 01/03/2024 1348    BUN 39.1 (H) 01/04/2024 0252    BUN 33.0 (H) 01/03/2024 1348    CREATININE 4.43 (H) 01/04/2024 0252    CREATININE 4.39 (H) 01/03/2024 1348    CALCIUM 8.7 01/04/2024 0252    CALCIUM 9.0 01/03/2024 1348    PHOS 4.5 01/04/2024 0252            Component Value Date/Time    WBC 6.21 01/04/2024 0252    WBC 7.40 01/03/2024 1348    HGB 7.1 (L) 01/04/2024 0252    HGB 5.3 (LL) 01/03/2024 1348    HCT 22.7 (L) 01/04/2024 0252    HCT 17.7 (LL) 01/03/2024 1348     01/04/2024 0252     01/03/2024 1348         No orders to display       Assessment / Plan:     ESRD on HD  Acute worsening of chronic anemia status post PRBC x2  Recent history of GI bleeding - EGD 12/8  Heart failure with preserved EF  COPD  Diabetes mellitus type 2    -patient will continue TTS schedule for hemodialysis while inpatient.  We will allow EGD to occur 1st and dialyze later.  -start Epogen TTS for anemia of chronic disease

## 2024-01-04 NOTE — OP NOTE
EGD and small-bowel enteroscopy Report    Referring Physician: Christelle    Date of procedure: 01/04/2024     Surgeon: Morgan Scales    ASA: 3    Medications: Per anesthesia    Indication:  anemia history of angiodysplastic lesions    Procedure: EGD with pediatric colonoscope to afford visualization into the small bowel    Description of the Procedure: The patient was brought back to the endoscopy suite where the risks, benefits, and alternatives of the procedure were described in detail. The patient was given the opportunity to ask questions and then signed informed consent. Patient was positioned in the left lateral decubitus position, continuous monitoring was initiated, and supplemental oxygen was provided via nasal cannula. Bite block was placed. Adequate sedation was achieved with the above mentioned medications as documented in chart and then titrated during the entire procedure. Under direct visualization the pediatric colonoscope was introduced through the oropharynx into the esophagus. The scope was advanced into the stomach and to the second portion of the duodenum and then well beyond the ligament of Treitz into the proximal jejunum bone. Scope was withdrawn and the mucosa was carefully examined. The entire gastric mucosa was examined, including the fundus with retroflexion. Air was evacuated from the stomach and the scope was withdrawn into the esophagus. The entire esophageal mucosa was examined. The procedure was completed. The patient tolerated the procedure well and was transferred to the recovery area in stable condition.     Estimated Blood Loss: minimal    Complications: none    Findings:  we found no blood and no bleed source evident through the esophagus stomach and duodenal and proximal jejunum, well beyond the ligament of Treitz    Impression and Recommendations:   Push enteroscopy failed to find any bleed source beyond the ligament of Treitz.  Her stool is dark brown.  Her last colonoscopy took  place in 2021 with multiple polyps addressed...  I will see if she wants to proceed with a colonoscopy tomorrow while in-house.  If not, she will need to have it addressed in the short term for completeness.  The Watchman certainly seems like a prudent endeavor     Morgan Scales

## 2024-01-04 NOTE — ANESTHESIA PREPROCEDURE EVALUATION
"                                                                                                             01/04/2024  Rosette Madrid is a 78 y.o., female.    Pre-op Diagnosis: * No pre-op diagnosis entered *    Procedure(s): EGD   Assessment/Plan:  1. Anemia- normocytic    History of GI bleeds: AVMs treated in 4th portion of duodenum in 2023 (see above); 8mm duodenal bulb polyp removed via cold snare 12/8/23.  EGD push in AM.  2. A fib on Eliquis- may want to consider a Watchman if possible  3. ESRD- on HD Last Dialysis 1/2/2024  [facility] schedule  Initial History of Present Illness (HPI):  78 y.o. female presented to [facility] on 01/03/2024 with c/o dark stools x1 day with a history of GI bleeds.   She does have a history of ESRD (on HD MWF schedule), type 2 diabetes, COPD, HFpEF, coronary artery disease, CABG x4, hypertension, AF (on Eliquis), dyslipidemia, hypothyroidism, anemia, AVM, colon polyps and schizophrenia who presented to [facility] ED on 12/6/23 with complaints of melanotic stools, fatigue, generalized abdominal pain, and back pain x 3 days  Review of patient's allergies indicates:   Allergen Reactions    Lisinopril Swelling    Azithromycin      Other reaction(s): tongue/facial swelling    Baclofen      Other reaction(s): tongue/facial swelling    Doxycycline      Other reaction(s): Angioedema       Current Outpatient Medications   Medication Instructions    albuterol (VENTOLIN HFA) 90 mcg/actuation inhaler 2 puffs, Inhalation, Every 6 hours PRN, Rescue    albuterol-ipratropium (DUO-NEB) 2.5 mg-0.5 mg/3 mL nebulizer solution 3 mLs, Nebulization, Every 6 hours PRN, Rescue    amLODIPine (NORVASC) 5 mg, Oral, Daily    apixaban (ELIQUIS) 5 mg, Oral, 2 times daily    BD ULTRA-FINE MINI PEN NEEDLE 31 gauge x 3/16" Ndle Subcutaneous, 2 times daily    BELSOMRA 5 mg Tab 1 tablet, Oral, Nightly    BenadryL 50 mg, Oral, Nightly    carvediloL (COREG) 6.25 mg, Oral, 2 times daily    clonazePAM (KLONOPIN) 0.5 mg, Oral, " Nightly    DIALYVITE 100-1 mg Tab 1 tablet, Oral, Daily    DULoxetine (CYMBALTA) 30 mg, Oral, Daily    fluticasone furoate-vilanteroL (BREO) 100-25 mcg/dose diskus inhaler 1 puff, Inhalation, Daily    furosemide (LASIX) 40 mg, Oral, Daily    glipiZIDE (GLUCOTROL) 10 mg, Oral, With breakfast    INVEGA SUSTENNA 156 mg/mL Syrg injection Intramuscular    L-METHYLFOLATE 15 mg Tab 1 tablet, Oral    lactulose (CHRONULAC) 10 gram/15 mL solution 30 mLs, Oral    LEVEMIR FLEXPEN 30 Units, Subcutaneous, Nightly    levothyroxine (SYNTHROID) 125 mcg, Oral, Before breakfast    loratadine (CLARITIN) 10 mg, Oral, Daily    ONETOUCH DELICA PLUS LANCET 30 gauge Misc 1 lancet , Other, Daily    ONETOUCH ULTRA TEST Strp 1 strip, Other, Daily    pantoprazole (PROTONIX) 40 mg, Oral, 2 times daily    pen needle, diabetic 2 Units, Misc.(Non-Drug; Combo Route), 2 times daily, Patient to check blood sugars twice daily (morning and night)    rosuvastatin (CRESTOR) 40 mg, Oral, Daily    sevelamer carbonate (RENVELA) 1,600 mg, Oral, 3 times daily    tiotropium (SPIRIVA WITH HANDIHALER) 18 mcg, Inhalation, Daily           Past Medical History:   Diagnosis Date    CKD (chronic kidney disease) requiring chronic dialysis     COPD (chronic obstructive pulmonary disease)     Diabetes mellitus     Hyperlipidemia     Hypertension     Renal insufficiency     Thyroid disease    PMH includes CAD s/p CABG / Valve Replacement; h/o DVT; Smoker < 1/4 ppd    Past Surgical History:   Procedure Laterality Date    AV FISTULA PLACEMENT Left     CARDIAC CATHETERIZATION      CARDIAC VALVE REPLACEMENT      COLONOSCOPY      CORONARY ARTERY BYPASS GRAFT      EGD, WITH HEMORRHAGE CONTROL  03/24/2023    Procedure: EGD,WITH HEMORRHAGE CONTROL;  Surgeon: Go Le MD;  Location: [facility] ENDOSCOPY;  Service: Gastroenterology;;    EGD, WITH HEMORRHAGE CONTROL  04/06/2023    Procedure: EGD,WITH HEMORRHAGE CONTROL;  Surgeon: Ayden Francois MD;  Location: [facility]  ENDOSCOPY;  Service: Gastroenterology;;    EGD, WITH POLYPECTOMY USING SNARE Left 12/8/2023    Procedure: EGD, WITH POLYPECTOMY USING SNARE;  Surgeon: Lexi Scales MD;  Location: [facility] ENDOSCOPY;  Service: Gastroenterology;  Laterality: Left;    ESOPHAGOGASTRODUODENOSCOPY      ESOPHAGOGASTRODUODENOSCOPY N/A 02/17/2023    Procedure: EGD +/- VCE PLACEMENT;  Surgeon: Morgan Gardner MD;  Location: [facility] ENDOSCOPY;  Service: Gastroenterology;  Laterality: N/A;    ESOPHAGOGASTRODUODENOSCOPY N/A 03/24/2023    Procedure: EGD;  Surgeon: Go Le MD;  Location: [facility] ENDOSCOPY;  Service: Gastroenterology;  Laterality: N/A;  with push    ESOPHAGOGASTRODUODENOSCOPY N/A 04/06/2023    Procedure: EGD;  Surgeon: Ayden Francois MD;  Location: [facility] ENDOSCOPY;  Service: Gastroenterology;  Laterality: N/A;  with push    ESOPHAGOGASTRODUODENOSCOPY N/A 06/16/2023    Procedure: EGD W/ PUSH;  Surgeon: Morgan Gardner MD;  Location: [facility] ENDOSCOPY;  Service: Gastroenterology;  Laterality: N/A;    EYE SURGERY      POLYPECTOMY      SMALL BOWEL ENTEROSCOPY Left 01/20/2023    Procedure: ENTEROSCOPY;  Surgeon: Lexi Scales MD;  Location: [facility] ENDOSCOPY;  Service: Gastroenterology;  Laterality: Left;    THYROIDECTOMY      TUBAL LIGATION        Recent Labs   Lab 01/03/24  1348 01/04/24  0252 01/04/24  0817   WBC 7.40 6.21  --    RBC 1.90* 2.52*  --    HGB 5.3* 7.1* 7.7*   HCT 17.7* 22.7* 25.3*   MCV 93.2 90.1  --    MCH 27.9 28.2  --    MCHC 29.9* 31.3*  --    RDW 23.9* 21.3*  --     169  --    MPV 10.2 10.5*  --        Recent Labs   Lab 01/03/24  1348 01/04/24  0252    137   K 4.0 3.7   CO2 27 27   BUN 33.0* 39.1*   CREATININE 4.39* 4.43*   CALCIUM 9.0 8.7   MG  --  1.90   ALBUMIN 3.0* 2.9*   ALKPHOS 108 88   ALT 37 38   AST 34 31   BILITOT 0.3 0.5       Recent Labs   Lab 01/03/24  1348   INR 1.3      TTE 12/27/2023 Summary     Left Ventricle: The left ventricle is normal in size.  Normal wall thickness. There is normal systolic function. Biplane (2D) method of discs ejection fraction is 68%.    Right Ventricle: Normal right ventricular cavity size. Systolic function is borderline low.    Left Atrium: Left atrium is mildly dilated.    Right Atrium: Right atrium is mildly dilated.    Aortic Valve: The aortic valve is a trileaflet valve. There is moderate aortic valve sclerosis.    Mitral Valve: There is severe posterior mitral annular calcification present. There is moderate to severe regurgitation.    Tricuspid Valve: There is moderate annular calcification present. There is moderate regurgitation.    IVC/SVC: Intermediate venous pressure at 8 mmHg.     NUCLEAR STRESS 12/27/2023  Conclusion     Abnormal myocardial perfusion scan.    There is a moderate to severe intensity, moderate sized, mostly reversible perfusion abnormality with some fixed areas in the lateral apical wall(s) in the typical distribution of the LCX territory.    There are no other significant perfusion abnormalities.    The gated perfusion images showed an ejection fraction of 62% at rest. The gated perfusion images showed an ejection fraction of 63% post stress.    There were no arrhythmias during stress.     The nuclear stress test is  fair quality.    On the nuclear stress test the EF is normal.  If the patient has an echo, the EF on the echo is considered more accurate.         The patient has a moderate risk nuclear stress test.      If patient is symptomatic, consider management with two anti-anginals    Pre-op Assessment    I have reviewed the Patient Summary Reports.    I have reviewed the NPO Status.   I have reviewed the Medications.     Review of Systems  Anesthesia Hx:  No problems with previous Anesthesia             Denies Family Hx of Anesthesia complications.    Denies Personal Hx of Anesthesia complications.                    Social:  Non-Smoker       Cardiovascular:  Exercise tolerance: good   Hypertension  Valvular problems/Murmurs (h/o Valve Replacement)  CAD       Denies Angina. CHF  Denies Orthopnea.  Denies PND.  hyperlipidemia  Denies BLAS.    Functional Capacity good / => 4 METS                         Pulmonary:   COPD                     Renal/:  Chronic Renal Disease                Musculoskeletal:  Musculoskeletal Normal                Neurological:  Denies TIA.  Denies CVA.                                    Endocrine:  Diabetes Hypothyroidism          Psych:  Psychiatric Normal                Repeat  after 100 ml D10W for CBG 61    Physical Exam  General: Well nourished, Alert and Oriented    Airway:  Mallampati: III   Mouth Opening: Normal  TM Distance: Normal  Tongue: Normal  Neck ROM: Normal ROM    Dental:  Intact    Chest/Lungs:  Clear to auscultation    Heart:  Rate: Normal  Rhythm: Regular Rhythm  No pretibial edema  No carotid bruits      Anesthesia Plan  Type of Anesthesia, risks & benefits discussed:    Anesthesia Type: Gen Natural Airway  Intra-op Monitoring Plan: Standard ASA Monitors  Post Op Pain Control Plan: IV/PO Opioids PRN  Induction:  IV  Informed Consent: Informed consent signed with the Patient and all parties understand the risks and agree with anesthesia plan.  All questions answered. Patient consented to blood products? No  ASA Score: 4  Day of Surgery Review of History & Physical: H&P Update referred to the surgeon/provider.  Anesthesia Plan Notes: GA TIVA    Ready For Surgery From Anesthesia Perspective.     .

## 2024-01-04 NOTE — NURSING
01/04/24 1716   Post-Hemodialysis Assessment   Rinseback Volume (mL) 500 mL   Blood Volume Processed (Liters) 63.2 L   Dialyzer Clearance Lightly streaked   Duration of Treatment 180 minutes   Total UF (mL) 2000 mL   Net Fluid Removal 1500   Patient Response to Treatment Tolerated well   Post-Hemodialysis Comments Tx completed, pt reinfused. AVF deaccessed per P&P, pressure applied until hemostasis achieved. Pt ran for 3 hours, NET removed= 1500ml

## 2024-01-04 NOTE — TRANSFER OF CARE
"Anesthesia Transfer of Care Note    Patient: Rosette Madrid    Procedure(s) Performed: Procedure(s) (LRB):  EGD (N/A)    Patient location: GI    Anesthesia Type: general    Transport from OR: Transported from OR on room air with adequate spontaneous ventilation    Post pain: adequate analgesia    Post assessment: no apparent anesthetic complications    Post vital signs: stable    Level of consciousness: awake and sedated    Nausea/Vomiting: no nausea/vomiting    Complications: none    Transfer of care protocol was followed      Last vitals: Visit Vitals  BP (!) 119/53   Pulse (!) 55   Temp 36 °C (96.8 °F)   Resp 18   Ht 5' 6" (1.676 m)   Wt 89.8 kg (198 lb)   SpO2 100%   BMI 31.96 kg/m²     "

## 2024-01-05 LAB
ALBUMIN SERPL-MCNC: 2.7 G/DL (ref 3.4–4.8)
ALBUMIN/GLOB SERPL: 1.1 RATIO (ref 1.1–2)
ALP SERPL-CCNC: 81 UNIT/L (ref 40–150)
ALT SERPL-CCNC: 30 UNIT/L (ref 0–55)
ANISOCYTOSIS BLD QL SMEAR: ABNORMAL
AST SERPL-CCNC: 23 UNIT/L (ref 5–34)
BASOPHILS # BLD AUTO: 0.01 X10(3)/MCL
BASOPHILS NFR BLD AUTO: 0.2 %
BILIRUB SERPL-MCNC: 0.3 MG/DL
BUN SERPL-MCNC: 24.2 MG/DL (ref 9.8–20.1)
CALCIUM SERPL-MCNC: 8.5 MG/DL (ref 8.4–10.2)
CHLORIDE SERPL-SCNC: 99 MMOL/L (ref 98–107)
CO2 SERPL-SCNC: 30 MMOL/L (ref 23–31)
CREAT SERPL-MCNC: 3.53 MG/DL (ref 0.55–1.02)
EOSINOPHIL # BLD AUTO: 0.06 X10(3)/MCL (ref 0–0.9)
EOSINOPHIL NFR BLD AUTO: 1 %
ERYTHROCYTE [DISTWIDTH] IN BLOOD BY AUTOMATED COUNT: 22.3 % (ref 11.5–17)
GFR SERPLBLD CREATININE-BSD FMLA CKD-EPI: 13 MLS/MIN/1.73/M2
GLOBULIN SER-MCNC: 2.5 GM/DL (ref 2.4–3.5)
GLUCOSE SERPL-MCNC: 103 MG/DL (ref 82–115)
HCT VFR BLD AUTO: 22.9 % (ref 37–47)
HGB BLD-MCNC: 7.2 G/DL (ref 12–16)
IMM GRANULOCYTES # BLD AUTO: 0.03 X10(3)/MCL (ref 0–0.04)
IMM GRANULOCYTES NFR BLD AUTO: 0.5 %
LYMPHOCYTES # BLD AUTO: 0.64 X10(3)/MCL (ref 0.6–4.6)
LYMPHOCYTES NFR BLD AUTO: 11.1 %
MACROCYTES BLD QL SMEAR: ABNORMAL
MAGNESIUM SERPL-MCNC: 1.8 MG/DL (ref 1.6–2.6)
MCH RBC QN AUTO: 28.7 PG (ref 27–31)
MCHC RBC AUTO-ENTMCNC: 31.4 G/DL (ref 33–36)
MCV RBC AUTO: 91.2 FL (ref 80–94)
MONOCYTES # BLD AUTO: 0.45 X10(3)/MCL (ref 0.1–1.3)
MONOCYTES NFR BLD AUTO: 7.8 %
NEUTROPHILS # BLD AUTO: 4.58 X10(3)/MCL (ref 2.1–9.2)
NEUTROPHILS NFR BLD AUTO: 79.4 %
NRBC BLD AUTO-RTO: 0 %
PLATELET # BLD AUTO: 161 X10(3)/MCL (ref 130–400)
PLATELET # BLD EST: NORMAL 10*3/UL
PMV BLD AUTO: 10.3 FL (ref 7.4–10.4)
POCT GLUCOSE: 127 MG/DL (ref 70–110)
POCT GLUCOSE: 224 MG/DL (ref 70–110)
POCT GLUCOSE: 69 MG/DL (ref 70–110)
POCT GLUCOSE: 90 MG/DL (ref 70–110)
POIKILOCYTOSIS BLD QL SMEAR: ABNORMAL
POTASSIUM SERPL-SCNC: 4.1 MMOL/L (ref 3.5–5.1)
PROT SERPL-MCNC: 5.2 GM/DL (ref 5.8–7.6)
RBC # BLD AUTO: 2.51 X10(6)/MCL (ref 4.2–5.4)
RBC MORPH BLD: ABNORMAL
SODIUM SERPL-SCNC: 136 MMOL/L (ref 136–145)
STOMATOCYTES (OLG): ABNORMAL
TARGETS BLD QL SMEAR: ABNORMAL
WBC # SPEC AUTO: 5.77 X10(3)/MCL (ref 4.5–11.5)

## 2024-01-05 PROCEDURE — 21400001 HC TELEMETRY ROOM

## 2024-01-05 PROCEDURE — 83735 ASSAY OF MAGNESIUM: CPT | Performed by: INTERNAL MEDICINE

## 2024-01-05 PROCEDURE — 63600175 PHARM REV CODE 636 W HCPCS: Performed by: INTERNAL MEDICINE

## 2024-01-05 PROCEDURE — 25000003 PHARM REV CODE 250: Performed by: INTERNAL MEDICINE

## 2024-01-05 PROCEDURE — 63600175 PHARM REV CODE 636 W HCPCS: Performed by: NURSE PRACTITIONER

## 2024-01-05 PROCEDURE — C9113 INJ PANTOPRAZOLE SODIUM, VIA: HCPCS | Performed by: INTERNAL MEDICINE

## 2024-01-05 PROCEDURE — 80053 COMPREHEN METABOLIC PANEL: CPT | Performed by: INTERNAL MEDICINE

## 2024-01-05 PROCEDURE — 25000242 PHARM REV CODE 250 ALT 637 W/ HCPCS: Performed by: INTERNAL MEDICINE

## 2024-01-05 PROCEDURE — 85025 COMPLETE CBC W/AUTO DIFF WBC: CPT | Performed by: INTERNAL MEDICINE

## 2024-01-05 RX ADMIN — INSULIN DETEMIR 13 UNITS: 100 INJECTION, SOLUTION SUBCUTANEOUS at 09:01

## 2024-01-05 RX ADMIN — MUPIROCIN: 20 OINTMENT TOPICAL at 08:01

## 2024-01-05 RX ADMIN — LEVOTHYROXINE SODIUM 125 MCG: 125 TABLET ORAL at 05:01

## 2024-01-05 RX ADMIN — SEVELAMER CARBONATE 1600 MG: 800 TABLET, FILM COATED ORAL at 09:01

## 2024-01-05 RX ADMIN — DULOXETINE HYDROCHLORIDE 30 MG: 30 CAPSULE, DELAYED RELEASE ORAL at 09:01

## 2024-01-05 RX ADMIN — INSULIN ASPART 4 UNITS: 100 INJECTION, SOLUTION INTRAVENOUS; SUBCUTANEOUS at 05:01

## 2024-01-05 RX ADMIN — SEVELAMER CARBONATE 1600 MG: 800 TABLET, FILM COATED ORAL at 03:01

## 2024-01-05 RX ADMIN — CARVEDILOL 6.25 MG: 3.12 TABLET, FILM COATED ORAL at 09:01

## 2024-01-05 RX ADMIN — SEVELAMER CARBONATE 1600 MG: 800 TABLET, FILM COATED ORAL at 08:01

## 2024-01-05 RX ADMIN — PANTOPRAZOLE SODIUM 40 MG: 40 INJECTION, POWDER, FOR SOLUTION INTRAVENOUS at 09:01

## 2024-01-05 RX ADMIN — ATORVASTATIN CALCIUM 40 MG: 40 TABLET, FILM COATED ORAL at 09:01

## 2024-01-05 RX ADMIN — MUPIROCIN: 20 OINTMENT TOPICAL at 09:01

## 2024-01-05 RX ADMIN — FLUTICASONE FUROATE AND VILANTEROL TRIFENATATE 1 PUFF: 100; 25 POWDER RESPIRATORY (INHALATION) at 09:01

## 2024-01-05 RX ADMIN — TIOTROPIUM BROMIDE INHALATION SPRAY 2 PUFF: 3.12 SPRAY, METERED RESPIRATORY (INHALATION) at 09:01

## 2024-01-05 RX ADMIN — CARVEDILOL 6.25 MG: 3.12 TABLET, FILM COATED ORAL at 08:01

## 2024-01-05 RX ADMIN — AMLODIPINE BESYLATE 5 MG: 5 TABLET ORAL at 09:01

## 2024-01-05 RX ADMIN — DIPHENHYDRAMINE HYDROCHLORIDE 50 MG: 25 CAPSULE ORAL at 08:01

## 2024-01-05 RX ADMIN — PANTOPRAZOLE SODIUM 40 MG: 40 INJECTION, POWDER, FOR SOLUTION INTRAVENOUS at 08:01

## 2024-01-05 NOTE — PLAN OF CARE
01/05/24 1442   Discharge Assessment   Assessment Type Discharge Planning Assessment   Confirmed/corrected address, phone number and insurance Yes   Confirmed Demographics Correct on Facesheet   Source of Information patient   When was your last doctors appointment?   (1 month ago)   Communicated YUN with patient/caregiver Date not available/Unable to determine   Reason For Admission GI Bleed, Anemia, ESRD on dialysis   People in Home child(shawn), adult   Do you expect to return to your current living situation? Yes   Prior to hospitilization cognitive status: Alert/Oriented   Current cognitive status: Alert/Oriented   Walking or Climbing Stairs Difficulty yes   Walking or Climbing Stairs ambulation difficulty, requires equipment;stair climbing difficulty, requires equipment   Mobility Management rollator and cane   Dressing/Bathing Difficulty no   Home Accessibility wheelchair accessible   Home Layout Able to live on 1st floor   Equipment Currently Used at Home rollator;cane, straight   Readmission within 30 days? Yes   Patient currently being followed by outpatient case management? No   Do you currently have service(s) that help you manage your care at home? No   Do you take prescription medications? Yes   Do you have prescription coverage? Yes   Coverage [employer]   Do you have any problems affording any of your prescribed medications? No   Is the patient taking medications as prescribed? yes   Who is going to help you get home at discharge? daughter   How do you get to doctors appointments? health plan transportation   Are you on dialysis? Yes   Dialysis Name and Scheduled days [facility]   Discharge Plan A Home   Discharge Plan B Home   DME Needed Upon Discharge  none   Discharge Plan discussed with: Patient   Transition of Care Barriers None   OTHER   Name(s) of People in Home shayne Hodges

## 2024-01-05 NOTE — PROGRESS NOTES
"Gastroenterology Progress Note    Subjective/Interval History:  EGD 1/4/24 with Dr. Scales: we found no blood and no bleed source evident through the esophagus stomach and duodenal and proximal jejunum, well beyond the ligament of Treitz     Due to a glitch with EPIC, the colonoscopy prep orders did not enter in the system correctly and prep was not done in preparation for colonoscopy today. Colonoscopy cancelled    Cardiology consulted to review case and consideration of A fib with necessity for prophylactic anticoagulation vs frequent GI bleeds and hospitalizations.     Patient is doing well today. She does report having a bowel movement since arrival but she is unsure of the color/description.     Will keep on low residue diet in case colonoscopy is needed while inpatient due to h/h decline, signs of active GI bleed or if she happens to still be here next Monday.     ROS:  Review of Systems   Constitutional:  Negative for chills and fever.   HENT:  Negative for hearing loss.    Eyes:  Negative for blurred vision.   Respiratory:  Positive for cough; negative for sputum production.    Cardiovascular:  Positive for leg swelling. Negative for chest pain.   Gastrointestinal:  Positive for abdominal pain (chronic lower abdominal pains) and melena. Negative for constipation, diarrhea, heartburn, nausea and vomiting.   Musculoskeletal:  Positive for myalgias.   Skin:  Negative for rash.   Neurological:  Positive for weakness.   Psychiatric/Behavioral:  Negative for depression and memory loss. The patient is not nervous/anxious.      Vital Signs:  /71   Pulse (!) 59   Temp 97.9 °F (36.6 °C) (Oral)   Resp 18   Ht 5' 6" (1.676 m)   Wt 89.5 kg (197 lb 5 oz)   SpO2 98%   BMI 31.85 kg/m²   Body mass index is 31.85 kg/m².    Physical Exam:  Constitutional:       General: She is not in acute distress.     Appearance: She is obese.   HENT:      Head: Normocephalic.      Mouth/Throat:      Mouth: Mucous membranes " are moist.      Pharynx: Oropharynx is clear.   Eyes:      Conjunctiva/sclera: Conjunctivae normal.      Pupils: Pupils are equal, round, and reactive to light.   Cardiovascular:      Rate and Rhythm: Normal rate.      Heart sounds: Murmur heard.   Pulmonary:      Effort: No respiratory distress.      Breath sounds: No stridor. No wheezing or rhonchi.   Abdominal:      General: Bowel sounds are normal. There is no distension.      Palpations: Abdomen is soft.      Tenderness: There is no abdominal tenderness. There is no guarding.   Musculoskeletal:         General: Swelling present.   Skin:     General: Skin is warm and dry.      Coloration: Skin is not jaundiced.      Comments: Left arm HD graft   Neurological:      Mental Status: She is alert and oriented to person, place, and time.      Motor: Weakness present.   Psychiatric:         Mood and Affect: Mood normal.         Behavior: Behavior normal.     Labs:  Recent Results (from the past 48 hour(s))   Comprehensive metabolic panel    Collection Time: 01/03/24  1:48 PM   Result Value Ref Range    Sodium Level 138 136 - 145 mmol/L    Potassium Level 4.0 3.5 - 5.1 mmol/L    Chloride 102 98 - 107 mmol/L    Carbon Dioxide 27 23 - 31 mmol/L    Glucose Level 212 (H) 82 - 115 mg/dL    Blood Urea Nitrogen 33.0 (H) 9.8 - 20.1 mg/dL    Creatinine 4.39 (H) 0.55 - 1.02 mg/dL    Calcium Level Total 9.0 8.4 - 10.2 mg/dL    Protein Total 6.1 5.8 - 7.6 gm/dL    Albumin Level 3.0 (L) 3.4 - 4.8 g/dL    Globulin 3.1 2.4 - 3.5 gm/dL    Albumin/Globulin Ratio 1.0 (L) 1.1 - 2.0 ratio    Bilirubin Total 0.3 <=1.5 mg/dL    Alkaline Phosphatase 108 40 - 150 unit/L    Alanine Aminotransferase 37 0 - 55 unit/L    Aspartate Aminotransferase 34 5 - 34 unit/L    eGFR 10 mls/min/1.73/m2   Protime-INR    Collection Time: 01/03/24  1:48 PM   Result Value Ref Range    PT 15.9 (H) 12.5 - 14.5 seconds    INR 1.3 <=1.3   Type & Screen    Collection Time: 01/03/24  1:48 PM   Result Value Ref Range     Group & Rh O POS     Indirect Gideon GEL NEG     Specimen Outdate 01/06/2024 23:59    CBC with Differential    Collection Time: 01/03/24  1:48 PM   Result Value Ref Range    WBC 7.40 4.50 - 11.50 x10(3)/mcL    RBC 1.90 (L) 4.20 - 5.40 x10(6)/mcL    Hgb 5.3 (LL) 12.0 - 16.0 g/dL    Hct 17.7 (LL) 37.0 - 47.0 %    MCV 93.2 80.0 - 94.0 fL    MCH 27.9 27.0 - 31.0 pg    MCHC 29.9 (L) 33.0 - 36.0 g/dL    RDW 23.9 (H) 11.5 - 17.0 %    Platelet 174 130 - 400 x10(3)/mcL    MPV 10.2 7.4 - 10.4 fL    Neut % 82.0 %    Lymph % 10.0 %    Mono % 6.4 %    Eos % 0.8 %    Basophil % 0.3 %    Lymph # 0.74 0.6 - 4.6 x10(3)/mcL    Neut # 6.07 2.1 - 9.2 x10(3)/mcL    Mono # 0.47 0.1 - 1.3 x10(3)/mcL    Eos # 0.06 0 - 0.9 x10(3)/mcL    Baso # 0.02 <=0.2 x10(3)/mcL    IG# 0.04 0 - 0.04 x10(3)/mcL    IG% 0.5 %    NRBC% 0.4 %   Blood Smear Microscopic Exam    Collection Time: 01/03/24  1:48 PM   Result Value Ref Range    RBC Morph Abnormal (A) Normal    Anisocytosis 1+ (A) (none)    Hypochromasia 1+ (A) (none)    Macrocytosis 1+ (A) (none)    Microcytosis 1+ (A) (none)    Poikilocytosis 1+ (A) (none)    Polychromasia 1+ (A) (none)    Stomatocytes 1+ (A) (none)    Target Cells 1+ (A) (none)    Platelets Normal Normal, Adequate   Prepare RBC 2 Units; rectal bleeding    Collection Time: 01/03/24  1:48 PM   Result Value Ref Range    UNIT NUMBER [id number]     UNIT ABO/RH O POS     DISPENSE STATUS Transfused     Unit Expiration [id number]     Product Code S8284D77     Unit Blood Type Code 5100     CROSSMATCH INTERPRETATION Compatible     UNIT NUMBER [id number]     UNIT ABO/RH O POS     DISPENSE STATUS Transfused     Unit Expiration [id number]     Product Code E6087P90     Unit Blood Type Code 5100     CROSSMATCH INTERPRETATION Compatible    POCT glucose    Collection Time: 01/03/24  8:27 PM   Result Value Ref Range    POCT Glucose 241 (H) 70 - 110 mg/dL   Comprehensive Metabolic Panel (CMP)    Collection Time: 01/04/24  2:52 AM   Result  Value Ref Range    Sodium Level 137 136 - 145 mmol/L    Potassium Level 3.7 3.5 - 5.1 mmol/L    Chloride 100 98 - 107 mmol/L    Carbon Dioxide 27 23 - 31 mmol/L    Glucose Level 51 (L) 82 - 115 mg/dL    Blood Urea Nitrogen 39.1 (H) 9.8 - 20.1 mg/dL    Creatinine 4.43 (H) 0.55 - 1.02 mg/dL    Calcium Level Total 8.7 8.4 - 10.2 mg/dL    Protein Total 6.0 5.8 - 7.6 gm/dL    Albumin Level 2.9 (L) 3.4 - 4.8 g/dL    Globulin 3.1 2.4 - 3.5 gm/dL    Albumin/Globulin Ratio 0.9 (L) 1.1 - 2.0 ratio    Bilirubin Total 0.5 <=1.5 mg/dL    Alkaline Phosphatase 88 40 - 150 unit/L    Alanine Aminotransferase 38 0 - 55 unit/L    Aspartate Aminotransferase 31 5 - 34 unit/L    eGFR 10 mls/min/1.73/m2   Magnesium    Collection Time: 01/04/24  2:52 AM   Result Value Ref Range    Magnesium Level 1.90 1.60 - 2.60 mg/dL   Phosphorus    Collection Time: 01/04/24  2:52 AM   Result Value Ref Range    Phosphorus Level 4.5 2.3 - 4.7 mg/dL   Hemoglobin A1C    Collection Time: 01/04/24  2:52 AM   Result Value Ref Range    Hemoglobin A1c 5.8 <=7.0 %    Estimated Average Glucose 119.8 mg/dL   TSH    Collection Time: 01/04/24  2:52 AM   Result Value Ref Range    TSH 2.230 0.350 - 4.940 uIU/mL   CBC with Differential    Collection Time: 01/04/24  2:52 AM   Result Value Ref Range    WBC 6.21 4.50 - 11.50 x10(3)/mcL    RBC 2.52 (L) 4.20 - 5.40 x10(6)/mcL    Hgb 7.1 (L) 12.0 - 16.0 g/dL    Hct 22.7 (L) 37.0 - 47.0 %    MCV 90.1 80.0 - 94.0 fL    MCH 28.2 27.0 - 31.0 pg    MCHC 31.3 (L) 33.0 - 36.0 g/dL    RDW 21.3 (H) 11.5 - 17.0 %    Platelet 169 130 - 400 x10(3)/mcL    MPV 10.5 (H) 7.4 - 10.4 fL    Neut % 75.4 %    Lymph % 13.5 %    Mono % 8.5 %    Eos % 1.6 %    Basophil % 0.5 %    Lymph # 0.84 0.6 - 4.6 x10(3)/mcL    Neut # 4.68 2.1 - 9.2 x10(3)/mcL    Mono # 0.53 0.1 - 1.3 x10(3)/mcL    Eos # 0.10 0 - 0.9 x10(3)/mcL    Baso # 0.03 <=0.2 x10(3)/mcL    IG# 0.03 0 - 0.04 x10(3)/mcL    IG% 0.5 %    NRBC% 1.0 %   Hemoglobin and Hematocrit    Collection  Time: 01/04/24  8:17 AM   Result Value Ref Range    Hgb 7.7 (L) 12.0 - 16.0 g/dL    Hct 25.3 (L) 37.0 - 47.0 %   POCT glucose    Collection Time: 01/04/24  9:55 AM   Result Value Ref Range    POCT Glucose 61 (L) 70 - 110 mg/dL   POCT glucose    Collection Time: 01/04/24 10:27 AM   Result Value Ref Range    POCT Glucose 148 (H) 70 - 110 mg/dL   POCT glucose    Collection Time: 01/04/24  8:31 PM   Result Value Ref Range    POCT Glucose 192 (H) 70 - 110 mg/dL   POCT glucose    Collection Time: 01/05/24  4:52 AM   Result Value Ref Range    POCT Glucose 69 (L) 70 - 110 mg/dL   Comprehensive Metabolic Panel    Collection Time: 01/05/24  6:25 AM   Result Value Ref Range    Sodium Level 136 136 - 145 mmol/L    Potassium Level 4.1 3.5 - 5.1 mmol/L    Chloride 99 98 - 107 mmol/L    Carbon Dioxide 30 23 - 31 mmol/L    Glucose Level 103 82 - 115 mg/dL    Blood Urea Nitrogen 24.2 (H) 9.8 - 20.1 mg/dL    Creatinine 3.53 (H) 0.55 - 1.02 mg/dL    Calcium Level Total 8.5 8.4 - 10.2 mg/dL    Protein Total 5.2 (L) 5.8 - 7.6 gm/dL    Albumin Level 2.7 (L) 3.4 - 4.8 g/dL    Globulin 2.5 2.4 - 3.5 gm/dL    Albumin/Globulin Ratio 1.1 1.1 - 2.0 ratio    Bilirubin Total 0.3 <=1.5 mg/dL    Alkaline Phosphatase 81 40 - 150 unit/L    Alanine Aminotransferase 30 0 - 55 unit/L    Aspartate Aminotransferase 23 5 - 34 unit/L    eGFR 13 mls/min/1.73/m2   CBC with Differential    Collection Time: 01/05/24  6:25 AM   Result Value Ref Range    WBC 5.77 4.50 - 11.50 x10(3)/mcL    RBC 2.51 (L) 4.20 - 5.40 x10(6)/mcL    Hgb 7.2 (L) 12.0 - 16.0 g/dL    Hct 22.9 (L) 37.0 - 47.0 %    MCV 91.2 80.0 - 94.0 fL    MCH 28.7 27.0 - 31.0 pg    MCHC 31.4 (L) 33.0 - 36.0 g/dL    RDW 22.3 (H) 11.5 - 17.0 %    Platelet 161 130 - 400 x10(3)/mcL    MPV 10.3 7.4 - 10.4 fL    Neut % 79.4 %    Lymph % 11.1 %    Mono % 7.8 %    Eos % 1.0 %    Basophil % 0.2 %    Lymph # 0.64 0.6 - 4.6 x10(3)/mcL    Neut # 4.58 2.1 - 9.2 x10(3)/mcL    Mono # 0.45 0.1 - 1.3 x10(3)/mcL    Eos  # 0.06 0 - 0.9 x10(3)/mcL    Baso # 0.01 <=0.2 x10(3)/mcL    IG# 0.03 0 - 0.04 x10(3)/mcL    IG% 0.5 %    NRBC% 0.0 %   Magnesium    Collection Time: 01/05/24  6:25 AM   Result Value Ref Range    Magnesium Level 1.80 1.60 - 2.60 mg/dL       Imaging:  Nuclear Stress - Cardiology Interpreted    Result Date: 12/27/2023    Abnormal myocardial perfusion scan.   There is a moderate to severe intensity, moderate sized, mostly reversible perfusion abnormality with some fixed areas in the lateral apical wall(s) in the typical distribution of the LCX territory.   There are no other significant perfusion abnormalities.   The gated perfusion images showed an ejection fraction of 62% at rest. The gated perfusion images showed an ejection fraction of 63% post stress.   There were no arrhythmias during stress. The nuclear stress test is  fair quality.  On the nuclear stress test the EF is normal.  If the patient has an echo, the EF on the echo is considered more accurate.    The patient has a moderate risk nuclear stress test. If patient is symptomatic, consider management with two anti-anginals        Echo    Result Date: 12/27/2023    Left Ventricle: The left ventricle is normal in size. Normal wall thickness. There is normal systolic function. Biplane (2D) method of discs ejection fraction is 68%.   Right Ventricle: Normal right ventricular cavity size. Systolic function is borderline low.   Left Atrium: Left atrium is mildly dilated.   Right Atrium: Right atrium is mildly dilated.   Aortic Valve: The aortic valve is a trileaflet valve. There is moderate aortic valve sclerosis.   Mitral Valve: There is severe posterior mitral annular calcification present. There is moderate to severe regurgitation.   Tricuspid Valve: There is moderate annular calcification present. There is moderate regurgitation.   IVC/SVC: Intermediate venous pressure at 8 mmHg.          Assessment/Plan:  Anemia- normocytic  History of GI bleeds: AVMs treated  in 4th portion of duodenum in 2023 (see above); 8mm duodenal bulb polyp removed via cold snare 12/8/23  EGD push 1/4/24: no blood and no bleed source evident through the esophagus stomach and duodenal and proximal jejunum, well beyond the ligament of Treitz   A fib on Eliquis- may want to consider options due to frequent GI bleeds and hospitalizations  Appreciate cardiology's input  ESRD- on HD       Will keep on low residue diet in case colonoscopy is needed while inpatient due to h/h decline, signs of active GI bleed or if she happens to still be here next Monday.     If still here Monday, clear liquids and colon prep to be ordered on Sunday    Sydnee Richard NP as scribe for Dr. Morgan Scales MD

## 2024-01-05 NOTE — PROGRESS NOTES
Inpatient Nutrition Evaluation    Admit Date: 1/3/2024   Total duration of encounter: 2 days   Patient Age: 78 y.o.    Nutrition Recommendation/Prescription     -Continue low-residue diet. Added diabetic restriction.   -As po intake increases, monitor electrolytes for need to add renal, dialysis restriction.     Nutrition Assessment     Chart Review    Reason Seen: malnutrition screening tool (MST)    Malnutrition Screening Tool Results   Have you recently lost weight without trying?: Unsure  Have you been eating poorly because of a decreased appetite?: No   MST Score: 2   Diagnosis:  Acute anemia due to GIB s/p 2 units of PRBC     Relevant Medical History: ESRD on HD, Afib on eliquis,  CAD with CABG x4, HFpEF, COPD gold stage II, hypertension, insulin-dependent type 2 diabetes, hypothyroidism, schizophrenia     Scheduled Medications:  amLODIPine, 5 mg, Daily  atorvastatin, 40 mg, Daily  carvediloL, 6.25 mg, BID  diphenhydrAMINE, 50 mg, Nightly  DULoxetine, 30 mg, Daily  epoetin alexis, 10,000 Units, Every Tues, Thurs, Sat  fluticasone furoate-vilanteroL, 1 puff, Daily  insulin detemir U-100, 15 Units, QHS  levothyroxine, 125 mcg, Before breakfast  mupirocin, , BID  pantoprazole, 40 mg, BID  sevelamer carbonate, 1,600 mg, TID  tiotropium bromide, 2 puff, Daily    Continuous Infusions:   PRN Medications: 0.9%  NaCl infusion (for blood administration), acetaminophen, acetaminophen, albuterol, albuterol-ipratropium, dextrose 10%, dextrose 10%, dextrose 10%, dextrose 10%, glucagon (human recombinant), glucagon (human recombinant), glucose, glucose, glucose, glucose, insulin aspart U-100, ondansetron, sodium chloride 0.9%    Recent Labs   Lab 01/03/24  1348 01/04/24  0252 01/04/24  0817 01/05/24  0625    137  --  136   K 4.0 3.7  --  4.1   CALCIUM 9.0 8.7  --  8.5   PHOS  --  4.5  --   --    MG  --  1.90  --  1.80   CHLORIDE 102 100  --  99   CO2 27 27  --  30   BUN 33.0* 39.1*  --  24.2*   CREATININE 4.39* 4.43*  --  " 3.53*   EGFRNORACEVR 10 10  --  13   GLUCOSE 212* 51*  --  103   BILITOT 0.3 0.5  --  0.3   ALKPHOS 108 88  --  81   ALT 37 38  --  30   AST 34 31  --  23   ALBUMIN 3.0* 2.9*  --  2.7*   HGBA1C  --  5.8  --   --    WBC 7.40 6.21  --  5.77   HGB 5.3* 7.1* 7.7* 7.2*   HCT 17.7* 22.7* 25.3* 22.9*     Nutrition Orders:  Diet low fiber/residue      Appetite/Oral Intake: fair/50-75% of meals liquid tray   Factors Affecting Nutritional Intake: clear liquid diet  Food/Latter day/Cultural Preferences: none reported  Food Allergies: none reported  Last Bowel Movement: 12/28/23  Wound(s):      Comments    1/5/24 On liquid diet during rounds for possible procedure. Tolerating liquids without any GI complaints. States appetite at home was normal, denies any significant wt loss. Unsure of usual dry weight. Wts in EMR range from 189-197 x 1 month. GI  advancing diet today. Will monitor need to add renal restriction since already has low residue and diabetic restrictions on diet order.      Anthropometrics    Height: 5' 6" (167.6 cm), Height Method: Stated  Last Weight: 89.5 kg (197 lb 5 oz) (01/05/24 0554), Weight Method: Standard Scale  BMI (Calculated): 31.9  BMI Classification: obese grade I (BMI 30-34.9)     Ideal Body Weight (IBW), Female: 130 lb     % Ideal Body Weight, Female (lb): 152.31 %                             Usual Weight Provided By: unable to obtain usual weight    Wt Readings from Last 5 Encounters:   01/05/24 89.5 kg (197 lb 5 oz)   12/27/23 88.9 kg (196 lb)   12/27/23 88.9 kg (195 lb 15.8 oz)   12/14/23 89.3 kg (196 lb 12.8 oz)   12/09/23 86 kg (189 lb 9.5 oz)     Weight Change(s) Since Admission: 1/5/23 89.5kg, 1+ LE edema  Wt Readings from Last 1 Encounters:   01/05/24 0554 89.5 kg (197 lb 5 oz)   01/05/24 0439 89.8 kg (198 lb)   01/04/24 0945 89.8 kg (198 lb)   01/03/24 1338 89.8 kg (198 lb)   Admit Weight: 89.8 kg (198 lb) (01/03/24 1338), Weight Method: Stated    Patient Education     Not " applicable.    Nutrition Goals & Monitoring     Dietitian will monitor: energy intake, electrolyte/renal panel, and glucose/endocrine profile    Nutrition Risk/Follow-Up: low (follow-up in 5-7 days)  Patients assigned 'low nutrition risk' status do not qualify for a full nutritional assessment but will be monitored and re-evaluated in a 5-7 day time period. Please consult if re-evaluation needed sooner.

## 2024-01-05 NOTE — PROGRESS NOTES
[facility] Hospital Medicine Progress Note        Chief Complaint: Inpatient Follow-up     HPI:   79 yo AA woman with recent diagnosis of 8mm duodenal bulb polyp, gastric angiodysplasia and 5 mm duodenal polyp, ESRD on HD MWF, Afib on eliquis,  CAD with CABG x4, HFpEF, COPD gold stage II, hypertension, insulin-dependent type 2 diabetes, hypothyroidism, schizophrenia.     She was recently seen for GIB, diagnosed with  discharged on 12/9/23. She was diagnosed with 8mm duodenal polyp which was removed with a cold snare. Eliquis was resumed at [place]. 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     Presented with worsening melena. Denied abd pain , diarrhea, nausea or any other GI symptoms. Also denies SOB, syncope, dizziness. Denies NSAID use. Lives with daughter at baseline. At presentation she was HDS. Labs showed acute drop in Hb to 5.3 from last labs, ESRD. Received iv ppi, two units of PRBCs were ordered and then admitted to  for further eval and management of acute anemia.  Patient was taken for EGD on 01/04.  Push endoscopy also performed.  No signs of any acute bleeding.  Stool was dark.     Interval Hx:   Patient doing okay this morning.  She was currently NPO for possible colonoscopy but was not prepped last night.  He was no order for procedure this morning.  Nursing looking further into whether the procedure is planned or not.  GI note states could potentially be done outpatient.      Objective/physical exam:  General: In no acute distress, afebrile  Respiratory: Clear to auscultation bilaterally  Cardiovascular: S1, S2, no appreciable murmur  Abdomen: Soft, nontender, BS +  MSK: Warm, no lower extremity edema, no clubbing or cyanosis  Neurologic: Alert and oriented x4, moving all extremities with good strength      VITAL SIGNS: 24 HRS MIN & MAX LAST   Temp  Min: 96.8 °F (36 °C)  Max: 97.8 °F (36.6 °C) 97.6 °F (36.4 °C)   BP  Min: 111/66  Max: 153/68 111/66    Pulse  Min: 54  Max: 100  (!) 54   Resp  Min: 14  Max: 24 18   SpO2  Min: 96 %  Max: 100 % 98 %       Recent Labs   Lab 01/03/24  1348 01/04/24  0252 01/04/24  0817   WBC 7.40 6.21  --    RBC 1.90* 2.52*  --    HGB 5.3* 7.1* 7.7*   HCT 17.7* 22.7* 25.3*   MCV 93.2 90.1  --    MCH 27.9 28.2  --    MCHC 29.9* 31.3*  --    RDW 23.9* 21.3*  --     169  --    MPV 10.2 10.5*  --        Recent Labs   Lab 01/03/24  1348 01/04/24  0252    137   K 4.0 3.7   CO2 27 27   BUN 33.0* 39.1*   CREATININE 4.39* 4.43*   CALCIUM 9.0 8.7   MG  --  1.90   ALBUMIN 3.0* 2.9*   ALKPHOS 108 88   ALT 37 38   AST 34 31   BILITOT 0.3 0.5          Microbiology Results (last 7 days)       ** No results found for the last 168 hours. **             Radiology:  Nuclear Stress - Cardiology Interpreted    Abnormal myocardial perfusion scan.    There is a moderate to severe intensity, moderate sized, mostly   reversible perfusion abnormality with some fixed areas in the lateral   apical wall(s) in the typical distribution of the LCX territory.    There are no other significant perfusion abnormalities.    The gated perfusion images showed an ejection fraction of 62% at rest.   The gated perfusion images showed an ejection fraction of 63% post stress.    There were no arrhythmias during stress.    The nuclear stress test is  fair quality.    On the nuclear stress test the EF is normal.  If the patient has an echo,   the EF on the echo is considered more accurate.        The patient has a moderate risk nuclear stress test.     If patient is symptomatic, consider management with two anti-anginals         Echo    Left Ventricle: The left ventricle is normal in size. Normal wall   thickness. There is normal systolic function. Biplane (2D) method of discs   ejection fraction is 68%.    Right Ventricle: Normal right ventricular cavity size. Systolic   function is borderline low.    Left Atrium: Left atrium is mildly dilated.    Right Atrium:  Right atrium is mildly dilated.    Aortic Valve: The aortic valve is a trileaflet valve. There is moderate   aortic valve sclerosis.    Mitral Valve: There is severe posterior mitral annular calcification   present. There is moderate to severe regurgitation.    Tricuspid Valve: There is moderate annular calcification present. There   is moderate regurgitation.    IVC/SVC: Intermediate venous pressure at 8 mmHg.      Scheduled Med:   amLODIPine  5 mg Oral Daily    atorvastatin  40 mg Oral Daily    carvediloL  6.25 mg Oral BID    diphenhydrAMINE  50 mg Oral Nightly    DULoxetine  30 mg Oral Daily    epoetin alexis  10,000 Units Intravenous Every Tues, Thurs, Sat    fluticasone furoate-vilanteroL  1 puff Inhalation Daily    insulin detemir U-100  15 Units Subcutaneous QHS    levothyroxine  125 mcg Oral Before breakfast    mupirocin   Nasal BID    pantoprazole  40 mg Intravenous BID    sevelamer carbonate  1,600 mg Oral TID    tiotropium bromide  2 puff Inhalation Daily        Continuous Infusions:   sodium chloride 0.9%          PRN Meds:  0.9%  NaCl infusion (for blood administration), acetaminophen, acetaminophen, albuterol, albuterol-ipratropium, dextrose 10%, dextrose 10%, dextrose 10%, dextrose 10%, glucagon (human recombinant), glucagon (human recombinant), glucose, glucose, glucose, glucose, insulin aspart U-100, ondansetron, sodium chloride 0.9%       Assessment/Plan:   Acute anemia due to GIB s/p 2 units of PRBC  H/o AVM, duodenal polyp     Hx: ESRD on HD, Afib on eliquis,  CAD with CABG x4, HFpEF, COPD gold stage II, hypertension, insulin-dependent type 2 diabetes, hypothyroidism, schizophrenia.    EGD results reviewed above. NPO for possible colonoscopy but no prep yet.  Follow up H/H this morning. Transfuse for hgb less than 7.   HD per Renal, TTS schedule  Continue to hold Eliquis. H/o of A.fib.  Agree she may not be a candidate for further anticoagulation due to ongoing bleeding and multiple  hospitalizations.  Will consult cardiology for further input.   Monitor blood sugars while NPO.     Critical care diagnosis: critical anemia requiring blood transfusion  Critical care interventions: hands on evaluation, review of labs/radiographs/records and discussions with family  Critical care time spent: >32 minutes          Santiago Castro MD   01/05/2024     All diagnosis and differential diagnosis have been reviewed; assessment and plan has been documented; I have personally reviewed the labs and test results that are presently available; I have reviewed the patients medication list; I have reviewed the consulting providers response and recommendations. I have reviewed or attempted to review medical records based upon their availability    All of the patient's questions have been  addressed and answered. Patient's is agreeable to the above stated plan. I will continue to monitor closely and make adjustments to medical management as needed.  _____________________________________________________________________

## 2024-01-05 NOTE — PROGRESS NOTES
Nephrology follow up progress note    HPI:      Rosette Madrid is a 78 y.o. female with dialysis dependent ESRD recent diagnosis of duodenal bulb polyp, gastric angiodysplasia during hospitalization for abdominal pain and melanotic stool (EGD 12/8).  She has atrial fibrillation on Eliquis, CAD status post CABG x4, heart failure with preserved EF, COPD, hypertension, diabetes mellitus type 2, hypothyroidism, schizophrenia.  She dialyzes on Monday Wednesday Friday schedule at [facility] followed by Dr. Bourgeois.      She presented to [facility] ED on 01/03 with complaints of melanotic stool and low H&H per outpatient lab.  Initial workup at this ER showed Hgb 5.3/ Hct 17.  She was transfused 2 units PRBC Hgb now 7.1. EGD pending today.  Nephrology consulted for dialysis needs.    Interval history:     EGD without signs of active bleeding. Tolerated dialysis yesterday with 1.5 L UF.      Review of Systems:       Past medical, family, surgical, and social history reviewed and unchanged from initial consult note.     Objective:       VITAL SIGNS: 24 HR MIN & MAX LAST    Temp  Min: 96.8 °F (36 °C)  Max: 97.9 °F (36.6 °C)  97.9 °F (36.6 °C)        BP  Min: 111/66  Max: 153/68  125/71     Pulse  Min: 54  Max: 100  (!) 59     Resp  Min: 18  Max: 24  18    SpO2  Min: 96 %  Max: 100 %  98 %      GEN: Chronically ill appearing in NAD  HEENT: Conjunctiva anicteric, pupils equal, MMM, OP benign  CV: RRR +S1,S2 without murmur  PULM: bilateral expiratory wheezing, RA, non labored   ABD: Soft, NT/ND abdomen with NABS  EXT: No cyanosis or edema  SKIN: Warm and dry  PSYCH: Awake, alert, and appropriately conversant  Vascular access: KERLINE AVF          Component Value Date/Time     01/05/2024 0625     01/04/2024 0252    K 4.1 01/05/2024 0625    K 3.7 01/04/2024 0252    CHLORIDE 99 01/05/2024 0625    CHLORIDE 100 01/04/2024 0252    CO2 30 01/05/2024 0625    CO2 27 01/04/2024 0252    BUN 24.2 (H) 01/05/2024 0625    BUN 39.1 (H)  01/04/2024 0252    CREATININE 3.53 (H) 01/05/2024 0625    CREATININE 4.43 (H) 01/04/2024 0252    CALCIUM 8.5 01/05/2024 0625    CALCIUM 8.7 01/04/2024 0252    PHOS 4.5 01/04/2024 0252            Component Value Date/Time    WBC 5.77 01/05/2024 0625    WBC 6.21 01/04/2024 0252    HGB 7.2 (L) 01/05/2024 0625    HGB 7.7 (L) 01/04/2024 0817    HCT 22.9 (L) 01/05/2024 0625    HCT 25.3 (L) 01/04/2024 0817     01/05/2024 0625     01/04/2024 0252       Imaging reviewed      Assessment / Plan:   ESRD on HD  Acute worsening of chronic anemia status post PRBC x2  Recent history of GI bleeding - EGD 12/8  Heart failure with preserved EF  COPD  Diabetes mellitus type 2     -patient will continue TTS schedule for hemodialysis while inpatient.    -Continue Epogen for anemia of CKD. Plans to transfuse for Hgb <7  -GI following. Possible colonoscopy this admission

## 2024-01-05 NOTE — PLAN OF CARE
Problem: Adult Inpatient Plan of Care  Goal: Plan of Care Review  Outcome: Ongoing, Progressing  Goal: Patient-Specific Goal (Individualized)  Outcome: Ongoing, Progressing  Goal: Absence of Hospital-Acquired Illness or Injury  Outcome: Ongoing, Progressing  Goal: Optimal Comfort and Wellbeing  Outcome: Ongoing, Progressing  Goal: Readiness for Transition of Care  Outcome: Ongoing, Progressing     Problem: Device-Related Complication Risk (Hemodialysis)  Goal: Safe, Effective Therapy Delivery  Outcome: Ongoing, Progressing     Problem: Hemodynamic Instability (Hemodialysis)  Goal: Effective Tissue Perfusion  Outcome: Ongoing, Progressing     Problem: Infection (Hemodialysis)  Goal: Absence of Infection Signs and Symptoms  Outcome: Ongoing, Progressing     Problem: Diabetes Comorbidity  Goal: Blood Glucose Level Within Targeted Range  Outcome: Ongoing, Progressing     Problem: Skin Injury Risk Increased  Goal: Skin Health and Integrity  Outcome: Ongoing, Progressing

## 2024-01-06 LAB
ABO + RH BLD: NORMAL
ABO + RH BLD: NORMAL
ANION GAP SERPL CALC-SCNC: 6 MEQ/L
BASOPHILS # BLD AUTO: 0.02 X10(3)/MCL
BASOPHILS NFR BLD AUTO: 0.4 %
BLD PROD TYP BPU: NORMAL
BLD PROD TYP BPU: NORMAL
BLOOD UNIT EXPIRATION DATE: NORMAL
BLOOD UNIT EXPIRATION DATE: NORMAL
BLOOD UNIT TYPE CODE: 5100
BLOOD UNIT TYPE CODE: 5100
BUN SERPL-MCNC: 34.5 MG/DL (ref 9.8–20.1)
CALCIUM SERPL-MCNC: 8.2 MG/DL (ref 8.4–10.2)
CHLORIDE SERPL-SCNC: 95 MMOL/L (ref 98–107)
CO2 SERPL-SCNC: 31 MMOL/L (ref 23–31)
CREAT SERPL-MCNC: 4.73 MG/DL (ref 0.55–1.02)
CREAT/UREA NIT SERPL: 7
CROSSMATCH INTERPRETATION: NORMAL
CROSSMATCH INTERPRETATION: NORMAL
DISPENSE STATUS: NORMAL
DISPENSE STATUS: NORMAL
EOSINOPHIL # BLD AUTO: 0.06 X10(3)/MCL (ref 0–0.9)
EOSINOPHIL NFR BLD AUTO: 1.2 %
ERYTHROCYTE [DISTWIDTH] IN BLOOD BY AUTOMATED COUNT: 22.1 % (ref 11.5–17)
GFR SERPLBLD CREATININE-BSD FMLA CKD-EPI: 9 MLS/MIN/1.73/M2
GLUCOSE SERPL-MCNC: 108 MG/DL (ref 82–115)
HCT VFR BLD AUTO: 23 % (ref 37–47)
HGB BLD-MCNC: 7 G/DL (ref 12–16)
IMM GRANULOCYTES # BLD AUTO: 0.03 X10(3)/MCL (ref 0–0.04)
IMM GRANULOCYTES NFR BLD AUTO: 0.6 %
LYMPHOCYTES # BLD AUTO: 0.67 X10(3)/MCL (ref 0.6–4.6)
LYMPHOCYTES NFR BLD AUTO: 13.3 %
MCH RBC QN AUTO: 28.8 PG (ref 27–31)
MCHC RBC AUTO-ENTMCNC: 30.4 G/DL (ref 33–36)
MCV RBC AUTO: 94.7 FL (ref 80–94)
MONOCYTES # BLD AUTO: 0.47 X10(3)/MCL (ref 0.1–1.3)
MONOCYTES NFR BLD AUTO: 9.4 %
NEUTROPHILS # BLD AUTO: 3.77 X10(3)/MCL (ref 2.1–9.2)
NEUTROPHILS NFR BLD AUTO: 75.1 %
NRBC BLD AUTO-RTO: 0 %
PLATELET # BLD AUTO: 157 X10(3)/MCL (ref 130–400)
PMV BLD AUTO: 9.9 FL (ref 7.4–10.4)
POCT GLUCOSE: 106 MG/DL (ref 70–110)
POCT GLUCOSE: 122 MG/DL (ref 70–110)
POCT GLUCOSE: 157 MG/DL (ref 70–110)
POCT GLUCOSE: 217 MG/DL (ref 70–110)
POTASSIUM SERPL-SCNC: 4.2 MMOL/L (ref 3.5–5.1)
RBC # BLD AUTO: 2.43 X10(6)/MCL (ref 4.2–5.4)
SODIUM SERPL-SCNC: 132 MMOL/L (ref 136–145)
UNIT NUMBER: NORMAL
UNIT NUMBER: NORMAL
WBC # SPEC AUTO: 5.02 X10(3)/MCL (ref 4.5–11.5)

## 2024-01-06 PROCEDURE — C9113 INJ PANTOPRAZOLE SODIUM, VIA: HCPCS | Performed by: INTERNAL MEDICINE

## 2024-01-06 PROCEDURE — 80100016 HC MAINTENANCE HEMODIALYSIS

## 2024-01-06 PROCEDURE — P9016 RBC LEUKOCYTES REDUCED: HCPCS | Performed by: HOSPITALIST

## 2024-01-06 PROCEDURE — 80048 BASIC METABOLIC PNL TOTAL CA: CPT | Performed by: NURSE PRACTITIONER

## 2024-01-06 PROCEDURE — 21400001 HC TELEMETRY ROOM

## 2024-01-06 PROCEDURE — 63600175 PHARM REV CODE 636 W HCPCS: Performed by: INTERNAL MEDICINE

## 2024-01-06 PROCEDURE — P9016 RBC LEUKOCYTES REDUCED: HCPCS | Performed by: NURSE PRACTITIONER

## 2024-01-06 PROCEDURE — 85025 COMPLETE CBC W/AUTO DIFF WBC: CPT | Performed by: NURSE PRACTITIONER

## 2024-01-06 PROCEDURE — 63600175 PHARM REV CODE 636 W HCPCS: Mod: JZ | Performed by: NURSE PRACTITIONER

## 2024-01-06 PROCEDURE — 25000242 PHARM REV CODE 250 ALT 637 W/ HCPCS: Performed by: INTERNAL MEDICINE

## 2024-01-06 PROCEDURE — 25000003 PHARM REV CODE 250: Performed by: INTERNAL MEDICINE

## 2024-01-06 PROCEDURE — 86923 COMPATIBILITY TEST ELECTRIC: CPT | Mod: 91 | Performed by: NURSE PRACTITIONER

## 2024-01-06 PROCEDURE — 86923 COMPATIBILITY TEST ELECTRIC: CPT | Performed by: HOSPITALIST

## 2024-01-06 RX ORDER — HYDROCODONE BITARTRATE AND ACETAMINOPHEN 500; 5 MG/1; MG/1
TABLET ORAL
Status: DISCONTINUED | OUTPATIENT
Start: 2024-01-06 | End: 2024-01-09 | Stop reason: HOSPADM

## 2024-01-06 RX ADMIN — LEVOTHYROXINE SODIUM 125 MCG: 125 TABLET ORAL at 05:01

## 2024-01-06 RX ADMIN — PANTOPRAZOLE SODIUM 40 MG: 40 INJECTION, POWDER, FOR SOLUTION INTRAVENOUS at 08:01

## 2024-01-06 RX ADMIN — MUPIROCIN: 20 OINTMENT TOPICAL at 08:01

## 2024-01-06 RX ADMIN — DIPHENHYDRAMINE HYDROCHLORIDE 50 MG: 25 CAPSULE ORAL at 09:01

## 2024-01-06 RX ADMIN — MUPIROCIN: 20 OINTMENT TOPICAL at 09:01

## 2024-01-06 RX ADMIN — ATORVASTATIN CALCIUM 40 MG: 40 TABLET, FILM COATED ORAL at 08:01

## 2024-01-06 RX ADMIN — DULOXETINE HYDROCHLORIDE 30 MG: 30 CAPSULE, DELAYED RELEASE ORAL at 08:01

## 2024-01-06 RX ADMIN — SEVELAMER CARBONATE 1600 MG: 800 TABLET, FILM COATED ORAL at 09:01

## 2024-01-06 RX ADMIN — INSULIN ASPART 4 UNITS: 100 INJECTION, SOLUTION INTRAVENOUS; SUBCUTANEOUS at 11:01

## 2024-01-06 RX ADMIN — SEVELAMER CARBONATE 1600 MG: 800 TABLET, FILM COATED ORAL at 08:01

## 2024-01-06 RX ADMIN — TIOTROPIUM BROMIDE INHALATION SPRAY 2 PUFF: 3.12 SPRAY, METERED RESPIRATORY (INHALATION) at 08:01

## 2024-01-06 RX ADMIN — CARVEDILOL 6.25 MG: 3.12 TABLET, FILM COATED ORAL at 09:01

## 2024-01-06 RX ADMIN — FLUTICASONE FUROATE AND VILANTEROL TRIFENATATE 1 PUFF: 100; 25 POWDER RESPIRATORY (INHALATION) at 08:01

## 2024-01-06 RX ADMIN — PANTOPRAZOLE SODIUM 40 MG: 40 INJECTION, POWDER, FOR SOLUTION INTRAVENOUS at 09:01

## 2024-01-06 NOTE — PROGRESS NOTES
NEPHROLOGY PROGRESS NOTE      Patient Demographics:  Name:  Rosette Madrid  Age: 78 y.o.  MRN:  [id number]  Admission Date: 1/3/2024      Subjective:      Eating lunch  No complaints    Hgb drifting down    Current Facility-Administered Medications   Medication Dose Route Frequency Provider Last Rate Last Admin    0.9%  NaCl infusion (for blood administration)   Intravenous Q24H PRN Navdeep Rae IV, MD        0.9%  NaCl infusion (for blood administration)   Intravenous Q24H PRN Santiago Castro MD        acetaminophen tablet 650 mg  650 mg Oral Q8H PRN Judith Rubi PA-C        acetaminophen tablet 650 mg  650 mg Oral Q4H PRN Judith Rubi PA-C        albuterol inhaler 2 puff  2 puff Inhalation Q6H PRN Duarte Lo MD        albuterol-ipratropium 2.5 mg-0.5 mg/3 mL nebulizer solution 3 mL  3 mL Nebulization Q6H PRN Duarte Lo MD   3 mL at 01/04/24 1038    amLODIPine tablet 5 mg  5 mg Oral Daily Duarte Lo MD   5 mg at 01/05/24 0920    atorvastatin tablet 40 mg  40 mg Oral Daily Duarte Lo MD   40 mg at 01/06/24 0816    carvediloL tablet 6.25 mg  6.25 mg Oral BID Duarte Lo MD   6.25 mg at 01/05/24 2041    dextrose 10% bolus 125 mL 125 mL  12.5 g Intravenous PRN Judith Rubi PA-C        dextrose 10% bolus 125 mL 125 mL  12.5 g Intravenous PRN Duarte Lo MD        dextrose 10% bolus 250 mL 250 mL  25 g Intravenous PRN Judith Rubi PA-C        dextrose 10% bolus 250 mL 250 mL  25 g Intravenous PRN Duarte Lo MD        diphenhydrAMINE capsule 50 mg  50 mg Oral Nightly Duarte Lo MD   50 mg at 01/05/24 2041    DULoxetine DR capsule 30 mg  30 mg Oral Daily Duarte Lo MD   30 mg at 01/06/24 0816    epoetin alexis injection 10,000 Units  10,000 Units Intravenous Every Tues, Thurs, Lynn Arevalo S, [facility]   10,000 Units at 01/06/24 0816    fluticasone furoate-vilanteroL 100-25 mcg/dose diskus inhaler 1 puff  1 puff  "Inhalation Daily Duarte Lo MD   1 puff at 01/06/24 0820    glucagon (human recombinant) injection 1 mg  1 mg Intramuscular PRN Judith Rubi PA-C        glucagon (human recombinant) injection 1 mg  1 mg Intramuscular PRN Duarte Lo MD        glucose chewable tablet 16 g  16 g Oral PRN Judith Rubi PA-C        glucose chewable tablet 16 g  16 g Oral PRN Duarte Lo MD        glucose chewable tablet 24 g  24 g Oral PRN Judith Rubi PA-C        glucose chewable tablet 24 g  24 g Oral PRN Duarte Lo MD        insulin aspart U-100 injection 0-10 Units  0-10 Units Subcutaneous QID (AC + HS) PRN Duarte Lo MD   4 Units at 01/05/24 1707    levothyroxine tablet 125 mcg  125 mcg Oral Before breakfast Duarte Lo MD   125 mcg at 01/06/24 0553    mupirocin 2 % ointment   Nasal BID Lynn Tripathi, AGNRACHELE   Given at 01/06/24 0821    ondansetron injection 4 mg  4 mg Intravenous Q4H PRN Judith Rubi PA-C        pantoprazole injection 40 mg  40 mg Intravenous BID Duarte Lo MD   40 mg at 01/06/24 0816    sevelamer carbonate tablet 1,600 mg  1,600 mg Oral TID Duarte Lo MD   1,600 mg at 01/06/24 0816    sodium chloride 0.9% flush 10 mL  10 mL Intravenous Q12H PRN Judith Rubi PA-C        tiotropium bromide 2.5 mcg/actuation inhaler 2 puff  2 puff Inhalation Daily Duarte Lo MD   2 puff at 01/06/24 0819           Review of Systems   Constitutional:  Positive for malaise/fatigue.   HENT: Negative.     Eyes: Negative.    Respiratory: Negative.     Cardiovascular: Negative.    Gastrointestinal:  Positive for melena.   Genitourinary: Negative.    Musculoskeletal: Negative.    Skin: Negative.    Neurological:  Positive for weakness.         Objective:    BP (!) 108/48 (BP Location: Right forearm, Patient Position: Lying)   Pulse (!) 52   Temp 98 °F (36.7 °C) (Oral)   Resp 20   Ht 5' 6" (1.676 m)   Wt 89.5 kg (197 lb 5 oz)   " SpO2 (!) 94%   BMI 31.85 kg/m²       Intake/Output Summary (Last 24 hours) at 1/6/2024 1104  Last data filed at 1/6/2024 0430  Gross per 24 hour   Intake 480 ml   Output 750 ml   Net -270 ml             Physical Exam  Vitals reviewed.   Constitutional:       Appearance: Normal appearance.   HENT:      Head: Normocephalic and atraumatic.      Nose: Nose normal.   Eyes:      Extraocular Movements: Extraocular movements intact.   Cardiovascular:      Rate and Rhythm: Normal rate and regular rhythm.      Pulses: Normal pulses.      Heart sounds: Normal heart sounds.   Pulmonary:      Effort: Pulmonary effort is normal.      Breath sounds: Normal breath sounds.   Abdominal:      General: Bowel sounds are normal.      Palpations: Abdomen is soft.   Musculoskeletal:         General: Normal range of motion.      Cervical back: Normal range of motion.      Comments: Some deconditioning   Skin:     General: Skin is warm and dry.   Neurological:      General: No focal deficit present.      Mental Status: She is alert and oriented to person, place, and time. Mental status is at baseline.   Psychiatric:         Mood and Affect: Mood normal.            Inpatient Diagnostics:  Recent Results (from the past 24 hour(s))   POCT glucose    Collection Time: 01/05/24 11:23 AM   Result Value Ref Range    POCT Glucose 90 70 - 110 mg/dL   POCT glucose    Collection Time: 01/05/24  4:42 PM   Result Value Ref Range    POCT Glucose 224 (H) 70 - 110 mg/dL   POCT glucose    Collection Time: 01/05/24  8:08 PM   Result Value Ref Range    POCT Glucose 127 (H) 70 - 110 mg/dL   POCT glucose    Collection Time: 01/06/24  5:52 AM   Result Value Ref Range    POCT Glucose 122 (H) 70 - 110 mg/dL   Basic Metabolic Panel    Collection Time: 01/06/24  5:58 AM   Result Value Ref Range    Sodium Level 132 (L) 136 - 145 mmol/L    Potassium Level 4.2 3.5 - 5.1 mmol/L    Chloride 95 (L) 98 - 107 mmol/L    Carbon Dioxide 31 23 - 31 mmol/L    Glucose Level 108 82  - 115 mg/dL    Blood Urea Nitrogen 34.5 (H) 9.8 - 20.1 mg/dL    Creatinine 4.73 (H) 0.55 - 1.02 mg/dL    BUN/Creatinine Ratio 7     Calcium Level Total 8.2 (L) 8.4 - 10.2 mg/dL    Anion Gap 6.0 mEq/L    eGFR 9 mls/min/1.73/m2   CBC with Differential    Collection Time: 01/06/24  5:58 AM   Result Value Ref Range    WBC 5.02 4.50 - 11.50 x10(3)/mcL    RBC 2.43 (L) 4.20 - 5.40 x10(6)/mcL    Hgb 7.0 (L) 12.0 - 16.0 g/dL    Hct 23.0 (L) 37.0 - 47.0 %    MCV 94.7 (H) 80.0 - 94.0 fL    MCH 28.8 27.0 - 31.0 pg    MCHC 30.4 (L) 33.0 - 36.0 g/dL    RDW 22.1 (H) 11.5 - 17.0 %    Platelet 157 130 - 400 x10(3)/mcL    MPV 9.9 7.4 - 10.4 fL    Neut % 75.1 %    Lymph % 13.3 %    Mono % 9.4 %    Eos % 1.2 %    Basophil % 0.4 %    Lymph # 0.67 0.6 - 4.6 x10(3)/mcL    Neut # 3.77 2.1 - 9.2 x10(3)/mcL    Mono # 0.47 0.1 - 1.3 x10(3)/mcL    Eos # 0.06 0 - 0.9 x10(3)/mcL    Baso # 0.02 <=0.2 x10(3)/mcL    IG# 0.03 0 - 0.04 x10(3)/mcL    IG% 0.6 %    NRBC% 0.0 %       A/P--NE 01/06    1---ESRD on HD---Cont TTS schedule while inpatient  2---A Fib---Eliquis on hold/ Cardiology on board  3---HTN--Controlled  4---Type II DM---Cont same management  5---Anemia secondary to Blood Loss---Transfuse with HD today/ EGD done 1/4 --No acute bleed---Colonoscopy had been considered    IV--SL    ORDERS:  HD today  Transfuse 2 units PRBC's with HD  CBC/CMP/Mg/Phos in am    [facility]/ [place]                    Agree with above.  Seen on HD.  Patient examined by me.  Orders reviewed.  No complaints.  CTAB  No edema    HD as scheduled.  Transfuse with HD today.  Continue supportive care.  Will follow        Thanks for this consult!

## 2024-01-06 NOTE — NURSING
01/06/24 1714   Post-Hemodialysis Assessment   Rinseback Volume (mL) 250 mL   Blood Volume Processed (Liters) 63 L   Dialyzer Clearance Lightly streaked   Duration of Treatment 180 minutes   Total UF (mL) 2500 mL   Patient Response to Treatment Tolerated well   Post-Hemodialysis Comments Tx ended, Pt reinfused, Hemostasis achieved.

## 2024-01-06 NOTE — CONSULTS
Inpatient consult to Cardiology  Consult performed by: Regina Barillas FNP  Consult ordered by: Santiago Castro MD        [facility] - 5th Floor Med Surg    Cardiology  Consult Note    Patient Name: Rosette Madrid  MRN: [id number]  Admission Date: 1/3/2024  Hospital Length of Stay: 3 days  Code Status: Full Code   Attending Provider: Santiago Castro MD   Consulting Provider: JOSEFINA He  Primary Care Physician: Margarita Dior FNP  Principal Problem:GI bleed    Patient information was obtained from patient, past medical records, and ER records.     Subjective:     Chief Complaint:  dark stools    HPI:   Ms. Chacon is a 77y/o female, previously followed by Dr. Singh-- now follows with [facility] cardiology, who was referred by dialysis to ED for evaluation due to low H/H. She is on Eliquis for Afib and has a hx of recurrent GIBs and does attest to 2 day hx of dark tarry stools. Labs significant for H/H 5.3/17.7. Hemoccult +. She was transfused 2 units of PRBCs and admitted to hospital medicine. GI was consulted and she underwent push enteroscopy on 1.4.24 which did not reveal any bleed source beyond ligament of Treitz. Plans were for possible colonoscopy but patient was not prepped; therefore colonoscopy did not occur. GI notes could be done outpatient. H/H 7.0/23.0 today. [facility] has been consulted for possible Watchman due to recurrent GIBs. Eliquis has been placed on hold.   She is currently SB with 1st degree AVB, AICD        PMH: ESRD/HD, HFpEF, Native CAD/CABG, Pafib/Eliquis, recurrent GIBs, T2DM, COPD, Hypothyroidism, HTN, HLD, lymphedema LLE, Vitamin D deficiency, gastric angiodysplasia  PSH: AV fistula placement, CABG, EGD, polypectomy, thyroidectomy, tubal ligation  Family History: Mother- HTN; father- HTN  Social History: current smoker; denies illicit drug use; occasional ETOH    Previous Cardiac Diagnostics:   MPI 12.27.23:  Abnormal myocardial perfusion scan.    There is a moderate  to severe intensity, moderate sized, mostly reversible perfusion abnormality with some fixed areas in the lateral apical wall(s) in the typical distribution of the LCX territory.    There are no other significant perfusion abnormalities.    The gated perfusion images showed an ejection fraction of 62% at rest. The gated perfusion images showed an ejection fraction of 63% post stress.    There were no arrhythmias during stress.      TTE 12.27.23:  Left Ventricle: The left ventricle is normal in size. Normal wall thickness. There is normal systolic function. Biplane (2D) method of discs ejection fraction is 68%.    Right Ventricle: Normal right ventricular cavity size. Systolic function is borderline low.    Left Atrium: Left atrium is mildly dilated.    Right Atrium: Right atrium is mildly dilated.    Aortic Valve: The aortic valve is a trileaflet valve. There is moderate aortic valve sclerosis.    Mitral Valve: There is severe posterior mitral annular calcification present. There is moderate to severe regurgitation.    Tricuspid Valve: There is moderate annular calcification present. There is moderate regurgitation.    IVC/SVC: Intermediate venous pressure at 8 mmHg.    Venous US 05.12.23:  1. The study quality is average.   2. Pulsatile venous flow suggestive of fluid volume overload.  Severe edema noted.  3. .  4. The veins of the bilateral lower extremities compress easily and augment well with normal phasic flow and no evidence of deep venous thrombosis or arterio-venous fistula on this study.  5. .  6. LEFT GSV REFLUX:  7. The left mid thigh GSV throughout the mid calf GSV appears to have been harvested for CABG.   8. .  9. RIGHT GSV REFLUX:  10. 1.5 sec reflux is noted in the right great saphenous vein at the mid calf with a diameter of 2.7 mm.  11. .  12. DEEP SYSTEM REFLUX:  13. 2.2 secs in the right CFV, 1.5 secs in the left CFV, 3.2 secs in the right mid SFV  14. .  15. SSV:  16. Bilaterally small SSV with  no reflux appreciated.     Past Medical History:   Diagnosis Date    CKD (chronic kidney disease) requiring chronic dialysis     COPD (chronic obstructive pulmonary disease)     Diabetes mellitus     Hyperlipidemia     Hypertension     Renal insufficiency     Thyroid disease        Past Surgical History:   Procedure Laterality Date    AV FISTULA PLACEMENT Left     CARDIAC CATHETERIZATION      CARDIAC VALVE REPLACEMENT      COLONOSCOPY      CORONARY ARTERY BYPASS GRAFT      EGD, WITH HEMORRHAGE CONTROL  03/24/2023    Procedure: EGD,WITH HEMORRHAGE CONTROL;  Surgeon: Go Le MD;  Location: [facility] ENDOSCOPY;  Service: Gastroenterology;;    EGD, WITH HEMORRHAGE CONTROL  04/06/2023    Procedure: EGD,WITH HEMORRHAGE CONTROL;  Surgeon: Ayden Francois MD;  Location: [facility] ENDOSCOPY;  Service: Gastroenterology;;    EGD, WITH POLYPECTOMY USING SNARE Left 12/8/2023    Procedure: EGD, WITH POLYPECTOMY USING SNARE;  Surgeon: Lexi Scales MD;  Location: [facility] ENDOSCOPY;  Service: Gastroenterology;  Laterality: Left;    ESOPHAGOGASTRODUODENOSCOPY      ESOPHAGOGASTRODUODENOSCOPY N/A 02/17/2023    Procedure: EGD +/- VCE PLACEMENT;  Surgeon: Morgan Gardner MD;  Location: [facility] ENDOSCOPY;  Service: Gastroenterology;  Laterality: N/A;    ESOPHAGOGASTRODUODENOSCOPY N/A 03/24/2023    Procedure: EGD;  Surgeon: Go Le MD;  Location: [facility] ENDOSCOPY;  Service: Gastroenterology;  Laterality: N/A;  with push    ESOPHAGOGASTRODUODENOSCOPY N/A 04/06/2023    Procedure: EGD;  Surgeon: Ayden Francois MD;  Location: [facility] ENDOSCOPY;  Service: Gastroenterology;  Laterality: N/A;  with push    ESOPHAGOGASTRODUODENOSCOPY N/A 06/16/2023    Procedure: EGD W/ PUSH;  Surgeon: Morgan Gardner MD;  Location: [facility] ENDOSCOPY;  Service: Gastroenterology;  Laterality: N/A;    ESOPHAGOGASTRODUODENOSCOPY N/A 1/4/2024    Procedure: EGD;  Surgeon: Morgan Scales MD;  Location: [facility];   "Service: Gastroenterology;  Laterality: N/A;    EYE SURGERY      POLYPECTOMY      SMALL BOWEL ENTEROSCOPY Left 01/20/2023    Procedure: ENTEROSCOPY;  Surgeon: Lexi Scales MD;  Location: [facility] ENDOSCOPY;  Service: Gastroenterology;  Laterality: Left;    THYROIDECTOMY      TUBAL LIGATION         Review of patient's allergies indicates:   Allergen Reactions    Lisinopril Swelling    Azithromycin      Other reaction(s): tongue/facial swelling    Baclofen      Other reaction(s): tongue/facial swelling    Doxycycline      Other reaction(s): Angioedema       No current facility-administered medications on file prior to encounter.     Current Outpatient Medications on File Prior to Encounter   Medication Sig    albuterol (VENTOLIN HFA) 90 mcg/actuation inhaler Inhale 2 puffs into the lungs every 6 (six) hours as needed for Wheezing. Rescue    albuterol-ipratropium (DUO-NEB) 2.5 mg-0.5 mg/3 mL nebulizer solution Take 3 mLs by nebulization every 6 (six) hours as needed for Wheezing or Shortness of Breath. Rescue    amLODIPine (NORVASC) 5 MG tablet Take 1 tablet (5 mg total) by mouth once daily.    apixaban (ELIQUIS) 5 mg Tab Take 1 tablet (5 mg total) by mouth 2 (two) times daily.    BD ULTRA-FINE MINI PEN NEEDLE 31 gauge x 3/16" Ndle Inject into the skin 2 (two) times daily.    BELSOMRA 5 mg Tab Take 1 tablet by mouth every evening.    BENADRYL 25 mg capsule Take 50 mg by mouth nightly.    carvediloL (COREG) 6.25 MG tablet Take 1 tablet (6.25 mg total) by mouth 2 (two) times daily.    clonazePAM (KLONOPIN) 0.5 MG tablet Take 0.5 mg by mouth every evening.    DIALYVITE 100-1 mg Tab Take 1 tablet by mouth once daily.    DULoxetine (CYMBALTA) 30 MG capsule Take 30 mg by mouth once daily.    fluticasone furoate-vilanteroL (BREO) 100-25 mcg/dose diskus inhaler Inhale 1 puff into the lungs once daily.    furosemide (LASIX) 40 MG tablet Take 1 tablet (40 mg total) by mouth once daily.    glipiZIDE (GLUCOTROL) 10 MG tablet " "Take 1 tablet (10 mg total) by mouth daily with breakfast.    insulin detemir U-100, Levemir, (LEVEMIR FLEXPEN) 100 unit/mL (3 mL) InPn pen Inject 30 Units into the skin every evening.    INVEGA SUSTENNA 156 mg/mL Syrg injection Inject into the muscle.    L-METHYLFOLATE 15 mg Tab Take 1 tablet by mouth.    lactulose (CHRONULAC) 10 gram/15 mL solution Take 30 mLs by mouth.    levothyroxine (SYNTHROID) 125 MCG tablet Take 1 tablet (125 mcg total) by mouth before breakfast.    loratadine (CLARITIN) 10 mg tablet Take 1 tablet (10 mg total) by mouth once daily. for 14 days    ONETOUCH DELICA PLUS LANCET 30 gauge Misc 1 lancet by Other route once daily.    ONETOUCH ULTRA TEST Strp 1 strip by Other route once daily.    pantoprazole (PROTONIX) 40 MG tablet Take 40 mg by mouth 2 (two) times daily.    pen needle, diabetic 31 gauge x 5/16" Ndle 2 Units by Misc.(Non-Drug; Combo Route) route 2 (two) times a day. Patient to check blood sugars twice daily (morning and night)    rosuvastatin (CRESTOR) 40 MG Tab Take 1 tablet (40 mg total) by mouth once daily.    sevelamer carbonate (RENVELA) 800 mg Tab Take 2 tablets (1,600 mg total) by mouth 3 (three) times daily.    tiotropium (SPIRIVA WITH HANDIHALER) 18 mcg inhalation capsule Inhale 1 capsule (18 mcg total) into the lungs Daily.     Family History       Problem Relation (Age of Onset)    Hypertension Mother, Father, Sister, Sister          Tobacco Use    Smoking status: Some Days     Current packs/day: 2.00     Average packs/day: 2.0 packs/day for 15.0 years (30.0 ttl pk-yrs)     Types: Cigarettes    Smokeless tobacco: Never    Tobacco comments:     Smokes 3 cigarettes a day   Substance and Sexual Activity    Alcohol use: Not Currently     Comment: 1 glass every 2-3 month    Drug use: Never    Sexual activity: Not Currently       Review of Systems   Respiratory: Negative.     Cardiovascular: Negative.    Gastrointestinal:  Positive for blood in stool.   Genitourinary: Negative. "    Musculoskeletal: Negative.    Skin: Negative.    Neurological: Negative.    Psychiatric/Behavioral: Negative.         Objective:     Vital Signs (Most Recent):  Temp: 98.5 °F (36.9 °C) (01/06/24 0337)  Pulse: (!) 53 (01/06/24 0337)  Resp: 18 (01/05/24 1923)  BP: 116/67 (01/06/24 0337)  SpO2: 97 % (01/06/24 0613) Vital Signs (24h Range):  Temp:  [97.9 °F (36.6 °C)-98.8 °F (37.1 °C)] 98.5 °F (36.9 °C)  Pulse:  [53-60] 53  Resp:  [18] 18  SpO2:  [95 %-99 %] 97 %  BP: (107-125)/(49-71) 116/67     Weight: 89.5 kg (197 lb 5 oz)  Body mass index is 31.85 kg/m².    SpO2: 97 %         Intake/Output Summary (Last 24 hours) at 1/6/2024 0657  Last data filed at 1/6/2024 0430  Gross per 24 hour   Intake 480 ml   Output 750 ml   Net -270 ml       Lines/Drains/Airways       Peripheral Intravenous Line  Duration                  Hemodialysis AV Fistula Left forearm -- days         Peripheral IV - Single Lumen 01/03/24 1516 18 G Anterior;Proximal;Right Upper Arm 2 days                    Significant Labs:  Recent Results (from the past 72 hour(s))   Comprehensive metabolic panel    Collection Time: 01/03/24  1:48 PM   Result Value Ref Range    Sodium Level 138 136 - 145 mmol/L    Potassium Level 4.0 3.5 - 5.1 mmol/L    Chloride 102 98 - 107 mmol/L    Carbon Dioxide 27 23 - 31 mmol/L    Glucose Level 212 (H) 82 - 115 mg/dL    Blood Urea Nitrogen 33.0 (H) 9.8 - 20.1 mg/dL    Creatinine 4.39 (H) 0.55 - 1.02 mg/dL    Calcium Level Total 9.0 8.4 - 10.2 mg/dL    Protein Total 6.1 5.8 - 7.6 gm/dL    Albumin Level 3.0 (L) 3.4 - 4.8 g/dL    Globulin 3.1 2.4 - 3.5 gm/dL    Albumin/Globulin Ratio 1.0 (L) 1.1 - 2.0 ratio    Bilirubin Total 0.3 <=1.5 mg/dL    Alkaline Phosphatase 108 40 - 150 unit/L    Alanine Aminotransferase 37 0 - 55 unit/L    Aspartate Aminotransferase 34 5 - 34 unit/L    eGFR 10 mls/min/1.73/m2   Protime-INR    Collection Time: 01/03/24  1:48 PM   Result Value Ref Range    PT 15.9 (H) 12.5 - 14.5 seconds    INR 1.3 <=1.3    Type & Screen    Collection Time: 01/03/24  1:48 PM   Result Value Ref Range    Group & Rh O POS     Indirect Gideon GEL NEG     Specimen Outdate 01/06/2024 23:59    CBC with Differential    Collection Time: 01/03/24  1:48 PM   Result Value Ref Range    WBC 7.40 4.50 - 11.50 x10(3)/mcL    RBC 1.90 (L) 4.20 - 5.40 x10(6)/mcL    Hgb 5.3 (LL) 12.0 - 16.0 g/dL    Hct 17.7 (LL) 37.0 - 47.0 %    MCV 93.2 80.0 - 94.0 fL    MCH 27.9 27.0 - 31.0 pg    MCHC 29.9 (L) 33.0 - 36.0 g/dL    RDW 23.9 (H) 11.5 - 17.0 %    Platelet 174 130 - 400 x10(3)/mcL    MPV 10.2 7.4 - 10.4 fL    Neut % 82.0 %    Lymph % 10.0 %    Mono % 6.4 %    Eos % 0.8 %    Basophil % 0.3 %    Lymph # 0.74 0.6 - 4.6 x10(3)/mcL    Neut # 6.07 2.1 - 9.2 x10(3)/mcL    Mono # 0.47 0.1 - 1.3 x10(3)/mcL    Eos # 0.06 0 - 0.9 x10(3)/mcL    Baso # 0.02 <=0.2 x10(3)/mcL    IG# 0.04 0 - 0.04 x10(3)/mcL    IG% 0.5 %    NRBC% 0.4 %   Blood Smear Microscopic Exam    Collection Time: 01/03/24  1:48 PM   Result Value Ref Range    RBC Morph Abnormal (A) Normal    Anisocytosis 1+ (A) (none)    Hypochromasia 1+ (A) (none)    Macrocytosis 1+ (A) (none)    Microcytosis 1+ (A) (none)    Poikilocytosis 1+ (A) (none)    Polychromasia 1+ (A) (none)    Stomatocytes 1+ (A) (none)    Target Cells 1+ (A) (none)    Platelets Normal Normal, Adequate   Prepare RBC 2 Units; rectal bleeding    Collection Time: 01/03/24  1:48 PM   Result Value Ref Range    UNIT NUMBER [id number]     UNIT ABO/RH O POS     DISPENSE STATUS Transfused     Unit Expiration [id number]     Product Code A5774P71     Unit Blood Type Code 5100     CROSSMATCH INTERPRETATION Compatible     UNIT NUMBER [id number]     UNIT ABO/RH O POS     DISPENSE STATUS Transfused     Unit Expiration [id number]     Product Code W1299N89     Unit Blood Type Code 5100     CROSSMATCH INTERPRETATION Compatible    POCT glucose    Collection Time: 01/03/24  8:27 PM   Result Value Ref Range    POCT Glucose 241 (H) 70 - 110 mg/dL    Comprehensive Metabolic Panel (CMP)    Collection Time: 01/04/24  2:52 AM   Result Value Ref Range    Sodium Level 137 136 - 145 mmol/L    Potassium Level 3.7 3.5 - 5.1 mmol/L    Chloride 100 98 - 107 mmol/L    Carbon Dioxide 27 23 - 31 mmol/L    Glucose Level 51 (L) 82 - 115 mg/dL    Blood Urea Nitrogen 39.1 (H) 9.8 - 20.1 mg/dL    Creatinine 4.43 (H) 0.55 - 1.02 mg/dL    Calcium Level Total 8.7 8.4 - 10.2 mg/dL    Protein Total 6.0 5.8 - 7.6 gm/dL    Albumin Level 2.9 (L) 3.4 - 4.8 g/dL    Globulin 3.1 2.4 - 3.5 gm/dL    Albumin/Globulin Ratio 0.9 (L) 1.1 - 2.0 ratio    Bilirubin Total 0.5 <=1.5 mg/dL    Alkaline Phosphatase 88 40 - 150 unit/L    Alanine Aminotransferase 38 0 - 55 unit/L    Aspartate Aminotransferase 31 5 - 34 unit/L    eGFR 10 mls/min/1.73/m2   Magnesium    Collection Time: 01/04/24  2:52 AM   Result Value Ref Range    Magnesium Level 1.90 1.60 - 2.60 mg/dL   Phosphorus    Collection Time: 01/04/24  2:52 AM   Result Value Ref Range    Phosphorus Level 4.5 2.3 - 4.7 mg/dL   Hemoglobin A1C    Collection Time: 01/04/24  2:52 AM   Result Value Ref Range    Hemoglobin A1c 5.8 <=7.0 %    Estimated Average Glucose 119.8 mg/dL   TSH    Collection Time: 01/04/24  2:52 AM   Result Value Ref Range    TSH 2.230 0.350 - 4.940 uIU/mL   CBC with Differential    Collection Time: 01/04/24  2:52 AM   Result Value Ref Range    WBC 6.21 4.50 - 11.50 x10(3)/mcL    RBC 2.52 (L) 4.20 - 5.40 x10(6)/mcL    Hgb 7.1 (L) 12.0 - 16.0 g/dL    Hct 22.7 (L) 37.0 - 47.0 %    MCV 90.1 80.0 - 94.0 fL    MCH 28.2 27.0 - 31.0 pg    MCHC 31.3 (L) 33.0 - 36.0 g/dL    RDW 21.3 (H) 11.5 - 17.0 %    Platelet 169 130 - 400 x10(3)/mcL    MPV 10.5 (H) 7.4 - 10.4 fL    Neut % 75.4 %    Lymph % 13.5 %    Mono % 8.5 %    Eos % 1.6 %    Basophil % 0.5 %    Lymph # 0.84 0.6 - 4.6 x10(3)/mcL    Neut # 4.68 2.1 - 9.2 x10(3)/mcL    Mono # 0.53 0.1 - 1.3 x10(3)/mcL    Eos # 0.10 0 - 0.9 x10(3)/mcL    Baso # 0.03 <=0.2 x10(3)/mcL    IG# 0.03 0 -  0.04 x10(3)/mcL    IG% 0.5 %    NRBC% 1.0 %   Hemoglobin and Hematocrit    Collection Time: 01/04/24  8:17 AM   Result Value Ref Range    Hgb 7.7 (L) 12.0 - 16.0 g/dL    Hct 25.3 (L) 37.0 - 47.0 %   POCT glucose    Collection Time: 01/04/24  9:55 AM   Result Value Ref Range    POCT Glucose 61 (L) 70 - 110 mg/dL   POCT glucose    Collection Time: 01/04/24 10:27 AM   Result Value Ref Range    POCT Glucose 148 (H) 70 - 110 mg/dL   POCT glucose    Collection Time: 01/04/24  8:31 PM   Result Value Ref Range    POCT Glucose 192 (H) 70 - 110 mg/dL   POCT glucose    Collection Time: 01/05/24  4:52 AM   Result Value Ref Range    POCT Glucose 69 (L) 70 - 110 mg/dL   Comprehensive Metabolic Panel    Collection Time: 01/05/24  6:25 AM   Result Value Ref Range    Sodium Level 136 136 - 145 mmol/L    Potassium Level 4.1 3.5 - 5.1 mmol/L    Chloride 99 98 - 107 mmol/L    Carbon Dioxide 30 23 - 31 mmol/L    Glucose Level 103 82 - 115 mg/dL    Blood Urea Nitrogen 24.2 (H) 9.8 - 20.1 mg/dL    Creatinine 3.53 (H) 0.55 - 1.02 mg/dL    Calcium Level Total 8.5 8.4 - 10.2 mg/dL    Protein Total 5.2 (L) 5.8 - 7.6 gm/dL    Albumin Level 2.7 (L) 3.4 - 4.8 g/dL    Globulin 2.5 2.4 - 3.5 gm/dL    Albumin/Globulin Ratio 1.1 1.1 - 2.0 ratio    Bilirubin Total 0.3 <=1.5 mg/dL    Alkaline Phosphatase 81 40 - 150 unit/L    Alanine Aminotransferase 30 0 - 55 unit/L    Aspartate Aminotransferase 23 5 - 34 unit/L    eGFR 13 mls/min/1.73/m2   CBC with Differential    Collection Time: 01/05/24  6:25 AM   Result Value Ref Range    WBC 5.77 4.50 - 11.50 x10(3)/mcL    RBC 2.51 (L) 4.20 - 5.40 x10(6)/mcL    Hgb 7.2 (L) 12.0 - 16.0 g/dL    Hct 22.9 (L) 37.0 - 47.0 %    MCV 91.2 80.0 - 94.0 fL    MCH 28.7 27.0 - 31.0 pg    MCHC 31.4 (L) 33.0 - 36.0 g/dL    RDW 22.3 (H) 11.5 - 17.0 %    Platelet 161 130 - 400 x10(3)/mcL    MPV 10.3 7.4 - 10.4 fL    Neut % 79.4 %    Lymph % 11.1 %    Mono % 7.8 %    Eos % 1.0 %    Basophil % 0.2 %    Lymph # 0.64 0.6 - 4.6  x10(3)/mcL    Neut # 4.58 2.1 - 9.2 x10(3)/mcL    Mono # 0.45 0.1 - 1.3 x10(3)/mcL    Eos # 0.06 0 - 0.9 x10(3)/mcL    Baso # 0.01 <=0.2 x10(3)/mcL    IG# 0.03 0 - 0.04 x10(3)/mcL    IG% 0.5 %    NRBC% 0.0 %   Magnesium    Collection Time: 01/05/24  6:25 AM   Result Value Ref Range    Magnesium Level 1.80 1.60 - 2.60 mg/dL   Blood Smear Microscopic Exam    Collection Time: 01/05/24  6:25 AM   Result Value Ref Range    RBC Morph Abnormal (A) Normal    Anisocytosis 1+ (A) (none)    Macrocytosis 1+ (A) (none)    Poikilocytosis 1+ (A) (none)    Stomatocytes 1+ (A) (none)    Target Cells 1+ (A) (none)    Platelets Normal Normal, Adequate   POCT glucose    Collection Time: 01/05/24 11:23 AM   Result Value Ref Range    POCT Glucose 90 70 - 110 mg/dL   POCT glucose    Collection Time: 01/05/24  4:42 PM   Result Value Ref Range    POCT Glucose 224 (H) 70 - 110 mg/dL   POCT glucose    Collection Time: 01/05/24  8:08 PM   Result Value Ref Range    POCT Glucose 127 (H) 70 - 110 mg/dL   POCT glucose    Collection Time: 01/06/24  5:52 AM   Result Value Ref Range    POCT Glucose 122 (H) 70 - 110 mg/dL   Basic Metabolic Panel    Collection Time: 01/06/24  5:58 AM   Result Value Ref Range    Sodium Level 132 (L) 136 - 145 mmol/L    Potassium Level 4.2 3.5 - 5.1 mmol/L    Chloride 95 (L) 98 - 107 mmol/L    Carbon Dioxide 31 23 - 31 mmol/L    Glucose Level 108 82 - 115 mg/dL    Blood Urea Nitrogen 34.5 (H) 9.8 - 20.1 mg/dL    Creatinine 4.73 (H) 0.55 - 1.02 mg/dL    BUN/Creatinine Ratio 7     Calcium Level Total 8.2 (L) 8.4 - 10.2 mg/dL    Anion Gap 6.0 mEq/L    eGFR 9 mls/min/1.73/m2   CBC with Differential    Collection Time: 01/06/24  5:58 AM   Result Value Ref Range    WBC 5.02 4.50 - 11.50 x10(3)/mcL    RBC 2.43 (L) 4.20 - 5.40 x10(6)/mcL    Hgb 7.0 (L) 12.0 - 16.0 g/dL    Hct 23.0 (L) 37.0 - 47.0 %    MCV 94.7 (H) 80.0 - 94.0 fL    MCH 28.8 27.0 - 31.0 pg    MCHC 30.4 (L) 33.0 - 36.0 g/dL    RDW 22.1 (H) 11.5 - 17.0 %     "Platelet 157 130 - 400 x10(3)/mcL    MPV 9.9 7.4 - 10.4 fL    Neut % 75.1 %    Lymph % 13.3 %    Mono % 9.4 %    Eos % 1.2 %    Basophil % 0.4 %    Lymph # 0.67 0.6 - 4.6 x10(3)/mcL    Neut # 3.77 2.1 - 9.2 x10(3)/mcL    Mono # 0.47 0.1 - 1.3 x10(3)/mcL    Eos # 0.06 0 - 0.9 x10(3)/mcL    Baso # 0.02 <=0.2 x10(3)/mcL    IG# 0.03 0 - 0.04 x10(3)/mcL    IG% 0.6 %    NRBC% 0.0 %       Significant Imaging:  Imaging Results    None         EKG:  No results found for this visit on 01/03/24.    Physical Exam  Constitutional:       Appearance: She is ill-appearing.   Cardiovascular:      Rate and Rhythm: Regular rhythm. Bradycardia present.      Pulses: Normal pulses.      Heart sounds: Normal heart sounds.   Pulmonary:      Effort: Pulmonary effort is normal.      Breath sounds: Normal breath sounds.   Abdominal:      Palpations: Abdomen is soft.   Musculoskeletal:         General: Normal range of motion.   Skin:     General: Skin is warm.   Neurological:      General: No focal deficit present.      Mental Status: She is alert.         Home Medications:   No current facility-administered medications on file prior to encounter.     Current Outpatient Medications on File Prior to Encounter   Medication Sig Dispense Refill    albuterol (VENTOLIN HFA) 90 mcg/actuation inhaler Inhale 2 puffs into the lungs every 6 (six) hours as needed for Wheezing. Rescue 18 g 1    albuterol-ipratropium (DUO-NEB) 2.5 mg-0.5 mg/3 mL nebulizer solution Take 3 mLs by nebulization every 6 (six) hours as needed for Wheezing or Shortness of Breath. Rescue 75 mL 0    amLODIPine (NORVASC) 5 MG tablet Take 1 tablet (5 mg total) by mouth once daily. 30 tablet 11    apixaban (ELIQUIS) 5 mg Tab Take 1 tablet (5 mg total) by mouth 2 (two) times daily. 60 tablet 11    BD ULTRA-FINE MINI PEN NEEDLE 31 gauge x 3/16" Ndle Inject into the skin 2 (two) times daily.      BELSOMRA 5 mg Tab Take 1 tablet by mouth every evening.      BENADRYL 25 mg capsule Take 50 " "mg by mouth nightly.      carvediloL (COREG) 6.25 MG tablet Take 1 tablet (6.25 mg total) by mouth 2 (two) times daily. 180 tablet 3    clonazePAM (KLONOPIN) 0.5 MG tablet Take 0.5 mg by mouth every evening.      DIALYVITE 100-1 mg Tab Take 1 tablet by mouth once daily.      DULoxetine (CYMBALTA) 30 MG capsule Take 30 mg by mouth once daily.      fluticasone furoate-vilanteroL (BREO) 100-25 mcg/dose diskus inhaler Inhale 1 puff into the lungs once daily. 90 each 2    furosemide (LASIX) 40 MG tablet Take 1 tablet (40 mg total) by mouth once daily. 90 tablet 3    glipiZIDE (GLUCOTROL) 10 MG tablet Take 1 tablet (10 mg total) by mouth daily with breakfast. 90 tablet 2    insulin detemir U-100, Levemir, (LEVEMIR FLEXPEN) 100 unit/mL (3 mL) InPn pen Inject 30 Units into the skin every evening. 27 mL 2    INVEGA SUSTENNA 156 mg/mL Syrg injection Inject into the muscle.      L-METHYLFOLATE 15 mg Tab Take 1 tablet by mouth.      lactulose (CHRONULAC) 10 gram/15 mL solution Take 30 mLs by mouth.      levothyroxine (SYNTHROID) 125 MCG tablet Take 1 tablet (125 mcg total) by mouth before breakfast. 90 tablet 2    loratadine (CLARITIN) 10 mg tablet Take 1 tablet (10 mg total) by mouth once daily. for 14 days 14 tablet 0    ONETOUCH DELICA PLUS LANCET 30 gauge Misc 1 lancet by Other route once daily. 100 each 2    ONETOUCH ULTRA TEST Strp 1 strip by Other route once daily. 200 strip 2    pantoprazole (PROTONIX) 40 MG tablet Take 40 mg by mouth 2 (two) times daily.      pen needle, diabetic 31 gauge x 5/16" Ndle 2 Units by Misc.(Non-Drug; Combo Route) route 2 (two) times a day. Patient to check blood sugars twice daily (morning and night) 200 each 2    rosuvastatin (CRESTOR) 40 MG Tab Take 1 tablet (40 mg total) by mouth once daily. 90 tablet 3    sevelamer carbonate (RENVELA) 800 mg Tab Take 2 tablets (1,600 mg total) by mouth 3 (three) times daily. 90 tablet 0    tiotropium (SPIRIVA WITH HANDIHALER) 18 mcg inhalation capsule " Inhale 1 capsule (18 mcg total) into the lungs Daily. 90 capsule 2       Current Inpatient Medications:    Current Facility-Administered Medications:     0.9%  NaCl infusion (for blood administration), , Intravenous, Q24H PRN, Navdeep Rae IV, MD    0.9%  NaCl infusion (for blood administration), , Intravenous, Q24H PRN, Santiago Castro MD    acetaminophen tablet 650 mg, 650 mg, Oral, Q8H PRN, Judith Rubi PA-C    acetaminophen tablet 650 mg, 650 mg, Oral, Q4H PRN, Judith Rubi PA-C    albuterol inhaler 2 puff, 2 puff, Inhalation, Q6H PRN, Duarte Lo MD    albuterol-ipratropium 2.5 mg-0.5 mg/3 mL nebulizer solution 3 mL, 3 mL, Nebulization, Q6H PRN, Duarte Lo MD, 3 mL at 01/04/24 1038    amLODIPine tablet 5 mg, 5 mg, Oral, Daily, Duarte Lo MD, 5 mg at 01/05/24 0920    atorvastatin tablet 40 mg, 40 mg, Oral, Daily, Duarte Lo MD, 40 mg at 01/05/24 0920    carvediloL tablet 6.25 mg, 6.25 mg, Oral, BID, Duarte Lo MD, 6.25 mg at 01/05/24 2041    dextrose 10% bolus 125 mL 125 mL, 12.5 g, Intravenous, PRN, Judith Rubi PA-C    dextrose 10% bolus 125 mL 125 mL, 12.5 g, Intravenous, PRN, Duarte Lo MD    dextrose 10% bolus 250 mL 250 mL, 25 g, Intravenous, PRN, Judith Rubi PA-C    dextrose 10% bolus 250 mL 250 mL, 25 g, Intravenous, PRN, Duarte Lo MD    diphenhydrAMINE capsule 50 mg, 50 mg, Oral, Nightly, Duarte Lo MD, 50 mg at 01/05/24 2041    DULoxetine DR capsule 30 mg, 30 mg, Oral, Daily, Duarte Lo MD, 30 mg at 01/05/24 0920    epoetin alexis injection 10,000 Units, 10,000 Units, Intravenous, Every Tues, Thkenny, Sat, Lynn Tripathi S, AGNP    fluticasone furoate-vilanteroL 100-25 mcg/dose diskus inhaler 1 puff, 1 puff, Inhalation, Daily, Duarte Lo MD, 1 puff at 01/05/24 0920    glucagon (human recombinant) injection 1 mg, 1 mg, Intramuscular, PRN, Judith Rubi, KALA    glucagon (human  recombinant) injection 1 mg, 1 mg, Intramuscular, PRN, Duarte Lo MD    glucose chewable tablet 16 g, 16 g, Oral, PRN, Judith Rubi, PA-KALE    glucose chewable tablet 16 g, 16 g, Oral, PRN, Duarte Lo MD    glucose chewable tablet 24 g, 24 g, Oral, PRN, Judith Rubi, PA-KALE    glucose chewable tablet 24 g, 24 g, Oral, PRN, Duarte Lo MD    insulin aspart U-100 injection 0-10 Units, 0-10 Units, Subcutaneous, QID (AC + HS) PRN, Duarte Lo MD, 4 Units at 01/05/24 1707    levothyroxine tablet 125 mcg, 125 mcg, Oral, Before breakfast, Duarte Lo MD, 125 mcg at 01/06/24 0553    mupirocin 2 % ointment, , Nasal, BID, Lynn Tripathi, AGNP, Given at 01/05/24 2042    ondansetron injection 4 mg, 4 mg, Intravenous, Q4H PRN, Judith Rubi PA-C    pantoprazole injection 40 mg, 40 mg, Intravenous, BID, Duarte Lo MD, 40 mg at 01/05/24 2042    sevelamer carbonate tablet 1,600 mg, 1,600 mg, Oral, TID, Duarte Lo MD, 1,600 mg at 01/05/24 2041    sodium chloride 0.9% flush 10 mL, 10 mL, Intravenous, Q12H PRN, Judith Rubi PA-C    tiotropium bromide 2.5 mcg/actuation inhaler 2 puff, 2 puff, Inhalation, Daily, Duarte Lo MD, 2 puff at 01/05/24 0920         VTE Risk Mitigation (From admission, onward)           Ordered     IP VTE HIGH RISK PATIENT  Once         01/03/24 1639     Place sequential compression device  Until discontinued         01/03/24 1639     Reason for No Pharmacological VTE Prophylaxis  Once        Question:  Reasons:  Answer:  Active Bleeding    01/03/24 1639                    Assessment:   Pafib  --CHADS VASC score  6 (age, HTN, female, T2DM, vascular disease)  --HAS Bled score 5 (HTN, age, ESRD, predisposing drugs, prior bleed)  Acute GIB/melena  --push enteroscopy negative bleed 1.4.24  --hx of gastric angiodysplasia  Chronic anemia  --H/H 5.3/17.7 on admit  --s/p 2 units PRBCs  ESRD/HD TTS  VHD--moderate to severe MR  Native  CAD/hx 4V CABG (no record of file)  --MPI 12.27.23: reversible perfusion abnormality in lateral apical wall  HTN  HLD  COPD  T2DM  Hypothyroidism  Lymphedema LLE    Plan:   OK to hold eliquis due to GIB. Will plan outpatient referral to structural heart clinic for [place] occlusive device  Consider inpatient colonoscopy as patient will also need to undergo outpatient coronary angiogram due to abnormal stress test and will need to demonstrate tolerance to DAPT.    Transfusions per primary  HD per nephrology  Continue coreg and amlodipine  Continue Protonix 40 mg daily             Thank you for your consult. CIS signing off. Reconsult if needed.     Regina Barillas, [facility]  Cardiology  [facility] - 5th Floor Med Surg  01/06/2024 6:57 AM           --------------   CARDIOLOGY ATTENDING ADDENDUM   ---------------      Cardiology Attending  I evaluated Gilboa St Leiian.  The patient is a 78 y.o. female with:   Chief Complaint   Patient presents with    Rectal Bleeding     C/o black stools x2 days with abnormal lab work yesterday from dialysis, on Eliquis. HD on TTS, last run yesterday. Denies weakness, SOB, or CP.          ROS    GEN   No fever  No weakness  pat c/o swelling on left side  RESP  No SOB  + wheezing  SKIN  No pruritus  No rash  CARD  No CP  No palpitations        VASC  No cyanosis  No claudication  HEM   No adenopathy  + bleeding (rectal)  GI  No heart burn  + melena    NEURO  No dizziness  No syncope    Patient Active Problem List   Diagnosis    Chronic congestive heart failure    Tobacco dependence syndrome    Atherosclerosis of coronary artery bypass graft    Chronic obstructive pulmonary disease    Hypertension    Hyperlipidemia    Type 2 diabetes mellitus with chronic kidney disease on chronic dialysis, with long-term current use of insulin    ESRD (end stage renal disease) on dialysis    Depressive disorder    Class 1 obesity due to excess calories with serious comorbidity and body mass index  (BMI) of 31.0 to 31.9 in adult    Vitamin D deficiency    Coronary artery disease involving native coronary artery of native heart without angina pectoris    Anemia    A-fib    Anemia due to chronic kidney disease, on chronic dialysis    Hypotension    Melena    Acquired hypothyroidism    GI bleed    Aortic heart murmur    Diverticulosis of colon with hemorrhage    Chronic renal impairment    Status post coronary artery bypass graft     Past Surgical History:   Procedure Laterality Date    AV FISTULA PLACEMENT Left     CARDIAC CATHETERIZATION      CARDIAC VALVE REPLACEMENT      COLONOSCOPY      CORONARY ARTERY BYPASS GRAFT      EGD, WITH HEMORRHAGE CONTROL  03/24/2023    Procedure: EGD,WITH HEMORRHAGE CONTROL;  Surgeon: Go Le MD;  Location: [facility] ENDOSCOPY;  Service: Gastroenterology;;    EGD, WITH HEMORRHAGE CONTROL  04/06/2023    Procedure: EGD,WITH HEMORRHAGE CONTROL;  Surgeon: Ayden Francois MD;  Location: [facility] ENDOSCOPY;  Service: Gastroenterology;;    EGD, WITH POLYPECTOMY USING SNARE Left 12/8/2023    Procedure: EGD, WITH POLYPECTOMY USING SNARE;  Surgeon: Lexi Scales MD;  Location: [facility] ENDOSCOPY;  Service: Gastroenterology;  Laterality: Left;    ESOPHAGOGASTRODUODENOSCOPY      ESOPHAGOGASTRODUODENOSCOPY N/A 02/17/2023    Procedure: EGD +/- VCE PLACEMENT;  Surgeon: Morgan Gardner MD;  Location: [facility] ENDOSCOPY;  Service: Gastroenterology;  Laterality: N/A;    ESOPHAGOGASTRODUODENOSCOPY N/A 03/24/2023    Procedure: EGD;  Surgeon: Go Le MD;  Location: [facility] ENDOSCOPY;  Service: Gastroenterology;  Laterality: N/A;  with push    ESOPHAGOGASTRODUODENOSCOPY N/A 04/06/2023    Procedure: EGD;  Surgeon: Ayden Francois MD;  Location: [facility] ENDOSCOPY;  Service: Gastroenterology;  Laterality: N/A;  with push    ESOPHAGOGASTRODUODENOSCOPY N/A 06/16/2023    Procedure: EGD W/ PUSH;  Surgeon: Morgan Gardner MD;  Location: [facility] ENDOSCOPY;  Service:  Gastroenterology;  Laterality: N/A;    ESOPHAGOGASTRODUODENOSCOPY N/A 1/4/2024    Procedure: EGD;  Surgeon: Morgan Scales MD;  Location: [facility] ENDOSCOPY;  Service: Gastroenterology;  Laterality: N/A;    EYE SURGERY      POLYPECTOMY      SMALL BOWEL ENTEROSCOPY Left 01/20/2023    Procedure: ENTEROSCOPY;  Surgeon: Lexi Scales MD;  Location: [facility] ENDOSCOPY;  Service: Gastroenterology;  Laterality: Left;    THYROIDECTOMY      TUBAL LIGATION       Review of patient's allergies indicates:   Allergen Reactions    Lisinopril Swelling    Azithromycin      Other reaction(s): tongue/facial swelling    Baclofen      Other reaction(s): tongue/facial swelling    Doxycycline      Other reaction(s): Angioedema         Current Facility-Administered Medications:     0.9%  NaCl infusion (for blood administration), , Intravenous, Q24H PRN, Navdeep Rae IV, MD    0.9%  NaCl infusion (for blood administration), , Intravenous, Q24H PRN, Santiago Castro MD    acetaminophen tablet 650 mg, 650 mg, Oral, Q8H PRN, Judith Rubi PA-C    acetaminophen tablet 650 mg, 650 mg, Oral, Q4H PRN, Judith Rubi PA-C    albuterol inhaler 2 puff, 2 puff, Inhalation, Q6H PRN, Duarte Lo MD    albuterol-ipratropium 2.5 mg-0.5 mg/3 mL nebulizer solution 3 mL, 3 mL, Nebulization, Q6H PRN, Duarte Lo MD, 3 mL at 01/04/24 1038    amLODIPine tablet 5 mg, 5 mg, Oral, Daily, Duarte Lo MD, 5 mg at 01/05/24 0920    atorvastatin tablet 40 mg, 40 mg, Oral, Daily, Duarte Lo MD, 40 mg at 01/06/24 0816    carvediloL tablet 6.25 mg, 6.25 mg, Oral, BID, Duarte Lo MD, 6.25 mg at 01/05/24 2041    dextrose 10% bolus 125 mL 125 mL, 12.5 g, Intravenous, PRN, Rubi, Judith E., PA-C    dextrose 10% bolus 125 mL 125 mL, 12.5 g, Intravenous, PRN, Duarte Lo MD    dextrose 10% bolus 250 mL 250 mL, 25 g, Intravenous, PRN, Judith Rubi, PA-C    dextrose 10% bolus 250 mL 250 mL, 25 g,  Intravenous, PRN, Duarte Lo MD    diphenhydrAMINE capsule 50 mg, 50 mg, Oral, Nightly, Duarte Lo MD, 50 mg at 01/05/24 2041    DULoxetine DR capsule 30 mg, 30 mg, Oral, Daily, Duarte Lo MD, 30 mg at 01/06/24 0816    epoetin alexis injection 10,000 Units, 10,000 Units, Intravenous, Every Tues, Thurs, Sat, Lynn Tripathi, AGNP, 10,000 Units at 01/06/24 0816    fluticasone furoate-vilanteroL 100-25 mcg/dose diskus inhaler 1 puff, 1 puff, Inhalation, Daily, Duarte Lo MD, 1 puff at 01/06/24 0820    glucagon (human recombinant) injection 1 mg, 1 mg, Intramuscular, PRN, Judith Rubi, PA-C    glucagon (human recombinant) injection 1 mg, 1 mg, Intramuscular, PRN, Duarte Lo MD    glucose chewable tablet 16 g, 16 g, Oral, PRN, Judith Rubi, PA-KALE    glucose chewable tablet 16 g, 16 g, Oral, PRN, Duarte Lo MD    glucose chewable tablet 24 g, 24 g, Oral, PRN, Judith Rubi, PA-KALE    glucose chewable tablet 24 g, 24 g, Oral, PRN, Duarte Lo MD    insulin aspart U-100 injection 0-10 Units, 0-10 Units, Subcutaneous, QID (AC + HS) PRN, Duarte Lo MD, 4 Units at 01/05/24 1707    levothyroxine tablet 125 mcg, 125 mcg, Oral, Before breakfast, Duarte Lo MD, 125 mcg at 01/06/24 0553    mupirocin 2 % ointment, , Nasal, BID, Lynn Tripathi, AGNP, Given at 01/06/24 0821    ondansetron injection 4 mg, 4 mg, Intravenous, Q4H PRN, Judith Rubi, PA-KALE    pantoprazole injection 40 mg, 40 mg, Intravenous, BID, Duarte Lo MD, 40 mg at 01/06/24 0816    sevelamer carbonate tablet 1,600 mg, 1,600 mg, Oral, TID, Duarte Lo MD, 1,600 mg at 01/06/24 0816    sodium chloride 0.9% flush 10 mL, 10 mL, Intravenous, Q12H PRN, Judith Rubi, KALA    tiotropium bromide 2.5 mcg/actuation inhaler 2 puff, 2 puff, Inhalation, Daily, Duarte Lo MD, 2 puff at 01/06/24 0819    Blood pressure (!) 108/48, pulse (!) 52, temperature 98  "°F (36.7 °C), temperature source Oral, resp. rate 20, height 5' 6" (1.676 m), weight 89.5 kg (197 lb 5 oz), SpO2 95 %.    PE    GEN  No acute distress  Not ill appearing  NECK  No cervical adenopathy  No carotid bruit  CV  Normal rate  Regular rhythm  + murmur  PUL  No respiratory distress  +Rhonchi  ABD  No distention  No tenderness  LOW EXT  No deformity  nonpitting edema on left side   SKIN  No bruising  No rash  NEURO  Oriented x 3  No weakness      Labs  Last BMP BMP  Lab Results   Component Value Date     (L) 01/06/2024    K 4.2 01/06/2024    CO2 31 01/06/2024    BUN 34.5 (H) 01/06/2024    CREATININE 4.73 (H) 01/06/2024    CALCIUM 8.2 (L) 01/06/2024    EGFRNORACEVR 9 01/06/2024      Lab Results   Component Value Date    CREATININE 4.73 (H) 01/06/2024    CREATININE 3.53 (H) 01/05/2024    CREATININE 4.43 (H) 01/04/2024     Last CBC     Lab Results   Component Value Date    WBC 5.02 01/06/2024    HGB 7.0 (L) 01/06/2024    HCT 23.0 (L) 01/06/2024    MCV 94.7 (H) 01/06/2024     01/06/2024           Lab Results   Component Value Date    HGB 7.0 (L) 01/06/2024    HGB 7.2 (L) 01/05/2024    HGB 7.7 (L) 01/04/2024    HCT 23.0 (L) 01/06/2024    HCT 22.9 (L) 01/05/2024    HCT 25.3 (L) 01/04/2024     BNP    Lab Results   Component Value Date    .9 (H) 02/09/2023    .4 03/19/2022    BNP 1,258 (H) 12/30/2021     Troponin   Lab Results   Component Value Date    TROPONINI 0.066 (H) 12/06/2023    TROPONINI 0.077 (H) 03/23/2023    TROPONINI 0.052 (H) 02/09/2023     Last lipids    Lab Results   Component Value Date    CHOL 124 09/01/2023    CHOL 119 02/27/2023    CHOL 117 09/30/2022    HDL 48 09/01/2023    HDL 44 02/27/2023    HDL 42 09/30/2022    LDL 65.00 09/01/2023    LDL 56.00 02/27/2023    LDL 62.00 09/30/2022    TRIG 54 09/01/2023    TRIG 95 02/27/2023    TRIG 66 09/30/2022    TOTALCHOLEST 3 09/01/2023    TOTALCHOLEST 3 02/27/2023    TOTALCHOLEST 3 09/30/2022      LFT     Lab Results   Component " Value Date    ALT 30 01/05/2024    ALT 38 01/04/2024    ALT 37 01/03/2024    AST 23 01/05/2024    AST 31 01/04/2024    AST 34 01/03/2024       Echo  Results for orders placed during the hospital encounter of 12/27/23    Echo    Interpretation Summary    Left Ventricle: The left ventricle is normal in size. Normal wall thickness. There is normal systolic function. Biplane (2D) method of discs ejection fraction is 68%.    Right Ventricle: Normal right ventricular cavity size. Systolic function is borderline low.    Left Atrium: Left atrium is mildly dilated.    Right Atrium: Right atrium is mildly dilated.    Aortic Valve: The aortic valve is a trileaflet valve. There is moderate aortic valve sclerosis.    Mitral Valve: There is severe posterior mitral annular calcification present. There is moderate to severe regurgitation.    Tricuspid Valve: There is moderate annular calcification present. There is moderate regurgitation.    IVC/SVC: Intermediate venous pressure at 8 mmHg.    Stress test  Results for orders placed during the hospital encounter of 12/27/23    Nuclear Stress - Cardiology Interpreted    Interpretation Summary    Abnormal myocardial perfusion scan.    There is a moderate to severe intensity, moderate sized, mostly reversible perfusion abnormality with some fixed areas in the lateral apical wall(s) in the typical distribution of the LCX territory.    There are no other significant perfusion abnormalities.    The gated perfusion images showed an ejection fraction of 62% at rest. The gated perfusion images showed an ejection fraction of 63% post stress.    There were no arrhythmias during stress.    The nuclear stress test is  fair quality.  On the nuclear stress test the EF is normal.  If the patient has an echo, the EF on the echo is considered more accurate.    The patient has a moderate risk nuclear stress test.    If patient is symptomatic, consider management with two anti-anginals    Coronary  Angiogram  No results found for this or any previous visit.    Holter Monitor  No cardiac monitor results found for the past 12 months    Assessment/Plan  I agree with the assessment and plan     Josh Dubon MD  01/06/2024  10:25 AM  Cardiologist

## 2024-01-06 NOTE — PROGRESS NOTES
[facility] Hospital Medicine Progress Note        Chief Complaint: Inpatient Follow-up     HPI:   79 yo AA woman with recent diagnosis of 8mm duodenal bulb polyp, gastric angiodysplasia and 5 mm duodenal polyp, ESRD on HD MWF, Afib on eliquis,  CAD with CABG x4, HFpEF, COPD gold stage II, hypertension, insulin-dependent type 2 diabetes, hypothyroidism, schizophrenia.     She was recently seen for GIB, diagnosed with  discharged on 12/9/23. She was diagnosed with 8mm duodenal polyp which was removed with a cold snare. Eliquis was resumed at [place]. 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     Presented with worsening melena. Denied abd pain , diarrhea, nausea or any other GI symptoms. Also denies SOB, syncope, dizziness. Denies NSAID use. Lives with daughter at baseline. At presentation she was HDS. Labs showed acute drop in Hb to 5.3 from last labs, ESRD. Received iv ppi, two units of PRBCs were ordered and then admitted to  for further eval and management of acute anemia.  Patient was taken for EGD on 01/04.  Push endoscopy also performed.  No signs of any acute bleeding.  Stool was dark.     Interval Hx:   No significant changes overnight.  No new complaints.  Nursing reported that she was had some hypoglycemia over the last couple days.  Likely due to NPO status and continued insulin.       Objective/physical exam:  General: In no acute distress, afebrile  Respiratory: Clear to auscultation bilaterally  Cardiovascular: S1, S2, no appreciable murmur  Abdomen: Soft, nontender, BS +  MSK: Warm, no lower extremity edema, no clubbing or cyanosis  Neurologic: Alert and oriented x4, moving all extremities with good strength     VITAL SIGNS: 24 HRS MIN & MAX LAST   Temp  Min: 97.9 °F (36.6 °C)  Max: 98.8 °F (37.1 °C) 98.5 °F (36.9 °C)   BP  Min: 107/53  Max: 125/71 116/67   Pulse  Min: 53  Max: 60  (!) 53   Resp  Min: 18  Max: 18 18   SpO2  Min: 95 %  Max: 99 % 97 %        Recent Labs   Lab 01/04/24  0252 01/04/24  0817 01/05/24  0625 01/06/24  0558   WBC 6.21  --  5.77 5.02   RBC 2.52*  --  2.51* 2.43*   HGB 7.1* 7.7* 7.2* 7.0*   HCT 22.7* 25.3* 22.9* 23.0*   MCV 90.1  --  91.2 94.7*   MCH 28.2  --  28.7 28.8   MCHC 31.3*  --  31.4* 30.4*   RDW 21.3*  --  22.3* 22.1*     --  161 157   MPV 10.5*  --  10.3 9.9       Recent Labs   Lab 01/03/24  1348 01/04/24  0252 01/05/24  0625    137 136   K 4.0 3.7 4.1   CO2 27 27 30   BUN 33.0* 39.1* 24.2*   CREATININE 4.39* 4.43* 3.53*   CALCIUM 9.0 8.7 8.5   MG  --  1.90 1.80   ALBUMIN 3.0* 2.9* 2.7*   ALKPHOS 108 88 81   ALT 37 38 30   AST 34 31 23   BILITOT 0.3 0.5 0.3          Microbiology Results (last 7 days)       ** No results found for the last 168 hours. **             Radiology:  Nuclear Stress - Cardiology Interpreted    Abnormal myocardial perfusion scan.    There is a moderate to severe intensity, moderate sized, mostly   reversible perfusion abnormality with some fixed areas in the lateral   apical wall(s) in the typical distribution of the LCX territory.    There are no other significant perfusion abnormalities.    The gated perfusion images showed an ejection fraction of 62% at rest.   The gated perfusion images showed an ejection fraction of 63% post stress.    There were no arrhythmias during stress.    The nuclear stress test is  fair quality.    On the nuclear stress test the EF is normal.  If the patient has an echo,   the EF on the echo is considered more accurate.        The patient has a moderate risk nuclear stress test.     If patient is symptomatic, consider management with two anti-anginals         Echo    Left Ventricle: The left ventricle is normal in size. Normal wall   thickness. There is normal systolic function. Biplane (2D) method of discs   ejection fraction is 68%.    Right Ventricle: Normal right ventricular cavity size. Systolic   function is borderline low.    Left Atrium: Left atrium is  mildly dilated.    Right Atrium: Right atrium is mildly dilated.    Aortic Valve: The aortic valve is a trileaflet valve. There is moderate   aortic valve sclerosis.    Mitral Valve: There is severe posterior mitral annular calcification   present. There is moderate to severe regurgitation.    Tricuspid Valve: There is moderate annular calcification present. There   is moderate regurgitation.    IVC/SVC: Intermediate venous pressure at 8 mmHg.      Scheduled Med:   amLODIPine  5 mg Oral Daily    atorvastatin  40 mg Oral Daily    carvediloL  6.25 mg Oral BID    diphenhydrAMINE  50 mg Oral Nightly    DULoxetine  30 mg Oral Daily    epoetin alexis  10,000 Units Intravenous Every Tues, Thurs, Sat    fluticasone furoate-vilanteroL  1 puff Inhalation Daily    insulin detemir U-100  15 Units Subcutaneous QHS    levothyroxine  125 mcg Oral Before breakfast    mupirocin   Nasal BID    pantoprazole  40 mg Intravenous BID    sevelamer carbonate  1,600 mg Oral TID    tiotropium bromide  2 puff Inhalation Daily        Continuous Infusions:       PRN Meds:  0.9%  NaCl infusion (for blood administration), acetaminophen, acetaminophen, albuterol, albuterol-ipratropium, dextrose 10%, dextrose 10%, dextrose 10%, dextrose 10%, glucagon (human recombinant), glucagon (human recombinant), glucose, glucose, glucose, glucose, insulin aspart U-100, ondansetron, sodium chloride 0.9%       Assessment/Plan:   Acute anemia due to GIB s/p 2 units of PRBC  H/o AVM, duodenal polyp     Hx: ESRD on HD, Afib on eliquis,  CAD with CABG x4, HFpEF, COPD gold stage II, hypertension, insulin-dependent type 2 diabetes, hypothyroidism, schizophrenia.    Will hold off on long-acting insulin until she was tolerating diet more regularly and continue with sliding scale.  Vital signs are stable.  Her hemoglobin has continued to drop.  7.0 today.  Will transfuse 1 unit of blood.  Renal following along for HD management.  She would be dialyzed today.  Can likely run  her blood with HD.    GI continues to follow along.  Plan for colonoscopy.  Was supposed to be done yesterday but unfortunately had to be postponed.  Will follow up further recommendations and timing of procedure.  Cardiology has been consulted as well this morning.  History of atrial fibrillation but unable to tolerate Eliquis due to multiple bouts of GI bleeding.  Question possible Watchman procedure.    Critical care diagnosis: critical anemia requiring blood transfusion  Critical care interventions: hands on evaluation, review of labs/radiographs/records and discussions with family  Critical care time spent: >32 minutes        Santiago Castro MD   01/06/2024     All diagnosis and differential diagnosis have been reviewed; assessment and plan has been documented; I have personally reviewed the labs and test results that are presently available; I have reviewed the patients medication list; I have reviewed the consulting providers response and recommendations. I have reviewed or attempted to review medical records based upon their availability    All of the patient's questions have been  addressed and answered. Patient's is agreeable to the above stated plan. I will continue to monitor closely and make adjustments to medical management as needed.  _____________________________________________________________________

## 2024-01-07 LAB
ALBUMIN SERPL-MCNC: 2.7 G/DL (ref 3.4–4.8)
ALBUMIN/GLOB SERPL: 1 RATIO (ref 1.1–2)
ALP SERPL-CCNC: 95 UNIT/L (ref 40–150)
ALT SERPL-CCNC: 23 UNIT/L (ref 0–55)
AST SERPL-CCNC: 15 UNIT/L (ref 5–34)
BASOPHILS # BLD AUTO: 0.03 X10(3)/MCL
BASOPHILS NFR BLD AUTO: 0.5 %
BILIRUB SERPL-MCNC: 0.4 MG/DL
BUN SERPL-MCNC: 22.3 MG/DL (ref 9.8–20.1)
CALCIUM SERPL-MCNC: 8.5 MG/DL (ref 8.4–10.2)
CHLORIDE SERPL-SCNC: 95 MMOL/L (ref 98–107)
CO2 SERPL-SCNC: 31 MMOL/L (ref 23–31)
CREAT SERPL-MCNC: 3.75 MG/DL (ref 0.55–1.02)
EOSINOPHIL # BLD AUTO: 0.07 X10(3)/MCL (ref 0–0.9)
EOSINOPHIL NFR BLD AUTO: 1.1 %
ERYTHROCYTE [DISTWIDTH] IN BLOOD BY AUTOMATED COUNT: 21.5 % (ref 11.5–17)
GFR SERPLBLD CREATININE-BSD FMLA CKD-EPI: 12 MLS/MIN/1.73/M2
GLOBULIN SER-MCNC: 2.6 GM/DL (ref 2.4–3.5)
GLUCOSE SERPL-MCNC: 99 MG/DL (ref 82–115)
HCT VFR BLD AUTO: 30.8 % (ref 37–47)
HGB BLD-MCNC: 9.7 G/DL (ref 12–16)
IMM GRANULOCYTES # BLD AUTO: 0.03 X10(3)/MCL (ref 0–0.04)
IMM GRANULOCYTES NFR BLD AUTO: 0.5 %
LYMPHOCYTES # BLD AUTO: 0.74 X10(3)/MCL (ref 0.6–4.6)
LYMPHOCYTES NFR BLD AUTO: 12 %
MAGNESIUM SERPL-MCNC: 1.9 MG/DL (ref 1.6–2.6)
MCH RBC QN AUTO: 28.8 PG (ref 27–31)
MCHC RBC AUTO-ENTMCNC: 31.5 G/DL (ref 33–36)
MCV RBC AUTO: 91.4 FL (ref 80–94)
MONOCYTES # BLD AUTO: 0.55 X10(3)/MCL (ref 0.1–1.3)
MONOCYTES NFR BLD AUTO: 8.9 %
NEUTROPHILS # BLD AUTO: 4.74 X10(3)/MCL (ref 2.1–9.2)
NEUTROPHILS NFR BLD AUTO: 77 %
NRBC BLD AUTO-RTO: 0 %
PLATELET # BLD AUTO: 156 X10(3)/MCL (ref 130–400)
PMV BLD AUTO: 10 FL (ref 7.4–10.4)
POCT GLUCOSE: 100 MG/DL (ref 70–110)
POCT GLUCOSE: 175 MG/DL (ref 70–110)
POCT GLUCOSE: 230 MG/DL (ref 70–110)
POTASSIUM SERPL-SCNC: 4.5 MMOL/L (ref 3.5–5.1)
PROT SERPL-MCNC: 5.3 GM/DL (ref 5.8–7.6)
RBC # BLD AUTO: 3.37 X10(6)/MCL (ref 4.2–5.4)
SODIUM SERPL-SCNC: 132 MMOL/L (ref 136–145)
WBC # SPEC AUTO: 6.16 X10(3)/MCL (ref 4.5–11.5)

## 2024-01-07 PROCEDURE — 85025 COMPLETE CBC W/AUTO DIFF WBC: CPT | Performed by: NURSE PRACTITIONER

## 2024-01-07 PROCEDURE — 80053 COMPREHEN METABOLIC PANEL: CPT | Performed by: NURSE PRACTITIONER

## 2024-01-07 PROCEDURE — 25000003 PHARM REV CODE 250: Performed by: INTERNAL MEDICINE

## 2024-01-07 PROCEDURE — 63600175 PHARM REV CODE 636 W HCPCS: Performed by: INTERNAL MEDICINE

## 2024-01-07 PROCEDURE — C9113 INJ PANTOPRAZOLE SODIUM, VIA: HCPCS | Performed by: INTERNAL MEDICINE

## 2024-01-07 PROCEDURE — 25000242 PHARM REV CODE 250 ALT 637 W/ HCPCS: Performed by: INTERNAL MEDICINE

## 2024-01-07 PROCEDURE — 21400001 HC TELEMETRY ROOM

## 2024-01-07 PROCEDURE — 83735 ASSAY OF MAGNESIUM: CPT | Performed by: HOSPITALIST

## 2024-01-07 PROCEDURE — 25000003 PHARM REV CODE 250: Performed by: HOSPITALIST

## 2024-01-07 RX ORDER — SODIUM, POTASSIUM,MAG SULFATES 17.5-3.13G
1 SOLUTION, RECONSTITUTED, ORAL ORAL ONCE
Status: DISCONTINUED | OUTPATIENT
Start: 2024-01-07 | End: 2024-01-07

## 2024-01-07 RX ORDER — SODIUM, POTASSIUM,MAG SULFATES 17.5-3.13G
1 SOLUTION, RECONSTITUTED, ORAL ORAL 2 TIMES DAILY
Status: COMPLETED | OUTPATIENT
Start: 2024-01-07 | End: 2024-01-08

## 2024-01-07 RX ADMIN — PANTOPRAZOLE SODIUM 40 MG: 40 INJECTION, POWDER, FOR SOLUTION INTRAVENOUS at 09:01

## 2024-01-07 RX ADMIN — MUPIROCIN: 20 OINTMENT TOPICAL at 09:01

## 2024-01-07 RX ADMIN — SODIUM SULFATE, POTASSIUM SULFATE, MAGNESIUM SULFATE 354 ML: 17.5; 3.13; 1.6 SOLUTION, CONCENTRATE ORAL at 06:01

## 2024-01-07 RX ADMIN — DULOXETINE HYDROCHLORIDE 30 MG: 30 CAPSULE, DELAYED RELEASE ORAL at 08:01

## 2024-01-07 RX ADMIN — FLUTICASONE FUROATE AND VILANTEROL TRIFENATATE 1 PUFF: 100; 25 POWDER RESPIRATORY (INHALATION) at 09:01

## 2024-01-07 RX ADMIN — INSULIN ASPART 2 UNITS: 100 INJECTION, SOLUTION INTRAVENOUS; SUBCUTANEOUS at 11:01

## 2024-01-07 RX ADMIN — SEVELAMER CARBONATE 1600 MG: 800 TABLET, FILM COATED ORAL at 09:01

## 2024-01-07 RX ADMIN — INSULIN ASPART 4 UNITS: 100 INJECTION, SOLUTION INTRAVENOUS; SUBCUTANEOUS at 04:01

## 2024-01-07 RX ADMIN — SEVELAMER CARBONATE 1600 MG: 800 TABLET, FILM COATED ORAL at 08:01

## 2024-01-07 RX ADMIN — LEVOTHYROXINE SODIUM 125 MCG: 125 TABLET ORAL at 05:01

## 2024-01-07 RX ADMIN — CARVEDILOL 6.25 MG: 3.12 TABLET, FILM COATED ORAL at 08:01

## 2024-01-07 RX ADMIN — CARVEDILOL 6.25 MG: 3.12 TABLET, FILM COATED ORAL at 09:01

## 2024-01-07 RX ADMIN — TIOTROPIUM BROMIDE INHALATION SPRAY 2 PUFF: 3.12 SPRAY, METERED RESPIRATORY (INHALATION) at 09:01

## 2024-01-07 RX ADMIN — DIPHENHYDRAMINE HYDROCHLORIDE 50 MG: 25 CAPSULE ORAL at 09:01

## 2024-01-07 RX ADMIN — ATORVASTATIN CALCIUM 40 MG: 40 TABLET, FILM COATED ORAL at 08:01

## 2024-01-07 RX ADMIN — SEVELAMER CARBONATE 1600 MG: 800 TABLET, FILM COATED ORAL at 04:01

## 2024-01-07 NOTE — PROGRESS NOTES
NEPHROLOGY PROGRESS NOTE      Patient Demographics:  Name:  Rosette Madrid  Age: 78 y.o.  MRN:  [id number]  Admission Date: 1/3/2024      Subjective:      Doing ok    Stools are brown per nursing    Transfused with HD yesterday    Current Facility-Administered Medications   Medication Dose Route Frequency Provider Last Rate Last Admin    0.9%  NaCl infusion (for blood administration)   Intravenous Q24H PRN Navdeep Rae IV, MD        0.9%  NaCl infusion (for blood administration)   Intravenous Q24H PRN Santiago Castro MD        acetaminophen tablet 650 mg  650 mg Oral Q8H PRN Judith Rubi PA-C        acetaminophen tablet 650 mg  650 mg Oral Q4H PRN Judith Rubi PA-C        albuterol inhaler 2 puff  2 puff Inhalation Q6H PRN Duarte Lo MD        albuterol-ipratropium 2.5 mg-0.5 mg/3 mL nebulizer solution 3 mL  3 mL Nebulization Q6H PRN Duarte Lo MD   3 mL at 01/04/24 1038    amLODIPine tablet 5 mg  5 mg Oral Daily Duarte Lo MD   5 mg at 01/05/24 0920    atorvastatin tablet 40 mg  40 mg Oral Daily Duarte Lo MD   40 mg at 01/07/24 0848    carvediloL tablet 6.25 mg  6.25 mg Oral BID Duarte Lo MD   6.25 mg at 01/07/24 0848    dextrose 10% bolus 125 mL 125 mL  12.5 g Intravenous PRN Judith Rubi PA-C        dextrose 10% bolus 125 mL 125 mL  12.5 g Intravenous PRN Duarte Lo MD        dextrose 10% bolus 250 mL 250 mL  25 g Intravenous PRN Judith Rubi PA-C        dextrose 10% bolus 250 mL 250 mL  25 g Intravenous PRN Duarte Lo MD        diphenhydrAMINE capsule 50 mg  50 mg Oral Nightly Duarte Lo MD   50 mg at 01/06/24 2111    DULoxetine DR capsule 30 mg  30 mg Oral Daily Duarte Lo MD   30 mg at 01/07/24 0848    epoetin alexis injection 10,000 Units  10,000 Units Intravenous Every Tues, Thurs, Martin Tripathi, Lynn S, AGNP        fluticasone furoate-vilanteroL 100-25 mcg/dose diskus inhaler 1 puff  1 puff Inhalation  "Daily Duarte Lo MD   1 puff at 01/06/24 0820    glucagon (human recombinant) injection 1 mg  1 mg Intramuscular PRN Judith Rubi PA-C        glucagon (human recombinant) injection 1 mg  1 mg Intramuscular PRN Duarte Lo MD        glucose chewable tablet 16 g  16 g Oral PRN Judith Rubi PA-C        glucose chewable tablet 16 g  16 g Oral PRN Duarte Lo MD        glucose chewable tablet 24 g  24 g Oral PRN Judith Rubi PA-C        glucose chewable tablet 24 g  24 g Oral PRN Duarte Lo MD        insulin aspart U-100 injection 0-10 Units  0-10 Units Subcutaneous QID (AC + HS) PRN Duarte Lo MD   4 Units at 01/06/24 1120    levothyroxine tablet 125 mcg  125 mcg Oral Before breakfast Duarte Lo MD   125 mcg at 01/07/24 0544    mupirocin 2 % ointment   Nasal BID Lynn Tripathi, AGNP   Given at 01/06/24 2112    ondansetron injection 4 mg  4 mg Intravenous Q4H PRN Judith Rubi PA-C        pantoprazole injection 40 mg  40 mg Intravenous BID Duarte Lo MD   40 mg at 01/06/24 2111    sevelamer carbonate tablet 1,600 mg  1,600 mg Oral TID Duarte Lo MD   1,600 mg at 01/07/24 0848    sodium chloride 0.9% flush 10 mL  10 mL Intravenous Q12H PRN Judith Rubi PA-C        tiotropium bromide 2.5 mcg/actuation inhaler 2 puff  2 puff Inhalation Daily Duarte Lo MD   2 puff at 01/06/24 0819           Review of Systems   Constitutional:  Positive for malaise/fatigue.   HENT: Negative.     Eyes: Negative.    Respiratory: Negative.     Cardiovascular: Negative.    Gastrointestinal: Negative.    Genitourinary: Negative.    Musculoskeletal: Negative.    Skin: Negative.    Neurological:  Positive for weakness.         Objective:    BP (!) 108/47 (BP Location: Right arm, Patient Position: Lying)   Pulse 63   Temp 97.7 °F (36.5 °C) (Oral)   Resp 18   Ht 5' 6" (1.676 m)   Wt 89.5 kg (197 lb 5 oz)   SpO2 95%   BMI 31.85 kg/m² "       Intake/Output Summary (Last 24 hours) at 1/7/2024 0947  Last data filed at 1/6/2024 2100  Gross per 24 hour   Intake 1810 ml   Output 2502 ml   Net -692 ml             Physical Exam  Vitals reviewed.   Constitutional:       Appearance: Normal appearance.   HENT:      Head: Normocephalic and atraumatic.      Nose: Nose normal.   Eyes:      Extraocular Movements: Extraocular movements intact.      Pupils: Pupils are equal, round, and reactive to light.   Cardiovascular:      Rate and Rhythm: Normal rate and regular rhythm.      Pulses: Normal pulses.      Heart sounds: Normal heart sounds.   Pulmonary:      Effort: Pulmonary effort is normal.      Breath sounds: Normal breath sounds.   Abdominal:      General: Bowel sounds are normal.      Palpations: Abdomen is soft.   Musculoskeletal:         General: Normal range of motion.      Cervical back: Normal range of motion.      Comments: Some deconditioning   Skin:     General: Skin is warm and dry.   Neurological:      General: No focal deficit present.      Mental Status: She is alert and oriented to person, place, and time. Mental status is at baseline.   Psychiatric:         Mood and Affect: Mood normal.            Inpatient Diagnostics:  Recent Results (from the past 24 hour(s))   POCT glucose    Collection Time: 01/06/24 10:40 AM   Result Value Ref Range    POCT Glucose 217 (H) 70 - 110 mg/dL   POCT glucose    Collection Time: 01/06/24  5:36 PM   Result Value Ref Range    POCT Glucose 106 70 - 110 mg/dL   POCT glucose    Collection Time: 01/06/24  7:58 PM   Result Value Ref Range    POCT Glucose 157 (H) 70 - 110 mg/dL   POCT glucose    Collection Time: 01/07/24  5:44 AM   Result Value Ref Range    POCT Glucose 100 70 - 110 mg/dL   Magnesium    Collection Time: 01/07/24  6:42 AM   Result Value Ref Range    Magnesium Level 1.90 1.60 - 2.60 mg/dL   Comprehensive Metabolic Panel    Collection Time: 01/07/24  6:42 AM   Result Value Ref Range    Sodium Level 132  (L) 136 - 145 mmol/L    Potassium Level 4.5 3.5 - 5.1 mmol/L    Chloride 95 (L) 98 - 107 mmol/L    Carbon Dioxide 31 23 - 31 mmol/L    Glucose Level 99 82 - 115 mg/dL    Blood Urea Nitrogen 22.3 (H) 9.8 - 20.1 mg/dL    Creatinine 3.75 (H) 0.55 - 1.02 mg/dL    Calcium Level Total 8.5 8.4 - 10.2 mg/dL    Protein Total 5.3 (L) 5.8 - 7.6 gm/dL    Albumin Level 2.7 (L) 3.4 - 4.8 g/dL    Globulin 2.6 2.4 - 3.5 gm/dL    Albumin/Globulin Ratio 1.0 (L) 1.1 - 2.0 ratio    Bilirubin Total 0.4 <=1.5 mg/dL    Alkaline Phosphatase 95 40 - 150 unit/L    Alanine Aminotransferase 23 0 - 55 unit/L    Aspartate Aminotransferase 15 5 - 34 unit/L    eGFR 12 mls/min/1.73/m2   CBC with Differential    Collection Time: 01/07/24  6:42 AM   Result Value Ref Range    WBC 6.16 4.50 - 11.50 x10(3)/mcL    RBC 3.37 (L) 4.20 - 5.40 x10(6)/mcL    Hgb 9.7 (L) 12.0 - 16.0 g/dL    Hct 30.8 (L) 37.0 - 47.0 %    MCV 91.4 80.0 - 94.0 fL    MCH 28.8 27.0 - 31.0 pg    MCHC 31.5 (L) 33.0 - 36.0 g/dL    RDW 21.5 (H) 11.5 - 17.0 %    Platelet 156 130 - 400 x10(3)/mcL    MPV 10.0 7.4 - 10.4 fL    Neut % 77.0 %    Lymph % 12.0 %    Mono % 8.9 %    Eos % 1.1 %    Basophil % 0.5 %    Lymph # 0.74 0.6 - 4.6 x10(3)/mcL    Neut # 4.74 2.1 - 9.2 x10(3)/mcL    Mono # 0.55 0.1 - 1.3 x10(3)/mcL    Eos # 0.07 0 - 0.9 x10(3)/mcL    Baso # 0.03 <=0.2 x10(3)/mcL    IG# 0.03 0 - 0.04 x10(3)/mcL    IG% 0.5 %    NRBC% 0.0 %     A/P--NE 01/07    1---ESRD on HD---Cont TTS schedule while inpatient  2---A Fib---Eliquis on hold/ Cardiology on board  3---HTN--Controlled  4---Type II DM---Cont same management  5---Anemia secondary to Blood Loss---Transfused with HD yesterday/ EGD done 1/4 --No acute bleed---Colonoscopy had been considered     IV--SL     ORDERS:  CBC in am     [facility] [place] [place]                          Patient stable.  Discussed with NP.  HD as scheduled.  Continue hydration/supportive care.  Will follow.

## 2024-01-07 NOTE — PROGRESS NOTES
[facility]    Cardiology  Progress Note    Patient Name: Rosette Madrid  MRN: [id number]  Admission Date: 1/3/2024  Hospital Length of Stay: 4 days  Code Status: Full Code   Attending Provider: Santiago Castro MD   Consulting Provider: JOSEFINA He  Primary Care Physician: Margarita Dior FNP  Principal Problem:GI bleed    Patient information was obtained from patient, past medical records, and ER records.     Subjective:     Chief Complaint:  dark stools    HPI:   Ms. Chacon is a 77 y/o female, previously followed by Dr. Singh-- now follows with [facility] cardiology, who was referred by dialysis to ED for evaluation due to low H/H. She is on Eliquis for Afib and has a hx of recurrent GIBs and does attest to 2 day hx of dark tarry stools. Labs significant for H/H 5.3/17.7. Hemoccult +. She was transfused 2 units of PRBCs and admitted to hospital medicine. GI was consulted and she underwent push enteroscopy on 1.4.24 which did not reveal any bleed source beyond ligament of Treitz. Plans were for possible colonoscopy but patient was not prepped; therefore colonoscopy did not occur. GI notes could be done outpatient. H/H 7.0/23.0 today. [facility] has been consulted for possible Watchman due to recurrent GIBs. Eliquis has been placed on hold.   She is currently SB with 1st degree AVB, AICD      Hospital Course:  1.7.24: No complaints. H/H 9.7/30.8 post transfusion 2 units PRBCs. VSS. SB on tele      PMH: ESRD/HD, HFpEF, Native CAD/CABG, Pafib/Eliquis, recurrent GIBs, T2DM, COPD, Hypothyroidism, HTN, HLD, lymphedema LLE, Vitamin D deficiency, gastric angiodysplasia  PSH: AV fistula placement, CABG, EGD, polypectomy, thyroidectomy, tubal ligation  Family History: Mother- HTN; father- HTN  Social History: current smoker; denies illicit drug use; occasional ETOH    Previous Cardiac Diagnostics:   MPI 12.27.23:  Abnormal myocardial perfusion scan.    There is a moderate to severe  intensity, moderate sized, mostly reversible perfusion abnormality with some fixed areas in the lateral apical wall(s) in the typical distribution of the LCX territory.    There are no other significant perfusion abnormalities.    The gated perfusion images showed an ejection fraction of 62% at rest. The gated perfusion images showed an ejection fraction of 63% post stress.    There were no arrhythmias during stress.      TTE 12.27.23:  Left Ventricle: The left ventricle is normal in size. Normal wall thickness. There is normal systolic function. Biplane (2D) method of discs ejection fraction is 68%.    Right Ventricle: Normal right ventricular cavity size. Systolic function is borderline low.    Left Atrium: Left atrium is mildly dilated.    Right Atrium: Right atrium is mildly dilated.    Aortic Valve: The aortic valve is a trileaflet valve. There is moderate aortic valve sclerosis.    Mitral Valve: There is severe posterior mitral annular calcification present. There is moderate to severe regurgitation.    Tricuspid Valve: There is moderate annular calcification present. There is moderate regurgitation.    IVC/SVC: Intermediate venous pressure at 8 mmHg.    Venous US 05.12.23:  1. The study quality is average.   2. Pulsatile venous flow suggestive of fluid volume overload.  Severe edema noted.  3. .  4. The veins of the bilateral lower extremities compress easily and augment well with normal phasic flow and no evidence of deep venous thrombosis or arterio-venous fistula on this study.  5. .  6. LEFT GSV REFLUX:  7. The left mid thigh GSV throughout the mid calf GSV appears to have been harvested for CABG.   8. .  9. RIGHT GSV REFLUX:  10. 1.5 sec reflux is noted in the right great saphenous vein at the mid calf with a diameter of 2.7 mm.  11. .  12. DEEP SYSTEM REFLUX:  13. 2.2 secs in the right CFV, 1.5 secs in the left CFV, 3.2 secs in the right mid SFV  14. .  15. SSV:  16. Bilaterally small SSV with no reflux  "appreciated.       Review of patient's allergies indicates:   Allergen Reactions    Lisinopril Swelling    Azithromycin      Other reaction(s): tongue/facial swelling    Baclofen      Other reaction(s): tongue/facial swelling    Doxycycline      Other reaction(s): Angioedema       No current facility-administered medications on file prior to encounter.     Current Outpatient Medications on File Prior to Encounter   Medication Sig    albuterol (VENTOLIN HFA) 90 mcg/actuation inhaler Inhale 2 puffs into the lungs every 6 (six) hours as needed for Wheezing. Rescue    albuterol-ipratropium (DUO-NEB) 2.5 mg-0.5 mg/3 mL nebulizer solution Take 3 mLs by nebulization every 6 (six) hours as needed for Wheezing or Shortness of Breath. Rescue    amLODIPine (NORVASC) 5 MG tablet Take 1 tablet (5 mg total) by mouth once daily.    apixaban (ELIQUIS) 5 mg Tab Take 1 tablet (5 mg total) by mouth 2 (two) times daily.    BD ULTRA-FINE MINI PEN NEEDLE 31 gauge x 3/16" Ndle Inject into the skin 2 (two) times daily.    BELSOMRA 5 mg Tab Take 1 tablet by mouth every evening.    BENADRYL 25 mg capsule Take 50 mg by mouth nightly.    carvediloL (COREG) 6.25 MG tablet Take 1 tablet (6.25 mg total) by mouth 2 (two) times daily.    clonazePAM (KLONOPIN) 0.5 MG tablet Take 0.5 mg by mouth every evening.    DIALYVITE 100-1 mg Tab Take 1 tablet by mouth once daily.    DULoxetine (CYMBALTA) 30 MG capsule Take 30 mg by mouth once daily.    fluticasone furoate-vilanteroL (BREO) 100-25 mcg/dose diskus inhaler Inhale 1 puff into the lungs once daily.    furosemide (LASIX) 40 MG tablet Take 1 tablet (40 mg total) by mouth once daily.    glipiZIDE (GLUCOTROL) 10 MG tablet Take 1 tablet (10 mg total) by mouth daily with breakfast.    insulin detemir U-100, Levemir, (LEVEMIR FLEXPEN) 100 unit/mL (3 mL) InPn pen Inject 30 Units into the skin every evening.    INVEGA SUSTENNA 156 mg/mL Syrg injection Inject into the muscle.    L-METHYLFOLATE 15 mg Tab Take " "1 tablet by mouth.    lactulose (CHRONULAC) 10 gram/15 mL solution Take 30 mLs by mouth.    levothyroxine (SYNTHROID) 125 MCG tablet Take 1 tablet (125 mcg total) by mouth before breakfast.    loratadine (CLARITIN) 10 mg tablet Take 1 tablet (10 mg total) by mouth once daily. for 14 days    ONETOUCH DELICA PLUS LANCET 30 gauge Misc 1 lancet by Other route once daily.    ONETOUCH ULTRA TEST Strp 1 strip by Other route once daily.    pantoprazole (PROTONIX) 40 MG tablet Take 40 mg by mouth 2 (two) times daily.    pen needle, diabetic 31 gauge x 5/16" Ndle 2 Units by Misc.(Non-Drug; Combo Route) route 2 (two) times a day. Patient to check blood sugars twice daily (morning and night)    rosuvastatin (CRESTOR) 40 MG Tab Take 1 tablet (40 mg total) by mouth once daily.    sevelamer carbonate (RENVELA) 800 mg Tab Take 2 tablets (1,600 mg total) by mouth 3 (three) times daily.    tiotropium (SPIRIVA WITH HANDIHALER) 18 mcg inhalation capsule Inhale 1 capsule (18 mcg total) into the lungs Daily.       Review of Systems   Respiratory: Negative.     Cardiovascular: Negative.    Gastrointestinal:  Positive for blood in stool.   Genitourinary: Negative.    Musculoskeletal: Negative.    Skin: Negative.    Neurological: Negative.    Psychiatric/Behavioral: Negative.         Objective:     Vital Signs (Most Recent):  Temp: 97.7 °F (36.5 °C) (01/07/24 0700)  Pulse: 62 (01/07/24 0948)  Resp: 20 (01/07/24 0948)  BP: (!) 108/47 (01/07/24 0700)  SpO2: 95 % (01/07/24 1100) Vital Signs (24h Range):  Temp:  [97.6 °F (36.4 °C)-98.6 °F (37 °C)] 97.7 °F (36.5 °C)  Pulse:  [53-69] 62  Resp:  [17-20] 20  SpO2:  [93 %-98 %] 95 %  BP: (108-133)/(47-97) 108/47     Weight: 89.5 kg (197 lb 5 oz)  Body mass index is 31.85 kg/m².    SpO2: 95 %         Intake/Output Summary (Last 24 hours) at 1/7/2024 1116  Last data filed at 1/6/2024 2100  Gross per 24 hour   Intake 1810 ml   Output 2502 ml   Net -692 ml         Lines/Drains/Airways       Peripheral " Intravenous Line  Duration                  Hemodialysis AV Fistula Left forearm -- days         Peripheral IV - Single Lumen 01/03/24 1516 18 G Anterior;Proximal;Right Upper Arm 3 days                    Significant Labs:  Recent Results (from the past 72 hour(s))   POCT glucose    Collection Time: 01/04/24  8:31 PM   Result Value Ref Range    POCT Glucose 192 (H) 70 - 110 mg/dL   POCT glucose    Collection Time: 01/05/24  4:52 AM   Result Value Ref Range    POCT Glucose 69 (L) 70 - 110 mg/dL   Comprehensive Metabolic Panel    Collection Time: 01/05/24  6:25 AM   Result Value Ref Range    Sodium Level 136 136 - 145 mmol/L    Potassium Level 4.1 3.5 - 5.1 mmol/L    Chloride 99 98 - 107 mmol/L    Carbon Dioxide 30 23 - 31 mmol/L    Glucose Level 103 82 - 115 mg/dL    Blood Urea Nitrogen 24.2 (H) 9.8 - 20.1 mg/dL    Creatinine 3.53 (H) 0.55 - 1.02 mg/dL    Calcium Level Total 8.5 8.4 - 10.2 mg/dL    Protein Total 5.2 (L) 5.8 - 7.6 gm/dL    Albumin Level 2.7 (L) 3.4 - 4.8 g/dL    Globulin 2.5 2.4 - 3.5 gm/dL    Albumin/Globulin Ratio 1.1 1.1 - 2.0 ratio    Bilirubin Total 0.3 <=1.5 mg/dL    Alkaline Phosphatase 81 40 - 150 unit/L    Alanine Aminotransferase 30 0 - 55 unit/L    Aspartate Aminotransferase 23 5 - 34 unit/L    eGFR 13 mls/min/1.73/m2   CBC with Differential    Collection Time: 01/05/24  6:25 AM   Result Value Ref Range    WBC 5.77 4.50 - 11.50 x10(3)/mcL    RBC 2.51 (L) 4.20 - 5.40 x10(6)/mcL    Hgb 7.2 (L) 12.0 - 16.0 g/dL    Hct 22.9 (L) 37.0 - 47.0 %    MCV 91.2 80.0 - 94.0 fL    MCH 28.7 27.0 - 31.0 pg    MCHC 31.4 (L) 33.0 - 36.0 g/dL    RDW 22.3 (H) 11.5 - 17.0 %    Platelet 161 130 - 400 x10(3)/mcL    MPV 10.3 7.4 - 10.4 fL    Neut % 79.4 %    Lymph % 11.1 %    Mono % 7.8 %    Eos % 1.0 %    Basophil % 0.2 %    Lymph # 0.64 0.6 - 4.6 x10(3)/mcL    Neut # 4.58 2.1 - 9.2 x10(3)/mcL    Mono # 0.45 0.1 - 1.3 x10(3)/mcL    Eos # 0.06 0 - 0.9 x10(3)/mcL    Baso # 0.01 <=0.2 x10(3)/mcL    IG# 0.03 0 - 0.04  x10(3)/mcL    IG% 0.5 %    NRBC% 0.0 %   Magnesium    Collection Time: 01/05/24  6:25 AM   Result Value Ref Range    Magnesium Level 1.80 1.60 - 2.60 mg/dL   Blood Smear Microscopic Exam    Collection Time: 01/05/24  6:25 AM   Result Value Ref Range    RBC Morph Abnormal (A) Normal    Anisocytosis 1+ (A) (none)    Macrocytosis 1+ (A) (none)    Poikilocytosis 1+ (A) (none)    Stomatocytes 1+ (A) (none)    Target Cells 1+ (A) (none)    Platelets Normal Normal, Adequate   POCT glucose    Collection Time: 01/05/24 11:23 AM   Result Value Ref Range    POCT Glucose 90 70 - 110 mg/dL   POCT glucose    Collection Time: 01/05/24  4:42 PM   Result Value Ref Range    POCT Glucose 224 (H) 70 - 110 mg/dL   POCT glucose    Collection Time: 01/05/24  8:08 PM   Result Value Ref Range    POCT Glucose 127 (H) 70 - 110 mg/dL   POCT glucose    Collection Time: 01/06/24  5:52 AM   Result Value Ref Range    POCT Glucose 122 (H) 70 - 110 mg/dL   Basic Metabolic Panel    Collection Time: 01/06/24  5:58 AM   Result Value Ref Range    Sodium Level 132 (L) 136 - 145 mmol/L    Potassium Level 4.2 3.5 - 5.1 mmol/L    Chloride 95 (L) 98 - 107 mmol/L    Carbon Dioxide 31 23 - 31 mmol/L    Glucose Level 108 82 - 115 mg/dL    Blood Urea Nitrogen 34.5 (H) 9.8 - 20.1 mg/dL    Creatinine 4.73 (H) 0.55 - 1.02 mg/dL    BUN/Creatinine Ratio 7     Calcium Level Total 8.2 (L) 8.4 - 10.2 mg/dL    Anion Gap 6.0 mEq/L    eGFR 9 mls/min/1.73/m2   CBC with Differential    Collection Time: 01/06/24  5:58 AM   Result Value Ref Range    WBC 5.02 4.50 - 11.50 x10(3)/mcL    RBC 2.43 (L) 4.20 - 5.40 x10(6)/mcL    Hgb 7.0 (L) 12.0 - 16.0 g/dL    Hct 23.0 (L) 37.0 - 47.0 %    MCV 94.7 (H) 80.0 - 94.0 fL    MCH 28.8 27.0 - 31.0 pg    MCHC 30.4 (L) 33.0 - 36.0 g/dL    RDW 22.1 (H) 11.5 - 17.0 %    Platelet 157 130 - 400 x10(3)/mcL    MPV 9.9 7.4 - 10.4 fL    Neut % 75.1 %    Lymph % 13.3 %    Mono % 9.4 %    Eos % 1.2 %    Basophil % 0.4 %    Lymph # 0.67 0.6 - 4.6  x10(3)/mcL    Neut # 3.77 2.1 - 9.2 x10(3)/mcL    Mono # 0.47 0.1 - 1.3 x10(3)/mcL    Eos # 0.06 0 - 0.9 x10(3)/mcL    Baso # 0.02 <=0.2 x10(3)/mcL    IG# 0.03 0 - 0.04 x10(3)/mcL    IG% 0.6 %    NRBC% 0.0 %   POCT glucose    Collection Time: 01/06/24 10:40 AM   Result Value Ref Range    POCT Glucose 217 (H) 70 - 110 mg/dL   POCT glucose    Collection Time: 01/06/24  5:36 PM   Result Value Ref Range    POCT Glucose 106 70 - 110 mg/dL   POCT glucose    Collection Time: 01/06/24  7:58 PM   Result Value Ref Range    POCT Glucose 157 (H) 70 - 110 mg/dL   POCT glucose    Collection Time: 01/07/24  5:44 AM   Result Value Ref Range    POCT Glucose 100 70 - 110 mg/dL   Magnesium    Collection Time: 01/07/24  6:42 AM   Result Value Ref Range    Magnesium Level 1.90 1.60 - 2.60 mg/dL   Comprehensive Metabolic Panel    Collection Time: 01/07/24  6:42 AM   Result Value Ref Range    Sodium Level 132 (L) 136 - 145 mmol/L    Potassium Level 4.5 3.5 - 5.1 mmol/L    Chloride 95 (L) 98 - 107 mmol/L    Carbon Dioxide 31 23 - 31 mmol/L    Glucose Level 99 82 - 115 mg/dL    Blood Urea Nitrogen 22.3 (H) 9.8 - 20.1 mg/dL    Creatinine 3.75 (H) 0.55 - 1.02 mg/dL    Calcium Level Total 8.5 8.4 - 10.2 mg/dL    Protein Total 5.3 (L) 5.8 - 7.6 gm/dL    Albumin Level 2.7 (L) 3.4 - 4.8 g/dL    Globulin 2.6 2.4 - 3.5 gm/dL    Albumin/Globulin Ratio 1.0 (L) 1.1 - 2.0 ratio    Bilirubin Total 0.4 <=1.5 mg/dL    Alkaline Phosphatase 95 40 - 150 unit/L    Alanine Aminotransferase 23 0 - 55 unit/L    Aspartate Aminotransferase 15 5 - 34 unit/L    eGFR 12 mls/min/1.73/m2   CBC with Differential    Collection Time: 01/07/24  6:42 AM   Result Value Ref Range    WBC 6.16 4.50 - 11.50 x10(3)/mcL    RBC 3.37 (L) 4.20 - 5.40 x10(6)/mcL    Hgb 9.7 (L) 12.0 - 16.0 g/dL    Hct 30.8 (L) 37.0 - 47.0 %    MCV 91.4 80.0 - 94.0 fL    MCH 28.8 27.0 - 31.0 pg    MCHC 31.5 (L) 33.0 - 36.0 g/dL    RDW 21.5 (H) 11.5 - 17.0 %    Platelet 156 130 - 400 x10(3)/mcL    MPV  "10.0 7.4 - 10.4 fL    Neut % 77.0 %    Lymph % 12.0 %    Mono % 8.9 %    Eos % 1.1 %    Basophil % 0.5 %    Lymph # 0.74 0.6 - 4.6 x10(3)/mcL    Neut # 4.74 2.1 - 9.2 x10(3)/mcL    Mono # 0.55 0.1 - 1.3 x10(3)/mcL    Eos # 0.07 0 - 0.9 x10(3)/mcL    Baso # 0.03 <=0.2 x10(3)/mcL    IG# 0.03 0 - 0.04 x10(3)/mcL    IG% 0.5 %    NRBC% 0.0 %   POCT glucose    Collection Time: 01/07/24 11:10 AM   Result Value Ref Range    POCT Glucose 175 (H) 70 - 110 mg/dL       Significant Imaging:  Imaging Results    None         Telemetry:  SB      Physical Exam  Constitutional:       Appearance: She is ill-appearing.   Cardiovascular:      Rate and Rhythm: Regular rhythm. Bradycardia present.      Pulses: Normal pulses.      Heart sounds: Normal heart sounds.   Pulmonary:      Effort: Pulmonary effort is normal.      Breath sounds: Normal breath sounds.   Abdominal:      Palpations: Abdomen is soft.   Musculoskeletal:         General: Normal range of motion.   Skin:     General: Skin is warm.   Neurological:      General: No focal deficit present.      Mental Status: She is alert.         Home Medications:   No current facility-administered medications on file prior to encounter.     Current Outpatient Medications on File Prior to Encounter   Medication Sig Dispense Refill    albuterol (VENTOLIN HFA) 90 mcg/actuation inhaler Inhale 2 puffs into the lungs every 6 (six) hours as needed for Wheezing. Rescue 18 g 1    albuterol-ipratropium (DUO-NEB) 2.5 mg-0.5 mg/3 mL nebulizer solution Take 3 mLs by nebulization every 6 (six) hours as needed for Wheezing or Shortness of Breath. Rescue 75 mL 0    amLODIPine (NORVASC) 5 MG tablet Take 1 tablet (5 mg total) by mouth once daily. 30 tablet 11    apixaban (ELIQUIS) 5 mg Tab Take 1 tablet (5 mg total) by mouth 2 (two) times daily. 60 tablet 11    BD ULTRA-FINE MINI PEN NEEDLE 31 gauge x 3/16" Ndle Inject into the skin 2 (two) times daily.      BELSOMRA 5 mg Tab Take 1 tablet by mouth every " "evening.      BENADRYL 25 mg capsule Take 50 mg by mouth nightly.      carvediloL (COREG) 6.25 MG tablet Take 1 tablet (6.25 mg total) by mouth 2 (two) times daily. 180 tablet 3    clonazePAM (KLONOPIN) 0.5 MG tablet Take 0.5 mg by mouth every evening.      DIALYVITE 100-1 mg Tab Take 1 tablet by mouth once daily.      DULoxetine (CYMBALTA) 30 MG capsule Take 30 mg by mouth once daily.      fluticasone furoate-vilanteroL (BREO) 100-25 mcg/dose diskus inhaler Inhale 1 puff into the lungs once daily. 90 each 2    furosemide (LASIX) 40 MG tablet Take 1 tablet (40 mg total) by mouth once daily. 90 tablet 3    glipiZIDE (GLUCOTROL) 10 MG tablet Take 1 tablet (10 mg total) by mouth daily with breakfast. 90 tablet 2    insulin detemir U-100, Levemir, (LEVEMIR FLEXPEN) 100 unit/mL (3 mL) InPn pen Inject 30 Units into the skin every evening. 27 mL 2    INVEGA SUSTENNA 156 mg/mL Syrg injection Inject into the muscle.      L-METHYLFOLATE 15 mg Tab Take 1 tablet by mouth.      lactulose (CHRONULAC) 10 gram/15 mL solution Take 30 mLs by mouth.      levothyroxine (SYNTHROID) 125 MCG tablet Take 1 tablet (125 mcg total) by mouth before breakfast. 90 tablet 2    loratadine (CLARITIN) 10 mg tablet Take 1 tablet (10 mg total) by mouth once daily. for 14 days 14 tablet 0    ONETOUCH DELICA PLUS LANCET 30 gauge Misc 1 lancet by Other route once daily. 100 each 2    ONETOUCH ULTRA TEST Strp 1 strip by Other route once daily. 200 strip 2    pantoprazole (PROTONIX) 40 MG tablet Take 40 mg by mouth 2 (two) times daily.      pen needle, diabetic 31 gauge x 5/16" Ndle 2 Units by Misc.(Non-Drug; Combo Route) route 2 (two) times a day. Patient to check blood sugars twice daily (morning and night) 200 each 2    rosuvastatin (CRESTOR) 40 MG Tab Take 1 tablet (40 mg total) by mouth once daily. 90 tablet 3    sevelamer carbonate (RENVELA) 800 mg Tab Take 2 tablets (1,600 mg total) by mouth 3 (three) times daily. 90 tablet 0    tiotropium (SPIRIVA " WITH HANDIHALER) 18 mcg inhalation capsule Inhale 1 capsule (18 mcg total) into the lungs Daily. 90 capsule 2       Current Inpatient Medications:    Current Facility-Administered Medications:     0.9%  NaCl infusion (for blood administration), , Intravenous, Q24H PRN, Navdeep Rae IV, MD    0.9%  NaCl infusion (for blood administration), , Intravenous, Q24H PRN, Santiago Castro MD    acetaminophen tablet 650 mg, 650 mg, Oral, Q8H PRN, Judith Rubi PA-C    acetaminophen tablet 650 mg, 650 mg, Oral, Q4H PRN, Judith Rubi PA-C    albuterol inhaler 2 puff, 2 puff, Inhalation, Q6H PRN, Duarte Lo MD    albuterol-ipratropium 2.5 mg-0.5 mg/3 mL nebulizer solution 3 mL, 3 mL, Nebulization, Q6H PRN, Duarte Lo MD, 3 mL at 01/04/24 1038    amLODIPine tablet 5 mg, 5 mg, Oral, Daily, Duarte Lo MD, 5 mg at 01/05/24 0920    atorvastatin tablet 40 mg, 40 mg, Oral, Daily, Duarte Lo MD, 40 mg at 01/07/24 0848    carvediloL tablet 6.25 mg, 6.25 mg, Oral, BID, Duarte Lo MD, 6.25 mg at 01/07/24 0848    dextrose 10% bolus 125 mL 125 mL, 12.5 g, Intravenous, PRN, Judith Rubi PA-C    dextrose 10% bolus 125 mL 125 mL, 12.5 g, Intravenous, PRN, Duarte Lo MD    dextrose 10% bolus 250 mL 250 mL, 25 g, Intravenous, PRN, Judith Rubi PA-C    dextrose 10% bolus 250 mL 250 mL, 25 g, Intravenous, PRN, Duatre Lo MD    diphenhydrAMINE capsule 50 mg, 50 mg, Oral, Nightly, Duarte Lo MD, 50 mg at 01/06/24 2111    DULoxetine DR capsule 30 mg, 30 mg, Oral, Daily, Duarte Lo MD, 30 mg at 01/07/24 0848    epoetin alexis injection 10,000 Units, 10,000 Units, Intravenous, Every Tues, Thkenny, Sat, Lynn Tripathi S, AGNP    fluticasone furoate-vilanteroL 100-25 mcg/dose diskus inhaler 1 puff, 1 puff, Inhalation, Daily, Duarte Lo MD, 1 puff at 01/07/24 0948    glucagon (human recombinant) injection 1 mg, 1 mg, Intramuscular, PRN, Greyson,  Judith BEAUCHAMP PA-C    glucagon (human recombinant) injection 1 mg, 1 mg, Intramuscular, PRN, Duarte Lo MD    glucose chewable tablet 16 g, 16 g, Oral, PRN, Judith Rubi, KALA    glucose chewable tablet 16 g, 16 g, Oral, PRN, Duarte Lo MD    glucose chewable tablet 24 g, 24 g, Oral, PRN, Judith Rubi, KALA    glucose chewable tablet 24 g, 24 g, Oral, PRN, Duarte Lo MD    insulin aspart U-100 injection 0-10 Units, 0-10 Units, Subcutaneous, QID (AC + HS) PRN, Duarte Lo MD, 4 Units at 01/06/24 1120    levothyroxine tablet 125 mcg, 125 mcg, Oral, Before breakfast, Duarte Lo MD, 125 mcg at 01/07/24 0544    mupirocin 2 % ointment, , Nasal, BID, Lynn Tripathi, AGNP, Given at 01/07/24 0949    ondansetron injection 4 mg, 4 mg, Intravenous, Q4H PRN, Judith Rubi PA-C    pantoprazole injection 40 mg, 40 mg, Intravenous, BID, Duarte Lo MD, 40 mg at 01/07/24 0948    sevelamer carbonate tablet 1,600 mg, 1,600 mg, Oral, TID, Duarte Lo MD, 1,600 mg at 01/07/24 0848    sodium chloride 0.9% flush 10 mL, 10 mL, Intravenous, Q12H PRN, Judith Rubi PA-C    tiotropium bromide 2.5 mcg/actuation inhaler 2 puff, 2 puff, Inhalation, Daily, Duarte Lo MD, 2 puff at 01/07/24 0948         VTE Risk Mitigation (From admission, onward)           Ordered     IP VTE HIGH RISK PATIENT  Once         01/03/24 1639     Place sequential compression device  Until discontinued         01/03/24 1639     Reason for No Pharmacological VTE Prophylaxis  Once        Question:  Reasons:  Answer:  Active Bleeding    01/03/24 1639                    Assessment:   Pafib  --CHADS VASC score  6 (age, HTN, female, T2DM, vascular disease)  --HAS Bled score 5 (HTN, age, ESRD, predisposing drugs, prior bleed)  Acute GIB/melena  --push enteroscopy negative bleed 1.4.24  --hx of gastric angiodysplasia  Chronic anemia  --H/H 5.3/17.7 on admit  --s/p 2 units PRBCs with  improvement in H/H  ESRD/HD TTS  VHD--moderate to severe MR  Native CAD/hx 4V CABG (no record of file)  --MPI 12.27.23: reversible perfusion abnormality in lateral apical wall  HTN  HLD  COPD  T2DM  Hypothyroidism  Lymphedema LLE    Plan:   OK to hold eliquis due to GIB. Will plan outpatient referral to [facility] clinic for [place] occlusive device  Consider inpatient colonoscopy as patient will also need to undergo outpatient coronary angiogram due to abnormal stress test and will need to demonstrate tolerance to DAPT.    Transfusions per primary  HD per nephrology  Continue coreg and amlodipine  Continue Protonix 40 mg daily             Thank you for your consult. [facility] signing off. Reconsult if needed.     Regina Barillas, JOSEFINA  Cardiology  [facility] - 5th Floor Med Surg  01/07/2024

## 2024-01-07 NOTE — PROGRESS NOTES
[facility] Hospital Medicine Progress Note        Chief Complaint: Inpatient Follow-up     HPI:   79 yo AA woman with recent diagnosis of 8mm duodenal bulb polyp, gastric angiodysplasia and 5 mm duodenal polyp, ESRD on HD MWF, Afib on eliquis,  CAD with CABG x4, HFpEF, COPD gold stage II, hypertension, insulin-dependent type 2 diabetes, hypothyroidism, schizophrenia.     She was recently seen for GIB, diagnosed with  discharged on 12/9/23. She was diagnosed with 8mm duodenal polyp which was removed with a cold snare. Eliquis was resumed at [facility]. 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     Presented with worsening melena. Denied abd pain , diarrhea, nausea or any other GI symptoms. Also denies SOB, syncope, dizziness. Denies NSAID use. Lives with daughter at baseline. At presentation she was HDS. Labs showed acute drop in Hb to 5.3 from last labs, ESRD. Received iv ppi, two units of PRBCs were ordered and then admitted to  for further eval and management of acute anemia.  Patient was taken for EGD on 01/04.  Push endoscopy also performed.  No signs of any acute bleeding.  Stool was dark.     Interval Hx:   Doing well.  No new complaints.  No bloody bowel movements.  No hematemesis.  Back on diet     Objective/physical exam:  General: In no acute distress, afebrile  Respiratory: Clear to auscultation bilaterally  Cardiovascular: S1, S2, no appreciable murmur  Abdomen: Soft, nontender, BS +  MSK: Warm, no lower extremity edema, no clubbing or cyanosis  Neurologic: Alert and oriented x4, moving all extremities with good strength     VITAL SIGNS: 24 HRS MIN & MAX LAST   Temp  Min: 97.6 °F (36.4 °C)  Max: 98.6 °F (37 °C) 98.3 °F (36.8 °C)   BP  Min: 108/48  Max: 133/56 (!) 114/53   Pulse  Min: 52  Max: 82  (!) 53   Resp  Min: 17  Max: 20 18   SpO2  Min: 94 %  Max: 98 % 95 %       Recent Labs   Lab 01/04/24  0252 01/04/24  0817 01/05/24  0625 01/06/24  0558   WBC 6.21   --  5.77 5.02   RBC 2.52*  --  2.51* 2.43*   HGB 7.1* 7.7* 7.2* 7.0*   HCT 22.7* 25.3* 22.9* 23.0*   MCV 90.1  --  91.2 94.7*   MCH 28.2  --  28.7 28.8   MCHC 31.3*  --  31.4* 30.4*   RDW 21.3*  --  22.3* 22.1*     --  161 157   MPV 10.5*  --  10.3 9.9       Recent Labs   Lab 01/03/24  1348 01/04/24  0252 01/05/24  0625 01/06/24  0558    137 136 132*   K 4.0 3.7 4.1 4.2   CO2 27 27 30 31   BUN 33.0* 39.1* 24.2* 34.5*   CREATININE 4.39* 4.43* 3.53* 4.73*   CALCIUM 9.0 8.7 8.5 8.2*   MG  --  1.90 1.80  --    ALBUMIN 3.0* 2.9* 2.7*  --    ALKPHOS 108 88 81  --    ALT 37 38 30  --    AST 34 31 23  --    BILITOT 0.3 0.5 0.3  --           Microbiology Results (last 7 days)       ** No results found for the last 168 hours. **             Radiology:  Nuclear Stress - Cardiology Interpreted    Abnormal myocardial perfusion scan.    There is a moderate to severe intensity, moderate sized, mostly   reversible perfusion abnormality with some fixed areas in the lateral   apical wall(s) in the typical distribution of the LCX territory.    There are no other significant perfusion abnormalities.    The gated perfusion images showed an ejection fraction of 62% at rest.   The gated perfusion images showed an ejection fraction of 63% post stress.    There were no arrhythmias during stress.    The nuclear stress test is  fair quality.    On the nuclear stress test the EF is normal.  If the patient has an echo,   the EF on the echo is considered more accurate.        The patient has a moderate risk nuclear stress test.     If patient is symptomatic, consider management with two anti-anginals         Echo    Left Ventricle: The left ventricle is normal in size. Normal wall   thickness. There is normal systolic function. Biplane (2D) method of discs   ejection fraction is 68%.    Right Ventricle: Normal right ventricular cavity size. Systolic   function is borderline low.    Left Atrium: Left atrium is mildly dilated.     Right Atrium: Right atrium is mildly dilated.    Aortic Valve: The aortic valve is a trileaflet valve. There is moderate   aortic valve sclerosis.    Mitral Valve: There is severe posterior mitral annular calcification   present. There is moderate to severe regurgitation.    Tricuspid Valve: There is moderate annular calcification present. There   is moderate regurgitation.    IVC/SVC: Intermediate venous pressure at 8 mmHg.      Scheduled Med:   amLODIPine  5 mg Oral Daily    atorvastatin  40 mg Oral Daily    carvediloL  6.25 mg Oral BID    diphenhydrAMINE  50 mg Oral Nightly    DULoxetine  30 mg Oral Daily    epoetin alexis  10,000 Units Intravenous Every Tues, Thurs, Sat    fluticasone furoate-vilanteroL  1 puff Inhalation Daily    levothyroxine  125 mcg Oral Before breakfast    mupirocin   Nasal BID    pantoprazole  40 mg Intravenous BID    sevelamer carbonate  1,600 mg Oral TID    tiotropium bromide  2 puff Inhalation Daily        Continuous Infusions:       PRN Meds:  0.9%  NaCl infusion (for blood administration), 0.9%  NaCl infusion (for blood administration), acetaminophen, acetaminophen, albuterol, albuterol-ipratropium, dextrose 10%, dextrose 10%, dextrose 10%, dextrose 10%, glucagon (human recombinant), glucagon (human recombinant), glucose, glucose, glucose, glucose, insulin aspart U-100, ondansetron, sodium chloride 0.9%       Assessment/Plan:   Acute anemia due to GIB s/p 2 units of PRBC  H/o AVM, duodenal polyp     Hx: ESRD on HD, Afib on eliquis,  CAD with CABG x4, HFpEF, COPD gold stage II, hypertension, insulin-dependent type 2 diabetes, hypothyroidism, schizophrenia.     Transfused 2 units of packed red blood cells yesterday with HD.  Hemoglobin was 7.  Waiting on this morning's results to returned.  Stable from a respiratory standpoint.  Continue supportive care.    GI following along.  No further planned intervention at this point in time.  There was discussion of possible colonoscopy.  This  could be potentially performed tomorrow.  Follow up GI recommendations.  Nephrology following along for HD management.  Runs on Tuesday, Thursday, and Saturday.  Appreciate cardiology recommendations.  They past by yesterday and recommending continuing holding her Eliquis upon discharge.  Will follow up in the clinic for further workup of possible interventions.  May end up needing an angiogram as well.  I held her Levemir yesterday due to some hypoglycemia and she was not eating.  Continue sliding scale.  Currently ranging between 100 - 150.    Critical care diagnosis: critical anemia requiring blood transfusion  Critical care interventions: hands on evaluation, review of labs/radiographs/records and discussions with family  Critical care time spent: >32 minutes      Santiago Castro MD   01/07/2024     All diagnosis and differential diagnosis have been reviewed; assessment and plan has been documented; I have personally reviewed the labs and test results that are presently available; I have reviewed the patients medication list; I have reviewed the consulting providers response and recommendations. I have reviewed or attempted to review medical records based upon their availability    All of the patient's questions have been  addressed and answered. Patient's is agreeable to the above stated plan. I will continue to monitor closely and make adjustments to medical management as needed.  _____________________________________________________________________

## 2024-01-08 ENCOUNTER — ANESTHESIA (OUTPATIENT)
Dept: ENDOSCOPY | Facility: HOSPITAL | Age: 79
DRG: 377 | End: 2024-01-08
Payer: MEDICARE

## 2024-01-08 ENCOUNTER — ANESTHESIA EVENT (OUTPATIENT)
Dept: ENDOSCOPY | Facility: HOSPITAL | Age: 79
DRG: 377 | End: 2024-01-08
Payer: MEDICARE

## 2024-01-08 LAB
ANION GAP SERPL CALC-SCNC: 7 MEQ/L
BASOPHILS # BLD AUTO: 0.03 X10(3)/MCL
BASOPHILS NFR BLD AUTO: 0.4 %
BUN SERPL-MCNC: 32.3 MG/DL (ref 9.8–20.1)
CALCIUM SERPL-MCNC: 8.7 MG/DL (ref 8.4–10.2)
CHLORIDE SERPL-SCNC: 96 MMOL/L (ref 98–107)
CO2 SERPL-SCNC: 32 MMOL/L (ref 23–31)
CREAT SERPL-MCNC: 4.14 MG/DL (ref 0.55–1.02)
CREAT/UREA NIT SERPL: 8
EOSINOPHIL # BLD AUTO: 0.08 X10(3)/MCL (ref 0–0.9)
EOSINOPHIL NFR BLD AUTO: 1 %
ERYTHROCYTE [DISTWIDTH] IN BLOOD BY AUTOMATED COUNT: 21.2 % (ref 11.5–17)
GFR SERPLBLD CREATININE-BSD FMLA CKD-EPI: 11 MLS/MIN/1.73/M2
GLUCOSE SERPL-MCNC: 124 MG/DL (ref 82–115)
HCT VFR BLD AUTO: 31.9 % (ref 37–47)
HGB BLD-MCNC: 10.2 G/DL (ref 12–16)
IMM GRANULOCYTES # BLD AUTO: 0.02 X10(3)/MCL (ref 0–0.04)
IMM GRANULOCYTES NFR BLD AUTO: 0.3 %
LYMPHOCYTES # BLD AUTO: 0.58 X10(3)/MCL (ref 0.6–4.6)
LYMPHOCYTES NFR BLD AUTO: 7.3 %
MCH RBC QN AUTO: 29.1 PG (ref 27–31)
MCHC RBC AUTO-ENTMCNC: 32 G/DL (ref 33–36)
MCV RBC AUTO: 90.9 FL (ref 80–94)
MONOCYTES # BLD AUTO: 0.6 X10(3)/MCL (ref 0.1–1.3)
MONOCYTES NFR BLD AUTO: 7.5 %
NEUTROPHILS # BLD AUTO: 6.66 X10(3)/MCL (ref 2.1–9.2)
NEUTROPHILS NFR BLD AUTO: 83.5 %
NRBC BLD AUTO-RTO: 0 %
PLATELET # BLD AUTO: 179 X10(3)/MCL (ref 130–400)
PMV BLD AUTO: 10 FL (ref 7.4–10.4)
POCT GLUCOSE: 112 MG/DL (ref 70–110)
POCT GLUCOSE: 179 MG/DL (ref 70–110)
POCT GLUCOSE: 210 MG/DL (ref 70–110)
POTASSIUM SERPL-SCNC: 4.5 MMOL/L (ref 3.5–5.1)
RBC # BLD AUTO: 3.51 X10(6)/MCL (ref 4.2–5.4)
SODIUM SERPL-SCNC: 135 MMOL/L (ref 136–145)
WBC # SPEC AUTO: 7.97 X10(3)/MCL (ref 4.5–11.5)

## 2024-01-08 PROCEDURE — 45378 DIAGNOSTIC COLONOSCOPY: CPT | Performed by: INTERNAL MEDICINE

## 2024-01-08 PROCEDURE — C9113 INJ PANTOPRAZOLE SODIUM, VIA: HCPCS | Performed by: INTERNAL MEDICINE

## 2024-01-08 PROCEDURE — D9220A PRA ANESTHESIA: Mod: CRNA,,, | Performed by: NURSE ANESTHETIST, CERTIFIED REGISTERED

## 2024-01-08 PROCEDURE — 80048 BASIC METABOLIC PNL TOTAL CA: CPT | Performed by: HOSPITALIST

## 2024-01-08 PROCEDURE — 85025 COMPLETE CBC W/AUTO DIFF WBC: CPT | Performed by: HOSPITALIST

## 2024-01-08 PROCEDURE — 21400001 HC TELEMETRY ROOM

## 2024-01-08 PROCEDURE — 25000242 PHARM REV CODE 250 ALT 637 W/ HCPCS: Performed by: INTERNAL MEDICINE

## 2024-01-08 PROCEDURE — 63600175 PHARM REV CODE 636 W HCPCS: Performed by: NURSE ANESTHETIST, CERTIFIED REGISTERED

## 2024-01-08 PROCEDURE — 25000003 PHARM REV CODE 250: Performed by: INTERNAL MEDICINE

## 2024-01-08 PROCEDURE — 25000003 PHARM REV CODE 250: Performed by: HOSPITALIST

## 2024-01-08 PROCEDURE — D9220A PRA ANESTHESIA: Mod: ANES,,, | Performed by: ANESTHESIOLOGY

## 2024-01-08 PROCEDURE — 37000009 HC ANESTHESIA EA ADD 15 MINS: Performed by: INTERNAL MEDICINE

## 2024-01-08 PROCEDURE — 25000003 PHARM REV CODE 250: Performed by: NURSE ANESTHETIST, CERTIFIED REGISTERED

## 2024-01-08 PROCEDURE — 0DJD8ZZ INSPECTION OF LOWER INTESTINAL TRACT, VIA NATURAL OR ARTIFICIAL OPENING ENDOSCOPIC: ICD-10-PCS | Performed by: INTERNAL MEDICINE

## 2024-01-08 PROCEDURE — 37000008 HC ANESTHESIA 1ST 15 MINUTES: Performed by: INTERNAL MEDICINE

## 2024-01-08 PROCEDURE — 82962 GLUCOSE BLOOD TEST: CPT | Performed by: INTERNAL MEDICINE

## 2024-01-08 PROCEDURE — 63600175 PHARM REV CODE 636 W HCPCS: Performed by: INTERNAL MEDICINE

## 2024-01-08 RX ORDER — PROPOFOL 10 MG/ML
VIAL (ML) INTRAVENOUS
Status: DISCONTINUED | OUTPATIENT
Start: 2024-01-08 | End: 2024-01-08

## 2024-01-08 RX ORDER — LIDOCAINE HYDROCHLORIDE 20 MG/ML
INJECTION, SOLUTION EPIDURAL; INFILTRATION; INTRACAUDAL; PERINEURAL
Status: COMPLETED
Start: 2024-01-08 | End: 2024-01-08

## 2024-01-08 RX ORDER — LIDOCAINE HYDROCHLORIDE 20 MG/ML
INJECTION, SOLUTION EPIDURAL; INFILTRATION; INTRACAUDAL; PERINEURAL
Status: DISCONTINUED | OUTPATIENT
Start: 2024-01-08 | End: 2024-01-08

## 2024-01-08 RX ORDER — PANTOPRAZOLE SODIUM 40 MG/1
40 TABLET, DELAYED RELEASE ORAL
Status: DISCONTINUED | OUTPATIENT
Start: 2024-01-08 | End: 2024-01-09 | Stop reason: HOSPADM

## 2024-01-08 RX ORDER — PROPOFOL 10 MG/ML
VIAL (ML) INTRAVENOUS
Status: COMPLETED
Start: 2024-01-08 | End: 2024-01-08

## 2024-01-08 RX ADMIN — SEVELAMER CARBONATE 1600 MG: 800 TABLET, FILM COATED ORAL at 09:01

## 2024-01-08 RX ADMIN — SEVELAMER CARBONATE 1600 MG: 800 TABLET, FILM COATED ORAL at 04:01

## 2024-01-08 RX ADMIN — MUPIROCIN: 20 OINTMENT TOPICAL at 09:01

## 2024-01-08 RX ADMIN — PANTOPRAZOLE SODIUM 40 MG: 40 TABLET, DELAYED RELEASE ORAL at 04:01

## 2024-01-08 RX ADMIN — PROPOFOL 15 MG: 10 INJECTION, EMULSION INTRAVENOUS at 01:01

## 2024-01-08 RX ADMIN — TIOTROPIUM BROMIDE INHALATION SPRAY 2 PUFF: 3.12 SPRAY, METERED RESPIRATORY (INHALATION) at 09:01

## 2024-01-08 RX ADMIN — CARVEDILOL 6.25 MG: 3.12 TABLET, FILM COATED ORAL at 09:01

## 2024-01-08 RX ADMIN — LIDOCAINE HYDROCHLORIDE 3 ML: 20 INJECTION, SOLUTION INTRAVENOUS at 01:01

## 2024-01-08 RX ADMIN — CARVEDILOL 6.25 MG: 3.12 TABLET, FILM COATED ORAL at 08:01

## 2024-01-08 RX ADMIN — DIPHENHYDRAMINE HYDROCHLORIDE 50 MG: 25 CAPSULE ORAL at 08:01

## 2024-01-08 RX ADMIN — SODIUM SULFATE, POTASSIUM SULFATE, MAGNESIUM SULFATE 354 ML: 17.5; 3.13; 1.6 SOLUTION, CONCENTRATE ORAL at 05:01

## 2024-01-08 RX ADMIN — FLUTICASONE FUROATE AND VILANTEROL TRIFENATATE 1 PUFF: 100; 25 POWDER RESPIRATORY (INHALATION) at 09:01

## 2024-01-08 RX ADMIN — SODIUM CHLORIDE: 9 INJECTION, SOLUTION INTRAVENOUS at 01:01

## 2024-01-08 RX ADMIN — MUPIROCIN: 20 OINTMENT TOPICAL at 08:01

## 2024-01-08 RX ADMIN — AMLODIPINE BESYLATE 5 MG: 5 TABLET ORAL at 09:01

## 2024-01-08 RX ADMIN — SEVELAMER CARBONATE 1600 MG: 800 TABLET, FILM COATED ORAL at 08:01

## 2024-01-08 RX ADMIN — ATORVASTATIN CALCIUM 40 MG: 40 TABLET, FILM COATED ORAL at 09:01

## 2024-01-08 RX ADMIN — PANTOPRAZOLE SODIUM 40 MG: 40 INJECTION, POWDER, FOR SOLUTION INTRAVENOUS at 10:01

## 2024-01-08 RX ADMIN — DULOXETINE HYDROCHLORIDE 30 MG: 30 CAPSULE, DELAYED RELEASE ORAL at 09:01

## 2024-01-08 NOTE — ANESTHESIA POSTPROCEDURE EVALUATION
Anesthesia Post Evaluation    Patient: Rosette Madrid    Procedure(s) Performed: Procedure(s) (LRB):  COLON (N/A)    Final Anesthesia Type: general (-TIVA Jackelyn AW)      Patient location during evaluation: GI PACU  Patient participation: Yes- Able to Participate  Level of consciousness: awake and alert, awake and oriented  Post-procedure vital signs: reviewed and stable  Pain management: adequate  Airway patency: patent    PONV status at discharge: No PONV  Anesthetic complications: no      Cardiovascular status: blood pressure returned to baseline, hemodynamically stable and stable  Respiratory status: unassisted, spontaneous ventilation and room air  Hydration status: euvolemic  Follow-up not needed.              Vitals Value Taken Time   /62 01/08/24 1347   Temp 36.5 °C (97.7 °F) 01/08/24 1338   Pulse 59 01/08/24 1347   Resp 25 01/08/24 1347   SpO2 100 % 01/08/24 1347         No case tracking events are documented in the log.      Pain/Marco Score: Marco Score: 10 (1/8/2024  1:43 PM)

## 2024-01-08 NOTE — TRANSFER OF CARE
"Anesthesia Transfer of Care Note    Patient: Rosette Madrid    Procedure(s) Performed: Procedure(s) (LRB):  COLON (N/A)    Patient location: GI    Anesthesia Type: MAC    Transport from OR: Transported from OR on room air with adequate spontaneous ventilation    Post pain: adequate analgesia    Post assessment: no apparent anesthetic complications    Post vital signs: stable    Level of consciousness: awake    Nausea/Vomiting: no nausea/vomiting    Complications: none    Transfer of care protocol was followed      Last vitals: Visit Vitals  BP (!) 127/52   Pulse (!) 58   Temp 37 °C (98.6 °F)   Resp 16   Ht 5' 6" (1.676 m)   Wt 89.5 kg (197 lb 5 oz)   SpO2 96%   BMI 31.85 kg/m²     "

## 2024-01-08 NOTE — PROVATION PATIENT INSTRUCTIONS
Discharge Summary/Instructions after an Endoscopic Procedure  Patient Name: Rosette Madrid  Patient MRN: [id number]  Patient YOB: 1945 Monday, January 8, 2024  Josh Gore MD  Dear patient,  As a result of recent federal legislation (The Federal Cures Act), you may   receive lab or pathology results from your procedure in your [facility]   account before your physician is able to contact you. Your physician or   their representative will relay the results to you with their   recommendations at their soonest availability.  Thank you,  RESTRICTIONS:  During your procedure today, you received medications for sedation.  These   medications may affect your judgment, balance and coordination.  Therefore,   for 24 hours, you have the following restrictions:   - DO NOT drive a car, operate machinery, make legal/financial decisions,   sign important papers or drink alcohol.    ACTIVITY:  Today: no heavy lifting, straining or running due to procedural   sedation/anesthesia.  The following day: return to full activity including work.  DIET:  Eat and drink normally unless instructed otherwise.     TREATMENT FOR COMMON SIDE EFFECTS:  - Mild abdominal pain, nausea, belching, bloating or excessive gas:  rest,   eat lightly and use a heating pad.  - Sore Throat: treat with throat lozenges and/or gargle with warm salt   water.  - Because air was used during the procedure, expelling large amounts of air   from your rectum or belching is normal.  - If a bowel prep was taken, you may not have a bowel movement for 1-3 days.    This is normal.  SYMPTOMS TO WATCH FOR AND REPORT TO YOUR PHYSICIAN:  1. Abdominal pain or bloating, other than gas cramps.  2. Chest pain.  3. Back pain.  4. Signs of infection such as: chills or fever occurring within 24 hours   after the procedure.  5. Rectal bleeding, which would show as bright red, maroon, or black stools.   (A tablespoon of blood from the rectum is not serious, especially  if   hemorrhoids are present.)  6. Vomiting.  7. Weakness or dizziness.  GO DIRECTLY TO THE NEAREST EMERGENCY ROOM IF YOU HAVE ANY OF THE FOLLOWING:      Difficulty breathing              Chills and/or fever over 101 F   Persistent vomiting and/or vomiting blood   Severe abdominal pain   Severe chest pain   Black, tarry stools   Bleeding- more than one tablespoon   Any other symptom or condition that you feel may need urgent attention  Your doctor recommends these additional instructions:  If any biopsies were taken, your doctors clinic will contact you in 1 to 2   weeks with any results.  - Return patient to [facility] for [facility] care.   - Resume previous diet.   - VCE as next step if worsening anemia, although appears stable at this   time.  For questions, problems or results please call your physician - Josh Gore MD at Work:  [phone].  [facility], EMERGENCY ROOM PHONE NUMBER: [phone]  IF A COMPLICATION OR EMERGENCY SITUATION ARISES AND YOU ARE UNABLE TO REACH   YOUR PHYSICIAN - GO DIRECTLY TO THE EMERGENCY ROOM.  Josh Gore MD  1/8/2024 1:53:31 PM  This report has been verified and signed electronically.  Dear patient,  As a result of recent federal legislation (The Federal Cures Act), you may   receive lab or pathology results from your procedure in your [facility]   account before your physician is able to contact you. Your physician or   their representative will relay the results to you with their   recommendations at their soonest availability.  Thank you,  PROVATION

## 2024-01-08 NOTE — PROGRESS NOTES
Nephrology follow up progress note    HPI:      Rosette Madrid is a 78 y.o. female who is a chronic hemodialysis patient at [facility] on Tuesday Thursday Saturday and has been admitted to the hospital for evaluation and management of her anemia.  Upper endoscopy did not reveal any obvious etiology and now colonoscopy will be done today.  Patient does report of black blurred and also complains of some constipation but no complaints of any abdominal pains.  At the time of her admission her hemoglobin was 5.3 and she has received 2 units of packed RBCs already.  Her next dialysis scheduled for tomorrow.    Interval history:          Review of Systems:       Past medical, family, surgical, and social history reviewed and unchanged from initial consult note.     Objective:       VITAL SIGNS: 24 HR MIN & MAX LAST    Temp  Min: 97.7 °F (36.5 °C)  Max: 98.5 °F (36.9 °C)  97.7 °F (36.5 °C)        BP  Min: 103/64  Max: 152/55  (!) 149/78     Pulse  Min: 51  Max: 63  60     Resp  Min: 18  Max: 19  18    SpO2  Min: 93 %  Max: 97 %  95 %      GEN:  Awake alert oriented to time person place.  No acute distress noted.  HEENT: Conjunctiva anicteric, pupils reactive.  Extraocular movements intact.  No oral lesion.  Neck supple no JVD noted.  CV: RRR without rub or gallop noted.  PULM:  Bilateral expiratory rhonchi noted.  Patient is on room air and has no respiratory distress.  ABD: Soft, NT/ND abdomen with NABS  EXT: No cyanosis or edema  SKIN: Warm and dry  PSYCH: Awake, alert, and appropriately conversant  Vascular access:  Left upper extremity AV fistula.          Component Value Date/Time     (L) 01/08/2024 0431     (L) 01/07/2024 0642    K 4.5 01/08/2024 0431    K 4.5 01/07/2024 0642    CHLORIDE 96 (L) 01/08/2024 0431    CHLORIDE 95 (L) 01/07/2024 0642    CO2 32 (H) 01/08/2024 0431    CO2 31 01/07/2024 0642    BUN 32.3 (H) 01/08/2024 0431    BUN 22.3 (H) 01/07/2024 0642    CREATININE 4.14 (H) 01/08/2024 0431     CREATININE 3.75 (H) 01/07/2024 0642    CALCIUM 8.7 01/08/2024 0431    CALCIUM 8.5 01/07/2024 0642    PHOS 4.5 01/04/2024 0252            Component Value Date/Time    WBC 7.97 01/08/2024 0431    WBC 6.16 01/07/2024 0642    HGB 10.2 (L) 01/08/2024 0431    HGB 9.7 (L) 01/07/2024 0642    HCT 31.9 (L) 01/08/2024 0431    HCT 30.8 (L) 01/07/2024 0642     01/08/2024 0431     01/07/2024 0642       Imaging reviewed      Assessment / Plan:   ESRD on TTS schedule  Acute blood loss anemia and status post 2 units of packed RBC transfusion.  COPD  Diabetes mellitus type 2    Recommendations  Check labs tomorrow  Hemodialysis tomorrow  Hopefully patient will get her colonoscopy done today.

## 2024-01-08 NOTE — ANESTHESIA PREPROCEDURE EVALUATION
01/08/2024  Rosette Madrid is a 78 y.o., female.  Pre-operative evaluation for Procedure(s) (LRB):  COLON (N/A)    Rosette Madrid is a 78 y.o. female     nOTES:     Assessment/Plan:  1.         Anemia- normocytic    History of GI bleeds: AVMs treated in 4th portion of duodenum in 2023 (see above); 8mm duodenal bulb polyp removed via cold snare 12/8/23.  EGD push in AM.  2.         A fib on Eliquis- may want to consider a Watchman if possible  3.         ESRD- on HD Last Dialysis 1/2/2024  [facility] schedule  Initial History of Present Illness (HPI):  78 y.o. female presented to [facility] on 01/03/2024 with c/o dark stools x1 day with a history of GI bleeds.   She does have a history of ESRD (on HD MWF schedule), type 2 diabetes, COPD, HFpEF, coronary artery disease, CABG x4, hypertension, AF (on Eliquis), dyslipidemia, hypothyroidism, anemia, AVM, colon polyps and schizophrenia who presented to [facility] ED on 12/6/23 with complaints of melanotic stools, fatigue, generalized abdominal pain, and back pain x 3 days      Patient Active Problem List   Diagnosis    Chronic congestive heart failure    Tobacco dependence syndrome    Atherosclerosis of coronary artery bypass graft    Chronic obstructive pulmonary disease    Hypertension    Hyperlipidemia    Type 2 diabetes mellitus with chronic kidney disease on chronic dialysis, with long-term current use of insulin    ESRD (end stage renal disease) on dialysis    Depressive disorder    Class 1 obesity due to excess calories with serious comorbidity and body mass index (BMI) of 31.0 to 31.9 in adult    Vitamin D deficiency    Coronary artery disease involving native coronary artery of native heart without angina pectoris    Anemia    A-fib    Anemia due to chronic kidney disease, on chronic dialysis    Hypotension    Melena    Acquired hypothyroidism    GI bleed    Aortic  "heart murmur    Diverticulosis of colon with hemorrhage    Chronic renal impairment    Status post coronary artery bypass graft       Review of patient's allergies indicates:   Allergen Reactions    Lisinopril Swelling    Azithromycin      Other reaction(s): tongue/facial swelling    Baclofen      Other reaction(s): tongue/facial swelling    Doxycycline      Other reaction(s): Angioedema       No current facility-administered medications on file prior to encounter.     Current Outpatient Medications on File Prior to Encounter   Medication Sig Dispense Refill    albuterol (VENTOLIN HFA) 90 mcg/actuation inhaler Inhale 2 puffs into the lungs every 6 (six) hours as needed for Wheezing. Rescue 18 g 1    albuterol-ipratropium (DUO-NEB) 2.5 mg-0.5 mg/3 mL nebulizer solution Take 3 mLs by nebulization every 6 (six) hours as needed for Wheezing or Shortness of Breath. Rescue 75 mL 0    amLODIPine (NORVASC) 5 MG tablet Take 1 tablet (5 mg total) by mouth once daily. 30 tablet 11    apixaban (ELIQUIS) 5 mg Tab Take 1 tablet (5 mg total) by mouth 2 (two) times daily. 60 tablet 11    BD ULTRA-FINE MINI PEN NEEDLE 31 gauge x 3/16" Ndle Inject into the skin 2 (two) times daily.      BELSOMRA 5 mg Tab Take 1 tablet by mouth every evening.      BENADRYL 25 mg capsule Take 50 mg by mouth nightly.      carvediloL (COREG) 6.25 MG tablet Take 1 tablet (6.25 mg total) by mouth 2 (two) times daily. 180 tablet 3    clonazePAM (KLONOPIN) 0.5 MG tablet Take 0.5 mg by mouth every evening.      DIALYVITE 100-1 mg Tab Take 1 tablet by mouth once daily.      DULoxetine (CYMBALTA) 30 MG capsule Take 30 mg by mouth once daily.      fluticasone furoate-vilanteroL (BREO) 100-25 mcg/dose diskus inhaler Inhale 1 puff into the lungs once daily. 90 each 2    furosemide (LASIX) 40 MG tablet Take 1 tablet (40 mg total) by mouth once daily. 90 tablet 3    glipiZIDE (GLUCOTROL) 10 MG tablet Take 1 tablet (10 mg total) by mouth daily with breakfast. 90 " "tablet 2    insulin detemir U-100, Levemir, (LEVEMIR FLEXPEN) 100 unit/mL (3 mL) InPn pen Inject 30 Units into the skin every evening. 27 mL 2    INVEGA SUSTENNA 156 mg/mL Syrg injection Inject into the muscle.      L-METHYLFOLATE 15 mg Tab Take 1 tablet by mouth.      lactulose (CHRONULAC) 10 gram/15 mL solution Take 30 mLs by mouth.      levothyroxine (SYNTHROID) 125 MCG tablet Take 1 tablet (125 mcg total) by mouth before breakfast. 90 tablet 2    loratadine (CLARITIN) 10 mg tablet Take 1 tablet (10 mg total) by mouth once daily. for 14 days 14 tablet 0    ONETOUCH DELICA PLUS LANCET 30 gauge Misc 1 lancet by Other route once daily. 100 each 2    ONETOUCH ULTRA TEST Strp 1 strip by Other route once daily. 200 strip 2    pantoprazole (PROTONIX) 40 MG tablet Take 40 mg by mouth 2 (two) times daily.      pen needle, diabetic 31 gauge x 5/16" Ndle 2 Units by Misc.(Non-Drug; Combo Route) route 2 (two) times a day. Patient to check blood sugars twice daily (morning and night) 200 each 2    rosuvastatin (CRESTOR) 40 MG Tab Take 1 tablet (40 mg total) by mouth once daily. 90 tablet 3    sevelamer carbonate (RENVELA) 800 mg Tab Take 2 tablets (1,600 mg total) by mouth 3 (three) times daily. 90 tablet 0    tiotropium (SPIRIVA WITH HANDIHALER) 18 mcg inhalation capsule Inhale 1 capsule (18 mcg total) into the lungs Daily. 90 capsule 2       Past Surgical History:   Procedure Laterality Date    AV FISTULA PLACEMENT Left     CARDIAC CATHETERIZATION      CARDIAC VALVE REPLACEMENT      COLONOSCOPY      CORONARY ARTERY BYPASS GRAFT      EGD, WITH HEMORRHAGE CONTROL  03/24/2023    Procedure: EGD,WITH HEMORRHAGE CONTROL;  Surgeon: Go Le MD;  Location: [facility] ENDOSCOPY;  Service: Gastroenterology;;    EGD, WITH HEMORRHAGE CONTROL  04/06/2023    Procedure: EGD,WITH HEMORRHAGE CONTROL;  Surgeon: Ayden Francois MD;  Location: [facility] ENDOSCOPY;  Service: Gastroenterology;;    EGD, WITH POLYPECTOMY USING SNARE " "Left 12/8/2023    Procedure: EGD, WITH POLYPECTOMY USING SNARE;  Surgeon: Lexi Scales MD;  Location: [facility] ENDOSCOPY;  Service: Gastroenterology;  Laterality: Left;    ESOPHAGOGASTRODUODENOSCOPY      ESOPHAGOGASTRODUODENOSCOPY N/A 02/17/2023    Procedure: EGD +/- VCE PLACEMENT;  Surgeon: Morgan Gardner MD;  Location: [facility] ENDOSCOPY;  Service: Gastroenterology;  Laterality: N/A;    ESOPHAGOGASTRODUODENOSCOPY N/A 03/24/2023    Procedure: EGD;  Surgeon: Go Le MD;  Location: [facility] ENDOSCOPY;  Service: Gastroenterology;  Laterality: N/A;  with push    ESOPHAGOGASTRODUODENOSCOPY N/A 04/06/2023    Procedure: EGD;  Surgeon: Ayden Francois MD;  Location: [facility] ENDOSCOPY;  Service: Gastroenterology;  Laterality: N/A;  with push    ESOPHAGOGASTRODUODENOSCOPY N/A 06/16/2023    Procedure: EGD W/ PUSH;  Surgeon: Morgan Gardner MD;  Location: [facility] ENDOSCOPY;  Service: Gastroenterology;  Laterality: N/A;    ESOPHAGOGASTRODUODENOSCOPY N/A 1/4/2024    Procedure: EGD;  Surgeon: Morgan Scales MD;  Location: [facility] ENDOSCOPY;  Service: Gastroenterology;  Laterality: N/A;    EYE SURGERY      POLYPECTOMY      SMALL BOWEL ENTEROSCOPY Left 01/20/2023    Procedure: ENTEROSCOPY;  Surgeon: Lexi Scales MD;  Location: [facility] ENDOSCOPY;  Service: Gastroenterology;  Laterality: Left;    THYROIDECTOMY      TUBAL LIGATION         CBC:   Recent Labs     01/07/24  0642 01/08/24  0431   WBC 6.16 7.97   RBC 3.37* 3.51*   HGB 9.7* 10.2*   HCT 30.8* 31.9*    179   MCV 91.4 90.9   MCH 28.8 29.1   MCHC 31.5* 32.0*       CMP:   Recent Labs     01/07/24  0642 01/08/24  0431   * 135*   K 4.5 4.5   CO2 31 32*   BUN 22.3* 32.3*   CREATININE 3.75* 4.14*   MG 1.90  --    CALCIUM 8.5 8.7   ALBUMIN 2.7*  --    ALKPHOS 95  --    ALT 23  --    AST 15  --    BILITOT 0.4  --        INR  No results for input(s): "PT", "INR", "PROTIME", "APTT" in the last 72 hours.    Diagnostic Studies:  CXR " :    Nuclear Perfu 12/27/2023:     There is a moderate to severe intensity, moderate sized, mostly reversible perfusion abnormality with some fixed areas in the lateral apical wall(s) in the typical distribution of the LCX territory.    There are no other significant perfusion abnormalities.    The gated perfusion images showed an ejection fraction of 62% at rest. The gated perfusion images showed an ejection fraction of 63% post stress.    There were no arrhythmias during stress.     The nuclear stress test is  fair quality.    On the nuclear stress test the EF is normal.  If the patient has an echo, the EF on the echo is considered more accurate.         The patient has a moderate risk nuclear stress test.      If patient is symptomatic, consider management with two anti-anginals  EKG :      2D Echo 12/27/2023: EF=68%. B atrail dialtation. Mod-severe MR. Mod TR. Trace AI. PHTN=63.      Pre-op Assessment    I have reviewed the Patient Summary Reports.     I have reviewed the Nursing Notes. I have reviewed the NPO Status.   I have reviewed the Medications.     Review of Systems  Anesthesia Hx:  No problems with previous Anesthesia   History of prior surgery of interest to airway management or planning: heart surgery. Previous anesthesia: General EGD 1/4/2024 AND 12/8/2023: ;  CABG:; Cardiac Valve Replacement:; L AVF:; Thyroidectomy. with general anesthesia.  Procedure performed at an [facility].      Airway issues documented on chart review include GETA     Denies Family Hx of Anesthesia complications.    Denies Personal Hx of Anesthesia complications.                    Social:  Smoker, Alcohol Use 1/3 PPDx 30 yrs.  1-2 glasses q 2 months.      Hematology/Oncology:  Hematology Normal   Oncology Normal                                   EENT/Dental:  EENT/Dental Normal    Ears General/Symptom(s)    Ear Disease:  Tympanic Membrane Problem        Cardiovascular:     Denies Pacemaker. Hypertension Valvular  problems/Murmurs (h/o Valve Replacement), MR, AI  CAD       Denies Angina. CHF  Denies Orthopnea.  Denies PND.  hyperlipidemia  Denies BLAS.   Echo 12/27/2023: EF=68%. B atrail dialtation. Mod-severe MR. Mod TR. Trace AI. PHTN=63.  Nuclear Perfu 12/27/2023:   moderate to severe intensity, moderate sized, mostly reversible perfusion abnormality with some fixed areas in the lateral apical wall(s) in the typical distribution of the LCX territory.  ·  There are no other significant perfusion abnormalities.  ·  The gated perfusion images showed an rEF=62%. The gated perfusion images showed an ejection fraction of 63% post stress. No arrhythmias during stress.   Functional Capacity good / => 4 METS                         Pulmonary:   COPD      PHTN=63.               Renal/:  Chronic Renal Disease, ESRD   HD: TTSat: last: Sat.             Hepatic/GI:  Hepatic/GI Normal                 Musculoskeletal:  Musculoskeletal Normal                Neurological:  Denies TIA.  Denies CVA.                                    Endocrine:  Diabetes Hypothyroidism          Dermatological:  Skin Normal    Psych:    depression                Physical Exam  General: Alert, Oriented and Cooperative  Ill looking  Airway:  Mallampati: III / III  Mouth Opening: Normal  TM Distance: > 6 cm  Tongue: Normal  Neck ROM: Normal ROM    Dental:  Partial Dentures  Upper dent: cannot take it off.  Musculoskeletal:  Lle LYMPHEDEMA >> rlle.      Anesthesia Plan  Type of Anesthesia, risks & benefits discussed:    Anesthesia Type: Gen Natural Airway  Intra-op Monitoring Plan: Standard ASA Monitors  Post Op Pain Control Plan: IV/PO Opioids PRN  Induction:  IV  Informed Consent: Informed consent signed with the Patient and all parties understand the risks and agree with anesthesia plan.  All questions answered. Patient consented to blood products? No  ASA Score: 4  Day of Surgery Review of History & Physical: H&P Update referred to the  surgeon/provider.  Anesthesia Plan Notes: GA TIVA    Ready For Surgery From Anesthesia Perspective.     .

## 2024-01-08 NOTE — PROGRESS NOTES
[facility] Hospital Medicine Progress Note        Chief Complaint: Inpatient Follow-up    HPI:   78-year-old female with significant history of ESRD on HD, PAF on Eliquis, CAD status post CABG x4, chronic diastolic heart failure, COPD, HTN, type 2 diabetes mellitus, hypothyroidism, schizophrenia, duodenal bulb polyp, gastric angiodysplasia, duodenal polyp.  Patient had a recent hospitalization for GI bleed.  Duodenal polyp was removed, Eliquis was resumed at [place].  Patient presented back to the ED on 01/04 with worsening melanotic stools . patient was significantly anemic with hemoglobin-5.3.  patient was initiated on IV ppi.  PRBC transfusion ordered .  nephrology and GI services consulted.  Patient underwent EGD/small-bowel enteroscopy.  No bleeding source was identified.  GI planning for colonoscopy this hospitalization.  Cardiology also consulted for anticoagulation recommendation given recurrent GI bleed.  Okay to hold Eliquis per Cardiology, plan for LAE occlusive device placement as outpatient.  Patient also had a recent positive stress test and is planning for outpatient coronary angiogram.  Cardiology requested to perform colonoscopy while in-house so that she can obtain clearance for dual antiplatelets.  Dialysis continued while in-house paid colonoscopy scheduled for 1/8      Interval Hx:     Patient seen at bedside today morning . no active GI bleed today . awaiting colonoscopy today morning . hemodynamics are stable .  no abdominal pain .  no new complaints.     Objective/physical exam:  General: In no acute distress, afebrile  Chest: Clear to auscultation bilaterally  Heart: S1, S2, no appreciable murmur  Abdomen: Soft, nontender, BS +  MSK: Warm, no lower extremity edema, no clubbing or cyanosis  Neurologic: Alert and oriented x4, moving all extremities with good strength     VITAL SIGNS: 24 HRS MIN & MAX LAST   Temp  Min: 97.7 °F (36.5 °C)  Max: 98.5 °F (36.9 °C) 97.7 °F  (36.5 °C)   BP  Min: 103/64  Max: 152/55 (!) 149/78   Pulse  Min: 51  Max: 63  60   Resp  Min: 18  Max: 20 18   SpO2  Min: 93 %  Max: 97 % 95 %       Recent Labs   Lab 01/08/24  0431   WBC 7.97   RBC 3.51*   HGB 10.2*   HCT 31.9*   MCV 90.9   MCH 29.1   MCHC 32.0*   RDW 21.2*      MPV 10.0         Recent Labs   Lab 01/07/24  0642 01/08/24  0431   * 135*   K 4.5 4.5   CO2 31 32*   BUN 22.3* 32.3*   CREATININE 3.75* 4.14*   CALCIUM 8.5 8.7   MG 1.90  --    ALBUMIN 2.7*  --    ALKPHOS 95  --    ALT 23  --    AST 15  --    BILITOT 0.4  --           Microbiology Results (last 7 days)       ** No results found for the last 168 hours. **             Scheduled Med:   amLODIPine  5 mg Oral Daily    atorvastatin  40 mg Oral Daily    carvediloL  6.25 mg Oral BID    diphenhydrAMINE  50 mg Oral Nightly    DULoxetine  30 mg Oral Daily    epoetin alexis  10,000 Units Intravenous Every Tues, Thurs, Sat    fluticasone furoate-vilanteroL  1 puff Inhalation Daily    levothyroxine  125 mcg Oral Before breakfast    mupirocin   Nasal BID    pantoprazole  40 mg Intravenous BID    sevelamer carbonate  1,600 mg Oral TID    tiotropium bromide  2 puff Inhalation Daily          Assessment/Plan:    Acute/recurrent GI bleed  Acute blood loss anemia secondary to above requiring PRBC transfusion-improved   History of duodenal polyp/gastric angiodysplasia  ESRD on HD  PAF-currently off anticoagulation   History of CAD status post CABG x4  Recent positive stress test awaiting coronary angiogram   Chronic heart failure with preserved ejection fraction, compensated   COPD with no acute exacerbation  Essential HTN-stable   History of type 2 diabetes mellitus-stable   Hypothyroidism  History of schizophrenia   Prophylaxis    Hemoglobin is more than 10   EGD with small bowel enteroscopy did not identify any active source of bleeding   Colonoscopy today, await results  DC IV PPI and switch to p.o. Protonix  HD per nephrology  Evaluated by  Cardiology this hospitalization, outpatient coronary angiogram planned for abnormal stress test   Per cardiology okay to hold Eliquis given recurrent GI bleed, [place] occlusive device placement plan as outpatient   Continue home meds-amlodipine, statin, Coreg, Benadryl, Cymbalta, Procrit, bronchodilators, levothyroxine, Renvela  Will consider increasing amlodipine to 10 mg in case of recurrent acceleration in blood pressure   Hold long-acting insulin since A1c<6   DVT prophylaxis-no chemical prophylaxis given GI bleed      Await colonoscopy    Jaclyn Nam MD   01/08/2024

## 2024-01-09 ENCOUNTER — TELEPHONE (OUTPATIENT)
Dept: INTERNAL MEDICINE | Facility: CLINIC | Age: 79
End: 2024-01-09
Payer: MEDICARE

## 2024-01-09 VITALS
BODY MASS INDEX: 31.71 KG/M2 | DIASTOLIC BLOOD PRESSURE: 50 MMHG | SYSTOLIC BLOOD PRESSURE: 114 MMHG | OXYGEN SATURATION: 96 % | HEIGHT: 66 IN | HEART RATE: 54 BPM | TEMPERATURE: 98 F | RESPIRATION RATE: 18 BRPM | WEIGHT: 197.31 LBS

## 2024-01-09 LAB
ALBUMIN SERPL-MCNC: 2.7 G/DL (ref 3.4–4.8)
ALBUMIN/GLOB SERPL: 1 RATIO (ref 1.1–2)
ALP SERPL-CCNC: 95 UNIT/L (ref 40–150)
ALT SERPL-CCNC: 17 UNIT/L (ref 0–55)
AST SERPL-CCNC: 12 UNIT/L (ref 5–34)
BASOPHILS # BLD AUTO: 0.04 X10(3)/MCL
BASOPHILS NFR BLD AUTO: 0.6 %
BILIRUB SERPL-MCNC: 0.5 MG/DL
BUN SERPL-MCNC: 40.3 MG/DL (ref 9.8–20.1)
CALCIUM SERPL-MCNC: 8.8 MG/DL (ref 8.4–10.2)
CHLORIDE SERPL-SCNC: 100 MMOL/L (ref 98–107)
CO2 SERPL-SCNC: 31 MMOL/L (ref 23–31)
CREAT SERPL-MCNC: 4.62 MG/DL (ref 0.55–1.02)
EOSINOPHIL # BLD AUTO: 0.06 X10(3)/MCL (ref 0–0.9)
EOSINOPHIL NFR BLD AUTO: 0.9 %
ERYTHROCYTE [DISTWIDTH] IN BLOOD BY AUTOMATED COUNT: 21.1 % (ref 11.5–17)
GFR SERPLBLD CREATININE-BSD FMLA CKD-EPI: 9 MLS/MIN/1.73/M2
GLOBULIN SER-MCNC: 2.7 GM/DL (ref 2.4–3.5)
GLUCOSE SERPL-MCNC: 129 MG/DL (ref 82–115)
HCT VFR BLD AUTO: 32 % (ref 37–47)
HGB BLD-MCNC: 10 G/DL (ref 12–16)
IMM GRANULOCYTES # BLD AUTO: 0.02 X10(3)/MCL (ref 0–0.04)
IMM GRANULOCYTES NFR BLD AUTO: 0.3 %
LYMPHOCYTES # BLD AUTO: 0.6 X10(3)/MCL (ref 0.6–4.6)
LYMPHOCYTES NFR BLD AUTO: 8.6 %
MCH RBC QN AUTO: 28.8 PG (ref 27–31)
MCHC RBC AUTO-ENTMCNC: 31.3 G/DL (ref 33–36)
MCV RBC AUTO: 92.2 FL (ref 80–94)
MONOCYTES # BLD AUTO: 0.67 X10(3)/MCL (ref 0.1–1.3)
MONOCYTES NFR BLD AUTO: 9.6 %
NEUTROPHILS # BLD AUTO: 5.61 X10(3)/MCL (ref 2.1–9.2)
NEUTROPHILS NFR BLD AUTO: 80 %
NRBC BLD AUTO-RTO: 0 %
PLATELET # BLD AUTO: 165 X10(3)/MCL (ref 130–400)
PMV BLD AUTO: 9.7 FL (ref 7.4–10.4)
POCT GLUCOSE: 134 MG/DL (ref 70–110)
POCT GLUCOSE: 182 MG/DL (ref 70–110)
POTASSIUM SERPL-SCNC: 4.6 MMOL/L (ref 3.5–5.1)
PROT SERPL-MCNC: 5.4 GM/DL (ref 5.8–7.6)
RBC # BLD AUTO: 3.47 X10(6)/MCL (ref 4.2–5.4)
SODIUM SERPL-SCNC: 140 MMOL/L (ref 136–145)
WBC # SPEC AUTO: 7 X10(3)/MCL (ref 4.5–11.5)

## 2024-01-09 PROCEDURE — 85025 COMPLETE CBC W/AUTO DIFF WBC: CPT | Performed by: INTERNAL MEDICINE

## 2024-01-09 PROCEDURE — 80100016 HC MAINTENANCE HEMODIALYSIS

## 2024-01-09 PROCEDURE — 80053 COMPREHEN METABOLIC PANEL: CPT | Performed by: INTERNAL MEDICINE

## 2024-01-09 PROCEDURE — 25000003 PHARM REV CODE 250: Performed by: INTERNAL MEDICINE

## 2024-01-09 PROCEDURE — 25000242 PHARM REV CODE 250 ALT 637 W/ HCPCS: Performed by: INTERNAL MEDICINE

## 2024-01-09 PROCEDURE — 94761 N-INVAS EAR/PLS OXIMETRY MLT: CPT

## 2024-01-09 PROCEDURE — 63600175 PHARM REV CODE 636 W HCPCS: Mod: JZ | Performed by: INTERNAL MEDICINE

## 2024-01-09 RX ORDER — NAPROXEN SODIUM 220 MG/1
81 TABLET, FILM COATED ORAL DAILY
Qty: 30 TABLET | Refills: 0 | Status: SHIPPED | OUTPATIENT
Start: 2024-01-09 | End: 2024-02-08

## 2024-01-09 RX ADMIN — PANTOPRAZOLE SODIUM 40 MG: 40 TABLET, DELAYED RELEASE ORAL at 05:01

## 2024-01-09 RX ADMIN — LEVOTHYROXINE SODIUM 125 MCG: 125 TABLET ORAL at 05:01

## 2024-01-09 RX ADMIN — FLUTICASONE FUROATE AND VILANTEROL TRIFENATATE 1 PUFF: 100; 25 POWDER RESPIRATORY (INHALATION) at 09:01

## 2024-01-09 RX ADMIN — ERYTHROPOIETIN 10000 UNITS: 10000 INJECTION, SOLUTION INTRAVENOUS; SUBCUTANEOUS at 09:01

## 2024-01-09 RX ADMIN — TIOTROPIUM BROMIDE INHALATION SPRAY 2 PUFF: 3.12 SPRAY, METERED RESPIRATORY (INHALATION) at 09:01

## 2024-01-09 RX ADMIN — DULOXETINE HYDROCHLORIDE 30 MG: 30 CAPSULE, DELAYED RELEASE ORAL at 12:01

## 2024-01-09 RX ADMIN — SEVELAMER CARBONATE 1600 MG: 800 TABLET, FILM COATED ORAL at 12:01

## 2024-01-09 RX ADMIN — ATORVASTATIN CALCIUM 40 MG: 40 TABLET, FILM COATED ORAL at 12:01

## 2024-01-09 NOTE — TELEPHONE ENCOUNTER
[facility] called stating pt was d/c'd from hospital and the hospitalist is requesting us follow up for pt's home health. States she will fax over info, pt has upcoming appt on 1/29/24

## 2024-01-09 NOTE — H&P (VIEW-ONLY)
CHIEF COMPLAINT:   No chief complaint on file.                  Review of patient's allergies indicates:   Allergen Reactions    Lisinopril Swelling    Azithromycin      Other reaction(s): tongue/facial swelling    Baclofen      Other reaction(s): tongue/facial swelling    Doxycycline      Other reaction(s): Angioedema                                          HPI:  Rosette Madrid 78 y.o. with a past medical history of CAD s/p CABG prior to 2005, ESRD on HD (TTHSat), HTN, DM, HFpEF, and HLD presents for follow up and ongoing care.  Today she is requesting cardiac risk stratification for EGD/colonoscopy procedure.  Patient completed an Echocardiogram on 12.30.21 which revealed an ejection fraction of approximately 50 to 55%. See report below.  At last office visit patient denied any cardiac complaints other than bilateral lower extremity edema for which she follows with Dr. Singh for venous insufficiency.  At last office visit patient endorsed chest pain that has been occurring more frequently than typical.  She stated that it typically only occurred at dialysis during her treatments, however it did occur at other times at site of dialysis both at rest and with exertion.  Repeat echocardiogram and nuclear stress test were ordered for further evaluation.  Echocardiogram completed in December 2023 revealed EF of 68%, normal systolic function, moderate to severe MR, moderate TR.  See full report below.  Patient also completed a nuclear stress test in December 2023 which revealed an abnormal myocardial perfusion scan.  There was a moderate to severe intensity, moderate size, mostly reversible perfusion abnormality with some fixed areas in the lateral apical wall in the typical distribution of the LCX territory.  Overall the patient had a moderate risk nuclear stress test.    Since then, on January 3rd, 2024, patient presented to [facility] ED following dialysis treatment for evaluation of low H&H.  Per chart review, she  endorsed 2 days of dark tarry stools.  She was transfused with 4 units PRBCs.  She underwent push enteroscopy on January 4, 2024 which did not reveal any bleed source beyond ligament of Treitz.  Subsequent colonoscopy also did not reveal any source of bleeding.  [facility] was consulted while patient was in patient for possible Watchman due to recurrent GIBs.  Patient has been holding Eliquis since hospitalization per [facility] Rx.  [facility] recommended at that time that patient proceed with outpatient [facility] in [place] referral to structural heart clinic for LAE occlusive device.    Today the patient states that she is feeling fine.  She denies any cardiac complaints today such as chest pain, shortness for breath, palpitations, PND, orthopnea, lightheadedness, dizziness, or syncope.  She denies any chest pain, even with dialysis.  She denies any further dark stools or rectal bleeding since her hospitalization.  She has not had any issues since holding her Eliquis.  She continues to require assistance with ambulation, but states that she is able to complete her ADLs without any issues or ischemic symptoms.  She reports compliance with all her current medications.  She continues to do hemodialysis on Tuesday, Thursday, and Saturdays.  She continues to smoke approximately 3-4 cigarettes per day but is states that she is trying to quit on her own.     CARDIAC TESTING  Nuclear Stress Test 12.27.23    Abnormal myocardial perfusion scan.    There is a moderate to severe intensity, moderate sized, mostly reversible perfusion abnormality with some fixed areas in the lateral apical wall(s) in the typical distribution of the LCX territory.    There are no other significant perfusion abnormalities.    The gated perfusion images showed an ejection fraction of 62% at rest. The gated perfusion images showed an ejection fraction of 63% post stress.    There were no arrhythmias during stress.  The nuclear stress test is  fair quality.    On the nuclear  stress test the EF is normal.  If the patient has an echo, the EF on the echo is considered more accurate.      The patient has a moderate risk nuclear stress test.   If patient is symptomatic, consider management with two anti-anginals    Echo 12.27.23    Left Ventricle: The left ventricle is normal in size. Normal wall thickness. There is normal systolic function. Biplane (2D) method of discs ejection fraction is 68%.    Right Ventricle: Normal right ventricular cavity size. Systolic function is borderline low.    Left Atrium: Left atrium is mildly dilated.    Right Atrium: Right atrium is mildly dilated.    Aortic Valve: The aortic valve is a trileaflet valve. There is moderate aortic valve sclerosis.    Mitral Valve: There is severe posterior mitral annular calcification present. There is moderate to severe regurgitation.    Tricuspid Valve: There is moderate annular calcification present. There is moderate regurgitation.    IVC/SVC: Intermediate venous pressure at 8 mmHg.       Echocardiogram December 30, 2021:  Left ventricular ejection fraction is measured at approximately 50 to 55%.  Grade 2 diastolic dysfunction.  Aortic valve is tricuspid.  Aortic valve trileaflet are mildly thickened.  Mitral annular calcification is present.  Mild mitral regurgitation.  There is mild tricuspid regurgitation with RVSP estimated at 56 mmHg.                                                                                                                                                                                                                                                                                                                                                                                                                                                                                   Patient Active Problem List   Diagnosis    Chronic congestive heart failure    Tobacco dependence syndrome    Atherosclerosis  of coronary artery bypass graft    Chronic obstructive pulmonary disease    Hypertension    Hyperlipidemia    Type 2 diabetes mellitus with chronic kidney disease on chronic dialysis, with long-term current use of insulin    ESRD (end stage renal disease) on dialysis    Depressive disorder    Class 1 obesity due to excess calories with serious comorbidity and body mass index (BMI) of 31.0 to 31.9 in adult    Vitamin D deficiency    Coronary artery disease involving native coronary artery of native heart without angina pectoris    Anemia    A-fib    Anemia due to chronic kidney disease, on chronic dialysis    Hypotension    Melena    Acquired hypothyroidism    GI bleed    Aortic heart murmur    Diverticulosis of colon with hemorrhage    Chronic renal impairment    Status post coronary artery bypass graft     Past Surgical History:   Procedure Laterality Date    AV FISTULA PLACEMENT Left     CARDIAC CATHETERIZATION      CARDIAC VALVE REPLACEMENT      COLONOSCOPY      COLONOSCOPY N/A 1/8/2024    Procedure: COLON;  Surgeon: Josh Gore MD;  Location: [facility] ENDOSCOPY;  Service: Gastroenterology;  Laterality: N/A;    CORONARY ARTERY BYPASS GRAFT      EGD, WITH HEMORRHAGE CONTROL  03/24/2023    Procedure: EGD,WITH HEMORRHAGE CONTROL;  Surgeon: Go Le MD;  Location: [facility] ENDOSCOPY;  Service: Gastroenterology;;    EGD, WITH HEMORRHAGE CONTROL  04/06/2023    Procedure: EGD,WITH HEMORRHAGE CONTROL;  Surgeon: Ayden Francois MD;  Location: [facility] ENDOSCOPY;  Service: Gastroenterology;;    EGD, WITH POLYPECTOMY USING SNARE Left 12/8/2023    Procedure: EGD, WITH POLYPECTOMY USING SNARE;  Surgeon: Lexi Scales MD;  Location: [facility] ENDOSCOPY;  Service: Gastroenterology;  Laterality: Left;    ESOPHAGOGASTRODUODENOSCOPY      ESOPHAGOGASTRODUODENOSCOPY N/A 02/17/2023    Procedure: EGD +/- VCE PLACEMENT;  Surgeon: Morgan Gardner MD;  Location: [facility] ENDOSCOPY;  Service: Gastroenterology;   Laterality: N/A;    ESOPHAGOGASTRODUODENOSCOPY N/A 03/24/2023    Procedure: EGD;  Surgeon: Go Le MD;  Location: [facility] ENDOSCOPY;  Service: Gastroenterology;  Laterality: N/A;  with push    ESOPHAGOGASTRODUODENOSCOPY N/A 04/06/2023    Procedure: EGD;  Surgeon: Ayden Francois MD;  Location: [facility] ENDOSCOPY;  Service: Gastroenterology;  Laterality: N/A;  with push    ESOPHAGOGASTRODUODENOSCOPY N/A 06/16/2023    Procedure: EGD W/ PUSH;  Surgeon: Morgan Gardner MD;  Location: [facility] ENDOSCOPY;  Service: Gastroenterology;  Laterality: N/A;    ESOPHAGOGASTRODUODENOSCOPY N/A 1/4/2024    Procedure: EGD;  Surgeon: Morgan Scales MD;  Location: [facility] ENDOSCOPY;  Service: Gastroenterology;  Laterality: N/A;    EYE SURGERY      POLYPECTOMY      SMALL BOWEL ENTEROSCOPY Left 01/20/2023    Procedure: ENTEROSCOPY;  Surgeon: Lexi Scales MD;  Location: [facility] ENDOSCOPY;  Service: Gastroenterology;  Laterality: Left;    THYROIDECTOMY      TUBAL LIGATION       Social History     Socioeconomic History    Marital status:     Number of children: 6   Tobacco Use    Smoking status: Some Days     Current packs/day: 2.00     Average packs/day: 2.0 packs/day for 15.0 years (30.0 ttl pk-yrs)     Types: Cigarettes    Smokeless tobacco: Never    Tobacco comments:     Smokes 3 cigarettes a day   Substance and Sexual Activity    Alcohol use: Not Currently     Comment: 1 glass every 2-3 month    Drug use: Never    Sexual activity: Not Currently     Social Determinants of Health     Financial Resource Strain: Low Risk  (1/20/2023)    Overall Financial Resource Strain (CARDIA)     Difficulty of Paying Living Expenses: Not hard at all   Food Insecurity: No Food Insecurity (6/16/2023)    Hunger Vital Sign     Worried About Running Out of Food in the Last Year: Never true     Ran Out of Food in the Last Year: Never true   Transportation Needs: No Transportation Needs (6/16/2023)    PRAPARE -  Transportation     Lack of Transportation (Medical): No     Lack of Transportation (Non-Medical): No   Physical Activity: Inactive (1/20/2023)    Exercise Vital Sign     Days of Exercise per Week: 0 days     Minutes of Exercise per Session: 0 min   Stress: No Stress Concern Present (1/20/2023)    [employer] - Occupational Stress Questionnaire     Feeling of Stress : Not at all   Social Connections: Moderately Isolated (1/20/2023)    Social Connection and Isolation Panel [NHANES]     Frequency of Communication with Friends and Family: More than three times a week     Frequency of Social Gatherings with Friends and Family: More than three times a week     Attends Scientologist Services: More than 4 times per year     Active Member of Clubs or Organizations: No     Attends Club or Organization Meetings: Never     Marital Status:    Housing Stability: Low Risk  (6/16/2023)    Housing Stability Vital Sign     Unable to Pay for Housing in the Last Year: No     Number of Places Lived in the Last Year: 1     Unstable Housing in the Last Year: No        Family History   Problem Relation Age of Onset    Hypertension Mother     Hypertension Father     Hypertension Sister     Hypertension Sister        No current facility-administered medications for this visit.    Current Outpatient Medications:     aspirin 81 MG Chew, Take 1 tablet (81 mg total) by mouth once daily., Disp: 30 tablet, Rfl: 0    Facility-Administered Medications Ordered in Other Visits:     0.9%  NaCl infusion (for blood administration), , Intravenous, Q24H PRN, Morgan Scales MD    0.9%  NaCl infusion (for blood administration), , Intravenous, Q24H PRN, Santiago Castro MD    acetaminophen tablet 650 mg, 650 mg, Oral, Q8H PRN, Morgan Scales MD    acetaminophen tablet 650 mg, 650 mg, Oral, Q4H PRN, Morgan Scales MD    albuterol inhaler 2 puff, 2 puff, Inhalation, Q6H PRN, Morgan Scales MD    albuterol-ipratropium 2.5  mg-0.5 mg/3 mL nebulizer solution 3 mL, 3 mL, Nebulization, Q6H PRN, Morgan Scales MD, 3 mL at 01/04/24 1038    amLODIPine tablet 5 mg, 5 mg, Oral, Daily, Mrogan Scales MD, 5 mg at 01/08/24 0909    atorvastatin tablet 40 mg, 40 mg, Oral, Daily, Morgan Scales MD, 40 mg at 01/09/24 1248    carvediloL tablet 6.25 mg, 6.25 mg, Oral, BID, Morgan Scales MD, 6.25 mg at 01/08/24 2052    dextrose 10% bolus 125 mL 125 mL, 12.5 g, Intravenous, PRN, Morgan Scales MD    dextrose 10% bolus 125 mL 125 mL, 12.5 g, Intravenous, PRN, Morgan Scales MD    dextrose 10% bolus 250 mL 250 mL, 25 g, Intravenous, PRN, Morgan Scales MD    dextrose 10% bolus 250 mL 250 mL, 25 g, Intravenous, PRN, Morgan Scales MD    diphenhydrAMINE capsule 50 mg, 50 mg, Oral, Nightly, Morgan Scales MD, 50 mg at 01/08/24 2051    DULoxetine DR capsule 30 mg, 30 mg, Oral, Daily, Morgan Scales MD, 30 mg at 01/09/24 1248    epoetin alexis injection 10,000 Units, 10,000 Units, Intravenous, Every Tues, Thurs, Sat, Morgan Scales MD, 10,000 Units at 01/09/24 0900    fluticasone furoate-vilanteroL 100-25 mcg/dose diskus inhaler 1 puff, 1 puff, Inhalation, Daily, Morgan Scales MD, 1 puff at 01/09/24 0900    glucagon (human recombinant) injection 1 mg, 1 mg, Intramuscular, PRN, Morgan Scales MD    glucagon (human recombinant) injection 1 mg, 1 mg, Intramuscular, PRN, Morgan Scales MD    glucose chewable tablet 16 g, 16 g, Oral, PRN, Morgan Scales MD    glucose chewable tablet 16 g, 16 g, Oral, PRN, Morgan Scales MD    glucose chewable tablet 24 g, 24 g, Oral, PRN, Morgan Scales MD    glucose chewable tablet 24 g, 24 g, Oral, PRN, Morgan Scales MD    insulin aspart U-100 injection 0-10 Units, 0-10 Units, Subcutaneous, QID (AC + HS) PRN, Morgan Scales MD, 4 Units at 01/07/24 1652    levothyroxine tablet 125 mcg, 125 mcg, Oral, Before breakfast, Morgan Scales MD, 125 mcg at 01/09/24 0500    ondansetron injection 4 mg,  4 mg, Intravenous, Q4H PRN, Morgan Scales MD    pantoprazole EC tablet 40 mg, 40 mg, Oral, BID AC, Jaclyn Nam MD, 40 mg at 01/09/24 0500    sevelamer carbonate tablet 1,600 mg, 1,600 mg, Oral, TID, Morgan Scales MD, 1,600 mg at 01/09/24 1248    sodium chloride 0.9% flush 10 mL, 10 mL, Intravenous, Q12H PRN, Morgan Scales MD    tiotropium bromide 2.5 mcg/actuation inhaler 2 puff, 2 puff, Inhalation, Daily, Morgan Scales MD, 2 puff at 01/09/24 0900     ROS:                                                                                                                                                                             Review of Systems   Constitutional: Negative.    HENT: Negative.     Eyes: Negative.    Respiratory: Negative.  Negative for shortness of breath.    Cardiovascular:  Positive for leg swelling. Negative for chest pain, palpitations, orthopnea, claudication and PND.   Gastrointestinal: Negative.    Genitourinary: Negative.    Musculoskeletal: Negative.    Skin: Negative.    Neurological: Negative.    Endo/Heme/Allergies: Negative.    Psychiatric/Behavioral: Negative.          There were no vitals taken for this visit.   PE:  Physical Exam  HENT:      Head: Normocephalic.      Nose: Nose normal.      Mouth/Throat:      Mouth: Mucous membranes are moist.   Eyes:      Extraocular Movements: Extraocular movements intact.   Cardiovascular:      Rate and Rhythm: Normal rate and regular rhythm.      Pulses: Normal pulses.   Pulmonary:      Effort: Pulmonary effort is normal.      Breath sounds: Normal breath sounds.   Abdominal:      General: There is no distension.   Musculoskeletal:         General: Normal range of motion.      Cervical back: Normal range of motion.      Right lower leg: Edema (Mild) present.      Left lower leg: Edema (Mild) present.   Skin:     General: Skin is warm.   Neurological:      General: No focal deficit present.      Mental Status: She is alert.    Psychiatric:         Mood and Affect: Mood normal.          ASSESSMENT/PLAN:  HFpEF - EF 50-55% per Echo Dec. 2021 - NYHA Class II  Patient denies SOB; continues to have some BLE edema, but associates this with chronic venous insufficiency   Echo December 2023 revealed EF 68%, normal systolic function, normal diastolic function, moderate to severe MR, moderate TR.   Echo Jan. 2018 showed diastolic dysfunction, EF of 66%, E/A flow reversal, aortic regurg, and moderately thickened leaflets without aortic stenosis  She is euvolemic, warm, and dry on exam today  Continue Lasix and hemodialysis as directed  Counseled on low salt diet    AFIB  Denies any palpitations, lightheadedness, dizziness, or tachycardic symptoms  Eliquis is on hold at this time given recent hospitalization for low H&H and dark tarry stools.  Patient underwent endoscopy and colonoscopy which did not reveal origin of bleeding  [facility] recommendations at [facility] during inpatient stay for referral to structural heart clinic for [place] occlusive device  Appeared to be in regular rhythm on auscultation today  Mildly bradycardic on exam today    CAD s/p CABG prior to 2005  Previously complained of increasing chest pain at last office visit, patient endorsed worsening pain at dialysis, at rest and with exertional activities   Today the patient states that she does not have any further chest pain  However, patient did complete nuclear stress test in December 2023 which revealed an abnormal myocardial perfusion scan. There was a moderate to severe intensity, moderate size, mostly reversible perfusion abnormality with some fixed areas in the lateral apical wall in the typical distribution of the LCX territory.  Overall the patient had a moderate risk nuclear stress test.  During inpatient stay at [facility], it was recommended that patient have LHC on an outpatient basis   Discussed the risk and benefits with patient today regarding LHC and given abnormal stress test,  significant history of CAD status post CABG, hypertension, hyperlipidemia, heart failure, obesity, diabetes, and tobacco use, will recommend that patient proceed with LHC  Case presented to staff cardiologist.  He is in agreement with plan for LHC, but states that we will only proceed with a diagnostic LHC, given that we are unclear if patient was able to tolerate DAPT at this time  Risk, Benefits and Alternatives Reviewed and Discussed with the PT and their Family and they wish to proceed with above Procedure.   NSTEMI Type II (Supply/Demand) in the setting of Severe Anemia due to GI Bleed Dec. 2021 - recent hospital admission  EF 50% per Echo Dec. 2021  Stress Echo 2018 showed no significant ischemia but a moderately large fixed anterior defect of moderate intensity  Continue Aspirin, Coreg, and Crestor  Counseled on heart healthy diet     COPD   Management per PCP     HTN  BP slightly above goal today 144/61  Ongoing occasional low BP with HD  Reports compliance with medications  Continuing current therapy   Will defer BP management to Nephrology  Counseled on low salt diet and exercise as tolerated    HLD  LDL at goal - 65 per labs September 2023  Continue Crestor 40mg daily  Counseled on low cholesterol diet    Diabetes  A1C - 5.7  Continue management per PCP    Tobacco use  Counseled on the importance of smoking cessation   She smokes 3-4 cigarettes per day - states she is trying to quit on her own      ESRD on HD T/TH/Sat  Continue nephrology management    Venous Insufficiency   s/p percutaneous endovenous Microfoam ablation with Varithena of the left GSV with Dr. Singh on May 17, 2021  Recommend compression stockings  Instructed on low salt diet  Follow up with Dr. Singh as directed- last appointment with him May 2023      Will tentatively plan for LHC at this time   Will call patient after speaking with staff cardiologist to update on plan   Strict ED precautions given  Upon further investigation, patient  has already been referred to structural heart specialist at [facility].  Of note, patient appears to not had answered phone for referral.  Attempted to reach patient and notify her that referral has been sent to [facility], however unable to reach.  Will try again tomorrow.  Follow up for preoperative visit   Follow up in cardiology clinic after [facility]

## 2024-01-09 NOTE — PLAN OF CARE
Patient choice list given chose [facility] referral sent via [facility]

## 2024-01-09 NOTE — PLAN OF CARE
01/09/24 1205   Final Note   Assessment Type Final Discharge Note   Anticipated Discharge Disposition Home-Health   Post-Acute Status   Post-Acute Authorization Home Health   Home Health Status Set-up Complete/Auth obtained   Coverage people's Health   Discharge Delays None known at this time

## 2024-01-09 NOTE — DISCHARGE SUMMARY
[facility] Medicine Discharge Summary    Admit Date: 1/3/2024  Discharge Date and Time: 1/9/20249:02 AM  Admitting Physician: ANGELO Team  Discharging Physician: Jaclyn Nam MD.  Primary Care Physician: Margarita Dior FNP      Discharge Diagnoses:  Acute/recurrent GI bleed  Acute blood loss anemia secondary to above requiring PRBC transfusion-improved   History of duodenal polyp/gastric angiodysplasia  ESRD on HD  PAF-currently off anticoagulation   History of CAD status post CABG x4  Recent positive stress test awaiting coronary angiogram   Chronic heart failure with preserved ejection fraction, compensated   COPD with no acute exacerbation  Essential HTN-stable   History of type 2 diabetes mellitus-stable   Hypothyroidism  History of schizophrenia     Hospital Course:   78-year-old female with significant history of ESRD on HD, PAF on Eliquis, CAD status post CABG x4, chronic diastolic heart failure, COPD, HTN, type 2 diabetes mellitus, hypothyroidism, schizophrenia, duodenal bulb polyp, gastric angiodysplasia, duodenal polyp.  Patient had a recent hospitalization for GI bleed.  Duodenal polyp was removed, Eliquis was resumed at [place].  Patient presented back to the ED on 01/04 with worsening melanotic stools . patient was significantly anemic with hemoglobin-5.3.  patient was initiated on IV ppi.  PRBC transfusion ordered .  nephrology and GI services consulted.  Patient underwent EGD/small-bowel enteroscopy.  No bleeding source was identified.  GI planning for colonoscopy this hospitalization.  Cardiology also consulted for anticoagulation recommendation given recurrent GI bleed.  Okay to hold Eliquis per Cardiology, plan for LAE occlusive device placement as outpatient.  Patient also had a recent positive stress test and is planning for outpatient coronary angiogram.  Cardiology requested to perform colonoscopy while in-house so that she can obtain clearance for dual  antiplatelets.  Dialysis continued while in-house . colonoscopy scheduled for 1/8 .  Hemoglobin remaining stable.  Colonoscopy unremarkable, plan for video capsule study in case of overt bleeding or drop in hemoglobin.  Cardiology recommended to hold Eliquis given recurrent GI bleed, plan for Watchman device as outpatient.  Blood pressure medications adjusted to maintain goal BP.  Holding long-acting insulin since A1c less than 6.  Patient re-evaluated 1/9.  Cleared by all specialists for DC.  Hemodynamics stable, hemoglobin continued to be stable.  Therefore it was decided to discharge the patient.  Discharge medications per med rec.  Patient will need to follow up with Nephrology, GI, Cardiology, cardiology planning for coronary angiogram as outpatient.  He was discharged on aspirin.  Eliquis DC per Cardiology recommendation  Vitals:  VITAL SIGNS: 24 HRS MIN & MAX LAST   Temp  Min: 97.7 °F (36.5 °C)  Max: 99.1 °F (37.3 °C) 98.7 °F (37.1 °C)   BP  Min: 127/52  Max: 176/71 (!) 144/67   Pulse  Min: 57  Max: 71  (!) 58   Resp  Min: 16  Max: 27 18   SpO2  Min: 92 %  Max: 100 % (!) 93 %       Physical Exam:  General appearance:  No acute distress  HENT: Atraumatic   Lungs: Clear to auscultation bilaterally.   Heart: RRR,No edema  Abdomen: Soft, non tender   Extremities: warm  Neuro:  Awake, alert, oriented x4  Psych/mental status: Appropriate mood and affect.      Procedures Performed: No admission procedures for hospital encounter.     Significant Diagnostic Studies: See Full reports for all details    Recent Labs   Lab 01/07/24  0642 01/08/24  0431 01/09/24  0514   WBC 6.16 7.97 7.00   RBC 3.37* 3.51* 3.47*   HGB 9.7* 10.2* 10.0*   HCT 30.8* 31.9* 32.0*   MCV 91.4 90.9 92.2   MCH 28.8 29.1 28.8   MCHC 31.5* 32.0* 31.3*   RDW 21.5* 21.2* 21.1*    179 165   MPV 10.0 10.0 9.7       Recent Labs   Lab 01/04/24  0252 01/05/24  0625 01/06/24  0558 01/07/24  0642 01/08/24  0431 01/09/24  0514    136   < > 132*  135* 140   K 3.7 4.1   < > 4.5 4.5 4.6   CO2 27 30   < > 31 32* 31   BUN 39.1* 24.2*   < > 22.3* 32.3* 40.3*   CREATININE 4.43* 3.53*   < > 3.75* 4.14* 4.62*   CALCIUM 8.7 8.5   < > 8.5 8.7 8.8   MG 1.90 1.80  --  1.90  --   --    ALBUMIN 2.9* 2.7*  --  2.7*  --  2.7*   ALKPHOS 88 81  --  95  --  95   ALT 38 30  --  23  --  17   AST 31 23  --  15  --  12   BILITOT 0.5 0.3  --  0.4  --  0.5    < > = values in this interval not displayed.        Microbiology Results (last 7 days)       ** No results found for the last 168 hours. **             Nuclear Stress - Cardiology Interpreted    Abnormal myocardial perfusion scan.    There is a moderate to severe intensity, moderate sized, mostly   reversible perfusion abnormality with some fixed areas in the lateral   apical wall(s) in the typical distribution of the LCX territory.    There are no other significant perfusion abnormalities.    The gated perfusion images showed an ejection fraction of 62% at rest.   The gated perfusion images showed an ejection fraction of 63% post stress.    There were no arrhythmias during stress.    The nuclear stress test is  fair quality.    On the nuclear stress test the EF is normal.  If the patient has an echo,   the EF on the echo is considered more accurate.        The patient has a moderate risk nuclear stress test.     If patient is symptomatic, consider management with two anti-anginals         Echo    Left Ventricle: The left ventricle is normal in size. Normal wall   thickness. There is normal systolic function. Biplane (2D) method of discs   ejection fraction is 68%.    Right Ventricle: Normal right ventricular cavity size. Systolic   function is borderline low.    Left Atrium: Left atrium is mildly dilated.    Right Atrium: Right atrium is mildly dilated.    Aortic Valve: The aortic valve is a trileaflet valve. There is moderate   aortic valve sclerosis.    Mitral Valve: There is severe posterior mitral annular calcification    present. There is moderate to severe regurgitation.    Tricuspid Valve: There is moderate annular calcification present. There   is moderate regurgitation.    IVC/SVC: Intermediate venous pressure at 8 mmHg.         Medication List        START taking these medications      aspirin 81 MG Chew  Take 1 tablet (81 mg total) by mouth once daily.            CONTINUE taking these medications      albuterol 90 mcg/actuation inhaler  Commonly known as: VENTOLIN HFA  Inhale 2 puffs into the lungs every 6 (six) hours as needed for Wheezing. Rescue     albuterol-ipratropium 2.5 mg-0.5 mg/3 mL nebulizer solution  Commonly known as: DUO-NEB  Take 3 mLs by nebulization every 6 (six) hours as needed for Wheezing or Shortness of Breath. Rescue     amLODIPine 5 MG tablet  Commonly known as: NORVASC  Take 1 tablet (5 mg total) by mouth once daily.     BELSOMRA 5 mg Tab  Generic drug: suvorexant     BenadryL 25 mg capsule  Generic drug: diphenhydrAMINE     carvediloL 6.25 MG tablet  Commonly known as: COREG  Take 1 tablet (6.25 mg total) by mouth 2 (two) times daily.     clonazePAM 0.5 MG tablet  Commonly known as: KlonoPIN     DIALYVITE 100-1 mg Tab  Generic drug: B complex-vitamin C-folic acid     DULoxetine 30 MG capsule  Commonly known as: CYMBALTA     fluticasone furoate-vilanteroL 100-25 mcg/dose diskus inhaler  Commonly known as: BREO  Inhale 1 puff into the lungs once daily.     furosemide 40 MG tablet  Commonly known as: LASIX  Take 1 tablet (40 mg total) by mouth once daily.     INVEGA SUSTENNA 156 mg/mL Syrg injection  Generic drug: paliperidone palmitate     L-METHYLFOLATE 15 mg Tab  Generic drug: levomefolate calcium     lactulose 10 gram/15 mL solution  Commonly known as: CHRONULAC     levothyroxine 125 MCG tablet  Commonly known as: SYNTHROID  Take 1 tablet (125 mcg total) by mouth before breakfast.     loratadine 10 mg tablet  Commonly known as: CLARITIN  Take 1 tablet (10 mg total) by mouth once daily. for 14 days    "  ONETOUCH DELICA PLUS LANCET 30 gauge Misc  Generic drug: lancets  1 lancet by Other route once daily.     ONETOUCH ULTRA TEST Strp  Generic drug: blood sugar diagnostic  1 strip by Other route once daily.     pantoprazole 40 MG tablet  Commonly known as: PROTONIX     rosuvastatin 40 MG Tab  Commonly known as: CRESTOR  Take 1 tablet (40 mg total) by mouth once daily.     sevelamer carbonate 800 mg Tab  Commonly known as: RENVELA  Take 2 tablets (1,600 mg total) by mouth 3 (three) times daily.     tiotropium 18 mcg inhalation capsule  Commonly known as: SPIRIVA WITH HANDIHALER  Inhale 1 capsule (18 mcg total) into the lungs Daily.            STOP taking these medications      apixaban 5 mg Tab  Commonly known as: ELIQUIS     BD ULTRA-FINE MINI PEN NEEDLE 31 gauge x 3/16" Ndle  Generic drug: pen needle, diabetic     glipiZIDE 10 MG tablet  Commonly known as: GLUCOTROL     LEVEMIR FLEXPEN 100 unit/mL (3 mL) Inpn pen  Generic drug: insulin detemir U-100 (Levemir)     pen needle, diabetic 31 gauge x 5/16" Ndle               Where to Get Your Medications        These medications were sent to [id number] [address] AT [facility] OF [address] [address]      Phone: [phone]   aspirin 81 MG Chew          Explained in detail to the patient about the discharge plan, medications, and follow-up visits. Pt understands and agrees with the treatment plan  Discharge Disposition: Home or Self Care   Discharged Condition: stable  Diet-   Dietary Orders (From admission, onward)       Start     Ordered    01/08/24 1425  Diet Renal On Dialysis  Diet effective now         01/08/24 1424                   Medications Per DC med rec  Activities as tolerated   Follow-up Information       Clinics, [facility] Amb Follow up.    Why: [facility] cardiology in 1 week post discharge  Contact information:  [address] [facility] [address] [phone]               Margarita Dior FNP Follow up in 1 " week(s).    Specialty: Family Medicine  Contact information:  [address] [facility] [address]  [phone]               Annalee Bourgeois MD Follow up in 1 week(s).    Specialty: Nephrology  Contact information:  [address] [address]  [phone]               Ayden Francois MD Follow up in 1 week(s).    Specialty: Gastroenterology  Contact information:  [address] [facility] [address]  [phone]                           For further questions contact hospitalist office    Discharge time 33 minutes    For worsening symptoms, chest pain, shortness of breath, increased abdominal pain, high grade fever, stroke or stroke like symptoms, immediately go to the nearest Emergency Room or call 911 as soon as possible.      Jaclyn Kitchen M.D on 1/9/2024. at 9:02 AM.

## 2024-01-09 NOTE — PROGRESS NOTES
Nephrology follow up progress note    HPI:      Rosette Madrid is a 78 y.o. female with dialysis dependent ESRD recent diagnosis of duodenal bulb polyp, gastric angiodysplasia during hospitalization for abdominal pain and melanotic stool (EGD 12/8).  She has atrial fibrillation on Eliquis, CAD status post CABG x4, heart failure with preserved EF, COPD, hypertension, diabetes mellitus type 2, hypothyroidism, schizophrenia.  She dialyzes on Monday Wednesday Friday schedule at [facility] followed by Dr. Bourgeois.      She presented to [facility] ED on 01/03 with complaints of melanotic stool and low H&H per outpatient lab.  Initial workup at this ER showed Hgb 5.3/ Hct 17 for which she was transfused.      Interval history:     EGD without signs of active bleeding. Colonoscopy done 1/8 with normal colon and no sign of bleeding. Hgb stable now with total 4 unit PRBC this admission.      Review of Systems:     Past medical, family, surgical, and social history reviewed and unchanged from initial consult note.     Objective:       VITAL SIGNS: 24 HR MIN & MAX LAST    Temp  Min: 97.7 °F (36.5 °C)  Max: 99.1 °F (37.3 °C)  98.7 °F (37.1 °C)        BP  Min: 127/52  Max: 176/71  (!) 144/67     Pulse  Min: 57  Max: 71  (!) 58     Resp  Min: 16  Max: 27  18    SpO2  Min: 92 %  Max: 100 %  (!) 93 %      GEN: Chronically ill appearing in NAD  HEENT: Conjunctiva anicteric, pupils equal, MMM, OP benign  CV: RRR +S1,S2 without murmur  PULM: CTAB, RA, non labored   ABD: Soft, NT/ND abdomen with NABS  EXT: No cyanosis, 1+ BLE edema   SKIN: Warm and dry  PSYCH: Awake, alert, and appropriately conversant  Vascular access: KERLINE AVF          Component Value Date/Time     01/09/2024 0514     (L) 01/08/2024 0431    K 4.6 01/09/2024 0514    K 4.5 01/08/2024 0431    CHLORIDE 100 01/09/2024 0514    CHLORIDE 96 (L) 01/08/2024 0431    CO2 31 01/09/2024 0514    CO2 32 (H) 01/08/2024 0431    BUN 40.3 (H) 01/09/2024 0514    BUN 32.3 (H) 01/08/2024  0431    CREATININE 4.62 (H) 01/09/2024 0514    CREATININE 4.14 (H) 01/08/2024 0431    CALCIUM 8.8 01/09/2024 0514    CALCIUM 8.7 01/08/2024 0431    PHOS 4.5 01/04/2024 0252            Component Value Date/Time    WBC 7.00 01/09/2024 0514    WBC 7.97 01/08/2024 0431    HGB 10.0 (L) 01/09/2024 0514    HGB 10.2 (L) 01/08/2024 0431    HCT 32.0 (L) 01/09/2024 0514    HCT 31.9 (L) 01/08/2024 0431     01/09/2024 0514     01/08/2024 0431       Imaging reviewed      Assessment / Plan:   ESRD on HD  Acute worsening of chronic anemia status post PRBC x4 total   Recent history of GI bleeding - EGD 12/8  Heart failure with preserved EF  COPD  Diabetes mellitus type 2     -Continue dialysis on TThSa schedule with EPO each treatment  -Cardiology was consulted for possible watchman given recurrent GI bleeds. Recommending outpatient coronary angiogram due to abnormal stress test.

## 2024-01-09 NOTE — PROGRESS NOTES
CHIEF COMPLAINT:   No chief complaint on file.                  Review of patient's allergies indicates:   Allergen Reactions    Lisinopril Swelling    Azithromycin      Other reaction(s): tongue/facial swelling    Baclofen      Other reaction(s): tongue/facial swelling    Doxycycline      Other reaction(s): Angioedema                                          HPI:  Rosette Madrid 78 y.o. with a past medical history of CAD s/p CABG prior to 2005, ESRD on HD (TTHSat), HTN, DM, HFpEF, and HLD presents for follow up and ongoing care.  Today she is requesting cardiac risk stratification for EGD/colonoscopy procedure.  Patient completed an Echocardiogram on 12.30.21 which revealed an ejection fraction of approximately 50 to 55%. See report below.  At last office visit patient denied any cardiac complaints other than bilateral lower extremity edema for which she follows with Dr. Singh for venous insufficiency.  At last office visit patient endorsed chest pain that has been occurring more frequently than typical.  She stated that it typically only occurred at dialysis during her treatments, however it did occur at other times at site of dialysis both at rest and with exertion.  Repeat echocardiogram and nuclear stress test were ordered for further evaluation.  Echocardiogram completed in December 2023 revealed EF of 68%, normal systolic function, moderate to severe MR, moderate TR.  See full report below.  Patient also completed a nuclear stress test in December 2023 which revealed an abnormal myocardial perfusion scan.  There was a moderate to severe intensity, moderate size, mostly reversible perfusion abnormality with some fixed areas in the lateral apical wall in the typical distribution of the LCX territory.  Overall the patient had a moderate risk nuclear stress test.    Since then, on January 3rd, 2024, patient presented to [facility] ED following dialysis treatment for evaluation of low H&H.  Per chart review, she  endorsed 2 days of dark tarry stools.  She was transfused with 4 units PRBCs.  She underwent push enteroscopy on January 4, 2024 which did not reveal any bleed source beyond ligament of Treitz.  Subsequent colonoscopy also did not reveal any source of bleeding.  CIS was consulted while patient was in patient for possible Watchman due to recurrent GIBs.  Patient has been holding Eliquis since hospitalization per [facility] Rx.  CIS recommended at that time that patient proceed with outpatient [facility] in [place] referral to structural heart clinic for LAE occlusive device.    Today the patient states that she is feeling fine.  She denies any cardiac complaints today such as chest pain, shortness for breath, palpitations, PND, orthopnea, lightheadedness, dizziness, or syncope.  She denies any chest pain, even with dialysis.  She denies any further dark stools or rectal bleeding since her hospitalization.  She has not had any issues since holding her Eliquis.  She continues to require assistance with ambulation, but states that she is able to complete her ADLs without any issues or ischemic symptoms.  She reports compliance with all her current medications.  She continues to do hemodialysis on Tuesday, Thursday, and Saturdays.  She continues to smoke approximately 3-4 cigarettes per day but is states that she is trying to quit on her own.     CARDIAC TESTING  Nuclear Stress Test 12.27.23    Abnormal myocardial perfusion scan.    There is a moderate to severe intensity, moderate sized, mostly reversible perfusion abnormality with some fixed areas in the lateral apical wall(s) in the typical distribution of the LCX territory.    There are no other significant perfusion abnormalities.    The gated perfusion images showed an ejection fraction of 62% at rest. The gated perfusion images showed an ejection fraction of 63% post stress.    There were no arrhythmias during stress.  The nuclear stress test is  fair quality.    On the nuclear  stress test the EF is normal.  If the patient has an echo, the EF on the echo is considered more accurate.      The patient has a moderate risk nuclear stress test.   If patient is symptomatic, consider management with two anti-anginals    Echo 12.27.23    Left Ventricle: The left ventricle is normal in size. Normal wall thickness. There is normal systolic function. Biplane (2D) method of discs ejection fraction is 68%.    Right Ventricle: Normal right ventricular cavity size. Systolic function is borderline low.    Left Atrium: Left atrium is mildly dilated.    Right Atrium: Right atrium is mildly dilated.    Aortic Valve: The aortic valve is a trileaflet valve. There is moderate aortic valve sclerosis.    Mitral Valve: There is severe posterior mitral annular calcification present. There is moderate to severe regurgitation.    Tricuspid Valve: There is moderate annular calcification present. There is moderate regurgitation.    IVC/SVC: Intermediate venous pressure at 8 mmHg.       Echocardiogram December 30, 2021:  Left ventricular ejection fraction is measured at approximately 50 to 55%.  Grade 2 diastolic dysfunction.  Aortic valve is tricuspid.  Aortic valve trileaflet are mildly thickened.  Mitral annular calcification is present.  Mild mitral regurgitation.  There is mild tricuspid regurgitation with RVSP estimated at 56 mmHg.                                                                                                                                                                                                                                                                                                                                                                                                                                                                                   Patient Active Problem List   Diagnosis    Chronic congestive heart failure    Tobacco dependence syndrome    Atherosclerosis  of coronary artery bypass graft    Chronic obstructive pulmonary disease    Hypertension    Hyperlipidemia    Type 2 diabetes mellitus with chronic kidney disease on chronic dialysis, with long-term current use of insulin    ESRD (end stage renal disease) on dialysis    Depressive disorder    Class 1 obesity due to excess calories with serious comorbidity and body mass index (BMI) of 31.0 to 31.9 in adult    Vitamin D deficiency    Coronary artery disease involving native coronary artery of native heart without angina pectoris    Anemia    A-fib    Anemia due to chronic kidney disease, on chronic dialysis    Hypotension    Melena    Acquired hypothyroidism    GI bleed    Aortic heart murmur    Diverticulosis of colon with hemorrhage    Chronic renal impairment    Status post coronary artery bypass graft     Past Surgical History:   Procedure Laterality Date    AV FISTULA PLACEMENT Left     CARDIAC CATHETERIZATION      CARDIAC VALVE REPLACEMENT      COLONOSCOPY      COLONOSCOPY N/A 1/8/2024    Procedure: COLON;  Surgeon: Josh Gore MD;  Location: [facility] ENDOSCOPY;  Service: Gastroenterology;  Laterality: N/A;    CORONARY ARTERY BYPASS GRAFT      EGD, WITH HEMORRHAGE CONTROL  03/24/2023    Procedure: EGD,WITH HEMORRHAGE CONTROL;  Surgeon: Go Le MD;  Location: [facility] ENDOSCOPY;  Service: Gastroenterology;;    EGD, WITH HEMORRHAGE CONTROL  04/06/2023    Procedure: EGD,WITH HEMORRHAGE CONTROL;  Surgeon: Ayden Francois MD;  Location: [facility] ENDOSCOPY;  Service: Gastroenterology;;    EGD, WITH POLYPECTOMY USING SNARE Left 12/8/2023    Procedure: EGD, WITH POLYPECTOMY USING SNARE;  Surgeon: Lexi Scales MD;  Location: [facility] ENDOSCOPY;  Service: Gastroenterology;  Laterality: Left;    ESOPHAGOGASTRODUODENOSCOPY      ESOPHAGOGASTRODUODENOSCOPY N/A 02/17/2023    Procedure: EGD +/- VCE PLACEMENT;  Surgeon: Morgan Gardner MD;  Location: [facility] ENDOSCOPY;  Service: Gastroenterology;   Laterality: N/A;    ESOPHAGOGASTRODUODENOSCOPY N/A 03/24/2023    Procedure: EGD;  Surgeon: Go Le MD;  Location: [facility] ENDOSCOPY;  Service: Gastroenterology;  Laterality: N/A;  with push    ESOPHAGOGASTRODUODENOSCOPY N/A 04/06/2023    Procedure: EGD;  Surgeon: Ayden Francois MD;  Location: [facility] ENDOSCOPY;  Service: Gastroenterology;  Laterality: N/A;  with push    ESOPHAGOGASTRODUODENOSCOPY N/A 06/16/2023    Procedure: EGD W/ PUSH;  Surgeon: Morgan Gardner MD;  Location: [facility] ENDOSCOPY;  Service: Gastroenterology;  Laterality: N/A;    ESOPHAGOGASTRODUODENOSCOPY N/A 1/4/2024    Procedure: EGD;  Surgeon: Morgan Scales MD;  Location: [facility] ENDOSCOPY;  Service: Gastroenterology;  Laterality: N/A;    EYE SURGERY      POLYPECTOMY      SMALL BOWEL ENTEROSCOPY Left 01/20/2023    Procedure: ENTEROSCOPY;  Surgeon: Lexi Scales MD;  Location: [facility] ENDOSCOPY;  Service: Gastroenterology;  Laterality: Left;    THYROIDECTOMY      TUBAL LIGATION       Social History     Socioeconomic History    Marital status:     Number of children: 6   Tobacco Use    Smoking status: Some Days     Current packs/day: 2.00     Average packs/day: 2.0 packs/day for 15.0 years (30.0 ttl pk-yrs)     Types: Cigarettes    Smokeless tobacco: Never    Tobacco comments:     Smokes 3 cigarettes a day   Substance and Sexual Activity    Alcohol use: Not Currently     Comment: 1 glass every 2-3 month    Drug use: Never    Sexual activity: Not Currently     Social Determinants of Health     Financial Resource Strain: Low Risk  (1/20/2023)    Overall Financial Resource Strain (CARDIA)     Difficulty of Paying Living Expenses: Not hard at all   Food Insecurity: No Food Insecurity (6/16/2023)    Hunger Vital Sign     Worried About Running Out of Food in the Last Year: Never true     Ran Out of Food in the Last Year: Never true   Transportation Needs: No Transportation Needs (6/16/2023)    PRAPARE -  Transportation     Lack of Transportation (Medical): No     Lack of Transportation (Non-Medical): No   Physical Activity: Inactive (1/20/2023)    Exercise Vital Sign     Days of Exercise per Week: 0 days     Minutes of Exercise per Session: 0 min   Stress: No Stress Concern Present (1/20/2023)    [place] [employer] - Occupational Stress Questionnaire     Feeling of Stress : Not at all   Social Connections: Moderately Isolated (1/20/2023)    Social Connection and Isolation Panel [NHANES]     Frequency of Communication with Friends and Family: More than three times a week     Frequency of Social Gatherings with Friends and Family: More than three times a week     Attends [employer] Services: More than 4 times per year     Active Member of Clubs or Organizations: No     Attends Club or Organization Meetings: Never     Marital Status:    Housing Stability: Low Risk  (6/16/2023)    Housing Stability Vital Sign     Unable to Pay for Housing in the Last Year: No     Number of Places Lived in the Last Year: 1     Unstable Housing in the Last Year: No        Family History   Problem Relation Age of Onset    Hypertension Mother     Hypertension Father     Hypertension Sister     Hypertension Sister        No current facility-administered medications for this visit.    Current Outpatient Medications:     aspirin 81 MG Chew, Take 1 tablet (81 mg total) by mouth once daily., Disp: 30 tablet, Rfl: 0    Facility-Administered Medications Ordered in Other Visits:     0.9%  NaCl infusion (for blood administration), , Intravenous, Q24H PRN, Morgan Scales MD    0.9%  NaCl infusion (for blood administration), , Intravenous, Q24H PRN, Santiago Castro MD    acetaminophen tablet 650 mg, 650 mg, Oral, Q8H PRN, Morgan Scales MD    acetaminophen tablet 650 mg, 650 mg, Oral, Q4H PRN, Morgan Scales MD    albuterol inhaler 2 puff, 2 puff, Inhalation, Q6H PRN, Morgan Scales MD    albuterol-ipratropium 2.5  mg-0.5 mg/3 mL nebulizer solution 3 mL, 3 mL, Nebulization, Q6H PRN, Morgan Scales MD, 3 mL at 01/04/24 1038    amLODIPine tablet 5 mg, 5 mg, Oral, Daily, Morgan Scales MD, 5 mg at 01/08/24 0909    atorvastatin tablet 40 mg, 40 mg, Oral, Daily, Morgan Scales MD, 40 mg at 01/09/24 1248    carvediloL tablet 6.25 mg, 6.25 mg, Oral, BID, Morgan Scales MD, 6.25 mg at 01/08/24 2052    dextrose 10% bolus 125 mL 125 mL, 12.5 g, Intravenous, PRN, Morgan Scales MD    dextrose 10% bolus 125 mL 125 mL, 12.5 g, Intravenous, PRN, Morgan Scales MD    dextrose 10% bolus 250 mL 250 mL, 25 g, Intravenous, PRN, Morgan Scales MD    dextrose 10% bolus 250 mL 250 mL, 25 g, Intravenous, PRN, Morgan Scales MD    diphenhydrAMINE capsule 50 mg, 50 mg, Oral, Nightly, Morgan Scales MD, 50 mg at 01/08/24 2051    DULoxetine DR capsule 30 mg, 30 mg, Oral, Daily, Morgan Scales MD, 30 mg at 01/09/24 1248    epoetin alexis injection 10,000 Units, 10,000 Units, Intravenous, Every Tues, Thurs, Sat, Morgan Scales MD, 10,000 Units at 01/09/24 0900    fluticasone furoate-vilanteroL 100-25 mcg/dose diskus inhaler 1 puff, 1 puff, Inhalation, Daily, Morgan Scales MD, 1 puff at 01/09/24 0900    glucagon (human recombinant) injection 1 mg, 1 mg, Intramuscular, PRN, Morgan Scales MD    glucagon (human recombinant) injection 1 mg, 1 mg, Intramuscular, PRN, Morgan Scales MD    glucose chewable tablet 16 g, 16 g, Oral, PRN, Morgan Scales MD    glucose chewable tablet 16 g, 16 g, Oral, PRN, Morgan Scales MD    glucose chewable tablet 24 g, 24 g, Oral, PRN, Morgan Scales MD    glucose chewable tablet 24 g, 24 g, Oral, PRN, Morgan Scales MD    insulin aspart U-100 injection 0-10 Units, 0-10 Units, Subcutaneous, QID (AC + HS) PRN, Morgan Scales MD, 4 Units at 01/07/24 1652    levothyroxine tablet 125 mcg, 125 mcg, Oral, Before breakfast, Morgan Scales MD, 125 mcg at 01/09/24 0500    ondansetron injection 4 mg,  4 mg, Intravenous, Q4H PRN, Morgan Scales MD    pantoprazole EC tablet 40 mg, 40 mg, Oral, BID AC, Jaclyn Nam MD, 40 mg at 01/09/24 0500    sevelamer carbonate tablet 1,600 mg, 1,600 mg, Oral, TID, Morgan Scales MD, 1,600 mg at 01/09/24 1248    sodium chloride 0.9% flush 10 mL, 10 mL, Intravenous, Q12H PRN, Morgan Scales MD    tiotropium bromide 2.5 mcg/actuation inhaler 2 puff, 2 puff, Inhalation, Daily, Morgan Scales MD, 2 puff at 01/09/24 0900     ROS:                                                                                                                                                                             Review of Systems   Constitutional: Negative.    HENT: Negative.     Eyes: Negative.    Respiratory: Negative.  Negative for shortness of breath.    Cardiovascular:  Positive for leg swelling. Negative for chest pain, palpitations, orthopnea, claudication and PND.   Gastrointestinal: Negative.    Genitourinary: Negative.    Musculoskeletal: Negative.    Skin: Negative.    Neurological: Negative.    Endo/Heme/Allergies: Negative.    Psychiatric/Behavioral: Negative.          There were no vitals taken for this visit.   PE:  Physical Exam  HENT:      Head: Normocephalic.      Nose: Nose normal.      Mouth/Throat:      Mouth: Mucous membranes are moist.   Eyes:      Extraocular Movements: Extraocular movements intact.   Cardiovascular:      Rate and Rhythm: Normal rate and regular rhythm.      Pulses: Normal pulses.   Pulmonary:      Effort: Pulmonary effort is normal.      Breath sounds: Normal breath sounds.   Abdominal:      General: There is no distension.   Musculoskeletal:         General: Normal range of motion.      Cervical back: Normal range of motion.      Right lower leg: Edema (Mild) present.      Left lower leg: Edema (Mild) present.   Skin:     General: Skin is warm.   Neurological:      General: No focal deficit present.      Mental Status: She is alert.    Psychiatric:         Mood and Affect: Mood normal.          ASSESSMENT/PLAN:  HFpEF - EF 50-55% per Echo Dec. 2021 - NYHA Class II  Patient denies SOB; continues to have some BLE edema, but associates this with chronic venous insufficiency   Echo December 2023 revealed EF 68%, normal systolic function, normal diastolic function, moderate to severe MR, moderate TR.   Echo Jan. 2018 showed diastolic dysfunction, EF of 66%, E/A flow reversal, aortic regurg, and moderately thickened leaflets without aortic stenosis  She is euvolemic, warm, and dry on exam today  Continue Lasix and hemodialysis as directed  Counseled on low salt diet    AFIB  Denies any palpitations, lightheadedness, dizziness, or tachycardic symptoms  Eliquis is on hold at this time given recent hospitalization for low H&H and dark tarry stools.  Patient underwent endoscopy and colonoscopy which did not reveal origin of bleeding  [facility] recommendations at [facility] during inpatient stay for referral to structural heart clinic for [place] occlusive device  Appeared to be in regular rhythm on auscultation today  Mildly bradycardic on exam today    CAD s/p CABG prior to 2005  Previously complained of increasing chest pain at last office visit, patient endorsed worsening pain at dialysis, at rest and with exertional activities   Today the patient states that she does not have any further chest pain  However, patient did complete nuclear stress test in December 2023 which revealed an abnormal myocardial perfusion scan. There was a moderate to severe intensity, moderate size, mostly reversible perfusion abnormality with some fixed areas in the lateral apical wall in the typical distribution of the LCX territory.  Overall the patient had a moderate risk nuclear stress test.  During inpatient stay at [facility], it was recommended that patient have LHC on an outpatient basis   Discussed the risk and benefits with patient today regarding LHC and given abnormal stress test,  significant history of CAD status post CABG, hypertension, hyperlipidemia, heart failure, obesity, diabetes, and tobacco use, will recommend that patient proceed with LHC  Case presented to staff cardiologist.  He is in agreement with plan for LHC, but states that we will only proceed with a diagnostic LHC, given that we are unclear if patient was able to tolerate DAPT at this time  Risk, Benefits and Alternatives Reviewed and Discussed with the PT and their Family and they wish to proceed with above Procedure.   NSTEMI Type II (Supply/Demand) in the setting of Severe Anemia due to GI Bleed Dec. 2021 - recent hospital admission  EF 50% per Echo Dec. 2021  Stress Echo 2018 showed no significant ischemia but a moderately large fixed anterior defect of moderate intensity  Continue Aspirin, Coreg, and Crestor  Counseled on heart healthy diet     COPD   Management per PCP     HTN  BP slightly above goal today 144/61  Ongoing occasional low BP with HD  Reports compliance with medications  Continuing current therapy   Will defer BP management to Nephrology  Counseled on low salt diet and exercise as tolerated    HLD  LDL at goal - 65 per labs September 2023  Continue Crestor 40mg daily  Counseled on low cholesterol diet    Diabetes  A1C - 5.7  Continue management per PCP    Tobacco use  Counseled on the importance of smoking cessation   She smokes 3-4 cigarettes per day - states she is trying to quit on her own      ESRD on HD T/TH/Sat  Continue nephrology management    Venous Insufficiency   s/p percutaneous endovenous Microfoam ablation with Varithena of the left GSV with Dr. Singh on May 17, 2021  Recommend compression stockings  Instructed on low salt diet  Follow up with Dr. Singh as directed- last appointment with him May 2023      Will tentatively plan for LHC at this time   Will call patient after speaking with staff cardiologist to update on plan   Strict ED precautions given  Upon further investigation, patient  has already been referred to structural heart specialist at [facility].  Of note, patient appears to not had answered phone for referral.  Attempted to reach patient and notify her that referral has been sent to [facility], however unable to reach.  Will try again tomorrow.  Follow up for preoperative visit   Follow up in cardiology clinic after [facility]

## 2024-01-10 ENCOUNTER — OFFICE VISIT (OUTPATIENT)
Dept: CARDIOLOGY | Facility: CLINIC | Age: 79
End: 2024-01-10
Payer: MEDICARE

## 2024-01-10 ENCOUNTER — PATIENT OUTREACH (OUTPATIENT)
Dept: ADMINISTRATIVE | Facility: CLINIC | Age: 79
End: 2024-01-10
Payer: MEDICARE

## 2024-01-10 VITALS
HEIGHT: 63 IN | DIASTOLIC BLOOD PRESSURE: 61 MMHG | HEART RATE: 53 BPM | SYSTOLIC BLOOD PRESSURE: 144 MMHG | WEIGHT: 200.19 LBS | BODY MASS INDEX: 35.47 KG/M2 | RESPIRATION RATE: 18 BRPM | TEMPERATURE: 98 F | OXYGEN SATURATION: 96 %

## 2024-01-10 DIAGNOSIS — I35.8 AORTIC HEART MURMUR: ICD-10-CM

## 2024-01-10 DIAGNOSIS — E78.2 MIXED HYPERLIPIDEMIA: ICD-10-CM

## 2024-01-10 DIAGNOSIS — F17.200 TOBACCO DEPENDENCE SYNDROME: ICD-10-CM

## 2024-01-10 DIAGNOSIS — R94.39 ABNORMAL CARDIOVASCULAR STRESS TEST: ICD-10-CM

## 2024-01-10 DIAGNOSIS — I95.9 HYPOTENSION, UNSPECIFIED HYPOTENSION TYPE: ICD-10-CM

## 2024-01-10 DIAGNOSIS — I25.708 ATHEROSCLEROSIS OF CORONARY ARTERY BYPASS GRAFT OF NATIVE HEART WITH STABLE ANGINA PECTORIS: ICD-10-CM

## 2024-01-10 DIAGNOSIS — I25.10 CORONARY ARTERY DISEASE INVOLVING NATIVE CORONARY ARTERY OF NATIVE HEART WITHOUT ANGINA PECTORIS: ICD-10-CM

## 2024-01-10 DIAGNOSIS — I48.91 ATRIAL FIBRILLATION, UNSPECIFIED TYPE: Chronic | ICD-10-CM

## 2024-01-10 DIAGNOSIS — Z95.1 STATUS POST CORONARY ARTERY BYPASS GRAFT: Primary | ICD-10-CM

## 2024-01-10 DIAGNOSIS — I10 HYPERTENSION, UNSPECIFIED TYPE: ICD-10-CM

## 2024-01-10 PROCEDURE — 99215 OFFICE O/P EST HI 40 MIN: CPT | Mod: PBBFAC

## 2024-01-10 PROCEDURE — 99214 OFFICE O/P EST MOD 30 MIN: CPT | Mod: S$PBB,,,

## 2024-01-10 RX ORDER — SODIUM CHLORIDE 9 MG/ML
INJECTION, SOLUTION INTRAVENOUS ONCE
Status: CANCELLED | OUTPATIENT
Start: 2024-01-10 | End: 2024-01-10

## 2024-01-10 RX ORDER — DIAZEPAM 5 MG/1
5 TABLET ORAL ONCE
Status: ACTIVE | OUTPATIENT
Start: 2024-01-10

## 2024-01-10 RX ORDER — DIPHENHYDRAMINE HCL 25 MG
25 CAPSULE ORAL
Status: CANCELLED | OUTPATIENT
Start: 2024-01-10

## 2024-01-10 RX ORDER — SODIUM CHLORIDE 0.9 % (FLUSH) 0.9 %
10 SYRINGE (ML) INJECTION
Status: SHIPPED | OUTPATIENT
Start: 2024-01-10

## 2024-01-10 NOTE — PATIENT INSTRUCTIONS
Will tentatively plan for LHC at this time   Will call patient after speaking with staff cardiologist to update on plan   Strict ED precautions given  Will likely refer to structural heart specialist pending conversation with staff cardiologist   Follow up for preoperative visit   Follow up in cardiology clinic after LHC

## 2024-01-10 NOTE — PROGRESS NOTES
C3 nurse spoke with patient's daughter Willie for a TCC post hospital discharge follow up call. Stated at work and requested call back. The patient had a scheduled HOSFU appointment with [facility] on 01/10/2024. Appointment with Margarita Dior FNP  on 01/29/2024 @ 1 pm.

## 2024-01-11 ENCOUNTER — PATIENT MESSAGE (OUTPATIENT)
Dept: ADMINISTRATIVE | Facility: CLINIC | Age: 79
End: 2024-01-11
Payer: MEDICARE

## 2024-01-11 ENCOUNTER — TELEPHONE (OUTPATIENT)
Dept: INTERNAL MEDICINE | Facility: CLINIC | Age: 79
End: 2024-01-11
Payer: MEDICARE

## 2024-01-11 NOTE — PROGRESS NOTES
C3 nurse attempted to contact [address] Menendez  for a TCC post hospital discharge follow up call. No answer. No voicemail available. Called emergency contact number for daughter Beth. No answer. No voicemail available.  The patient had a scheduled HOSFU appointment with [facility] on 01/10/2024. Appointment with Margarita Dior FNP  on 01/29/2024 @ 1 pm.

## 2024-01-11 NOTE — PROGRESS NOTES
C3 nurse attempted to contact Rosette Madrid  for a TCC post hospital discharge follow up call. No answer. No voicemail available. Called emergency contact number for daughter Beth. No answer. No voicemail available. The patient had a scheduled HOSFU appointment with [facility] on 01/10/2024. Appointment with Margarita Dior FNP  on 01/29/2024 @ 1 pm.   Message sent via patient's portal regarding follow up call.

## 2024-01-18 ENCOUNTER — TELEPHONE (OUTPATIENT)
Dept: INTERNAL MEDICINE | Facility: CLINIC | Age: 79
End: 2024-01-18
Payer: MEDICARE

## 2024-01-18 NOTE — TELEPHONE ENCOUNTER
----- Message from JOSEFINA Lord sent at 1/16/2024  8:44 AM CST -----  Patient needs to sign a release of info for daughter's FMLA to be completed

## 2024-01-22 ENCOUNTER — CLINICAL SUPPORT (OUTPATIENT)
Dept: CARDIOLOGY | Facility: CLINIC | Age: 79
End: 2024-01-22
Payer: MEDICARE

## 2024-01-22 VITALS
SYSTOLIC BLOOD PRESSURE: 122 MMHG | RESPIRATION RATE: 18 BRPM | OXYGEN SATURATION: 100 % | WEIGHT: 200 LBS | DIASTOLIC BLOOD PRESSURE: 59 MMHG | TEMPERATURE: 99 F | BODY MASS INDEX: 35.44 KG/M2 | HEART RATE: 60 BPM | HEIGHT: 63 IN

## 2024-01-22 DIAGNOSIS — I25.10 CORONARY ARTERY DISEASE INVOLVING NATIVE CORONARY ARTERY OF NATIVE HEART WITHOUT ANGINA PECTORIS: ICD-10-CM

## 2024-01-22 PROCEDURE — 99213 OFFICE O/P EST LOW 20 MIN: CPT | Mod: PBBFAC

## 2024-01-29 ENCOUNTER — OFFICE VISIT (OUTPATIENT)
Dept: INTERNAL MEDICINE | Facility: CLINIC | Age: 79
End: 2024-01-29
Payer: MEDICARE

## 2024-01-29 VITALS
SYSTOLIC BLOOD PRESSURE: 133 MMHG | TEMPERATURE: 98 F | OXYGEN SATURATION: 100 % | DIASTOLIC BLOOD PRESSURE: 71 MMHG | HEART RATE: 65 BPM | WEIGHT: 199.81 LBS | HEIGHT: 63 IN | RESPIRATION RATE: 18 BRPM | BODY MASS INDEX: 35.4 KG/M2

## 2024-01-29 DIAGNOSIS — Z99.2 ESRD (END STAGE RENAL DISEASE) ON DIALYSIS: Chronic | ICD-10-CM

## 2024-01-29 DIAGNOSIS — Z79.4 TYPE 2 DIABETES MELLITUS WITH CHRONIC KIDNEY DISEASE ON CHRONIC DIALYSIS, WITH LONG-TERM CURRENT USE OF INSULIN: Chronic | ICD-10-CM

## 2024-01-29 DIAGNOSIS — N18.6 ESRD (END STAGE RENAL DISEASE) ON DIALYSIS: Chronic | ICD-10-CM

## 2024-01-29 DIAGNOSIS — R54 FRAILTY: Chronic | ICD-10-CM

## 2024-01-29 DIAGNOSIS — Z99.2 TYPE 2 DIABETES MELLITUS WITH CHRONIC KIDNEY DISEASE ON CHRONIC DIALYSIS, WITH LONG-TERM CURRENT USE OF INSULIN: Chronic | ICD-10-CM

## 2024-01-29 DIAGNOSIS — I48.91 ATRIAL FIBRILLATION, UNSPECIFIED TYPE: Chronic | ICD-10-CM

## 2024-01-29 DIAGNOSIS — E11.22 TYPE 2 DIABETES MELLITUS WITH CHRONIC KIDNEY DISEASE ON CHRONIC DIALYSIS, WITH LONG-TERM CURRENT USE OF INSULIN: Chronic | ICD-10-CM

## 2024-01-29 DIAGNOSIS — E78.2 MIXED HYPERLIPIDEMIA: ICD-10-CM

## 2024-01-29 DIAGNOSIS — I50.9 CHRONIC CONGESTIVE HEART FAILURE, UNSPECIFIED HEART FAILURE TYPE: Chronic | ICD-10-CM

## 2024-01-29 DIAGNOSIS — N18.6 TYPE 2 DIABETES MELLITUS WITH CHRONIC KIDNEY DISEASE ON CHRONIC DIALYSIS, WITH LONG-TERM CURRENT USE OF INSULIN: Chronic | ICD-10-CM

## 2024-01-29 DIAGNOSIS — E66.01 CLASS 2 SEVERE OBESITY DUE TO EXCESS CALORIES WITH SERIOUS COMORBIDITY AND BODY MASS INDEX (BMI) OF 35.0 TO 35.9 IN ADULT: Chronic | ICD-10-CM

## 2024-01-29 DIAGNOSIS — E11.9 DIABETES MELLITUS WITHOUT COMPLICATION: ICD-10-CM

## 2024-01-29 DIAGNOSIS — R41.3 OTHER AMNESIA: Primary | ICD-10-CM

## 2024-01-29 DIAGNOSIS — I10 HYPERTENSION, UNSPECIFIED TYPE: Chronic | ICD-10-CM

## 2024-01-29 PROCEDURE — 99215 OFFICE O/P EST HI 40 MIN: CPT | Mod: PBBFAC | Performed by: NURSE PRACTITIONER

## 2024-01-29 PROCEDURE — 99215 OFFICE O/P EST HI 40 MIN: CPT | Mod: S$PBB,,, | Performed by: NURSE PRACTITIONER

## 2024-01-29 RX ORDER — INSULIN PUMP SYRINGE, 3 ML
EACH MISCELLANEOUS
Qty: 1 EACH | Refills: 0 | Status: SHIPPED | OUTPATIENT
Start: 2024-01-29 | End: 2025-01-28

## 2024-01-29 RX ORDER — LANCETS 33 GAUGE
1 EACH MISCELLANEOUS DAILY
Qty: 100 EACH | Refills: 2 | Status: SHIPPED | OUTPATIENT
Start: 2024-01-29

## 2024-01-29 RX ORDER — MINERAL OIL AND PETROLATUM 150; 830 MG/G; MG/G
OINTMENT OPHTHALMIC NIGHTLY
Qty: 3.5 G | Refills: 2 | Status: SHIPPED | OUTPATIENT
Start: 2024-01-29

## 2024-01-29 RX ORDER — BLOOD SUGAR DIAGNOSTIC
1 STRIP MISCELLANEOUS DAILY
Qty: 200 STRIP | Refills: 2 | Status: SHIPPED | OUTPATIENT
Start: 2024-01-29

## 2024-01-29 RX ORDER — AMLODIPINE BESYLATE 2.5 MG/1
2.5 TABLET ORAL DAILY
Qty: 30 TABLET | Refills: 6 | Status: SHIPPED | OUTPATIENT
Start: 2024-01-29 | End: 2024-05-20

## 2024-01-29 NOTE — PROGRESS NOTES
Patient ID: [id number]     Chief Complaint: Follow-up, Results, and Memory Loss (C/O memory loss)    HPI:     Rosette Madrid is a 78 y.o. female with diagnosis of HTN, HLD, Diastolic Dysfunction, Class III HFpEF 55%, Hx of CABG x4, A-FIB, DM2, ESRD with HD on Tu/Thur/Sat, COPD, Hypothyroidism S/T Thyroidectomy, Tobacco Use. Patient seen in clinic today for hospital follow up. Patient last seen in clinic on 12/14/2023.  Patient's daughter present during appointment.  Patient presented to ED at [facility] on 01/03/2024 for dark colored stools. Patient was significantly anemic with hemoglobin - 5.3. Patient initiated on IV ppi. PRBC transfusion ordered. Nephrology and GI services consulted. Patient underwent EGD/small-bowel enteroscopy. No bleeding source was identified. GI planning for colonoscopy this hospitalization. Cardiology also consulted for anticoagulation recommendation given recurrent GI bleed. Okay to hold Eliquis per Cardiology, plan for LAE occlusive device placement as outpatient. Patient also had a recent positive stress test and is planning for outpatient coronary angiogram. Cardiology requested to perform colonoscopy while in-house so that she can obtain clearance for dual antiplatelets. Dialysis continued while in-house. Colonoscopy unremarkable, plan for video capsule study in case of overt bleeding or drop in hemoglobin. Cardiology recommended to hold Eliquis given recurrent GI bleed, plan for Watchman device as outpatient. Blood pressure medications adjusted to maintain goal BP.  Holding long-acting insulin since A1c less than 6. Patient re-evaluated 1/9.  Cleared by all specialists for DC. Hemodynamics stable, hemoglobin continued to be stable. Patient will need to follow up with Nephrology, GI, Cardiology, cardiology planning for coronary angiogram as outpatient. Patient discharged on aspirin. Eliquis DC per Cardiology recommendation.   Patient's daughter states all diabetes medications discontinued.  States they are not checking CBGs at home due to lost CBG machine. Patient was prescribed Levemir 30 units Qhs, Glipizide 10 mg daily. Patient daughter states she was still giving Levemir at times due to her worried that her mother's CBG is elevated.   Patient prescribed Amlodipine 5 mg daily, Carvedilol 6.25 mg bid, Lasix 40 mg daily for HF, HTN. Patient's daughter states patient is hypotensive at times so she stopped giving her the Amlodipine. Per review of patient's chart, B/P was elevated at Cardiology appt (see below).   Today, patient states watery eyes. Patient denies eye discharged, change in vision.   Daughter also states patient having memory issues. States her mother moved in with her a year ago she she can take take of her. States forgets her medication at times, the date and place. Patient denies headache, syncope, vertigo.   Patient has diagnosis of COPD, currently prescribed Spiriva daily, Breo daily, Albuterol prn. Patient is a current everyday tobacco user.   Patient denies any other acute complaints.     Patient followed by Nephrology, Dr. Bourgeois.      Patient followed by Cardiology. Last appointment on 01/10/2024. HFpEF - EF 50-55% per Echo Dec. 2021 - NYHA Class II: Patient denies SOB; continues to have some BLE edema, but associates this with chronic venous insufficiency. Echo December 2023 revealed EF 68%, normal systolic function, normal diastolic function, moderate to severe MR, moderate TR. Echo Jan. 2018 showed diastolic dysfunction, EF of 66%, E/A flow reversal, aortic regurg, and moderately thickened leaflets without aortic stenosis. She is euvolemic, warm, and dry on exam today. Continue Lasix and hemodialysis as directed. Counseled on low salt diet. AFIB: Denies any palpitations, lightheadedness, dizziness, or tachycardic symptoms. Eliquis is on hold at this time given recent hospitalization for low H&H and dark tarry stools. Patient underwent endoscopy and colonoscopy which did not  reveal origin of bleeding. CIS recommendations at [facility] during inpatient stay for referral to structural heart clinic for CLINTON occlusive device. Appeared to be in regular rhythm on auscultation today. Mildly bradycardic on exam today. CAD s/p CABG prior to 2005: Previously complained of increasing chest pain at last office visit, patient endorsed worsening pain at dialysis, at rest and with exertional activities. Today the patient states that she does not have any further chest pain. However, patient did complete nuclear stress test in December 2023 which revealed an abnormal myocardial perfusion scan. There was a moderate to severe intensity, moderate size, mostly reversible perfusion abnormality with some fixed areas in the lateral apical wall in the typical distribution of the LCX territory. Overall the patient had a moderate risk nuclear stress test. During inpatient stay at [facility], it was recommended that patient have LHC on an outpatient basis. Discussed the risk and benefits with patient today regarding LHC and given abnormal stress test, significant history of CAD status post CABG, hypertension, hyperlipidemia, heart failure, obesity, diabetes, and tobacco use, will recommend that patient proceed with LHC. Case presented to staff cardiologist. He is in agreement with plan for LHC, but states that we will only proceed with a diagnostic LHC, given that we are unclear if patient was able to tolerate DAPT at this time. Risk, Benefits and Alternatives. Reviewed and Discussed with the PT and their Family and they wish to proceed with above Procedure. NSTEMI Type II (Supply/Demand) in the setting of Severe Anemia due to GI Bleed Dec. 2021 - recent hospital admission. EF 50% per Echo Dec. 2021. Stress Echo 2018 showed no significant ischemia but a moderately large fixed anterior defect of moderate intensity. Continue Aspirin, Coreg, and Crestor. Counseled on heart healthy diet. HTN: BP slightly above goal today 144/61.  Ongoing occasional low BP with HD. Reports compliance with medications. Continuing current therapy. Will defer BP management to Nephrology. Counseled on low salt diet and exercise as tolerated. HLD: LDL at goal - 65 per labs September 2023. Continue Crestor 40mg daily. Counseled on low cholesterol diet. Tobacco use: Counseled on the importance of smoking cessation. She smokes 3-4 cigarettes per day - states she is trying to quit on her own. ESRD on HD T/TH/Sat: Continue nephrology management. Venous Insufficiency: s/p percutaneous endovenous Microfoam ablation with Varithena of the left GSV with Dr. Singh on May 17, 2021. Recommend compression stockings. Instructed on low salt diet. Follow up with Dr. Singh as directed- last appointment with him May 2023. Patient has follow up appointment scheduled for 02/01/2024.     Patient followed by [facility], Dr. Marshal Espinal.     I spent a total of 45 minutes on the day of the visit.  This includes face to face time and non-face to face time preparing to see the patient (eg, review of tests), obtaining and/or reviewing separately obtained history, documenting clinical information in the electronic or other health record, independently interpreting results and communicating results to the patient/family/caregiver, or care coordinator.      Review of patient's allergies indicates:   Allergen Reactions    Lisinopril Swelling    Azithromycin      Other reaction(s): tongue/facial swelling    Baclofen      Other reaction(s): tongue/facial swelling    Doxycycline      Other reaction(s): Angioedema     Breast Cancer Screening: MMG negative on 10/28/2022  Cervical Cancer Screening: deferred due to age  Colorectal Cancer Screening: Colonoscopy on 07/21/2021 with recommended repeat in 3 years  Diabetic Eye Exam: ordered 03/01/2023  Diabetic Foot Exam: 03/01/2023  Lung Cancer Screening: refuses  Prostate Cancer Screening: N/A  AAA Screening: N/A  Osteoporosis Screening:  04/11/2022, normal  Medicare Wellness: 11/18/2023  Immunizations:   Immunization History   Administered Date(s) Administered    COVID-19 Vaccine 11/09/2021    COVID-19, MRNA, LN-S, PF (MODERNA FULL 0.5 ML DOSE) 11/09/2021    COVID-19, MRNA, LN-S, PF (Pfizer) (Purple Cap) 03/10/2021    Influenza - High Dose - PF (65 years and older) 10/31/2016    Influenza - Quadrivalent - High Dose - PF (65 years and older) 11/15/2017, 01/14/2019, 09/30/2021, 10/18/2022    PPD Test 11/15/2017    Pneumococcal Polysaccharide - 23 Valent 10/31/2016     Past Surgical History:   Procedure Laterality Date    AV FISTULA PLACEMENT Left     CARDIAC CATHETERIZATION      CARDIAC VALVE REPLACEMENT      COLONOSCOPY      COLONOSCOPY N/A 1/8/2024    Procedure: COLON;  Surgeon: Josh Gore MD;  Location: [facility] ENDOSCOPY;  Service: Gastroenterology;  Laterality: N/A;    CORONARY ARTERY BYPASS GRAFT      EGD, WITH HEMORRHAGE CONTROL  03/24/2023    Procedure: EGD,WITH HEMORRHAGE CONTROL;  Surgeon: Go Le MD;  Location: [facility] ENDOSCOPY;  Service: Gastroenterology;;    EGD, WITH HEMORRHAGE CONTROL  04/06/2023    Procedure: EGD,WITH HEMORRHAGE CONTROL;  Surgeon: Ayden Francois MD;  Location: [facility] ENDOSCOPY;  Service: Gastroenterology;;    EGD, WITH POLYPECTOMY USING SNARE Left 12/8/2023    Procedure: EGD, WITH POLYPECTOMY USING SNARE;  Surgeon: Lexi Scales MD;  Location: [facility] ENDOSCOPY;  Service: Gastroenterology;  Laterality: Left;    ESOPHAGOGASTRODUODENOSCOPY      ESOPHAGOGASTRODUODENOSCOPY N/A 02/17/2023    Procedure: EGD +/- VCE PLACEMENT;  Surgeon: Morgan Gardner MD;  Location: [facility] ENDOSCOPY;  Service: Gastroenterology;  Laterality: N/A;    ESOPHAGOGASTRODUODENOSCOPY N/A 03/24/2023    Procedure: EGD;  Surgeon: Go Le MD;  Location: [facility] ENDOSCOPY;  Service: Gastroenterology;  Laterality: N/A;  with push    ESOPHAGOGASTRODUODENOSCOPY N/A 04/06/2023    Procedure: EGD;  Surgeon:  Ayden Francois MD;  Location: [facility] ENDOSCOPY;  Service: Gastroenterology;  Laterality: N/A;  with push    ESOPHAGOGASTRODUODENOSCOPY N/A 06/16/2023    Procedure: EGD W/ PUSH;  Surgeon: Morgan Gardner MD;  Location: [facility] ENDOSCOPY;  Service: Gastroenterology;  Laterality: N/A;    ESOPHAGOGASTRODUODENOSCOPY N/A 1/4/2024    Procedure: EGD;  Surgeon: Morgan Scales MD;  Location: [facility] ENDOSCOPY;  Service: Gastroenterology;  Laterality: N/A;    EYE SURGERY      POLYPECTOMY      SMALL BOWEL ENTEROSCOPY Left 01/20/2023    Procedure: ENTEROSCOPY;  Surgeon: Lexi Scales MD;  Location: [facility] ENDOSCOPY;  Service: Gastroenterology;  Laterality: Left;    THYROIDECTOMY      TUBAL LIGATION       family history includes Hypertension in her father, mother, sister, and sister.    Social History     Socioeconomic History    Marital status:     Number of children: 6   Tobacco Use    Smoking status: Every Day     Current packs/day: 2.00     Average packs/day: 2.0 packs/day for 15.0 years (30.0 ttl pk-yrs)     Types: Cigarettes    Smokeless tobacco: Never    Tobacco comments:     Smokes 3 cigarettes a day   Substance and Sexual Activity    Alcohol use: Yes     Comment: 1 glass every 2-3 month    Drug use: Never    Sexual activity: Not Currently     Social Determinants of Health     Financial Resource Strain: Low Risk  (1/29/2024)    Overall Financial Resource Strain (CARDIA)     Difficulty of Paying Living Expenses: Not very hard   Food Insecurity: No Food Insecurity (1/29/2024)    Hunger Vital Sign     Worried About Running Out of Food in the Last Year: Never true     Ran Out of Food in the Last Year: Never true   Transportation Needs: Unmet Transportation Needs (1/29/2024)    PRAPARE - Transportation     Lack of Transportation (Medical): Yes     Lack of Transportation (Non-Medical): Patient declined   Physical Activity: Unknown (1/29/2024)    Exercise Vital Sign     Days of Exercise per Week: 3  days   Stress: No Stress Concern Present (1/29/2024)    [employer] - Occupational Stress Questionnaire     Feeling of Stress : Not at all   Social Connections: Unknown (1/29/2024)    Social Connection and Isolation Panel [NHANES]     Frequency of Communication with Friends and Family: More than three times a week     Frequency of Social Gatherings with Friends and Family: Three times a week     Active Member of Clubs or Organizations: No     Attends Club or Organization Meetings: Never     Marital Status:    Housing Stability: Low Risk  (1/29/2024)    Housing Stability Vital Sign     Unable to Pay for Housing in the Last Year: No     Number of Places Lived in the Last Year: 2     Unstable Housing in the Last Year: No     Current Outpatient Medications   Medication Instructions    albuterol (VENTOLIN HFA) 90 mcg/actuation inhaler 2 puffs, Inhalation, Every 6 hours PRN, Rescue    albuterol-ipratropium (DUO-NEB) 2.5 mg-0.5 mg/3 mL nebulizer solution 3 mLs, Nebulization, Every 6 hours PRN, Rescue    amLODIPine (NORVASC) 2.5 mg, Oral, Daily    aspirin 81 mg, Oral, Daily    BELSOMRA 5 mg Tab 1 tablet, Oral, Nightly    BenadryL 50 mg, Oral, Nightly    blood-glucose meter kit Use as instructed    carvediloL (COREG) 6.25 mg, Oral, 2 times daily    clonazePAM (KLONOPIN) 0.5 mg, Oral, Nightly    DIALYVITE 100-1 mg Tab 1 tablet, Oral, Daily    DULoxetine (CYMBALTA) 30 mg, Oral, Daily    fluticasone furoate-vilanteroL (BREO) 100-25 mcg/dose diskus inhaler 1 puff, Inhalation, Daily    furosemide (LASIX) 40 mg, Oral, Daily    INVEGA SUSTENNA 156 mg/mL Syrg injection Intramuscular    L-METHYLFOLATE 15 mg Tab 1 tablet, Oral    lactulose (CHRONULAC) 10 gram/15 mL solution 30 mLs, Oral    levothyroxine (SYNTHROID) 125 mcg, Oral, Before breakfast    loratadine (CLARITIN) 10 mg, Oral, Daily    ONETOUCH DELICA PLUS LANCET 30 gauge Misc 1 lancet , Other, Daily    ONETOUCH ULTRA TEST Strp 1 strip,  "Other, Daily    pantoprazole (PROTONIX) 40 mg, Oral, 2 times daily    rosuvastatin (CRESTOR) 40 mg, Oral, Daily    sevelamer carbonate (RENVELA) 1,600 mg, Oral, 3 times daily    tiotropium (SPIRIVA WITH HANDIHALER) 18 mcg, Inhalation, Daily    white petrolatum-mineral oiL (ARTIFICIAL TEARS, MIHAELA/MIN,) 83-15 % Oint Both Eyes, Nightly       Subjective:     Review of Systems   Constitutional: Negative.    HENT: Negative.     Eyes: Negative.         Watery eyes   Respiratory: Negative.     Cardiovascular: Negative.    Gastrointestinal: Negative.    Endocrine: Negative.    Genitourinary: Negative.    Musculoskeletal: Negative.    Skin: Negative.    Allergic/Immunologic: Negative.    Neurological: Negative.    Hematological: Negative.    Psychiatric/Behavioral: Negative.         Objective:     Visit Vitals  /71 (BP Location: Right arm, Patient Position: Sitting, BP Method: Large (Automatic))   Pulse 65   Temp 98.4 °F (36.9 °C) (Oral)   Resp 18   Ht 5' 2.99" (1.6 m)   Wt 90.6 kg (199 lb 12.8 oz)   SpO2 100%   BMI 35.40 kg/m²       Physical Exam  Vitals reviewed.   Constitutional:       Appearance: Normal appearance.   HENT:      Head: Normocephalic and atraumatic.      Mouth/Throat:      Mouth: Mucous membranes are moist.      Pharynx: Oropharynx is clear.   Cardiovascular:      Rate and Rhythm: Normal rate. Rhythm irregular.      Heart sounds: Murmur heard.   Pulmonary:      Effort: Pulmonary effort is normal.      Breath sounds: Normal breath sounds.   Abdominal:      General: Bowel sounds are normal.   Musculoskeletal:         General: Normal range of motion.      Cervical back: Normal range of motion.      Right lower leg: No edema.      Left lower leg: No edema.   Skin:     General: Skin is warm and dry.   Neurological:      Mental Status: She is alert.   Psychiatric:         Mood and Affect: Mood normal.         Behavior: Behavior normal.         Cognition and Memory: Memory is impaired.       Labs Reviewed: "     Hematology:  Lab Results   Component Value Date    WBC 5.57 01/22/2024    HGB 10.0 (L) 01/22/2024    HCT 33.1 (L) 01/22/2024     01/22/2024     Chemistry:  Lab Results   Component Value Date     01/22/2024    K 3.9 01/22/2024    CHLORIDE 104 01/22/2024    BUN 42.6 (H) 01/22/2024    CREATININE 4.93 (H) 01/22/2024    EGFRNORACEVR 9 01/22/2024    GLUCOSE 170 (H) 01/22/2024    CALCIUM 9.3 01/22/2024    ALKPHOS 121 01/22/2024    LABPROT 6.7 01/22/2024    ALBUMIN 3.2 (L) 01/22/2024    BILIDIR <0.1 03/22/2022    IBILI >0.10 03/22/2022    AST 19 01/22/2024    ALT 27 01/22/2024    MG 1.90 01/07/2024    PHOS 4.5 01/04/2024    JNNOHQVT04NS 38.1 09/01/2023     Lab Results   Component Value Date    HGBA1C 5.5 01/22/2024     Lipid Panel:  Lab Results   Component Value Date    CHOL 120 01/22/2024    HDL 40 01/22/2024    LDL 64.00 01/22/2024    TRIG 80 01/22/2024    TOTALCHOLEST 3 01/22/2024     Thyroid:  Lab Results   Component Value Date    TSH 2.230 01/04/2024    T3FREE 2.04 (L) 01/07/2020     Urine:  Lab Results   Component Value Date    COLORUA Yellow 12/06/2023    APPEARANCEUA Turbid (A) 12/06/2023    SGUA 1.012 12/06/2023    PHUA 5.0 12/06/2023    PROTEINUA Trace (A) 12/06/2023    GLUCOSEUA Normal 12/06/2023    KETONESUA Negative 12/06/2023    BLOODUA Trace (A) 12/06/2023    NITRITESUA Negative 12/06/2023    LEUKOCYTESUR 250 (A) 12/06/2023    RBCUA 0-5 12/06/2023    WBCUA 11-20 (A) 12/06/2023    BACTERIA Many (A) 12/06/2023    SQEPUA Moderate (A) 12/06/2023    HYALINECASTS None Seen 12/06/2023    CREATRANDUR 375.0 09/26/2017    PROTEINURINE 1,032.6 09/26/2017        Assessment:       ICD-10-CM ICD-9-CM   1. Other amnesia  R41.3 780.93   2. Frailty  R54 797   3. Atrial fibrillation, unspecified type  I48.91 427.31   4. Hypertension, unspecified type  I10 401.9   5. Chronic congestive heart failure, unspecified heart failure type  I50.9 428.0   6. Mixed hyperlipidemia  E78.2 272.2   7. ESRD (end stage renal  disease) on dialysis  N18.6 585.6    Z99.2 V45.11   8. Type 2 diabetes mellitus with chronic kidney disease on chronic dialysis, with long-term current use of insulin  E11.22 250.40    N18.6 585.9    Z99.2 V45.11    Z79.4 V58.67   9. Class 2 severe obesity due to excess calories with serious comorbidity and body mass index (BMI) of 35.0 to 35.9 in adult  E66.01 278.01    Z68.35 V85.35   10. Diabetes mellitus without complication  E11.9 250.00       Plan:     1. Other amnesia  - MRI Brain Without Contrast; Future    2. Frailty  ESRD with Dialysis on Tue/Thur/Fri  Impaired Memory  Frequent hospitalizations    3. Atrial fibrillation, unspecified type  Continue Coreg 6.25 mg daily  HR: 65    4. Hypertension, unspecified type  Vitals:    01/29/24 1319   BP: 133/71   Pulse: 65   Resp: 18   Temp: 98.4 °F (36.9 °C)      No results found for this or any previous visit.  Results for orders placed or performed in visit on 12/27/23   EKG 12-lead    Collection Time: 12/27/23  8:56 AM    Narrative    Test Reason : CAD, Chest Pain    Vent. Rate : 059 BPM     Atrial Rate : 058 BPM     P-R Int : 000 ms          QRS Dur : 154 ms      QT Int : 468 ms       P-R-T Axes : 000 067 121 degrees     QTc Int : 463 ms    Undetermined rhythm. P wave is not well seen in this EKG  Probably Junctional  bradycardia  Clinical Correlation Required  Left bundle branch block  Abnormal ECG    Confirmed by Daniel Kelley MD ([id number]) on 12/27/2023 12:00:06 PM    Referred By: HERBIE GERER           Confirmed By:Daniel Kelley MD   Decrease Amlodipine to 2.5 mg daily due to hypotension  Continue Coreg 6.25 mg bid, Lasix 40 mg daily  DASH diet  Encouraged home blood pressure monitoring    5. Chronic congestive heart failure, unspecified heart failure type  Followed by Cardiology  Continue Lasix    6. Mixed hyperlipidemia  Continue Rosuvastatin 40 mg daily  Weight loss encouraged  Low fat/high fiber diet  Increase physical activity  Tobacco cessation  encouraged  Cholesterol Total   Date Value Ref Range Status   01/22/2024 120 <=200 mg/dL Final     HDL Cholesterol   Date Value Ref Range Status   01/22/2024 40 35 - 60 mg/dL Final     Triglyceride   Date Value Ref Range Status   01/22/2024 80 37 - 140 mg/dL Final     LDL Cholesterol   Date Value Ref Range Status   01/22/2024 64.00 50.00 - 140.00 mg/dL Final     7. ESRD (end stage renal disease) on dialysis  BUN/Cr: 42.6/4.93  GFR: 9  Dialysis on Tue/Thur/Sat  Followed by Nephrology    8. Type 2 diabetes mellitus with chronic kidney disease on chronic dialysis, with long-term current use of insulin  Levemir and Glipizide discontinued during hospital stay  CBG log given  Encouraged home CBG monitoring.  Hypoglycemic episodes: denies  Body mass index is 35.4 kg/m².  Hemoglobin A1c   Date Value Ref Range Status   01/22/2024 5.5 <=7.0 % Final   On Rosuvastatin  No ACEi/ARB noted  Weight Loss Encouraged  ADA Diet    9. Class 2 severe obesity due to excess calories with serious comorbidity and body mass index (BMI) of 35.0 to 35.9 in adult  Body mass index is 35.4 kg/m².  TSH   Date Value Ref Range Status   01/04/2024 2.230 0.350 - 4.940 uIU/mL Final     Vit D 25 OH   Date Value Ref Range Status   09/01/2023 38.1 30.0 - 80.0 ng/mL Final     Hemoglobin A1c   Date Value Ref Range Status   01/22/2024 5.5 <=7.0 % Final   Sleep Study: none noted  Weight Loss Encouraged  Increase Physical Activity      Follow up in about 3 months (around 4/29/2024) for Labs, Virtual Visit. In addition to their scheduled follow up, the patient has also been instructed to follow up on as needed basis.     JOSEFINA Lord

## 2024-01-30 DIAGNOSIS — I48.19 PERSISTENT ATRIAL FIBRILLATION: Primary | ICD-10-CM

## 2024-02-01 ENCOUNTER — ANESTHESIA EVENT (OUTPATIENT)
Dept: CARDIOLOGY | Facility: HOSPITAL | Age: 79
End: 2024-02-01
Payer: MEDICARE

## 2024-02-01 DIAGNOSIS — I48.19 PERSISTENT ATRIAL FIBRILLATION: Primary | ICD-10-CM

## 2024-02-01 NOTE — ANESTHESIA PREPROCEDURE EVALUATION
02/01/2024  Rosette Madrid is a 78 y.o., female with ESRD (TTS--last dialysis ), CAD status post CABG (2000) with preserved EF/recent positive nucleolar stress test and moderate-to-severe MR and pulmonary hypertension , AFib previously on Eliquis presents as an outpatient for RILEY (persistent Afib for possible Watchman).  Recent admission GI bleed/critical anemia requiring transfusion early January    Nucleolar stress test December 2023   Moderate-to-severe ischemia left circumflex   EF 62-63%    Transthoracic echo December 2023   EF 60%   Moderate-to-severe MR, moderate TR   PA systolic pressure 63    Rosette Madrid    Pre-op Diagnosis: * No surgery found *  Procedure: RILEY         Review of patient's allergies indicates:   Allergen Reactions    Lisinopril Swelling    Azithromycin      Other reaction(s): tongue/facial swelling    Baclofen      Other reaction(s): tongue/facial swelling    Doxycycline      Other reaction(s): Angioedema       Current Outpatient Medications   Medication Instructions    albuterol (VENTOLIN HFA) 90 mcg/actuation inhaler 2 puffs, Inhalation, Every 6 hours PRN, Rescue    albuterol-ipratropium (DUO-NEB) 2.5 mg-0.5 mg/3 mL nebulizer solution 3 mLs, Nebulization, Every 6 hours PRN, Rescue    amLODIPine (NORVASC) 2.5 mg, Oral, Daily    aspirin 81 mg, Oral, Daily    BELSOMRA 5 mg Tab 1 tablet, Oral, Nightly    BenadryL 50 mg, Oral, Nightly    blood-glucose meter kit Use as instructed    carvediloL (COREG) 6.25 mg, Oral, 2 times daily    clonazePAM (KLONOPIN) 0.5 mg, Oral, Nightly    DIALYVITE 100-1 mg Tab 1 tablet, Oral, Daily    DULoxetine (CYMBALTA) 30 mg, Oral, Daily    fluticasone furoate-vilanteroL (BREO) 100-25 mcg/dose diskus inhaler 1 puff, Inhalation, Daily    furosemide (LASIX) 40 mg, Oral, Daily    INVEGA SUSTENNA 156 mg/mL Syrg injection Intramuscular    L-METHYLFOLATE 15 mg  Tab 1 tablet, Oral    lactulose (CHRONULAC) 10 gram/15 mL solution 30 mLs, Oral    levothyroxine (SYNTHROID) 125 mcg, Oral, Before breakfast    ONETOUCH DELICA PLUS LANCET 30 gauge Misc 1 lancet , Other, Daily    ONETOUCH ULTRA TEST Strp 1 strip, Other, Daily    pantoprazole (PROTONIX) 40 mg, Oral, 2 times daily    rosuvastatin (CRESTOR) 40 mg, Oral, Daily    sevelamer carbonate (RENVELA) 1,600 mg, Oral, 3 times daily    tiotropium (SPIRIVA WITH HANDIHALER) 18 mcg, Inhalation, Daily    white petrolatum-mineral oiL (ARTIFICIAL TEARS, MIHAELA/MIN,) 83-15 % Oint Both Eyes, Nightly       1/22/2024 LABS 10/33.1 plt 181    Past Medical History:   Diagnosis Date    CKD (chronic kidney disease) requiring chronic dialysis     COPD (chronic obstructive pulmonary disease)     Diabetes mellitus     Hyperlipidemia     Hypertension     Renal insufficiency     Thyroid disease        Past Surgical History:   Procedure Laterality Date    AV FISTULA PLACEMENT Left     CARDIAC CATHETERIZATION      CARDIAC VALVE REPLACEMENT      COLONOSCOPY      COLONOSCOPY N/A 1/8/2024    Procedure: COLON;  Surgeon: Josh Gore MD;  Location: [facility] ENDOSCOPY;  Service: Gastroenterology;  Laterality: N/A;    CORONARY ARTERY BYPASS GRAFT      EGD, WITH HEMORRHAGE CONTROL  03/24/2023    Procedure: EGD,WITH HEMORRHAGE CONTROL;  Surgeon: Go Le MD;  Location: [facility] ENDOSCOPY;  Service: Gastroenterology;;    EGD, WITH HEMORRHAGE CONTROL  04/06/2023    Procedure: EGD,WITH HEMORRHAGE CONTROL;  Surgeon: Ayden Francois MD;  Location: [facility] ENDOSCOPY;  Service: Gastroenterology;;    EGD, WITH POLYPECTOMY USING SNARE Left 12/8/2023    Procedure: EGD, WITH POLYPECTOMY USING SNARE;  Surgeon: Lexi Scales MD;  Location: [facility] ENDOSCOPY;  Service: Gastroenterology;  Laterality: Left;    ESOPHAGOGASTRODUODENOSCOPY      ESOPHAGOGASTRODUODENOSCOPY N/A 02/17/2023    Procedure: EGD +/- VCE PLACEMENT;  Surgeon: Morgan Gardner MD;   Location: [facility] ENDOSCOPY;  Service: Gastroenterology;  Laterality: N/A;    ESOPHAGOGASTRODUODENOSCOPY N/A 03/24/2023    Procedure: EGD;  Surgeon: Go Le MD;  Location: [facility] ENDOSCOPY;  Service: Gastroenterology;  Laterality: N/A;  with push    ESOPHAGOGASTRODUODENOSCOPY N/A 04/06/2023    Procedure: EGD;  Surgeon: Ayden Francois MD;  Location: [facility] ENDOSCOPY;  Service: Gastroenterology;  Laterality: N/A;  with push    ESOPHAGOGASTRODUODENOSCOPY N/A 06/16/2023    Procedure: EGD W/ PUSH;  Surgeon: Morgna Gardner MD;  Location: [facility] ENDOSCOPY;  Service: Gastroenterology;  Laterality: N/A;    ESOPHAGOGASTRODUODENOSCOPY N/A 1/4/2024    Procedure: EGD;  Surgeon: Morgan Scales MD;  Location: [facility] ENDOSCOPY;  Service: Gastroenterology;  Laterality: N/A;    EYE SURGERY      POLYPECTOMY      SMALL BOWEL ENTEROSCOPY Left 01/20/2023    Procedure: ENTEROSCOPY;  Surgeon: Lexi Scales MD;  Location: [facility] ENDOSCOPY;  Service: Gastroenterology;  Laterality: Left;    THYROIDECTOMY      TUBAL LIGATION          Pre-op Assessment    I have reviewed the Patient Summary Reports.    I have reviewed the NPO Status.   I have reviewed the Medications.     Review of Systems  Anesthesia Hx:  No problems with previous Anesthesia             Denies Family Hx of Anesthesia complications.    Denies Personal Hx of Anesthesia complications.                    Social:  Smoker Tobacco 1 cigarette /d (< 0.25 ppd)      Cardiovascular:  Exercise tolerance: poor   Hypertension Valvular problems/Murmurs (Valve replacement (patient does not know which valve))  CAD   CABG/stent Dysrhythmias atrial fibrillation  Denies Angina. CHF  Denies Orthopnea.  Denies PND.    Denies BLAS.    Functional Capacity low / < 4 METS   Coronary Artery Disease:         S/P Aorto-Coronary Bypass Graft Surgery (CABG):    Valvular Heart Disease:   Mitral Regurgitation (MR), severe                      Other Cardiac Conditions,  Pulmonary Hypertension   Pulmonary:   COPD                     Renal/:  Chronic Renal Disease, ESRD, Dialysis                Hepatic/GI:        Multiple GI Bleeds in past          Musculoskeletal:  Musculoskeletal Normal                Neurological:  Denies TIA. CVA (walks c walker), residual symptoms                                    Endocrine:  Diabetes (off meds), type 2 Hypothyroidism        Obesity / BMI > 30  Psych:  Psychiatric Normal                  STAT K+ 3.9     2/2/2024      Physical Exam  General: Well nourished, Cooperative, Alert and Oriented    Airway:  Mouth Opening: Normal  TM Distance: Normal  Tongue: Normal  Neck ROM: Normal ROM    Dental:  Dentures  Upper dentures  Chest/Lungs:  Clear to auscultation, Normal Respiratory Rate  expiratory wheezes  Mild wheeze on Right  Heart:  Rate: Normal  Rhythm: Regular Rhythm  Murmur: Systolic;  Systolic: L Upper Sternal Border, R Upper Sternal Border, Radiates to Carotids;        Anesthesia Plan  Type of Anesthesia, risks & benefits discussed:    Anesthesia Type: Gen Natural Airway  Intra-op Monitoring Plan: Standard ASA Monitors  Post Op Pain Control Plan: IV/PO Opioids PRN  Induction:  IV  Informed Consent: Informed consent signed with the Patient and all parties understand the risks and agree with anesthesia plan.  All questions answered. Patient consented to blood products? No  ASA Score: 4  Day of Surgery Review of History & Physical: H&P Update referred to the surgeon/provider.  Anesthesia Plan Notes: GA TIVA  Stat am K+ ordered  She took her Protonix & Albuterol inhaler this morning    Ready For Surgery From Anesthesia Perspective.     .

## 2024-02-02 ENCOUNTER — HOSPITAL ENCOUNTER (OUTPATIENT)
Dept: CARDIOLOGY | Facility: HOSPITAL | Age: 79
Discharge: HOME | End: 2024-02-02
Attending: INTERNAL MEDICINE | Admitting: INTERNAL MEDICINE
Payer: MEDICARE

## 2024-02-02 ENCOUNTER — ANESTHESIA (OUTPATIENT)
Dept: CARDIOLOGY | Facility: HOSPITAL | Age: 79
End: 2024-02-02
Payer: MEDICARE

## 2024-02-02 VITALS
WEIGHT: 190.25 LBS | HEIGHT: 67 IN | RESPIRATION RATE: 23 BRPM | TEMPERATURE: 98 F | OXYGEN SATURATION: 96 % | DIASTOLIC BLOOD PRESSURE: 65 MMHG | SYSTOLIC BLOOD PRESSURE: 147 MMHG | HEART RATE: 73 BPM | BODY MASS INDEX: 29.86 KG/M2

## 2024-02-02 DIAGNOSIS — I48.19 PERSISTENT ATRIAL FIBRILLATION: ICD-10-CM

## 2024-02-02 DIAGNOSIS — I48.91 A-FIB: ICD-10-CM

## 2024-02-02 LAB
POCT GLUCOSE: 114 MG/DL (ref 70–110)
POTASSIUM SERPL-SCNC: 3.9 MMOL/L (ref 3.5–5.1)

## 2024-02-02 PROCEDURE — 37000008 HC ANESTHESIA 1ST 15 MINUTES

## 2024-02-02 PROCEDURE — 37000009 HC ANESTHESIA EA ADD 15 MINS

## 2024-02-02 PROCEDURE — D9220A PRA ANESTHESIA: Mod: CRNA,,, | Performed by: NURSE ANESTHETIST, CERTIFIED REGISTERED

## 2024-02-02 PROCEDURE — 93312 ECHO TRANSESOPHAGEAL: CPT

## 2024-02-02 PROCEDURE — 25000003 PHARM REV CODE 250: Performed by: NURSE ANESTHETIST, CERTIFIED REGISTERED

## 2024-02-02 PROCEDURE — 84132 ASSAY OF SERUM POTASSIUM: CPT | Performed by: ANESTHESIOLOGY

## 2024-02-02 PROCEDURE — D9220A PRA ANESTHESIA: Mod: ANES,,, | Performed by: ANESTHESIOLOGY

## 2024-02-02 RX ORDER — LORATADINE 10 MG/1
10 TABLET ORAL DAILY
COMMUNITY

## 2024-02-02 RX ORDER — LIDOCAINE HYDROCHLORIDE 10 MG/ML
INJECTION, SOLUTION EPIDURAL; INFILTRATION; INTRACAUDAL; PERINEURAL
Status: DISCONTINUED | OUTPATIENT
Start: 2024-02-02 | End: 2024-02-02

## 2024-02-02 RX ORDER — PROPOFOL 10 MG/ML
INJECTION, EMULSION INTRAVENOUS
Status: DISCONTINUED | OUTPATIENT
Start: 2024-02-02 | End: 2024-02-02

## 2024-02-02 RX ADMIN — PROPOFOL 20 MG: 10 INJECTION, EMULSION INTRAVENOUS at 09:02

## 2024-02-02 RX ADMIN — PROPOFOL 50 MG: 10 INJECTION, EMULSION INTRAVENOUS at 09:02

## 2024-02-02 RX ADMIN — LIDOCAINE HYDROCHLORIDE 50 MG: 10 INJECTION, SOLUTION EPIDURAL; INFILTRATION; INTRACAUDAL; PERINEURAL at 09:02

## 2024-02-02 RX ADMIN — SODIUM CHLORIDE, SODIUM GLUCONATE, SODIUM ACETATE, POTASSIUM CHLORIDE AND MAGNESIUM CHLORIDE: 526; 502; 368; 37; 30 INJECTION, SOLUTION INTRAVENOUS at 09:02

## 2024-02-02 NOTE — DISCHARGE INSTRUCTIONS
At home  You may feel soreness in your throat. This will go away in 1 to 2 days. In the meantime, drink plenty of water and use cough drops to soothe your throat.  You may resume your normal activities 24 hours after your procedure.  Dont drink alcoholic drinks for 24 hours after your procedure.  Dont smoke for 24 hours after your procedure.  You may have a little blood in the phlegm (mucus) or saliva that you cough up. If this happens for more than 24 hours or if there is a lot of blood, call your healthcare provider.    When to Call Your Healthcare Provider  A fever of 101 °F (38.3 °C) or higher  Blood in your phlegm or saliva for more than 24 hours after your procedure  A lot of blood in your phlegm or saliva, such as when you cough

## 2024-02-02 NOTE — BRIEF OP NOTE
Procedure RILEY prior to CLINTON closure Device  Findings: CLINTON not well visualized. Procedure had to be cut short as the patient desaturated and anesthesia requested to pull the RILEY probe down. See anesthesia note for full details.  Patient will benefit from CTA chest EP protocol to further assess the CLINTON. But the initial RILEY views did not visualize CLINTON which is probably surgically excised during her Last CABG.

## 2024-02-02 NOTE — TRANSFER OF CARE
"Anesthesia Transfer of Care Note    Patient: Rosette Madrid    Procedure(s) Performed: * No procedures listed *    Patient location: Cath Lab    Anesthesia Type: MAC    Transport from OR: Transported from OR on room air with adequate spontaneous ventilation    Post pain: adequate analgesia    Post assessment: no apparent anesthetic complications    Post vital signs: stable    Level of consciousness: awake and alert    Nausea/Vomiting: no nausea/vomiting    Complications: none    Transfer of care protocol was followed      Last vitals: Visit Vitals  /73 (BP Location: Right arm, Patient Position: Lying)   Pulse 77   Temp 36.5 °C (97.7 °F) (Skin)   Resp (!) 26   Ht 5' 7" (1.702 m)   Wt 86.3 kg (190 lb 4.1 oz)   SpO2 98%   Breastfeeding No   BMI 29.80 kg/m²     "

## 2024-02-02 NOTE — PROCEDURES
Procedure RILEY prior to CLINTON closure Device  Findings:   CLINTON not well visualized. Procedure had to be cut short as the patient desaturated and anesthesia requested to pull the RILEY probe down. See anesthesia note for full details.  Patient will benefit from CTA chest EP protocol to further assess the CLINTON. But the initial RILEY views did not visualize CLINTON which is probably surgically excised during her Last CABG.  AV showed marked Aortic valve sclerosis. Concentric LVH is present.

## 2024-02-02 NOTE — PLAN OF CARE
Patient given discharge instructions and IV removed; Patient discharged and brought to daughter's vehicle via wheelchair. Patient in no acute distress upon discharge.

## 2024-02-02 NOTE — ANESTHESIA POSTPROCEDURE EVALUATION
Anesthesia Post Evaluation    Patient: Rosette Madrid    Procedure(s) Performed: * No procedures listed *    Final Anesthesia Type: general      Patient location during evaluation: PACU  Patient participation: Yes- Able to Participate  Level of consciousness: awake and alert and oriented  Post-procedure vital signs: reviewed and stable  Pain management: adequate  Airway patency: patent    PONV status at discharge: No PONV  Anesthetic complications: no      Cardiovascular status: hemodynamically stable  Respiratory status: unassisted  Hydration status: euvolemic  Follow-up not needed.              Vitals Value Taken Time   /65 02/02/24 1047   Temp 36.9 02/02/24 1051   Pulse 71 02/02/24 1051   Resp 23 02/02/24 1051   SpO2 95 % 02/02/24 1051   Vitals shown include unvalidated device data.      No case tracking events are documented in the log.      Pain/Marco Score: No data recorded

## 2024-02-03 LAB — BSA FOR ECHO PROCEDURE: 2.02 M2

## 2024-02-05 ENCOUNTER — HOSPITAL ENCOUNTER (OUTPATIENT)
Facility: HOSPITAL | Age: 79
Discharge: HOME | End: 2024-02-05
Attending: INTERNAL MEDICINE | Admitting: INTERNAL MEDICINE
Payer: MEDICARE

## 2024-02-05 VITALS
RESPIRATION RATE: 20 BRPM | DIASTOLIC BLOOD PRESSURE: 75 MMHG | OXYGEN SATURATION: 97 % | TEMPERATURE: 98 F | HEART RATE: 64 BPM | BODY MASS INDEX: 34.76 KG/M2 | HEIGHT: 63 IN | SYSTOLIC BLOOD PRESSURE: 158 MMHG | WEIGHT: 196.19 LBS

## 2024-02-05 DIAGNOSIS — I48.91 ATRIAL FIBRILLATION, UNSPECIFIED TYPE: ICD-10-CM

## 2024-02-05 DIAGNOSIS — E78.2 MIXED HYPERLIPIDEMIA: ICD-10-CM

## 2024-02-05 DIAGNOSIS — F17.200 TOBACCO DEPENDENCE SYNDROME: ICD-10-CM

## 2024-02-05 DIAGNOSIS — I25.708 ATHEROSCLEROSIS OF CORONARY ARTERY BYPASS GRAFT OF NATIVE HEART WITH STABLE ANGINA PECTORIS: ICD-10-CM

## 2024-02-05 DIAGNOSIS — I35.8 AORTIC HEART MURMUR: ICD-10-CM

## 2024-02-05 DIAGNOSIS — I25.10 CORONARY ARTERY DISEASE INVOLVING NATIVE CORONARY ARTERY OF NATIVE HEART WITHOUT ANGINA PECTORIS: ICD-10-CM

## 2024-02-05 DIAGNOSIS — I10 HYPERTENSION, UNSPECIFIED TYPE: ICD-10-CM

## 2024-02-05 DIAGNOSIS — R94.39 ABNORMAL CARDIOVASCULAR STRESS TEST: ICD-10-CM

## 2024-02-05 DIAGNOSIS — I95.9 HYPOTENSION, UNSPECIFIED HYPOTENSION TYPE: ICD-10-CM

## 2024-02-05 DIAGNOSIS — Z95.1 STATUS POST CORONARY ARTERY BYPASS GRAFT: ICD-10-CM

## 2024-02-05 LAB — POCT GLUCOSE: 87 MG/DL (ref 70–110)

## 2024-02-05 PROCEDURE — 25000003 PHARM REV CODE 250: Performed by: INTERNAL MEDICINE

## 2024-02-05 PROCEDURE — 99152 MOD SED SAME PHYS/QHP 5/>YRS: CPT | Performed by: INTERNAL MEDICINE

## 2024-02-05 PROCEDURE — 99153 MOD SED SAME PHYS/QHP EA: CPT | Performed by: INTERNAL MEDICINE

## 2024-02-05 PROCEDURE — 63600175 PHARM REV CODE 636 W HCPCS: Performed by: INTERNAL MEDICINE

## 2024-02-05 PROCEDURE — 93459 L HRT ART/GRFT ANGIO: CPT | Performed by: INTERNAL MEDICINE

## 2024-02-05 PROCEDURE — C1894 INTRO/SHEATH, NON-LASER: HCPCS | Performed by: INTERNAL MEDICINE

## 2024-02-05 PROCEDURE — C1760 CLOSURE DEV, VASC: HCPCS | Performed by: INTERNAL MEDICINE

## 2024-02-05 PROCEDURE — C1769 GUIDE WIRE: HCPCS | Performed by: INTERNAL MEDICINE

## 2024-02-05 PROCEDURE — 25000003 PHARM REV CODE 250

## 2024-02-05 PROCEDURE — 25500020 PHARM REV CODE 255: Performed by: INTERNAL MEDICINE

## 2024-02-05 RX ORDER — ONDANSETRON 4 MG/1
8 TABLET, ORALLY DISINTEGRATING ORAL EVERY 8 HOURS PRN
Status: DISCONTINUED | OUTPATIENT
Start: 2024-02-05 | End: 2024-02-05 | Stop reason: HOSPADM

## 2024-02-05 RX ORDER — DIAZEPAM 5 MG/1
5 TABLET ORAL ONCE
Status: DISCONTINUED | OUTPATIENT
Start: 2024-02-05 | End: 2024-02-05 | Stop reason: HOSPADM

## 2024-02-05 RX ORDER — FENTANYL CITRATE 50 UG/ML
INJECTION, SOLUTION INTRAMUSCULAR; INTRAVENOUS
Status: DISCONTINUED | OUTPATIENT
Start: 2024-02-05 | End: 2024-02-05 | Stop reason: HOSPADM

## 2024-02-05 RX ORDER — SODIUM CHLORIDE 9 MG/ML
INJECTION, SOLUTION INTRAVENOUS ONCE
Status: COMPLETED | OUTPATIENT
Start: 2024-02-05 | End: 2024-02-05

## 2024-02-05 RX ORDER — DIPHENHYDRAMINE HCL 25 MG
25 CAPSULE ORAL
Status: DISCONTINUED | OUTPATIENT
Start: 2024-02-05 | End: 2024-02-05 | Stop reason: HOSPADM

## 2024-02-05 RX ORDER — LIDOCAINE HYDROCHLORIDE 10 MG/ML
INJECTION INFILTRATION; PERINEURAL
Status: DISCONTINUED | OUTPATIENT
Start: 2024-02-05 | End: 2024-02-05 | Stop reason: HOSPADM

## 2024-02-05 RX ORDER — ACETAMINOPHEN 325 MG/1
650 TABLET ORAL EVERY 4 HOURS PRN
Status: DISCONTINUED | OUTPATIENT
Start: 2024-02-05 | End: 2024-02-05 | Stop reason: HOSPADM

## 2024-02-05 RX ORDER — MIDAZOLAM HYDROCHLORIDE 5 MG/ML
INJECTION INTRAMUSCULAR; INTRAVENOUS
Status: DISCONTINUED | OUTPATIENT
Start: 2024-02-05 | End: 2024-02-05 | Stop reason: HOSPADM

## 2024-02-05 RX ADMIN — DIPHENHYDRAMINE HYDROCHLORIDE 25 MG: 25 CAPSULE ORAL at 06:02

## 2024-02-05 RX ADMIN — SODIUM CHLORIDE: 9 INJECTION, SOLUTION INTRAVENOUS at 06:02

## 2024-02-05 NOTE — Clinical Note
The catheter was repositioned into the ostial LIMA graft. An angiography was performed of the graft and LAD graft. Multiple views were taken. The angiography was performed via power injection.

## 2024-02-05 NOTE — Clinical Note
The catheter was repositioned into the and insertion attempt was made into the ostial LIMA graft. The catheter was unable to engage the area..

## 2024-02-05 NOTE — BRIEF OP NOTE
Left heart cath Brief Op Note  Patient Name: Rosette Madrid  MRN: [id number]  : 1945  Date of Procedure: 2024  Location of Procedure: [facility] CATH LAB    Procedure: Left heart cath    Pre-op Dx:   Abnormal stress test     Post-op Dx:    Patent bypass grafts      Staff: Surgeon(s):  Josh Dubon MD Rentrop, Walter, MD     Access:   Right  femoral     Findings:   -LM:   60% ostial       -LAD:   severe disease       -LCX:           -RCA:    severe disease         -LVEDP: 12mmHg  - Patent grafts (LIMA-LAD, SVG's-OM, Diag, PDA)           Implants: * No implants in log *     Complications:  No immediate complications            Disposition:  7E with plans to discharge           Condition:  stable     Impression:  Successful coronary angiogram     Plan:  Medical management       Abhilash Marie MD  [facility] Cardiology Fellow, PGY5  2024 9:10 AM

## 2024-02-05 NOTE — Clinical Note
The catheter was repositioned into the ostial SVG graft. An angiography was performed of the graft and LCX. Multiple views were taken. The angiography was performed via power injection.

## 2024-02-05 NOTE — DISCHARGE INSTRUCTIONS
No heavy lifting or strenuous activities X 5 days. No activities that may cause an infection at the puncture site          X 5 days. No tub baths X 5 days, Showers only.          If your puncture site starts to bleed, lie down. Put heavy pressure on it until the bleeding stops.         Call 911, wait until they arrive to release pressure.    Be sure to wash your hands before touching your wound or dressing.   Take small walks around your house. Get enough rest.    Avoid straining when having a bowel movement. Eat a lot of fiber-rich foods like fruits, whole grains, and vegetables.

## 2024-02-05 NOTE — INTERVAL H&P NOTE
The patient has been examined and the H&P has been reviewed:    I concur with the findings and no changes have occurred since H&P was written.    Procedure risks, benefits and alternative options discussed and understood by patient/family.    Plan to proceed with LHC/cors for work up of chest pain and shortness of breath    There are no hospital problems to display for this patient.

## 2024-02-05 NOTE — Clinical Note
The catheter was repositioned into the ostial SVG graft. An angiography was performed of the graft and RCA graft. Multiple views were taken. The angiography was performed via power injection.

## 2024-02-05 NOTE — DISCHARGE SUMMARY
[facility] - Cath Lab  Discharge Note  Short Stay    Procedure(s) (LRB):  Left heart cath (Left)      OUTCOME: Patient tolerated treatment/procedure well without complication and is now ready for discharge.    DISPOSITION: Home or Self Care    FINAL DIAGNOSIS:  Severe CAD of native coronary arteries with patent grafts    FOLLOWUP: In clinic    DISCHARGE INSTRUCTIONS:  No discharge procedures on file.     TIME SPENT ON DISCHARGE: 5 minutes

## 2024-02-05 NOTE — Clinical Note
187 ml of contrast were injected throughout the case. 10 mL of contrast was the total wasted during the case. 197 mL was the total amount used during the case.

## 2024-02-05 NOTE — Clinical Note
The catheter was repositioned into the ostium   right coronary artery. An angiography was performed of the right coronary arteries. The angiography was performed via power injection.  The result was suboptimal..

## 2024-02-05 NOTE — Clinical Note
The skin was dry, was without bleeding and was without hematoma. 0.5 in skin tear noted to abdominal skin fold.

## 2024-02-07 NOTE — BRIEF OP NOTE
Discharge summary:  Post RILEY discharge Note  Patient to be dc home  Stable post RILEY without immediate complications  Fup with Dr. Singh  Patient will need CTA chest to confirm the presence or absence of CLINTON.  Total minutes spent : 15 min

## 2024-02-14 ENCOUNTER — HOSPITAL ENCOUNTER (OUTPATIENT)
Dept: RADIOLOGY | Facility: HOSPITAL | Age: 79
Discharge: HOME | End: 2024-02-14
Attending: NURSE PRACTITIONER
Payer: MEDICARE

## 2024-02-14 DIAGNOSIS — R41.3 OTHER AMNESIA: ICD-10-CM

## 2024-02-14 PROCEDURE — 70551 MRI BRAIN STEM W/O DYE: CPT | Mod: TC

## 2024-02-19 ENCOUNTER — HOSPITAL ENCOUNTER (INPATIENT)
Facility: HOSPITAL | Age: 79
LOS: 2 days | Discharge: ANOTHER HOSPITAL | DRG: 299 | End: 2024-02-21
Attending: EMERGENCY MEDICINE | Admitting: INTERNAL MEDICINE
Payer: MEDICARE

## 2024-02-19 DIAGNOSIS — I10 HYPERTENSION, UNSPECIFIED TYPE: ICD-10-CM

## 2024-02-19 DIAGNOSIS — N18.6 ESRD (END STAGE RENAL DISEASE) ON DIALYSIS: Primary | Chronic | ICD-10-CM

## 2024-02-19 DIAGNOSIS — I71.010 TYPE 1 DISSECTION OF ASCENDING AORTA: ICD-10-CM

## 2024-02-19 DIAGNOSIS — I47.20 V-TACH: ICD-10-CM

## 2024-02-19 DIAGNOSIS — I48.91 A-FIB: ICD-10-CM

## 2024-02-19 DIAGNOSIS — I25.10 CORONARY ARTERY DISEASE INVOLVING NATIVE CORONARY ARTERY OF NATIVE HEART WITHOUT ANGINA PECTORIS: ICD-10-CM

## 2024-02-19 DIAGNOSIS — Z99.2 ESRD (END STAGE RENAL DISEASE) ON DIALYSIS: Primary | Chronic | ICD-10-CM

## 2024-02-19 DIAGNOSIS — R93.89 ABNORMAL CT SCAN: ICD-10-CM

## 2024-02-19 DIAGNOSIS — I71.21 ANEURYSM OF ASCENDING AORTA WITHOUT RUPTURE: ICD-10-CM

## 2024-02-19 LAB
ALBUMIN SERPL-MCNC: 3.4 G/DL (ref 3.4–4.8)
ALBUMIN/GLOB SERPL: 1 RATIO (ref 1.1–2)
ALP SERPL-CCNC: 128 UNIT/L (ref 40–150)
ALT SERPL-CCNC: 36 UNIT/L (ref 0–55)
ANISOCYTOSIS BLD QL SMEAR: ABNORMAL
APTT PPP: 35.4 SECONDS (ref 23.2–33.7)
AST SERPL-CCNC: 33 UNIT/L (ref 5–34)
BASOPHILS # BLD AUTO: 0.03 X10(3)/MCL
BASOPHILS # BLD AUTO: 0.04 X10(3)/MCL
BASOPHILS NFR BLD AUTO: 0.5 %
BASOPHILS NFR BLD AUTO: 0.6 %
BILIRUB SERPL-MCNC: 0.4 MG/DL
BUN SERPL-MCNC: 30.9 MG/DL (ref 9.8–20.1)
CALCIUM SERPL-MCNC: 9.2 MG/DL (ref 8.4–10.2)
CHLORIDE SERPL-SCNC: 105 MMOL/L (ref 98–107)
CO2 SERPL-SCNC: 27 MMOL/L (ref 23–31)
CREAT SERPL-MCNC: 5.6 MG/DL (ref 0.55–1.02)
EOSINOPHIL # BLD AUTO: 0.12 X10(3)/MCL (ref 0–0.9)
EOSINOPHIL # BLD AUTO: 0.12 X10(3)/MCL (ref 0–0.9)
EOSINOPHIL NFR BLD AUTO: 1.8 %
EOSINOPHIL NFR BLD AUTO: 2.2 %
ERYTHROCYTE [DISTWIDTH] IN BLOOD BY AUTOMATED COUNT: 17.8 % (ref 11.5–17)
ERYTHROCYTE [DISTWIDTH] IN BLOOD BY AUTOMATED COUNT: 17.9 % (ref 11.5–17)
GFR SERPLBLD CREATININE-BSD FMLA CKD-EPI: 7 MLS/MIN/1.73/M2
GLOBULIN SER-MCNC: 3.3 GM/DL (ref 2.4–3.5)
GLUCOSE SERPL-MCNC: 170 MG/DL (ref 82–115)
HCT VFR BLD AUTO: 38.4 % (ref 37–47)
HCT VFR BLD AUTO: 38.5 % (ref 37–47)
HGB BLD-MCNC: 11.1 G/DL (ref 12–16)
HGB BLD-MCNC: 11.4 G/DL (ref 12–16)
IMM GRANULOCYTES # BLD AUTO: 0.02 X10(3)/MCL (ref 0–0.04)
IMM GRANULOCYTES # BLD AUTO: 0.03 X10(3)/MCL (ref 0–0.04)
IMM GRANULOCYTES NFR BLD AUTO: 0.3 %
IMM GRANULOCYTES NFR BLD AUTO: 0.5 %
INR PPP: 1.1
LYMPHOCYTES # BLD AUTO: 0.92 X10(3)/MCL (ref 0.6–4.6)
LYMPHOCYTES # BLD AUTO: 0.92 X10(3)/MCL (ref 0.6–4.6)
LYMPHOCYTES NFR BLD AUTO: 13.9 %
LYMPHOCYTES NFR BLD AUTO: 16.8 %
MCH RBC QN AUTO: 27.4 PG (ref 27–31)
MCH RBC QN AUTO: 27.6 PG (ref 27–31)
MCHC RBC AUTO-ENTMCNC: 28.9 G/DL (ref 33–36)
MCHC RBC AUTO-ENTMCNC: 29.6 G/DL (ref 33–36)
MCV RBC AUTO: 93.2 FL (ref 80–94)
MCV RBC AUTO: 94.8 FL (ref 80–94)
MONOCYTES # BLD AUTO: 0.46 X10(3)/MCL (ref 0.1–1.3)
MONOCYTES # BLD AUTO: 0.51 X10(3)/MCL (ref 0.1–1.3)
MONOCYTES NFR BLD AUTO: 7.7 %
MONOCYTES NFR BLD AUTO: 8.4 %
NEUTROPHILS # BLD AUTO: 3.92 X10(3)/MCL (ref 2.1–9.2)
NEUTROPHILS # BLD AUTO: 5.02 X10(3)/MCL (ref 2.1–9.2)
NEUTROPHILS NFR BLD AUTO: 71.6 %
NEUTROPHILS NFR BLD AUTO: 75.7 %
NRBC BLD AUTO-RTO: 0 %
NRBC BLD AUTO-RTO: 0 %
OHS QRS DURATION: 160 MS
OHS QTC CALCULATION: 440 MS
PLATELET # BLD AUTO: 150 X10(3)/MCL (ref 130–400)
PLATELET # BLD AUTO: 175 X10(3)/MCL (ref 130–400)
PLATELET # BLD EST: NORMAL 10*3/UL
PMV BLD AUTO: 10.3 FL (ref 7.4–10.4)
PMV BLD AUTO: 10.6 FL (ref 7.4–10.4)
POIKILOCYTOSIS BLD QL SMEAR: ABNORMAL
POTASSIUM SERPL-SCNC: 4.6 MMOL/L (ref 3.5–5.1)
PROT SERPL-MCNC: 6.7 GM/DL (ref 5.8–7.6)
PROTHROMBIN TIME: 14 SECONDS (ref 12.5–14.5)
RBC # BLD AUTO: 4.05 X10(6)/MCL (ref 4.2–5.4)
RBC # BLD AUTO: 4.13 X10(6)/MCL (ref 4.2–5.4)
RBC MORPH BLD: ABNORMAL
SODIUM SERPL-SCNC: 140 MMOL/L (ref 136–145)
WBC # SPEC AUTO: 5.48 X10(3)/MCL (ref 4.5–11.5)
WBC # SPEC AUTO: 6.63 X10(3)/MCL (ref 4.5–11.5)

## 2024-02-19 PROCEDURE — 99223 1ST HOSP IP/OBS HIGH 75: CPT | Mod: ,,, | Performed by: PHYSICIAN ASSISTANT

## 2024-02-19 PROCEDURE — 85730 THROMBOPLASTIN TIME PARTIAL: CPT | Performed by: EMERGENCY MEDICINE

## 2024-02-19 PROCEDURE — 85025 COMPLETE CBC W/AUTO DIFF WBC: CPT | Performed by: EMERGENCY MEDICINE

## 2024-02-19 PROCEDURE — 85025 COMPLETE CBC W/AUTO DIFF WBC: CPT

## 2024-02-19 PROCEDURE — 93010 ELECTROCARDIOGRAM REPORT: CPT | Mod: ,,, | Performed by: STUDENT IN AN ORGANIZED HEALTH CARE EDUCATION/TRAINING PROGRAM

## 2024-02-19 PROCEDURE — 25000003 PHARM REV CODE 250

## 2024-02-19 PROCEDURE — 96375 TX/PRO/DX INJ NEW DRUG ADDON: CPT

## 2024-02-19 PROCEDURE — 11000001 HC ACUTE MED/SURG PRIVATE ROOM

## 2024-02-19 PROCEDURE — 93005 ELECTROCARDIOGRAM TRACING: CPT

## 2024-02-19 PROCEDURE — 85610 PROTHROMBIN TIME: CPT | Performed by: EMERGENCY MEDICINE

## 2024-02-19 PROCEDURE — 96376 TX/PRO/DX INJ SAME DRUG ADON: CPT

## 2024-02-19 PROCEDURE — 80053 COMPREHEN METABOLIC PANEL: CPT | Performed by: EMERGENCY MEDICINE

## 2024-02-19 PROCEDURE — 99291 CRITICAL CARE FIRST HOUR: CPT

## 2024-02-19 PROCEDURE — 63600175 PHARM REV CODE 636 W HCPCS: Performed by: EMERGENCY MEDICINE

## 2024-02-19 PROCEDURE — 96365 THER/PROPH/DIAG IV INF INIT: CPT

## 2024-02-19 RX ORDER — METOPROLOL TARTRATE 1 MG/ML
5 INJECTION, SOLUTION INTRAVENOUS
Status: DISPENSED | OUTPATIENT
Start: 2024-02-19 | End: 2024-02-20

## 2024-02-19 RX ORDER — NICARDIPINE HYDROCHLORIDE 0.2 MG/ML
0-15 INJECTION INTRAVENOUS CONTINUOUS
Status: DISCONTINUED | OUTPATIENT
Start: 2024-02-19 | End: 2024-02-21

## 2024-02-19 RX ORDER — METOPROLOL TARTRATE 1 MG/ML
5 INJECTION, SOLUTION INTRAVENOUS
Status: DISCONTINUED | OUTPATIENT
Start: 2024-02-19 | End: 2024-02-20

## 2024-02-19 RX ORDER — METOPROLOL TARTRATE 1 MG/ML
5 INJECTION, SOLUTION INTRAVENOUS ONCE
Status: COMPLETED | OUTPATIENT
Start: 2024-02-19 | End: 2024-02-19

## 2024-02-19 RX ORDER — SEVELAMER CARBONATE 800 MG/1
1600 TABLET, FILM COATED ORAL 3 TIMES DAILY
Status: DISCONTINUED | OUTPATIENT
Start: 2024-02-19 | End: 2024-02-21 | Stop reason: HOSPADM

## 2024-02-19 RX ORDER — ATORVASTATIN CALCIUM 40 MG/1
80 TABLET, FILM COATED ORAL DAILY
Status: DISCONTINUED | OUTPATIENT
Start: 2024-02-20 | End: 2024-02-21 | Stop reason: HOSPADM

## 2024-02-19 RX ORDER — FOLIC ACID 1 MG/1
1 TABLET ORAL DAILY
Status: DISCONTINUED | OUTPATIENT
Start: 2024-02-20 | End: 2024-02-21 | Stop reason: HOSPADM

## 2024-02-19 RX ORDER — FLUTICASONE FUROATE AND VILANTEROL 100; 25 UG/1; UG/1
1 POWDER RESPIRATORY (INHALATION) DAILY
Status: DISCONTINUED | OUTPATIENT
Start: 2024-02-20 | End: 2024-02-21 | Stop reason: HOSPADM

## 2024-02-19 RX ORDER — CARVEDILOL 3.12 MG/1
6.25 TABLET ORAL 2 TIMES DAILY
Status: DISCONTINUED | OUTPATIENT
Start: 2024-02-19 | End: 2024-02-21

## 2024-02-19 RX ORDER — AMLODIPINE BESYLATE 2.5 MG/1
2.5 TABLET ORAL DAILY
Status: DISCONTINUED | OUTPATIENT
Start: 2024-02-19 | End: 2024-02-21

## 2024-02-19 RX ORDER — METOPROLOL TARTRATE 1 MG/ML
5 INJECTION, SOLUTION INTRAVENOUS EVERY 5 MIN PRN
Status: DISCONTINUED | OUTPATIENT
Start: 2024-02-19 | End: 2024-02-19

## 2024-02-19 RX ORDER — FUROSEMIDE 40 MG/1
40 TABLET ORAL DAILY
Status: DISCONTINUED | OUTPATIENT
Start: 2024-02-20 | End: 2024-02-21 | Stop reason: HOSPADM

## 2024-02-19 RX ORDER — SODIUM CHLORIDE 0.9 % (FLUSH) 0.9 %
10 SYRINGE (ML) INJECTION
Status: DISCONTINUED | OUTPATIENT
Start: 2024-02-19 | End: 2024-02-21 | Stop reason: HOSPADM

## 2024-02-19 RX ADMIN — METOPROLOL TARTRATE 5 MG: 1 INJECTION, SOLUTION INTRAVENOUS at 03:02

## 2024-02-19 RX ADMIN — CARVEDILOL 6.25 MG: 3.12 TABLET, FILM COATED ORAL at 09:02

## 2024-02-19 RX ADMIN — NICARDIPINE HYDROCHLORIDE 2.5 MG/HR: 0.2 INJECTION, SOLUTION INTRAVENOUS at 08:02

## 2024-02-19 RX ADMIN — NICARDIPINE HYDROCHLORIDE 5 MG/HR: 0.2 INJECTION, SOLUTION INTRAVENOUS at 06:02

## 2024-02-19 RX ADMIN — SEVELAMER CARBONATE 1600 MG: 800 TABLET, FILM COATED ORAL at 09:02

## 2024-02-19 NOTE — ED PROVIDER NOTES
Encounter Date: 2/19/2024       History     Chief Complaint   Patient presents with    Abnormal Ct Scan     EMS from [facility] for possible aortic dissection. Pt denies any complaints or pain. Vitals stable upon arrival and in route.     78-year-old female with a history of hypertension, hyperlipidemia, CKD, COPD presents to the emergency department via EMS from the [facility] after a test this morning reportedly showed a possible aortic dissection.  Patient unclear why a test was being performed or what type of test she was having.  She denies any chest pain, shortness of breath, back pain, lightheadedness, dizziness, syncopal episodes, extremity numbness, tingling, or weakness.    The history is provided by the patient and medical records.     Review of patient's allergies indicates:   Allergen Reactions    Lisinopril Swelling    Azithromycin      Other reaction(s): tongue/facial swelling    Baclofen      Other reaction(s): tongue/facial swelling    Doxycycline      Other reaction(s): Angioedema     Past Medical History:   Diagnosis Date    CKD (chronic kidney disease) requiring chronic dialysis     COPD (chronic obstructive pulmonary disease)     Diabetes mellitus     Hyperlipidemia     Hypertension     Renal insufficiency     Thyroid disease      Past Surgical History:   Procedure Laterality Date    AV FISTULA PLACEMENT Left     CARDIAC CATHETERIZATION      CARDIAC VALVE REPLACEMENT      COLONOSCOPY      COLONOSCOPY N/A 1/8/2024    Procedure: COLON;  Surgeon: Josh Gore MD;  Location: [facility] ENDOSCOPY;  Service: Gastroenterology;  Laterality: N/A;    CORONARY ARTERY BYPASS GRAFT      EGD, WITH HEMORRHAGE CONTROL  03/24/2023    Procedure: EGD,WITH HEMORRHAGE CONTROL;  Surgeon: Go Le MD;  Location: [facility] ENDOSCOPY;  Service: Gastroenterology;;    EGD, WITH HEMORRHAGE CONTROL  04/06/2023    Procedure: EGD,WITH HEMORRHAGE CONTROL;  Surgeon: Ayden Francois MD;  Location: [facility]  ENDOSCOPY;  Service: Gastroenterology;;    EGD, WITH POLYPECTOMY USING SNARE Left 12/8/2023    Procedure: EGD, WITH POLYPECTOMY USING SNARE;  Surgeon: Lexi Scales MD;  Location: [facility] ENDOSCOPY;  Service: Gastroenterology;  Laterality: Left;    ESOPHAGOGASTRODUODENOSCOPY      ESOPHAGOGASTRODUODENOSCOPY N/A 02/17/2023    Procedure: EGD +/- VCE PLACEMENT;  Surgeon: Morgan Gardner MD;  Location: [facility] ENDOSCOPY;  Service: Gastroenterology;  Laterality: N/A;    ESOPHAGOGASTRODUODENOSCOPY N/A 03/24/2023    Procedure: EGD;  Surgeon: Go Le MD;  Location: [facility] ENDOSCOPY;  Service: Gastroenterology;  Laterality: N/A;  with push    ESOPHAGOGASTRODUODENOSCOPY N/A 04/06/2023    Procedure: EGD;  Surgeon: Ayden Francois MD;  Location: [facility] ENDOSCOPY;  Service: Gastroenterology;  Laterality: N/A;  with push    ESOPHAGOGASTRODUODENOSCOPY N/A 06/16/2023    Procedure: EGD W/ PUSH;  Surgeon: Morgan Gardner MD;  Location: [facility] ENDOSCOPY;  Service: Gastroenterology;  Laterality: N/A;    ESOPHAGOGASTRODUODENOSCOPY N/A 1/4/2024    Procedure: EGD;  Surgeon: Morgan Scales MD;  Location: [facility] ENDOSCOPY;  Service: Gastroenterology;  Laterality: N/A;    EYE SURGERY      LEFT HEART CATHETERIZATION Left 2/5/2024    Procedure: Left heart cath;  Surgeon: Josh Dubon MD;  Location: [facility] CATH LAB;  Service: Cardiology;  Laterality: Left;    POLYPECTOMY      SMALL BOWEL ENTEROSCOPY Left 01/20/2023    Procedure: ENTEROSCOPY;  Surgeon: Lexi Scales MD;  Location: [facility] ENDOSCOPY;  Service: Gastroenterology;  Laterality: Left;    THYROIDECTOMY      TRANSESOPHAGEAL ECHO  2/2/2024    TUBAL LIGATION       Family History   Problem Relation Age of Onset    Hypertension Mother     Hypertension Father     Hypertension Sister     Hypertension Sister      Social History     Tobacco Use    Smoking status: Every Day     Current packs/day: 2.00     Average packs/day: 2.0 packs/day for 15.0 years  (30.0 ttl pk-yrs)     Types: Cigarettes    Smokeless tobacco: Never    Tobacco comments:     Smokes 3 cigarettes a day   Substance Use Topics    Alcohol use: Yes     Comment: 1 glass every 2-3 month    Drug use: Never     Review of Systems   Constitutional:  Negative for fever.   Respiratory:  Negative for chest tightness and shortness of breath.    Cardiovascular:  Negative for chest pain.   Gastrointestinal:  Negative for abdominal pain, nausea and vomiting.   Musculoskeletal:  Negative for back pain.       Physical Exam     Initial Vitals [02/19/24 1241]   BP Pulse Resp Temp SpO2   127/65 107 17 99.5 °F (37.5 °C) 100 %      MAP       --         Physical Exam    Nursing note and vitals reviewed.  Constitutional: She appears well-developed and well-nourished. No distress.   HENT:   Head: Normocephalic and atraumatic.   Eyes: Conjunctivae are normal. No scleral icterus.   Neck: Neck supple.   Normal range of motion.  Cardiovascular:  Normal rate, regular rhythm and intact distal pulses.           Pulmonary/Chest: Breath sounds normal. No respiratory distress.   Abdominal: Abdomen is soft. She exhibits no distension. There is no abdominal tenderness.   Musculoskeletal:      Cervical back: Normal range of motion and neck supple.     Neurological: She is alert and oriented to person, place, and time. GCS score is 15. GCS eye subscore is 4. GCS verbal subscore is 5. GCS motor subscore is 6.   Skin: Skin is warm.         ED Course   Critical Care    Date/Time: 2/19/2024 12:48 PM    Performed by: Lori Melendez MD  Authorized by: Lori Melendez MD  Direct patient critical care time: 21 minutes  Ordering / reviewing critical care time: 4 minutes  Documentation critical care time: 4 minutes  Consulting other physicians critical care time: 18 minutes  Total critical care time (exclusive of procedural time) : 47 minutes  Critical care time was exclusive of separately billable procedures and treating other  patients.  Critical care was necessary to treat or prevent imminent or life-threatening deterioration of the following conditions: circulatory failure.  Critical care was time spent personally by me on the following activities: development of treatment plan with patient or surrogate, discussions with consultants, interpretation of cardiac output measurements, evaluation of patient's response to treatment, obtaining history from patient or surrogate, examination of patient, ordering and performing treatments and interventions, ordering and review of laboratory studies, ordering and review of radiographic studies, re-evaluation of patient's condition and review of old charts.        Labs Reviewed   COMPREHENSIVE METABOLIC PANEL - Abnormal; Notable for the following components:       Result Value    Glucose Level 170 (*)     Blood Urea Nitrogen 30.9 (*)     Creatinine 5.60 (*)     Albumin/Globulin Ratio 1.0 (*)     All other components within normal limits   APTT - Abnormal; Notable for the following components:    PTT 35.4 (*)     All other components within normal limits   CBC WITH DIFFERENTIAL - Abnormal; Notable for the following components:    RBC 4.05 (*)     Hgb 11.1 (*)     MCV 94.8 (*)     MCHC 28.9 (*)     RDW 17.9 (*)     All other components within normal limits   BLOOD SMEAR MICROSCOPIC EXAM (OLG) - Abnormal; Notable for the following components:    RBC Morph Abnormal (*)     Anisocytosis 1+ (*)     Poikilocytosis 1+ (*)     All other components within normal limits   CBC WITH DIFFERENTIAL - Abnormal; Notable for the following components:    RBC 4.13 (*)     Hgb 11.4 (*)     MCHC 29.6 (*)     RDW 17.8 (*)     MPV 10.6 (*)     All other components within normal limits   PROTIME-INR - Normal   CBC W/ AUTO DIFFERENTIAL    Narrative:     The following orders were created for panel order CBC auto differential.  Procedure                               Abnormality         Status                     ---------                                -----------         ------                     CBC with Differential[[id number]]       Abnormal            Final result                 Please view results for these tests on the individual orders.   CBC W/ AUTO DIFFERENTIAL    Narrative:     The following orders were created for panel order CBC auto differential.  Procedure                               Abnormality         Status                     ---------                               -----------         ------                     CBC with Differential[[id number]]       Abnormal            Final result                 Please view results for these tests on the individual orders.     EKG Readings: (Independently Interpreted)   Rhythm: Atrial Fibrillation. Heart Rate: 62. Axis: Left Axis Deviation. Clinical Impression: with LBBB   02/19/2024 @ 1254     ECG Results              EKG 12-lead (Final result)        Collection Time Result Time QRS Duration OHS QTC Calculation    02/19/24 12:54:06 02/19/24 16:36:42 160 440                     Final result by Interface, Lab In [facility] (02/19/24 16:36:50)                   Narrative:    Test Reason : R93.89,    Vent. Rate : 062 BPM     Atrial Rate : 000 BPM     P-R Int : 000 ms          QRS Dur : 160 ms      QT Int : 434 ms       P-R-T Axes : 000 -52 128 degrees     QTc Int : 440 ms    Atrial fibrillation with a competing junctional pacemaker  Left axis deviation  Left bundle branch block  Abnormal ECG  Confirmed by Josh Macias MD ([id number]) on 2/19/2024 4:36:40 PM    Referred By: [email or web address]   SELF           Confirmed By:Josh Macias MD                                  Imaging Results    None          Medications   amLODIPine tablet 2.5 mg (2.5 mg Oral Not Given 2/19/24 1545)   carvediloL tablet 6.25 mg (has no administration in time range)   metoprolol injection 5 mg (has no administration in time range)   metoprolol injection 5 mg (has no administration in time range)   niCARdipine 40 mg/200 mL  (0.2 mg/mL) infusion (2.5 mg/hr Intravenous New Bag 2/19/24 2001)   sodium chloride 0.9% flush 10 mL (has no administration in time range)   fluticasone furoate-vilanteroL 100-25 mcg/dose diskus inhaler 1 puff (has no administration in time range)   furosemide tablet 40 mg (has no administration in time range)   folic acid tablet 1 mg (has no administration in time range)   levothyroxine tablet 125 mcg (has no administration in time range)   atorvastatin tablet 80 mg (has no administration in time range)   sevelamer carbonate tablet 1,600 mg (has no administration in time range)   metoprolol injection 5 mg (5 mg Intravenous Given 2/19/24 5207)     Medical Decision Making  Problems Addressed:  Aneurysm of ascending aorta without rupture: undiagnosed new problem with uncertain prognosis  Hypertension, unspecified type: chronic illness or injury with exacerbation, progression, or side effects of treatment  Type 1 dissection of ascending aorta: undiagnosed new problem with uncertain prognosis    Amount and/or Complexity of Data Reviewed  Labs: ordered.    Risk  Prescription drug management.  Decision regarding hospitalization.      ED assessment:    Ms. Chacon was transferred here from the cardiology clinic reportedly with an outpatient test today demonstrating findings concerning for aortic dissection.  Patient asymptomatic, minimally tachycardic but normotensive.  No imaging studies available for review in our electronic medical record.    Differential diagnosis (including but not limited to):   Aortic dissection, dissection, aortic aneurysm, complication of previous CABG, incidental finding, uncontrolled hypertension, end-stage renal disease on hemodialysis    ED management:   See ED course below    Amount and/or Complexity of Data Reviewed  Independent historian: EMS   Summary of history:  Reports seen at [facility] today, imaging done there demonstrated aortic dissection.  Unclear what type of imaging she had or why  it was being performed.  No radiology reports or imaging discs sent with patient      External data reviewed: notes from previous admissions and notes from previous ED visits  Summary of data reviewed:  Left heart catheterization 02/05/2024 with severe CAD of native coronary arteries with patent grafts     Risk and benefits of testing: discussed   Labs: ordered and reviewed  Radiology: ordered and independent interpretation performed (see above or ED course)  ECG/medicine tests: ordered and independent interpretation performed (see above or ED course)  Discussion of management or test interpretation with external provider(s): discussed with Cardiothoracic surgery, Cardiology, critical care medicine consultant   Summary of discussion:  As below    Risk  Prescription drug management   Decision regarding hospitalization  Shared decision making     Critical Care  30-74 minutes     I, Lori Melendez MD personally performed the history, PE, MDM, and procedures as documented above and agree with the scribe's documentation.              ED Course as of 02/19/24 2059 Mon Feb 19, 2024   1249 Paged [facility] to try to obtain collateral information with regards to what concerning findings were seen, location of reported vascular abnormality, etc.. [KS]   1329 Discussed with YAKELIN Miller with [facility], CT of the chest obtained as a coronary CT today demonstrated a thoracic aortic aneurysm 51.3 mm by 47.6 mm with a type a dissection extending from the aortic root through the brachiocephalic artery. [KS]   1336 Dr. Villegas to bedside, advised will try to get imaging from the clinic rather than repeat imaging here.  [KS]   1713 Evaluated by cardiothoracic surgery on call who advised attempt transfer back to [facility] for evaluation as prior CABG done there and would need revision surgery.  [KS]   1732 I have discussed with [facility] main Cardiothoracic surgery Dr. Delatorre who advised no indication for emergent intervention/transfer at this  time as likely chronic and her potential operative risk would likely exceed the risk of managing medically with optimal BP control.  He agrees to see her outpatient if desired however feels that she can also follow up locally with the Cardiothoracic surgeon in this area [KS]   1749 Discussed again with [facility], they would prefer the patient to be admitted for observation.  Requests given dialysis status that she go to Hospital Medicine, they will continue to follow in consultation [KS]   1759 Dicussed again with [facility], request SBP < 120, HR < 60 - recommend start Cardene gtt, admit to ICU.  [KS]   1820 Discussed with ICU [KS]      ED Course User Index  [KS] Lori Melendez MD                           Clinical Impression:  Final diagnoses:  [I71.21] Aneurysm of ascending aorta without rupture  [I71.010] Type 1 dissection of ascending aorta  [I10] Hypertension, unspecified type          ED Disposition Condition    Admit Stable                Lori Melendez MD  02/19/24 2059

## 2024-02-19 NOTE — CONSULTS
[facility]   Consult Note  Cardiothoracic Surgery    SUBJECTIVE:     Chief Complaint/Reason for Admission: type a dissection    History of Present Illness:  Patient is a 78 y.o. female presents with type a dissection s/p coronary cta  Pt known to have previous cab  Esrd c hd  .      Family History of Heart Disease: yes    Current Facility-Administered Medications on File Prior to Encounter   Medication Dose Route Frequency Provider Last Rate Last Admin    diazePAM tablet 5 mg  5 mg Oral Once DauterZakiya marlow PA-C        sodium chloride 0.9% flush 10 mL  10 mL Intravenous PRN DauterZakiya marlow PA-C         Current Outpatient Medications on File Prior to Encounter   Medication Sig Dispense Refill    albuterol (VENTOLIN HFA) 90 mcg/actuation inhaler Inhale 2 puffs into the lungs every 6 (six) hours as needed for Wheezing. Rescue 18 g 1    albuterol-ipratropium (DUO-NEB) 2.5 mg-0.5 mg/3 mL nebulizer solution Take 3 mLs by nebulization every 6 (six) hours as needed for Wheezing or Shortness of Breath. Rescue 75 mL 0    amLODIPine (NORVASC) 2.5 MG tablet Take 1 tablet (2.5 mg total) by mouth once daily. 30 tablet 6    aspirin 81 MG Chew Take 1 tablet (81 mg total) by mouth once daily. 30 tablet 0    BELSOMRA 5 mg Tab Take 1 tablet by mouth every evening.      BENADRYL 25 mg capsule Take 50 mg by mouth nightly.      blood-glucose meter kit Use as instructed 1 each 0    carvediloL (COREG) 6.25 MG tablet Take 1 tablet (6.25 mg total) by mouth 2 (two) times daily. 180 tablet 3    clonazePAM (KLONOPIN) 0.5 MG tablet Take 0.5 mg by mouth every evening.      DIALYVITE 100-1 mg Tab Take 1 tablet by mouth once daily.      DULoxetine (CYMBALTA) 30 MG capsule Take 30 mg by mouth once daily.      fluticasone furoate-vilanteroL (BREO) 100-25 mcg/dose diskus inhaler Inhale 1 puff into the lungs once daily. 90 each 2    furosemide (LASIX) 40 MG tablet Take 1 tablet (40 mg total) by mouth once daily. 90 tablet 3     INVEGA SUSTENNA 156 mg/mL Syrg injection Inject into the muscle.      L-METHYLFOLATE 15 mg Tab Take 1 tablet by mouth.      lactulose (CHRONULAC) 10 gram/15 mL solution Take 30 mLs by mouth.      levothyroxine (SYNTHROID) 125 MCG tablet Take 1 tablet (125 mcg total) by mouth before breakfast. 90 tablet 2    loratadine (CLARITIN) 10 mg tablet Take 10 mg by mouth once daily.      ONETOUCH DELICA PLUS LANCET 30 gauge Misc 1 lancet  by Other route once daily. 100 each 2    ONETOUCH ULTRA TEST Strp 1 strip by Other route once daily. 200 strip 2    pantoprazole (PROTONIX) 40 MG tablet Take 40 mg by mouth 2 (two) times daily.      rosuvastatin (CRESTOR) 40 MG Tab Take 1 tablet (40 mg total) by mouth once daily. 90 tablet 3    sevelamer carbonate (RENVELA) 800 mg Tab Take 2 tablets (1,600 mg total) by mouth 3 (three) times daily. 90 tablet 0    tiotropium (SPIRIVA WITH HANDIHALER) 18 mcg inhalation capsule Inhale 1 capsule (18 mcg total) into the lungs Daily. 90 capsule 2    white petrolatum-mineral oiL (ARTIFICIAL TEARS, MIHAELA/MIN,) 83-15 % Oint Place into both eyes every evening. 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     COPD (chronic obstructive pulmonary disease)     Diabetes mellitus     Hyperlipidemia     Hypertension     Renal insufficiency     Thyroid disease         Past Surgical History:   Procedure Laterality Date    AV FISTULA PLACEMENT Left     CARDIAC CATHETERIZATION      CARDIAC VALVE REPLACEMENT      COLONOSCOPY      COLONOSCOPY N/A 1/8/2024    Procedure: COLON;  Surgeon: Josh Gore MD;  Location: [facility] ENDOSCOPY;  Service: Gastroenterology;  Laterality: N/A;     CORONARY ARTERY BYPASS GRAFT      EGD, WITH HEMORRHAGE CONTROL  03/24/2023    Procedure: EGD,WITH HEMORRHAGE CONTROL;  Surgeon: Go Le MD;  Location: [facility] ENDOSCOPY;  Service: Gastroenterology;;    EGD, WITH HEMORRHAGE CONTROL  04/06/2023    Procedure: EGD,WITH HEMORRHAGE CONTROL;  Surgeon: Ayden Francois MD;  Location: [facility] ENDOSCOPY;  Service: Gastroenterology;;    EGD, WITH POLYPECTOMY USING SNARE Left 12/8/2023    Procedure: EGD, WITH POLYPECTOMY USING SNARE;  Surgeon: Lexi Scales MD;  Location: [facility] ENDOSCOPY;  Service: Gastroenterology;  Laterality: Left;    ESOPHAGOGASTRODUODENOSCOPY      ESOPHAGOGASTRODUODENOSCOPY N/A 02/17/2023    Procedure: EGD +/- VCE PLACEMENT;  Surgeon: Morgan Gardner MD;  Location: [facility] ENDOSCOPY;  Service: Gastroenterology;  Laterality: N/A;    ESOPHAGOGASTRODUODENOSCOPY N/A 03/24/2023    Procedure: EGD;  Surgeon: Go Le MD;  Location: [facility] ENDOSCOPY;  Service: Gastroenterology;  Laterality: N/A;  with push    ESOPHAGOGASTRODUODENOSCOPY N/A 04/06/2023    Procedure: EGD;  Surgeon: Ayden Francois MD;  Location: [facility] ENDOSCOPY;  Service: Gastroenterology;  Laterality: N/A;  with push    ESOPHAGOGASTRODUODENOSCOPY N/A 06/16/2023    Procedure: EGD W/ PUSH;  Surgeon: Morgan Gardner MD;  Location: [facility] ENDOSCOPY;  Service: Gastroenterology;  Laterality: N/A;    ESOPHAGOGASTRODUODENOSCOPY N/A 1/4/2024    Procedure: EGD;  Surgeon: Morgan Scales MD;  Location: [facility] ENDOSCOPY;  Service: Gastroenterology;  Laterality: N/A;    EYE SURGERY      LEFT HEART CATHETERIZATION Left 2/5/2024    Procedure: Left heart cath;  Surgeon: Josh Dubon MD;  Location: [facility] CATH LAB;  Service: Cardiology;  Laterality: Left;    POLYPECTOMY      SMALL BOWEL ENTEROSCOPY Left 01/20/2023    Procedure: ENTEROSCOPY;  Surgeon: Lexi Scales MD;  Location: [facility] ENDOSCOPY;  Service: Gastroenterology;  Laterality: Left;     THYROIDECTOMY      TRANSESOPHAGEAL ECHO  2/2/2024    TUBAL LIGATION             Review of Systems:  Review of Systems   All other systems reviewed and are negative.       OBJECTIVE:        Physical Exam:  Physical Exam     Laboratory:  I have reviewed all pertinent lab results within the past 24 hours.    Diagnostic Results:  CT: Reviewed          ASSESSMENT/PLAN:       A: Chronic type a dissection - hx cab  Esrd c hd  P: will plan to setup with case mgt to refer to [facility] for repair - possible er to er

## 2024-02-19 NOTE — CONSULTS
Inpatient consult to Cardiology  Consult performed by: Angela Corona FNP  Consult ordered by: Angela Corona FNP  Reason for consult: Type A Dissection        [facility] - Emergency Dept    Cardiology  Consult Note    Patient Name: Rosette Madrid  MRN: [id number]  Admission Date: 2/19/2024  Hospital Length of Stay: 0 days  Code Status: Prior   Attending Provider: Lori Melendez MD   Consulting Provider: JOSEFINA Bauer  Primary Care Physician: Margarita Dior FNP  Principal Problem:<principal problem not specified>    Patient information was obtained from patient, past medical records, and ER records.     Subjective:     Reason for Consult: Type A Dissection     HPI: Ms. Chacon is a 78 year old female who is known to [facility], Dr. Macias. She was sent to the ER from the [facility] clinic after a cardiac CTA was obtained that demonstrated a Type A Dissection. Per the reading physician, she was noted to have an ascending aortic aneurysm (51.3 mm x 47.6 mm) and an ascending aortic dissection from the aortic root and ends prior to the brachiocephalic artery. The test was ordered in the outpatient setting to further assess if her left atrial appendage is present as she is undergoing an eval for possible LAAA closure device. Significant labs include B/Cr 30.9/5.60. She is hemodynamically stable at present. [facility] has been consulted to further evaluate the patient's Type A Dissection.     PMH: ESRD/HD, HFpEF, Native CAD/CABG, Pafib/Eliquis, recurrent GIBs, T2DM, COPD, Hypothyroidism, HTN, HLD, lymphedema LLE, Vitamin D deficiency, gastric angiodysplasia  PSH: AV fistula placement, CABG, EGD, polypectomy, thyroidectomy, tubal ligation  Family History: Mother- HTN; father- HTN  Social History: current smoker; denies illicit drug use; occasional ETOH     Previous Cardiac Diagnostics:   [facility] 2.5.24:  FINDINGS  LVEDP:  12 mmHg  Left Main:   60% ostial, 80% distal  stenosis  LAD:   70% ostial, long  segment 80% mid vessel stenosis     Circumflex:        RCA:   50% mid, 90% distal  stenosis  The patient has Significant three vessel disease of native coronary arteries  LIMA-LAD: Patent.  just distal to anastomosis  SVG-Diagonal: Mild proximal disease  SVG-OM: Patent  SVG-RCA: Patent  RECOMMENDATIONS  Medical management  Risk factor modifications -- maintain good BP control  Activity -- avoid straining with affected limb for one week  Exercise -- as tolerated  Precautions post D/C -- come to ER for hematoma, unusual pain, erythema, or unusual drainage at access site  Precautions post D/C -- if develop bleeding or hematoma at access site, hold pressure at access site, and come to ER    [facility] 2.2.24:  Procedure RILEY prior to CLINTON closure Device  Findings:  CLINTON not well visualized. Procedure had to be cut short as the patient desaturated and anesthesia requested to pull the RILEY probe down.  Patient will benefit from CTA chest EP protocol to further assess the CLINTON. But the initial RILEY views did not visualize CLINTON which is probably surgically excised during her last CABG.  AV showed marked Aortic valve sclerosis. Concentric LVH is present.     MPI 12.27.23:  Abnormal myocardial perfusion scan.  There is a moderate to severe intensity, moderate sized, mostly reversible perfusion abnormality with some fixed areas in the lateral apical wall(s) in the typical distribution of the LCX territory.  There are no other significant perfusion abnormalities.  The gated perfusion images showed an ejection fraction of 62% at rest. The gated perfusion images showed an ejection fraction of 63% post stress.  There were no arrhythmias during stress.     TTE 12.27.23:  Left Ventricle: The left ventricle is normal in size. Normal wall thickness. There is normal systolic function. Biplane (2D) method of discs ejection fraction is 68%.  Right Ventricle: Normal right ventricular cavity size. Systolic function is borderline low.  Left Atrium:  Left atrium is mildly dilated.  Right Atrium: Right atrium is mildly dilated.  Aortic Valve: The aortic valve is a trileaflet valve. There is moderate aortic valve sclerosis.  Mitral Valve: There is severe posterior mitral annular calcification present. There is moderate to severe regurgitation.  Tricuspid Valve: There is moderate annular calcification present. There is moderate regurgitation.  IVC/SVC: Intermediate venous pressure at 8 mmHg.     Venous US 05.12.23:  The study quality is average.   Pulsatile venous flow suggestive of fluid volume overload.  Severe edema noted.  The veins of the bilateral lower extremities compress easily and augment well with normal phasic flow and no evidence of deep venous thrombosis or arterio-venous fistula on this study.  LEFT GSV REFLUX:  The left mid thigh GSV throughout the mid calf GSV appears to have been harvested for CABG.   RIGHT GSV REFLUX:  1.5 sec reflux is noted in the right great saphenous vein at the mid calf with a diameter of 2.7 mm.  DEEP SYSTEM REFLUX:  2.2 secs in the right CFV, 1.5 secs in the left CFV, 3.2 secs in the right mid SFV  SSV:  Bilaterally small SSV with no reflux appreciated.     Review of patient's allergies indicates:   Allergen Reactions    Lisinopril Swelling    Azithromycin      Other reaction(s): tongue/facial swelling    Baclofen      Other reaction(s): tongue/facial swelling    Doxycycline      Other reaction(s): Angioedema     Current Facility-Administered Medications on File Prior to Encounter   Medication    diazePAM tablet 5 mg    sodium chloride 0.9% flush 10 mL     Current Outpatient Medications on File Prior to Encounter   Medication Sig    albuterol (VENTOLIN HFA) 90 mcg/actuation inhaler Inhale 2 puffs into the lungs every 6 (six) hours as needed for Wheezing. Rescue    albuterol-ipratropium (DUO-NEB) 2.5 mg-0.5 mg/3 mL nebulizer solution Take 3 mLs by nebulization every 6 (six) hours as needed for Wheezing or Shortness of  Breath. Rescue    amLODIPine (NORVASC) 2.5 MG tablet Take 1 tablet (2.5 mg total) by mouth once daily.    aspirin 81 MG Chew Take 1 tablet (81 mg total) by mouth once daily.    BELSOMRA 5 mg Tab Take 1 tablet by mouth every evening.    BENADRYL 25 mg capsule Take 50 mg by mouth nightly.    blood-glucose meter kit Use as instructed    carvediloL (COREG) 6.25 MG tablet Take 1 tablet (6.25 mg total) by mouth 2 (two) times daily.    clonazePAM (KLONOPIN) 0.5 MG tablet Take 0.5 mg by mouth every evening.    DIALYVITE 100-1 mg Tab Take 1 tablet by mouth once daily.    DULoxetine (CYMBALTA) 30 MG capsule Take 30 mg by mouth once daily.    fluticasone furoate-vilanteroL (BREO) 100-25 mcg/dose diskus inhaler Inhale 1 puff into the lungs once daily.    furosemide (LASIX) 40 MG tablet Take 1 tablet (40 mg total) by mouth once daily.    INVEGA SUSTENNA 156 mg/mL Syrg injection Inject into the muscle.    L-METHYLFOLATE 15 mg Tab Take 1 tablet by mouth.    lactulose (CHRONULAC) 10 gram/15 mL solution Take 30 mLs by mouth.    levothyroxine (SYNTHROID) 125 MCG tablet Take 1 tablet (125 mcg total) by mouth before breakfast.    loratadine (CLARITIN) 10 mg tablet Take 10 mg by mouth once daily.    ONETOUCH DELICA PLUS LANCET 30 gauge Misc 1 lancet  by Other route once daily.    ONETOUCH ULTRA TEST Strp 1 strip by Other route once daily.    pantoprazole (PROTONIX) 40 MG tablet Take 40 mg by mouth 2 (two) times daily.    rosuvastatin (CRESTOR) 40 MG Tab Take 1 tablet (40 mg total) by mouth once daily.    sevelamer carbonate (RENVELA) 800 mg Tab Take 2 tablets (1,600 mg total) by mouth 3 (three) times daily.    tiotropium (SPIRIVA WITH HANDIHALER) 18 mcg inhalation capsule Inhale 1 capsule (18 mcg total) into the lungs Daily.    white petrolatum-mineral oiL (ARTIFICIAL TEARS, MIHAELA/MIN,) 83-15 % Oint Place into both eyes every evening.     Review of Systems   Respiratory:  Negative for shortness of breath.    Cardiovascular:  Negative  for chest pain and palpitations.       Objective:     Vital Signs (Most Recent):  Temp: 99.5 °F (37.5 °C) (02/19/24 1241)  Pulse: (!) 59 (02/19/24 1331)  Resp: 19 (02/19/24 1331)  BP: 126/68 (02/19/24 1331)  SpO2: 98 % (02/19/24 1331) Vital Signs (24h Range):  Temp:  [99.5 °F (37.5 °C)] 99.5 °F (37.5 °C)  Pulse:  [] 59  Resp:  [17-19] 19  SpO2:  [98 %-100 %] 98 %  BP: (126-127)/(65-68) 126/68   Weight: 89.4 kg (197 lb)  Body mass index is 34.9 kg/m².  SpO2: 98 %     No intake or output data in the 24 hours ending 02/19/24 1439  Lines/Drains/Airways       Peripheral Intravenous Line  Duration                  Hemodialysis AV Fistula Left forearm -- days         Peripheral IV - Single Lumen 02/19/24 1300 20 G Right Antecubital <1 day                  Significant Labs:  Recent Results (from the past 72 hour(s))   Comprehensive metabolic panel    Collection Time: 02/19/24  1:22 PM   Result Value Ref Range    Sodium Level 140 136 - 145 mmol/L    Potassium Level 4.6 3.5 - 5.1 mmol/L    Chloride 105 98 - 107 mmol/L    Carbon Dioxide 27 23 - 31 mmol/L    Glucose Level 170 (H) 82 - 115 mg/dL    Blood Urea Nitrogen 30.9 (H) 9.8 - 20.1 mg/dL    Creatinine 5.60 (H) 0.55 - 1.02 mg/dL    Calcium Level Total 9.2 8.4 - 10.2 mg/dL    Protein Total 6.7 5.8 - 7.6 gm/dL    Albumin Level 3.4 3.4 - 4.8 g/dL    Globulin 3.3 2.4 - 3.5 gm/dL    Albumin/Globulin Ratio 1.0 (L) 1.1 - 2.0 ratio    Bilirubin Total 0.4 <=1.5 mg/dL    Alkaline Phosphatase 128 40 - 150 unit/L    Alanine Aminotransferase 36 0 - 55 unit/L    Aspartate Aminotransferase 33 5 - 34 unit/L    eGFR 7 mls/min/1.73/m2   Protime-INR    Collection Time: 02/19/24  1:22 PM   Result Value Ref Range    PT 14.0 12.5 - 14.5 seconds    INR 1.1 <=1.3   APTT    Collection Time: 02/19/24  1:22 PM   Result Value Ref Range    PTT 35.4 (H) 23.2 - 33.7 seconds   CBC with Differential    Collection Time: 02/19/24  1:22 PM   Result Value Ref Range    WBC 6.63 4.50 - 11.50 x10(3)/mcL     RBC 4.05 (L) 4.20 - 5.40 x10(6)/mcL    Hgb 11.1 (L) 12.0 - 16.0 g/dL    Hct 38.4 37.0 - 47.0 %    MCV 94.8 (H) 80.0 - 94.0 fL    MCH 27.4 27.0 - 31.0 pg    MCHC 28.9 (L) 33.0 - 36.0 g/dL    RDW 17.9 (H) 11.5 - 17.0 %    Platelet 150 130 - 400 x10(3)/mcL    MPV 10.3 7.4 - 10.4 fL    Neut % 75.7 %    Lymph % 13.9 %    Mono % 7.7 %    Eos % 1.8 %    Basophil % 0.6 %    Lymph # 0.92 0.6 - 4.6 x10(3)/mcL    Neut # 5.02 2.1 - 9.2 x10(3)/mcL    Mono # 0.51 0.1 - 1.3 x10(3)/mcL    Eos # 0.12 0 - 0.9 x10(3)/mcL    Baso # 0.04 <=0.2 x10(3)/mcL    IG# 0.02 0 - 0.04 x10(3)/mcL    IG% 0.3 %    NRBC% 0.0 %     Significant Imaging:  Imaging Results    None       EKG:       Telemetry:  Afib CVR    Physical Exam  HENT:      Head: Normocephalic.      Nose: Nose normal.      Mouth/Throat:      Mouth: Mucous membranes are moist.   Eyes:      Extraocular Movements: Extraocular movements intact.   Cardiovascular:      Rate and Rhythm: Normal rate. Rhythm irregular.      Pulses: Normal pulses.      Heart sounds: Normal heart sounds.   Pulmonary:      Effort: Pulmonary effort is normal.      Breath sounds: Normal breath sounds.   Abdominal:      Palpations: Abdomen is soft.   Skin:     General: Skin is warm and dry.   Neurological:      Mental Status: She is alert and oriented to person, place, and time.   Psychiatric:         Behavior: Behavior normal.         Home Medications:   Current Facility-Administered Medications on File Prior to Encounter   Medication Dose Route Frequency Provider Last Rate Last Admin    diazePAM tablet 5 mg  5 mg Oral Once Zakiya Torres PA-C        sodium chloride 0.9% flush 10 mL  10 mL Intravenous PRN Zakiya Torres PA-C         Current Outpatient Medications on File Prior to Encounter   Medication Sig Dispense Refill    albuterol (VENTOLIN HFA) 90 mcg/actuation inhaler Inhale 2 puffs into the lungs every 6 (six) hours as needed for Wheezing. Rescue 18 g 1    albuterol-ipratropium (DUO-NEB) 2.5  mg-0.5 mg/3 mL nebulizer solution Take 3 mLs by nebulization every 6 (six) hours as needed for Wheezing or Shortness of Breath. Rescue 75 mL 0    amLODIPine (NORVASC) 2.5 MG tablet Take 1 tablet (2.5 mg total) by mouth once daily. 30 tablet 6    aspirin 81 MG Chew Take 1 tablet (81 mg total) by mouth once daily. 30 tablet 0    BELSOMRA 5 mg Tab Take 1 tablet by mouth every evening.      BENADRYL 25 mg capsule Take 50 mg by mouth nightly.      blood-glucose meter kit Use as instructed 1 each 0    carvediloL (COREG) 6.25 MG tablet Take 1 tablet (6.25 mg total) by mouth 2 (two) times daily. 180 tablet 3    clonazePAM (KLONOPIN) 0.5 MG tablet Take 0.5 mg by mouth every evening.      DIALYVITE 100-1 mg Tab Take 1 tablet by mouth once daily.      DULoxetine (CYMBALTA) 30 MG capsule Take 30 mg by mouth once daily.      fluticasone furoate-vilanteroL (BREO) 100-25 mcg/dose diskus inhaler Inhale 1 puff into the lungs once daily. 90 each 2    furosemide (LASIX) 40 MG tablet Take 1 tablet (40 mg total) by mouth once daily. 90 tablet 3    INVEGA SUSTENNA 156 mg/mL Syrg injection Inject into the muscle.      L-METHYLFOLATE 15 mg Tab Take 1 tablet by mouth.      lactulose (CHRONULAC) 10 gram/15 mL solution Take 30 mLs by mouth.      levothyroxine (SYNTHROID) 125 MCG tablet Take 1 tablet (125 mcg total) by mouth before breakfast. 90 tablet 2    loratadine (CLARITIN) 10 mg tablet Take 10 mg by mouth once daily.      ONETOUCH DELICA PLUS LANCET 30 gauge Misc 1 lancet  by Other route once daily. 100 each 2    ONETOUCH ULTRA TEST Strp 1 strip by Other route once daily. 200 strip 2    pantoprazole (PROTONIX) 40 MG tablet Take 40 mg by mouth 2 (two) times daily.      rosuvastatin (CRESTOR) 40 MG Tab Take 1 tablet (40 mg total) by mouth once daily. 90 tablet 3    sevelamer carbonate (RENVELA) 800 mg Tab Take 2 tablets (1,600 mg total) by mouth 3 (three) times daily. 90 tablet 0    tiotropium (SPIRIVA WITH HANDIHALER) 18 mcg inhalation  capsule Inhale 1 capsule (18 mcg total) into the lungs Daily. 90 capsule 2    white petrolatum-mineral oiL (ARTIFICIAL TEARS, MIHAELA/MIN,) 83-15 % Oint Place into both eyes every evening. 3.5 g 2     Current Inpatient Medications:    Current Facility-Administered Medications:     amLODIPine tablet 2.5 mg, 2.5 mg, Oral, Daily, Angela Corona, FNP    carvediloL tablet 6.25 mg, 6.25 mg, Oral, BID, Angela Corona, FNP    diazePAM tablet 5 mg, 5 mg, Oral, Once, Dauterive, Zakiya K., PA-C    metoprolol injection 5 mg, 5 mg, Intravenous, Once, Angela Corona, FNP    metoprolol injection 5 mg, 5 mg, Intravenous, Q5 Min PRN, Angela Corona, JAIMEP    sodium chloride 0.9% flush 10 mL, 10 mL, Intravenous, PRN, Dauterive, Zakiya K., PA-C    Current Outpatient Medications:     albuterol (VENTOLIN HFA) 90 mcg/actuation inhaler, Inhale 2 puffs into the lungs every 6 (six) hours as needed for Wheezing. Rescue, Disp: 18 g, Rfl: 1    albuterol-ipratropium (DUO-NEB) 2.5 mg-0.5 mg/3 mL nebulizer solution, Take 3 mLs by nebulization every 6 (six) hours as needed for Wheezing or Shortness of Breath. Rescue, Disp: 75 mL, Rfl: 0    amLODIPine (NORVASC) 2.5 MG tablet, Take 1 tablet (2.5 mg total) by mouth once daily., Disp: 30 tablet, Rfl: 6    aspirin 81 MG Chew, Take 1 tablet (81 mg total) by mouth once daily., Disp: 30 tablet, Rfl: 0    BELSOMRA 5 mg Tab, Take 1 tablet by mouth every evening., Disp: , Rfl:     BENADRYL 25 mg capsule, Take 50 mg by mouth nightly., Disp: , Rfl:     blood-glucose meter kit, Use as instructed, Disp: 1 each, Rfl: 0    carvediloL (COREG) 6.25 MG tablet, Take 1 tablet (6.25 mg total) by mouth 2 (two) times daily., Disp: 180 tablet, Rfl: 3    clonazePAM (KLONOPIN) 0.5 MG tablet, Take 0.5 mg by mouth every evening., Disp: , Rfl:     DIALYVITE 100-1 mg Tab, Take 1 tablet by mouth once daily., Disp: , Rfl:     DULoxetine (CYMBALTA) 30 MG capsule, Take 30 mg by mouth once daily., Disp: , Rfl:      fluticasone furoate-vilanteroL (BREO) 100-25 mcg/dose diskus inhaler, Inhale 1 puff into the lungs once daily., Disp: 90 each, Rfl: 2    furosemide (LASIX) 40 MG tablet, Take 1 tablet (40 mg total) by mouth once daily., Disp: 90 tablet, Rfl: 3    INVEGA SUSTENNA 156 mg/mL Syrg injection, Inject into the muscle., Disp: , Rfl:     L-METHYLFOLATE 15 mg Tab, Take 1 tablet by mouth., Disp: , Rfl:     lactulose (CHRONULAC) 10 gram/15 mL solution, Take 30 mLs by mouth., Disp: , Rfl:     levothyroxine (SYNTHROID) 125 MCG tablet, Take 1 tablet (125 mcg total) by mouth before breakfast., Disp: 90 tablet, Rfl: 2    loratadine (CLARITIN) 10 mg tablet, Take 10 mg by mouth once daily., Disp: , Rfl:     ONETOUCH DELICA PLUS LANCET 30 gauge Misc, 1 lancet  by Other route once daily., Disp: 100 each, Rfl: 2    ONETOUCH ULTRA TEST Strp, 1 strip by Other route once daily., Disp: 200 strip, Rfl: 2    pantoprazole (PROTONIX) 40 MG tablet, Take 40 mg by mouth 2 (two) times daily., Disp: , Rfl:     rosuvastatin (CRESTOR) 40 MG Tab, Take 1 tablet (40 mg total) by mouth once daily., Disp: 90 tablet, Rfl: 3    sevelamer carbonate (RENVELA) 800 mg Tab, Take 2 tablets (1,600 mg total) by mouth 3 (three) times daily., Disp: 90 tablet, Rfl: 0    tiotropium (SPIRIVA WITH HANDIHALER) 18 mcg inhalation capsule, Inhale 1 capsule (18 mcg total) into the lungs Daily., Disp: 90 capsule, Rfl: 2    white petrolatum-mineral oiL (ARTIFICIAL TEARS, MIHAELA/MIN,) 83-15 % Oint, Place into both eyes every evening., Disp: 3.5 g, Rfl: 2  VTE Risk Mitigation (From admission, onward)      None          Assessment:   Type A Dissection    - Cardiac CTA 2.19.24: ascending aortic aneurysm (51.3 mm x 47.6 mm) and an ascending aortic dissection from the aortic root and ends prior to the brachiocephalic artery  Pafib - CVR    - CHADS VASC score  6 (age, HTN, female, T2DM, vascular disease)    - HAS Bled score 5 (HTN, age, ESRD, predisposing drugs, prior bleed)    -  Currently off Anticoagulation    - CTA was ordered to see if CLINTON was present   Chronic anemia  ESRD/HD TTS  VHD    - moderate to severe MR  Native CAD/hx 4V CABG     - [facility] 2.5.24: LIMA-LAD: Patent.  just distal to anastomosis. SVG-Diagonal: Mild proximal disease. SVG-OM: Patent. SVG-RCA: Patent    - MPI 12.27.23: reversible perfusion abnormality in lateral apical wall  HTN  HLD  COPD  T2DM  Hypothyroidism  Lymphedema LLE    Plan:   Consult CT surgery to evalaute AAA. Dr. Villegas discussed case with Dr. Land.   Would like to keep HR < 50 & SBP < 110. Give Lopressor 5 mg IVP x 1 now. Can give PRN.  Resume home BP meds: amlodipine & Coreg.  Hold ASA for now.     Thank you for your consult.     Angela Corona, [facility]  Cardiology  [facility] - Emergency Dept  02/19/2024     Physician addendum:  I have seen and examined this patient as a split-shared visit with the WIL d/t complicated medical management of above problems written in assessment and high acuity requiring physician expertise in medical decision-making. I performed the substantive portion of the history and exam. Above medical decision-making is also formulated by me.    Cardiovascular exam:  S1, S2  Lungs:  fine crackles at bases.  Extremities:  Trace edema bilaterally    Plan:  CT reviewed.  Patient has type a aortic dissection.  She is stable.  This could be chronic.  Dr. Land was contacted, he will put his recommendations.  We would like to keep heart rate less than 50 and systolic blood pressure less than 110.  Continue amlodipine.      Valerio Villegas MD  Cardiologist

## 2024-02-20 PROBLEM — I71.010 DISSECTION OF ASCENDING AORTA: Status: ACTIVE | Noted: 2024-02-20

## 2024-02-20 LAB
ALBUMIN SERPL-MCNC: 3 G/DL (ref 3.4–4.8)
ALBUMIN/GLOB SERPL: 1 RATIO (ref 1.1–2)
ALP SERPL-CCNC: 108 UNIT/L (ref 40–150)
ALT SERPL-CCNC: 29 UNIT/L (ref 0–55)
ANION GAP SERPL CALC-SCNC: 6 MEQ/L
AST SERPL-CCNC: 26 UNIT/L (ref 5–34)
BILIRUB SERPL-MCNC: 0.3 MG/DL
BUN SERPL-MCNC: 23.7 MG/DL (ref 9.8–20.1)
BUN SERPL-MCNC: 39.2 MG/DL (ref 9.8–20.1)
CALCIUM SERPL-MCNC: 8.6 MG/DL (ref 8.4–10.2)
CALCIUM SERPL-MCNC: 8.8 MG/DL (ref 8.4–10.2)
CHLORIDE SERPL-SCNC: 103 MMOL/L (ref 98–107)
CHLORIDE SERPL-SCNC: 105 MMOL/L (ref 98–107)
CO2 SERPL-SCNC: 22 MMOL/L (ref 23–31)
CO2 SERPL-SCNC: 25 MMOL/L (ref 23–31)
CREAT SERPL-MCNC: 3.31 MG/DL (ref 0.55–1.02)
CREAT SERPL-MCNC: 5.97 MG/DL (ref 0.55–1.02)
CREAT/UREA NIT SERPL: 7
GFR SERPLBLD CREATININE-BSD FMLA CKD-EPI: 14 MLS/MIN/1.73/M2
GFR SERPLBLD CREATININE-BSD FMLA CKD-EPI: 7 MLS/MIN/1.73/M2
GLOBULIN SER-MCNC: 2.9 GM/DL (ref 2.4–3.5)
GLUCOSE SERPL-MCNC: 146 MG/DL (ref 82–115)
GLUCOSE SERPL-MCNC: 175 MG/DL (ref 82–115)
MAGNESIUM SERPL-MCNC: 1.9 MG/DL (ref 1.6–2.6)
PHOSPHATE SERPL-MCNC: 2.5 MG/DL (ref 2.3–4.7)
POCT GLUCOSE: 205 MG/DL (ref 70–110)
POTASSIUM SERPL-SCNC: 4 MMOL/L (ref 3.5–5.1)
POTASSIUM SERPL-SCNC: 4.5 MMOL/L (ref 3.5–5.1)
PROT SERPL-MCNC: 5.9 GM/DL (ref 5.8–7.6)
SODIUM SERPL-SCNC: 134 MMOL/L (ref 136–145)
SODIUM SERPL-SCNC: 138 MMOL/L (ref 136–145)

## 2024-02-20 PROCEDURE — 25000242 PHARM REV CODE 250 ALT 637 W/ HCPCS

## 2024-02-20 PROCEDURE — 63600175 PHARM REV CODE 636 W HCPCS

## 2024-02-20 PROCEDURE — 63600175 PHARM REV CODE 636 W HCPCS: Mod: JZ,JG | Performed by: STUDENT IN AN ORGANIZED HEALTH CARE EDUCATION/TRAINING PROGRAM

## 2024-02-20 PROCEDURE — 63600175 PHARM REV CODE 636 W HCPCS: Performed by: EMERGENCY MEDICINE

## 2024-02-20 PROCEDURE — 93010 ELECTROCARDIOGRAM REPORT: CPT | Mod: ,,, | Performed by: INTERNAL MEDICINE

## 2024-02-20 PROCEDURE — 83735 ASSAY OF MAGNESIUM: CPT | Performed by: INTERNAL MEDICINE

## 2024-02-20 PROCEDURE — 63600175 PHARM REV CODE 636 W HCPCS: Performed by: NURSE PRACTITIONER

## 2024-02-20 PROCEDURE — 25000003 PHARM REV CODE 250

## 2024-02-20 PROCEDURE — 99233 SBSQ HOSP IP/OBS HIGH 50: CPT | Mod: ,,, | Performed by: PHYSICIAN ASSISTANT

## 2024-02-20 PROCEDURE — 5A1D70Z PERFORMANCE OF URINARY FILTRATION, INTERMITTENT, LESS THAN 6 HOURS PER DAY: ICD-10-PCS | Performed by: INTERNAL MEDICINE

## 2024-02-20 PROCEDURE — 25000003 PHARM REV CODE 250: Performed by: STUDENT IN AN ORGANIZED HEALTH CARE EDUCATION/TRAINING PROGRAM

## 2024-02-20 PROCEDURE — 84100 ASSAY OF PHOSPHORUS: CPT | Performed by: INTERNAL MEDICINE

## 2024-02-20 PROCEDURE — 93005 ELECTROCARDIOGRAM TRACING: CPT

## 2024-02-20 PROCEDURE — 80053 COMPREHEN METABOLIC PANEL: CPT

## 2024-02-20 PROCEDURE — 80100014 HC HEMODIALYSIS 1:1

## 2024-02-20 PROCEDURE — 25000003 PHARM REV CODE 250: Performed by: NURSE PRACTITIONER

## 2024-02-20 PROCEDURE — 20000000 HC ICU ROOM

## 2024-02-20 RX ORDER — GLUCAGON 1 MG
1 KIT INJECTION
Status: DISCONTINUED | OUTPATIENT
Start: 2024-02-20 | End: 2024-02-21 | Stop reason: HOSPADM

## 2024-02-20 RX ORDER — INSULIN ASPART 100 [IU]/ML
0-10 INJECTION, SOLUTION INTRAVENOUS; SUBCUTANEOUS
Status: DISCONTINUED | OUTPATIENT
Start: 2024-02-20 | End: 2024-02-21 | Stop reason: HOSPADM

## 2024-02-20 RX ORDER — DIGOXIN 0.25 MG/ML
250 INJECTION INTRAMUSCULAR; INTRAVENOUS ONCE
Status: COMPLETED | OUTPATIENT
Start: 2024-02-20 | End: 2024-02-20

## 2024-02-20 RX ORDER — METOPROLOL TARTRATE 1 MG/ML
5 INJECTION, SOLUTION INTRAVENOUS
Status: DISCONTINUED | OUTPATIENT
Start: 2024-02-20 | End: 2024-02-21 | Stop reason: HOSPADM

## 2024-02-20 RX ORDER — IBUPROFEN 200 MG
16 TABLET ORAL
Status: DISCONTINUED | OUTPATIENT
Start: 2024-02-20 | End: 2024-02-21 | Stop reason: HOSPADM

## 2024-02-20 RX ORDER — IBUPROFEN 200 MG
24 TABLET ORAL
Status: DISCONTINUED | OUTPATIENT
Start: 2024-02-20 | End: 2024-02-21 | Stop reason: HOSPADM

## 2024-02-20 RX ORDER — TALC
6 POWDER (GRAM) TOPICAL ONCE
Status: COMPLETED | OUTPATIENT
Start: 2024-02-20 | End: 2024-02-20

## 2024-02-20 RX ORDER — ESMOLOL HYDROCHLORIDE 20 MG/ML
0-300 INJECTION, SOLUTION INTRAVENOUS CONTINUOUS
Status: DISCONTINUED | OUTPATIENT
Start: 2024-02-20 | End: 2024-02-21 | Stop reason: HOSPADM

## 2024-02-20 RX ORDER — DIGOXIN 0.25 MG/ML
250 INJECTION INTRAMUSCULAR; INTRAVENOUS EVERY 6 HOURS PRN
Status: DISCONTINUED | OUTPATIENT
Start: 2024-02-20 | End: 2024-02-21 | Stop reason: HOSPADM

## 2024-02-20 RX ADMIN — FOLIC ACID 1 MG: 1 TABLET ORAL at 09:02

## 2024-02-20 RX ADMIN — FLUTICASONE FUROATE AND VILANTEROL TRIFENATATE 1 PUFF: 100; 25 POWDER RESPIRATORY (INHALATION) at 09:02

## 2024-02-20 RX ADMIN — INSULIN DETEMIR 15 UNITS: 100 INJECTION, SOLUTION SUBCUTANEOUS at 09:02

## 2024-02-20 RX ADMIN — ESMOLOL HYDROCHLORIDE 300 MCG/KG/MIN: 20 INJECTION INTRAVENOUS at 11:02

## 2024-02-20 RX ADMIN — ESMOLOL HYDROCHLORIDE 100 MCG/KG/MIN: 20 INJECTION INTRAVENOUS at 01:02

## 2024-02-20 RX ADMIN — NICARDIPINE HYDROCHLORIDE 2.5 MG/HR: 0.2 INJECTION, SOLUTION INTRAVENOUS at 09:02

## 2024-02-20 RX ADMIN — ESMOLOL HYDROCHLORIDE 300 MCG/KG/MIN: 20 INJECTION INTRAVENOUS at 09:02

## 2024-02-20 RX ADMIN — METOPROLOL TARTRATE 5 MG: 1 INJECTION, SOLUTION INTRAVENOUS at 11:02

## 2024-02-20 RX ADMIN — LEVOTHYROXINE SODIUM 125 MCG: 25 TABLET ORAL at 06:02

## 2024-02-20 RX ADMIN — ESMOLOL HYDROCHLORIDE 200 MCG/KG/MIN: 20 INJECTION INTRAVENOUS at 08:02

## 2024-02-20 RX ADMIN — SEVELAMER CARBONATE 1600 MG: 800 TABLET, FILM COATED ORAL at 03:02

## 2024-02-20 RX ADMIN — SEVELAMER CARBONATE 1600 MG: 800 TABLET, FILM COATED ORAL at 09:02

## 2024-02-20 RX ADMIN — ESMOLOL HYDROCHLORIDE 125 MCG/KG/MIN: 20 INJECTION INTRAVENOUS at 03:02

## 2024-02-20 RX ADMIN — INSULIN ASPART 2 UNITS: 100 INJECTION, SOLUTION INTRAVENOUS; SUBCUTANEOUS at 09:02

## 2024-02-20 RX ADMIN — Medication 6 MG: at 11:02

## 2024-02-20 RX ADMIN — DIGOXIN 250 MCG: 0.25 INJECTION INTRAMUSCULAR; INTRAVENOUS at 06:02

## 2024-02-20 RX ADMIN — CARVEDILOL 6.25 MG: 3.12 TABLET, FILM COATED ORAL at 09:02

## 2024-02-20 RX ADMIN — METOPROLOL TARTRATE 5 MG: 1 INJECTION, SOLUTION INTRAVENOUS at 03:02

## 2024-02-20 RX ADMIN — ATORVASTATIN CALCIUM 80 MG: 40 TABLET, FILM COATED ORAL at 09:02

## 2024-02-20 RX ADMIN — METOPROLOL TARTRATE 5 MG: 1 INJECTION, SOLUTION INTRAVENOUS at 07:02

## 2024-02-20 RX ADMIN — ESMOLOL HYDROCHLORIDE 25 MCG/KG/MIN: 20 INJECTION INTRAVENOUS at 09:02

## 2024-02-20 RX ADMIN — AMLODIPINE BESYLATE 2.5 MG: 2.5 TABLET ORAL at 09:02

## 2024-02-20 RX ADMIN — FUROSEMIDE 40 MG: 40 TABLET ORAL at 09:02

## 2024-02-20 NOTE — CONSULTS
[facility]                       [address] [id number]    PATIENT NAME:       ARIADNA GRAJEDA  YOB: 1945  CSN:                [id number]   MRN:                [id number]  ADMIT DATE:         02/19/2024 12:54:00  PHYSICIAN:          Annalee Bourgeois MD                            CONSULTATION    DATE OF CONSULT:      REASON FOR CONSULTATION:  The patient with ESRD, needs dialysis followup.    HISTORY OF PRESENT ILLNESS:  This 78-year-old female, who is a chronic   hemodialysis patient on TTS schedule at [facility] and has been very compliant to   her treatments.  She has been admitted to the hospital with the diagnosis of   dissection of ascending aortic aneurysm.  The patient is asymptomatic.  The   patient is on IV.  She is on IV Cardene and IV esmolol to help control the blood   pressure and the heart rate.  She is awake, alert, oriented to time, person,   place.  Her best friend sitting next to her.  She does not have any shortness of   breath, nausea, vomiting, chest pain, fever, chills, cough, cold, congestion.    No weakness, dizziness.  She has been tolerating oral nutrition.    PAST MEDICAL HISTORY:  Positive for ESRD, COPD, diabetes mellitus type 2,   hyperlipidemia, hypertension.    PAST SURGICAL HISTORY:  Positive for left forearm AV fistula placement, coronary   artery disease, coronary artery bypass graft done in the past.  History of EGD,   polypectomy.  History of thyroidectomy, tubal ligation.    SOCIAL HISTORY:  Positive for smoking still.  She smokes about 3 cigarettes a   day according to her.  Rare alcohol use.  No drug abuse.    ALLERGIES:  TO LISINOPRIL, AZITHROMYCIN, BACLOFEN, DOXYCYCLINE.     FAMILY HISTORY:  Positive for high blood pressure.  Nobody with kidney failure.    REVIEW OF SYSTEMS:  All 12-point review of systems done negative except one history of present    illness.    PHYSICAL EXAMINATION:  GENERAL:  The patient is conscious, awake, alert, oriented to time, person,   place.  VITAL SIGNS:  Blood pressure 97/57, pulse is about 64 at present time.    HEENT:  Atraumatic, normocephalic.  Pupils reactive.  No oral lesion.    NECK:  Supple.  No JVD.  No lymphadenopathy.  No thyromegaly.    LUNGS:  Clear to auscultation.  HEART:  Without rub or gallop.    ABDOMEN:  Soft.  Bowel sounds positive.  Nontender.  No organomegaly.  No   rebound or guarding.  EXTREMITIES:  1+ edema noted in the lower extremities and left forearm AV   fistula noted, which is working very well.  PSYCHIATRIC:  She has good mood and normal behavior.    LABORATORY DATA:  Labs reveal CBC; white blood cell count 5.48, hemoglobin 11.4,   platelet 175.  INR 1.1.  Serum sodium 138, potassium 4.5, chloride 105, bicarb   22, BUN 39, creatinine 5.97, albumin 3.0.    IMPRESSION:    1. ESRD.  2. Anemia of chronic kidney disease.  3. Hypertension.  4. COPD.  5. Ascending aortic aneurysm dissection.    RECOMMENDATIONS:    1. Agree with present workup management.  2. Continue home medication.  3. Resume diet if possible.  4. We will dialyze her today and thereafter every Tuesday, Thursday, Saturday.  5. It looks like that there is a plan to transfer her to [facility] for further   management of her dissection of ascending aortic aneurysm.  Thank you for this consultation.        ______________________________  MD ARIS Shannon/MANDO  DD:  02/20/2024  Time:  02:04PM  DT:  02/20/2024  Time:  05:37PM  Job #:  [id number]      CONSULTATION

## 2024-02-20 NOTE — PROGRESS NOTES
[facility]   Rounding Progress Note  Cardiothoracic Surgery    SUBJECTIVE:     Chief Complaint/Reason for Admission: asc aortic dissection    History of Present Illness:  Patient is a 78 y.o. female presents with asc aortic dissection , incidental finding  Hx cab  Esrd c hd  Severe mr.      Family History of Heart Disease: no    Current Facility-Administered Medications on File Prior to Encounter   Medication Dose Route Frequency Provider Last Rate Last Admin    diazePAM tablet 5 mg  5 mg Oral Once DauterZakiya marlow PA-C        sodium chloride 0.9% flush 10 mL  10 mL Intravenous PRN DauterZakiya marlow PA-C         Current Outpatient Medications on File Prior to Encounter   Medication Sig Dispense Refill    albuterol (VENTOLIN HFA) 90 mcg/actuation inhaler Inhale 2 puffs into the lungs every 6 (six) hours as needed for Wheezing. Rescue 18 g 1    albuterol-ipratropium (DUO-NEB) 2.5 mg-0.5 mg/3 mL nebulizer solution Take 3 mLs by nebulization every 6 (six) hours as needed for Wheezing or Shortness of Breath. Rescue 75 mL 0    amLODIPine (NORVASC) 2.5 MG tablet Take 1 tablet (2.5 mg total) by mouth once daily. 30 tablet 6    BENADRYL 25 mg capsule Take 50 mg by mouth nightly.      carvediloL (COREG) 6.25 MG tablet Take 1 tablet (6.25 mg total) by mouth 2 (two) times daily. 180 tablet 3    clonazePAM (KLONOPIN) 0.5 MG tablet Take 0.5 mg by mouth every evening.      fluticasone furoate-vilanteroL (BREO) 100-25 mcg/dose diskus inhaler Inhale 1 puff into the lungs once daily. 90 each 2    furosemide (LASIX) 40 MG tablet Take 1 tablet (40 mg total) by mouth once daily. 90 tablet 3    pantoprazole (PROTONIX) 40 MG tablet Take 40 mg by mouth 2 (two) times daily.      rosuvastatin (CRESTOR) 40 MG Tab Take 1 tablet (40 mg total) by mouth once daily. 90 tablet 3    sevelamer carbonate (RENVELA) 800 mg Tab Take 2 tablets (1,600 mg total) by mouth 3 (three) times daily. 90 tablet 0    aspirin 81 MG Chew Take 1  tablet (81 mg total) by mouth once daily. 30 tablet 0    BELSOMRA 5 mg Tab Take 1 tablet by mouth every evening.      blood-glucose meter kit Use as instructed 1 each 0    DIALYVITE 100-1 mg Tab Take 1 tablet by mouth once daily.      DULoxetine (CYMBALTA) 30 MG capsule Take 30 mg by mouth once daily.      INVEGA SUSTENNA 156 mg/mL Syrg injection Inject into the muscle.      L-METHYLFOLATE 15 mg Tab Take 1 tablet by mouth.      lactulose (CHRONULAC) 10 gram/15 mL solution Take 30 mLs by mouth.      levothyroxine (SYNTHROID) 125 MCG tablet Take 1 tablet (125 mcg total) by mouth before breakfast. 90 tablet 2    loratadine (CLARITIN) 10 mg tablet Take 10 mg by mouth once daily.      ONETOUCH DELICA PLUS LANCET 30 gauge Misc 1 lancet  by Other route once daily. 100 each 2    ONETOUCH ULTRA TEST Strp 1 strip by Other route once daily. 200 strip 2    tiotropium (SPIRIVA WITH HANDIHALER) 18 mcg inhalation capsule Inhale 1 capsule (18 mcg total) into the lungs Daily. 90 capsule 2    white petrolatum-mineral oiL (ARTIFICIAL TEARS, MIHAELA/MIN,) 83-15 % Oint Place into both eyes every evening. 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     COPD (chronic obstructive pulmonary disease)     Diabetes mellitus     Hyperlipidemia     Hypertension     Renal insufficiency     Thyroid disease         Past Surgical History:   Procedure Laterality Date    AV FISTULA PLACEMENT Left     CARDIAC CATHETERIZATION      CARDIAC VALVE REPLACEMENT      COLONOSCOPY      COLONOSCOPY N/A 1/8/2024    Procedure: COLON;  Surgeon: Josh Gore MD;  Location: [facility] ENDOSCOPY;  Service: Gastroenterology;  Laterality:  N/A;    CORONARY ARTERY BYPASS GRAFT      EGD, WITH HEMORRHAGE CONTROL  03/24/2023    Procedure: EGD,WITH HEMORRHAGE CONTROL;  Surgeon: Go Le MD;  Location: [facility] ENDOSCOPY;  Service: Gastroenterology;;    EGD, WITH HEMORRHAGE CONTROL  04/06/2023    Procedure: EGD,WITH HEMORRHAGE CONTROL;  Surgeon: Ayden Francois MD;  Location: [facility] ENDOSCOPY;  Service: Gastroenterology;;    EGD, WITH POLYPECTOMY USING SNARE Left 12/8/2023    Procedure: EGD, WITH POLYPECTOMY USING SNARE;  Surgeon: Lexi Scales MD;  Location: [facility] ENDOSCOPY;  Service: Gastroenterology;  Laterality: Left;    ESOPHAGOGASTRODUODENOSCOPY      ESOPHAGOGASTRODUODENOSCOPY N/A 02/17/2023    Procedure: EGD +/- VCE PLACEMENT;  Surgeon: Morgan Gardner MD;  Location: [facility] ENDOSCOPY;  Service: Gastroenterology;  Laterality: N/A;    ESOPHAGOGASTRODUODENOSCOPY N/A 03/24/2023    Procedure: EGD;  Surgeon: Go Le MD;  Location: [facility] ENDOSCOPY;  Service: Gastroenterology;  Laterality: N/A;  with push    ESOPHAGOGASTRODUODENOSCOPY N/A 04/06/2023    Procedure: EGD;  Surgeon: Ayden Francois MD;  Location: [facility] ENDOSCOPY;  Service: Gastroenterology;  Laterality: N/A;  with push    ESOPHAGOGASTRODUODENOSCOPY N/A 06/16/2023    Procedure: EGD W/ PUSH;  Surgeon: Morgan Gardner MD;  Location: [facility] ENDOSCOPY;  Service: Gastroenterology;  Laterality: N/A;    ESOPHAGOGASTRODUODENOSCOPY N/A 1/4/2024    Procedure: EGD;  Surgeon: Morgan Scales MD;  Location: [facility] ENDOSCOPY;  Service: Gastroenterology;  Laterality: N/A;    EYE SURGERY      LEFT HEART CATHETERIZATION Left 2/5/2024    Procedure: Left heart cath;  Surgeon: Josh Dubon MD;  Location: [facility] CATH LAB;  Service: Cardiology;  Laterality: Left;    POLYPECTOMY      SMALL BOWEL ENTEROSCOPY Left 01/20/2023    Procedure: ENTEROSCOPY;  Surgeon: Lexi Scales MD;  Location: [facility] ENDOSCOPY;  Service: Gastroenterology;  Laterality: Left;     THYROIDECTOMY      TRANSESOPHAGEAL ECHO  2/2/2024    TUBAL LIGATION             Review of Systems:  Review of Systems   All other systems reviewed and are negative.       OBJECTIVE:        Physical Exam:  Physical Exam     Laboratory:  I have reviewed all pertinent lab results within the past 24 hours.    Diagnostic Results:  CT: Reviewed          ASSESSMENT/PLAN:     A: chronic aortic dissection hx cab, esrd, mr  P: medical mgt at this time, if feasible transfer to [place] for further eval

## 2024-02-20 NOTE — PROGRESS NOTES
[facility] - Emergency Dept    Cardiology  Progress Note    Patient Name: Rosette Madrid  MRN: [id number]  Admission Date: 2/19/2024  Hospital Length of Stay: 1 days  Code Status: Full Code   Attending Provider: KENJI Smith MD   Consulting Provider: JOSEFINA Bauer  Primary Care Physician: Margarita Dior FNP  Principal Problem:<principal problem not specified>    Patient information was obtained from patient, past medical records, and ER records.     Subjective:     Reason for Consult: Type A Dissection     HPI: Ms. Chacon is a 78 year old female who is known to [facility], Dr. Macias. She was sent to the ER from the [facility] clinic after a cardiac CTA was obtained that demonstrated a Type A Dissection. Per the reading physician, she was noted to have an ascending aortic aneurysm (51.3 mm x 47.6 mm) and an ascending aortic dissection from the aortic root and ends prior to the brachiocephalic artery. The test was ordered in the outpatient setting to further assess if her left atrial appendage is present as she is undergoing an eval for possible LAAA closure device. Significant labs include B/Cr 30.9/5.60. She is hemodynamically stable at present. [facility] has been consulted to further evaluate the patient's Type A Dissection.     2.20.24: NAD. Denies CP, SOB, or palps. On esmolol @ 75 mcg/kg/min & Cardene @ 2.5 mg/hr. BP stable. AF CVR on tele.     PMH: ESRD/HD, HFpEF, Native CAD/CABG, Pafib/Eliquis, recurrent GIBs, T2DM, COPD, Hypothyroidism, HTN, HLD, lymphedema LLE, Vitamin D deficiency, gastric angiodysplasia  PSH: AV fistula placement, CABG, EGD, polypectomy, thyroidectomy, tubal ligation  Family History: Mother- HTN; father- HTN  Social History: current smoker; denies illicit drug use; occasional ETOH     Previous Cardiac Diagnostics:   [facility] 2.5.24:  FINDINGS  LVEDP:  12 mmHg  Left Main:   60% ostial, 80% distal  stenosis  LAD:   70% ostial, long segment 80% mid vessel stenosis     Circumflex:         RCA:   50% mid, 90% distal  stenosis  The patient has Significant three vessel disease of native coronary arteries  LIMA-LAD: Patent.  just distal to anastomosis  SVG-Diagonal: Mild proximal disease  SVG-OM: Patent  SVG-RCA: Patent  RECOMMENDATIONS  Medical management  Risk factor modifications -- maintain good BP control  Activity -- avoid straining with affected limb for one week  Exercise -- as tolerated  Precautions post D/C -- come to ER for hematoma, unusual pain, erythema, or unusual drainage at access site  Precautions post D/C -- if develop bleeding or hematoma at access site, hold pressure at access site, and come to ER    RILEY 2.2.24:  Procedure RILEY prior to CLINTON closure Device  Findings:  CLINTON not well visualized. Procedure had to be cut short as the patient desaturated and anesthesia requested to pull the RILEY probe down.  Patient will benefit from CTA chest EP protocol to further assess the CLINTON. But the initial RILEY views did not visualize CLINTON which is probably surgically excised during her last CABG.  AV showed marked Aortic valve sclerosis. Concentric LVH is present.     MPI 12.27.23:  Abnormal myocardial perfusion scan.  There is a moderate to severe intensity, moderate sized, mostly reversible perfusion abnormality with some fixed areas in the lateral apical wall(s) in the typical distribution of the LCX territory.  There are no other significant perfusion abnormalities.  The gated perfusion images showed an ejection fraction of 62% at rest. The gated perfusion images showed an ejection fraction of 63% post stress.  There were no arrhythmias during stress.     TTE 12.27.23:  Left Ventricle: The left ventricle is normal in size. Normal wall thickness. There is normal systolic function. Biplane (2D) method of discs ejection fraction is 68%.  Right Ventricle: Normal right ventricular cavity size. Systolic function is borderline low.  Left Atrium: Left atrium is mildly dilated.  Right Atrium:  Right atrium is mildly dilated.  Aortic Valve: The aortic valve is a trileaflet valve. There is moderate aortic valve sclerosis.  Mitral Valve: There is severe posterior mitral annular calcification present. There is moderate to severe regurgitation.  Tricuspid Valve: There is moderate annular calcification present. There is moderate regurgitation.  IVC/SVC: Intermediate venous pressure at 8 mmHg.     Venous US 05.12.23:  The study quality is average.   Pulsatile venous flow suggestive of fluid volume overload.  Severe edema noted.  The veins of the bilateral lower extremities compress easily and augment well with normal phasic flow and no evidence of deep venous thrombosis or arterio-venous fistula on this study.  LEFT GSV REFLUX:  The left mid thigh GSV throughout the mid calf GSV appears to have been harvested for CABG.   RIGHT GSV REFLUX:  1.5 sec reflux is noted in the right great saphenous vein at the mid calf with a diameter of 2.7 mm.  DEEP SYSTEM REFLUX:  2.2 secs in the right CFV, 1.5 secs in the left CFV, 3.2 secs in the right mid SFV  SSV:  Bilaterally small SSV with no reflux appreciated.     Review of patient's allergies indicates:   Allergen Reactions    Lisinopril Swelling    Azithromycin      Other reaction(s): tongue/facial swelling    Baclofen      Other reaction(s): tongue/facial swelling    Doxycycline      Other reaction(s): Angioedema     No current facility-administered medications on file prior to encounter.     Current Outpatient Medications on File Prior to Encounter   Medication Sig    albuterol (VENTOLIN HFA) 90 mcg/actuation inhaler Inhale 2 puffs into the lungs every 6 (six) hours as needed for Wheezing. Rescue    albuterol-ipratropium (DUO-NEB) 2.5 mg-0.5 mg/3 mL nebulizer solution Take 3 mLs by nebulization every 6 (six) hours as needed for Wheezing or Shortness of Breath. Rescue    amLODIPine (NORVASC) 2.5 MG tablet Take 1 tablet (2.5 mg total) by mouth once daily.    BENADRYL 25 mg  capsule Take 50 mg by mouth nightly.    carvediloL (COREG) 6.25 MG tablet Take 1 tablet (6.25 mg total) by mouth 2 (two) times daily.    clonazePAM (KLONOPIN) 0.5 MG tablet Take 0.5 mg by mouth every evening.    fluticasone furoate-vilanteroL (BREO) 100-25 mcg/dose diskus inhaler Inhale 1 puff into the lungs once daily.    furosemide (LASIX) 40 MG tablet Take 1 tablet (40 mg total) by mouth once daily.    pantoprazole (PROTONIX) 40 MG tablet Take 40 mg by mouth 2 (two) times daily.    rosuvastatin (CRESTOR) 40 MG Tab Take 1 tablet (40 mg total) by mouth once daily.    sevelamer carbonate (RENVELA) 800 mg Tab Take 2 tablets (1,600 mg total) by mouth 3 (three) times daily.    aspirin 81 MG Chew Take 1 tablet (81 mg total) by mouth once daily.    BELSOMRA 5 mg Tab Take 1 tablet by mouth every evening.    blood-glucose meter kit Use as instructed    DIALYVITE 100-1 mg Tab Take 1 tablet by mouth once daily.    DULoxetine (CYMBALTA) 30 MG capsule Take 30 mg by mouth once daily.    INVEGA SUSTENNA 156 mg/mL Syrg injection Inject into the muscle.    L-METHYLFOLATE 15 mg Tab Take 1 tablet by mouth.    lactulose (CHRONULAC) 10 gram/15 mL solution Take 30 mLs by mouth.    levothyroxine (SYNTHROID) 125 MCG tablet Take 1 tablet (125 mcg total) by mouth before breakfast.    loratadine (CLARITIN) 10 mg tablet Take 10 mg by mouth once daily.    ONETOUCH DELICA PLUS LANCET 30 gauge Misc 1 lancet  by Other route once daily.    ONETOUCH ULTRA TEST Strp 1 strip by Other route once daily.    tiotropium (SPIRIVA WITH HANDIHALER) 18 mcg inhalation capsule Inhale 1 capsule (18 mcg total) into the lungs Daily.    white petrolatum-mineral oiL (ARTIFICIAL TEARS, MIHAELA/MIN,) 83-15 % Oint Place into both eyes every evening.     Review of Systems   Respiratory:  Negative for shortness of breath.    Cardiovascular:  Negative for chest pain and palpitations.       Objective:     Vital Signs (Most Recent):  Temp: 97.6 °F (36.4 °C) (02/20/24  1100)  Pulse: 70 (02/20/24 1130)  Resp: (!) 23 (02/20/24 1130)  BP: (!) 105/53 (02/20/24 1130)  SpO2: 96 % (02/20/24 1130) Vital Signs (24h Range):  Temp:  [97.6 °F (36.4 °C)-99.5 °F (37.5 °C)] 97.6 °F (36.4 °C)  Pulse:  [] 70  Resp:  [12-24] 23  SpO2:  [91 %-100 %] 96 %  BP: ()/() 105/53   Weight: 89.4 kg (197 lb)  Body mass index is 34.9 kg/m².  SpO2: 96 %       Intake/Output Summary (Last 24 hours) at 2/20/2024 1145  Last data filed at 2/20/2024 1106  Gross per 24 hour   Intake 218.83 ml   Output --   Net 218.83 ml     Lines/Drains/Airways       Drain  Duration             Female External Urinary Catheter w/ Suction 02/20/24 1105 <1 day              Peripheral Intravenous Line  Duration                  Hemodialysis AV Fistula Left forearm -- days         Peripheral IV - Single Lumen 02/19/24 1300 20 G Right Antecubital <1 day         Peripheral IV - Single Lumen 02/20/24 1132 18 G Anterior;Right Forearm <1 day                  Significant Labs:  Recent Results (from the past 72 hour(s))   EKG 12-lead    Collection Time: 02/19/24 12:54 PM   Result Value Ref Range    QRS Duration 160 ms    [facility] QTC Calculation 440 ms   Comprehensive metabolic panel    Collection Time: 02/19/24  1:22 PM   Result Value Ref Range    Sodium Level 140 136 - 145 mmol/L    Potassium Level 4.6 3.5 - 5.1 mmol/L    Chloride 105 98 - 107 mmol/L    Carbon Dioxide 27 23 - 31 mmol/L    Glucose Level 170 (H) 82 - 115 mg/dL    Blood Urea Nitrogen 30.9 (H) 9.8 - 20.1 mg/dL    Creatinine 5.60 (H) 0.55 - 1.02 mg/dL    Calcium Level Total 9.2 8.4 - 10.2 mg/dL    Protein Total 6.7 5.8 - 7.6 gm/dL    Albumin Level 3.4 3.4 - 4.8 g/dL    Globulin 3.3 2.4 - 3.5 gm/dL    Albumin/Globulin Ratio 1.0 (L) 1.1 - 2.0 ratio    Bilirubin Total 0.4 <=1.5 mg/dL    Alkaline Phosphatase 128 40 - 150 unit/L    Alanine Aminotransferase 36 0 - 55 unit/L    Aspartate Aminotransferase 33 5 - 34 unit/L    eGFR 7 mls/min/1.73/m2   Protime-INR    Collection  Time: 02/19/24  1:22 PM   Result Value Ref Range    PT 14.0 12.5 - 14.5 seconds    INR 1.1 <=1.3   APTT    Collection Time: 02/19/24  1:22 PM   Result Value Ref Range    PTT 35.4 (H) 23.2 - 33.7 seconds   CBC with Differential    Collection Time: 02/19/24  1:22 PM   Result Value Ref Range    WBC 6.63 4.50 - 11.50 x10(3)/mcL    RBC 4.05 (L) 4.20 - 5.40 x10(6)/mcL    Hgb 11.1 (L) 12.0 - 16.0 g/dL    Hct 38.4 37.0 - 47.0 %    MCV 94.8 (H) 80.0 - 94.0 fL    MCH 27.4 27.0 - 31.0 pg    MCHC 28.9 (L) 33.0 - 36.0 g/dL    RDW 17.9 (H) 11.5 - 17.0 %    Platelet 150 130 - 400 x10(3)/mcL    MPV 10.3 7.4 - 10.4 fL    Neut % 75.7 %    Lymph % 13.9 %    Mono % 7.7 %    Eos % 1.8 %    Basophil % 0.6 %    Lymph # 0.92 0.6 - 4.6 x10(3)/mcL    Neut # 5.02 2.1 - 9.2 x10(3)/mcL    Mono # 0.51 0.1 - 1.3 x10(3)/mcL    Eos # 0.12 0 - 0.9 x10(3)/mcL    Baso # 0.04 <=0.2 x10(3)/mcL    IG# 0.02 0 - 0.04 x10(3)/mcL    IG% 0.3 %    NRBC% 0.0 %   Blood Smear Microscopic Exam    Collection Time: 02/19/24  1:22 PM   Result Value Ref Range    RBC Morph Abnormal (A) Normal    Anisocytosis 1+ (A) (none)    Poikilocytosis 1+ (A) (none)    Platelets Normal Normal, Adequate   CBC with Differential    Collection Time: 02/19/24  7:55 PM   Result Value Ref Range    WBC 5.48 4.50 - 11.50 x10(3)/mcL    RBC 4.13 (L) 4.20 - 5.40 x10(6)/mcL    Hgb 11.4 (L) 12.0 - 16.0 g/dL    Hct 38.5 37.0 - 47.0 %    MCV 93.2 80.0 - 94.0 fL    MCH 27.6 27.0 - 31.0 pg    MCHC 29.6 (L) 33.0 - 36.0 g/dL    RDW 17.8 (H) 11.5 - 17.0 %    Platelet 175 130 - 400 x10(3)/mcL    MPV 10.6 (H) 7.4 - 10.4 fL    Neut % 71.6 %    Lymph % 16.8 %    Mono % 8.4 %    Eos % 2.2 %    Basophil % 0.5 %    Lymph # 0.92 0.6 - 4.6 x10(3)/mcL    Neut # 3.92 2.1 - 9.2 x10(3)/mcL    Mono # 0.46 0.1 - 1.3 x10(3)/mcL    Eos # 0.12 0 - 0.9 x10(3)/mcL    Baso # 0.03 <=0.2 x10(3)/mcL    IG# 0.03 0 - 0.04 x10(3)/mcL    IG% 0.5 %    NRBC% 0.0 %   Comprehensive metabolic panel    Collection Time: 02/20/24  4:55  AM   Result Value Ref Range    Sodium Level 138 136 - 145 mmol/L    Potassium Level 4.5 3.5 - 5.1 mmol/L    Chloride 105 98 - 107 mmol/L    Carbon Dioxide 22 (L) 23 - 31 mmol/L    Glucose Level 175 (H) 82 - 115 mg/dL    Blood Urea Nitrogen 39.2 (H) 9.8 - 20.1 mg/dL    Creatinine 5.97 (H) 0.55 - 1.02 mg/dL    Calcium Level Total 8.8 8.4 - 10.2 mg/dL    Protein Total 5.9 5.8 - 7.6 gm/dL    Albumin Level 3.0 (L) 3.4 - 4.8 g/dL    Globulin 2.9 2.4 - 3.5 gm/dL    Albumin/Globulin Ratio 1.0 (L) 1.1 - 2.0 ratio    Bilirubin Total 0.3 <=1.5 mg/dL    Alkaline Phosphatase 108 40 - 150 unit/L    Alanine Aminotransferase 29 0 - 55 unit/L    Aspartate Aminotransferase 26 5 - 34 unit/L    eGFR 7 mls/min/1.73/m2     Significant Imaging:  Imaging Results    None       EKG:       Telemetry:  Afib CVR    Physical Exam  HENT:      Head: Normocephalic.      Nose: Nose normal.      Mouth/Throat:      Mouth: Mucous membranes are moist.   Eyes:      Extraocular Movements: Extraocular movements intact.   Cardiovascular:      Rate and Rhythm: Normal rate. Rhythm irregular.      Pulses: Normal pulses.      Heart sounds: Normal heart sounds.   Pulmonary:      Effort: Pulmonary effort is normal.      Breath sounds: Normal breath sounds.   Abdominal:      Palpations: Abdomen is soft.   Musculoskeletal:      Right lower leg: Edema present.      Left lower leg: Edema present.   Skin:     General: Skin is warm and dry.   Neurological:      Mental Status: She is alert and oriented to person, place, and time.   Psychiatric:         Behavior: Behavior normal.         Judgment: Judgment normal.         Home Medications:   No current facility-administered medications on file prior to encounter.     Current Outpatient Medications on File Prior to Encounter   Medication Sig Dispense Refill    albuterol (VENTOLIN HFA) 90 mcg/actuation inhaler Inhale 2 puffs into the lungs every 6 (six) hours as needed for Wheezing. Rescue 18 g 1    albuterol-ipratropium  (DUO-NEB) 2.5 mg-0.5 mg/3 mL nebulizer solution Take 3 mLs by nebulization every 6 (six) hours as needed for Wheezing or Shortness of Breath. Rescue 75 mL 0    amLODIPine (NORVASC) 2.5 MG tablet Take 1 tablet (2.5 mg total) by mouth once daily. 30 tablet 6    BENADRYL 25 mg capsule Take 50 mg by mouth nightly.      carvediloL (COREG) 6.25 MG tablet Take 1 tablet (6.25 mg total) by mouth 2 (two) times daily. 180 tablet 3    clonazePAM (KLONOPIN) 0.5 MG tablet Take 0.5 mg by mouth every evening.      fluticasone furoate-vilanteroL (BREO) 100-25 mcg/dose diskus inhaler Inhale 1 puff into the lungs once daily. 90 each 2    furosemide (LASIX) 40 MG tablet Take 1 tablet (40 mg total) by mouth once daily. 90 tablet 3    pantoprazole (PROTONIX) 40 MG tablet Take 40 mg by mouth 2 (two) times daily.      rosuvastatin (CRESTOR) 40 MG Tab Take 1 tablet (40 mg total) by mouth once daily. 90 tablet 3    sevelamer carbonate (RENVELA) 800 mg Tab Take 2 tablets (1,600 mg total) by mouth 3 (three) times daily. 90 tablet 0    aspirin 81 MG Chew Take 1 tablet (81 mg total) by mouth once daily. 30 tablet 0    BELSOMRA 5 mg Tab Take 1 tablet by mouth every evening.      blood-glucose meter kit Use as instructed 1 each 0    DIALYVITE 100-1 mg Tab Take 1 tablet by mouth once daily.      DULoxetine (CYMBALTA) 30 MG capsule Take 30 mg by mouth once daily.      INVEGA SUSTENNA 156 mg/mL Syrg injection Inject into the muscle.      L-METHYLFOLATE 15 mg Tab Take 1 tablet by mouth.      lactulose (CHRONULAC) 10 gram/15 mL solution Take 30 mLs by mouth.      levothyroxine (SYNTHROID) 125 MCG tablet Take 1 tablet (125 mcg total) by mouth before breakfast. 90 tablet 2    loratadine (CLARITIN) 10 mg tablet Take 10 mg by mouth once daily.      ONETOUCH DELICA PLUS LANCET 30 gauge Misc 1 lancet  by Other route once daily. 100 each 2    ONETOUCH ULTRA TEST Strp 1 strip by Other route once daily. 200 strip 2    tiotropium (SPIRIVA WITH HANDIHALER) 18 mcg  inhalation capsule Inhale 1 capsule (18 mcg total) into the lungs Daily. 90 capsule 2    white petrolatum-mineral oiL (ARTIFICIAL TEARS, MIHAELA/MIN,) 83-15 % Oint Place into both eyes every evening. 3.5 g 2     Current Inpatient Medications:    Current Facility-Administered Medications:     amLODIPine tablet 2.5 mg, 2.5 mg, Oral, Daily, Angela Corona, FNP, 2.5 mg at 02/20/24 0922    atorvastatin tablet 80 mg, 80 mg, Oral, Daily, Stroda, Sushil, DO, 80 mg at 02/20/24 0922    carvediloL tablet 6.25 mg, 6.25 mg, Oral, BID, Angela Corona, FNP, 6.25 mg at 02/20/24 0922    dextrose 10% bolus 125 mL 125 mL, 12.5 g, Intravenous, PRN, Stroda, Sushil, DO    dextrose 10% bolus 250 mL 250 mL, 25 g, Intravenous, PRN, Stroda, Sushil, DO    esmolol 2000 mg in sodium chloride 0.9% 100 mL (20 mg/mL), 0-300 mcg/kg/min, Intravenous, Continuous, Morgan Mcnair MD, Last Rate: 26.8 mL/hr at 02/20/24 1106, 100 mcg/kg/min at 02/20/24 1106    fluticasone furoate-vilanteroL 100-25 mcg/dose diskus inhaler 1 puff, 1 puff, Inhalation, Daily, Stroda, Sushil, DO, 1 puff at 02/20/24 0925    folic acid tablet 1 mg, 1 mg, Oral, Daily, Stroda, Sushil, DO, 1 mg at 02/20/24 0922    furosemide tablet 40 mg, 40 mg, Oral, Daily, Stroda, Sushil, DO, 40 mg at 02/20/24 0922    glucagon (human recombinant) injection 1 mg, 1 mg, Intramuscular, PRN, Stroda, Sushil, DO    glucose chewable tablet 16 g, 16 g, Oral, PRN, Stroda, Sushil, DO    glucose chewable tablet 24 g, 24 g, Oral, PRN, Stroda, Sushil, DO    levothyroxine tablet 125 mcg, 125 mcg, Oral, Before breakfast, Stroda, Sushil, DO, 125 mcg at 02/20/24 0600    metoprolol injection 5 mg, 5 mg, Intravenous, Q2H PRN, Angela Corona, JOSEFINA    niCARdipine 40 mg/200 mL (0.2 mg/mL) infusion, 0-15 mg/hr, Intravenous, Continuous, Lori Melendez MD, Last Rate: 12.5 mL/hr at 02/20/24 1106, 2.5 mg/hr at 02/20/24 1106    sevelamer carbonate tablet 1,600 mg, 1,600 mg, Oral, TID, Sushil Mills DO, 1,600 mg at 02/20/24 0922     sodium chloride 0.9% flush 10 mL, 10 mL, Intravenous, PRN, Stroda, Sushil, DO  VTE Risk Mitigation (From admission, onward)           Ordered     IP VTE HIGH RISK PATIENT  Once         02/19/24 1933     Place sequential compression device  Until discontinued         02/19/24 1933                  Assessment:   Type A Dissection (Suspect Chronic vs Subacute)     - Cardiac CTA 2.19.24: ascending aortic aneurysm (51.3 mm x 47.6 mm) and an ascending aortic dissection from the aortic root and ends prior to the brachiocephalic artery  Pafib - CVR    - CHADS VASC score  6 (age, HTN, female, T2DM, vascular disease)    - HAS Bled score 5 (HTN, age, ESRD, predisposing drugs, prior bleed)    - Currently off Anticoagulation    - CTA was ordered to see if CLINTON was present   Chronic anemia  ESRD/HD TTS  VHD    - moderate to severe MR  Native CAD/hx 4V CABG     - [facility] 2.5.24: LIMA-LAD: Patent.  just distal to anastomosis. SVG-Diagonal: Mild proximal disease. SVG-OM: Patent. SVG-RCA: Patent    - MPI 12.27.23: reversible perfusion abnormality in lateral apical wall  HTN  HLD  COPD  T2DM  Hypothyroidism  Lymphedema LLE    Plan:   CT surgery consulted for AAA. Potentially trying to transfer to [place].   Would like to keep HR < 50 & SBP < 110. Continue Esmolol & Cardene infusions and titrate as able.   Continue amlodipine & Coreg.   Continue to hold ASA for now.   She is being transferred to ICU for closer monitoring.     JOSEFINA Bauer  Cardiology  [facility] - Emergency Dept  02/20/2024     I agree with the findings of the complexity of problems addressed and take responsibility for the plan's risks and complications. I approved the plan documented by Angela Corona NP.  Type A dissection probably chronic  Dr. Elsie Pierce accepted the patient to transfer to [place]. Awaiting bed availability for either [facility] or [facility] in [place] ( based on bed availability)  Patient was turned down for  surgery here and in [place]

## 2024-02-20 NOTE — NURSING
"Admit questions answered by pt and family member, no need for further questioning. Pt states "my left ear is messed up, I can't hear too good in it", no further concerns reported. Pt goes to dialysis Tuesday, Thursday, Saturday. Med rec completed to the best of pt's ability, no further concerns, pt resting comfortably in bed.   "

## 2024-02-20 NOTE — NURSING
Nurses Note -- 4 Eyes      2/20/2024   11:14 AM      Skin assessed during: Admit      [x] No Altered Skin Integrity Present    [x]Prevention Measures Documented      [] Yes- Altered Skin Integrity Present or Discovered   [] LDA Added if Not in Epic (Describe Wound)   [] New Altered Skin Integrity was Present on Admit and Documented in LDA   [] Wound Image Taken    Wound Care Consulted? No    Attending Nurse:  Bing Shanks RN/Staff Member:  JERMAINE Fregoso

## 2024-02-20 NOTE — ED NOTES
Multiple unsuccessful attempts for long term IV access. Paged MD for midline/PICC order. No new orders at this time

## 2024-02-20 NOTE — PLAN OF CARE
Received single case agreement form from nurse, Bing who received it from [facility] transfer center because insurance will not cover at [facility]. Called case management manager, Radha. Form must be filled out by administration. Called [facility] and they do not accept patient's insurance either. Attempted to call [facility] for list of hospitals in network and was given [facility]. Reached out to Dr. Kelley. He approved calling [facility] for transfer request, but if not possible, then wants single case agreement filled out. He states we can follow up in AM. Called transfer center at [facility] to initiate transfer request. Gave information to Mayra. Informed patient's nurse. Will follow up in AM.

## 2024-02-20 NOTE — H&P
[facility]  Pulmonary Critical Care Note    Patient Name: Rosette Madrid  MRN: [id number]  Admission Date: 2/19/2024  Hospital Length of Stay: 0 days  Code Status: Prior  Attending Provider: Lori Melendez MD  Primary Care Provider: Margarita Dior FNP     Subjective:     HPI:   78-year-old female with a past medical history of ESRD on HD T/T/S, COPD, DM, HTN, HLD, hypothyroidism, CAD s/p CABG who is presenting today after an incidental finding on a cardiac CTA.  She was incidentally found to have an ascending aortic aneurysm.  Although she is asymptomatic and this may potentially represent a chronic condition the cardiologists have been consulted and gave recommendations to monitor within parameters for blood pressure of less than 110 and heart rate less than 50.  Patient is stating she feels completely like her usual self.  The only signs/symptoms she can identify our her legs being swollen which is usual for her and is not a new change.  She denies any chest pain, dyspnea, abdominal pain, jaw pain, tooth pain, headache, dizziness, N/V/D, changes in vision.    Hospital Course/Significant events:      24 Hour Interval History:      Past Medical History:   Diagnosis Date    CKD (chronic kidney disease) requiring chronic dialysis     COPD (chronic obstructive pulmonary disease)     Diabetes mellitus     Hyperlipidemia     Hypertension     Renal insufficiency     Thyroid disease        Past Surgical History:   Procedure Laterality Date    AV FISTULA PLACEMENT Left     CARDIAC CATHETERIZATION      CARDIAC VALVE REPLACEMENT      COLONOSCOPY      COLONOSCOPY N/A 1/8/2024    Procedure: COLON;  Surgeon: Josh Gore MD;  Location: [facility] ENDOSCOPY;  Service: Gastroenterology;  Laterality: N/A;    CORONARY ARTERY BYPASS GRAFT      EGD, WITH HEMORRHAGE CONTROL  03/24/2023    Procedure: EGD,WITH HEMORRHAGE CONTROL;  Surgeon: Go Le MD;  Location: [facility]  ENDOSCOPY;  Service: Gastroenterology;;    EGD, WITH HEMORRHAGE CONTROL  04/06/2023    Procedure: EGD,WITH HEMORRHAGE CONTROL;  Surgeon: Ayden Francois MD;  Location: [facility] ENDOSCOPY;  Service: Gastroenterology;;    EGD, WITH POLYPECTOMY USING SNARE Left 12/8/2023    Procedure: EGD, WITH POLYPECTOMY USING SNARE;  Surgeon: Lexi Scales MD;  Location: [facility] ENDOSCOPY;  Service: Gastroenterology;  Laterality: Left;    ESOPHAGOGASTRODUODENOSCOPY      ESOPHAGOGASTRODUODENOSCOPY N/A 02/17/2023    Procedure: EGD +/- VCE PLACEMENT;  Surgeon: Morgan Gardner MD;  Location: [facility] ENDOSCOPY;  Service: Gastroenterology;  Laterality: N/A;    ESOPHAGOGASTRODUODENOSCOPY N/A 03/24/2023    Procedure: EGD;  Surgeon: Go Le MD;  Location: [facility] ENDOSCOPY;  Service: Gastroenterology;  Laterality: N/A;  with push    ESOPHAGOGASTRODUODENOSCOPY N/A 04/06/2023    Procedure: EGD;  Surgeon: Ayden Francois MD;  Location: [facility] ENDOSCOPY;  Service: Gastroenterology;  Laterality: N/A;  with push    ESOPHAGOGASTRODUODENOSCOPY N/A 06/16/2023    Procedure: EGD W/ PUSH;  Surgeon: Morgan Gardner MD;  Location: [facility] ENDOSCOPY;  Service: Gastroenterology;  Laterality: N/A;    ESOPHAGOGASTRODUODENOSCOPY N/A 1/4/2024    Procedure: EGD;  Surgeon: Morgan Scales MD;  Location: [facility] ENDOSCOPY;  Service: Gastroenterology;  Laterality: N/A;    EYE SURGERY      LEFT HEART CATHETERIZATION Left 2/5/2024    Procedure: Left heart cath;  Surgeon: Josh Dubon MD;  Location: [facility] CATH LAB;  Service: Cardiology;  Laterality: Left;    POLYPECTOMY      SMALL BOWEL ENTEROSCOPY Left 01/20/2023    Procedure: ENTEROSCOPY;  Surgeon: Lexi Scales MD;  Location: [facility] ENDOSCOPY;  Service: Gastroenterology;  Laterality: Left;    THYROIDECTOMY      TRANSESOPHAGEAL ECHO  2/2/2024    TUBAL LIGATION         Social History     Socioeconomic History    Marital status:     Number of  children: 6   Tobacco Use    Smoking status: Every Day     Current packs/day: 2.00     Average packs/day: 2.0 packs/day for 15.0 years (30.0 ttl pk-yrs)     Types: Cigarettes    Smokeless tobacco: Never    Tobacco comments:     Smokes 3 cigarettes a day   Substance and Sexual Activity    Alcohol use: Yes     Comment: 1 glass every 2-3 month    Drug use: Never    Sexual activity: Not Currently     Social Determinants of Health     Financial Resource Strain: Low Risk  (1/29/2024)    Overall Financial Resource Strain (CARDIA)     Difficulty of Paying Living Expenses: Not very hard   Food Insecurity: No Food Insecurity (1/29/2024)    Hunger Vital Sign     Worried About Running Out of Food in the Last Year: Never true     Ran Out of Food in the Last Year: Never true   Transportation Needs: Unmet Transportation Needs (1/29/2024)    PRAPARE - Transportation     Lack of Transportation (Medical): Yes     Lack of Transportation (Non-Medical): Patient declined   Physical Activity: Unknown (1/29/2024)    Exercise Vital Sign     Days of Exercise per Week: 3 days   Stress: No Stress Concern Present (1/29/2024)    [employer] - Occupational Stress Questionnaire     Feeling of Stress : Not at all   Social Connections: Unknown (1/29/2024)    Social Connection and Isolation Panel [NHANES]     Frequency of Communication with Friends and Family: More than three times a week     Frequency of Social Gatherings with Friends and Family: Three times a week     Active Member of Clubs or Organizations: No     Attends Club or Organization Meetings: Never     Marital Status:    Housing Stability: Low Risk  (1/29/2024)    Housing Stability Vital Sign     Unable to Pay for Housing in the Last Year: No     Number of Places Lived in the Last Year: 2     Unstable Housing in the Last Year: No           Current Outpatient Medications   Medication Instructions    albuterol (VENTOLIN HFA) 90  mcg/actuation inhaler 2 puffs, Inhalation, Every 6 hours PRN, Rescue    albuterol-ipratropium (DUO-NEB) 2.5 mg-0.5 mg/3 mL nebulizer solution 3 mLs, Nebulization, Every 6 hours PRN, Rescue    amLODIPine (NORVASC) 2.5 mg, Oral, Daily    aspirin 81 mg, Oral, Daily    BELSOMRA 5 mg Tab 1 tablet, Oral, Nightly    BenadryL 50 mg, Oral, Nightly    blood-glucose meter kit Use as instructed    carvediloL (COREG) 6.25 mg, Oral, 2 times daily    clonazePAM (KLONOPIN) 0.5 mg, Oral, Nightly    DIALYVITE 100-1 mg Tab 1 tablet, Oral, Daily    DULoxetine (CYMBALTA) 30 mg, Oral, Daily    fluticasone furoate-vilanteroL (BREO) 100-25 mcg/dose diskus inhaler 1 puff, Inhalation, Daily    furosemide (LASIX) 40 mg, Oral, Daily    INVEGA SUSTENNA 156 mg/mL Syrg injection Intramuscular    L-METHYLFOLATE 15 mg Tab 1 tablet, Oral    lactulose (CHRONULAC) 10 gram/15 mL solution 30 mLs, Oral    levothyroxine (SYNTHROID) 125 mcg, Oral, Before breakfast    loratadine (CLARITIN) 10 mg, Oral, Daily    ONETOUCH DELICA PLUS LANCET 30 gauge Misc 1 lancet , Other, Daily    ONETOUCH ULTRA TEST Strp 1 strip, Other, Daily    pantoprazole (PROTONIX) 40 mg, Oral, 2 times daily    rosuvastatin (CRESTOR) 40 mg, Oral, Daily    sevelamer carbonate (RENVELA) 1,600 mg, Oral, 3 times daily    tiotropium (SPIRIVA WITH HANDIHALER) 18 mcg, Inhalation, Daily    white petrolatum-mineral oiL (ARTIFICIAL TEARS, MIHAELA/MIN,) 83-15 % Oint Both Eyes, Nightly       Current Inpatient Medications   amLODIPine  2.5 mg Oral Daily    carvediloL  6.25 mg Oral BID    diazePAM  5 mg Oral Once    metoprolol  5 mg Intravenous ED 1 Time       Current Intravenous Infusions   nicardipine 5 mg/hr (02/19/24 1834)         Review of Systems   Constitutional:  Negative for chills, diaphoresis and fever.   HENT:  Negative for ear pain, sinus pain and sore throat.    Eyes:  Negative for blurred vision, double vision, photophobia and pain.   Respiratory:  Negative  for cough, hemoptysis, sputum production, shortness of breath and wheezing.    Cardiovascular:  Negative for chest pain, palpitations, orthopnea, claudication, leg swelling and PND.   Gastrointestinal:  Negative for abdominal pain, diarrhea, nausea and vomiting.   Genitourinary:  Negative for flank pain and hematuria.   Musculoskeletal:  Negative for back pain, joint pain and neck pain.   Skin:  Negative for itching and rash.   Neurological:  Negative for dizziness, tingling, tremors, sensory change, speech change, focal weakness, seizures, loss of consciousness, weakness and headaches.   Endo/Heme/Allergies:  Does not bruise/bleed easily.          Objective:     No intake or output data in the 24 hours ending 02/19/24 1914      Vital Signs (Most Recent):  Temp: 99.5 °F (37.5 °C) (02/19/24 1241)  Pulse: (!) 58 (02/19/24 1911)  Resp: 20 (02/19/24 1911)  BP: (!) 114/57 (02/19/24 1911)  SpO2: 97 % (02/19/24 1911)  Body mass index is 34.9 kg/m².  Weight: 89.4 kg (197 lb) Vital Signs (24h Range):  Temp:  [99.5 °F (37.5 °C)] 99.5 °F (37.5 °C)  Pulse:  [] 58  Resp:  [16-24] 20  SpO2:  [94 %-100 %] 97 %  BP: (106-145)/(51-80) 114/57     Physical Exam  Constitutional:       General: She is not in acute distress.     Appearance: Normal appearance.   HENT:      Head: Normocephalic and atraumatic.   Cardiovascular:      Rate and Rhythm: Normal rate and regular rhythm.      Pulses: Normal pulses.   Pulmonary:      Effort: Pulmonary effort is normal. No respiratory distress.      Breath sounds: Normal breath sounds.   Abdominal:      General: There is no distension.      Palpations: Abdomen is soft.      Tenderness: There is no abdominal tenderness.   Musculoskeletal:      Right lower leg: Edema present.      Left lower leg: Edema present.   Skin:     General: Skin is warm and dry.   Neurological:      Mental Status: She is alert and oriented to person, place, and time. Mental status is at baseline.   Psychiatric:          "Mood and Affect: Mood normal.         Behavior: Behavior normal.         Lines/Drains/Airways       Peripheral Intravenous Line  Duration                  Hemodialysis AV Fistula Left forearm -- days         Peripheral IV - Single Lumen 02/19/24 1300 20 G Right Antecubital <1 day                    Significant Labs:    Lab Results   Component Value Date    WBC 6.63 02/19/2024    HGB 11.1 (L) 02/19/2024    HCT 38.4 02/19/2024    MCV 94.8 (H) 02/19/2024     02/19/2024           BMP  Lab Results   Component Value Date     02/19/2024    K 4.6 02/19/2024    CHLORIDE 105 02/19/2024    CO2 27 02/19/2024    BUN 30.9 (H) 02/19/2024    CREATININE 5.60 (H) 02/19/2024    CALCIUM 9.2 02/19/2024    AGAP 7.0 01/08/2024    EGFRNONAA 10 03/22/2022         ABG  No results for input(s): "PH", "PO2", "PCO2", "HCO3", "POCBASEDEF" in the last 168 hours.    Mechanical Ventilation Support:         Significant Imaging:  I have reviewed the pertinent imaging within the past 24 hours.        Assessment/Plan:     Assessment  Type a aortic dissection  Incidentally found on cardiac CTA done 02/19/2024.  Ascending aortic aneurysm 5.1 x4 0.7 cm, ascending aortic dissection from the aortic root in's prior to the brachiocephalic artery  Paroxysmal AFib  CHADS-VASc 6, has bled 5  Currently not on anticoagulation.  ESRD gets hemodialysis T/TH/S  VHD  Moderate to severe MR   Coronary artery disease, history CABG x4.  Left heart catheterization 2/24 patent.  History of hypertension, hyperlipidemia, COPD, diabetes type 2, hypothyroidism      Plan  Admit to ICU for further care and monitoring   Cardiology, CT surgery consults.  Greatly appreciate the assistance.  Recommendations to keep heart rate less than 50 and SBP less than 110.  Also to resume home BP meds including amlodipine, Coreg.  Hold aspirin for now per Cardiology    DVT Prophylaxis: scd  GI Prophylaxis: na     32 minutes of critical care was time spent personally by me on the " following activities: development of treatment plan with patient or surrogate and bedside caregivers, discussions with consultants, evaluation of patient's response to treatment, examination of patient, ordering and performing treatments and interventions, ordering and review of laboratory studies, ordering and review of radiographic studies, pulse oximetry, re-evaluation of patient's condition.  This critical care time did not overlap with that of any other provider or involve time for any procedures.     Sushil Mills DO  Pulmonary Critical Care Medicine  [facility] - Emergency Dept  DOS: 02/19/2024

## 2024-02-21 VITALS
OXYGEN SATURATION: 96 % | RESPIRATION RATE: 22 BRPM | TEMPERATURE: 98 F | SYSTOLIC BLOOD PRESSURE: 89 MMHG | DIASTOLIC BLOOD PRESSURE: 59 MMHG | HEART RATE: 94 BPM | HEIGHT: 63 IN | BODY MASS INDEX: 34.91 KG/M2 | WEIGHT: 197 LBS

## 2024-02-21 LAB
ALBUMIN SERPL-MCNC: 2.7 G/DL (ref 3.4–4.8)
ALBUMIN/GLOB SERPL: 1 RATIO (ref 1.1–2)
ALP SERPL-CCNC: 104 UNIT/L (ref 40–150)
ALT SERPL-CCNC: 22 UNIT/L (ref 0–55)
AST SERPL-CCNC: 17 UNIT/L (ref 5–34)
BASOPHILS # BLD AUTO: 0.03 X10(3)/MCL
BASOPHILS NFR BLD AUTO: 0.5 %
BILIRUB SERPL-MCNC: 0.3 MG/DL
BUN SERPL-MCNC: 28.8 MG/DL (ref 9.8–20.1)
CALCIUM SERPL-MCNC: 8.5 MG/DL (ref 8.4–10.2)
CHLORIDE SERPL-SCNC: 101 MMOL/L (ref 98–107)
CO2 SERPL-SCNC: 26 MMOL/L (ref 23–31)
CREAT SERPL-MCNC: 4.76 MG/DL (ref 0.55–1.02)
EOSINOPHIL # BLD AUTO: 0.12 X10(3)/MCL (ref 0–0.9)
EOSINOPHIL NFR BLD AUTO: 1.9 %
ERYTHROCYTE [DISTWIDTH] IN BLOOD BY AUTOMATED COUNT: 17.8 % (ref 11.5–17)
GFR SERPLBLD CREATININE-BSD FMLA CKD-EPI: 9 MLS/MIN/1.73/M2
GLOBULIN SER-MCNC: 2.8 GM/DL (ref 2.4–3.5)
GLUCOSE SERPL-MCNC: 128 MG/DL (ref 82–115)
HCT VFR BLD AUTO: 34.2 % (ref 37–47)
HGB BLD-MCNC: 9.7 G/DL (ref 12–16)
IMM GRANULOCYTES # BLD AUTO: 0.02 X10(3)/MCL (ref 0–0.04)
IMM GRANULOCYTES NFR BLD AUTO: 0.3 %
LYMPHOCYTES # BLD AUTO: 0.64 X10(3)/MCL (ref 0.6–4.6)
LYMPHOCYTES NFR BLD AUTO: 10.4 %
MCH RBC QN AUTO: 26.9 PG (ref 27–31)
MCHC RBC AUTO-ENTMCNC: 28.4 G/DL (ref 33–36)
MCV RBC AUTO: 94.7 FL (ref 80–94)
MONOCYTES # BLD AUTO: 0.56 X10(3)/MCL (ref 0.1–1.3)
MONOCYTES NFR BLD AUTO: 9.1 %
NEUTROPHILS # BLD AUTO: 4.8 X10(3)/MCL (ref 2.1–9.2)
NEUTROPHILS NFR BLD AUTO: 77.8 %
NRBC BLD AUTO-RTO: 0.5 %
PLATELET # BLD AUTO: 145 X10(3)/MCL (ref 130–400)
PMV BLD AUTO: 10.4 FL (ref 7.4–10.4)
POCT GLUCOSE: 150 MG/DL (ref 70–110)
POCT GLUCOSE: 175 MG/DL (ref 70–110)
POTASSIUM SERPL-SCNC: 4.8 MMOL/L (ref 3.5–5.1)
PROT SERPL-MCNC: 5.5 GM/DL (ref 5.8–7.6)
RBC # BLD AUTO: 3.61 X10(6)/MCL (ref 4.2–5.4)
SODIUM SERPL-SCNC: 134 MMOL/L (ref 136–145)
WBC # SPEC AUTO: 6.17 X10(3)/MCL (ref 4.5–11.5)

## 2024-02-21 PROCEDURE — 25000003 PHARM REV CODE 250: Performed by: NURSE PRACTITIONER

## 2024-02-21 PROCEDURE — 25000003 PHARM REV CODE 250

## 2024-02-21 PROCEDURE — 85025 COMPLETE CBC W/AUTO DIFF WBC: CPT | Performed by: STUDENT IN AN ORGANIZED HEALTH CARE EDUCATION/TRAINING PROGRAM

## 2024-02-21 PROCEDURE — 80053 COMPREHEN METABOLIC PANEL: CPT

## 2024-02-21 PROCEDURE — 63600175 PHARM REV CODE 636 W HCPCS: Mod: JZ,JG | Performed by: STUDENT IN AN ORGANIZED HEALTH CARE EDUCATION/TRAINING PROGRAM

## 2024-02-21 PROCEDURE — 25000242 PHARM REV CODE 250 ALT 637 W/ HCPCS

## 2024-02-21 RX ORDER — METOPROLOL TARTRATE 25 MG/1
25 TABLET, FILM COATED ORAL 4 TIMES DAILY
Status: DISCONTINUED | OUTPATIENT
Start: 2024-02-21 | End: 2024-02-21 | Stop reason: HOSPADM

## 2024-02-21 RX ORDER — AMLODIPINE BESYLATE 5 MG/1
5 TABLET ORAL DAILY
Status: DISCONTINUED | OUTPATIENT
Start: 2024-02-22 | End: 2024-02-21

## 2024-02-21 RX ORDER — CARVEDILOL 12.5 MG/1
12.5 TABLET ORAL 2 TIMES DAILY
Status: DISCONTINUED | OUTPATIENT
Start: 2024-02-21 | End: 2024-02-21

## 2024-02-21 RX ORDER — AMLODIPINE BESYLATE 2.5 MG/1
2.5 TABLET ORAL ONCE
Status: COMPLETED | OUTPATIENT
Start: 2024-02-21 | End: 2024-02-21

## 2024-02-21 RX ORDER — AMLODIPINE BESYLATE 5 MG/1
10 TABLET ORAL DAILY
Status: DISCONTINUED | OUTPATIENT
Start: 2024-02-22 | End: 2024-02-21 | Stop reason: HOSPADM

## 2024-02-21 RX ORDER — AMLODIPINE BESYLATE 5 MG/1
5 TABLET ORAL ONCE
Status: COMPLETED | OUTPATIENT
Start: 2024-02-21 | End: 2024-02-21

## 2024-02-21 RX ADMIN — ESMOLOL HYDROCHLORIDE 250 MCG/KG/MIN: 20 INJECTION INTRAVENOUS at 10:02

## 2024-02-21 RX ADMIN — METOPROLOL TARTRATE 5 MG: 1 INJECTION, SOLUTION INTRAVENOUS at 02:02

## 2024-02-21 RX ADMIN — ATORVASTATIN CALCIUM 80 MG: 40 TABLET, FILM COATED ORAL at 08:02

## 2024-02-21 RX ADMIN — ESMOLOL HYDROCHLORIDE 300 MCG/KG/MIN: 20 INJECTION INTRAVENOUS at 01:02

## 2024-02-21 RX ADMIN — ESMOLOL HYDROCHLORIDE 300 MCG/KG/MIN: 20 INJECTION INTRAVENOUS at 05:02

## 2024-02-21 RX ADMIN — AMLODIPINE BESYLATE 2.5 MG: 2.5 TABLET ORAL at 11:02

## 2024-02-21 RX ADMIN — ESMOLOL HYDROCHLORIDE 300 MCG/KG/MIN: 20 INJECTION INTRAVENOUS at 12:02

## 2024-02-21 RX ADMIN — FOLIC ACID 1 MG: 1 TABLET ORAL at 08:02

## 2024-02-21 RX ADMIN — ESMOLOL HYDROCHLORIDE 300 MCG/KG/MIN: 20 INJECTION INTRAVENOUS at 04:02

## 2024-02-21 RX ADMIN — METOPROLOL TARTRATE 25 MG: 25 TABLET, FILM COATED ORAL at 12:02

## 2024-02-21 RX ADMIN — FUROSEMIDE 40 MG: 40 TABLET ORAL at 08:02

## 2024-02-21 RX ADMIN — ESMOLOL HYDROCHLORIDE 300 MCG/KG/MIN: 20 INJECTION INTRAVENOUS at 08:02

## 2024-02-21 RX ADMIN — AMLODIPINE BESYLATE 2.5 MG: 2.5 TABLET ORAL at 09:02

## 2024-02-21 RX ADMIN — METOPROLOL TARTRATE 25 MG: 25 TABLET, FILM COATED ORAL at 11:02

## 2024-02-21 RX ADMIN — SEVELAMER CARBONATE 1600 MG: 800 TABLET, FILM COATED ORAL at 08:02

## 2024-02-21 RX ADMIN — ESMOLOL HYDROCHLORIDE 150 MCG/KG/MIN: 20 INJECTION INTRAVENOUS at 02:02

## 2024-02-21 RX ADMIN — LEVOTHYROXINE SODIUM 125 MCG: 25 TABLET ORAL at 05:02

## 2024-02-21 RX ADMIN — METOPROLOL TARTRATE 5 MG: 1 INJECTION, SOLUTION INTRAVENOUS at 10:02

## 2024-02-21 RX ADMIN — AMLODIPINE BESYLATE 5 MG: 5 TABLET ORAL at 12:02

## 2024-02-21 RX ADMIN — ESMOLOL HYDROCHLORIDE 300 MCG/KG/MIN: 20 INJECTION INTRAVENOUS at 06:02

## 2024-02-21 RX ADMIN — METOPROLOL TARTRATE 5 MG: 1 INJECTION, SOLUTION INTRAVENOUS at 05:02

## 2024-02-21 RX ADMIN — CARVEDILOL 6.25 MG: 3.12 TABLET, FILM COATED ORAL at 08:02

## 2024-02-21 RX ADMIN — ESMOLOL HYDROCHLORIDE 300 MCG/KG/MIN: 20 INJECTION INTRAVENOUS at 03:02

## 2024-02-21 RX ADMIN — METOPROLOL TARTRATE 25 MG: 25 TABLET, FILM COATED ORAL at 04:02

## 2024-02-21 RX ADMIN — FLUTICASONE FUROATE AND VILANTEROL TRIFENATATE 1 PUFF: 100; 25 POWDER RESPIRATORY (INHALATION) at 10:02

## 2024-02-21 RX ADMIN — ESMOLOL HYDROCHLORIDE 150 MCG/KG/MIN: 20 INJECTION INTRAVENOUS at 12:02

## 2024-02-21 NOTE — PLAN OF CARE
02/21/24 1150   Discharge Assessment   Assessment Type Discharge Planning Assessment   Confirmed/corrected address, phone number and insurance Yes   Confirmed Demographics Correct on Facesheet   Source of Information patient   When was your last doctors appointment? 12/04/23   Communicated YUN with patient/caregiver Date not available/Unable to determine   Reason For Admission Dissection o fascending aortic aneurysm   People in Home child(shawn), adult   Facility Arrived From: home   Do you expect to return to your current living situation? Yes   Do you have help at home or someone to help you manage your care at home? Yes   Who are your caregiver(s) and their phone number(s)? Willie Neville [phone]   Prior to hospitilization cognitive status: Alert/Oriented   Current cognitive status: Alert/Oriented   Walking or Climbing Stairs Difficulty yes   Walking or Climbing Stairs ambulation difficulty, requires equipment   Mobility Management walker and cane   Dressing/Bathing Difficulty yes   Dressing/Bathing bathing difficulty, assistance 1 person   Dressing/Bathing Management daughter assists patient   Home Accessibility wheelchair accessible   Equipment Currently Used at Home walker, rolling;cane, straight   Patient currently being followed by outpatient case management? No   Do you currently have service(s) that help you manage your care at home? No   Do you take prescription medications? Yes   Do you have prescription coverage? Yes   Coverage [employer]   Do you have any problems affording any of your prescribed medications? No   Is the patient taking medications as prescribed? yes   Who is going to help you get home at discharge? daughter   How do you get to doctors appointments? family or friend will provide   Are you on dialysis? Yes   Do you take coumadin? No   Discharge Plan A Other  (plan is for transfer today for surgery)   Discharge Plan B Other  (to be determined)   DME Needed Upon Discharge  none    Discharge Plan discussed with: Patient   Transition of Care Barriers None   OTHER   Name(s) of People in Home daughterWillie

## 2024-02-21 NOTE — PROGRESS NOTES
02/20/24 1833        Hemodialysis AV Fistula Left forearm   No placement date or time found.   Present Prior to Hospital Arrival?: Yes  Location: Left forearm   Needle Size 15ga   Patency Present;Thrill;Bruit   Status Deaccessed   Flows Good   Dressing Intervention First dressing   Dressing Status Clean;Dry;Intact   Site Condition No complications   Dressing Gauze   Post-Hemodialysis Assessment   Blood Volume Processed (Liters) 34 L   Dialyzer Clearance Lightly streaked   Duration of Treatment 120 minutes   Total UF (mL) 800 mL   Net Fluid Removal 800   Patient Response to Treatment did not tolerate   Post-Hemodialysis Comments Pt completed 2 of 3hrs of tx. Removed 800cc. No meds given with HD. Pt became tachycardic Afib and Hypotensive SBP 80s, ICU RN and staff asked to terminate treatment early due to Vitals. Pt will be transferred out [facility] to [place] for Heart Surgery.

## 2024-02-21 NOTE — PROGRESS NOTES
"  [facility]    Cardiology  Progress Note    Patient Name: Rosette Madrid  MRN: [id number]  Admission Date: 2/19/2024  Hospital Length of Stay: 2 days  Code Status: DNR   Attending Provider: KENJI Smith MD   Consulting Provider: YUE Calles  Primary Care Physician: Margarita Dior FNP  Principal Problem:Dissection of ascending aorta    Patient information was obtained from patient, past medical records, and ER records.     Subjective:     Reason for Consult: Type A Dissection     HPI: Ms. Chacon is a 78 year old female who is known to [facility], Dr. Macias. She was sent to the ER from the [facility] clinic after a cardiac CTA was obtained that demonstrated a Type A Dissection. Per the reading physician, she was noted to have an ascending aortic aneurysm (51.3 mm x 47.6 mm) and an ascending aortic dissection from the aortic root and ends prior to the brachiocephalic artery. The test was ordered in the outpatient setting to further assess if her left atrial appendage is present as she is undergoing an eval for possible LAAA closure device. Significant labs include B/Cr 30.9/5.60. She is hemodynamically stable at present. [facility] has been consulted to further evaluate the patient's Type A Dissection.     2.20.24: NAD. Denies CP, SOB, or palps. On esmolol @ 75 mcg/kg/min & Cardene @ 2.5 mg/hr. BP stable. AF CVR on tele.   2.21.24: NAD. Denies CP, SOB and Palps. Remains on Esmolol 300mcg/kg/min. Off Cardene. VSS. Now in SR. "I am good." PT is a DNR and doesn't want Invasive Intervention for Dissection.     PMH: ESRD/HD, HFpEF, Native CAD/CABG, Pafib/Eliquis, recurrent GIBs, T2DM, COPD, Hypothyroidism, HTN, HLD, lymphedema LLE, Vitamin D deficiency, gastric angiodysplasia  PSH: AV fistula placement, CABG, EGD, polypectomy, thyroidectomy, tubal ligation  Family History: Mother- HTN; father- HTN  Social History: current smoker; denies illicit drug use; occasional ETOH     Previous Cardiac Diagnostics:   [facility] " 2.5.24:  FINDINGS  LVEDP:  12 mmHg  Left Main:   60% ostial, 80% distal  stenosis  LAD:   70% ostial, long segment 80% mid vessel stenosis     Circumflex:        RCA:   50% mid, 90% distal  stenosis  The patient has Significant three vessel disease of native coronary arteries  LIMA-LAD: Patent.  just distal to anastomosis  SVG-Diagonal: Mild proximal disease  SVG-OM: Patent  SVG-RCA: Patent  RECOMMENDATIONS  Medical management  Risk factor modifications -- maintain good BP control  Activity -- avoid straining with affected limb for one week  Exercise -- as tolerated  Precautions post D/C -- come to ER for hematoma, unusual pain, erythema, or unusual drainage at access site  Precautions post D/C -- if develop bleeding or hematoma at access site, hold pressure at access site, and come to ER    RILEY 2.2.24:  Procedure RILEY prior to CLINTON closure Device  Findings:  CLINTON not well visualized. Procedure had to be cut short as the patient desaturated and anesthesia requested to pull the RILEY probe down.  Patient will benefit from CTA chest EP protocol to further assess the CLINTON. But the initial RILEY views did not visualize CLINTON which is probably surgically excised during her last CABG.  AV showed marked Aortic valve sclerosis. Concentric LVH is present.     MPI 12.27.23:  Abnormal myocardial perfusion scan.  There is a moderate to severe intensity, moderate sized, mostly reversible perfusion abnormality with some fixed areas in the lateral apical wall(s) in the typical distribution of the LCX territory.  There are no other significant perfusion abnormalities.  The gated perfusion images showed an ejection fraction of 62% at rest. The gated perfusion images showed an ejection fraction of 63% post stress.  There were no arrhythmias during stress.     TTE 12.27.23:  Left Ventricle: The left ventricle is normal in size. Normal wall thickness. There is normal systolic function. Biplane (2D) method of discs ejection fraction is  68%.  Right Ventricle: Normal right ventricular cavity size. Systolic function is borderline low.  Left Atrium: Left atrium is mildly dilated.  Right Atrium: Right atrium is mildly dilated.  Aortic Valve: The aortic valve is a trileaflet valve. There is moderate aortic valve sclerosis.  Mitral Valve: There is severe posterior mitral annular calcification present. There is moderate to severe regurgitation.  Tricuspid Valve: There is moderate annular calcification present. There is moderate regurgitation.  IVC/SVC: Intermediate venous pressure at 8 mmHg.     Venous US 05.12.23:  The study quality is average.   Pulsatile venous flow suggestive of fluid volume overload.  Severe edema noted.  The veins of the bilateral lower extremities compress easily and augment well with normal phasic flow and no evidence of deep venous thrombosis or arterio-venous fistula on this study.  LEFT GSV REFLUX:  The left mid thigh GSV throughout the mid calf GSV appears to have been harvested for CABG.   RIGHT GSV REFLUX:  1.5 sec reflux is noted in the right great saphenous vein at the mid calf with a diameter of 2.7 mm.  DEEP SYSTEM REFLUX:  2.2 secs in the right CFV, 1.5 secs in the left CFV, 3.2 secs in the right mid SFV  SSV:  Bilaterally small SSV with no reflux appreciated.     Review of patient's allergies indicates:   Allergen Reactions    Lisinopril Swelling    Azithromycin      Other reaction(s): tongue/facial swelling    Baclofen      Other reaction(s): tongue/facial swelling    Doxycycline      Other reaction(s): Angioedema     No current facility-administered medications on file prior to encounter.     Current Outpatient Medications on File Prior to Encounter   Medication Sig    albuterol (VENTOLIN HFA) 90 mcg/actuation inhaler Inhale 2 puffs into the lungs every 6 (six) hours as needed for Wheezing. Rescue    albuterol-ipratropium (DUO-NEB) 2.5 mg-0.5 mg/3 mL nebulizer solution Take 3 mLs by nebulization every 6 (six) hours  as needed for Wheezing or Shortness of Breath. Rescue    amLODIPine (NORVASC) 2.5 MG tablet Take 1 tablet (2.5 mg total) by mouth once daily.    BENADRYL 25 mg capsule Take 50 mg by mouth nightly.    carvediloL (COREG) 6.25 MG tablet Take 1 tablet (6.25 mg total) by mouth 2 (two) times daily.    clonazePAM (KLONOPIN) 0.5 MG tablet Take 0.5 mg by mouth every evening.    fluticasone furoate-vilanteroL (BREO) 100-25 mcg/dose diskus inhaler Inhale 1 puff into the lungs once daily.    furosemide (LASIX) 40 MG tablet Take 1 tablet (40 mg total) by mouth once daily.    pantoprazole (PROTONIX) 40 MG tablet Take 40 mg by mouth 2 (two) times daily.    rosuvastatin (CRESTOR) 40 MG Tab Take 1 tablet (40 mg total) by mouth once daily.    sevelamer carbonate (RENVELA) 800 mg Tab Take 2 tablets (1,600 mg total) by mouth 3 (three) times daily.    aspirin 81 MG Chew Take 1 tablet (81 mg total) by mouth once daily.    BELSOMRA 5 mg Tab Take 1 tablet by mouth every evening.    blood-glucose meter kit Use as instructed    DIALYVITE 100-1 mg Tab Take 1 tablet by mouth once daily.    DULoxetine (CYMBALTA) 30 MG capsule Take 30 mg by mouth once daily.    INVEGA SUSTENNA 156 mg/mL Syrg injection Inject into the muscle.    L-METHYLFOLATE 15 mg Tab Take 1 tablet by mouth.    lactulose (CHRONULAC) 10 gram/15 mL solution Take 30 mLs by mouth.    levothyroxine (SYNTHROID) 125 MCG tablet Take 1 tablet (125 mcg total) by mouth before breakfast.    loratadine (CLARITIN) 10 mg tablet Take 10 mg by mouth once daily.    ONETOUCH DELICA PLUS LANCET 30 gauge Misc 1 lancet  by Other route once daily.    ONETOUCH ULTRA TEST Strp 1 strip by Other route once daily.    tiotropium (SPIRIVA WITH HANDIHALER) 18 mcg inhalation capsule Inhale 1 capsule (18 mcg total) into the lungs Daily.    white petrolatum-mineral oiL (ARTIFICIAL TEARS, MIHAELA/MIN,) 83-15 % Oint Place into both eyes every evening.     Review of Systems   Constitutional:  Negative for fatigue.    Respiratory:  Negative for shortness of breath.    Cardiovascular:  Negative for chest pain and palpitations.   All other systems reviewed and are negative.    Objective:     Vital Signs (Most Recent):  Temp: 97.5 °F (36.4 °C) (02/21/24 0800)  Pulse: 99 (02/21/24 1047)  Resp: 18 (02/21/24 1047)  BP: 99/61 (02/21/24 1046)  SpO2: 95 % (02/21/24 1030) Vital Signs (24h Range):  Temp:  [97.5 °F (36.4 °C)-98.2 °F (36.8 °C)] 97.5 °F (36.4 °C)  Pulse:  [] 99  Resp:  [12-24] 18  SpO2:  [90 %-100 %] 95 %  BP: ()/(44-82) 99/61   Weight: 89.4 kg (197 lb)  Body mass index is 34.9 kg/m².  SpO2: 95 %       Intake/Output Summary (Last 24 hours) at 2/21/2024 1101  Last data filed at 2/21/2024 0714  Gross per 24 hour   Intake 1366.72 ml   Output 800 ml   Net 566.72 ml       Lines/Drains/Airways       Drain  Duration             Female External Urinary Catheter w/ Suction 02/20/24 1105 <1 day              Peripheral Intravenous Line  Duration                  Hemodialysis AV Fistula Left forearm -- days         Peripheral IV - Single Lumen 02/19/24 1300 20 G Right Antecubital 1 day         Peripheral IV - Single Lumen 02/20/24 1132 18 G Anterior;Right Forearm <1 day                  Significant Labs:  Recent Results (from the past 72 hour(s))   EKG 12-lead    Collection Time: 02/19/24 12:54 PM   Result Value Ref Range    QRS Duration 160 ms    [facility] QTC Calculation 440 ms   Comprehensive metabolic panel    Collection Time: 02/19/24  1:22 PM   Result Value Ref Range    Sodium Level 140 136 - 145 mmol/L    Potassium Level 4.6 3.5 - 5.1 mmol/L    Chloride 105 98 - 107 mmol/L    Carbon Dioxide 27 23 - 31 mmol/L    Glucose Level 170 (H) 82 - 115 mg/dL    Blood Urea Nitrogen 30.9 (H) 9.8 - 20.1 mg/dL    Creatinine 5.60 (H) 0.55 - 1.02 mg/dL    Calcium Level Total 9.2 8.4 - 10.2 mg/dL    Protein Total 6.7 5.8 - 7.6 gm/dL    Albumin Level 3.4 3.4 - 4.8 g/dL    Globulin 3.3 2.4 - 3.5 gm/dL    Albumin/Globulin Ratio 1.0 (L) 1.1 -  2.0 ratio    Bilirubin Total 0.4 <=1.5 mg/dL    Alkaline Phosphatase 128 40 - 150 unit/L    Alanine Aminotransferase 36 0 - 55 unit/L    Aspartate Aminotransferase 33 5 - 34 unit/L    eGFR 7 mls/min/1.73/m2   Protime-INR    Collection Time: 02/19/24  1:22 PM   Result Value Ref Range    PT 14.0 12.5 - 14.5 seconds    INR 1.1 <=1.3   APTT    Collection Time: 02/19/24  1:22 PM   Result Value Ref Range    PTT 35.4 (H) 23.2 - 33.7 seconds   CBC with Differential    Collection Time: 02/19/24  1:22 PM   Result Value Ref Range    WBC 6.63 4.50 - 11.50 x10(3)/mcL    RBC 4.05 (L) 4.20 - 5.40 x10(6)/mcL    Hgb 11.1 (L) 12.0 - 16.0 g/dL    Hct 38.4 37.0 - 47.0 %    MCV 94.8 (H) 80.0 - 94.0 fL    MCH 27.4 27.0 - 31.0 pg    MCHC 28.9 (L) 33.0 - 36.0 g/dL    RDW 17.9 (H) 11.5 - 17.0 %    Platelet 150 130 - 400 x10(3)/mcL    MPV 10.3 7.4 - 10.4 fL    Neut % 75.7 %    Lymph % 13.9 %    Mono % 7.7 %    Eos % 1.8 %    Basophil % 0.6 %    Lymph # 0.92 0.6 - 4.6 x10(3)/mcL    Neut # 5.02 2.1 - 9.2 x10(3)/mcL    Mono # 0.51 0.1 - 1.3 x10(3)/mcL    Eos # 0.12 0 - 0.9 x10(3)/mcL    Baso # 0.04 <=0.2 x10(3)/mcL    IG# 0.02 0 - 0.04 x10(3)/mcL    IG% 0.3 %    NRBC% 0.0 %   Blood Smear Microscopic Exam    Collection Time: 02/19/24  1:22 PM   Result Value Ref Range    RBC Morph Abnormal (A) Normal    Anisocytosis 1+ (A) (none)    Poikilocytosis 1+ (A) (none)    Platelets Normal Normal, Adequate   CBC with Differential    Collection Time: 02/19/24  7:55 PM   Result Value Ref Range    WBC 5.48 4.50 - 11.50 x10(3)/mcL    RBC 4.13 (L) 4.20 - 5.40 x10(6)/mcL    Hgb 11.4 (L) 12.0 - 16.0 g/dL    Hct 38.5 37.0 - 47.0 %    MCV 93.2 80.0 - 94.0 fL    MCH 27.6 27.0 - 31.0 pg    MCHC 29.6 (L) 33.0 - 36.0 g/dL    RDW 17.8 (H) 11.5 - 17.0 %    Platelet 175 130 - 400 x10(3)/mcL    MPV 10.6 (H) 7.4 - 10.4 fL    Neut % 71.6 %    Lymph % 16.8 %    Mono % 8.4 %    Eos % 2.2 %    Basophil % 0.5 %    Lymph # 0.92 0.6 - 4.6 x10(3)/mcL    Neut # 3.92 2.1 - 9.2  x10(3)/mcL    Mono # 0.46 0.1 - 1.3 x10(3)/mcL    Eos # 0.12 0 - 0.9 x10(3)/mcL    Baso # 0.03 <=0.2 x10(3)/mcL    IG# 0.03 0 - 0.04 x10(3)/mcL    IG% 0.5 %    NRBC% 0.0 %   Comprehensive metabolic panel    Collection Time: 02/20/24  4:55 AM   Result Value Ref Range    Sodium Level 138 136 - 145 mmol/L    Potassium Level 4.5 3.5 - 5.1 mmol/L    Chloride 105 98 - 107 mmol/L    Carbon Dioxide 22 (L) 23 - 31 mmol/L    Glucose Level 175 (H) 82 - 115 mg/dL    Blood Urea Nitrogen 39.2 (H) 9.8 - 20.1 mg/dL    Creatinine 5.97 (H) 0.55 - 1.02 mg/dL    Calcium Level Total 8.8 8.4 - 10.2 mg/dL    Protein Total 5.9 5.8 - 7.6 gm/dL    Albumin Level 3.0 (L) 3.4 - 4.8 g/dL    Globulin 2.9 2.4 - 3.5 gm/dL    Albumin/Globulin Ratio 1.0 (L) 1.1 - 2.0 ratio    Bilirubin Total 0.3 <=1.5 mg/dL    Alkaline Phosphatase 108 40 - 150 unit/L    Alanine Aminotransferase 29 0 - 55 unit/L    Aspartate Aminotransferase 26 5 - 34 unit/L    eGFR 7 mls/min/1.73/m2   Basic Metabolic Panel    Collection Time: 02/20/24  5:41 PM   Result Value Ref Range    Sodium Level 134 (L) 136 - 145 mmol/L    Potassium Level 4.0 3.5 - 5.1 mmol/L    Chloride 103 98 - 107 mmol/L    Carbon Dioxide 25 23 - 31 mmol/L    Glucose Level 146 (H) 82 - 115 mg/dL    Blood Urea Nitrogen 23.7 (H) 9.8 - 20.1 mg/dL    Creatinine 3.31 (H) 0.55 - 1.02 mg/dL    BUN/Creatinine Ratio 7     Calcium Level Total 8.6 8.4 - 10.2 mg/dL    Anion Gap 6.0 mEq/L    eGFR 14 mls/min/1.73/m2   Magnesium    Collection Time: 02/20/24  5:41 PM   Result Value Ref Range    Magnesium Level 1.90 1.60 - 2.60 mg/dL   Phosphorus    Collection Time: 02/20/24  5:41 PM   Result Value Ref Range    Phosphorus Level 2.5 2.3 - 4.7 mg/dL   POCT glucose    Collection Time: 02/20/24  8:50 PM   Result Value Ref Range    POCT Glucose 205 (H) 70 - 110 mg/dL   Comprehensive metabolic panel    Collection Time: 02/21/24  1:51 AM   Result Value Ref Range    Sodium Level 134 (L) 136 - 145 mmol/L    Potassium Level 4.8 3.5 -  5.1 mmol/L    Chloride 101 98 - 107 mmol/L    Carbon Dioxide 26 23 - 31 mmol/L    Glucose Level 128 (H) 82 - 115 mg/dL    Blood Urea Nitrogen 28.8 (H) 9.8 - 20.1 mg/dL    Creatinine 4.76 (H) 0.55 - 1.02 mg/dL    Calcium Level Total 8.5 8.4 - 10.2 mg/dL    Protein Total 5.5 (L) 5.8 - 7.6 gm/dL    Albumin Level 2.7 (L) 3.4 - 4.8 g/dL    Globulin 2.8 2.4 - 3.5 gm/dL    Albumin/Globulin Ratio 1.0 (L) 1.1 - 2.0 ratio    Bilirubin Total 0.3 <=1.5 mg/dL    Alkaline Phosphatase 104 40 - 150 unit/L    Alanine Aminotransferase 22 0 - 55 unit/L    Aspartate Aminotransferase 17 5 - 34 unit/L    eGFR 9 mls/min/1.73/m2   CBC with Differential    Collection Time: 02/21/24  1:51 AM   Result Value Ref Range    WBC 6.17 4.50 - 11.50 x10(3)/mcL    RBC 3.61 (L) 4.20 - 5.40 x10(6)/mcL    Hgb 9.7 (L) 12.0 - 16.0 g/dL    Hct 34.2 (L) 37.0 - 47.0 %    MCV 94.7 (H) 80.0 - 94.0 fL    MCH 26.9 (L) 27.0 - 31.0 pg    MCHC 28.4 (L) 33.0 - 36.0 g/dL    RDW 17.8 (H) 11.5 - 17.0 %    Platelet 145 130 - 400 x10(3)/mcL    MPV 10.4 7.4 - 10.4 fL    Neut % 77.8 %    Lymph % 10.4 %    Mono % 9.1 %    Eos % 1.9 %    Basophil % 0.5 %    Lymph # 0.64 0.6 - 4.6 x10(3)/mcL    Neut # 4.80 2.1 - 9.2 x10(3)/mcL    Mono # 0.56 0.1 - 1.3 x10(3)/mcL    Eos # 0.12 0 - 0.9 x10(3)/mcL    Baso # 0.03 <=0.2 x10(3)/mcL    IG# 0.02 0 - 0.04 x10(3)/mcL    IG% 0.3 %    NRBC% 0.5 %     Telemetry: SR    Physical Exam  Constitutional:       General: She is not in acute distress.     Appearance: Normal appearance. She is obese.   HENT:      Head: Normocephalic.      Mouth/Throat:      Mouth: Mucous membranes are moist.   Eyes:      Extraocular Movements: Extraocular movements intact.      Conjunctiva/sclera: Conjunctivae normal.   Cardiovascular:      Rate and Rhythm: Normal rate and regular rhythm.      Pulses: Normal pulses.      Heart sounds: Normal heart sounds. No murmur heard.  Pulmonary:      Effort: Pulmonary effort is normal. No respiratory distress.      Breath  sounds: Normal breath sounds.   Abdominal:      Palpations: Abdomen is soft.   Musculoskeletal:      Right lower leg: Edema present.      Left lower leg: Edema present.   Skin:     General: Skin is warm and dry.   Neurological:      General: No focal deficit present.      Mental Status: She is alert and oriented to person, place, and time. Mental status is at baseline.   Psychiatric:         Mood and Affect: Mood normal.         Behavior: Behavior normal.         Judgment: Judgment normal.       Home Medications:   No current facility-administered medications on file prior to encounter.     Current Outpatient Medications on File Prior to Encounter   Medication Sig Dispense Refill    albuterol (VENTOLIN HFA) 90 mcg/actuation inhaler Inhale 2 puffs into the lungs every 6 (six) hours as needed for Wheezing. Rescue 18 g 1    albuterol-ipratropium (DUO-NEB) 2.5 mg-0.5 mg/3 mL nebulizer solution Take 3 mLs by nebulization every 6 (six) hours as needed for Wheezing or Shortness of Breath. Rescue 75 mL 0    amLODIPine (NORVASC) 2.5 MG tablet Take 1 tablet (2.5 mg total) by mouth once daily. 30 tablet 6    BENADRYL 25 mg capsule Take 50 mg by mouth nightly.      carvediloL (COREG) 6.25 MG tablet Take 1 tablet (6.25 mg total) by mouth 2 (two) times daily. 180 tablet 3    clonazePAM (KLONOPIN) 0.5 MG tablet Take 0.5 mg by mouth every evening.      fluticasone furoate-vilanteroL (BREO) 100-25 mcg/dose diskus inhaler Inhale 1 puff into the lungs once daily. 90 each 2    furosemide (LASIX) 40 MG tablet Take 1 tablet (40 mg total) by mouth once daily. 90 tablet 3    pantoprazole (PROTONIX) 40 MG tablet Take 40 mg by mouth 2 (two) times daily.      rosuvastatin (CRESTOR) 40 MG Tab Take 1 tablet (40 mg total) by mouth once daily. 90 tablet 3    sevelamer carbonate (RENVELA) 800 mg Tab Take 2 tablets (1,600 mg total) by mouth 3 (three) times daily. 90 tablet 0    aspirin 81 MG Chew Take 1 tablet (81 mg total) by mouth once daily. 30  tablet 0    BELSOMRA 5 mg Tab Take 1 tablet by mouth every evening.      blood-glucose meter kit Use as instructed 1 each 0    DIALYVITE 100-1 mg Tab Take 1 tablet by mouth once daily.      DULoxetine (CYMBALTA) 30 MG capsule Take 30 mg by mouth once daily.      INVEGA SUSTENNA 156 mg/mL Syrg injection Inject into the muscle.      L-METHYLFOLATE 15 mg Tab Take 1 tablet by mouth.      lactulose (CHRONULAC) 10 gram/15 mL solution Take 30 mLs by mouth.      levothyroxine (SYNTHROID) 125 MCG tablet Take 1 tablet (125 mcg total) by mouth before breakfast. 90 tablet 2    loratadine (CLARITIN) 10 mg tablet Take 10 mg by mouth once daily.      ONETOUCH DELICA PLUS LANCET 30 gauge Misc 1 lancet  by Other route once daily. 100 each 2    ONETOUCH ULTRA TEST Strp 1 strip by Other route once daily. 200 strip 2    tiotropium (SPIRIVA WITH HANDIHALER) 18 mcg inhalation capsule Inhale 1 capsule (18 mcg total) into the lungs Daily. 90 capsule 2    white petrolatum-mineral oiL (ARTIFICIAL TEARS, MIHAELA/MIN,) 83-15 % Oint Place into both eyes every evening. 3.5 g 2     Current Inpatient Medications:    Current Facility-Administered Medications:     amLODIPine tablet 2.5 mg, 2.5 mg, Oral, Daily, Angela Corona FNP, 2.5 mg at 02/20/24 0922    atorvastatin tablet 80 mg, 80 mg, Oral, Daily, Stroda, Sushil, DO, 80 mg at 02/21/24 0835    carvediloL tablet 6.25 mg, 6.25 mg, Oral, BID, Angela Coroan FNP, 6.25 mg at 02/21/24 0835    dextrose 10% bolus 125 mL 125 mL, 12.5 g, Intravenous, PRN, Stroda, Sushil, DO    dextrose 10% bolus 125 mL 125 mL, 12.5 g, Intravenous, PRN, Stroda, Sushil, DO    dextrose 10% bolus 250 mL 250 mL, 25 g, Intravenous, PRN, Stroda, Sushil, DO    dextrose 10% bolus 250 mL 250 mL, 25 g, Intravenous, PRN, Stroda, Sushil, DO    digoxin injection 250 mcg, 250 mcg, Intravenous, Q6H PRN, Imelda, Crispin Q., NP    esmolol 2000 mg in sodium chloride 0.9% 100 mL (20 mg/mL), 0-300 mcg/kg/min, Intravenous, Continuous, Morgan Mcnair  MD CLEVE, Last Rate: 67.1 mL/hr at 02/21/24 1050, 250 mcg/kg/min at 02/21/24 1050    fluticasone furoate-vilanteroL 100-25 mcg/dose diskus inhaler 1 puff, 1 puff, Inhalation, Daily, Stroda, Sushil, DO, 1 puff at 02/21/24 1047    folic acid tablet 1 mg, 1 mg, Oral, Daily, Stroda, Sushil, DO, 1 mg at 02/21/24 0835    furosemide tablet 40 mg, 40 mg, Oral, Daily, Stroda, Sushil, DO, 40 mg at 02/21/24 0835    glucagon (human recombinant) injection 1 mg, 1 mg, Intramuscular, PRN, Stroda, Sushil, DO    glucagon (human recombinant) injection 1 mg, 1 mg, Intramuscular, PRN, Stroda, Sushil, DO    glucose chewable tablet 16 g, 16 g, Oral, PRN, Stroda, Sushil, DO    glucose chewable tablet 16 g, 16 g, Oral, PRN, Stroda, Sushil, DO    glucose chewable tablet 24 g, 24 g, Oral, PRN, Stroda, Sushil, DO    glucose chewable tablet 24 g, 24 g, Oral, PRN, Stroda, Sushil, DO    insulin aspart U-100 injection 0-10 Units, 0-10 Units, Subcutaneous, QID (AC + HS) PRN, Stroda, Sushil, DO, 2 Units at 02/20/24 2115    insulin detemir U-100 injection 15 Units, 15 Units, Subcutaneous, QHS, Stroda, Sushil, DO, 15 Units at 02/20/24 2115    levothyroxine tablet 125 mcg, 125 mcg, Oral, Before breakfast, Stroda, Sushil, DO, 125 mcg at 02/21/24 0502    metoprolol injection 5 mg, 5 mg, Intravenous, Q15 Min PRN, Crispin Segura Q., NP, 5 mg at 02/21/24 1046    niCARdipine 40 mg/200 mL (0.2 mg/mL) infusion, 0-15 mg/hr, Intravenous, Continuous, Lori Melendez MD, Stopped at 02/20/24 1731    sevelamer carbonate tablet 1,600 mg, 1,600 mg, Oral, TID, Stroda, Sushil, DO, 1,600 mg at 02/21/24 0834    sodium chloride 0.9% flush 10 mL, 10 mL, Intravenous, PRN, Stroda, Sushil, DO  VTE Risk Mitigation (From admission, onward)           Ordered     IP VTE HIGH RISK PATIENT  Once         02/19/24 1933     Place sequential compression device  Until discontinued         02/19/24 1933                  Assessment:   Type A Dissection (Suspect Chronic vs Subacute)     - Cardiac CTA 2.19.24:  ascending aortic aneurysm (51.3 mm x 47.6 mm) and an ascending aortic dissection from the aortic root and ends prior to the brachiocephalic artery  PAF with RVR - Now SR     - CHADS VASC score  6 (age, HTN, female, T2DM, vascular disease)    - HAS Bled score 5 (HTN, age, ESRD, predisposing drugs, prior bleed)    - Currently off Anticoagulation    - CTA was ordered to see if CLINTON was present   Chronic Anemia  ESRD/HD TTS  VHD    - Moderate to Severe MR  Native CAD/hx 4V CABG     - [facility] 2.5.24: LIMA-LAD: Patent.  just distal to anastomosis. SVG-Diagonal: Mild proximal disease. SVG-OM: Patent. SVG-RCA: Patent    - MPI 12.27.23: reversible perfusion abnormality in lateral apical wall  HTN  HLD  COPD  T2DM  Hypothyroidism  Lymphedema LLE    Plan:   PT declined Transfer to [place] for Invasive Repair for of Type A Dissection  PT is a DNR   Continue Recommendations for HR < 60bpm and SBP < 110  Continue Oral Agents for BP and HR Control   Increase Norvasc to 10mg PO Qday  D/C Coreg  Start Metoprolol Tartrate 25mg PO QID   Once off Esmolol ok to Downgrade to Cardiology Services (Currently Weaning)  Possible D/C Home in AM  Labs in AM: CBC, CMP and Mg    YUE Calles  Cardiology  [facility]  02/21/2024     I agree with the findings of the complexity of problems addressed and take responsibility for the plan's risks and complications. I approved the plan documented by Nino Rubi NP.  Patient changed her mind and wants to be transferred to [facility] in [place].  I spoke with the CV surgeon and ICU physician there.  The patient was accepted again.  Will transfer when bed available.  Keep esmolol drip.  Continue Norvasc 10 mg daily.

## 2024-02-21 NOTE — PLAN OF CARE
Patient approved at 12:59 for transfer. Accepted at [facility] heart ICU.Accepting physician Dr. Padmaja Lomeli. Accepting  Judy Elias. Gave information to nurse, Gordon and confirmed ground transport with Dr. Contreras. Called and put patient into will call with [facility]. Waiting on medical records to email records for transport.

## 2024-02-21 NOTE — PROGRESS NOTES
[facility] - Emergency Dept  Pulmonary Critical Care Note    Patient Name: Rosette Madrid  MRN: [id number]  Admission Date: 2/19/2024  Hospital Length of Stay: 2 days  Code Status: Full Code  Attending Provider: KENJI Smith MD  Primary Care Provider: Margarita Dior FNP     Subjective:     HPI:   78-year-old female with a past medical history of ESRD on HD T/T/S, COPD, DM, HTN, HLD, hypothyroidism, CAD s/p CABG who is presenting today after an incidental finding on a cardiac CTA.  She was incidentally found to have an ascending aortic aneurysm.  Although she is asymptomatic and this may potentially represent a chronic condition the cardiologists have been consulted and gave recommendations to monitor within parameters for blood pressure of less than 110 and heart rate less than 50.  Patient is stating she feels completely like her usual self.  The only signs/symptoms she can identify our her legs being swollen which is usual for her and is not a new change.  She denies any chest pain, dyspnea, abdominal pain, jaw pain, tooth pain, headache, dizziness, N/V/D, changes in vision.    Hospital Course/Significant events:      24 Hour Interval History:    No acute events overnight.  Patient denies any chest pain, shortness on breath, or any other symptoms this morning.  States she is feeling well.  She remains on esmolol 300 mcg/kg/min.  Her nicardipine has been stopped this morning.  Last night, patient and her family decided that they did not want to be transferred for higher level of care to further evaluate her aortic dissection      Past Medical History:   Diagnosis Date    CKD (chronic kidney disease) requiring chronic dialysis     COPD (chronic obstructive pulmonary disease)     Diabetes mellitus     Hyperlipidemia     Hypertension     Renal insufficiency     Thyroid disease        Past Surgical History:   Procedure Laterality Date    AV FISTULA PLACEMENT Left     CARDIAC CATHETERIZATION       CARDIAC VALVE REPLACEMENT      COLONOSCOPY      COLONOSCOPY N/A 1/8/2024    Procedure: COLON;  Surgeon: Josh Gore MD;  Location: [facility] ENDOSCOPY;  Service: Gastroenterology;  Laterality: N/A;    CORONARY ARTERY BYPASS GRAFT      EGD, WITH HEMORRHAGE CONTROL  03/24/2023    Procedure: EGD,WITH HEMORRHAGE CONTROL;  Surgeon: Go Le MD;  Location: [facility] ENDOSCOPY;  Service: Gastroenterology;;    EGD, WITH HEMORRHAGE CONTROL  04/06/2023    Procedure: EGD,WITH HEMORRHAGE CONTROL;  Surgeon: Ayden Francois MD;  Location: [facility] ENDOSCOPY;  Service: Gastroenterology;;    EGD, WITH POLYPECTOMY USING SNARE Left 12/8/2023    Procedure: EGD, WITH POLYPECTOMY USING SNARE;  Surgeon: Lexi Scales MD;  Location: [facility] ENDOSCOPY;  Service: Gastroenterology;  Laterality: Left;    ESOPHAGOGASTRODUODENOSCOPY      ESOPHAGOGASTRODUODENOSCOPY N/A 02/17/2023    Procedure: EGD +/- VCE PLACEMENT;  Surgeon: Morgan Gardner MD;  Location: [facility] ENDOSCOPY;  Service: Gastroenterology;  Laterality: N/A;    ESOPHAGOGASTRODUODENOSCOPY N/A 03/24/2023    Procedure: EGD;  Surgeon: Go Le MD;  Location: [facility] ENDOSCOPY;  Service: Gastroenterology;  Laterality: N/A;  with push    ESOPHAGOGASTRODUODENOSCOPY N/A 04/06/2023    Procedure: EGD;  Surgeon: Ayden Francois MD;  Location: [facility] ENDOSCOPY;  Service: Gastroenterology;  Laterality: N/A;  with push    ESOPHAGOGASTRODUODENOSCOPY N/A 06/16/2023    Procedure: EGD W/ PUSH;  Surgeon: Morgan Gardner MD;  Location: [facility] ENDOSCOPY;  Service: Gastroenterology;  Laterality: N/A;    ESOPHAGOGASTRODUODENOSCOPY N/A 1/4/2024    Procedure: EGD;  Surgeon: Morgan Scales MD;  Location: [facility] ENDOSCOPY;  Service: Gastroenterology;  Laterality: N/A;    EYE SURGERY      LEFT HEART CATHETERIZATION Left 2/5/2024    Procedure: Left heart cath;  Surgeon: Josh Dubon MD;  Location: [facility] CATH LAB;  Service: Cardiology;  Laterality:  Left;    POLYPECTOMY      SMALL BOWEL ENTEROSCOPY Left 01/20/2023    Procedure: ENTEROSCOPY;  Surgeon: Lexi Scales MD;  Location: [facility] ENDOSCOPY;  Service: Gastroenterology;  Laterality: Left;    THYROIDECTOMY      TRANSESOPHAGEAL ECHO  2/2/2024    TUBAL LIGATION         Social History     Socioeconomic History    Marital status:     Number of children: 6   Tobacco Use    Smoking status: Every Day     Current packs/day: 2.00     Average packs/day: 2.0 packs/day for 15.0 years (30.0 ttl pk-yrs)     Types: Cigarettes    Smokeless tobacco: Never    Tobacco comments:     Smokes 3 cigarettes a day   Substance and Sexual Activity    Alcohol use: Yes     Comment: 1 glass every 2-3 month    Drug use: Never    Sexual activity: Not Currently     Social Determinants of Health     Financial Resource Strain: Low Risk  (1/29/2024)    Overall Financial Resource Strain (CARDIA)     Difficulty of Paying Living Expenses: Not very hard   Food Insecurity: No Food Insecurity (1/29/2024)    Hunger Vital Sign     Worried About Running Out of Food in the Last Year: Never true     Ran Out of Food in the Last Year: Never true   Transportation Needs: Unmet Transportation Needs (1/29/2024)    PRAPARE - Transportation     Lack of Transportation (Medical): Yes     Lack of Transportation (Non-Medical): Patient declined   Physical Activity: Unknown (1/29/2024)    Exercise Vital Sign     Days of Exercise per Week: 3 days   Stress: No Stress Concern Present (1/29/2024)    [employer] - Occupational Stress Questionnaire     Feeling of Stress : Not at all   Social Connections: Unknown (1/29/2024)    Social Connection and Isolation Panel [NHANES]     Frequency of Communication with Friends and Family: More than three times a week     Frequency of Social Gatherings with Friends and Family: Three times a week     Active Member of Clubs or Organizations: No     Attends Club or Organization Meetings: Never      Marital Status:    Housing Stability: Low Risk  (1/29/2024)    Housing Stability Vital Sign     Unable to Pay for Housing in the Last Year: No     Number of Places Lived in the Last Year: 2     Unstable Housing in the Last Year: No           Current Outpatient Medications   Medication Instructions    albuterol (VENTOLIN HFA) 90 mcg/actuation inhaler 2 puffs, Inhalation, Every 6 hours PRN, Rescue    albuterol-ipratropium (DUO-NEB) 2.5 mg-0.5 mg/3 mL nebulizer solution 3 mLs, Nebulization, Every 6 hours PRN, Rescue    amLODIPine (NORVASC) 2.5 mg, Oral, Daily    aspirin 81 mg, Oral, Daily    BELSOMRA 5 mg Tab 1 tablet, Oral, Nightly    BenadryL 50 mg, Oral, Nightly    blood-glucose meter kit Use as instructed    carvediloL (COREG) 6.25 mg, Oral, 2 times daily    clonazePAM (KLONOPIN) 0.5 mg, Oral, Nightly    DIALYVITE 100-1 mg Tab 1 tablet, Oral, Daily    DULoxetine (CYMBALTA) 30 mg, Oral, Daily    fluticasone furoate-vilanteroL (BREO) 100-25 mcg/dose diskus inhaler 1 puff, Inhalation, Daily    furosemide (LASIX) 40 mg, Oral, Daily    INVEGA SUSTENNA 156 mg/mL Syrg injection Intramuscular    L-METHYLFOLATE 15 mg Tab 1 tablet, Oral    lactulose (CHRONULAC) 10 gram/15 mL solution 30 mLs, Oral    levothyroxine (SYNTHROID) 125 mcg, Oral, Before breakfast    loratadine (CLARITIN) 10 mg, Oral, Daily    ONETOUCH DELICA PLUS LANCET 30 gauge Misc 1 lancet , Other, Daily    ONETOUCH ULTRA TEST Strp 1 strip, Other, Daily    pantoprazole (PROTONIX) 40 mg, Oral, 2 times daily    rosuvastatin (CRESTOR) 40 mg, Oral, Daily    sevelamer carbonate (RENVELA) 1,600 mg, Oral, 3 times daily    tiotropium (SPIRIVA WITH HANDIHALER) 18 mcg, Inhalation, Daily    white petrolatum-mineral oiL (ARTIFICIAL TEARS, MIHAELA/MIN,) 83-15 % Oint Both Eyes, Nightly       Current Inpatient Medications   amLODIPine  2.5 mg Oral Daily    atorvastatin  80 mg Oral Daily    carvediloL  6.25 mg Oral BID    fluticasone furoate-vilanteroL  1 puff Inhalation  Daily    folic acid  1 mg Oral Daily    furosemide  40 mg Oral Daily    insulin detemir U-100  15 Units Subcutaneous QHS    levothyroxine  125 mcg Oral Before breakfast    sevelamer carbonate  1,600 mg Oral TID       Current Intravenous Infusions   esmolol 300 mcg/kg/min (02/21/24 0437)    nicardipine Stopped (02/20/24 1731)           Objective:       Intake/Output Summary (Last 24 hours) at 2/21/2024 0510  Last data filed at 2/21/2024 0400  Gross per 24 hour   Intake 1115.96 ml   Output 800 ml   Net 315.96 ml         Vital Signs (Most Recent):  Temp: 97.8 °F (36.6 °C) (02/21/24 0400)  Pulse: 95 (02/21/24 0500)  Resp: 14 (02/21/24 0500)  BP: (!) 92/59 (02/21/24 0500)  SpO2: (!) 94 % (02/21/24 0500)  Body mass index is 34.9 kg/m².  Weight: 89.4 kg (197 lb) Vital Signs (24h Range):  Temp:  [97.6 °F (36.4 °C)-98.2 °F (36.8 °C)] 97.8 °F (36.6 °C)  Pulse:  [] 95  Resp:  [12-24] 14  SpO2:  [91 %-100 %] 94 %  BP: ()/(44-96) 92/59     Physical Exam  Constitutional:       General: She is not in acute distress.     Appearance: Normal appearance.   HENT:      Head: Normocephalic and atraumatic.   Cardiovascular:      Rate and Rhythm: Normal rate and regular rhythm.      Pulses: Normal pulses.   Pulmonary:      Effort: Pulmonary effort is normal. No respiratory distress.      Breath sounds: Normal breath sounds.   Abdominal:      General: There is no distension.      Palpations: Abdomen is soft.      Tenderness: There is no abdominal tenderness.   Musculoskeletal:         General: Swelling (B/L upper and lower extremities) present.      Right lower leg: Edema present.      Left lower leg: Edema present.   Skin:     General: Skin is warm and dry.   Neurological:      Mental Status: She is alert and oriented to person, place, and time. Mental status is at baseline.   Psychiatric:         Mood and Affect: Mood normal.         Behavior: Behavior normal.           Lines/Drains/Airways       Drain  Duration              "Female External Urinary Catheter w/ Suction 02/20/24 1105 <1 day              Peripheral Intravenous Line  Duration                  Hemodialysis AV Fistula Left forearm -- days         Peripheral IV - Single Lumen 02/19/24 1300 20 G Right Antecubital 1 day         Peripheral IV - Single Lumen 02/20/24 1132 18 G Anterior;Right Forearm <1 day                    Significant Labs:    Lab Results   Component Value Date    WBC 6.17 02/21/2024    HGB 9.7 (L) 02/21/2024    HCT 34.2 (L) 02/21/2024    MCV 94.7 (H) 02/21/2024     02/21/2024           BMP  Lab Results   Component Value Date     (L) 02/21/2024    K 4.8 02/21/2024    CHLORIDE 101 02/21/2024    CO2 26 02/21/2024    BUN 28.8 (H) 02/21/2024    CREATININE 4.76 (H) 02/21/2024    CALCIUM 8.5 02/21/2024    AGAP 6.0 02/20/2024    EGFRNONAA 10 03/22/2022         ABG  No results for input(s): "PH", "PO2", "PCO2", "HCO3", "POCBASEDEF" in the last 168 hours.    Mechanical Ventilation Support:         Significant Imaging:  I have reviewed the pertinent imaging within the past 24 hours.        Assessment/Plan:     Assessment  Type a aortic dissection  Incidentally found on cardiac CTA done 02/19/2024.  Ascending aortic aneurysm 5.1 x4 0.7 cm, ascending aortic dissection from the aortic root that ends prior to the brachiocephalic artery  Paroxysmal AFib  CHADS-VASc 6, has bled 5  Currently not on anticoagulation.  ESRD gets hemodialysis T/TH/S  VHD  Moderate to severe MR   Coronary artery disease, history CABG x4.  Left heart catheterization 2/24 patent.  History of hypertension, hyperlipidemia, COPD, diabetes type 2, hypothyroidism      Plan  Continue ICU care  Cardiology, CT surgery consults.  Greatly appreciate their assistance.  Patient and family have refused transfer to higher level of care for further evaluation of her aortic dissection. Will discuss with Cardiology and CTS regarding further plans of care given this development  Current goal heart rate less " than 50 and SBP less than 110. She is currently requiring esmolol, nicardipine weaned off. Continue oral amlodipine and carvedilol as well  Hold aspirin for now per Cardiology  Nephrology following for dialysis needs    DVT Prophylaxis: SCDs  GI Prophylaxis: N/A     32 minutes of critical care was time spent personally by me on the following activities: development of treatment plan with patient or surrogate and bedside caregivers, discussions with consultants, evaluation of patient's response to treatment, examination of patient, ordering and performing treatments and interventions, ordering and review of laboratory studies, ordering and review of radiographic studies, pulse oximetry, re-evaluation of patient's condition.  This critical care time did not overlap with that of any other provider or involve time for any procedures.     Morgan Mcnair MD  Pulmonary Critical Care Medicine  [facility] - Emergency Dept  DOS: 02/21/2024

## 2024-02-21 NOTE — DISCHARGE SUMMARY
[facility] Internal Medicine Discharge Summary    Admitting Physician: KENJI Smith MD  Attending Physician: KENJI Smith MD  Date of Admit: 2/19/2024  Date of Discharge: 2/20/2024    Condition: Stable  Outcome: transfer to other facility for services not offered at this facility  DISPOSITION: transfer to other facility          Discharge Diagnoses     Patient Active Problem List   Diagnosis    Chronic congestive heart failure    Tobacco dependence syndrome    Atherosclerosis of coronary artery bypass graft    Chronic obstructive pulmonary disease    Hypertension    Hyperlipidemia    Type 2 diabetes mellitus with chronic kidney disease on chronic dialysis, with long-term current use of insulin    ESRD (end stage renal disease) on dialysis    Depressive disorder    Class 2 severe obesity due to excess calories with serious comorbidity and body mass index (BMI) of 35.0 to 35.9 in adult    Vitamin D deficiency    Coronary artery disease involving native coronary artery of native heart without angina pectoris    Anemia    A-fib    Anemia due to chronic kidney disease, on chronic dialysis    Hypotension    Melena    Acquired hypothyroidism    GI bleed    Aortic heart murmur    Diverticulosis of colon with hemorrhage    Chronic renal impairment    Status post coronary artery bypass graft    Frailty       Principal Problem:  <principal problem not specified>    Consultants and Procedures     Consultants:  IP CONSULT TO CARDIOTHORACIC SURGERY  IP CONSULT TO CARDIOLOGY  IP CONSULT TO NEPHROLOGY  IP CONSULT TO SOCIAL WORK/CASE MANAGEMENT    Procedures:   * No surgery found *     Brief Admission History      78-year-old female with a past medical history of ESRD on HD T/T/S, COPD, DM, HTN, HLD, hypothyroidism, CAD s/p CABG who is presenting today after an incidental finding on a cardiac CTA.  She was incidentally found to have an ascending aortic aneurysm.  Although she is asymptomatic and this may potentially represent a  "chronic condition the cardiologists have been consulted and gave recommendations to monitor within parameters for blood pressure of less than 110 and heart rate less than 50.  Patient is stating she feels completely like her usual self.  The only signs/symptoms she can identify our her legs being swollen which is usual for her and is not a new change.  She denies any chest pain, dyspnea, abdominal pain, jaw pain, tooth pain, headache, dizziness, N/V/D, changes in vision.    Hospital Course with Pertinent Findings     patient was admitted to the hospital for blood pressure and heart rate control given her incidental finding of aortic aneurysm.  CV surgery and Cardiology were consulted as well as dialysis for the patient to receive for usual dialysis needs.  Ultimately was determined that the patient would be suitable for transfer if feasible    Discharge physical exam:  Vitals  BP: (!) 105/58  Temp: 98.2 °F (36.8 °C)  Temp Source: Oral  Pulse: 76  Resp: 16  SpO2: 96 %  Height: 5' 3" (160 cm)  Weight: 89.4 kg (197 lb)    Vital signs and nursing notes reviewed.  HENT:      Head: Normocephalic.      Nose: Nose normal.      Mouth/Throat:      Mouth: Mucous membranes are moist.   Eyes:      Extraocular Movements: Extraocular movements intact.   Cardiovascular:      Rate and Rhythm: Normal rate. Rhythm irregular.      Pulses: Normal pulses.      Heart sounds: Normal heart sounds.   Pulmonary:      Effort: Pulmonary effort is normal.      Breath sounds: Normal breath sounds.   Abdominal:      Palpations: Abdomen is soft.   Musculoskeletal:      Right lower leg: Edema present.      Left lower leg: Edema present.   Skin:     General: Skin is warm and dry.   Neurological:      Mental Status: She is alert and oriented to person, place, and time.   Psychiatric:         Behavior: Behavior normal.          TIME SPENT ON DISCHARGE: 40 minutes    Discharge Medications        Medication List        ASK your doctor about these " medications      albuterol 90 mcg/actuation inhaler  Commonly known as: VENTOLIN HFA  Inhale 2 puffs into the lungs every 6 (six) hours as needed for Wheezing. Rescue     albuterol-ipratropium 2.5 mg-0.5 mg/3 mL nebulizer solution  Commonly known as: DUO-NEB  Take 3 mLs by nebulization every 6 (six) hours as needed for Wheezing or Shortness of Breath. Rescue     amLODIPine 2.5 MG tablet  Commonly known as: NORVASC  Take 1 tablet (2.5 mg total) by mouth once daily.     ARTIFICIAL TEARS (MIHAELA/MIN) 83-15 % Oint  Generic drug: white petrolatum-mineral oiL  Place into both eyes every evening.     aspirin 81 MG Chew  Take 1 tablet (81 mg total) by mouth once daily.     BELSOMRA 5 mg Tab  Generic drug: suvorexant     BenadryL 25 mg capsule  Generic drug: diphenhydrAMINE     blood-glucose meter kit  Use as instructed     carvediloL 6.25 MG tablet  Commonly known as: COREG  Take 1 tablet (6.25 mg total) by mouth 2 (two) times daily.     clonazePAM 0.5 MG tablet  Commonly known as: KlonoPIN     DIALYVITE 100-1 mg Tab  Generic drug: B complex-vitamin C-folic acid     DULoxetine 30 MG capsule  Commonly known as: CYMBALTA     fluticasone furoate-vilanteroL 100-25 mcg/dose diskus inhaler  Commonly known as: BREO  Inhale 1 puff into the lungs once daily.     furosemide 40 MG tablet  Commonly known as: LASIX  Take 1 tablet (40 mg total) by mouth once daily.     INVEGA SUSTENNA 156 mg/mL Syrg injection  Generic drug: paliperidone palmitate     L-METHYLFOLATE 15 mg Tab  Generic drug: levomefolate calcium     lactulose 10 gram/15 mL solution  Commonly known as: CHRONULAC     levothyroxine 125 MCG tablet  Commonly known as: SYNTHROID  Take 1 tablet (125 mcg total) by mouth before breakfast.     loratadine 10 mg tablet  Commonly known as: CLARITIN     ONETOUCH DELICA PLUS LANCET 30 gauge Misc  Generic drug: lancets  1 lancet  by Other route once daily.     ONETOUCH ULTRA TEST Strp  Generic drug: blood sugar diagnostic  1 strip by Other  route once daily.     pantoprazole 40 MG tablet  Commonly known as: PROTONIX     rosuvastatin 40 MG Tab  Commonly known as: CRESTOR  Take 1 tablet (40 mg total) by mouth once daily.     sevelamer carbonate 800 mg Tab  Commonly known as: RENVELA  Take 2 tablets (1,600 mg total) by mouth 3 (three) times daily.     tiotropium 18 mcg inhalation capsule  Commonly known as: SPIRIVA WITH HANDIHALER  Inhale 1 capsule (18 mcg total) into the lungs Daily.              Discharge Information:     The case has been discussed with staff/attending physician. The patient has been seen and evaluated during rounds where the plan to transfer to [facility] was discussed with pt. Pt/family express understanding and agree with the plan.         Sushil Mills DO  [facility] Internal Medicine, [facility]

## 2024-02-21 NOTE — PLAN OF CARE
02/21/24 1531   Final Note   Assessment Type Final Discharge Note   Anticipated Discharge Disposition   (transferred to [facility])   Post-Acute Status   Post-Acute Authorization Other  (transferred to higher level of care)   Discharge Delays None known at this time     Patient transfer set up and complete. Obtained copy of medical record from medical records and printed, put in folder with disk and face sheet for transport. DNR order placed in folder. Gave folder to nurse with instructions to call report and take patient out of will call for [facility] transport.

## 2024-02-21 NOTE — PLAN OF CARE
Per request from nurse, Gordon, patient and Dr. Contreras, initiated transfer. Called [facility] center. Faxed cover sheet. Awaiting reply.

## 2024-02-21 NOTE — CARE UPDATE
Update:   Patient was being set up for transfer.  Patient and 2 family members at bedside present called nurse to speak with physician.   At this time they are declining transfer for higher level of care.  Discussed risks of not being further evaluated for her aortic dissection including death.  Discussed possible benefits of getting repair procedure done.  Patient discussed with the 2 family members present her wishes.  She states I would rather let the good Lord take me, instead of transfer for surgical repair.   Reaching back out to CV surgery for further recs.

## 2024-02-21 NOTE — PLAN OF CARE
Problem: Adult Inpatient Plan of Care  Goal: Plan of Care Review  Outcome: Ongoing, Progressing  Goal: Patient-Specific Goal (Individualized)  Outcome: Ongoing, Progressing  Goal: Absence of Hospital-Acquired Illness or Injury  Outcome: Ongoing, Progressing  Goal: Optimal Comfort and Wellbeing  Outcome: Ongoing, Progressing  Goal: Readiness for Transition of Care  Outcome: Ongoing, Progressing     Problem: Diabetes Comorbidity  Goal: Blood Glucose Level Within Targeted Range  Outcome: Ongoing, Progressing     Problem: Device-Related Complication Risk (Hemodialysis)  Goal: Safe, Effective Therapy Delivery  Outcome: Ongoing, Progressing     Problem: Hemodynamic Instability (Hemodialysis)  Goal: Effective Tissue Perfusion  Outcome: Ongoing, Progressing     Problem: Infection (Hemodialysis)  Goal: Absence of Infection Signs and Symptoms  Outcome: Ongoing, Progressing     Problem: Fall Injury Risk  Goal: Absence of Fall and Fall-Related Injury  Outcome: Ongoing, Progressing

## 2024-02-21 NOTE — NURSING
Nurses Note -- 4 Eyes      2/21/2024   1:14 PM      Skin assessed during: Q Shift Change      [x] No Altered Skin Integrity Present    []Prevention Measures Documented      [] Yes- Altered Skin Integrity Present or Discovered   [] LDA Added if Not in Epic (Describe Wound)   [] New Altered Skin Integrity was Present on Admit and Documented in LDA   [] Wound Image Taken    Wound Care Consulted? No    Attending Nurse:  Manolo Lyn rn    Second RN/Staff Member:   Mina floyd rn

## 2024-02-21 NOTE — NURSING
Nurses Note -- 4 Eyes      2/21/2024   1:25 AM      Skin assessed during: Daily Assessment      [x] No Altered Skin Integrity Present    [x]Prevention Measures Documented      [] Yes- Altered Skin Integrity Present or Discovered   [] LDA Added if Not in Epic (Describe Wound)   [] New Altered Skin Integrity was Present on Admit and Documented in LDA   [] Wound Image Taken    Wound Care Consulted? No    Attending Nurse:  Coral DEAN     Second RN/Staff Member:   Mariposa DEAN

## 2024-02-21 NOTE — PROGRESS NOTES
Nephrology follow up progress note    HPI:      Rosette Madrid is a 78 y.o. female who is known to me for her ESRD on chronic hemodialysis on TTS schedule at [facility].  She has been very compliant to her treatments.  Was admitted because she was found to have dissection of the ascending aortic aneurysm.  Patient remains asymptomatic.  Still she is on IV esmolol for blood pressure control.  And heart rate control.  She had good dialysis done yesterday   Her youngest daughter present in the room.  Daughter reported the patient has refused to go to [place].    Interval history:          Review of Systems:       Past medical, family, surgical, and social history reviewed and unchanged from initial consult note.     Objective:       VITAL SIGNS: 24 HR MIN & MAX LAST    Temp  Min: 97.5 °F (36.4 °C)  Max: 98.2 °F (36.8 °C)  97.5 °F (36.4 °C)        BP  Min: 70/55  Max: 131/67  99/61     Pulse  Min: 54  Max: 115  99     Resp  Min: 12  Max: 24  18    SpO2  Min: 90 %  Max: 100 %  95 %      GEN:  Moderately developed and nourished.  No acute distress noted.  Awake alert oriented time person place.  HEENT: Conjunctiva anicteric, pupils reactive, MMM, OP benign  CV: RRR without rub or gallop  PULM: CTAB, unlabored  ABD: Soft, NT/ND abdomen with NABS  EXT: No cyanosis or edema  SKIN: Warm and dry  PSYCH: Awake, alert, and appropriately conversant  Vascular access:  Left forearm AV fistula          Component Value Date/Time     (L) 02/21/2024 0151     (L) 02/20/2024 1741    K 4.8 02/21/2024 0151    K 4.0 02/20/2024 1741    CHLORIDE 101 02/21/2024 0151    CHLORIDE 103 02/20/2024 1741    CO2 26 02/21/2024 0151    CO2 25 02/20/2024 1741    BUN 28.8 (H) 02/21/2024 0151    BUN 23.7 (H) 02/20/2024 1741    CREATININE 4.76 (H) 02/21/2024 0151    CREATININE 3.31 (H) 02/20/2024 1741    CALCIUM 8.5 02/21/2024 0151    CALCIUM 8.6 02/20/2024 1741    PHOS 2.5 02/20/2024 1741            Component Value Date/Time    WBC 6.17  02/21/2024 0151    WBC 5.48 02/19/2024 1955    HGB 9.7 (L) 02/21/2024 0151    HGB 11.4 (L) 02/19/2024 1955    HCT 34.2 (L) 02/21/2024 0151    HCT 38.5 02/19/2024 1955     02/21/2024 0151     02/19/2024 1955       Imaging reviewed      Assessment / Plan:   ESRD on TTS schedule  Anemia of chronic kidney disease  Hypertension  Dissection of ascending aortic aneurysm    Recommendation  Continue dialysis on TTS schedule  Daughter at the bedside is trying to convince patient to go to [place] for evaluation at least and possible management if needed.

## 2024-02-22 LAB
OHS QRS DURATION: 158 MS
OHS QRS DURATION: 160 MS
OHS QTC CALCULATION: 488 MS
OHS QTC CALCULATION: 515 MS

## 2024-02-22 NOTE — NURSING
5 bags of Esmolol (2000mg/100ml) given to [facility] ambulance for transfer to [facility].

## 2024-02-22 NOTE — NURSING
Pt. Stated understanding of d/c to [facility] for Aortic dissection intervention. Daughter at bedside. Pt stable, v/s stable, and resting in bed. [facility] ambulance in room pending transfer. IV line remained in place for transfer. Belongings send with daughter Neli.

## 2024-02-22 NOTE — DISCHARGE SUMMARY
[facility] Internal Medicine Discharge Summary    Admitting Physician: KENJI Smith MD  Attending Physician: KENJI Smith MD  Date of Admit: 2/19/2024  Date of Discharge: 2/21/2024    Condition: Stable  Outcome: transfer to other facility for services not offered at this facility  DISPOSITION: transfer to other facility          Discharge Diagnoses     Patient Active Problem List   Diagnosis    Chronic congestive heart failure    Tobacco dependence syndrome    Atherosclerosis of coronary artery bypass graft    Chronic obstructive pulmonary disease    Hypertension    Hyperlipidemia    Type 2 diabetes mellitus with chronic kidney disease on chronic dialysis, with long-term current use of insulin    ESRD (end stage renal disease) on dialysis    Depressive disorder    Class 2 severe obesity due to excess calories with serious comorbidity and body mass index (BMI) of 35.0 to 35.9 in adult    Vitamin D deficiency    Coronary artery disease involving native coronary artery of native heart without angina pectoris    Anemia    A-fib    Anemia due to chronic kidney disease, on chronic dialysis    Hypotension    Melena    Acquired hypothyroidism    GI bleed    Aortic heart murmur    Diverticulosis of colon with hemorrhage    Chronic renal impairment    Status post coronary artery bypass graft    Frailty    Dissection of ascending aorta       Principal Problem:  Dissection of ascending aorta    Consultants and Procedures     Consultants:  IP CONSULT TO CARDIOTHORACIC SURGERY  IP CONSULT TO CARDIOLOGY  IP CONSULT TO NEPHROLOGY  IP CONSULT TO SOCIAL WORK/CASE MANAGEMENT    Procedures:   * No surgery found *     Brief Admission History      78-year-old female with a past medical history of ESRD on HD T/T/S, COPD, DM, HTN, HLD, hypothyroidism, CAD s/p CABG who is presenting today after an incidental finding on a cardiac CTA.  She was incidentally found to have an ascending aortic aneurysm.  Although she is asymptomatic and this  "may potentially represent a chronic condition the cardiologists have been consulted and gave recommendations to monitor within parameters for blood pressure of less than 110 and heart rate less than 50.  Patient is stating she feels completely like her usual self.  The only signs/symptoms she can identify our her legs being swollen which is usual for her and is not a new change.  She denies any chest pain, dyspnea, abdominal pain, jaw pain, tooth pain, headache, dizziness, N/V/D, changes in vision.    Hospital Course with Pertinent Findings     patient was admitted to the hospital for blood pressure and heart rate control given her incidental finding of aortic aneurysm.  CV surgery and Cardiology were consulted as well as dialysis for the patient to receive for usual dialysis needs.  Ultimately was determined that the patient would be suitable for transfer if feasible to facility which can repair type A aortic dissection.    Discharge physical exam:  Vitals  BP: (!) 89/59  Temp: 97.7 °F (36.5 °C)  Temp Source: Oral  Pulse: 94  Resp: (!) 22  SpO2: 96 %  Height: 5' 3" (160 cm)  Weight: 89.4 kg (197 lb)    Vital signs and nursing notes reviewed.  HENT:      Head: Normocephalic.      Nose: Nose normal.      Mouth/Throat:      Mouth: Mucous membranes are moist.   Eyes:      Extraocular Movements: Extraocular movements intact.   Cardiovascular:      Rate and Rhythm: Normal rate. Rhythm irregular.      Pulses: Normal pulses.      Heart sounds: Normal heart sounds.   Pulmonary:      Effort: Pulmonary effort is normal.      Breath sounds: Normal breath sounds.   Abdominal:      Palpations: Abdomen is soft.   Musculoskeletal:      Right lower leg: Edema present.      Left lower leg: Edema present.   Skin:     General: Skin is warm and dry.   Neurological:      Mental Status: She is alert and oriented to person, place, and time.   Psychiatric:         Behavior: Behavior normal.          TIME SPENT ON DISCHARGE: 40 " minutes    Discharge Medications        Medication List        CONTINUE taking these medications      albuterol 90 mcg/actuation inhaler  Commonly known as: VENTOLIN HFA  Inhale 2 puffs into the lungs every 6 (six) hours as needed for Wheezing. Rescue     albuterol-ipratropium 2.5 mg-0.5 mg/3 mL nebulizer solution  Commonly known as: DUO-NEB  Take 3 mLs by nebulization every 6 (six) hours as needed for Wheezing or Shortness of Breath. Rescue     amLODIPine 2.5 MG tablet  Commonly known as: NORVASC  Take 1 tablet (2.5 mg total) by mouth once daily.     ARTIFICIAL TEARS (MIHAELA/MIN) 83-15 % Oint  Generic drug: white petrolatum-mineral oiL  Place into both eyes every evening.     aspirin 81 MG Chew  Take 1 tablet (81 mg total) by mouth once daily.     BELSOMRA 5 mg Tab  Generic drug: suvorexant     BenadryL 25 mg capsule  Generic drug: diphenhydrAMINE     blood-glucose meter kit  Use as instructed     carvediloL 6.25 MG tablet  Commonly known as: COREG  Take 1 tablet (6.25 mg total) by mouth 2 (two) times daily.     clonazePAM 0.5 MG tablet  Commonly known as: KlonoPIN     DIALYVITE 100-1 mg Tab  Generic drug: B complex-vitamin C-folic acid     DULoxetine 30 MG capsule  Commonly known as: CYMBALTA     fluticasone furoate-vilanteroL 100-25 mcg/dose diskus inhaler  Commonly known as: BREO  Inhale 1 puff into the lungs once daily.     furosemide 40 MG tablet  Commonly known as: LASIX  Take 1 tablet (40 mg total) by mouth once daily.     INVEGA SUSTENNA 156 mg/mL Syrg injection  Generic drug: paliperidone palmitate     L-METHYLFOLATE 15 mg Tab  Generic drug: levomefolate calcium     lactulose 10 gram/15 mL solution  Commonly known as: CHRONULAC     levothyroxine 125 MCG tablet  Commonly known as: SYNTHROID  Take 1 tablet (125 mcg total) by mouth before breakfast.     loratadine 10 mg tablet  Commonly known as: CLARITIN     ONETOUCH DELICA PLUS LANCET 30 gauge Misc  Generic drug: lancets  1 lancet  by Other route once  daily.     ONETOUCH ULTRA TEST Strp  Generic drug: blood sugar diagnostic  1 strip by Other route once daily.     pantoprazole 40 MG tablet  Commonly known as: PROTONIX     rosuvastatin 40 MG Tab  Commonly known as: CRESTOR  Take 1 tablet (40 mg total) by mouth once daily.     sevelamer carbonate 800 mg Tab  Commonly known as: RENVELA  Take 2 tablets (1,600 mg total) by mouth 3 (three) times daily.     tiotropium 18 mcg inhalation capsule  Commonly known as: SPIRIVA WITH HANDIHALER  Inhale 1 capsule (18 mcg total) into the lungs Daily.              Discharge Information:     The case has been discussed with staff/attending physician. The patient has been seen and evaluated during rounds where the plan to transfer to [facility] was discussed with pt. Pt/family express understanding and agree with the plan.         Sushil Mills DO  [facility] Internal Medicine, -II

## 2024-02-22 NOTE — DISCHARGE INSTRUCTIONS
Pt. Stated understanding of d/c instructions to [facility] for Aortic dissection intervention. Risk and benefits explained to patient at bedside with MD and nurse present.

## 2024-04-15 PROBLEM — K92.2 GI BLEED: Status: RESOLVED | Noted: 2023-03-22 | Resolved: 2024-04-15

## 2024-05-20 ENCOUNTER — OFFICE VISIT (OUTPATIENT)
Dept: INTERNAL MEDICINE | Facility: CLINIC | Age: 79
End: 2024-05-20
Payer: MEDICARE

## 2024-05-20 ENCOUNTER — HOSPITAL ENCOUNTER (OUTPATIENT)
Dept: RADIOLOGY | Facility: HOSPITAL | Age: 79
Discharge: HOME | End: 2024-05-20
Attending: NURSE PRACTITIONER
Payer: MEDICARE

## 2024-05-20 VITALS
SYSTOLIC BLOOD PRESSURE: 108 MMHG | WEIGHT: 190.81 LBS | TEMPERATURE: 98 F | RESPIRATION RATE: 16 BRPM | HEART RATE: 60 BPM | BODY MASS INDEX: 33.81 KG/M2 | DIASTOLIC BLOOD PRESSURE: 65 MMHG | OXYGEN SATURATION: 96 % | HEIGHT: 63 IN

## 2024-05-20 DIAGNOSIS — I48.91 ATRIAL FIBRILLATION, UNSPECIFIED TYPE: Chronic | ICD-10-CM

## 2024-05-20 DIAGNOSIS — F17.210 CIGARETTE NICOTINE DEPENDENCE WITHOUT COMPLICATION: Chronic | ICD-10-CM

## 2024-05-20 DIAGNOSIS — I10 PRIMARY HYPERTENSION: Chronic | ICD-10-CM

## 2024-05-20 DIAGNOSIS — N18.6 TYPE 2 DIABETES MELLITUS WITH CHRONIC KIDNEY DISEASE ON CHRONIC DIALYSIS, WITH LONG-TERM CURRENT USE OF INSULIN: Chronic | ICD-10-CM

## 2024-05-20 DIAGNOSIS — Z99.2 TYPE 2 DIABETES MELLITUS WITH CHRONIC KIDNEY DISEASE ON CHRONIC DIALYSIS, WITH LONG-TERM CURRENT USE OF INSULIN: Chronic | ICD-10-CM

## 2024-05-20 DIAGNOSIS — R68.89 FORGETFULNESS: Primary | ICD-10-CM

## 2024-05-20 DIAGNOSIS — J44.9 CHRONIC OBSTRUCTIVE PULMONARY DISEASE, UNSPECIFIED COPD TYPE: Chronic | ICD-10-CM

## 2024-05-20 DIAGNOSIS — R06.2 WHEEZING: ICD-10-CM

## 2024-05-20 DIAGNOSIS — N18.6 ESRD (END STAGE RENAL DISEASE) ON DIALYSIS: Chronic | ICD-10-CM

## 2024-05-20 DIAGNOSIS — M79.672 LEFT FOOT PAIN: ICD-10-CM

## 2024-05-20 DIAGNOSIS — Z99.2 ESRD (END STAGE RENAL DISEASE) ON DIALYSIS: Chronic | ICD-10-CM

## 2024-05-20 DIAGNOSIS — Z78.0 POST-MENOPAUSE: ICD-10-CM

## 2024-05-20 DIAGNOSIS — Z79.4 TYPE 2 DIABETES MELLITUS WITH CHRONIC KIDNEY DISEASE ON CHRONIC DIALYSIS, WITH LONG-TERM CURRENT USE OF INSULIN: Chronic | ICD-10-CM

## 2024-05-20 DIAGNOSIS — E78.2 MIXED HYPERLIPIDEMIA: Chronic | ICD-10-CM

## 2024-05-20 DIAGNOSIS — I50.9 CHRONIC CONGESTIVE HEART FAILURE, UNSPECIFIED HEART FAILURE TYPE: Chronic | ICD-10-CM

## 2024-05-20 DIAGNOSIS — E11.22 TYPE 2 DIABETES MELLITUS WITH CHRONIC KIDNEY DISEASE ON CHRONIC DIALYSIS, WITH LONG-TERM CURRENT USE OF INSULIN: Chronic | ICD-10-CM

## 2024-05-20 PROBLEM — E78.5 HYPERLIPIDEMIA: Chronic | Status: ACTIVE | Noted: 2022-08-08

## 2024-05-20 PROCEDURE — 99214 OFFICE O/P EST MOD 30 MIN: CPT | Mod: 25,S$PBB,, | Performed by: NURSE PRACTITIONER

## 2024-05-20 PROCEDURE — 99406 BEHAV CHNG SMOKING 3-10 MIN: CPT | Mod: S$PBB,,, | Performed by: NURSE PRACTITIONER

## 2024-05-20 PROCEDURE — 1159F MED LIST DOCD IN RCRD: CPT | Mod: CPTII,,, | Performed by: NURSE PRACTITIONER

## 2024-05-20 PROCEDURE — 99215 OFFICE O/P EST HI 40 MIN: CPT | Mod: PBBFAC,25 | Performed by: NURSE PRACTITIONER

## 2024-05-20 PROCEDURE — 1125F AMNT PAIN NOTED PAIN PRSNT: CPT | Mod: CPTII,,, | Performed by: NURSE PRACTITIONER

## 2024-05-20 PROCEDURE — 73620 X-RAY EXAM OF FOOT: CPT | Mod: TC,LT

## 2024-05-20 PROCEDURE — 3074F SYST BP LT 130 MM HG: CPT | Mod: CPTII,,, | Performed by: NURSE PRACTITIONER

## 2024-05-20 PROCEDURE — 3078F DIAST BP <80 MM HG: CPT | Mod: CPTII,,, | Performed by: NURSE PRACTITIONER

## 2024-05-20 PROCEDURE — 71046 X-RAY EXAM CHEST 2 VIEWS: CPT | Mod: TC

## 2024-05-20 PROCEDURE — 1160F RVW MEDS BY RX/DR IN RCRD: CPT | Mod: CPTII,,, | Performed by: NURSE PRACTITIONER

## 2024-05-20 RX ORDER — SACUBITRIL AND VALSARTAN 24; 26 MG/1; MG/1
1 TABLET, FILM COATED ORAL 2 TIMES DAILY
COMMUNITY
Start: 2024-05-13

## 2024-05-20 RX ORDER — AZITHROMYCIN 250 MG/1
250 TABLET, FILM COATED ORAL ONCE
COMMUNITY
Start: 2024-05-18

## 2024-05-20 RX ORDER — LEVOTHYROXINE SODIUM 125 UG/1
125 TABLET ORAL
Qty: 90 TABLET | Refills: 2 | Status: SHIPPED | OUTPATIENT
Start: 2024-05-20

## 2024-05-20 RX ORDER — PANTOPRAZOLE SODIUM 40 MG/1
40 TABLET, DELAYED RELEASE ORAL DAILY
Qty: 90 TABLET | Refills: 2 | Status: SHIPPED | OUTPATIENT
Start: 2024-05-20

## 2024-05-20 RX ORDER — TIOTROPIUM BROMIDE 18 UG/1
18 CAPSULE ORAL; RESPIRATORY (INHALATION) DAILY
Qty: 90 CAPSULE | Refills: 2 | Status: SHIPPED | OUTPATIENT
Start: 2024-05-20

## 2024-05-20 RX ORDER — ALBUTEROL SULFATE 90 UG/1
2 AEROSOL, METERED RESPIRATORY (INHALATION) EVERY 6 HOURS PRN
Qty: 18 G | Refills: 2 | Status: SHIPPED | OUTPATIENT
Start: 2024-05-20

## 2024-05-20 RX ORDER — FLUTICASONE PROPIONATE 50 MCG
1 SPRAY, SUSPENSION (ML) NASAL DAILY
COMMUNITY
Start: 2024-03-07

## 2024-05-20 RX ORDER — FLUTICASONE FUROATE AND VILANTEROL 100; 25 UG/1; UG/1
1 POWDER RESPIRATORY (INHALATION) DAILY
Qty: 90 EACH | Refills: 2 | Status: SHIPPED | OUTPATIENT
Start: 2024-05-20

## 2024-05-20 NOTE — PROGRESS NOTES
Patient ID: [id number]     Chief Complaint: Follow-up and Results    HPI:     Rosette Madrid is a 78 y.o. female with diagnosis of HTN, HLD, Diastolic Dysfunction, Class III HFpEF 55%, Hx of CABG x4, A-FIB, DM2, ESRD with HD on Tu/Thur/Sat, COPD, Hypothyroidism S/T Thyroidectomy, Tobacco Use. Patient seen in clinic today for hospital follow up. Patient last seen in clinic on 01/29/2024.  Patient's daughter present during appointment.  Patient admitted to [facility] on 02/19/2024 for incidental finding on a cardiac CTA. She was incidentally found to have an ascending aortic aneurysm. Although she is asymptomatic and this may potentially represent a chronic condition the cardiologists have been consulted and gave recommendations to monitor within parameters for blood pressure of less than 110 and heart rate less than 50. Ultimately was determined that the patient would be suitable for transfer if feasible to facility which can repair type A aortic dissection. Patient daughter states patient was transferred to [place], pacemaker was placed, no aortic repair done. No medical records noted, will request.   Patient's daughter states all diabetes medications discontinued during hospital stay 01/2024. States they are not checking CBGs at home due to lost CBG machine. Patient was prescribed Levemir 30 units Qhs, Glipizide 10 mg daily.   Patient prescribed Amlodipine 2.5 mg daily, Carvedilol 6.25 mg bid, Lasix 40 mg daily for HF, HTN. Patient's daughter states patient is hypotensive at times so she stopped giving her the Amlodipine.   Daughter also states patient having memory issues. States her mother moved in with her a year ago she she can take take of her. States forgets her medication at times, the date and place. Patient denies headache, syncope, vertigo. MRI Brain completed 02/14/2024; revealed No acute intracranial findings identified; Old lacunar infarcts, chronic microangiopathic ischemia and atrophy.  Patient has  diagnosis of COPD, currently prescribed Spiriva daily, Breo daily, Albuterol prn. Patient is a current everyday tobacco user.   Today, patient states she was at her grandson's graduation on Saturday and is not having left foot pain. Patient denies injury, fall. States pain to bottom of foot. Patient states taking Tylenol with relief.   Patient denies any other acute complaints.     Patient followed by Nephrology, Dr. Bourgeois.      Patient followed by Cardiology. Last appointment on 01/10/2024. HFpEF - EF 50-55% per Echo Dec. 2021 - NYHA Class II: Patient denies SOB; continues to have some BLE edema, but associates this with chronic venous insufficiency. Echo December 2023 revealed EF 68%, normal systolic function, normal diastolic function, moderate to severe MR, moderate TR. Echo Jan. 2018 showed diastolic dysfunction, EF of 66%, E/A flow reversal, aortic regurg, and moderately thickened leaflets without aortic stenosis. She is euvolemic, warm, and dry on exam today. Continue Lasix and hemodialysis as directed. Counseled on low salt diet. AFIB: Denies any palpitations, lightheadedness, dizziness, or tachycardic symptoms. Eliquis is on hold at this time given recent hospitalization for low H&H and dark tarry stools. Patient underwent endoscopy and colonoscopy which did not reveal origin of bleeding. CIS recommendations at [facility] during inpatient stay for referral to structural heart clinic for CL[place] occlusive device. Appeared to be in regular rhythm on auscultation today. Mildly bradycardic on exam today. CAD s/p CABG prior to 2005: Previously complained of increasing chest pain at last office visit, patient endorsed worsening pain at dialysis, at rest and with exertional activities. Today the patient states that she does not have any further chest pain. However, patient did complete nuclear stress test in December 2023 which revealed an abnormal myocardial perfusion scan. There was a moderate to severe intensity,  moderate size, mostly reversible perfusion abnormality with some fixed areas in the lateral apical wall in the typical distribution of the LCX territory. Overall the patient had a moderate risk nuclear stress test. During inpatient stay at [facility], it was recommended that patient have LHC on an outpatient basis. Discussed the risk and benefits with patient today regarding LHC and given abnormal stress test, significant history of CAD status post CABG, hypertension, hyperlipidemia, heart failure, obesity, diabetes, and tobacco use, will recommend that patient proceed with LHC. Case presented to staff cardiologist. He is in agreement with plan for LHC, but states that we will only proceed with a diagnostic LHC, given that we are unclear if patient was able to tolerate DAPT at this time. Risk, Benefits and Alternatives. Reviewed and Discussed with the PT and their Family and they wish to proceed with above Procedure. NSTEMI Type II (Supply/Demand) in the setting of Severe Anemia due to GI Bleed Dec. 2021 - recent hospital admission. EF 50% per Echo Dec. 2021. Stress Echo 2018 showed no significant ischemia but a moderately large fixed anterior defect of moderate intensity. Continue Aspirin, Coreg, and Crestor. Counseled on heart healthy diet. HTN: BP slightly above goal today 144/61. Ongoing occasional low BP with HD. Reports compliance with medications. Continuing current therapy. Will defer BP management to Nephrology. Counseled on low salt diet and exercise as tolerated. HLD: LDL at goal - 65 per labs September 2023. Continue Crestor 40mg daily. Counseled on low cholesterol diet. Tobacco use: Counseled on the importance of smoking cessation. She smokes 3-4 cigarettes per day - states she is trying to quit on her own. ESRD on HD T/TH/Sat: Continue nephrology management. Venous Insufficiency: s/p percutaneous endovenous Microfoam ablation with Varithena of the left GSV with Dr. Singh on May 17, 2021. Recommend  compression stockings. Instructed on low salt diet. Follow up with Dr. Singh as directed- last appointment with him May 2023. Patient has follow up appointment scheduled for 02/01/2024.     Patient followed by [facility], Dr. Marshal Espinal.       Review of patient's allergies indicates:   Allergen Reactions    Lisinopril Swelling    Azithromycin      Other reaction(s): tongue/facial swelling    Baclofen      Other reaction(s): tongue/facial swelling    Doxycycline      Other reaction(s): Angioedema     Breast Cancer Screening: MMG negative on 10/28/2022  Cervical Cancer Screening: deferred due to age  Colorectal Cancer Screening: Colonoscopy on 07/21/2021 with recommended repeat in 3 years  Diabetic Eye Exam: ordered 03/01/2023  Diabetic Foot Exam: 03/01/2023  Lung Cancer Screening: ordered 05/20/2024  Prostate Cancer Screening: N/A  AAA Screening: N/A  Osteoporosis Screening: normal on 04/11/2022, ordered 05/20/2024  Medicare Wellness: 11/18/2023  Immunizations:   Immunization History   Administered Date(s) Administered    COVID-19 MRNA, LN-S PF (MODERNA HALF 0.25 ML DOSE) 11/09/2021    COVID-19 Vaccine 11/09/2021    COVID-19, MRNA, LN-S, PF (Pfizer) (Purple Cap) 03/10/2021    Influenza - High Dose - PF (65 years and older) 10/31/2016    Influenza - Quadrivalent - High Dose - PF (65 years and older) 11/15/2017, 01/14/2019, 09/30/2021, 10/18/2022    PPD Test 11/15/2017    Pneumococcal Polysaccharide - 23 Valent 10/31/2016     Past Surgical History:   Procedure Laterality Date    AV FISTULA PLACEMENT Left     CARDIAC CATHETERIZATION      CARDIAC VALVE REPLACEMENT      COLONOSCOPY      COLONOSCOPY N/A 1/8/2024    Procedure: COLON;  Surgeon: Josh Gore MD;  Location: [facility] ENDOSCOPY;  Service: Gastroenterology;  Laterality: N/A;    CORONARY ARTERY BYPASS GRAFT      EGD, WITH HEMORRHAGE CONTROL  03/24/2023    Procedure: EGD,WITH HEMORRHAGE CONTROL;  Surgeon: Go Le MD;  Location: [facility]  ENDOSCOPY;  Service: Gastroenterology;;    EGD, WITH HEMORRHAGE CONTROL  04/06/2023    Procedure: EGD,WITH HEMORRHAGE CONTROL;  Surgeon: Ayden Francois MD;  Location: [facility] ENDOSCOPY;  Service: Gastroenterology;;    EGD, WITH POLYPECTOMY USING SNARE Left 12/8/2023    Procedure: EGD, WITH POLYPECTOMY USING SNARE;  Surgeon: Lexi Scales MD;  Location: [facility] ENDOSCOPY;  Service: Gastroenterology;  Laterality: Left;    ESOPHAGOGASTRODUODENOSCOPY      ESOPHAGOGASTRODUODENOSCOPY N/A 02/17/2023    Procedure: EGD +/- VCE PLACEMENT;  Surgeon: Morgan Gardner MD;  Location: [facility] ENDOSCOPY;  Service: Gastroenterology;  Laterality: N/A;    ESOPHAGOGASTRODUODENOSCOPY N/A 03/24/2023    Procedure: EGD;  Surgeon: Go Le MD;  Location: [facility] ENDOSCOPY;  Service: Gastroenterology;  Laterality: N/A;  with push    ESOPHAGOGASTRODUODENOSCOPY N/A 04/06/2023    Procedure: EGD;  Surgeon: Ayden Francois MD;  Location: [facility] ENDOSCOPY;  Service: Gastroenterology;  Laterality: N/A;  with push    ESOPHAGOGASTRODUODENOSCOPY N/A 06/16/2023    Procedure: EGD W/ PUSH;  Surgeon: Morgan Gardner MD;  Location: [facility] ENDOSCOPY;  Service: Gastroenterology;  Laterality: N/A;    ESOPHAGOGASTRODUODENOSCOPY N/A 1/4/2024    Procedure: EGD;  Surgeon: Morgan Scales MD;  Location: [facility] ENDOSCOPY;  Service: Gastroenterology;  Laterality: N/A;    EYE SURGERY      LEFT HEART CATHETERIZATION Left 2/5/2024    Procedure: Left heart cath;  Surgeon: Josh Dubon MD;  Location: [facility] CATH LAB;  Service: Cardiology;  Laterality: Left;    POLYPECTOMY      SMALL BOWEL ENTEROSCOPY Left 01/20/2023    Procedure: ENTEROSCOPY;  Surgeon: Lexi Scales MD;  Location: [facility] ENDOSCOPY;  Service: Gastroenterology;  Laterality: Left;    THYROIDECTOMY      TRANSESOPHAGEAL ECHO  2/2/2024    TUBAL LIGATION       family history includes Hypertension in her father, mother, sister, and sister.    Social History      Socioeconomic History    Marital status:     Number of children: 6   Tobacco Use    Smoking status: Every Day     Current packs/day: 2.00     Average packs/day: 2.0 packs/day for 15.0 years (30.0 ttl pk-yrs)     Types: Cigarettes    Smokeless tobacco: Never    Tobacco comments:     Smokes 3 cigarettes a day   Substance and Sexual Activity    Alcohol use: Yes     Comment: 1 glass every 2-3 month    Drug use: Never    Sexual activity: Not Currently     Social Determinants of Health     Financial Resource Strain: Low Risk  (1/29/2024)    Overall Financial Resource Strain (CARDIA)     Difficulty of Paying Living Expenses: Not very hard   Food Insecurity: No Food Insecurity (1/29/2024)    Hunger Vital Sign     Worried About Running Out of Food in the Last Year: Never true     Ran Out of Food in the Last Year: Never true   Transportation Needs: Unmet Transportation Needs (1/29/2024)    PRAPARE - Transportation     Lack of Transportation (Medical): Yes     Lack of Transportation (Non-Medical): Patient declined   Physical Activity: Unknown (1/29/2024)    Exercise Vital Sign     Days of Exercise per Week: 3 days   Stress: No Stress Concern Present (1/29/2024)    [employer] [place] [employer] - Occupational Stress Questionnaire     Feeling of Stress : Not at all   Housing Stability: Low Risk  (1/29/2024)    Housing Stability Vital Sign     Unable to Pay for Housing in the Last Year: No     Number of Places Lived in the Last Year: 2     Unstable Housing in the Last Year: No     Current Outpatient Medications   Medication Instructions    albuterol (VENTOLIN HFA) 90 mcg/actuation inhaler 2 puffs, Inhalation, Every 6 hours PRN, Rescue    albuterol-ipratropium (DUO-NEB) 2.5 mg-0.5 mg/3 mL nebulizer solution 3 mLs, Nebulization, Every 6 hours PRN, Rescue    aspirin 81 mg, Oral, Daily    azithromycin (Z-ELIECER) 250 mg, Oral, Once    BELSOMRA 5 mg Tab 1 tablet, Oral, Nightly    BenadryL 50 mg, Oral, Nightly     "blood-glucose meter kit Use as instructed    carvediloL (COREG) 6.25 mg, Oral, 2 times daily    clonazePAM (KLONOPIN) 0.5 mg, Oral, Nightly    DIALYVITE 100-1 mg Tab 1 tablet, Oral, Daily    DULoxetine (CYMBALTA) 30 mg, Oral, Daily    ENTRESTO 24-26 mg per tablet 1 tablet, Oral, 2 times daily    fluticasone furoate-vilanteroL (BREO) 100-25 mcg/dose diskus inhaler 1 puff, Inhalation, Daily    fluticasone propionate (FLONASE) 50 mcg/actuation nasal spray 1 spray, Each Nostril, Daily    furosemide (LASIX) 40 mg, Oral, Daily    INVEGA SUSTENNA 156 mg/mL Syrg injection Intramuscular    L-METHYLFOLATE 15 mg Tab 1 tablet, Oral    lactulose (CHRONULAC) 10 gram/15 mL solution 30 mLs, Oral    levothyroxine (SYNTHROID) 125 mcg, Oral, Before breakfast    loratadine (CLARITIN) 10 mg, Oral, Daily    ONETOUCH DELICA PLUS LANCET 30 gauge Misc 1 lancet , Other, Daily    ONETOUCH ULTRA TEST Strp 1 strip, Other, Daily    pantoprazole (PROTONIX) 40 mg, Oral, Daily    rosuvastatin (CRESTOR) 40 mg, Oral, Daily    sevelamer carbonate (RENVELA) 1,600 mg, Oral, 3 times daily    tiotropium (SPIRIVA WITH HANDIHALER) 18 mcg, Inhalation, Daily    white petrolatum-mineral oiL (ARTIFICIAL TEARS, MIHAELA/MIN,) 83-15 % Oint Both Eyes, Nightly       Subjective:     Review of Systems   Constitutional: Negative.    HENT: Negative.     Eyes: Negative.    Respiratory: Negative.     Cardiovascular: Negative.    Gastrointestinal: Negative.    Endocrine: Negative.    Genitourinary: Negative.    Musculoskeletal:  Positive for arthralgias.   Skin: Negative.    Allergic/Immunologic: Negative.    Neurological: Negative.    Hematological: Negative.    Psychiatric/Behavioral: Negative.         Objective:     Visit Vitals  /65 (BP Location: Right arm, Patient Position: Sitting, BP Method: Medium (Automatic))   Pulse 60   Temp 97.9 °F (36.6 °C) (Oral)   Resp 16   Ht 5' 2.99" (1.6 m)   Wt 86.5 kg (190 lb 12.8 oz)   SpO2 96%   BMI 33.81 kg/m²       Physical " Exam  Vitals reviewed.   Constitutional:       Appearance: Normal appearance. She is obese.   HENT:      Head: Normocephalic and atraumatic.      Mouth/Throat:      Mouth: Mucous membranes are moist.      Pharynx: Oropharynx is clear.   Cardiovascular:      Rate and Rhythm: Normal rate. Rhythm irregular.      Heart sounds: Murmur heard.   Pulmonary:      Effort: Pulmonary effort is normal.      Breath sounds: Wheezing present.   Abdominal:      General: Bowel sounds are normal.   Musculoskeletal:         General: Normal range of motion.      Cervical back: Normal range of motion.      Right lower leg: No edema.      Left lower leg: No edema.   Skin:     General: Skin is warm and dry.   Neurological:      Mental Status: She is alert.   Psychiatric:         Mood and Affect: Mood normal.         Behavior: Behavior normal.         Cognition and Memory: Memory is impaired.       Labs Reviewed:     Hematology:  Lab Results   Component Value Date    WBC 6.22 05/20/2024    HGB 11.0 (L) 05/20/2024    HCT 36.4 (L) 05/20/2024     05/20/2024     Chemistry:  Lab Results   Component Value Date     (L) 05/20/2024    K 4.0 05/20/2024    CHLORIDE 100 05/20/2024    BUN 27.3 (H) 05/20/2024    CREATININE 5.11 (H) 05/20/2024    EGFRNORACEVR 8 05/20/2024    GLUCOSE 225 (H) 05/20/2024    CALCIUM 9.3 05/20/2024    CALCIUM 9.2 04/23/2024    ALKPHOS 90 05/20/2024    LABPROT 6.8 05/20/2024    ALBUMIN 3.0 (L) 05/20/2024    BILIDIR <0.1 03/22/2022    IBILI >0.10 03/22/2022    AST 30 05/20/2024    ALT 31 05/20/2024    MG 1.90 02/20/2024    PHOS 2.5 02/20/2024    [id number] 38.1 09/01/2023     Lab Results   Component Value Date    HGBA1C 6.7 05/20/2024     Lipid Panel:  Lab Results   Component Value Date    CHOL 109 05/20/2024    HDL 42 05/20/2024    LDL 56.00 05/20/2024    TRIG 53 05/20/2024    TOTALCHOLEST 3 05/20/2024     Thyroid:  Lab Results   Component Value Date    TSH 2.230 01/04/2024    T3FREE 2.04 (L) 01/07/2020      Urine:  Lab Results   Component Value Date    COLORUA Yellow 12/06/2023    APPEARANCEUA Turbid (A) 12/06/2023    SGUA 1.012 12/06/2023    PHUA 5.0 12/06/2023    PROTEINUA Trace (A) 12/06/2023    GLUCOSEUA Normal 12/06/2023    KETONESUA Negative 12/06/2023    BLOODUA Trace (A) 12/06/2023    NITRITESUA Negative 12/06/2023    LEUKOCYTESUR 250 (A) 12/06/2023    RBCUA 0-5 12/06/2023    WBCUA 11-20 (A) 12/06/2023    BACTERIA Many (A) 12/06/2023    SQEPUA Moderate (A) 12/06/2023    HYALINECASTS None Seen 12/06/2023    CREATRANDUR 375.0 09/26/2017    PROTEINURINE 1,032.6 09/26/2017        Assessment:       ICD-10-CM ICD-9-CM   1. Forgetfulness  R68.89 780.99   2. Wheezing  R06.2 786.07   3. Chronic obstructive pulmonary disease, unspecified COPD type  J44.9 496   4. Chronic congestive heart failure, unspecified heart failure type  I50.9 428.0   5. Atrial fibrillation, unspecified type  I48.91 427.31   6. Primary hypertension  I10 401.9   7. Mixed hyperlipidemia  E78.2 272.2   8. ESRD (end stage renal disease) on dialysis  N18.6 585.6    Z99.2 V45.11   9. Type 2 diabetes mellitus with chronic kidney disease on chronic dialysis, with long-term current use of insulin  E11.22 250.40    N18.6 585.9    Z99.2 V45.11    Z79.4 V58.67   10. Left foot pain  M79.672 729.5   11. Cigarette nicotine dependence without complication  F17.210 305.1   12. Post-menopause  Z78.0 V49.81       Plan:     1. Forgetfulness  MRI negative for acute findings  Neurology referral ordered   - Ambulatory referral/consult to Neurology; Future    2. Wheezing  O2: 96% on RA  CXR ordered  - X-Ray Chest PA And Lateral; Future    3. Chronic obstructive pulmonary disease, unspecified COPD type  PFTs: none noted  Continue Breo Ellipta, Spiriva, Albuterol prn    4. Chronic congestive heart failure, unspecified heart failure type  Followed by Cardiology  Continue Lasix    5. Atrial fibrillation, unspecified type  Continue Coreg 6.25 mg daily  HR: 60  Followed by  Cardiology    6. Primary hypertension  Vitals:    05/20/24 1410   BP: 108/65   Pulse: 60   Resp: 16   Temp: 97.9 °F (36.6 °C)      No results found for this or any previous visit.  Results for orders placed or performed during the hospital encounter of 02/19/24   EKG 12-lead    Collection Time: 02/20/24  6:51 PM   Result Value Ref Range    QRS Duration 160 ms    OHS QTC Calculation 515 ms    Narrative    Test Reason : I48.91,    Vent. Rate : 096 BPM     Atrial Rate : 096 BPM     P-R Int : 152 ms          QRS Dur : 160 ms      QT Int : 408 ms       P-R-T Axes : 030 032 212 degrees     QTc Int : 515 ms    Sinus rhythm with marked sinus arrythmia  Left bundle branch block  Abnormal ECG  Confirmed by Valerio Villegas MD ([id number]) on 2/22/2024 11:45:58 AM    Referred By:             Confirmed By:Valerio Villegas MD   Patient's daughter stopped Amlodipine due to hypotension  Continue Coreg 6.25 mg bid, Lasix 40 mg daily  DASH diet  Encouraged home blood pressure monitoring    7. Mixed hyperlipidemia  Continue Rosuvastatin 40 mg daily  Weight loss encouraged  Low fat/high fiber diet  Increase physical activity  Tobacco cessation encouraged  Cholesterol Total   Date Value Ref Range Status   05/20/2024 109 <=200 mg/dL Final     HDL Cholesterol   Date Value Ref Range Status   05/20/2024 42 35 - 60 mg/dL Final     Triglyceride   Date Value Ref Range Status   05/20/2024 53 37 - 140 mg/dL Final     LDL Cholesterol   Date Value Ref Range Status   05/20/2024 56.00 50.00 - 140.00 mg/dL Final     8. ESRD (end stage renal disease) on dialysis  BUN/Cr: 27.3/5.11  GFR: 8  Dialysis on Tue/Thur/Sat  Followed by Nephrology    9. Type 2 diabetes mellitus with chronic kidney disease on chronic dialysis, with long-term current use of insulin  Levemir and Glipizide discontinued during hospital stay  A1c increased, will repeat in 3 months and if still elevated will restart Levemir  Encouraged home CBG monitoring.  Hypoglycemic episodes: denies  Body mass  index is 33.81 kg/m².  Hemoglobin A1c   Date Value Ref Range Status   05/20/2024 6.7 <=7.0 % Final   On Rosuvastatin  No ACEi/ARB noted  Weight Loss Encouraged  ADA Diet    10. Left foot pain  - X-Ray Foot 2 View Left; Future    12. Cigarette nicotine dependence without complication  Smoking cessation education provided, encouraged. Informed of smoking cessation program. Patient refused smoking cessation at this time. I spent 3 minutes discussing smoking cessation with patient.   - CT Chest Lung Screening Low Dose; Future    13. Post-menopause  - DXA Bone Density Axial Skeleton 1 or more sites; Future      Follow up in about 3 months (around 8/20/2024) for Labs. In addition to their scheduled follow up, the patient has also been instructed to follow up on as needed basis.     JOSEFINA Lord

## 2024-05-21 ENCOUNTER — TELEPHONE (OUTPATIENT)
Dept: INTERNAL MEDICINE | Facility: CLINIC | Age: 79
End: 2024-05-21
Payer: MEDICARE

## 2024-05-21 NOTE — TELEPHONE ENCOUNTER
----- Message from JOSEFINA Lord sent at 5/20/2024  3:30 PM CDT -----  Please obtain medical records from [facility] in [facility]. Patient was transferred there from [facility] 02/2024.

## 2024-05-21 NOTE — TELEPHONE ENCOUNTER
Please notify patient/daughter that chest X-ray was negative. Also, foot X-ray indicated Heel Spur. I would apply foot/ankle sleeve and if no improvement, patient to notify clinic for a referral to Podiatry.

## 2024-06-05 ENCOUNTER — HOSPITAL ENCOUNTER (OUTPATIENT)
Dept: RADIOLOGY | Facility: HOSPITAL | Age: 79
Discharge: HOME | End: 2024-06-05
Attending: NURSE PRACTITIONER
Payer: MEDICARE

## 2024-06-05 DIAGNOSIS — Z78.0 POST-MENOPAUSE: ICD-10-CM

## 2024-06-05 PROCEDURE — 77080 DXA BONE DENSITY AXIAL: CPT | Mod: TC

## 2024-06-07 ENCOUNTER — TELEPHONE (OUTPATIENT)
Dept: INTERNAL MEDICINE | Facility: CLINIC | Age: 79
End: 2024-06-07
Payer: MEDICARE

## 2024-06-07 NOTE — TELEPHONE ENCOUNTER
Please notify patient/patient's daughter that osteopenia noted on Bone Density scan. Recommended to take calcium + Vitamin D supplement daily.

## 2024-06-13 DIAGNOSIS — E11.22 TYPE 2 DIABETES MELLITUS WITH CHRONIC KIDNEY DISEASE ON CHRONIC DIALYSIS, WITH LONG-TERM CURRENT USE OF INSULIN: Primary | ICD-10-CM

## 2024-06-13 DIAGNOSIS — Z99.2 TYPE 2 DIABETES MELLITUS WITH CHRONIC KIDNEY DISEASE ON CHRONIC DIALYSIS, WITH LONG-TERM CURRENT USE OF INSULIN: Primary | ICD-10-CM

## 2024-06-13 DIAGNOSIS — N18.6 TYPE 2 DIABETES MELLITUS WITH CHRONIC KIDNEY DISEASE ON CHRONIC DIALYSIS, WITH LONG-TERM CURRENT USE OF INSULIN: Primary | ICD-10-CM

## 2024-06-13 DIAGNOSIS — M79.672 LEFT FOOT PAIN: ICD-10-CM

## 2024-06-13 DIAGNOSIS — Z79.4 TYPE 2 DIABETES MELLITUS WITH CHRONIC KIDNEY DISEASE ON CHRONIC DIALYSIS, WITH LONG-TERM CURRENT USE OF INSULIN: Primary | ICD-10-CM

## 2024-06-19 RX ORDER — INSULIN DETEMIR 100 [IU]/ML
30 INJECTION, SOLUTION SUBCUTANEOUS NIGHTLY
Qty: 27 ML | Refills: 2 | Status: CANCELLED | OUTPATIENT
Start: 2024-06-19

## 2024-06-20 ENCOUNTER — TELEPHONE (OUTPATIENT)
Dept: INTERNAL MEDICINE | Facility: CLINIC | Age: 79
End: 2024-06-20
Payer: MEDICARE

## 2024-06-20 NOTE — TELEPHONE ENCOUNTER
Willie Neville ( daughter) informed of referral to  [facility].   The insulin,  was discontinued previously upon discharge from the hospital. YAKELIN Dior will check HgbA1c at next office visit and resume insulin if elevated.She voiced understanding

## 2024-08-05 NOTE — NURSING
----- Message from BAYLEE Mckeon sent at 8/5/2024  1:02 PM CDT -----  Xray lumbar noted moderate disc height and endplate degeneration   Along with some bone spurs   Will need MRI lumbar without contrast as failed conservatory treatment PT and nsaids and steroids     Nurses Note -- 4 Eyes      4/7/2023   12:15 AM      Skin assessed during: Admit      [x] No Pressure Injuries Present    []Prevention Measures Documented      [] Yes- Altered Skin Integrity Present or Discovered   [] LDA Added if Not in Epic (Describe Wound)   [] New Altered Skin Integrity was Present on Admit and Documented in LDA   [] Wound Image Taken    Wound Care Consulted? No    Attending Nurse:  Gómez Shanks RN/Staff Member:  Susan Freire

## 2024-10-21 NOTE — PROGRESS NOTES
Salome Levin, JOSEFINA   [facility]  [address] [facility] [address]      PATIENT NAME: Rosette Madrid  : 1945  DATE: 10/23/24  MRN: [id number]      Reason for Visit / Chief Complaint: Follow-up and Memory Loss       History of Present Illness / Problem Focused Workflow     Rosette Madrid presents to the clinic with Follow-up and Memory Loss     79 y.o. female presents to clinic. Medical problems include HTN, HLD, Diastolic Dysfunction, Class III HFpEF 55%, Hx of CABG x4, A-FIB, DM2, ESRD with HD on /Thur/Sat, COPD, Hypothyroidism S/T Thyroidectomy, Tobacco Use. Followed by cardiology, nephrology    10/23/24  Pt presents for over due follow up. Blood pressure is at goal, pt reporting compliance with medication. She does not monitor her blood pressure at home. She is due for labs for DM, thyroid, and HLD follow up, she is not fasting today but she relies on transportation to attend apts so gave her the OK to have labs drawn today non fasting. Daughter sent note stating that her memory loss is worsening and would like referral for it. Pt is poor historian, memory loss is evident. Previous MRI in 2024 with no acute abnormality. She has been off of insulin for some time now, she is unsure why. Will make medication adjustments pending labs. Declines flu shot. Declines smoking cessation resources.              Review of Systems     Review of Systems   Constitutional:  Negative for fatigue, fever and unexpected weight change.   HENT:  Negative for ear pain, hearing loss, trouble swallowing and voice change.    Respiratory:  Negative for cough and shortness of breath.    Cardiovascular:  Negative for chest pain and palpitations.   Gastrointestinal:  Negative for abdominal pain, diarrhea and vomiting.   Genitourinary:  Negative for dysuria.   Musculoskeletal:  Negative for gait problem.   Skin:  Negative for rash and wound.   Neurological:   Negative for weakness.   Psychiatric/Behavioral:  Negative for suicidal ideas.          Medications and Allergies     Medications  Medication List with Changes/Refills   Current Medications    ALBUTEROL (VENTOLIN HFA) 90 MCG/ACTUATION INHALER    Inhale 2 puffs into the lungs every 6 (six) hours as needed for Wheezing. Rescue    ALBUTEROL-IPRATROPIUM (DUO-NEB) 2.5 MG-0.5 MG/3 ML NEBULIZER SOLUTION    Take 3 mLs by nebulization every 6 (six) hours as needed for Wheezing or Shortness of Breath. Rescue    ASPIRIN 81 MG CHEW    Take 1 tablet (81 mg total) by mouth once daily.    AZITHROMYCIN (Z-ELIECER) 250 MG TABLET    Take 250 mg by mouth once.    BELSOMRA 5 MG TAB    Take 1 tablet by mouth every evening.    BENADRYL 25 MG CAPSULE    Take 50 mg by mouth nightly.    BLOOD-GLUCOSE METER KIT    Use as instructed    CARVEDILOL (COREG) 6.25 MG TABLET    Take 1 tablet (6.25 mg total) by mouth 2 (two) times daily.    CLONAZEPAM (KLONOPIN) 0.5 MG TABLET    Take 0.5 mg by mouth every evening.    DIALYVITE 100-1 MG TAB    Take 1 tablet by mouth once daily.    DULOXETINE (CYMBALTA) 30 MG CAPSULE    Take 30 mg by mouth once daily.    ENTRESTO 24-26 MG PER TABLET    Take 1 tablet by mouth 2 (two) times daily.    FLUTICASONE FUROATE-VILANTEROL (BREO) 100-25 MCG/DOSE DISKUS INHALER    Inhale 1 puff into the lungs once daily.    FLUTICASONE PROPIONATE (FLONASE) 50 MCG/ACTUATION NASAL SPRAY    1 spray by Each Nostril route once daily.    FUROSEMIDE (LASIX) 40 MG TABLET    Take 1 tablet (40 mg total) by mouth once daily.    INVEGA SUSTENNA 156 MG/ML SYRG INJECTION    Inject into the muscle.    L-METHYLFOLATE 15 MG TAB    Take 1 tablet by mouth.    LACTULOSE (CHRONULAC) 10 GRAM/15 ML SOLUTION    Take 30 mLs by mouth.    LEVOTHYROXINE (SYNTHROID) 125 MCG TABLET    Take 1 tablet (125 mcg total) by mouth before breakfast.    LORATADINE (CLARITIN) 10 MG TABLET    Take 10 mg by mouth once daily.    ONETOUCH DELICA PLUS LANCET 30 GAUGE MISC    1  lancet  by Other route once daily.    ONETOUCH ULTRA TEST STRP    1 strip by Other route once daily.    PANTOPRAZOLE (PROTONIX) 40 MG TABLET    Take 1 tablet (40 mg total) by mouth once daily.    ROSUVASTATIN (CRESTOR) 40 MG TAB    Take 1 tablet (40 mg total) by mouth once daily.    SEVELAMER CARBONATE (RENVELA) 800 MG TAB    Take 2 tablets (1,600 mg total) by mouth 3 (three) times daily.    TIOTROPIUM (SPIRIVA WITH HANDIHALER) 18 MCG INHALATION CAPSULE    Inhale 1 capsule (18 mcg total) into the lungs Daily.    WHITE PETROLATUM-MINERAL OIL (ARTIFICIAL TEARS, MIHAELA/MIN,) 83-15 % OINT    Place into both eyes every evening.         Allergies  Review of patient's allergies indicates:   Allergen Reactions    Lisinopril Swelling    Azithromycin      Other reaction(s): tongue/facial swelling    Baclofen      Other reaction(s): tongue/facial swelling    Doxycycline      Other reaction(s): Angioedema       Physical Examination     Vitals:    10/23/24 0957   BP: 118/71   Pulse: 60   Resp: 16   Temp: 97.5 °F (36.4 °C)     Physical Exam  Vitals and nursing note reviewed.   Constitutional:       General: She is not in acute distress.     Appearance: She is not ill-appearing.   HENT:      Head: Normocephalic and atraumatic.      Mouth/Throat:      Mouth: Mucous membranes are moist.      Pharynx: Oropharynx is clear.   Eyes:      General: No scleral icterus.     Extraocular Movements: Extraocular movements intact.      Conjunctiva/sclera: Conjunctivae normal.      Pupils: Pupils are equal, round, and reactive to light.   Neck:      Vascular: No carotid bruit.   Cardiovascular:      Rate and Rhythm: Normal rate and regular rhythm.      Heart sounds:      No friction rub. No gallop.   Pulmonary:      Effort: Pulmonary effort is normal. No respiratory distress.      Breath sounds: Normal breath sounds. No wheezing, rhonchi or rales.   Abdominal:      General: Abdomen is flat. Bowel sounds are normal. There is no distension.       Palpations: Abdomen is soft. There is no mass.      Tenderness: There is no abdominal tenderness.   Musculoskeletal:         General: Normal range of motion.      Cervical back: Normal range of motion and neck supple.   Skin:     General: Skin is warm and dry.   Neurological:      General: No focal deficit present.      Mental Status: She is alert.   Psychiatric:         Mood and Affect: Mood normal.           Results     Lab Results   Component Value Date    WBC 6.22 05/20/2024    RBC 4.14 (L) 05/20/2024    HGB 10.3 (L) 10/08/2024    HCT 36.4 (L) 05/20/2024    MCV 87.9 05/20/2024    MCH 26.6 (L) 05/20/2024    MCHC 30.2 (L) 05/20/2024    RDW 18.2 (H) 05/20/2024     05/20/2024    MPV 10.6 (H) 05/20/2024     Sodium   Date Value Ref Range Status   05/20/2024 134 (L) 136 - 145 mmol/L Final     Potassium   Date Value Ref Range Status   05/20/2024 4.0 3.5 - 5.1 mmol/L Final     Chloride   Date Value Ref Range Status   05/20/2024 100 98 - 107 mmol/L Final     CO2   Date Value Ref Range Status   05/20/2024 26 23 - 31 mmol/L Final     Blood Urea Nitrogen   Date Value Ref Range Status   09/17/2024 54 (H) 6 - 19 mg/dL Final     Creatinine   Date Value Ref Range Status   05/20/2024 5.11 (H) 0.55 - 1.02 mg/dL Final     Calcium   Date Value Ref Range Status   05/20/2024 9.3 8.4 - 10.2 mg/dL Final   04/23/2024 9.2 8.7 - 10.4 mg/dL Final     Comment:       Please note change in reference range.     Albumin   Date Value Ref Range Status   05/20/2024 3.0 (L) 3.4 - 4.8 g/dL Final     Bilirubin Total   Date Value Ref Range Status   05/20/2024 0.3 <=1.5 mg/dL Final     ALP   Date Value Ref Range Status   05/20/2024 90 40 - 150 unit/L Final     AST   Date Value Ref Range Status   05/20/2024 30 5 - 34 unit/L Final     ALT   Date Value Ref Range Status   05/20/2024 31 0 - 55 unit/L Final     Estimated GFR-Non    Date Value Ref Range Status   03/22/2022 10       Lab Results   Component Value Date    CHOL 109  05/20/2024     Lab Results   Component Value Date    HDL 42 05/20/2024     Lab Results   Component Value Date    TRIG 53 05/20/2024     Lab Results   Component Value Date    VLDL 11 05/20/2024     Lab Results   Component Value Date    LDL 56.00 05/20/2024     Lab Results   Component Value Date    TSH 2.230 01/04/2024     Lab Results   Component Value Date    PHUR 5.0 12/06/2023    SPECGRAV 1.020 02/14/2018    PROTEINUA Trace (A) 12/06/2023    GLUCUA Normal 12/06/2023    KETONESU Negative 09/30/2021    OCCULTUA Trace (A) 12/06/2023    NITRITE Negative 12/06/2023    LEUKOCYTESUR 250 (A) 12/06/2023     Lab Results   Component Value Date    HGBA1C 6.7 05/20/2024    HGBA1C 5.5 01/22/2024    HGBA1C 5.8 01/04/2024           Assessment         ICD-10-CM ICD-9-CM   1. Primary hypertension  I10 401.9   2. Mixed hyperlipidemia  E78.2 272.2   3. Type 2 diabetes mellitus with chronic kidney disease on chronic dialysis, with long-term current use of insulin  E11.22 250.40    N18.6 585.9    Z99.2 V45.11    Z79.4 V58.67   4. Vitamin D deficiency  E55.9 268.9   5. Memory loss  R41.3 780.93   6. Hyperparathyroidism  E21.3 252.00   7. Thrombocytopenia, unspecified  D69.6 287.5   8. Anemia due to chronic kidney disease, on chronic dialysis  N18.6 585.6    D63.1 285.21    Z99.2 V45.11   9. Acquired hypothyroidism  E03.9 244.9   10. Chronic obstructive pulmonary disease, unspecified COPD type  J44.9 496   11. Cigarette nicotine dependence without complication  F17.210 305.1       Plan      Problem List Items Addressed This Visit       Cigarette nicotine dependence without complication (Chronic)    Overview     Formatting of this note might be different from the original. dx update         Current Assessment & Plan     Smoking cessation discussed > 3 minutes.  Patent not yet ready to quit.  Discussed benefits of quitting including improved health, decreased cardiac/vascular/pulmonary/stroke risks as well as saving money.           Chronic  obstructive pulmonary disease (Chronic)    Current Assessment & Plan     Continue spiriva maintenance, albuterol inhaler and nebs PRN  Use inhalers as prescribed (rinse mouth after use of steroid inhalers). Use long term inhalers daily and rescue inhaler as needed.  Avoid triggers (high humidity, strong odors, chemical fumes).  Report signs of upper respiratory infection immediately for early treatment.  Flu shot recommended yearly.  Practice abdominal breathing. Eat smaller, more frequent meals.  Smoking Cessation encouraged.           Hypertension - Primary (Chronic)    Current Assessment & Plan     At goal. Continue coreg, entresto, lasix  Low Sodium Diet (DASH Diet - Less than 2 grams of sodium per day).  Monitor blood pressure daily and log. Report consistent numbers greater than 140/90.  Maintain healthy weight with goal BMI <30. Exercise 30 minutes per day, 5 days per week.  Smoking cessation encouraged to aid in BP reduction.         Relevant Orders    CBC Auto Differential    Comprehensive Metabolic Panel    TSH    T4, Free    Urinalysis, Reflex to Urine Culture    Hyperlipidemia (Chronic)    Current Assessment & Plan     FLP ordered  Continue rosuvastatin 40mg  Stressed importance of dietary modifications. Follow a low cholesterol, low saturated fat diet with less that 200mg of cholesterol a day.  Avoid fried foods and high saturated fats (high saturated fats less than 7% of calories).  Add Flax Seed/Fish Oil supplements to diet. Increase dietary fiber.  Regular exercise can reduce LDL and raise HDL. Stressed importance of physical activity 5 times per week for 30 minutes per day.            Relevant Orders    Lipid Panel    Type 2 diabetes mellitus with chronic kidney disease on chronic dialysis, with long-term current use of insulin (Chronic)    Current Assessment & Plan     A1C ordered  Not currently taking insulin  Follow ADA Diet. Avoid soda, simple sweets, and limit rice/pasta/breads/starches (no  more than 45-50 grams per meal).  Maintain healthy weight with goal BMI <30.  Exercise 5 times per week for 30 minutes per day.  Stressed importance of daily foot exams.  Stressed importance of annual dilated eye exam.         Relevant Orders    Comprehensive Metabolic Panel    Hemoglobin A1C    Vitamin D deficiency (Chronic)    Relevant Orders    Vitamin D    Anemia due to chronic kidney disease, on chronic dialysis    Current Assessment & Plan     CBC ordered  ED precautions         Acquired hypothyroidism    Overview     Formatting of this note might be different from the original. dx update         Current Assessment & Plan     TSH, T4 ordered  Continue Levothyroxine 125mcg  Take medicine on an empty stomach with water (no other medications or beverages). Wait 30 minutes to eat or drink.  Report any symptoms of thinning hair, breaking nails, fatigue, weight gain or loss, palpitations.            Hyperparathyroidism    Current Assessment & Plan     Nephrology frequently monitoring PTH         Thrombocytopenia, unspecified    Memory loss    Relevant Orders    Ambulatory referral/consult to Family Practice       Future Appointments   Date Time Provider Department Center   10/23/2024 10:20 AM KENNETH AMBROSE   11/11/2024 12:40 PM [facility]            Signature:      [facility]  [address] [facility] [address]    Date of encounter: 10/23/24

## 2024-10-23 ENCOUNTER — TELEPHONE (OUTPATIENT)
Dept: INTERNAL MEDICINE | Facility: CLINIC | Age: 79
End: 2024-10-23

## 2024-10-23 ENCOUNTER — LAB VISIT (OUTPATIENT)
Dept: LAB | Facility: HOSPITAL | Age: 79
End: 2024-10-23
Payer: MEDICARE

## 2024-10-23 ENCOUNTER — OFFICE VISIT (OUTPATIENT)
Dept: INTERNAL MEDICINE | Facility: CLINIC | Age: 79
End: 2024-10-23
Payer: MEDICARE

## 2024-10-23 VITALS
DIASTOLIC BLOOD PRESSURE: 71 MMHG | HEIGHT: 63 IN | RESPIRATION RATE: 16 BRPM | TEMPERATURE: 98 F | OXYGEN SATURATION: 96 % | BODY MASS INDEX: 31.63 KG/M2 | WEIGHT: 178.5 LBS | SYSTOLIC BLOOD PRESSURE: 118 MMHG | HEART RATE: 60 BPM

## 2024-10-23 DIAGNOSIS — Z79.4 TYPE 2 DIABETES MELLITUS WITH CHRONIC KIDNEY DISEASE ON CHRONIC DIALYSIS, WITH LONG-TERM CURRENT USE OF INSULIN: Chronic | ICD-10-CM

## 2024-10-23 DIAGNOSIS — F17.210 CIGARETTE NICOTINE DEPENDENCE WITHOUT COMPLICATION: Chronic | ICD-10-CM

## 2024-10-23 DIAGNOSIS — E21.3 HYPERPARATHYROIDISM: ICD-10-CM

## 2024-10-23 DIAGNOSIS — Z99.2 TYPE 2 DIABETES MELLITUS WITH CHRONIC KIDNEY DISEASE ON CHRONIC DIALYSIS, WITH LONG-TERM CURRENT USE OF INSULIN: Chronic | ICD-10-CM

## 2024-10-23 DIAGNOSIS — R41.3 MEMORY LOSS: ICD-10-CM

## 2024-10-23 DIAGNOSIS — E03.9 ACQUIRED HYPOTHYROIDISM: ICD-10-CM

## 2024-10-23 DIAGNOSIS — E78.2 MIXED HYPERLIPIDEMIA: Chronic | ICD-10-CM

## 2024-10-23 DIAGNOSIS — E11.22 TYPE 2 DIABETES MELLITUS WITH CHRONIC KIDNEY DISEASE ON CHRONIC DIALYSIS, WITH LONG-TERM CURRENT USE OF INSULIN: Chronic | ICD-10-CM

## 2024-10-23 DIAGNOSIS — Z99.2 ANEMIA DUE TO CHRONIC KIDNEY DISEASE, ON CHRONIC DIALYSIS: ICD-10-CM

## 2024-10-23 DIAGNOSIS — E55.9 VITAMIN D DEFICIENCY: Chronic | ICD-10-CM

## 2024-10-23 DIAGNOSIS — D63.1 ANEMIA DUE TO CHRONIC KIDNEY DISEASE, ON CHRONIC DIALYSIS: ICD-10-CM

## 2024-10-23 DIAGNOSIS — I10 PRIMARY HYPERTENSION: ICD-10-CM

## 2024-10-23 DIAGNOSIS — D69.6 THROMBOCYTOPENIA, UNSPECIFIED: ICD-10-CM

## 2024-10-23 DIAGNOSIS — I10 PRIMARY HYPERTENSION: Primary | Chronic | ICD-10-CM

## 2024-10-23 DIAGNOSIS — N18.6 TYPE 2 DIABETES MELLITUS WITH CHRONIC KIDNEY DISEASE ON CHRONIC DIALYSIS, WITH LONG-TERM CURRENT USE OF INSULIN: Chronic | ICD-10-CM

## 2024-10-23 DIAGNOSIS — F17.200 NEEDS SMOKING CESSATION EDUCATION: ICD-10-CM

## 2024-10-23 DIAGNOSIS — J44.9 CHRONIC OBSTRUCTIVE PULMONARY DISEASE, UNSPECIFIED COPD TYPE: Chronic | ICD-10-CM

## 2024-10-23 DIAGNOSIS — N18.6 ANEMIA DUE TO CHRONIC KIDNEY DISEASE, ON CHRONIC DIALYSIS: ICD-10-CM

## 2024-10-23 PROBLEM — D64.9 ANEMIA: Status: RESOLVED | Noted: 2023-01-19 | Resolved: 2024-10-23

## 2024-10-23 LAB
25(OH)D3+25(OH)D2 SERPL-MCNC: 24 NG/ML (ref 30–80)
ALBUMIN SERPL-MCNC: 2.8 G/DL (ref 3.4–4.8)
ALBUMIN/GLOB SERPL: 0.5 RATIO (ref 1.1–2)
ALP SERPL-CCNC: 100 UNIT/L (ref 40–150)
ALT SERPL-CCNC: 19 UNIT/L (ref 0–55)
ANION GAP SERPL CALC-SCNC: 10 MEQ/L
AST SERPL-CCNC: 17 UNIT/L (ref 5–34)
BACTERIA #/AREA URNS AUTO: ABNORMAL /HPF
BASOPHILS # BLD AUTO: 0.03 X10(3)/MCL
BASOPHILS NFR BLD AUTO: 0.4 %
BILIRUB SERPL-MCNC: 0.3 MG/DL
BILIRUB UR QL STRIP.AUTO: ABNORMAL
BUN SERPL-MCNC: 16.7 MG/DL (ref 9.8–20.1)
CALCIUM SERPL-MCNC: 10.1 MG/DL (ref 8.4–10.2)
CHLORIDE SERPL-SCNC: 100 MMOL/L (ref 98–107)
CHOLEST SERPL-MCNC: 126 MG/DL
CHOLEST/HDLC SERPL: 3 {RATIO} (ref 0–5)
CLARITY UR: ABNORMAL
CO2 SERPL-SCNC: 28 MMOL/L (ref 23–31)
COLOR UR AUTO: YELLOW
CREAT SERPL-MCNC: 4.86 MG/DL (ref 0.55–1.02)
CREAT/UREA NIT SERPL: 3
EOSINOPHIL # BLD AUTO: 0.08 X10(3)/MCL (ref 0–0.9)
EOSINOPHIL NFR BLD AUTO: 1.1 %
ERYTHROCYTE [DISTWIDTH] IN BLOOD BY AUTOMATED COUNT: 17.7 % (ref 11.5–17)
EST. AVERAGE GLUCOSE BLD GHB EST-MCNC: 159.9 MG/DL
GFR SERPLBLD CREATININE-BSD FMLA CKD-EPI: 9 ML/MIN/1.73/M2
GLOBULIN SER-MCNC: 5.1 GM/DL (ref 2.4–3.5)
GLUCOSE SERPL-MCNC: 262 MG/DL (ref 82–115)
GLUCOSE UR QL STRIP: ABNORMAL
HBA1C MFR BLD: 7.2 %
HCT VFR BLD AUTO: 34.8 % (ref 37–47)
HDLC SERPL-MCNC: 42 MG/DL (ref 35–60)
HGB BLD-MCNC: 10.5 G/DL (ref 12–16)
HGB UR QL STRIP: ABNORMAL
HYALINE CASTS #/AREA URNS LPF: ABNORMAL /LPF
IMM GRANULOCYTES # BLD AUTO: 0.05 X10(3)/MCL (ref 0–0.04)
IMM GRANULOCYTES NFR BLD AUTO: 0.7 %
KETONES UR QL STRIP: ABNORMAL
LDLC SERPL CALC-MCNC: 73 MG/DL (ref 50–140)
LEUKOCYTE ESTERASE UR QL STRIP: 75
LYMPHOCYTES # BLD AUTO: 1.23 X10(3)/MCL (ref 0.6–4.6)
LYMPHOCYTES NFR BLD AUTO: 17.3 %
MCH RBC QN AUTO: 28.5 PG (ref 27–31)
MCHC RBC AUTO-ENTMCNC: 30.2 G/DL (ref 33–36)
MCV RBC AUTO: 94.3 FL (ref 80–94)
MONOCYTES # BLD AUTO: 0.46 X10(3)/MCL (ref 0.1–1.3)
MONOCYTES NFR BLD AUTO: 6.5 %
MUCOUS THREADS URNS QL MICRO: ABNORMAL /LPF
NEUTROPHILS # BLD AUTO: 5.27 X10(3)/MCL (ref 2.1–9.2)
NEUTROPHILS NFR BLD AUTO: 74 %
NITRITE UR QL STRIP: NEGATIVE
NRBC BLD AUTO-RTO: 0 %
PH UR STRIP: 5.5 [PH]
PLATELET # BLD AUTO: 172 X10(3)/MCL (ref 130–400)
PMV BLD AUTO: 9.4 FL (ref 7.4–10.4)
POTASSIUM SERPL-SCNC: 3.8 MMOL/L (ref 3.5–5.1)
PROT SERPL-MCNC: 7.9 GM/DL (ref 5.8–7.6)
PROT UR QL STRIP: ABNORMAL
RBC # BLD AUTO: 3.69 X10(6)/MCL (ref 4.2–5.4)
RBC #/AREA URNS AUTO: ABNORMAL /HPF
SODIUM SERPL-SCNC: 138 MMOL/L (ref 136–145)
SP GR UR STRIP.AUTO: 1.02 (ref 1–1.03)
SQUAMOUS #/AREA URNS LPF: ABNORMAL /HPF
T4 FREE SERPL-MCNC: 1.38 NG/DL (ref 0.7–1.48)
TRIGL SERPL-MCNC: 55 MG/DL (ref 37–140)
TSH SERPL-ACNC: 0.71 UIU/ML (ref 0.35–4.94)
UROBILINOGEN UR STRIP-ACNC: ABNORMAL
VLDLC SERPL CALC-MCNC: 11 MG/DL
WBC # BLD AUTO: 7.12 X10(3)/MCL (ref 4.5–11.5)
WBC #/AREA URNS AUTO: ABNORMAL /HPF

## 2024-10-23 PROCEDURE — 1159F MED LIST DOCD IN RCRD: CPT | Mod: CPTII,,,

## 2024-10-23 PROCEDURE — 82306 VITAMIN D 25 HYDROXY: CPT

## 2024-10-23 PROCEDURE — 99214 OFFICE O/P EST MOD 30 MIN: CPT | Mod: PBBFAC

## 2024-10-23 PROCEDURE — 85025 COMPLETE CBC W/AUTO DIFF WBC: CPT

## 2024-10-23 PROCEDURE — 84439 ASSAY OF FREE THYROXINE: CPT

## 2024-10-23 PROCEDURE — 87086 URINE CULTURE/COLONY COUNT: CPT

## 2024-10-23 PROCEDURE — 1101F PT FALLS ASSESS-DOCD LE1/YR: CPT | Mod: CPTII,,,

## 2024-10-23 PROCEDURE — 36415 COLL VENOUS BLD VENIPUNCTURE: CPT

## 2024-10-23 PROCEDURE — 3078F DIAST BP <80 MM HG: CPT | Mod: CPTII,,,

## 2024-10-23 PROCEDURE — 1160F RVW MEDS BY RX/DR IN RCRD: CPT | Mod: CPTII,,,

## 2024-10-23 PROCEDURE — 84443 ASSAY THYROID STIM HORMONE: CPT

## 2024-10-23 PROCEDURE — 83036 HEMOGLOBIN GLYCOSYLATED A1C: CPT

## 2024-10-23 PROCEDURE — 99406 BEHAV CHNG SMOKING 3-10 MIN: CPT | Mod: S$PBB,,,

## 2024-10-23 PROCEDURE — 3288F FALL RISK ASSESSMENT DOCD: CPT | Mod: CPTII,,,

## 2024-10-23 PROCEDURE — 3074F SYST BP LT 130 MM HG: CPT | Mod: CPTII,,,

## 2024-10-23 PROCEDURE — 81001 URINALYSIS AUTO W/SCOPE: CPT

## 2024-10-23 PROCEDURE — 80061 LIPID PANEL: CPT

## 2024-10-23 PROCEDURE — 99214 OFFICE O/P EST MOD 30 MIN: CPT | Mod: S$PBB,25,,

## 2024-10-23 PROCEDURE — 1126F AMNT PAIN NOTED NONE PRSNT: CPT | Mod: CPTII,,,

## 2024-10-23 PROCEDURE — 80053 COMPREHEN METABOLIC PANEL: CPT

## 2024-10-23 NOTE — ASSESSMENT & PLAN NOTE
At goal. Continue coreg, entresto, lasix  Low Sodium Diet (DASH Diet - Less than 2 grams of sodium per day).  Monitor blood pressure daily and log. Report consistent numbers greater than 140/90.  Maintain healthy weight with goal BMI <30. Exercise 30 minutes per day, 5 days per week.  Smoking cessation encouraged to aid in BP reduction.

## 2024-10-23 NOTE — ASSESSMENT & PLAN NOTE
FLP ordered  Continue rosuvastatin 40mg  Stressed importance of dietary modifications. Follow a low cholesterol, low saturated fat diet with less that 200mg of cholesterol a day.  Avoid fried foods and high saturated fats (high saturated fats less than 7% of calories).  Add Flax Seed/Fish Oil supplements to diet. Increase dietary fiber.  Regular exercise can reduce LDL and raise HDL. Stressed importance of physical activity 5 times per week for 30 minutes per day.

## 2024-10-23 NOTE — ASSESSMENT & PLAN NOTE
Continue spiriva maintenance, albuterol inhaler and nebs PRN  Use inhalers as prescribed (rinse mouth after use of steroid inhalers). Use long term inhalers daily and rescue inhaler as needed.  Avoid triggers (high humidity, strong odors, chemical fumes).  Report signs of upper respiratory infection immediately for early treatment.  Flu shot recommended yearly.  Practice abdominal breathing. Eat smaller, more frequent meals.  Smoking Cessation encouraged.

## 2024-10-23 NOTE — ASSESSMENT & PLAN NOTE
TSH, T4 ordered  Continue Levothyroxine 125mcg  Take medicine on an empty stomach with water (no other medications or beverages). Wait 30 minutes to eat or drink.  Report any symptoms of thinning hair, breaking nails, fatigue, weight gain or loss, palpitations.

## 2024-10-23 NOTE — ASSESSMENT & PLAN NOTE
A1C ordered  Not currently taking insulin  Follow ADA Diet. Avoid soda, simple sweets, and limit rice/pasta/breads/starches (no more than 45-50 grams per meal).  Maintain healthy weight with goal BMI <30.  Exercise 5 times per week for 30 minutes per day.  Stressed importance of daily foot exams.  Stressed importance of annual dilated eye exam.

## 2024-10-23 NOTE — TELEPHONE ENCOUNTER
----- Message from JOSEFINA Gonzalez sent at 10/23/2024 11:03 AM CDT -----  Regarding: Urinary Symptoms  Please call pt and inquire if she is having any symptoms of UTI such as burning, urgency, frequency, back pain, fever as her UA is suspicious for infection. If so I will send some antibiotics to treat it.    Thanks  JOSEFINA Gonzalez  ----- Message -----  From: Lab, Background User  Sent: 10/23/2024  10:37 AM CDT  To: JOSEFINA Santoro

## 2024-10-25 ENCOUNTER — TELEPHONE (OUTPATIENT)
Dept: INTERNAL MEDICINE | Facility: CLINIC | Age: 79
End: 2024-10-25
Payer: MEDICARE

## 2024-10-25 LAB — BACTERIA UR CULT: NORMAL

## 2024-10-25 NOTE — TELEPHONE ENCOUNTER
Spoke to pt and pt's daughter . Pt gave permission to speak to daughter Raquel Cuenca . Informed pt's daughter attempt x2  to contact pt was noted  to inquire if pt is having any symptoms of UTI such as as burning, urgency, frequency, back pain, fever as her UA is suspicious for infection. Informed her If so PCP will send some antibiotics to treat it. Pt's daughter stated pt always denies having any pain , but wears depends due to urinary urgency . Pt denies having burning, back pain, or  fever . Pt 's daughter states pt's urine has foul odor. Informed pt's daughter PCP is not in clinic at this time and will be out for the weekend. Advised pt's daughter to monitor pt for any worsening of symptoms and/ or fever and if pt experiences  take pt to [facility].  Also, informed daughter pt has f/u appointment  for labs on 11/11/24 . Pt 's daughter verbalized understanding.

## 2024-10-28 NOTE — TELEPHONE ENCOUNTER
Please inform daughter that the urine culture was did not show any bacterial growth so it is best that we do not treat her with antibiotics due to the risk of treatment with her kidney impairment. If she would develop any fever, go to the urgent care or ED.

## 2024-10-30 ENCOUNTER — TELEPHONE (OUTPATIENT)
Dept: INTERNAL MEDICINE | Facility: CLINIC | Age: 79
End: 2024-10-30
Payer: MEDICARE

## 2024-12-05 DIAGNOSIS — I10 HTN (HYPERTENSION), BENIGN: Chronic | ICD-10-CM

## 2024-12-05 RX ORDER — CARVEDILOL 6.25 MG/1
6.25 TABLET ORAL 2 TIMES DAILY
Qty: 180 TABLET | Refills: 3 | Status: SHIPPED | OUTPATIENT
Start: 2024-12-05

## 2025-01-03 DIAGNOSIS — I50.32 CHRONIC HEART FAILURE WITH PRESERVED EJECTION FRACTION: Chronic | ICD-10-CM

## 2025-01-03 RX ORDER — FUROSEMIDE 40 MG/1
40 TABLET ORAL DAILY
Qty: 30 TABLET | Refills: 0 | Status: SHIPPED | OUTPATIENT
Start: 2025-01-03 | End: 2025-02-02

## 2025-01-06 ENCOUNTER — OFFICE VISIT (OUTPATIENT)
Dept: INTERNAL MEDICINE | Facility: CLINIC | Age: 80
End: 2025-01-06
Payer: MEDICARE

## 2025-01-06 VITALS
TEMPERATURE: 98 F | SYSTOLIC BLOOD PRESSURE: 123 MMHG | HEART RATE: 54 BPM | RESPIRATION RATE: 16 BRPM | HEIGHT: 63 IN | OXYGEN SATURATION: 100 % | WEIGHT: 191 LBS | BODY MASS INDEX: 33.84 KG/M2 | DIASTOLIC BLOOD PRESSURE: 80 MMHG

## 2025-01-06 DIAGNOSIS — Z99.2 ESRD (END STAGE RENAL DISEASE) ON DIALYSIS: Chronic | ICD-10-CM

## 2025-01-06 DIAGNOSIS — Z79.4 TYPE 2 DIABETES MELLITUS WITH CHRONIC KIDNEY DISEASE ON CHRONIC DIALYSIS, WITH LONG-TERM CURRENT USE OF INSULIN: Primary | Chronic | ICD-10-CM

## 2025-01-06 DIAGNOSIS — N18.6 ESRD (END STAGE RENAL DISEASE) ON DIALYSIS: Chronic | ICD-10-CM

## 2025-01-06 DIAGNOSIS — E78.2 MIXED HYPERLIPIDEMIA: Chronic | ICD-10-CM

## 2025-01-06 DIAGNOSIS — Z99.2 TYPE 2 DIABETES MELLITUS WITH CHRONIC KIDNEY DISEASE ON CHRONIC DIALYSIS, WITH LONG-TERM CURRENT USE OF INSULIN: Primary | Chronic | ICD-10-CM

## 2025-01-06 DIAGNOSIS — E11.22 TYPE 2 DIABETES MELLITUS WITH CHRONIC KIDNEY DISEASE ON CHRONIC DIALYSIS, WITH LONG-TERM CURRENT USE OF INSULIN: Primary | Chronic | ICD-10-CM

## 2025-01-06 DIAGNOSIS — Z23 ENCOUNTER FOR PREVNAR PNEUMOCOCCAL VACCINATION: ICD-10-CM

## 2025-01-06 DIAGNOSIS — N18.6 ANEMIA DUE TO CHRONIC KIDNEY DISEASE, ON CHRONIC DIALYSIS: ICD-10-CM

## 2025-01-06 DIAGNOSIS — D63.1 ANEMIA DUE TO CHRONIC KIDNEY DISEASE, ON CHRONIC DIALYSIS: ICD-10-CM

## 2025-01-06 DIAGNOSIS — I10 PRIMARY HYPERTENSION: Chronic | ICD-10-CM

## 2025-01-06 DIAGNOSIS — Z99.2 ANEMIA DUE TO CHRONIC KIDNEY DISEASE, ON CHRONIC DIALYSIS: ICD-10-CM

## 2025-01-06 DIAGNOSIS — N18.6 TYPE 2 DIABETES MELLITUS WITH CHRONIC KIDNEY DISEASE ON CHRONIC DIALYSIS, WITH LONG-TERM CURRENT USE OF INSULIN: Primary | Chronic | ICD-10-CM

## 2025-01-06 PROCEDURE — 1159F MED LIST DOCD IN RCRD: CPT | Mod: CPTII,,,

## 2025-01-06 PROCEDURE — 3079F DIAST BP 80-89 MM HG: CPT | Mod: CPTII,,,

## 2025-01-06 PROCEDURE — 90677 PCV20 VACCINE IM: CPT | Mod: PBBFAC

## 2025-01-06 PROCEDURE — G0009 ADMIN PNEUMOCOCCAL VACCINE: HCPCS | Mod: PBBFAC

## 2025-01-06 PROCEDURE — 1160F RVW MEDS BY RX/DR IN RCRD: CPT | Mod: CPTII,,,

## 2025-01-06 PROCEDURE — 99213 OFFICE O/P EST LOW 20 MIN: CPT | Mod: PBBFAC

## 2025-01-06 PROCEDURE — 99214 OFFICE O/P EST MOD 30 MIN: CPT | Mod: S$PBB,,,

## 2025-01-06 PROCEDURE — 1101F PT FALLS ASSESS-DOCD LE1/YR: CPT | Mod: CPTII,,,

## 2025-01-06 PROCEDURE — 3074F SYST BP LT 130 MM HG: CPT | Mod: CPTII,,,

## 2025-01-06 PROCEDURE — 3288F FALL RISK ASSESSMENT DOCD: CPT | Mod: CPTII,,,

## 2025-01-06 RX ADMIN — PNEUMOCOCCAL 20-VALENT CONJUGATE VACCINE 0.5 ML
2.2; 2.2; 2.2; 2.2; 2.2; 2.2; 2.2; 2.2; 2.2; 2.2; 2.2; 2.2; 2.2; 2.2; 2.2; 2.2; 4.4; 2.2; 2.2; 2.2 INJECTION, SUSPENSION INTRAMUSCULAR at 01:01

## 2025-01-06 NOTE — ASSESSMENT & PLAN NOTE
FLP at goal  Continue rosuvastatin 40mg  Stressed importance of dietary modifications. Follow a low cholesterol, low saturated fat diet with less that 200mg of cholesterol a day.  Avoid fried foods and high saturated fats (high saturated fats less than 7% of calories).  Add Flax Seed/Fish Oil supplements to diet. Increase dietary fiber.  Regular exercise can reduce LDL and raise HDL. Stressed importance of physical activity 5 times per week for 30 minutes per day.

## 2025-01-06 NOTE — ASSESSMENT & PLAN NOTE
A1C at goal  No current medications  Follow ADA Diet. Avoid soda, simple sweets, and limit rice/pasta/breads/starches (no more than 45-50 grams per meal).  Maintain healthy weight with goal BMI <30.  Exercise 5 times per week for 30 minutes per day.  Stressed importance of daily foot exams.  Stressed importance of annual dilated eye exam.

## 2025-01-06 NOTE — PROGRESS NOTES
Salome Levin, JOSEFINA   [facility]  [address] [facility] [place] [address]      PATIENT NAME: Rosette Madrid  : 1945  DATE: 25  MRN: [id number]      Reason for Visit / Chief Complaint: Hypertension       History of Present Illness / Problem Focused Workflow     Rosette Madrid presents to the clinic with Hypertension     79 y.o. female presents to clinic. Medical problems include HTN, HLD, Diastolic Dysfunction, Class III HFpEF 55%, Hx of CABG x4, A-FIB, DM2, ESRD with HD on /Thur/Sat, COPD, Hypothyroidism S/T Thyroidectomy, Tobacco Use. Followed by cardiology, nephrology    10/23/24  Pt presents for over due follow up. Blood pressure is at goal, pt reporting compliance with medication. She does not monitor her blood pressure at home. She is due for labs for DM, thyroid, and HLD follow up, she is not fasting today but she relies on transportation to attend apts so gave her the OK to have labs drawn today non fasting. Daughter sent note stating that her memory loss is worsening and would like referral for it. Pt is poor historian, memory loss is evident. Previous MRI in 2024 with no acute abnormality. She has been off of insulin for some time now, she is unsure why. Will make medication adjustments pending labs. Declines flu shot. Declines smoking cessation resources.      25  Pt presents for over due follow up, labs from October reviewed. All values stable and acceptable for pt known diagnoses. A1C is in an acceptable range and she is OK to remain off of insulin. She denies acute complaints. She is agreeable to pneumonia vaccine today. She is compliant with dialysis and speciality follow up. Attempted to contact daughter to update her with apt details as patient is forgetful, pt agreeable to call to family.           Review of Systems     Review of Systems   Constitutional:  Negative for fatigue, fever and unexpected weight  change.   HENT:  Negative for ear pain, hearing loss, trouble swallowing and voice change.    Respiratory:  Negative for cough and shortness of breath.    Cardiovascular:  Negative for chest pain and palpitations.   Gastrointestinal:  Negative for abdominal pain, diarrhea and vomiting.   Genitourinary:  Negative for dysuria.   Musculoskeletal:  Negative for gait problem.   Skin:  Negative for rash and wound.   Neurological:  Negative for weakness.   Psychiatric/Behavioral:  Negative for suicidal ideas.          Medications and Allergies     Medications  Medication List with Changes/Refills   Current Medications    ALBUTEROL (VENTOLIN HFA) 90 MCG/ACTUATION INHALER    Inhale 2 puffs into the lungs every 6 (six) hours as needed for Wheezing. Rescue    ALBUTEROL-IPRATROPIUM (DUO-NEB) 2.5 MG-0.5 MG/3 ML NEBULIZER SOLUTION    Take 3 mLs by nebulization every 6 (six) hours as needed for Wheezing or Shortness of Breath. Rescue    ASPIRIN 81 MG CHEW    Take 1 tablet (81 mg total) by mouth once daily.    BELSOMRA 5 MG TAB    Take 1 tablet by mouth every evening.    BENADRYL 25 MG CAPSULE    Take 50 mg by mouth nightly.    BLOOD-GLUCOSE METER KIT    Use as instructed    CARVEDILOL (COREG) 6.25 MG TABLET    TAKE 1 TABLET BY MOUTH TWICE A DAY    CLONAZEPAM (KLONOPIN) 0.5 MG TABLET    Take 0.5 mg by mouth every evening.    DIALYVITE 100-1 MG TAB    Take 1 tablet by mouth once daily.    DULOXETINE (CYMBALTA) 30 MG CAPSULE    Take 30 mg by mouth once daily.    ENTRESTO 24-26 MG PER TABLET    Take 1 tablet by mouth 2 (two) times daily.    FLUTICASONE FUROATE-VILANTEROL (BREO) 100-25 MCG/DOSE DISKUS INHALER    Inhale 1 puff into the lungs once daily.    FLUTICASONE PROPIONATE (FLONASE) 50 MCG/ACTUATION NASAL SPRAY    1 spray by Each Nostril route once daily.    FUROSEMIDE (LASIX) 40 MG TABLET    Take 1 tablet (40 mg total) by mouth once daily.    INVEGA SUSTENNA 156 MG/ML SYRG INJECTION    Inject into the muscle.    L-METHYLFOLATE 15  MG TAB    Take 1 tablet by mouth.    LACTULOSE 10 GRAM/15 ML (CHRONULAC) 10 GRAM/15 ML (15 ML) SOLUTION    SMARTSI Milliliter(s) By Mouth Daily PRN    LEVOTHYROXINE (SYNTHROID) 125 MCG TABLET    Take 1 tablet (125 mcg total) by mouth before breakfast.    LORATADINE (CLARITIN) 10 MG TABLET    Take 10 mg by mouth once daily.    ONETOUCH DELICA PLUS LANCET 30 GAUGE MISC    1 lancet  by Other route once daily.    ONETOUCH ULTRA TEST STRP    1 strip by Other route once daily.    PANTOPRAZOLE (PROTONIX) 40 MG TABLET    Take 1 tablet (40 mg total) by mouth once daily.    ROSUVASTATIN (CRESTOR) 40 MG TAB    Take 1 tablet (40 mg total) by mouth once daily.    SEVELAMER CARBONATE (RENVELA) 800 MG TAB    Take 2 tablets (1,600 mg total) by mouth 3 (three) times daily.    TIOTROPIUM (SPIRIVA WITH HANDIHALER) 18 MCG INHALATION CAPSULE    Inhale 1 capsule (18 mcg total) into the lungs Daily.    WHITE PETROLATUM-MINERAL OIL (ARTIFICIAL TEARS, MIHAELA/MIN,) 83-15 % OINT    Place into both eyes every evening.         Allergies  Review of patient's allergies indicates:   Allergen Reactions    Lisinopril Swelling    Azithromycin      Other reaction(s): tongue/facial swelling    Baclofen      Other reaction(s): tongue/facial swelling    Doxycycline      Other reaction(s): Angioedema       Physical Examination     Vitals:    25 1330   BP: 123/80   Pulse: (!) 54   Resp: 16   Temp: 97.5 °F (36.4 °C)     Physical Exam  Vitals and nursing note reviewed.   Constitutional:       General: She is not in acute distress.     Appearance: She is not ill-appearing.   HENT:      Head: Normocephalic and atraumatic.      Mouth/Throat:      Mouth: Mucous membranes are moist.      Pharynx: Oropharynx is clear.   Eyes:      General: No scleral icterus.     Extraocular Movements: Extraocular movements intact.      Conjunctiva/sclera: Conjunctivae normal.      Pupils: Pupils are equal, round, and reactive to light.   Neck:      Vascular: No carotid  bruit.   Cardiovascular:      Rate and Rhythm: Normal rate and regular rhythm.      Heart sounds: No murmur heard.     No friction rub. No gallop.   Pulmonary:      Effort: Pulmonary effort is normal. No respiratory distress.      Breath sounds: Normal breath sounds. No wheezing, rhonchi or rales.   Abdominal:      General: Abdomen is flat. Bowel sounds are normal. There is no distension.      Palpations: Abdomen is soft. There is no mass.      Tenderness: There is no abdominal tenderness.   Musculoskeletal:         General: Normal range of motion.      Cervical back: Normal range of motion and neck supple.   Skin:     General: Skin is warm and dry.   Neurological:      General: No focal deficit present.      Mental Status: She is alert.   Psychiatric:         Mood and Affect: Mood normal.           Results     Lab Results   Component Value Date    WBC 7.12 10/23/2024    RBC 3.69 (L) 10/23/2024    HGB 10.9 (L) 12/30/2024    HCT 34.8 (L) 10/23/2024    MCV 94.3 (H) 10/23/2024    MCH 28.5 10/23/2024    MCHC 30.2 (L) 10/23/2024    RDW 17.7 (H) 10/23/2024     10/23/2024    MPV 9.4 10/23/2024     Sodium   Date Value Ref Range Status   10/23/2024 138 136 - 145 mmol/L Final     Potassium   Date Value Ref Range Status   10/23/2024 3.8 3.5 - 5.1 mmol/L Final     Chloride   Date Value Ref Range Status   10/23/2024 100 98 - 107 mmol/L Final     CO2   Date Value Ref Range Status   10/23/2024 28 23 - 31 mmol/L Final     Blood Urea Nitrogen   Date Value Ref Range Status   10/23/2024 16.7 9.8 - 20.1 mg/dL Final   09/17/2024 54 (H) 6 - 19 mg/dL Final     Creatinine   Date Value Ref Range Status   10/23/2024 4.86 (H) 0.55 - 1.02 mg/dL Final     Calcium   Date Value Ref Range Status   10/23/2024 10.1 8.4 - 10.2 mg/dL Final   04/23/2024 9.2 8.7 - 10.4 mg/dL Final     Comment:       Please note change in reference range.     Albumin   Date Value Ref Range Status   10/23/2024 2.8 (L) 3.4 - 4.8 g/dL Final     Bilirubin Total   Date  Value Ref Range Status   10/23/2024 0.3 <=1.5 mg/dL Final     ALP   Date Value Ref Range Status   10/23/2024 100 40 - 150 unit/L Final     AST   Date Value Ref Range Status   10/23/2024 17 5 - 34 unit/L Final     ALT   Date Value Ref Range Status   10/23/2024 19 0 - 55 unit/L Final     Estimated GFR-Non    Date Value Ref Range Status   03/22/2022 10       Lab Results   Component Value Date    CHOL 126 10/23/2024     Lab Results   Component Value Date    HDL 42 10/23/2024     Lab Results   Component Value Date    TRIG 55 10/23/2024     Lab Results   Component Value Date    VLDL 11 10/23/2024     Lab Results   Component Value Date    LDL 73.00 10/23/2024     Lab Results   Component Value Date    TSH 0.711 10/23/2024     Lab Results   Component Value Date    PHUR 5.5 10/23/2024    SPECGRAV 1.020 02/14/2018    PROTEINUA 2+ (A) 10/23/2024    GLUCUA 1+ (A) 10/23/2024    KETONESU Negative 09/30/2021    OCCULTUA 1+ (A) 10/23/2024    NITRITE Negative 10/23/2024    LEUKOCYTESUR 75 (A) 10/23/2024     Lab Results   Component Value Date    HGBA1C 7.2 (H) 10/23/2024    HGBA1C 6.7 05/20/2024    HGBA1C 5.5 01/22/2024           Assessment         ICD-10-CM ICD-9-CM   1. Type 2 diabetes mellitus with chronic kidney disease on chronic dialysis, with long-term current use of insulin  E11.22 250.40    N18.6 585.9    Z99.2 V45.11    Z79.4 V58.67   2. Encounter for Prevnar pneumococcal vaccination  Z23 V03.82   3. Primary hypertension  I10 401.9   4. Mixed hyperlipidemia  E78.2 272.2   5. ESRD (end stage renal disease) on dialysis  N18.6 585.6    Z99.2 V45.11   6. Anemia due to chronic kidney disease, on chronic dialysis  N18.6 585.6    D63.1 285.21    Z99.2 V45.11       Plan      Problem List Items Addressed This Visit       Hypertension (Chronic)    Current Assessment & Plan     At goal. Continue coreg, entresto, lasix  Low Sodium Diet (DASH Diet - Less than 2 grams of sodium per day).  Monitor blood pressure daily and  log. Report consistent numbers greater than 140/90.  Maintain healthy weight with goal BMI <30. Exercise 30 minutes per day, 5 days per week.  Smoking cessation encouraged to aid in BP reduction.         Hyperlipidemia (Chronic)    Current Assessment & Plan     FLP at goal  Continue rosuvastatin 40mg  Stressed importance of dietary modifications. Follow a low cholesterol, low saturated fat diet with less that 200mg of cholesterol a day.  Avoid fried foods and high saturated fats (high saturated fats less than 7% of calories).  Add Flax Seed/Fish Oil supplements to diet. Increase dietary fiber.  Regular exercise can reduce LDL and raise HDL. Stressed importance of physical activity 5 times per week for 30 minutes per day.            Type 2 diabetes mellitus with chronic kidney disease on chronic dialysis, with long-term current use of insulin - Primary (Chronic)    Current Assessment & Plan     A1C at goal  No current medications  Follow ADA Diet. Avoid soda, simple sweets, and limit rice/pasta/breads/starches (no more than 45-50 grams per meal).  Maintain healthy weight with goal BMI <30.  Exercise 5 times per week for 30 minutes per day.  Stressed importance of daily foot exams.  Stressed importance of annual dilated eye exam.         Relevant Orders    Diabetic Eye Screening Photo    Hemoglobin A1C    Comprehensive Metabolic Panel    ESRD (end stage renal disease) on dialysis (Chronic)    Current Assessment & Plan     Followed by Nephrology  Dialysis on Tu,Th,Sat            Anemia due to chronic kidney disease, on chronic dialysis    Current Assessment & Plan     Stable and asymptomatic  ED precautions          Other Visit Diagnoses       Encounter for Prevnar pneumococcal vaccination        Relevant Medications    pneumoc 20-felecia conj-dip cr(PF) (PREVNAR-20 (PF)) injection Syrg 0.5 mL (Completed)            Future Appointments   Date Time Provider Department Center   1/29/2025  8:00 AM Zakiya Torres, KALA  [facility]   7/9/2025 10:00 AM Salome Levin, JOSEFINA [facility]            Signature:      [facility]  [address] [facility] [address]    Date of encounter: 1/6/25

## 2025-01-24 DIAGNOSIS — E78.2 MIXED HYPERLIPIDEMIA: Chronic | ICD-10-CM

## 2025-01-24 RX ORDER — ROSUVASTATIN CALCIUM 40 MG/1
40 TABLET, COATED ORAL DAILY
Qty: 90 TABLET | Refills: 3 | Status: SHIPPED | OUTPATIENT
Start: 2025-01-24 | End: 2026-01-24

## 2025-01-28 NOTE — PROGRESS NOTES
CHIEF COMPLAINT:   No chief complaint on file.                  Review of patient's allergies indicates:   Allergen Reactions    Lisinopril Swelling    Azithromycin      Other reaction(s): tongue/facial swelling    Baclofen      Other reaction(s): tongue/facial swelling    Doxycycline      Other reaction(s): Angioedema                                          HPI:  Rosette Madrid 79 y.o. with a past medical history of CAD s/p CABG prior to 2005, ESRD on HD (TTHSat), HTN, DM, HFpEF, and HLD presents for follow up and ongoing care.  Today she is requesting cardiac risk stratification for EGD/colonoscopy procedure.  Patient completed an Echocardiogram on 12.30.21 which revealed an ejection fraction of approximately 50 to 55%. See report below.   Echocardiogram completed in December 2023 revealed EF of 68%, normal systolic function, moderate to severe MR, moderate TR.  See full report below.  Patient also completed a nuclear stress test in December 2023 which revealed an abnormal myocardial perfusion scan.  There was a moderate to severe intensity, moderate size, mostly reversible perfusion abnormality with some fixed areas in the lateral apical wall in the typical distribution of the LCX territory.  Overall the patient had a moderate risk nuclear stress test.  She completed a subsequent angiogram in February of 2024 which revealed three-vessel coronary artery disease, medical management was recommended.  She also completed a RILEY.  See full report below.  Patient has not been seen in cardiology clinic at [facility] since January of 2024.  She has been seeing Dr. Macias at [facility], but her memory seems to be a bit poor and she actually can not recall seeing him.    Today the patient states that she feels stable from a cardiac standpoint.  She is not endorsing any cardiac complaints today such as chest pain, SOB, BLAS, palpitations, PND, orthopnea, lightheadedness, dizziness, syncope, or claudication symptoms.  She continues to  endorse left lower extremity swelling that has remained stable.  She continues to do dialysis on Tuesday, Thursday, and Saturdays.  As stated above, her memory seems to be a bit impaired today but she can not recall any recent changes in her medical history or her care.  She is no longer on Eliquis as she is unable to tolerate secondary to GI bleed.  Please see below.  She is able to complete her ADLs without any ischemic symptoms.  She uses rolling walker for ambulation.  She states that her daughter does help her.  She reports compliance with her current medications and states that she is tolerating them well.  She denies any tobacco or other illicit drug use.  She has a pacemaker that has checked with Dr. Farias at [facility].      Historical Information: January 3rd, 2024, patient presented to [facility] ED following dialysis treatment for evaluation of low H&H.  Per chart review, she endorsed 2 days of dark tarry stools.  She was transfused with 4 units PRBCs.  She underwent push enteroscopy on January 4, 2024 which did not reveal any bleed source beyond ligament of Treitz.  Subsequent colonoscopy also did not reveal any source of bleeding.  [facility] was consulted while patient was in patient for possible Watchman due to recurrent GIBs.  Patient has been holding Eliquis since hospitalization per [facility] Rx.  [facility] recommended at that time that patient proceed with outpatient LHC and referral to structural heart clinic for LAE occlusive device.    CARDIAC TESTING  Echo 5.6.24 ([facility])  1. The study quality is below average.   2. The left ventricle is mildly enlarged. Global left ventricular systolic function is mildly decreased. The left ventricular ejection fraction is 45%. Left ventricular diastolic function is indeterminate. Noted left ventricular hypertrophy. Asymmetric septal left ventricular hypertrophy is present. It is moderate.  EDV is 185 mL. ESV is 100 mL.  3. Optison, ultrasound image enhancement, was utilized on this study per  standing order for better visualization of left ventricular endocardium.  The patients IV was started in the right arm by Susan Boland RN. 2ml of imaging enhancement was administered during image acquisition, 1ml was wasted. The IV was removed upon completion of the study.   4. Dilatation of the aortic root and ascending aorta is noted. Aortic root diameter is 4.0 cms. Ascending aorta diameter is 5.0 cms.   5. The pulmonary artery appears to be dilated.  6. Mild calcification of the aortic valve is noted with adequate cuspal excursion.  Mild mitral annular calcification is noted.   7. Moderate (2+) mitral regurgitation. Mild to moderate (1-2+) tricuspid regurgitation. Mild (1+) aortic regurgitation.  8. There appears to be a moderate pleural effusion is noted.   9. Pacemaker is noted.  10. The pulmonary artery systolic pressure is 47 mmHg. Evidence of pulmonary hypertension is noted.    [facility] 2.5.24    The Ost LM lesion was 60% stenosed.    The Mid LM to Ost LAD lesion was 80% stenosed.    The Ost LAD lesion was 70% stenosed.    The Mid LAD-1 lesion was 80% stenosed.    The Prox RCA lesion was 50% stenosed.    The Dist RCA lesion was 90% stenosed.    The Mid LAD-2 lesion was 100% stenosed.    The pre-procedure left ventricular end diastolic pressure was 12.    The estimated blood loss was <50 mL.    There was three vessel coronary artery disease.     FINDINGS  LVEDP:  12 mmHg  Left Main:   60% ostial, 80% distal  stenosis  LAD:   70% ostial, long segment 80% mid vessel stenosis     Circumflex:        RCA:   50% mid, 90% distal  stenosis  The patient has Significant three vessel disease of native coronary arteries    LIMA-LAD: Patent.  just distal to anastomosis  SVG-Diagonal: Mild proximal disease  SVG-OM: Patent  SVG-RCA: Patent     RECOMMENDATIONS  Medical management  Risk factor modifications -- maintain good BP control  Activity -- avoid straining with affected limb for one week  Exercise -- as  tolerated  Precautions post D/C -- come to ER for hematoma, unusual pain, erythema, or unusual drainage at access site  Precautions post D/C -- if develop bleeding or hematoma at access site, hold pressure at access site, and come to ER       RILEY 2.2.24  Procedure RILEY prior to CLINTON closure Device  Findings:   CLINTON not well visualized. Procedure had to be cut short as the patient desaturated and anesthesia requested to pull the RILEY probe down. See anesthesia note for full details.  Patient will benefit from CTA chest EP protocol to further assess the CLINTON. But the initial RILEY views did not visualize CLINTON which is probably surgically excised during her Last CABG.  AV showed marked Aortic valve sclerosis. Concentric LVH is present.        Nuclear Stress Test 12.27.23    Abnormal myocardial perfusion scan.    There is a moderate to severe intensity, moderate sized, mostly reversible perfusion abnormality with some fixed areas in the lateral apical wall(s) in the typical distribution of the LCX territory.    There are no other significant perfusion abnormalities.    The gated perfusion images showed an ejection fraction of 62% at rest. The gated perfusion images showed an ejection fraction of 63% post stress.    There were no arrhythmias during stress.  The nuclear stress test is  fair quality.    On the nuclear stress test the EF is normal.  If the patient has an echo, the EF on the echo is considered more accurate.      The patient has a moderate risk nuclear stress test.   If patient is symptomatic, consider management with two anti-anginals    Echo 12.27.23    Left Ventricle: The left ventricle is normal in size. Normal wall thickness. There is normal systolic function. Biplane (2D) method of discs ejection fraction is 68%.    Right Ventricle: Normal right ventricular cavity size. Systolic function is borderline low.    Left Atrium: Left atrium is mildly dilated.    Right Atrium: Right atrium is mildly dilated.    Aortic  Valve: The aortic valve is a trileaflet valve. There is moderate aortic valve sclerosis.    Mitral Valve: There is severe posterior mitral annular calcification present. There is moderate to severe regurgitation.    Tricuspid Valve: There is moderate annular calcification present. There is moderate regurgitation.    IVC/SVC: Intermediate venous pressure at 8 mmHg.       Echocardiogram December 30, 2021:  Left ventricular ejection fraction is measured at approximately 50 to 55%.  Grade 2 diastolic dysfunction.  Aortic valve is tricuspid.  Aortic valve trileaflet are mildly thickened.  Mitral annular calcification is present.  Mild mitral regurgitation.  There is mild tricuspid regurgitation with RVSP estimated at 56 mmHg.                                                                                                                                                                                                                                                                                                                                                                                                                                                                                   Patient Active Problem List   Diagnosis    Chronic congestive heart failure    Cigarette nicotine dependence without complication    Atherosclerosis of coronary artery bypass graft    Chronic obstructive pulmonary disease    Hypertension    Hyperlipidemia    Type 2 diabetes mellitus with chronic kidney disease on chronic dialysis, with long-term current use of insulin    ESRD (end stage renal disease) on dialysis    Depressive disorder    Class 2 severe obesity due to excess calories with serious comorbidity and body mass index (BMI) of 35.0 to 35.9 in adult    Vitamin D deficiency    Coronary artery disease involving native coronary artery of native heart without angina pectoris    A-fib    Anemia due to chronic kidney disease, on chronic  dialysis    Hypotension    Melena    Acquired hypothyroidism    Aortic heart murmur    Chronic renal impairment    Status post coronary artery bypass graft    Frailty    Dissection of ascending aorta    Hyperparathyroidism    Thrombocytopenia, unspecified    Memory loss     Past Surgical History:   Procedure Laterality Date    AV FISTULA PLACEMENT Left     CARDIAC CATHETERIZATION      CARDIAC VALVE REPLACEMENT      COLONOSCOPY      COLONOSCOPY N/A 1/8/2024    Procedure: COLON;  Surgeon: Josh Gore MD;  Location: [facility] ENDOSCOPY;  Service: Gastroenterology;  Laterality: N/A;    CORONARY ARTERY BYPASS GRAFT      EGD, WITH HEMORRHAGE CONTROL  03/24/2023    Procedure: EGD,WITH HEMORRHAGE CONTROL;  Surgeon: Go Le MD;  Location: [facility] ENDOSCOPY;  Service: Gastroenterology;;    EGD, WITH HEMORRHAGE CONTROL  04/06/2023    Procedure: EGD,WITH HEMORRHAGE CONTROL;  Surgeon: Ayden Francois MD;  Location: [facility] ENDOSCOPY;  Service: Gastroenterology;;    EGD, WITH POLYPECTOMY USING SNARE Left 12/8/2023    Procedure: EGD, WITH POLYPECTOMY USING SNARE;  Surgeon: Lexi Scales MD;  Location: [facility] ENDOSCOPY;  Service: Gastroenterology;  Laterality: Left;    ESOPHAGOGASTRODUODENOSCOPY      ESOPHAGOGASTRODUODENOSCOPY N/A 02/17/2023    Procedure: EGD +/- VCE PLACEMENT;  Surgeon: Morgan Gardner MD;  Location: [facility] ENDOSCOPY;  Service: Gastroenterology;  Laterality: N/A;    ESOPHAGOGASTRODUODENOSCOPY N/A 03/24/2023    Procedure: EGD;  Surgeon: Go Le MD;  Location: [facility] ENDOSCOPY;  Service: Gastroenterology;  Laterality: N/A;  with push    ESOPHAGOGASTRODUODENOSCOPY N/A 04/06/2023    Procedure: EGD;  Surgeon: Ayden Francois MD;  Location: [facility] ENDOSCOPY;  Service: Gastroenterology;  Laterality: N/A;  with push    ESOPHAGOGASTRODUODENOSCOPY N/A 06/16/2023    Procedure: EGD W/ PUSH;  Surgeon: Morgan Gardner MD;  Location: [facility] ENDOSCOPY;  Service:  Gastroenterology;  Laterality: N/A;    ESOPHAGOGASTRODUODENOSCOPY N/A 1/4/2024    Procedure: EGD;  Surgeon: Morgan Scales MD;  Location: [facility] ENDOSCOPY;  Service: Gastroenterology;  Laterality: N/A;    EYE SURGERY      LEFT HEART CATHETERIZATION Left 2/5/2024    Procedure: Left heart cath;  Surgeon: Josh Dubon MD;  Location: [facility] CATH LAB;  Service: Cardiology;  Laterality: Left;    POLYPECTOMY      SMALL BOWEL ENTEROSCOPY Left 01/20/2023    Procedure: ENTEROSCOPY;  Surgeon: Lxei Scales MD;  Location: [facility] ENDOSCOPY;  Service: Gastroenterology;  Laterality: Left;    THYROIDECTOMY      TRANSESOPHAGEAL ECHO  2/2/2024    TUBAL LIGATION       Social History     Socioeconomic History    Marital status:     Number of children: 6   Tobacco Use    Smoking status: Every Day     Current packs/day: 2.00     Average packs/day: 2.0 packs/day for 15.0 years (30.0 ttl pk-yrs)     Types: Cigarettes    Smokeless tobacco: Never    Tobacco comments:     Smokes 3 cigarettes a day   Substance and Sexual Activity    Alcohol use: Yes     Comment: 1 glass every 2-3 month    Drug use: Never    Sexual activity: Not Currently     Social Drivers of Health     Financial Resource Strain: Low Risk  (1/29/2024)    Overall Financial Resource Strain (CARDIA)     Difficulty of Paying Living Expenses: Not very hard   Food Insecurity: No Food Insecurity (1/29/2024)    Hunger Vital Sign     Worried About Running Out of Food in the Last Year: Never true     Ran Out of Food in the Last Year: Never true   Transportation Needs: Unmet Transportation Needs (1/29/2024)    PRAPARE - Transportation     Lack of Transportation (Medical): Yes     Lack of Transportation (Non-Medical): Patient declined   Physical Activity: Unknown (1/29/2024)    Exercise Vital Sign     Days of Exercise per Week: 3 days   Stress: No Stress Concern Present (1/29/2024)    [employer] - Occupational Stress Questionnaire      Feeling of Stress : Not at all   Housing Stability: Low Risk  (1/29/2024)    Housing Stability Vital Sign     Unable to Pay for Housing in the Last Year: No     Number of Places Lived in the Last Year: 2     Unstable Housing in the Last Year: No        Family History   Problem Relation Name Age of Onset    Hypertension Mother Mom     Hypertension Father      Hypertension Sister      Hypertension Sister           Current Outpatient Medications:     albuterol (VENTOLIN HFA) 90 mcg/actuation inhaler, Inhale 2 puffs into the lungs every 6 (six) hours as needed for Wheezing. Rescue, Disp: 18 g, Rfl: 2    albuterol-ipratropium (DUO-NEB) 2.5 mg-0.5 mg/3 mL nebulizer solution, Take 3 mLs by nebulization every 6 (six) hours as needed for Wheezing or Shortness of Breath. Rescue, Disp: 75 mL, Rfl: 0    aspirin 81 MG Chew, Take 1 tablet (81 mg total) by mouth once daily., Disp: 30 tablet, Rfl: 0    BELSOMRA 5 mg Tab, Take 1 tablet by mouth every evening., Disp: , Rfl:     BENADRYL 25 mg capsule, Take 50 mg by mouth nightly., Disp: , Rfl:     blood-glucose meter kit, Use as instructed, Disp: 1 each, Rfl: 0    carvediloL (COREG) 6.25 MG tablet, TAKE 1 TABLET BY MOUTH TWICE A DAY, Disp: 180 tablet, Rfl: 3    clonazePAM (KLONOPIN) 0.5 MG tablet, Take 0.5 mg by mouth every evening., Disp: , Rfl:     DIALYVITE 100-1 mg Tab, Take 1 tablet by mouth once daily., Disp: , Rfl:     DULoxetine (CYMBALTA) 30 MG capsule, Take 30 mg by mouth once daily., Disp: , Rfl:     ENTRESTO 24-26 mg per tablet, Take 1 tablet by mouth 2 (two) times daily., Disp: , Rfl:     fluticasone furoate-vilanteroL (BREO) 100-25 mcg/dose diskus inhaler, Inhale 1 puff into the lungs once daily., Disp: 90 each, Rfl: 2    fluticasone propionate (FLONASE) 50 mcg/actuation nasal spray, 1 spray by Each Nostril route once daily., Disp: , Rfl:     furosemide (LASIX) 40 MG tablet, Take 1 tablet (40 mg total) by mouth once daily., Disp: 30 tablet, Rfl: 0    INVEGA SUSTENNA 156  mg/mL Syrg injection, Inject into the muscle., Disp: , Rfl:     L-METHYLFOLATE 15 mg Tab, Take 1 tablet by mouth., Disp: , Rfl:     lactulose 10 gram/15 ml (CHRONULAC) 10 gram/15 mL (15 mL) solution, SMARTSI Milliliter(s) By Mouth Daily PRN, Disp: , Rfl:     levothyroxine (SYNTHROID) 125 MCG tablet, Take 1 tablet (125 mcg total) by mouth before breakfast., Disp: 90 tablet, Rfl: 2    loratadine (CLARITIN) 10 mg tablet, Take 10 mg by mouth once daily., Disp: , Rfl:     ONETOUCH DELICA PLUS LANCET 30 gauge Misc, 1 lancet  by Other route once daily., Disp: 100 each, Rfl: 2    ONETOUCH ULTRA TEST Strp, 1 strip by Other route once daily., Disp: 200 strip, Rfl: 2    pantoprazole (PROTONIX) 40 MG tablet, Take 1 tablet (40 mg total) by mouth once daily., Disp: 90 tablet, Rfl: 2    rosuvastatin (CRESTOR) 40 MG Tab, Take 1 tablet (40 mg total) by mouth once daily., Disp: 90 tablet, Rfl: 3    sevelamer carbonate (RENVELA) 800 mg Tab, Take 2 tablets (1,600 mg total) by mouth 3 (three) times daily., Disp: 90 tablet, Rfl: 0    tiotropium (SPIRIVA WITH HANDIHALER) 18 mcg inhalation capsule, Inhale 1 capsule (18 mcg total) into the lungs Daily., Disp: 90 capsule, Rfl: 2    white petrolatum-mineral oiL (ARTIFICIAL TEARS, MIHAELA/MIN,) 83-15 % Oint, Place into both eyes every evening., Disp: 3.5 g, Rfl: 2    Current Facility-Administered Medications:     diazePAM tablet 5 mg, 5 mg, Oral, Once, Dauterive, Zakiya K., PA-C    sodium chloride 0.9% flush 10 mL, 10 mL, Intravenous, PRN, Dauterive, Zakiya K., PA-C     ROS:                                                                                                                                                                             Review of Systems   Constitutional: Negative.    HENT: Negative.     Eyes: Negative.    Respiratory: Negative.  Negative for shortness of breath.    Cardiovascular:  Positive for leg swelling. Negative for chest pain, palpitations, orthopnea,  "claudication and PND.   Gastrointestinal: Negative.    Genitourinary: Negative.    Musculoskeletal: Negative.    Skin: Negative.    Neurological: Negative.    Endo/Heme/Allergies: Negative.    Psychiatric/Behavioral: Negative.          There were no vitals taken for this visit.   PE:  Physical Exam  HENT:      Head: Normocephalic.      Nose: Nose normal.      Mouth/Throat:      Mouth: Mucous membranes are moist.   Eyes:      Extraocular Movements: Extraocular movements intact.   Cardiovascular:      Rate and Rhythm: Normal rate and regular rhythm.      Pulses: Normal pulses.   Pulmonary:      Effort: Pulmonary effort is normal.      Breath sounds: Normal breath sounds.   Abdominal:      General: There is no distension.   Musculoskeletal:         General: Normal range of motion.      Cervical back: Normal range of motion.      Right lower leg: Edema (Mild) present.      Left lower leg: Edema (Mild) present.   Skin:     General: Skin is warm.   Neurological:      General: No focal deficit present.      Mental Status: She is alert.   Psychiatric:         Mood and Affect: Mood normal.        ASSESSMENT/PLAN:  Background Information: Taken From Dr. Macias Note at [facility] (2024)  "78-year-old woman with a history of atrial fibrillation (no longer tolerating Eliquis because of recurrent GI bleed), coronary artery disease, prior heart failure, COPD, depression, diabetes, ESRD on dialysis now presents for follow-up appointment.  Previously, patient was being worked up for left atrial pended closure.  CT of the chest was performed that showed no evidence of significant left atrial appendage, most likely was ligated during prior CABG.  However, this study incidentally found type aortic dissection.  She was sent to the emergency room, and ultimately transferred to [facility] in [place] for possible intervention.  Limited paperwork is available to me at this time, but it does appear that patient did not undergo intervention, " "in favor of medical management.  Pacemaker was placed for symptomatic pericardia.  She also started on Entresto for her heart failure."    HFpEF - EF 45% per TTE May 2024 ([facility]) - NYHA Class II  Patient denies SOB; continues to have some BLE edema, but associates this with chronic venous insufficiency   Denies any other congestive symptoms today   Echo May 2024 revealed EF 45% moderate MR, mild-to-moderate TR, mild AR, pacemaker was noted.  Echo December 2023 revealed EF 68%, normal systolic function, normal diastolic function, moderate to severe MR, moderate TR.   Echo Jan. 2018 showed diastolic dysfunction, EF of 66%, E/A flow reversal, aortic regurg, and moderately thickened leaflets without aortic stenosis  She is euvolemic, warm, and dry on exam today  Continue Coreg and Entresto   Continue Lasix and hemodialysis as directed  Counseled on low salt diet    AFIB  Denies any palpitations, lightheadedness, dizziness, or tachycardic symptoms  Appeared to be in regular rhythm on auscultation today  Will remain off of Eliquis due to recurrent GIB   Suspect CLINTON ligation during previous CABG     CAD s/p CABG prior to 2005  Asymptomatic today   Previously complained of increasing chest pain at last office visit, patient endorsed worsening pain at dialysis, at rest and with exertional activities   Today the patient states that she does not have any further chest pain  However, patient did complete nuclear stress test in December 2023 which revealed an abnormal myocardial perfusion scan. There was a moderate to severe intensity, moderate size, mostly reversible perfusion abnormality with some fixed areas in the lateral apical wall in the typical distribution of the LCX territory.  Overall the patient had a moderate risk nuclear stress test.  During inpatient stay at [facility], it was recommended that patient have LHC on an outpatient basis   Subsequent LHC completed in February 2024 revealed three-vessel coronary artery disease, " overall medical management was recommended.  Please see full report above  NSTEMI Type II (Supply/Demand) in the setting of Severe Anemia due to GI Bleed Dec. 2021 - recent hospital admission  EF 50% per Echo Dec. 2021  Stress Echo 2018 showed no significant ischemia but a moderately large fixed anterior defect of moderate intensity  Continue Aspirin, Coreg, and Crestor  Counseled on heart healthy diet     COPD   Management per PCP     HTN  /60  Ongoing occasional low BP with HD  Reports compliance with medications  Continuing current therapy   Will defer BP management to Nephrology  Counseled on low salt diet and exercise as tolerated    HLD  LDL 73 per labs October 2024   Continue Crestor 40mg daily  Counseled on low cholesterol diet    Diabetes  A1C - 7.2  Continue management per PCP    Tobacco use  Counseled on the importance of smoking cessation     ESRD on HD T/TH/Sat  Continue nephrology management    Venous Insufficiency   s/p percutaneous endovenous Microfoam ablation with Varithena of the left GSV with Dr. Singh on May 17, 2021  Recommend compression stockings  Instructed on low salt diet  Follow up with Dr. Singh as directed- last appointment with him May 2023  Ongoing LLE edema intermittently       Follow up in cardiology clinic in 3 months or sooner if needed   Follow up with PCP as directed   Please notify clinic if any new concerns or any change in symptoms   Follow up with Dr. Macias at [facility] as directed

## 2025-01-29 ENCOUNTER — OFFICE VISIT (OUTPATIENT)
Dept: CARDIOLOGY | Facility: CLINIC | Age: 80
End: 2025-01-29
Payer: MEDICARE

## 2025-01-29 VITALS
TEMPERATURE: 97 F | WEIGHT: 183.63 LBS | SYSTOLIC BLOOD PRESSURE: 138 MMHG | DIASTOLIC BLOOD PRESSURE: 60 MMHG | HEIGHT: 63 IN | BODY MASS INDEX: 32.54 KG/M2 | HEART RATE: 64 BPM | RESPIRATION RATE: 18 BRPM | OXYGEN SATURATION: 98 %

## 2025-01-29 DIAGNOSIS — I48.91 ATRIAL FIBRILLATION, UNSPECIFIED TYPE: Chronic | ICD-10-CM

## 2025-01-29 DIAGNOSIS — I25.10 CORONARY ARTERY DISEASE INVOLVING NATIVE CORONARY ARTERY OF NATIVE HEART WITHOUT ANGINA PECTORIS: ICD-10-CM

## 2025-01-29 DIAGNOSIS — I71.010 DISSECTION OF ASCENDING AORTA: ICD-10-CM

## 2025-01-29 DIAGNOSIS — F17.210 CIGARETTE NICOTINE DEPENDENCE WITHOUT COMPLICATION: Chronic | ICD-10-CM

## 2025-01-29 DIAGNOSIS — E78.2 MIXED HYPERLIPIDEMIA: Chronic | ICD-10-CM

## 2025-01-29 DIAGNOSIS — I50.9 CHRONIC CONGESTIVE HEART FAILURE, UNSPECIFIED HEART FAILURE TYPE: Chronic | ICD-10-CM

## 2025-01-29 DIAGNOSIS — E66.812 CLASS 2 SEVERE OBESITY DUE TO EXCESS CALORIES WITH SERIOUS COMORBIDITY AND BODY MASS INDEX (BMI) OF 35.0 TO 35.9 IN ADULT: Chronic | ICD-10-CM

## 2025-01-29 DIAGNOSIS — I95.9 HYPOTENSION, UNSPECIFIED HYPOTENSION TYPE: ICD-10-CM

## 2025-01-29 DIAGNOSIS — Z95.1 STATUS POST CORONARY ARTERY BYPASS GRAFT: Primary | ICD-10-CM

## 2025-01-29 DIAGNOSIS — I35.8 AORTIC HEART MURMUR: ICD-10-CM

## 2025-01-29 DIAGNOSIS — I10 PRIMARY HYPERTENSION: Chronic | ICD-10-CM

## 2025-01-29 DIAGNOSIS — I25.708 ATHEROSCLEROSIS OF CORONARY ARTERY BYPASS GRAFT OF NATIVE HEART WITH STABLE ANGINA PECTORIS: ICD-10-CM

## 2025-01-29 DIAGNOSIS — E66.01 CLASS 2 SEVERE OBESITY DUE TO EXCESS CALORIES WITH SERIOUS COMORBIDITY AND BODY MASS INDEX (BMI) OF 35.0 TO 35.9 IN ADULT: Chronic | ICD-10-CM

## 2025-01-29 LAB
OHS QRS DURATION: 160 MS
OHS QTC CALCULATION: 472 MS

## 2025-01-29 PROCEDURE — 1159F MED LIST DOCD IN RCRD: CPT | Mod: CPTII,,,

## 2025-01-29 PROCEDURE — 99214 OFFICE O/P EST MOD 30 MIN: CPT | Mod: PBBFAC,25

## 2025-01-29 PROCEDURE — 99214 OFFICE O/P EST MOD 30 MIN: CPT | Mod: S$PBB,,,

## 2025-01-29 PROCEDURE — 1101F PT FALLS ASSESS-DOCD LE1/YR: CPT | Mod: CPTII,,,

## 2025-01-29 PROCEDURE — 1126F AMNT PAIN NOTED NONE PRSNT: CPT | Mod: CPTII,,,

## 2025-01-29 PROCEDURE — 3288F FALL RISK ASSESSMENT DOCD: CPT | Mod: CPTII,,,

## 2025-01-29 PROCEDURE — 93005 ELECTROCARDIOGRAM TRACING: CPT

## 2025-01-29 PROCEDURE — 3078F DIAST BP <80 MM HG: CPT | Mod: CPTII,,,

## 2025-01-29 PROCEDURE — 3075F SYST BP GE 130 - 139MM HG: CPT | Mod: CPTII,,,

## 2025-01-29 PROCEDURE — 1160F RVW MEDS BY RX/DR IN RCRD: CPT | Mod: CPTII,,,

## 2025-01-29 NOTE — PATIENT INSTRUCTIONS
Follow up in cardiology clinic in 3 months or sooner if needed   Follow up with PCP as directed   Please notify clinic if any new concerns or any change in symptoms   Follow up with Dr. Macias at [facility] as directed

## 2025-03-10 RX ORDER — LEVOTHYROXINE SODIUM 125 UG/1
125 TABLET ORAL
Qty: 90 TABLET | Refills: 2 | Status: SHIPPED | OUTPATIENT
Start: 2025-03-10

## 2025-04-30 ENCOUNTER — TELEPHONE (OUTPATIENT)
Dept: INTERNAL MEDICINE | Facility: CLINIC | Age: 80
End: 2025-04-30
Payer: MEDICARE

## 2025-04-30 ENCOUNTER — OFFICE VISIT (OUTPATIENT)
Dept: CARDIOLOGY | Facility: CLINIC | Age: 80
End: 2025-04-30
Payer: MEDICARE

## 2025-04-30 VITALS
RESPIRATION RATE: 20 BRPM | OXYGEN SATURATION: 92 % | HEART RATE: 64 BPM | WEIGHT: 182.38 LBS | SYSTOLIC BLOOD PRESSURE: 111 MMHG | TEMPERATURE: 98 F | DIASTOLIC BLOOD PRESSURE: 56 MMHG | HEIGHT: 64 IN | BODY MASS INDEX: 31.14 KG/M2

## 2025-04-30 DIAGNOSIS — J44.9 CHRONIC OBSTRUCTIVE PULMONARY DISEASE, UNSPECIFIED COPD TYPE: Chronic | ICD-10-CM

## 2025-04-30 DIAGNOSIS — N18.6 ESRD (END STAGE RENAL DISEASE) ON DIALYSIS: Chronic | ICD-10-CM

## 2025-04-30 DIAGNOSIS — Z99.2 TYPE 2 DIABETES MELLITUS WITH CHRONIC KIDNEY DISEASE ON CHRONIC DIALYSIS, WITH LONG-TERM CURRENT USE OF INSULIN: Chronic | ICD-10-CM

## 2025-04-30 DIAGNOSIS — Z99.2 ESRD (END STAGE RENAL DISEASE) ON DIALYSIS: Chronic | ICD-10-CM

## 2025-04-30 DIAGNOSIS — N18.6 TYPE 2 DIABETES MELLITUS WITH CHRONIC KIDNEY DISEASE ON CHRONIC DIALYSIS, WITH LONG-TERM CURRENT USE OF INSULIN: Chronic | ICD-10-CM

## 2025-04-30 DIAGNOSIS — I50.9 CHRONIC CONGESTIVE HEART FAILURE, UNSPECIFIED HEART FAILURE TYPE: Chronic | ICD-10-CM

## 2025-04-30 DIAGNOSIS — I25.10 CORONARY ARTERY DISEASE INVOLVING NATIVE CORONARY ARTERY OF NATIVE HEART WITHOUT ANGINA PECTORIS: ICD-10-CM

## 2025-04-30 DIAGNOSIS — E78.2 MIXED HYPERLIPIDEMIA: Chronic | ICD-10-CM

## 2025-04-30 DIAGNOSIS — I10 PRIMARY HYPERTENSION: Chronic | ICD-10-CM

## 2025-04-30 DIAGNOSIS — Z95.1 STATUS POST CORONARY ARTERY BYPASS GRAFT: ICD-10-CM

## 2025-04-30 DIAGNOSIS — I48.0 PAROXYSMAL ATRIAL FIBRILLATION: Primary | ICD-10-CM

## 2025-04-30 DIAGNOSIS — E11.22 TYPE 2 DIABETES MELLITUS WITH CHRONIC KIDNEY DISEASE ON CHRONIC DIALYSIS, WITH LONG-TERM CURRENT USE OF INSULIN: Chronic | ICD-10-CM

## 2025-04-30 DIAGNOSIS — I71.010 DISSECTION OF ASCENDING AORTA: ICD-10-CM

## 2025-04-30 DIAGNOSIS — R41.3 MEMORY LOSS: ICD-10-CM

## 2025-04-30 DIAGNOSIS — R54 FRAILTY: Primary | Chronic | ICD-10-CM

## 2025-04-30 DIAGNOSIS — Z79.4 TYPE 2 DIABETES MELLITUS WITH CHRONIC KIDNEY DISEASE ON CHRONIC DIALYSIS, WITH LONG-TERM CURRENT USE OF INSULIN: Chronic | ICD-10-CM

## 2025-04-30 PROCEDURE — 99213 OFFICE O/P EST LOW 20 MIN: CPT | Mod: PBBFAC | Performed by: INTERNAL MEDICINE

## 2025-04-30 NOTE — PROGRESS NOTES
Cardiology attending attestation  Patient was seen and examined with IQRA Chanel. Agree with plan as outlined below.  80 yo F with CAD s/p CABG (LIMA- LAD, SVG-diag, SVG-OM, SVG-RCA) in 2012, paroxysmal atrial fibrillation, ESRD on HD, previous GI bleed, incidental ascending aortic aneurysm and aortic dissection type a (during workup for Watchman) that was medically treated given high-risk for procedure, HTN, dual-chamber ppm due to symptomatic bradycardia, HFmrEF (EF 45% in 5/2024 at [facility]) here for follow-up.      Patient has been doing well with no cardiovascular complaints.  Denies significant shortness or breath, orthopnea, PND, lower extremity edema, palpitations, chest pain, dizziness or lightheadedness.    She was previously evaluated for Watchman procedure given previous GI bleed but her left atrial appendage was not visualized and it was likely ligated during her CABG in 2012.   BP well controlled today.  Continue current regimen.  Last device interrogation remotely at [facility] showed 51% a pacing and 7% V pacing.  No events otherwise.    To note Patient is unable to make medical decisions.      Bing Bender MD  Cardiology staff-[facility]        CHIEF COMPLAINT:   Chief Complaint   Patient presents with    Follow-up     Denies chest pains or SOB                   HPI:  Rosette Madrid 79 y.o. with a past medical history of CAD s/p CABG prior to 2005, ESRD on HD (TTS), HTN, DM, HFpEF s/p pacemaker, and HLD presents for follow up and ongoing care. She has a pacemaker interrogated in Feb 2025 with low afib burden.     Pt continues to report dyspnea on exertion of greater than 2 blocks of walking with cane. She states that this has been her baseline for past several years. She has had no chest pain, orthopnea, SOB at rest, weakness, or syncope. She has had a cold for the past 2 weeks with increased cough and wheezing but no fever, myalgia, decreased appetite. She has not tried any treatments for this cold.      CARDIAC TESTING  Echo 5.6.24 ([facility])  1. The study quality is below average.   2. The left ventricle is mildly enlarged. Global left ventricular systolic function is mildly decreased. The left ventricular ejection fraction is 45%. Left ventricular diastolic function is indeterminate. Noted left ventricular hypertrophy. Asymmetric septal left ventricular hypertrophy is present. It is moderate.  EDV is 185 mL. ESV is 100 mL.  3. Optison, ultrasound image enhancement, was utilized on this study per standing order for better visualization of left ventricular endocardium.  The patients IV was started in the right arm by Susan Boland RN. 2ml of imaging enhancement was administered during image acquisition, 1ml was wasted. The IV was removed upon completion of the study.   4. Dilatation of the aortic root and ascending aorta is noted. Aortic root diameter is 4.0 cms. Ascending aorta diameter is 5.0 cms.   5. The pulmonary artery appears to be dilated.  6. Mild calcification of the aortic valve is noted with adequate cuspal excursion.  Mild mitral annular calcification is noted.   7. Moderate (2+) mitral regurgitation. Mild to moderate (1-2+) tricuspid regurgitation. Mild (1+) aortic regurgitation.  8. There appears to be a moderate pleural effusion is noted.   9. Pacemaker is noted.  10. The pulmonary artery systolic pressure is 47 mmHg. Evidence of pulmonary hypertension is noted.    [facility] 2.5.24    The Ost LM lesion was 60% stenosed.    The Mid LM to Ost LAD lesion was 80% stenosed.    The Ost LAD lesion was 70% stenosed.    The Mid LAD-1 lesion was 80% stenosed.    The Prox RCA lesion was 50% stenosed.    The Dist RCA lesion was 90% stenosed.    The Mid LAD-2 lesion was 100% stenosed.    The pre-procedure left ventricular end diastolic pressure was 12.    The estimated blood loss was <50 mL.    There was three vessel coronary artery disease.  FINDINGS  LVEDP:  12 mmHg  Left Main:   60% ostial, 80% distal   stenosis  LAD:   70% ostial, long segment 80% mid vessel stenosis     Circumflex:        RCA:   50% mid, 90% distal  stenosis  The patient has Significant three vessel disease of native coronary arteries  LIMA-LAD: Patent.  just distal to anastomosis  SVG-Diagonal: Mild proximal disease  SVG-OM: Patent  SVG-RCA: Patent      RILEY 2.2.24  Procedure RILEY prior to CLINTON closure Device  Findings:   CLINTON not well visualized. Procedure had to be cut short as the patient desaturated and anesthesia requested to pull the RILEY probe down. See anesthesia note for full details.  Patient will benefit from CTA chest EP protocol to further assess the CLINTON. But the initial RILEY views did not visualize CLINTON which is probably surgically excised during her Last CABG.  AV showed marked Aortic valve sclerosis. Concentric LVH is present.        Nuclear Stress Test 12.27.23    Abnormal myocardial perfusion scan.    There is a moderate to severe intensity, moderate sized, mostly reversible perfusion abnormality with some fixed areas in the lateral apical wall(s) in the typical distribution of the LCX territory.    There are no other significant perfusion abnormalities.    The gated perfusion images showed an ejection fraction of 62% at rest. The gated perfusion images showed an ejection fraction of 63% post stress.    There were no arrhythmias during stress.  The nuclear stress test is  fair quality.    On the nuclear stress test the EF is normal.  If the patient has an echo, the EF on the echo is considered more accurate.      The patient has a moderate risk nuclear stress test.   If patient is symptomatic, consider management with two anti-anginals    Echo 12.27.23    Left Ventricle: The left ventricle is normal in size. Normal wall thickness. There is normal systolic function. Biplane (2D) method of discs ejection fraction is 68%.    Right Ventricle: Normal right ventricular cavity size. Systolic function is borderline low.    Left Atrium:  Left atrium is mildly dilated.    Right Atrium: Right atrium is mildly dilated.    Aortic Valve: The aortic valve is a trileaflet valve. There is moderate aortic valve sclerosis.    Mitral Valve: There is severe posterior mitral annular calcification present. There is moderate to severe regurgitation.    Tricuspid Valve: There is moderate annular calcification present. There is moderate regurgitation.    IVC/SVC: Intermediate venous pressure at 8 mmHg.       Echocardiogram December 30, 2021:  Left ventricular ejection fraction is measured at approximately 50 to 55%.  Grade 2 diastolic dysfunction.  Aortic valve is tricuspid.  Aortic valve trileaflet are mildly thickened.  Mitral annular calcification is present.  Mild mitral regurgitation.  There is mild tricuspid regurgitation with RVSP estimated at 56 mmHg.                                                                                                                                                                                                                                                                                                                                                                                                                                                                                   Patient Active Problem List   Diagnosis    Chronic congestive heart failure    Cigarette nicotine dependence without complication    Atherosclerosis of coronary artery bypass graft    Chronic obstructive pulmonary disease    Hypertension    Hyperlipidemia    Type 2 diabetes mellitus with chronic kidney disease on chronic dialysis, with long-term current use of insulin    ESRD (end stage renal disease) on dialysis    Depressive disorder    Class 2 severe obesity due to excess calories with serious comorbidity and body mass index (BMI) of 35.0 to 35.9 in adult    Vitamin D deficiency    Coronary artery disease involving native coronary artery of native  heart without angina pectoris    A-fib    Anemia due to chronic kidney disease, on chronic dialysis    Hypotension    Melena    Acquired hypothyroidism    Aortic heart murmur    Chronic renal impairment    Status post coronary artery bypass graft    Frailty    Dissection of ascending aorta    Hyperparathyroidism    Thrombocytopenia, unspecified    Memory loss     Past Surgical History:   Procedure Laterality Date    AV FISTULA PLACEMENT Left     CARDIAC CATHETERIZATION      CARDIAC VALVE REPLACEMENT      COLONOSCOPY      COLONOSCOPY N/A 1/8/2024    Procedure: COLON;  Surgeon: Josh Gore MD;  Location: [facility] ENDOSCOPY;  Service: Gastroenterology;  Laterality: N/A;    CORONARY ARTERY BYPASS GRAFT      EGD, WITH HEMORRHAGE CONTROL  03/24/2023    Procedure: EGD,WITH HEMORRHAGE CONTROL;  Surgeon: Go Le MD;  Location: [facility] ENDOSCOPY;  Service: Gastroenterology;;    EGD, WITH HEMORRHAGE CONTROL  04/06/2023    Procedure: EGD,WITH HEMORRHAGE CONTROL;  Surgeon: Ayden Francois MD;  Location: [facility] ENDOSCOPY;  Service: Gastroenterology;;    EGD, WITH POLYPECTOMY USING SNARE Left 12/8/2023    Procedure: EGD, WITH POLYPECTOMY USING SNARE;  Surgeon: Lexi Scales MD;  Location: [facility] ENDOSCOPY;  Service: Gastroenterology;  Laterality: Left;    ESOPHAGOGASTRODUODENOSCOPY      ESOPHAGOGASTRODUODENOSCOPY N/A 02/17/2023    Procedure: EGD +/- VCE PLACEMENT;  Surgeon: oMrgan Gardner MD;  Location: [facility] ENDOSCOPY;  Service: Gastroenterology;  Laterality: N/A;    ESOPHAGOGASTRODUODENOSCOPY N/A 03/24/2023    Procedure: EGD;  Surgeon: Go Le MD;  Location: [facility] ENDOSCOPY;  Service: Gastroenterology;  Laterality: N/A;  with push    ESOPHAGOGASTRODUODENOSCOPY N/A 04/06/2023    Procedure: EGD;  Surgeon: Ayden Francois MD;  Location: [facility] ENDOSCOPY;  Service: Gastroenterology;  Laterality: N/A;  with push    ESOPHAGOGASTRODUODENOSCOPY N/A 06/16/2023    Procedure: EGD  W/ PUSH;  Surgeon: Morgan Gardner MD;  Location: [facility] ENDOSCOPY;  Service: Gastroenterology;  Laterality: N/A;    ESOPHAGOGASTRODUODENOSCOPY N/A 1/4/2024    Procedure: EGD;  Surgeon: Morgan Scales MD;  Location: [facility] ENDOSCOPY;  Service: Gastroenterology;  Laterality: N/A;    EYE SURGERY      LEFT HEART CATHETERIZATION Left 2/5/2024    Procedure: Left heart cath;  Surgeon: Josh Dubon MD;  Location: [facility] CATH LAB;  Service: Cardiology;  Laterality: Left;    POLYPECTOMY      SMALL BOWEL ENTEROSCOPY Left 01/20/2023    Procedure: ENTEROSCOPY;  Surgeon: Lexi Scales MD;  Location: [facility] ENDOSCOPY;  Service: Gastroenterology;  Laterality: Left;    THYROIDECTOMY      TRANSESOPHAGEAL ECHO  2/2/2024    TUBAL LIGATION       Social History     Socioeconomic History    Marital status:     Number of children: 6   Tobacco Use    Smoking status: Every Day     Current packs/day: 2.00     Average packs/day: 2.0 packs/day for 15.0 years (30.0 ttl pk-yrs)     Types: Cigarettes    Smokeless tobacco: Never    Tobacco comments:     Smokes 3 cigarettes a day   Substance and Sexual Activity    Alcohol use: Not Currently     Comment: 1 glass every 2-3 month    Drug use: Never    Sexual activity: Not Currently     Social Drivers of Health     Financial Resource Strain: Low Risk  (1/29/2024)    Overall Financial Resource Strain (CARDIA)     Difficulty of Paying Living Expenses: Not very hard   Food Insecurity: No Food Insecurity (1/29/2024)    Hunger Vital Sign     Worried About Running Out of Food in the Last Year: Never true     Ran Out of Food in the Last Year: Never true   Transportation Needs: High Risk (9/23/2024)    Received from [facility] SDOH Screening     Has lack of transportation kept you from medical appointments, meetings, work or from getting things needed for daily living? choose all that apply.: Yes, it has kept me from medical appointments or from getting my medications    Physical Activity: Unknown (1/29/2024)    Exercise Vital Sign     Days of Exercise per Week: 3 days   Stress: No Stress Concern Present (1/29/2024)    [employer] [place] [place] [employer] - Occupational Stress Questionnaire     Feeling of Stress : Not at all   Housing Stability: Low Risk  (1/29/2024)    Housing Stability Vital Sign     Unable to Pay for Housing in the Last Year: No     Number of Places Lived in the Last Year: 2     Unstable Housing in the Last Year: No        Family History   Problem Relation Name Age of Onset    Hypertension Mother Mom     Hypertension Father      Hypertension Sister      Hypertension Sister           Current Outpatient Medications:     albuterol (VENTOLIN HFA) 90 mcg/actuation inhaler, Inhale 2 puffs into the lungs every 6 (six) hours as needed for Wheezing. Rescue, Disp: 18 g, Rfl: 2    albuterol-ipratropium (DUO-NEB) 2.5 mg-0.5 mg/3 mL nebulizer solution, Take 3 mLs by nebulization every 6 (six) hours as needed for Wheezing or Shortness of Breath. Rescue, Disp: 75 mL, Rfl: 0    aspirin 81 MG Chew, Take 1 tablet (81 mg total) by mouth once daily., Disp: 30 tablet, Rfl: 0    BELSOMRA 5 mg Tab, Take 1 tablet by mouth every evening., Disp: , Rfl:     BENADRYL 25 mg capsule, Take 50 mg by mouth nightly., Disp: , Rfl:     carvediloL (COREG) 6.25 MG tablet, TAKE 1 TABLET BY MOUTH TWICE A DAY, Disp: 180 tablet, Rfl: 3    clonazePAM (KLONOPIN) 0.5 MG tablet, Take 0.5 mg by mouth every evening., Disp: , Rfl:     DIALYVITE 100-1 mg Tab, Take 1 tablet by mouth once daily., Disp: , Rfl:     DULoxetine (CYMBALTA) 30 MG capsule, Take 30 mg by mouth once daily., Disp: , Rfl:     ENTRESTO 24-26 mg per tablet, Take 1 tablet by mouth 2 (two) times daily., Disp: , Rfl:     fluticasone furoate-vilanteroL (BREO) 100-25 mcg/dose diskus inhaler, Inhale 1 puff into the lungs once daily., Disp: 90 each, Rfl: 2    fluticasone propionate (FLONASE) 50 mcg/actuation nasal spray, 1 spray by Each Nostril  route once daily., Disp: , Rfl:     furosemide (LASIX) 40 MG tablet, Take 1 tablet (40 mg total) by mouth once daily., Disp: 90 tablet, Rfl: 1    INVEGA SUSTENNA 156 mg/mL Syrg injection, Inject into the muscle., Disp: , Rfl:     L-METHYLFOLATE 15 mg Tab, Take 1 tablet by mouth., Disp: , Rfl:     levothyroxine (SYNTHROID) 125 MCG tablet, Take 1 tablet (125 mcg total) by mouth before breakfast., Disp: 90 tablet, Rfl: 2    rosuvastatin (CRESTOR) 40 MG Tab, Take 1 tablet (40 mg total) by mouth once daily., Disp: 90 tablet, Rfl: 3    tiotropium (SPIRIVA WITH HANDIHALER) 18 mcg inhalation capsule, Inhale 1 capsule (18 mcg total) into the lungs Daily., Disp: 90 capsule, Rfl: 2    blood-glucose meter kit, Use as instructed (Patient not taking: Reported on 2025), Disp: 1 each, Rfl: 0    lactulose 10 gram/15 ml (CHRONULAC) 10 gram/15 mL (15 mL) solution, SMARTSI Milliliter(s) By Mouth Daily PRN (Patient not taking: Reported on 2025), Disp: , Rfl:     loratadine (CLARITIN) 10 mg tablet, Take 10 mg by mouth once daily. (Patient not taking: Reported on 2025), Disp: , Rfl:     ONETOUCH DELICA PLUS LANCET 30 gauge Misc, 1 lancet  by Other route once daily. (Patient not taking: Reported on 2025), Disp: 100 each, Rfl: 2    ONETOUCH ULTRA TEST Strp, 1 strip by Other route once daily. (Patient not taking: Reported on 2025), Disp: 200 strip, Rfl: 2    pantoprazole (PROTONIX) 40 MG tablet, Take 1 tablet (40 mg total) by mouth once daily. (Patient not taking: Reported on 2025), Disp: 90 tablet, Rfl: 2    sevelamer carbonate (RENVELA) 800 mg Tab, Take 2 tablets (1,600 mg total) by mouth 3 (three) times daily. (Patient not taking: Reported on 2025), Disp: 90 tablet, Rfl: 0    white petrolatum-mineral oiL (ARTIFICIAL TEARS, MIHAELA/MIN,) 83-15 % Oint, Place into both eyes every evening. (Patient not taking: Reported on 2025), Disp: 3.5 g, Rfl: 2    Current Facility-Administered Medications:      "diazePAM tablet 5 mg, 5 mg, Oral, Once, Zakiya Torres PA-C    sodium chloride 0.9% flush 10 mL, 10 mL, Intravenous, PRN, Zakiya Torres PA-C     ROS:                                                                                                                                                                             Review of Systems   Constitutional: Negative.  Negative for fever and weight loss.   HENT: Negative.     Eyes: Negative.    Respiratory:  Positive for cough and wheezing. Negative for hemoptysis and shortness of breath.    Cardiovascular:  Positive for leg swelling. Negative for chest pain, palpitations, orthopnea, claudication and PND.   Gastrointestinal: Negative.    Genitourinary: Negative.    Musculoskeletal: Negative.    Skin: Negative.    Neurological: Negative.    Endo/Heme/Allergies: Negative.    Psychiatric/Behavioral: Negative.          Blood pressure (!) 111/56, pulse 64, temperature 97.8 °F (36.6 °C), temperature source Oral, resp. rate 20, height 5' 4" (1.626 m), weight 82.7 kg (182 lb 6.4 oz), SpO2 (!) 92%.   PE:  Physical Exam  HENT:      Head: Normocephalic.      Nose: Nose normal.      Mouth/Throat:      Mouth: Mucous membranes are moist.   Eyes:      Extraocular Movements: Extraocular movements intact.   Cardiovascular:      Rate and Rhythm: Normal rate and regular rhythm.      Pulses: Normal pulses.      Heart sounds: Murmur (2/6 systolic murmur) heard.   Pulmonary:      Effort: Pulmonary effort is normal.      Breath sounds: Wheezing (diffuse) present.      Comments: Coarse breath sounds at bases bilaterally   Abdominal:      General: There is no distension.   Musculoskeletal:         General: Normal range of motion.      Cervical back: Normal range of motion.      Right lower leg: Edema (Mild) present.      Left lower leg: Edema (Mild) present.   Skin:     General: Skin is warm.   Neurological:      General: No focal deficit present.      Mental Status: She is " alert.   Psychiatric:         Mood and Affect: Mood normal.          ASSESSMENT/PLAN:     HFpEF - EF 45%   -Echo May 2024 revealed EF 45% moderate MR, mild-to-moderate TR, mild AR, pacemaker was noted.  -NYHA Class 3 today, per chart review this has been baseline for past several visits  -Continue Coreg and Entresto   -Continue Lasix and hemodialysis as directed  Counseled on low salt diet    AFIB  - Denies any palpitations, lightheadedness, dizziness, or tachycardic symptoms  - Appeared to be in regular rhythm on auscultation today  - Will remain off of Eliquis due to recurrent GIB   - previous workup for Watchman procedure with Dr. Macias revealed no left atrial appendage suggesting it was probably ligated during CABG.    CAD s/p CABG prior to 2005  -LHC completed in February 2024 revealed three-vessel coronary artery disease, overall medical management was recommended.   -Continue Aspirin, Coreg, and Crestor  -Counseled on heart healthy diet     COPD   -Management per PCP, currently on BREO qd and albuterol prn  - Possible current exacerbation given increase in cough recently    HTN  - /56 in clinic today   -Ongoing occasional low BP with HD  - Reports compliance with medications  - Continuing current therapy   -Counseled on low salt diet and exercise as tolerated    HLD  LDL 73 per labs October 2024   Continue Crestor 40mg daily  Counseled on low cholesterol diet    Diabetes  A1C - 7.2 10/2024  Continue management per PCP     ESRD on HD T/TH/Sat  Continue nephrology management    Venous Insufficiency   s/p percutaneous endovenous Microfoam ablation with Varithena of the left GSV with Dr. Singh on May 17, 2021  Recommend compression stockings  Instructed on low salt diet  Follow up with Dr. Singh as directed- last appointment with him May 2023  Ongoing LLE edema intermittently       Follow up in cardiology clinic in 4 months or sooner if needed   Follow up with PCP as directed   Please notify clinic if  any new concerns or any change in symptoms     Robel AVITIAU NARENDRA SCOTT, MS3

## 2025-04-30 NOTE — PROGRESS NOTES
Cardiology attending attestation  Patient was seen and examined with Robel Islas MS3. Agree with plan as outlined below.  80 yo F with CAD s/p CABG (LIMA- LAD, SVG-diag, SVG-OM, SVG-RCA) in 2012, paroxysmal atrial fibrillation, ESRD on HD, previous GI bleed, incidental ascending aortic aneurysm and aortic dissection type a (during workup for Watchman) that was medically treated given high-risk for procedure, HTN, dual-chamber ppm due to symptomatic bradycardia, HFmrEF (EF 45% in 5/2024 at [facility]) here for follow-up.      Patient has been doing well with no cardiovascular complaints.  Denies significant shortness or breath, orthopnea, PND, lower extremity edema, palpitations, chest pain, dizziness or lightheadedness.    She was previously evaluated for Watchman procedure given previous GI bleed but her left atrial appendage was not visualized and it was likely ligated during her CABG in 2012.   BP well controlled today.  Continue current regimen.  Last device interrogation remotely at [facility] showed 51% a pacing and 7% V pacing.  No events otherwise.    To note Patient is unable to make medical decisions.      Bing Bender MD  Cardiology staff-[facility]

## 2025-04-30 NOTE — TELEPHONE ENCOUNTER
----- Message from Message Bus sent at 4/30/2025  1:46 PM CDT -----  Regarding: orders  Who Called: Chela richardson (daughter of pt)Caller is requesting assistance/information from provider's office.Symptoms (please be specific): How long has patient had these symptoms:  List of preferred pharmacies on file (remove unneeded): [facility] [address] If different, enter pharmacy into here including location and phone number: Preferred Method of Contact: Phone CallPatient's Preferred Phone Number on File: [phone] Best Call Back Number, if different:  [phone] Information: pts daughter stated her mother needs another walker order. Her current walker is broken and is needing a new one. Please advise

## 2025-05-01 PROBLEM — I48.0 PAROXYSMAL ATRIAL FIBRILLATION: Status: ACTIVE | Noted: 2023-01-22

## 2025-05-01 PROBLEM — I95.9 HYPOTENSION: Status: RESOLVED | Noted: 2023-02-09 | Resolved: 2025-05-01

## 2025-05-15 ENCOUNTER — TELEPHONE (OUTPATIENT)
Dept: INTERNAL MEDICINE | Facility: CLINIC | Age: 80
End: 2025-05-15
Payer: MEDICARE

## 2025-05-30 NOTE — TELEPHONE ENCOUNTER
----- Message from Delaney West sent at 6/12/2024 11:37 AM CDT -----  .Who Called: Richard Dennis    Refill or New Rx:New Rx  RX Name and Strength:levemirflexpin  How is the patient currently taking it? (ex. 1XDay):inject 30 units into skin every evening     Is this a 30 day or 90 day RX:unknown  Local or Mail Order:local  List of preferred pharmacies on file (remove unneeded): [facility]/pharmacy #[id number] - [place], [place] - [address] AT [facility]    Preferred Method of Contact: Phone Call  Patient's Preferred Phone Number on File:   Best Call Back Number, if different:[phone]  Additional Information: Pt is requesting a medication request. She stated the pharmacy  told her the request is denied.  Pts daughter would like it to be resubmitted  
Called no answer. Left message to call the clinic.  
I placed a referral to [facility].   As for the insulin, it was discontinued previously upon discharge from the hospital. YAKELIN Dior will check HgbA1c at next office visit and resume insulin if elevated.    Thank you,    LUCIA Crum   
LOV:5/20/24    NOV: 8/21/24      Pt requesting refill on the following medication. Please advise.     insulin detemir U-100, Levemir, (LEVEMIR FLEXPEN) 100 unit/mL (3 mL) InPn pen   
Spoke to pt and patients daughter, they confirmed pt is taking 30 units QHS of Levimir. Pts daughter states pt is completely out. Pts daughter also asking if we sent a referral for podiatry due to her mom having foot pain. I dont see a referral, she is asking if we can send it since it has been discussed and she thought we were sending it at last visit.     LOV 5/20/24  NOV 8/21/24  
[de-identified] : TeleHealth visit

## 2025-08-05 NOTE — PROGRESS NOTES
CHIEF COMPLAINT:   No chief complaint on file.                  Review of patient's allergies indicates:   Allergen Reactions    Lisinopril Swelling    Azithromycin      Other reaction(s): tongue/facial swelling    Baclofen      Other reaction(s): tongue/facial swelling    Doxycycline      Other reaction(s): Angioedema                                          HPI:  Rosette Madrid 80 y.o. with a past medical history of CAD s/p CABG prior to 2005, ESRD on HD (TTHSat), HTN, DM, HFpEF, and HLD presents for follow up and ongoing care.  Today she is requesting cardiac risk stratification for EGD/colonoscopy procedure.  Patient completed an Echocardiogram on 12.30.21 which revealed an ejection fraction of approximately 50 to 55%. See report below.   Echocardiogram completed in December 2023 revealed EF of 68%, normal systolic function, moderate to severe MR, moderate TR.  See full report below.  Patient also completed a nuclear stress test in December 2023 which revealed an abnormal myocardial perfusion scan.  There was a moderate to severe intensity, moderate size, mostly reversible perfusion abnormality with some fixed areas in the lateral apical wall in the typical distribution of the LCX territory.  Overall the patient had a moderate risk nuclear stress test.  She completed a subsequent angiogram in February of 2024 which revealed three-vessel coronary artery disease, medical management was recommended.  She also completed a RILEY.  See full report below.     Today the patient states that she feels well from a cardiac standpoint.  She denied any cardiac complaints today such as chest pain, SOB, BLAS, palpitations, PND, orthopnea, lightheadedness, dizziness, syncope, or claudication symptoms.  She has some left lower extremity edema, but states that it is stable from before.  This is a chronic issue for her and she states that it does not bother her. Of note, she is still seeing Dr. Macias at [facility]. She recently completed  an Echo- see below.  She states that she is able to complete her ADLs without any ischemic symptoms.  She has somewhat physically active in her daily life but does not do much formal exercise.  Her granddaughter is with her and states that the patient walks a lot.  She uses a rolling walker for ambulation assistance.  She reports compliance with her current medications and is tolerating them well.  She smokes approximately 4-7 cigarettes per day.  She states that she will try to quit.  She does not follow a specific diet at this time.      Historical Information: January 3rd, 2024, patient presented to [facility] ED following dialysis treatment for evaluation of low H&H.  Per chart review, she endorsed 2 days of dark tarry stools.  She was transfused with 4 units PRBCs.  She underwent push enteroscopy on January 4, 2024 which did not reveal any bleed source beyond ligament of Treitz.  Subsequent colonoscopy also did not reveal any source of bleeding.  [facility] was consulted while patient was in patient for possible Watchman due to recurrent GIBs.  Patient has been holding Eliquis since hospitalization per [facility] Rx.  [facility] recommended at that time that patient proceed with outpatient LHC and referral to structural heart clinic for LAE occlusive device.    CARDIAC TESTING  Echo 6.4.25  1.The study quality is average.  2.The left ventricle is normal in size. Global left ventricular systolic function is borderline normal. The left ventricular ejection fraction is 50%. Noted left ventricular hypertrophy. Asymmetric septal left ventricular hypertrophy is present. It is mild to moderate.  3.Mild mitral annular calcification is noted.  4.Mild calcification of the aortic valve is noted with adequate cuspal excursion.  5.Moderate (2+) mitral regurgitation. Mild (1+) aortic regurgitation. Mild (1+) tricuspid regurgitation. Mild (1+) pulmonic regurgitation.  6.A linear echo density is noted in the right ventricle, suggestive of a pacemaker  lead.  7.The pulmonary artery systolic pressure is 27 mmHg.    Echo 5.6.24 ([facility])  1. The study quality is below average.   2. The left ventricle is mildly enlarged. Global left ventricular systolic function is mildly decreased. The left ventricular ejection fraction is 45%. Left ventricular diastolic function is indeterminate. Noted left ventricular hypertrophy. Asymmetric septal left ventricular hypertrophy is present. It is moderate.  EDV is 185 mL. ESV is 100 mL.  3. Optison, ultrasound image enhancement, was utilized on this study per standing order for better visualization of left ventricular endocardium.  The patients IV was started in the right arm by Susan Boland RN. 2ml of imaging enhancement was administered during image acquisition, 1ml was wasted. The IV was removed upon completion of the study.   4. Dilatation of the aortic root and ascending aorta is noted. Aortic root diameter is 4.0 cms. Ascending aorta diameter is 5.0 cms.   5. The pulmonary artery appears to be dilated.  6. Mild calcification of the aortic valve is noted with adequate cuspal excursion.  Mild mitral annular calcification is noted.   7. Moderate (2+) mitral regurgitation. Mild to moderate (1-2+) tricuspid regurgitation. Mild (1+) aortic regurgitation.  8. There appears to be a moderate pleural effusion is noted.   9. Pacemaker is noted.  10. The pulmonary artery systolic pressure is 47 mmHg. Evidence of pulmonary hypertension is noted.    C 2.5.24    The Ost LM lesion was 60% stenosed.    The Mid LM to Ost LAD lesion was 80% stenosed.    The Ost LAD lesion was 70% stenosed.    The Mid LAD-1 lesion was 80% stenosed.    The Prox RCA lesion was 50% stenosed.    The Dist RCA lesion was 90% stenosed.    The Mid LAD-2 lesion was 100% stenosed.    The pre-procedure left ventricular end diastolic pressure was 12.    The estimated blood loss was <50 mL.    There was three vessel coronary artery disease.     FINDINGS  LVEDP:  12 mmHg  Left  Main:   60% ostial, 80% distal  stenosis  LAD:   70% ostial, long segment 80% mid vessel stenosis     Circumflex:        RCA:   50% mid, 90% distal  stenosis  The patient has Significant three vessel disease of native coronary arteries    LIMA-LAD: Patent.  just distal to anastomosis  SVG-Diagonal: Mild proximal disease  SVG-OM: Patent  SVG-RCA: Patent     RECOMMENDATIONS  Medical management  Risk factor modifications -- maintain good BP control  Activity -- avoid straining with affected limb for one week  Exercise -- as tolerated  Precautions post D/C -- come to ER for hematoma, unusual pain, erythema, or unusual drainage at access site  Precautions post D/C -- if develop bleeding or hematoma at access site, hold pressure at access site, and come to ER       RILEY 2.2.24  Procedure RILEY prior to CLINTON closure Device  Findings:   CLINTON not well visualized. Procedure had to be cut short as the patient desaturated and anesthesia requested to pull the RILEY probe down. See anesthesia note for full details.  Patient will benefit from CTA chest EP protocol to further assess the CLINTON. But the initial RILEY views did not visualize CLINTNO which is probably surgically excised during her Last CABG.  AV showed marked Aortic valve sclerosis. Concentric LVH is present.        Nuclear Stress Test 12.27.23    Abnormal myocardial perfusion scan.    There is a moderate to severe intensity, moderate sized, mostly reversible perfusion abnormality with some fixed areas in the lateral apical wall(s) in the typical distribution of the LCX territory.    There are no other significant perfusion abnormalities.    The gated perfusion images showed an ejection fraction of 62% at rest. The gated perfusion images showed an ejection fraction of 63% post stress.    There were no arrhythmias during stress.  The nuclear stress test is  fair quality.    On the nuclear stress test the EF is normal.  If the patient has an echo, the EF on the echo is considered  more accurate.      The patient has a moderate risk nuclear stress test.   If patient is symptomatic, consider management with two anti-anginals    Echo 12.27.23    Left Ventricle: The left ventricle is normal in size. Normal wall thickness. There is normal systolic function. Biplane (2D) method of discs ejection fraction is 68%.    Right Ventricle: Normal right ventricular cavity size. Systolic function is borderline low.    Left Atrium: Left atrium is mildly dilated.    Right Atrium: Right atrium is mildly dilated.    Aortic Valve: The aortic valve is a trileaflet valve. There is moderate aortic valve sclerosis.    Mitral Valve: There is severe posterior mitral annular calcification present. There is moderate to severe regurgitation.    Tricuspid Valve: There is moderate annular calcification present. There is moderate regurgitation.    IVC/SVC: Intermediate venous pressure at 8 mmHg.       Echocardiogram December 30, 2021:  Left ventricular ejection fraction is measured at approximately 50 to 55%.  Grade 2 diastolic dysfunction.  Aortic valve is tricuspid.  Aortic valve trileaflet are mildly thickened.  Mitral annular calcification is present.  Mild mitral regurgitation.  There is mild tricuspid regurgitation with RVSP estimated at 56 mmHg.                                                                                                                                                                                                                                                                                                                                                                                                                                                                                   Patient Active Problem List   Diagnosis    Chronic congestive heart failure    Cigarette nicotine dependence without complication    Atherosclerosis of coronary artery bypass graft    Chronic obstructive pulmonary  disease    Hypertension    Hyperlipidemia    Type 2 diabetes mellitus with chronic kidney disease on chronic dialysis, with long-term current use of insulin    ESRD (end stage renal disease) on dialysis    Depressive disorder    Class 2 severe obesity due to excess calories with serious comorbidity and body mass index (BMI) of 35.0 to 35.9 in adult    Vitamin D deficiency    Coronary artery disease involving native coronary artery of native heart without angina pectoris    Paroxysmal atrial fibrillation    Anemia due to chronic kidney disease, on chronic dialysis    Melena    Acquired hypothyroidism    Aortic heart murmur    Chronic renal impairment    Status post coronary artery bypass graft    Frailty    Dissection of ascending aorta    Hyperparathyroidism    Thrombocytopenia, unspecified    Memory loss     Past Surgical History:   Procedure Laterality Date    AV FISTULA PLACEMENT Left     CARDIAC CATHETERIZATION      CARDIAC VALVE REPLACEMENT      COLONOSCOPY      COLONOSCOPY N/A 1/8/2024    Procedure: COLON;  Surgeon: Josh Gore MD;  Location: [facility] ENDOSCOPY;  Service: Gastroenterology;  Laterality: N/A;    CORONARY ARTERY BYPASS GRAFT      EGD, WITH HEMORRHAGE CONTROL  03/24/2023    Procedure: EGD,WITH HEMORRHAGE CONTROL;  Surgeon: Go Le MD;  Location: [facility] ENDOSCOPY;  Service: Gastroenterology;;    EGD, WITH HEMORRHAGE CONTROL  04/06/2023    Procedure: EGD,WITH HEMORRHAGE CONTROL;  Surgeon: Ayden Francois MD;  Location: [facility] ENDOSCOPY;  Service: Gastroenterology;;    EGD, WITH POLYPECTOMY USING SNARE Left 12/8/2023    Procedure: EGD, WITH POLYPECTOMY USING SNARE;  Surgeon: Lexi Scales MD;  Location: [facility] ENDOSCOPY;  Service: Gastroenterology;  Laterality: Left;    ESOPHAGOGASTRODUODENOSCOPY      ESOPHAGOGASTRODUODENOSCOPY N/A 02/17/2023    Procedure: EGD +/- VCE PLACEMENT;  Surgeon: Morgan Gardner MD;  Location: [facility] ENDOSCOPY;  Service:  Gastroenterology;  Laterality: N/A;    ESOPHAGOGASTRODUODENOSCOPY N/A 03/24/2023    Procedure: EGD;  Surgeon: Go Le MD;  Location: [facility] ENDOSCOPY;  Service: Gastroenterology;  Laterality: N/A;  with push    ESOPHAGOGASTRODUODENOSCOPY N/A 04/06/2023    Procedure: EGD;  Surgeon: Ayden Francois MD;  Location: [facility] ENDOSCOPY;  Service: Gastroenterology;  Laterality: N/A;  with push    ESOPHAGOGASTRODUODENOSCOPY N/A 06/16/2023    Procedure: EGD W/ PUSH;  Surgeon: Morgan Gardner MD;  Location: [facility] ENDOSCOPY;  Service: Gastroenterology;  Laterality: N/A;    ESOPHAGOGASTRODUODENOSCOPY N/A 1/4/2024    Procedure: EGD;  Surgeon: Morgan Scales MD;  Location: [facility] ENDOSCOPY;  Service: Gastroenterology;  Laterality: N/A;    EYE SURGERY      LEFT HEART CATHETERIZATION Left 2/5/2024    Procedure: Left heart cath;  Surgeon: Josh Dubon MD;  Location: [facility] CATH LAB;  Service: Cardiology;  Laterality: Left;    POLYPECTOMY      SMALL BOWEL ENTEROSCOPY Left 01/20/2023    Procedure: ENTEROSCOPY;  Surgeon: Lexi Scales MD;  Location: [facility] ENDOSCOPY;  Service: Gastroenterology;  Laterality: Left;    THYROIDECTOMY      TRANSESOPHAGEAL ECHO  2/2/2024    TUBAL LIGATION       Social History     Socioeconomic History    Marital status:     Number of children: 6   Tobacco Use    Smoking status: Every Day     Current packs/day: 2.00     Average packs/day: 2.0 packs/day for 15.0 years (30.0 ttl pk-yrs)     Types: Cigarettes    Smokeless tobacco: Never    Tobacco comments:     Smokes 3 cigarettes a day   Substance and Sexual Activity    Alcohol use: Not Currently     Comment: 1 glass every 2-3 month    Drug use: Never    Sexual activity: Not Currently     Social Drivers of Health     Financial Resource Strain: Low Risk  (1/29/2024)    Overall Financial Resource Strain (CARDIA)     Difficulty of Paying Living Expenses: Not very hard   Food Insecurity: No Food Insecurity (1/29/2024)     Hunger Vital Sign     Worried About Running Out of Food in the Last Year: Never true     Ran Out of Food in the Last Year: Never true   Transportation Needs: High Risk (9/23/2024)    Received from [facility] SDOH Screening     Has lack of transportation kept you from medical appointments, meetings, work or from getting things needed for daily living? choose all that apply.: Yes, it has kept me from medical appointments or from getting my medications   Physical Activity: Unknown (1/29/2024)    Exercise Vital Sign     Days of Exercise per Week: 3 days   Stress: No Stress Concern Present (1/29/2024)    [employer] - Occupational Stress Questionnaire     Feeling of Stress : Not at all   Housing Stability: Low Risk  (1/29/2024)    Housing Stability Vital Sign     Unable to Pay for Housing in the Last Year: No     Number of Places Lived in the Last Year: 2     Unstable Housing in the Last Year: No        Family History   Problem Relation Name Age of Onset    Hypertension Mother Mom     Hypertension Father      Hypertension Sister      Hypertension Sister           Current Outpatient Medications:     albuterol (VENTOLIN HFA) 90 mcg/actuation inhaler, Inhale 2 puffs into the lungs every 6 (six) hours as needed for Wheezing. Rescue, Disp: 18 g, Rfl: 2    albuterol-ipratropium (DUO-NEB) 2.5 mg-0.5 mg/3 mL nebulizer solution, Take 3 mLs by nebulization every 6 (six) hours as needed for Wheezing or Shortness of Breath. Rescue, Disp: 75 mL, Rfl: 0    aspirin 81 MG Chew, Take 1 tablet (81 mg total) by mouth once daily., Disp: 30 tablet, Rfl: 0    BELSOMRA 5 mg Tab, Take 1 tablet by mouth every evening., Disp: , Rfl:     BENADRYL 25 mg capsule, Take 50 mg by mouth nightly., Disp: , Rfl:     blood-glucose meter kit, Use as instructed (Patient not taking: Reported on 4/30/2025), Disp: 1 each, Rfl: 0    carvediloL (COREG) 6.25 MG tablet, TAKE 1 TABLET BY MOUTH TWICE A DAY, Disp: 180 tablet,  Rfl: 3    clonazePAM (KLONOPIN) 0.5 MG tablet, Take 0.5 mg by mouth every evening., Disp: , Rfl:     DIALYVITE 100-1 mg Tab, Take 1 tablet by mouth once daily., Disp: , Rfl:     DULoxetine (CYMBALTA) 30 MG capsule, Take 30 mg by mouth once daily., Disp: , Rfl:     ENTRESTO 24-26 mg per tablet, Take 1 tablet by mouth 2 (two) times daily., Disp: , Rfl:     fluticasone furoate-vilanteroL (BREO) 100-25 mcg/dose diskus inhaler, Inhale 1 puff into the lungs once daily., Disp: 90 each, Rfl: 2    fluticasone propionate (FLONASE) 50 mcg/actuation nasal spray, 1 spray by Each Nostril route once daily., Disp: , Rfl:     furosemide (LASIX) 40 MG tablet, Take 1 tablet (40 mg total) by mouth once daily., Disp: 90 tablet, Rfl: 1    INVEGA SUSTENNA 156 mg/mL Syrg injection, Inject into the muscle., Disp: , Rfl:     L-METHYLFOLATE 15 mg Tab, Take 1 tablet by mouth., Disp: , Rfl:     lactulose 10 gram/15 ml (CHRONULAC) 10 gram/15 mL (15 mL) solution, SMARTSI Milliliter(s) By Mouth Daily PRN (Patient not taking: Reported on 2025), Disp: , Rfl:     levothyroxine (SYNTHROID) 125 MCG tablet, Take 1 tablet (125 mcg total) by mouth before breakfast., Disp: 90 tablet, Rfl: 2    loratadine (CLARITIN) 10 mg tablet, Take 10 mg by mouth once daily. (Patient not taking: Reported on 2025), Disp: , Rfl:     ONETOUCH DELICA PLUS LANCET 30 gauge Misc, 1 lancet  by Other route once daily. (Patient not taking: Reported on 2025), Disp: 100 each, Rfl: 2    ONETOUCH ULTRA TEST Strp, 1 strip by Other route once daily. (Patient not taking: Reported on 2025), Disp: 200 strip, Rfl: 2    pantoprazole (PROTONIX) 40 MG tablet, Take 1 tablet (40 mg total) by mouth once daily. (Patient not taking: Reported on 2025), Disp: 90 tablet, Rfl: 2    rosuvastatin (CRESTOR) 40 MG Tab, Take 1 tablet (40 mg total) by mouth once daily., Disp: 90 tablet, Rfl: 3    sevelamer carbonate (RENVELA) 800 mg Tab, Take 2 tablets (1,600 mg total) by mouth 3  (three) times daily. (Patient not taking: Reported on 4/30/2025), Disp: 90 tablet, Rfl: 0    tiotropium (SPIRIVA WITH HANDIHALER) 18 mcg inhalation capsule, Inhale 1 capsule (18 mcg total) into the lungs Daily., Disp: 90 capsule, Rfl: 2    white petrolatum-mineral oiL (ARTIFICIAL TEARS, MIHAELA/MIN,) 83-15 % Oint, Place into both eyes every evening. (Patient not taking: Reported on 4/30/2025), Disp: 3.5 g, Rfl: 2    Current Facility-Administered Medications:     diazePAM tablet 5 mg, 5 mg, Oral, Once, Dauterive, Zakiya K., PA-C    sodium chloride 0.9% flush 10 mL, 10 mL, Intravenous, PRN, Dauterive, Zakiya K., PA-C     ROS:                                                                                                                                                                             Review of Systems   Constitutional: Negative.    HENT: Negative.     Eyes: Negative.    Respiratory: Negative.  Negative for shortness of breath.    Cardiovascular:  Positive for leg swelling. Negative for chest pain, palpitations, orthopnea, claudication and PND.   Gastrointestinal: Negative.    Genitourinary: Negative.    Musculoskeletal: Negative.    Skin: Negative.    Neurological: Negative.    Endo/Heme/Allergies: Negative.    Psychiatric/Behavioral: Negative.          There were no vitals taken for this visit.   PE:  Physical Exam  HENT:      Head: Normocephalic.      Nose: Nose normal.      Mouth/Throat:      Mouth: Mucous membranes are moist.   Eyes:      Extraocular Movements: Extraocular movements intact.   Cardiovascular:      Rate and Rhythm: Normal rate and regular rhythm.      Pulses: Normal pulses.   Pulmonary:      Effort: Pulmonary effort is normal.      Breath sounds: Normal breath sounds.   Abdominal:      General: There is no distension.   Musculoskeletal:         General: Normal range of motion.      Cervical back: Normal range of motion.      Right lower leg: Edema (Mild) present.      Left lower leg: Edema  "(Mild) present.   Skin:     General: Skin is warm.   Neurological:      General: No focal deficit present.      Mental Status: She is alert.   Psychiatric:         Mood and Affect: Mood normal.          ASSESSMENT/PLAN:  Background Information: Taken From Dr. Macias Note at [facility] (2024)  "78-year-old woman with a history of atrial fibrillation (no longer tolerating Eliquis because of recurrent GI bleed), coronary artery disease, prior heart failure, COPD, depression, diabetes, ESRD on dialysis now presents for follow-up appointment.  Previously, patient was being worked up for left atrial pended closure.  CT of the chest was performed that showed no evidence of significant left atrial appendage, most likely was ligated during prior CABG.  However, this study incidentally found type aortic dissection.  She was sent to the emergency room, and ultimately transferred to [facility] for possible intervention.  Limited paperwork is available to me at this time, but it does appear that patient did not undergo intervention, in favor of medical management.  Pacemaker was placed for symptomatic pericardia.  She also started on Entresto for her heart failure."    HFpEF - EF 50% per TTE May 2025 ([facility]) - NYHA Class II  No change in symptoms since last visit. See above for most recent Echo   Patient denies SOB; continues to have some BLE edema, but associates this with chronic venous insufficiency   Denies any other congestive symptoms today   Echo May 2024 revealed EF 45% moderate MR, mild-to-moderate TR, mild AR, pacemaker was noted.  Echo December 2023 revealed EF 68%, normal systolic function, normal diastolic function, moderate to severe MR, moderate TR.   Echo Jan. 2018 showed diastolic dysfunction, EF of 66%, E/A flow reversal, aortic regurg, and moderately thickened leaflets without aortic stenosis  She is euvolemic, warm, and dry on exam today  Continue Coreg   Entresto discontinued by  " Gilberto  Continue Lasix 40 mg BID and hemodialysis as directed  Counseled on low salt diet    AFIB  Denies any palpitations, lightheadedness, dizziness, or tachycardic symptoms  Appeared to be in regular rhythm on auscultation today  Will remain off of Eliquis due to recurrent GIB   Suspect CLINTON ligation during previous CABG     CAD s/p CABG prior to 2005  Asymptomatic and CAD is quiescent  However, patient did complete nuclear stress test in December 2023 which revealed an abnormal myocardial perfusion scan. There was a moderate to severe intensity, moderate size, mostly reversible perfusion abnormality with some fixed areas in the lateral apical wall in the typical distribution of the LCX territory.  Overall the patient had a moderate risk nuclear stress test.  During inpatient stay at [facility], it was recommended that patient have LHC on an outpatient basis   Subsequent LHC completed in February 2024 revealed three-vessel coronary artery disease, overall medical management was recommended.  Please see full report above  NSTEMI Type II (Supply/Demand) in the setting of Severe Anemia due to GI Bleed Dec. 2021 - recent hospital admission  EF 50% per Echo Dec. 2021  Stress Echo 2018 showed no significant ischemia but a moderately large fixed anterior defect of moderate intensity  Continue Aspirin, Coreg, and Crestor  Counseled on heart healthy diet     COPD   Management per PCP     HTN  /48  Ongoing occasional low BP with HD  Reports compliance with medications  Continuing current therapy   Will defer BP management to Nephrology  Counseled on low salt diet and exercise as tolerated    HLD  LDL 73 per labs October 2024   Continue Crestor 40mg daily  Counseled on low cholesterol diet    Diabetes  A1C - 7.2  Continue management per PCP    Tobacco use  Counseled on the importance of smoking cessation     ESRD on HD T/TH/Sat  Continue nephrology management    Venous Insufficiency   s/p percutaneous endovenous Microfoam  ablation with Varithena of the left GSV with Dr. Singh on May 17, 2021  Recommend compression stockings  Instructed on low salt diet  Follow up with Dr. Singh as directed- last appointment with him May 2023  Ongoing LLE edema intermittently   Has updated US ordered per Dr. Macias       Will have patient decide whether or not she would like to follow with Cardiology here at [facility] or with [facility].  Tentative appointment made for 4 months from now.      Cardiology clinic in 4 months or sooner if needed   Follow up with PCP as directed   Please notify clinic if any new concerns or any change in symptoms

## 2025-08-06 ENCOUNTER — OFFICE VISIT (OUTPATIENT)
Dept: CARDIOLOGY | Facility: CLINIC | Age: 80
End: 2025-08-06
Payer: MEDICAID

## 2025-08-06 VITALS
DIASTOLIC BLOOD PRESSURE: 48 MMHG | HEART RATE: 60 BPM | HEIGHT: 64 IN | RESPIRATION RATE: 18 BRPM | TEMPERATURE: 98 F | WEIGHT: 185 LBS | OXYGEN SATURATION: 93 % | BODY MASS INDEX: 31.58 KG/M2 | SYSTOLIC BLOOD PRESSURE: 117 MMHG

## 2025-08-06 DIAGNOSIS — I25.10 CORONARY ARTERY DISEASE INVOLVING NATIVE CORONARY ARTERY OF NATIVE HEART WITHOUT ANGINA PECTORIS: ICD-10-CM

## 2025-08-06 DIAGNOSIS — I71.010 DISSECTION OF ASCENDING AORTA: ICD-10-CM

## 2025-08-06 DIAGNOSIS — I25.708 ATHEROSCLEROSIS OF CORONARY ARTERY BYPASS GRAFT OF NATIVE HEART WITH STABLE ANGINA PECTORIS: ICD-10-CM

## 2025-08-06 DIAGNOSIS — E78.2 MIXED HYPERLIPIDEMIA: Chronic | ICD-10-CM

## 2025-08-06 DIAGNOSIS — I50.32 CHRONIC HEART FAILURE WITH PRESERVED EJECTION FRACTION: Chronic | ICD-10-CM

## 2025-08-06 DIAGNOSIS — I10 PRIMARY HYPERTENSION: Chronic | ICD-10-CM

## 2025-08-06 DIAGNOSIS — I50.9 CHRONIC CONGESTIVE HEART FAILURE, UNSPECIFIED HEART FAILURE TYPE: Chronic | ICD-10-CM

## 2025-08-06 DIAGNOSIS — I48.0 PAROXYSMAL ATRIAL FIBRILLATION: ICD-10-CM

## 2025-08-06 DIAGNOSIS — F17.210 CIGARETTE NICOTINE DEPENDENCE WITHOUT COMPLICATION: Chronic | ICD-10-CM

## 2025-08-06 DIAGNOSIS — Z95.1 STATUS POST CORONARY ARTERY BYPASS GRAFT: Primary | ICD-10-CM

## 2025-08-06 PROCEDURE — 99214 OFFICE O/P EST MOD 30 MIN: CPT | Mod: S$PBB,,,

## 2025-08-06 PROCEDURE — 99215 OFFICE O/P EST HI 40 MIN: CPT | Mod: PBBFAC

## 2025-08-06 RX ORDER — FUROSEMIDE 40 MG/1
40 TABLET ORAL DAILY
Qty: 90 TABLET | Refills: 1 | Status: SHIPPED | OUTPATIENT
Start: 2025-08-06 | End: 2026-02-02

## 2025-08-06 RX ORDER — SACUBITRIL AND VALSARTAN 24; 26 MG/1; MG/1
1 TABLET, FILM COATED ORAL 2 TIMES DAILY
Qty: 180 TABLET | Refills: 3 | Status: SHIPPED | OUTPATIENT
Start: 2025-08-06 | End: 2025-08-06

## 2025-08-12 ENCOUNTER — HOSPITAL ENCOUNTER (EMERGENCY)
Facility: HOSPITAL | Age: 80
Discharge: HOME | End: 2025-08-12
Attending: INTERNAL MEDICINE
Payer: MEDICARE

## 2025-08-12 VITALS
SYSTOLIC BLOOD PRESSURE: 105 MMHG | TEMPERATURE: 98 F | RESPIRATION RATE: 20 BRPM | HEART RATE: 58 BPM | WEIGHT: 184.94 LBS | DIASTOLIC BLOOD PRESSURE: 58 MMHG | HEIGHT: 66 IN | OXYGEN SATURATION: 100 % | BODY MASS INDEX: 29.72 KG/M2

## 2025-08-12 DIAGNOSIS — R06.2 WHEEZING: ICD-10-CM

## 2025-08-12 DIAGNOSIS — R05.3 CHRONIC COUGH: Primary | ICD-10-CM

## 2025-08-12 PROCEDURE — 99283 EMERGENCY DEPT VISIT LOW MDM: CPT | Mod: 25

## (undated) DEVICE — KIT SURGICAL COLON .25 1.1OZ

## (undated) DEVICE — COVER CIV-FLEX PROBE US 58IN

## (undated) DEVICE — SOL IRRI STRL WATER 1000ML

## (undated) DEVICE — INTRO KIT MICPUNC STIFF CANN

## (undated) DEVICE — DRSNG POLYSKIN TRNSPAR 4X4.75

## (undated) DEVICE — MOUTHPIECE ENDO 60FR

## (undated) DEVICE — COLLECTION SPECIMEN NEPTUNE

## (undated) DEVICE — CATH JR4 5FR

## (undated) DEVICE — INTRODUCER CATH 5F 11CM

## (undated) DEVICE — TIP SUCTION YANKAUER

## (undated) DEVICE — GUIDEWIRE EMERALD .035IN 260CM

## (undated) DEVICE — CATH IMA INFINITI 5X100CM

## (undated) DEVICE — KIT CANIST SUCTION 1200CC

## (undated) DEVICE — DEVICE MYNX CONTROL 5FR 10ML

## (undated) DEVICE — MANIFOLD 4 PORT

## (undated) DEVICE — CATH IMPULSE LCB 100X.047 5FR

## (undated) DEVICE — TUBING O2 FEMALE CONN 13FT

## (undated) DEVICE — PAD GROUNDING DISPER ELECTRODE

## (undated) DEVICE — GUIDEWIRE EMERALD 3MM 175X5CM

## (undated) DEVICE — ELECTRODE REM PLYHSV RETURN 9

## (undated) DEVICE — PROBE CIRCLER FIRE APC

## (undated) DEVICE — TRAP ETRAP POLYP 50 TRAY

## (undated) DEVICE — ADAPTER DUAL NSL LUER M-M 7FT

## (undated) DEVICE — PAD DEFIB CADENCE ADULT R2

## (undated) DEVICE — INTRODUCER TRNSRADIAL 6F 10CM

## (undated) DEVICE — BITE BLOCK ADULT LATEX FREE

## (undated) DEVICE — Device

## (undated) DEVICE — CATH JL3.5 5FR

## (undated) DEVICE — CATH MPA2 INFINITI SH 5X100CM

## (undated) DEVICE — DRAPE RADIAL BRACHIAL 29X42IN

## (undated) DEVICE — SNARE EXACTO COLD

## (undated) DEVICE — OMNIPAQUE 350MG 150ML VIAL

## (undated) DEVICE — BLOCK BLOX BITE DENT RIM 54FR

## (undated) DEVICE — UNDERPAD DISPOSABLE 30X30IN

## (undated) DEVICE — NDL BLUNT FILL 18G 1IN

## (undated) DEVICE — LIDOCAINE HCI VISCOUS SOL 2%